# Patient Record
Sex: FEMALE | Race: OTHER | NOT HISPANIC OR LATINO | URBAN - METROPOLITAN AREA
[De-identification: names, ages, dates, MRNs, and addresses within clinical notes are randomized per-mention and may not be internally consistent; named-entity substitution may affect disease eponyms.]

---

## 2017-07-15 ENCOUNTER — EMERGENCY (EMERGENCY)
Facility: HOSPITAL | Age: 82
LOS: 1 days | Discharge: HOME | End: 2017-07-15
Attending: EMERGENCY MEDICINE | Admitting: EMERGENCY MEDICINE
Payer: MEDICARE

## 2017-07-15 VITALS
RESPIRATION RATE: 18 BRPM | HEART RATE: 67 BPM | DIASTOLIC BLOOD PRESSURE: 93 MMHG | SYSTOLIC BLOOD PRESSURE: 175 MMHG | OXYGEN SATURATION: 98 % | TEMPERATURE: 98 F

## 2017-07-15 DIAGNOSIS — S09.90XA UNSPECIFIED INJURY OF HEAD, INITIAL ENCOUNTER: ICD-10-CM

## 2017-07-15 DIAGNOSIS — S05.11XA CONTUSION OF EYEBALL AND ORBITAL TISSUES, RIGHT EYE, INITIAL ENCOUNTER: ICD-10-CM

## 2017-07-15 DIAGNOSIS — E78.5 HYPERLIPIDEMIA, UNSPECIFIED: ICD-10-CM

## 2017-07-15 DIAGNOSIS — Z88.0 ALLERGY STATUS TO PENICILLIN: ICD-10-CM

## 2017-07-15 DIAGNOSIS — Z98.890 OTHER SPECIFIED POSTPROCEDURAL STATES: Chronic | ICD-10-CM

## 2017-07-15 DIAGNOSIS — Z79.82 LONG TERM (CURRENT) USE OF ASPIRIN: ICD-10-CM

## 2017-07-15 DIAGNOSIS — E11.9 TYPE 2 DIABETES MELLITUS WITHOUT COMPLICATIONS: ICD-10-CM

## 2017-07-15 DIAGNOSIS — Z79.899 OTHER LONG TERM (CURRENT) DRUG THERAPY: ICD-10-CM

## 2017-07-15 DIAGNOSIS — Z88.1 ALLERGY STATUS TO OTHER ANTIBIOTIC AGENTS STATUS: ICD-10-CM

## 2017-07-15 PROCEDURE — 70450 CT HEAD/BRAIN W/O DYE: CPT | Mod: 26

## 2017-07-15 PROCEDURE — 99284 EMERGENCY DEPT VISIT MOD MDM: CPT

## 2017-07-15 NOTE — ED PROVIDER NOTE - PHYSICAL EXAMINATION
RIGHT eye with bruising around orbit but no point tenderness or deformities.  Globe intact PERRLA, EOMI, no change in vision reported.  No other evidence of significant head injury.  No csf/blood from ears/nose, no young sign, no abrasions or hematomas.

## 2017-07-15 NOTE — ED ADULT NURSE NOTE - CHPI ED SYMPTOMS NEG
no change in level of consciousness/no weakness/no vomiting/no dizziness/no numbness/no blurred vision/no confusion/no loss of consciousness/no fever/no nausea

## 2017-07-15 NOTE — ED PROVIDER NOTE - MEDICAL DECISION MAKING DETAILS
Awake, alert, oriented patient in no distress here with bruising on the right side of her face/orbit.  No globe injury.  Normal CT yesterday, on ASA, new headache.  Will rescan for delayed bleed.

## 2017-07-15 NOTE — ED PROVIDER NOTE - OBJECTIVE STATEMENT
Awake, alert, oriented and conversant 88 year old  states that while she was in [facility] yesterday she suffered a mechanical fall without LOC causing her RIGHT eye and side of face to hit the ground.  Patient was seen a hospital there and had a CT scan showing no fracture and no ICH.  Patient came home on the bus but today has had increasing headache so she went [place] [facility] and they sent her here for repeat CT scan r/o bleed.  Patient has contusions around her RIGHT eye but no change in vision, no point tenderness, normal eye movements, and no other injuries or complaints.  Patient takes 81 ASA per day but is not on any other blood thinning medications.

## 2017-07-15 NOTE — ED ADULT TRIAGE NOTE - CHIEF COMPLAINT QUOTE
pt biba from urgent care for head injury. tripped and fell last night and sustained bruise to left orbit. denies taking blood thinners. denies LOC

## 2017-07-15 NOTE — ED PROVIDER NOTE - PROGRESS NOTE DETAILS
Awake, alert, oriented x 3, conversant and in no distress with no complaints.  Follow up plan discussed.  Neuro exam normal. Importance of close followup and precautions for immediate return discussed.

## 2017-07-15 NOTE — ED ADULT NURSE NOTE - OBJECTIVE STATEMENT
pt. aaox,3 c/o bruising to head after fall 22 hours ago. pt. states she was seen at Urgent care but sent here for further evaluation. pt. c/o headache with bruising around right eye. speaking complete full sentences

## 2018-02-13 ENCOUNTER — EMERGENCY (EMERGENCY)
Facility: HOSPITAL | Age: 83
LOS: 1 days | Discharge: HOME | End: 2018-02-13
Admitting: EMERGENCY MEDICINE
Payer: MEDICARE

## 2018-02-13 VITALS
RESPIRATION RATE: 16 BRPM | DIASTOLIC BLOOD PRESSURE: 68 MMHG | OXYGEN SATURATION: 98 % | SYSTOLIC BLOOD PRESSURE: 158 MMHG | TEMPERATURE: 98 F | HEART RATE: 88 BPM

## 2018-02-13 VITALS
OXYGEN SATURATION: 99 % | SYSTOLIC BLOOD PRESSURE: 140 MMHG | RESPIRATION RATE: 14 BRPM | DIASTOLIC BLOOD PRESSURE: 90 MMHG | HEART RATE: 74 BPM
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
## 2018-02-13 DIAGNOSIS — Z88.1 ALLERGY STATUS TO OTHER ANTIBIOTIC AGENTS STATUS: ICD-10-CM
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
## 2018-02-13 DIAGNOSIS — Z88.0 ALLERGY STATUS TO PENICILLIN: ICD-10-CM

## 2018-02-13 DIAGNOSIS — S09.90XA UNSPECIFIED INJURY OF HEAD, INITIAL ENCOUNTER: ICD-10-CM

## 2018-02-13 DIAGNOSIS — E78.5 HYPERLIPIDEMIA, UNSPECIFIED: ICD-10-CM
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
## 2018-02-13 DIAGNOSIS — Z98.890 OTHER SPECIFIED POSTPROCEDURAL STATES: Chronic | ICD-10-CM

## 2018-02-13 DIAGNOSIS — S00.11XA CONTUSION OF RIGHT EYELID AND PERIOCULAR AREA, INITIAL ENCOUNTER: ICD-10-CM

## 2018-02-13 DIAGNOSIS — Z23 ENCOUNTER FOR IMMUNIZATION: ICD-10-CM

## 2018-02-13 DIAGNOSIS — Z79.899 OTHER LONG TERM (CURRENT) DRUG THERAPY: ICD-10-CM

## 2018-02-13 DIAGNOSIS — Z79.82 LONG TERM (CURRENT) USE OF ASPIRIN: ICD-10-CM

## 2018-02-13 PROCEDURE — 70450 CT HEAD/BRAIN W/O DYE: CPT | Mod: 26

## 2018-02-13 PROCEDURE — 70486 CT MAXILLOFACIAL W/O DYE: CPT | Mod: 26

## 2018-02-13 PROCEDURE — 99284 EMERGENCY DEPT VISIT MOD MDM: CPT

## 2018-02-13 RX ORDER — TETANUS TOXOID, REDUCED DIPHTHERIA TOXOID AND ACELLULAR PERTUSSIS VACCINE, ADSORBED 5; 2.5; 8; 8; 2.5 [IU]/.5ML; [IU]/.5ML; UG/.5ML; UG/.5ML; UG/.5ML
0.5 SUSPENSION INTRAMUSCULAR ONCE
Qty: 0 | Refills: 0 | Status: COMPLETED | OUTPATIENT
Start: 2018-02-13 | End: 2018-02-13

## 2018-02-13 RX ADMIN — TETANUS TOXOID, REDUCED DIPHTHERIA TOXOID AND ACELLULAR PERTUSSIS VACCINE, ADSORBED 0.5 MILLILITER(S): 5; 2.5; 8; 8; 2.5 SUSPENSION INTRAMUSCULAR at 19:16

## 2018-02-13 NOTE — ED PROVIDER NOTE - RIGHT FACE
Right-sided periorbital ecchymosis, swelling and tenderness. No deformity or palpable crepitus. No septal hematoma.

## 2018-02-13 NOTE — ED PROVIDER NOTE - MEDICAL DECISION MAKING DETAILS
Will order CT Head/Maxillofacial and reassess. CT Head/Maxillofacial negative for acute fracture or intracranial trauma. Pt A&Ox3. NAD. Ambulating around ED asking for discharge. Will D/C home with strict instructions to F/U with PMD in 2-3 days. Strict return precautions reviewed with pt in which pt verbalizes understanding and agrees to.

## 2018-02-13 NOTE — ED PROVIDER NOTE - OBJECTIVE STATEMENT
88 y/o F with PMH of HLD, Diabetes Insipidus and Macular Degeneration presents to ED c/o head & facial injuries after mechanically tripping and falling while running down the sidewalk to catch a bus this evening. She states she smacked the right side of her face and forehead against the sidewalk and now endorses localized pain to the area. She went to [facility] where she was referred here for further evaluation and CT scan. Pt takes ASA daily but does not take any other anticoagulants.    Denies LOC, confusion, generalized headache, dizziness, acute vision changes, neck or back pain, CP, SOB, palpitations, abdo pain, N/V, paresthesias, numbness or focal weakness

## 2018-02-13 NOTE — ED PROVIDER NOTE - CARE PLAN
Principal Discharge DX:	Facial contusion  Secondary Diagnosis:	Head injury Principal Discharge DX:	Facial contusion

## 2018-02-13 NOTE — ED ADULT TRIAGE NOTE - CHIEF COMPLAINT QUOTE
Patient reports tripped and fell while running to catch the bus. hit head--was seen urgent care and sent for head CT. denies LOC, dizziness, or headache. Multiple bruises and swelling noted to R forehead and R face.

## 2020-05-19 NOTE — ED PROVIDER NOTE - CROS ED EYES ALL NEG
CT THORAX NONCONTRAST:



DATE:

5/19/2020



HISTORY:

62-year-old female with respiratory failure.



COMPARISON:

none



FINDINGS:

ETT distal tip at mid thoracic trachea. Esophageal tube distal tip at least at distal stomach or kim
roduodenal junction. Bilateral pleural effusions; right effusion occupies approximately 20-25%

volume of right hemithoracic cavity, and the left occupies approximately 10- 15% volume. Adjacent con
solidations at the posterior aspects of bilateral lower lobes of moderate to large size images,

broadly abutting the pleural effusions. These probably represent passive atelectasis, although pneumo
kavya cannot be excluded.



Scattered ill-defined patchy regions of groundglass attenuation in bilateral upper lobes are nonspeci
fic, but one possibility is pulmonary interstitial edema. Cannot rule out viral pneumonia. No

high-grade narrowing of trachea or bilateral mainstem bronchi. No thoracic aortic aneurysm. Minimal p
ericardial effusion. No pneumothorax. No mediastinal lymphadenopathy.



IMPRESSION:



1. Bilateral small pleural effusions, right greater than left.

2. Adjacent broad consolidations in the bilateral lower lobes, probably passive atelectasis, although
 pneumonia cannot be excluded.

3. Nonspecific scattered bilateral upper lobe groundglass densities. One possibility is pulmonary int
erstitial edema. Cannot exclude viral pneumonia.

4. Endotracheal tube and esophagogastric tube.



Reported By: Momo Larsen 

Electronically Signed:  5/19/2020 11:45 AM negative...

## 2021-05-17 PROBLEM — E78.5 HYPERLIPIDEMIA, UNSPECIFIED: Chronic | Status: ACTIVE | Noted: 2017-07-15

## 2021-05-17 PROBLEM — E23.2 DIABETES INSIPIDUS: Chronic | Status: ACTIVE | Noted: 2017-07-15

## 2021-05-25 ENCOUNTER — APPOINTMENT (OUTPATIENT)
Dept: HEART AND VASCULAR | Facility: CLINIC | Age: 86
End: 2021-05-25

## 2021-05-31 ENCOUNTER — RESULT CHARGE (OUTPATIENT)
Age: 86
End: 2021-05-31

## 2021-06-01 ENCOUNTER — NON-APPOINTMENT (OUTPATIENT)
Age: 86
End: 2021-06-01

## 2021-06-01 ENCOUNTER — APPOINTMENT (OUTPATIENT)
Dept: HEART AND VASCULAR | Facility: CLINIC | Age: 86
End: 2021-06-01
Payer: MEDICARE

## 2021-06-01 VITALS — TEMPERATURE: 97.8 F

## 2021-06-01 VITALS
OXYGEN SATURATION: 96 % | HEIGHT: 61 IN | DIASTOLIC BLOOD PRESSURE: 74 MMHG | HEART RATE: 76 BPM | WEIGHT: 148 LBS | BODY MASS INDEX: 27.94 KG/M2 | SYSTOLIC BLOOD PRESSURE: 130 MMHG

## 2021-06-01 PROCEDURE — 93000 ELECTROCARDIOGRAM COMPLETE: CPT

## 2021-06-01 PROCEDURE — 99205 OFFICE O/P NEW HI 60 MIN: CPT | Mod: 25

## 2021-06-01 NOTE — ASSESSMENT
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 \par \par She has dyspnea with minimal exertion and can walk about 1/2 block before becoming very sob. She denies any cp. She also has PND. Her symptoms have been increasing over the past month.\par \par She had an echo 5/7/21 showing normal EF (55%), bioprosthetic valve, mild/mod AS and severe mitral regurgitation.\par \par Her BNP was 298 on 5/25/21.\par \par Her vitals were WNL. She has a 4/6 SADIE at RUSB and her lungs are CTA. She has 1-2+ LE edema to the knees.\par \par Her EKG showed NST at 76 with LVH.\par \par I walked her up and down the hallway. Her PO was 92 at rest and dropped to 83 with walking.\par \par We will R/O severe MR, CAD and a PE. Pt will have cMRI as well as a CTA for cors and R/O PE.\par \par Pt will return in 1 week\par \par I discussed the patient with Dr. Mcpherson and is in agreement with out plan.

## 2021-06-01 NOTE — HISTORY OF PRESENT ILLNESS
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 \par \par She has dyspnea with minimal exertion and can walk about 1/2 block before becoming very sob. She denies any cp. She also has PND. Her symptoms have been increasing over the past month.\par \par She had an echo 5/7/21 showing normal EF (55%), mild/mod AS and severe mitral regurgitation.\par \par Her BNP was 298 on 5/25/21.

## 2021-06-01 NOTE — PHYSICAL EXAM
[Normal S1, S2] : normal S1, S2 [No Rub] : no rub [No Gallop] : no gallop [Murmur] : murmur [Normal] : clear lung fields, good air entry, no respiratory distress [de-identified] : 4/6 SM [de-identified] : 1-2+ edema bilaterally up to knee

## 2021-06-02 LAB
ALBUMIN SERPL ELPH-MCNC: 4.3 G/DL
ALP BLD-CCNC: 65 U/L
ALT SERPL-CCNC: 19 U/L
ANION GAP SERPL CALC-SCNC: 11 MMOL/L
AST SERPL-CCNC: 26 U/L
BASOPHILS # BLD AUTO: 0.03 K/UL
BASOPHILS NFR BLD AUTO: 0.4 %
BILIRUB SERPL-MCNC: 0.6 MG/DL
BUN SERPL-MCNC: 14 MG/DL
CALCIUM SERPL-MCNC: 9.8 MG/DL
CHLORIDE SERPL-SCNC: 89 MMOL/L
CO2 SERPL-SCNC: 28 MMOL/L
CREAT SERPL-MCNC: 0.86 MG/DL
DEPRECATED D DIMER PPP IA-ACNC: 411 NG/ML DDU
EOSINOPHIL # BLD AUTO: 0.06 K/UL
EOSINOPHIL NFR BLD AUTO: 0.8 %
GLUCOSE SERPL-MCNC: 95 MG/DL
HCT VFR BLD CALC: 34.3 %
HGB BLD-MCNC: 10.9 G/DL
IMM GRANULOCYTES NFR BLD AUTO: 0.5 %
LYMPHOCYTES # BLD AUTO: 0.92 K/UL
LYMPHOCYTES NFR BLD AUTO: 12.3 %
MAN DIFF?: NORMAL
MCHC RBC-ENTMCNC: 30.9 PG
MCHC RBC-ENTMCNC: 31.8 GM/DL
MCV RBC AUTO: 97.2 FL
MONOCYTES # BLD AUTO: 0.78 K/UL
MONOCYTES NFR BLD AUTO: 10.5 %
NEUTROPHILS # BLD AUTO: 5.63 K/UL
NEUTROPHILS NFR BLD AUTO: 75.5 %
NT-PROBNP SERPL-MCNC: 7257 PG/ML
PLATELET # BLD AUTO: 312 K/UL
POTASSIUM SERPL-SCNC: 5.5 MMOL/L
PROT SERPL-MCNC: 6.2 G/DL
RBC # BLD: 3.53 M/UL
RBC # FLD: 14.3 %
SODIUM SERPL-SCNC: 127 MMOL/L
WBC # FLD AUTO: 7.46 K/UL

## 2021-06-03 ENCOUNTER — RESULT REVIEW (OUTPATIENT)
Age: 86
End: 2021-06-03

## 2021-06-03 ENCOUNTER — OUTPATIENT (OUTPATIENT)
Dept: OUTPATIENT SERVICES | Facility: HOSPITAL | Age: 86
LOS: 1 days | End: 2021-06-03

## 2021-06-03 ENCOUNTER — APPOINTMENT (OUTPATIENT)
Dept: CT IMAGING | Facility: CLINIC | Age: 86
End: 2021-06-03
Payer: MEDICARE

## 2021-06-03 DIAGNOSIS — Z98.890 OTHER SPECIFIED POSTPROCEDURAL STATES: Chronic | ICD-10-CM

## 2021-06-03 PROCEDURE — G1004: CPT

## 2021-06-03 PROCEDURE — 71275 CT ANGIOGRAPHY CHEST: CPT | Mod: 26,ME

## 2021-06-03 PROCEDURE — 75574 CT ANGIO HRT W/3D IMAGE: CPT | Mod: 26,ME

## 2021-06-11 ENCOUNTER — OUTPATIENT (OUTPATIENT)
Dept: OUTPATIENT SERVICES | Facility: HOSPITAL | Age: 86
LOS: 1 days | End: 2021-06-11
Payer: MEDICARE

## 2021-06-11 ENCOUNTER — EMERGENCY (EMERGENCY)
Facility: HOSPITAL | Age: 86
LOS: 1 days | Discharge: LEFT BEFORE DISCHARGE | End: 2021-06-11
Attending: STUDENT IN AN ORGANIZED HEALTH CARE EDUCATION/TRAINING PROGRAM | Admitting: STUDENT IN AN ORGANIZED HEALTH CARE EDUCATION/TRAINING PROGRAM
Payer: MEDICARE

## 2021-06-11 ENCOUNTER — APPOINTMENT (OUTPATIENT)
Dept: MRI IMAGING | Facility: HOSPITAL | Age: 86
End: 2021-06-11

## 2021-06-11 VITALS
RESPIRATION RATE: 18 BRPM | OXYGEN SATURATION: 97 % | SYSTOLIC BLOOD PRESSURE: 133 MMHG | HEART RATE: 76 BPM | DIASTOLIC BLOOD PRESSURE: 60 MMHG

## 2021-06-11 VITALS
SYSTOLIC BLOOD PRESSURE: 108 MMHG | HEART RATE: 64 BPM | TEMPERATURE: 97 F | DIASTOLIC BLOOD PRESSURE: 73 MMHG | HEIGHT: 56 IN | RESPIRATION RATE: 18 BRPM | OXYGEN SATURATION: 95 %

## 2021-06-11 DIAGNOSIS — R55 SYNCOPE AND COLLAPSE: ICD-10-CM

## 2021-06-11 DIAGNOSIS — Z98.890 OTHER SPECIFIED POSTPROCEDURAL STATES: Chronic | ICD-10-CM

## 2021-06-11 DIAGNOSIS — E23.2 DIABETES INSIPIDUS: ICD-10-CM

## 2021-06-11 DIAGNOSIS — Z88.1 ALLERGY STATUS TO OTHER ANTIBIOTIC AGENTS STATUS: ICD-10-CM

## 2021-06-11 DIAGNOSIS — Z88.0 ALLERGY STATUS TO PENICILLIN: ICD-10-CM

## 2021-06-11 DIAGNOSIS — E11.9 TYPE 2 DIABETES MELLITUS WITHOUT COMPLICATIONS: ICD-10-CM

## 2021-06-11 DIAGNOSIS — I25.10 ATHEROSCLEROTIC HEART DISEASE OF NATIVE CORONARY ARTERY WITHOUT ANGINA PECTORIS: ICD-10-CM

## 2021-06-11 DIAGNOSIS — R07.9 CHEST PAIN, UNSPECIFIED: ICD-10-CM

## 2021-06-11 DIAGNOSIS — Z86.79 PERSONAL HISTORY OF OTHER DISEASES OF THE CIRCULATORY SYSTEM: ICD-10-CM

## 2021-06-11 DIAGNOSIS — E78.5 HYPERLIPIDEMIA, UNSPECIFIED: ICD-10-CM

## 2021-06-11 LAB
ALBUMIN SERPL ELPH-MCNC: 3.9 G/DL — SIGNIFICANT CHANGE UP (ref 3.3–5)
ALP SERPL-CCNC: 57 U/L — SIGNIFICANT CHANGE UP (ref 40–120)
ALT FLD-CCNC: 17 U/L — SIGNIFICANT CHANGE UP (ref 10–45)
ANION GAP SERPL CALC-SCNC: 13 MMOL/L — SIGNIFICANT CHANGE UP (ref 5–17)
APTT BLD: 31 SEC — SIGNIFICANT CHANGE UP (ref 27.5–35.5)
AST SERPL-CCNC: 28 U/L — SIGNIFICANT CHANGE UP (ref 10–40)
BASOPHILS # BLD AUTO: 0.02 K/UL — SIGNIFICANT CHANGE UP (ref 0–0.2)
BASOPHILS NFR BLD AUTO: 0.3 % — SIGNIFICANT CHANGE UP (ref 0–2)
BILIRUB SERPL-MCNC: 0.6 MG/DL — SIGNIFICANT CHANGE UP (ref 0.2–1.2)
BUN SERPL-MCNC: 24 MG/DL — HIGH (ref 7–23)
CALCIUM SERPL-MCNC: 9.8 MG/DL — SIGNIFICANT CHANGE UP (ref 8.4–10.5)
CHLORIDE SERPL-SCNC: 93 MMOL/L — LOW (ref 96–108)
CO2 SERPL-SCNC: 24 MMOL/L — SIGNIFICANT CHANGE UP (ref 22–31)
CREAT SERPL-MCNC: 1 MG/DL — SIGNIFICANT CHANGE UP (ref 0.5–1.3)
EOSINOPHIL # BLD AUTO: 0.07 K/UL — SIGNIFICANT CHANGE UP (ref 0–0.5)
EOSINOPHIL NFR BLD AUTO: 1 % — SIGNIFICANT CHANGE UP (ref 0–6)
GLUCOSE SERPL-MCNC: 125 MG/DL — HIGH (ref 70–99)
HCT VFR BLD CALC: 31.8 % — LOW (ref 34.5–45)
HGB BLD-MCNC: 10.4 G/DL — LOW (ref 11.5–15.5)
IMM GRANULOCYTES NFR BLD AUTO: 0.4 % — SIGNIFICANT CHANGE UP (ref 0–1.5)
INR BLD: 1.01 — SIGNIFICANT CHANGE UP (ref 0.88–1.16)
LYMPHOCYTES # BLD AUTO: 1.31 K/UL — SIGNIFICANT CHANGE UP (ref 1–3.3)
LYMPHOCYTES # BLD AUTO: 18.1 % — SIGNIFICANT CHANGE UP (ref 13–44)
MCHC RBC-ENTMCNC: 30.5 PG — SIGNIFICANT CHANGE UP (ref 27–34)
MCHC RBC-ENTMCNC: 32.7 GM/DL — SIGNIFICANT CHANGE UP (ref 32–36)
MCV RBC AUTO: 93.3 FL — SIGNIFICANT CHANGE UP (ref 80–100)
MONOCYTES # BLD AUTO: 0.64 K/UL — SIGNIFICANT CHANGE UP (ref 0–0.9)
MONOCYTES NFR BLD AUTO: 8.8 % — SIGNIFICANT CHANGE UP (ref 2–14)
NEUTROPHILS # BLD AUTO: 5.17 K/UL — SIGNIFICANT CHANGE UP (ref 1.8–7.4)
NEUTROPHILS NFR BLD AUTO: 71.4 % — SIGNIFICANT CHANGE UP (ref 43–77)
NRBC # BLD: 0 /100 WBCS — SIGNIFICANT CHANGE UP (ref 0–0)
PLATELET # BLD AUTO: 231 K/UL — SIGNIFICANT CHANGE UP (ref 150–400)
POTASSIUM SERPL-MCNC: 4.6 MMOL/L — SIGNIFICANT CHANGE UP (ref 3.5–5.3)
POTASSIUM SERPL-SCNC: 4.6 MMOL/L — SIGNIFICANT CHANGE UP (ref 3.5–5.3)
PROT SERPL-MCNC: 6.5 G/DL — SIGNIFICANT CHANGE UP (ref 6–8.3)
PROTHROM AB SERPL-ACNC: 12.1 SEC — SIGNIFICANT CHANGE UP (ref 10.6–13.6)
RBC # BLD: 3.41 M/UL — LOW (ref 3.8–5.2)
RBC # FLD: 13.7 % — SIGNIFICANT CHANGE UP (ref 10.3–14.5)
SODIUM SERPL-SCNC: 130 MMOL/L — LOW (ref 135–145)
TROPONIN T SERPL-MCNC: 0.01 NG/ML — SIGNIFICANT CHANGE UP (ref 0–0.01)
WBC # BLD: 7.24 K/UL — SIGNIFICANT CHANGE UP (ref 3.8–10.5)
WBC # FLD AUTO: 7.24 K/UL — SIGNIFICANT CHANGE UP (ref 3.8–10.5)

## 2021-06-11 PROCEDURE — 93005 ELECTROCARDIOGRAM TRACING: CPT

## 2021-06-11 PROCEDURE — 99284 EMERGENCY DEPT VISIT MOD MDM: CPT

## 2021-06-11 PROCEDURE — 84484 ASSAY OF TROPONIN QUANT: CPT

## 2021-06-11 PROCEDURE — 71555 MRI ANGIO CHEST W OR W/O DYE: CPT

## 2021-06-11 PROCEDURE — A9585: CPT

## 2021-06-11 PROCEDURE — 36415 COLL VENOUS BLD VENIPUNCTURE: CPT

## 2021-06-11 PROCEDURE — 80053 COMPREHEN METABOLIC PANEL: CPT

## 2021-06-11 PROCEDURE — G1004: CPT

## 2021-06-11 PROCEDURE — 85610 PROTHROMBIN TIME: CPT

## 2021-06-11 PROCEDURE — 71555 MRI ANGIO CHEST W OR W/O DYE: CPT | Mod: 26,ME

## 2021-06-11 PROCEDURE — 85730 THROMBOPLASTIN TIME PARTIAL: CPT

## 2021-06-11 PROCEDURE — 85025 COMPLETE CBC W/AUTO DIFF WBC: CPT

## 2021-06-11 PROCEDURE — 99283 EMERGENCY DEPT VISIT LOW MDM: CPT

## 2021-06-11 NOTE — ED PROVIDER NOTE - PATIENT PORTAL LINK FT
You can access the FollowMyHealth Patient Portal offered by [facility] by registering at the following website: [email or web address]. By joining [employer]’s FollowMyHealth portal, you will also be able to view your health information using other applications (apps) compatible with our system.

## 2021-06-11 NOTE — ED ADULT NURSE NOTE - OBJECTIVE STATEMENT
pt had a syncopal episode , was sitting outside a restaurant and passed out for a brief period, prior to this had walked a few blocks using her walker accompanied by her carer and had been fasting since am for an MRI , stated that she was feeling weak while walking, having some discomfort in right lateral ribcage area, no SOB

## 2021-06-11 NOTE — ED PROVIDER NOTE - OBJECTIVE STATEMENT
89F hx HLD, DM, CAD, thoracic aortic aneurysms, presenting to the ED s/p syncopal event that was witnessed by her health aid while at a restaurant this evening. Per pt, she felt sob walking the many blocks to the restaurant, and then once she finally arrived to said restaurant, briefly 'passed out' while sitting in a chair. Pt did not fall or hit her head, per aid at bedside, and came to ~2 minutes later at which point EMS was called. Here in ED pt complains of chest pain under her R breast, mild, nonpleuritic.     Denies headache, paresthesias, abdominal pain, nausea, vomiting, fevers

## 2021-06-11 NOTE — ED PROVIDER NOTE - CLINICAL SUMMARY MEDICAL DECISION MAKING FREE TEXT BOX
pt was recommended admission. explained in detail why I felt pt should stay for cardiac workup but she refused. Pt was agreeable to signing against medical advice release and left against my medical advice stating "I'll see my doctor in the morning:".

## 2021-06-11 NOTE — ED ADULT TRIAGE NOTE - OTHER COMPLAINTS
biba from street for syncopal episode, ems reports pt was hypotensive in field 60's.  Pt denies dizziness, cp, n/v.

## 2021-06-15 ENCOUNTER — APPOINTMENT (OUTPATIENT)
Dept: HEART AND VASCULAR | Facility: CLINIC | Age: 86
End: 2021-06-15

## 2021-06-16 ENCOUNTER — INPATIENT (INPATIENT)
Facility: HOSPITAL | Age: 86
LOS: 19 days | Discharge: HOME HEALTH | DRG: 266 | End: 2021-07-06
Attending: INTERNAL MEDICINE | Admitting: INTERNAL MEDICINE
Payer: MEDICARE

## 2021-06-16 VITALS
DIASTOLIC BLOOD PRESSURE: 33 MMHG | SYSTOLIC BLOOD PRESSURE: 81 MMHG | RESPIRATION RATE: 17 BRPM | HEIGHT: 56 IN | HEART RATE: 73 BPM | OXYGEN SATURATION: 98 % | WEIGHT: 147.93 LBS | TEMPERATURE: 98 F

## 2021-06-16 DIAGNOSIS — Z98.890 OTHER SPECIFIED POSTPROCEDURAL STATES: Chronic | ICD-10-CM

## 2021-06-16 DIAGNOSIS — E23.2 DIABETES INSIPIDUS: ICD-10-CM

## 2021-06-16 DIAGNOSIS — E78.5 HYPERLIPIDEMIA, UNSPECIFIED: ICD-10-CM

## 2021-06-16 DIAGNOSIS — Z95.2 PRESENCE OF PROSTHETIC HEART VALVE: Chronic | ICD-10-CM

## 2021-06-16 DIAGNOSIS — N17.9 ACUTE KIDNEY FAILURE, UNSPECIFIED: ICD-10-CM

## 2021-06-16 DIAGNOSIS — Z86.79 PERSONAL HISTORY OF OTHER DISEASES OF THE CIRCULATORY SYSTEM: ICD-10-CM

## 2021-06-16 DIAGNOSIS — I10 ESSENTIAL (PRIMARY) HYPERTENSION: ICD-10-CM

## 2021-06-16 DIAGNOSIS — Z96.641 PRESENCE OF RIGHT ARTIFICIAL HIP JOINT: Chronic | ICD-10-CM

## 2021-06-16 DIAGNOSIS — D64.9 ANEMIA, UNSPECIFIED: ICD-10-CM

## 2021-06-16 DIAGNOSIS — Z96.649 PRESENCE OF UNSPECIFIED ARTIFICIAL HIP JOINT: Chronic | ICD-10-CM

## 2021-06-16 DIAGNOSIS — R55 SYNCOPE AND COLLAPSE: ICD-10-CM

## 2021-06-16 LAB
ALBUMIN SERPL ELPH-MCNC: 3.9 G/DL — SIGNIFICANT CHANGE UP (ref 3.3–5)
ALP SERPL-CCNC: 60 U/L — SIGNIFICANT CHANGE UP (ref 40–120)
ALT FLD-CCNC: 17 U/L — SIGNIFICANT CHANGE UP (ref 10–45)
ANION GAP SERPL CALC-SCNC: 6 MMOL/L — SIGNIFICANT CHANGE UP (ref 5–17)
APTT BLD: 28.3 SEC — SIGNIFICANT CHANGE UP (ref 27.5–35.5)
AST SERPL-CCNC: 29 U/L — SIGNIFICANT CHANGE UP (ref 10–40)
BASOPHILS # BLD AUTO: 0.02 K/UL — SIGNIFICANT CHANGE UP (ref 0–0.2)
BASOPHILS NFR BLD AUTO: 0.2 % — SIGNIFICANT CHANGE UP (ref 0–2)
BILIRUB SERPL-MCNC: 0.5 MG/DL — SIGNIFICANT CHANGE UP (ref 0.2–1.2)
BUN SERPL-MCNC: 35 MG/DL — HIGH (ref 7–23)
CALCIUM SERPL-MCNC: 9.4 MG/DL — SIGNIFICANT CHANGE UP (ref 8.4–10.5)
CHLORIDE SERPL-SCNC: 97 MMOL/L — SIGNIFICANT CHANGE UP (ref 96–108)
CK MB CFR SERPL CALC: 12.7 NG/ML — HIGH (ref 0–6.7)
CK MB CFR SERPL CALC: 13.3 NG/ML — HIGH (ref 0–6.7)
CK MB CFR SERPL CALC: 3.3 NG/ML — SIGNIFICANT CHANGE UP (ref 0–6.7)
CK SERPL-CCNC: 136 U/L — SIGNIFICANT CHANGE UP (ref 25–170)
CK SERPL-CCNC: 214 U/L — HIGH (ref 25–170)
CK SERPL-CCNC: 237 U/L — HIGH (ref 25–170)
CO2 SERPL-SCNC: 30 MMOL/L — SIGNIFICANT CHANGE UP (ref 22–31)
CREAT SERPL-MCNC: 1.34 MG/DL — HIGH (ref 0.5–1.3)
EOSINOPHIL # BLD AUTO: 0.13 K/UL — SIGNIFICANT CHANGE UP (ref 0–0.5)
EOSINOPHIL NFR BLD AUTO: 1.6 % — SIGNIFICANT CHANGE UP (ref 0–6)
GLUCOSE SERPL-MCNC: 105 MG/DL — HIGH (ref 70–99)
HCT VFR BLD CALC: 32.1 % — LOW (ref 34.5–45)
HGB BLD-MCNC: 10.3 G/DL — LOW (ref 11.5–15.5)
IMM GRANULOCYTES NFR BLD AUTO: 0.4 % — SIGNIFICANT CHANGE UP (ref 0–1.5)
INR BLD: 0.98 — SIGNIFICANT CHANGE UP (ref 0.88–1.16)
LYMPHOCYTES # BLD AUTO: 1.13 K/UL — SIGNIFICANT CHANGE UP (ref 1–3.3)
LYMPHOCYTES # BLD AUTO: 14 % — SIGNIFICANT CHANGE UP (ref 13–44)
MCHC RBC-ENTMCNC: 30 PG — SIGNIFICANT CHANGE UP (ref 27–34)
MCHC RBC-ENTMCNC: 32.1 GM/DL — SIGNIFICANT CHANGE UP (ref 32–36)
MCV RBC AUTO: 93.6 FL — SIGNIFICANT CHANGE UP (ref 80–100)
MONOCYTES # BLD AUTO: 0.82 K/UL — SIGNIFICANT CHANGE UP (ref 0–0.9)
MONOCYTES NFR BLD AUTO: 10.2 % — SIGNIFICANT CHANGE UP (ref 2–14)
NEUTROPHILS # BLD AUTO: 5.93 K/UL — SIGNIFICANT CHANGE UP (ref 1.8–7.4)
NEUTROPHILS NFR BLD AUTO: 73.6 % — SIGNIFICANT CHANGE UP (ref 43–77)
NRBC # BLD: 0 /100 WBCS — SIGNIFICANT CHANGE UP (ref 0–0)
NT-PROBNP SERPL-SCNC: 7233 PG/ML — HIGH (ref 0–300)
PLATELET # BLD AUTO: 247 K/UL — SIGNIFICANT CHANGE UP (ref 150–400)
POTASSIUM SERPL-MCNC: 4.7 MMOL/L — SIGNIFICANT CHANGE UP (ref 3.5–5.3)
POTASSIUM SERPL-SCNC: 4.7 MMOL/L — SIGNIFICANT CHANGE UP (ref 3.5–5.3)
PROT SERPL-MCNC: 6.8 G/DL — SIGNIFICANT CHANGE UP (ref 6–8.3)
PROTHROM AB SERPL-ACNC: 11.8 SEC — SIGNIFICANT CHANGE UP (ref 10.6–13.6)
RBC # BLD: 3.43 M/UL — LOW (ref 3.8–5.2)
RBC # FLD: 14 % — SIGNIFICANT CHANGE UP (ref 10.3–14.5)
SARS-COV-2 RNA SPEC QL NAA+PROBE: NEGATIVE — SIGNIFICANT CHANGE UP
SODIUM SERPL-SCNC: 133 MMOL/L — LOW (ref 135–145)
TROPONIN T SERPL-MCNC: 0.04 NG/ML — CRITICAL HIGH (ref 0–0.01)
TROPONIN T SERPL-MCNC: 0.18 NG/ML — CRITICAL HIGH (ref 0–0.01)
TROPONIN T SERPL-MCNC: 0.21 NG/ML — CRITICAL HIGH (ref 0–0.01)
WBC # BLD: 8.06 K/UL — SIGNIFICANT CHANGE UP (ref 3.8–10.5)
WBC # FLD AUTO: 8.06 K/UL — SIGNIFICANT CHANGE UP (ref 3.8–10.5)

## 2021-06-16 PROCEDURE — 99223 1ST HOSP IP/OBS HIGH 75: CPT

## 2021-06-16 PROCEDURE — 93306 TTE W/DOPPLER COMPLETE: CPT | Mod: 26

## 2021-06-16 PROCEDURE — 93010 ELECTROCARDIOGRAM REPORT: CPT | Mod: 77

## 2021-06-16 PROCEDURE — 93010 ELECTROCARDIOGRAM REPORT: CPT

## 2021-06-16 PROCEDURE — ZZZZZ: CPT

## 2021-06-16 PROCEDURE — 71045 X-RAY EXAM CHEST 1 VIEW: CPT | Mod: 26

## 2021-06-16 PROCEDURE — 99291 CRITICAL CARE FIRST HOUR: CPT

## 2021-06-16 RX ORDER — DESMOPRESSIN ACETATE 0.1 MG/1
0 TABLET ORAL
Qty: 0 | Refills: 0 | DISCHARGE

## 2021-06-16 RX ORDER — SODIUM CHLORIDE 9 MG/ML
250 INJECTION INTRAMUSCULAR; INTRAVENOUS; SUBCUTANEOUS ONCE
Refills: 0 | Status: COMPLETED | OUTPATIENT
Start: 2021-06-16 | End: 2021-06-16

## 2021-06-16 RX ORDER — ASPIRIN/CALCIUM CARB/MAGNESIUM 324 MG
1 TABLET ORAL
Qty: 0 | Refills: 0 | DISCHARGE

## 2021-06-16 RX ORDER — HYDROCORTISONE 1 %
1 OINTMENT (GRAM) TOPICAL ONCE
Refills: 0 | Status: COMPLETED | OUTPATIENT
Start: 2021-06-16 | End: 2021-06-17

## 2021-06-16 RX ORDER — ATORVASTATIN CALCIUM 80 MG/1
0 TABLET, FILM COATED ORAL
Qty: 0 | Refills: 0 | DISCHARGE

## 2021-06-16 RX ORDER — FUROSEMIDE 40 MG
20 TABLET ORAL DAILY
Refills: 0 | Status: DISCONTINUED | OUTPATIENT
Start: 2021-06-17 | End: 2021-06-17

## 2021-06-16 RX ORDER — METOPROLOL TARTRATE 50 MG
12.5 TABLET ORAL
Refills: 0 | Status: DISCONTINUED | OUTPATIENT
Start: 2021-06-16 | End: 2021-06-29

## 2021-06-16 RX ORDER — ASPIRIN/CALCIUM CARB/MAGNESIUM 324 MG
81 TABLET ORAL DAILY
Refills: 0 | Status: DISCONTINUED | OUTPATIENT
Start: 2021-06-17 | End: 2021-06-29

## 2021-06-16 RX ORDER — ENOXAPARIN SODIUM 100 MG/ML
40 INJECTION SUBCUTANEOUS EVERY 24 HOURS
Refills: 0 | Status: DISCONTINUED | OUTPATIENT
Start: 2021-06-16 | End: 2021-06-29

## 2021-06-16 RX ORDER — LATANOPROST 0.05 MG/ML
1 SOLUTION/ DROPS OPHTHALMIC; TOPICAL AT BEDTIME
Refills: 0 | Status: DISCONTINUED | OUTPATIENT
Start: 2021-06-16 | End: 2021-06-29

## 2021-06-16 RX ORDER — GABAPENTIN 400 MG/1
300 CAPSULE ORAL AT BEDTIME
Refills: 0 | Status: DISCONTINUED | OUTPATIENT
Start: 2021-06-16 | End: 2021-06-29

## 2021-06-16 RX ORDER — FLUTICASONE PROPIONATE 50 MCG
1 SPRAY, SUSPENSION NASAL EVERY 12 HOURS
Refills: 0 | Status: DISCONTINUED | OUTPATIENT
Start: 2021-06-16 | End: 2021-06-29

## 2021-06-16 RX ORDER — ASPIRIN/CALCIUM CARB/MAGNESIUM 324 MG
325 TABLET ORAL ONCE
Refills: 0 | Status: COMPLETED | OUTPATIENT
Start: 2021-06-16 | End: 2021-06-16

## 2021-06-16 RX ORDER — DESMOPRESSIN ACETATE 0.1 MG/1
0.1 TABLET ORAL
Refills: 0 | Status: DISCONTINUED | OUTPATIENT
Start: 2021-06-16 | End: 2021-06-29

## 2021-06-16 RX ORDER — ATORVASTATIN CALCIUM 80 MG/1
40 TABLET, FILM COATED ORAL AT BEDTIME
Refills: 0 | Status: DISCONTINUED | OUTPATIENT
Start: 2021-06-16 | End: 2021-06-29

## 2021-06-16 RX ADMIN — Medication 12.5 MILLIGRAM(S): at 22:48

## 2021-06-16 RX ADMIN — SODIUM CHLORIDE 500 MILLILITER(S): 9 INJECTION INTRAMUSCULAR; INTRAVENOUS; SUBCUTANEOUS at 11:44

## 2021-06-16 RX ADMIN — DESMOPRESSIN ACETATE 0.1 MILLIGRAM(S): 0.1 TABLET ORAL at 19:04

## 2021-06-16 RX ADMIN — GABAPENTIN 300 MILLIGRAM(S): 400 CAPSULE ORAL at 22:48

## 2021-06-16 RX ADMIN — LATANOPROST 1 DROP(S): 0.05 SOLUTION/ DROPS OPHTHALMIC; TOPICAL at 22:48

## 2021-06-16 RX ADMIN — ENOXAPARIN SODIUM 40 MILLIGRAM(S): 100 INJECTION SUBCUTANEOUS at 19:04

## 2021-06-16 RX ADMIN — ATORVASTATIN CALCIUM 40 MILLIGRAM(S): 80 TABLET, FILM COATED ORAL at 22:48

## 2021-06-16 RX ADMIN — Medication 325 MILLIGRAM(S): at 13:06

## 2021-06-16 NOTE — H&P ADULT - NSICDXPASTSURGICALHX_GEN_ALL_CORE_FT
PAST SURGICAL HISTORY:  History of orthopedic surgery multiple    S/P AVR      PAST SURGICAL HISTORY:  History of orthopedic surgery multiple    S/P AVR Bioprosthetic     PAST SURGICAL HISTORY:  H/O lumbosacral spine surgery     S/P AVR Bioprosthetic    S/P hip replacement B/L

## 2021-06-16 NOTE — ED PROVIDER NOTE - PROGRESS NOTE DETAILS
Pt w elevated troponin, other labs wnl.  Pt discussed w Dr Vasquez and Dr Mcpherson - both agree w admission plan.  Dr Mcpherson spoke w Dr Rendon who recommended pt be [place] to Dr Kelly ([place] [facility] [place]  Dr Kelly did not answer call or respond to text as yet but pt discussed w on call cards hospitalist, Dr Fernandez - agrees w admit.  Dr Mcpherson requested mri since outpt mri not performed in a way to eval valves; mri order entered but per mri cannot be done until tonight.  Cardiology team did not feel pt needs mri and agreed echo was sufficient.  Echo pending.

## 2021-06-16 NOTE — ED ADULT NURSE NOTE - NSIMPLEMENTINTERV_GEN_ALL_ED
Implemented All Fall with Harm Risk Interventions:  [place] to call system. Call bell, personal items and telephone within reach. Instruct patient to call for assistance. Room bathroom lighting operational. Non-slip footwear when patient is off stretcher. Physically safe environment: no spills, clutter or unnecessary equipment. Stretcher in lowest position, wheels locked, appropriate side rails in place. Provide visual cue, wrist band, yellow gown, etc. Monitor gait and stability. Monitor for mental status changes and reorient to person, place, and time. Review medications for side effects contributing to fall risk. Reinforce activity limits and safety measures with patient and family. Provide visual clues: red socks.

## 2021-06-16 NOTE — H&P ADULT - NSHPLABSRESULTS_GEN_ALL_CORE
ECG: NSR @71bpm, ANAIS in III, V1, V2, STD in I (unchanged from prior                        10.3   8.06  )-----------( 247      ( 16 Jun 2021 11:34 )             32.1       06-16    133<L>  |  97  |  35<H>  ----------------------------<  105<H>  4.7   |  30  |  1.34<H>    Ca    9.4      16 Jun 2021 11:34    TPro  6.8  /  Alb  3.9  /  TBili  0.5  /  DBili  x   /  AST  29  /  ALT  17  /  AlkPhos  60  06-16      PT/INR - ( 16 Jun 2021 11:34 )   PT: 11.8 sec;   INR: 0.98          PTT - ( 16 Jun 2021 11:34 )  PTT:28.3 sec    CARDIAC MARKERS ( 16 Jun 2021 11:34 )  x     / 0.04 ng/mL / 136 U/L / x     / 3.3 ng/mL

## 2021-06-16 NOTE — H&P ADULT - PROBLEM SELECTOR PLAN 3
BUN/Cr 35/1.34 on admission (unknown baseline, Cr. 1.00 6/11). Likely in the setting of dehydration  - S/p IVF in ED

## 2021-06-16 NOTE — ED ADULT NURSE NOTE - CHIEF COMPLAINT QUOTE
If well just observer and recheck later today and tomorrow am. And call  He has used nasal sprays in past see if using afrin or similar or oral decongestants and if so then stop biba for syncopal episode while at her doctor's office this morning.  Patient fell from standing position onto the floor.  Denies neck / back pain, sob, chest pain, numbness.  .  Per ems patient hypotensive on site bp 94/86 hr 83; given IV fluids.  Moves all extremities with equal strength.  Ekg in progress

## 2021-06-16 NOTE — H&P ADULT - ASSESSMENT
89 yo F with 24Hr private HHA with PMHx of HTN, HLD, Diabetes insipidus, GERD, AS s/p AVR (7 years ago), TAA (4.1cm) who presented to the ED 6/16/21 BIBEMS from pt’s dermatology office where she was walking down the hallway and suddenly lost consciousness per her HHA. She sat her down so she did not have a fall or hit her head. Pt was found to be hypotensive with SBP 80’s in the field. Pt was given 250 cc bolus in the ED. Pt is now admitted to cardiology for further management of syncope.

## 2021-06-16 NOTE — H&P ADULT - PROBLEM SELECTOR PLAN 2
S/p bioprosthetic AVR @ [facility] 6-7 years ago per pt  - Pt on Lasix 20mg PO QD at home, continue

## 2021-06-16 NOTE — ED ADULT NURSE NOTE - CHPI ED NUR SYMPTOMS NEG
no back pain/no chest pain/no congestion/no diaphoresis/no nausea/no shortness of breath/no vomiting

## 2021-06-16 NOTE — ED PROVIDER NOTE - ST/T WAVE
similar changes as above st elevation v1-3 similar to 6/11/21, biphasic t v5/6 new from 6/11, 1/2 box st depression 1, L similar to 6/11

## 2021-06-16 NOTE — CONSULT NOTE ADULT - ATTENDING COMMENTS
Patient seen and examined with PA/fellow bedside and discussed during rounds.  PA/fellow note read, including vitals, physical findings, laboratory data, and radiological reports.   Revisions included below. Direct personal management at bedside and extensive interpretation of the data performed.  Plan was outlined and discussed in details with the multidisciplinary team.  Decision making of high complexity.     89 yo F with 24Hr private HHA with PMHx of HTN, HLD, Diabetes insipidus, GERD, AS s/p TAVR with a [employer] THV (6-7 years ago), TAA (4.1cm) who presented with exertional SOB followed by syncope. Reportedly pt had recent ED visit on 21 for another episode of witnessed syncope after walking many blocks to a restaurant with pt left AMA that visit. Pt recently started on lasix for CHF/volume overload with coordination with endocrinologist for her DI. Outpatient CCTA 6/3/21 revealing calcium score is severe at 768 Agatston units, non obstructive CAD, dLAD is not well visualized but is probably non obstructive, shallow myocardial bridge, bioprosthetic aortic valve noted with nml leaflet thickness and excursion (motion artifact noted), severe mitral annular/aortic calcification, and no evidence of PE. Labs revealed mild renal insiffuciency with cr 1.3, anemia hgb 10.3, mildly elevated troponin in setting of elevated BNP. TTE revealed nml EF, severely elevated mean aortic transvalvular gradients, severe MR/moderate calcific MS. Referred for evaluation. Patient with concern for structural valve deterioration of TAVR THV without clear evidence of leaflet thrombus, however, limited cardiac CTA with motion artifact. Will require ROD to better evaluation THV function. Patient is at very high risk for any surgical valvular intervention without minimally invasive options to treat mitral valve disease. Consider for TAVR Bryce of  of THV.  -obtain ROD to better evaluate TAVR THV  -pending above, would consider CTA TAVR protocol including abd CTA to evaluate for TAVR Bryce  -obtain records from [place] (operative report, baseline post TAVR TTE to understand baseline gradients)  -maintain euvolemic state  -endocrine consultation  -care per primary team, d/w referring cardiologist and PMD    I was physically present for the key portions of the evaluation and management (E/M) service provided.  I agree with the above history, physical, and plan which I have reviewed and edited where appropriate.     80 minutes spent on total encounter; more than 50% of the visit was spent counseling and/or coordinating care by the attending physician.

## 2021-06-16 NOTE — ED PROVIDER NOTE - OBJECTIVE STATEMENT
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   CTA cardiac 6/3/21: Impression:  1.  The calcium score is severe at 768 Agatston units.  2.  Non obstructive coronary artery disease.  3.  Distal LAD is not well visualized but is probably non obstructive. 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  4.  Bioprosthetic aortic valve noted. Valve leaflets demonstrate normal thickness and excursion.  5.  Severe mitral annular calcification.  6.  Aortic calcification present as well as chest findingds:  1. Cardiomegaly with mild pulmonary venous congestion.  2. Dilatation of the main pulmonary artery measuring 3.3 cm. Although limited for the evaluation of distal segmental branch emboli no central pulmonary artery emboli are noted. No evidence of right ventricular strain.  3. 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 Periodic intervals surveillance is suggested.  4. Left upper and right upper lobe solid nodule measuring 6 and 5 mm respectively. As per Fleischner society 2017 guidelines, interval surveillance thoracic CT in 3-6 months is suggested to confirm stability.  Pt w recent visit w Dr Vasquez 6/1, noted 5/7/21 showing normal EF (55%), mild/mod AS and severe mitral regurgitation. Her BNP was 298 on 5/25/21. Pt here w syncope, also seen 6/11 for syncope and signed out ama. 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 Pt w recent visit w Dr Vasquez 6/1, noted 5/7/21 showing normal EF (55%), mild/mod AS and severe mitral regurgitation. Her BNP was 298 on 5/25/21.  Pt had outpt eval:   CTA cardiac 6/3/21: Impression:  1.  The calcium score is severe at 768 Agatston units.  2.  Non obstructive coronary artery disease.  3.  Distal LAD is not well visualized but is probably non obstructive. 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  4.  Bioprosthetic aortic valve noted. Valve leaflets demonstrate normal thickness and excursion.  5.  Severe mitral annular calcification.  6.  Aortic calcification present as well as chest findingds:  1. Cardiomegaly with mild pulmonary venous congestion.  2. Dilatation of the main pulmonary artery measuring 3.3 cm. Although limited for the evaluation of distal segmental branch emboli no central pulmonary artery emboli are noted. No evidence of right ventricular strain.  3. 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 Periodic intervals surveillance is suggested.  4. Left upper and right upper lobe solid nodule measuring 6 and 5 mm respectively. As per Fleischner society 2017 guidelines, interval surveillance thoracic CT in 3-6 months is suggested to confirm stability.   Pt here w syncope, also seen 6/11 for syncope and signed out ama.  Pt returns today w syncope.  Pt w/o recall but hha reports pt became unresponsive after walking down [address] at 's office and c/o sob then sitting down.  Pt notes recent rios now unable to walk ~ 1-2 block w/o severe sob.  No cp, palpitations, le edema.  No ha, change in vision/speech/gait, numbness or weakness in ext.  Pt states she is now feeling well.

## 2021-06-16 NOTE — ED ADULT NURSE NOTE - OBJECTIVE STATEMENT
Pt is a 89y female BIBA for witnessed syncopal episode. Pt denies syncope, does not recall. Pt is AAOx3. Pt noted to be hypotensive, abnormal changes in EKG. Pt upgraded.

## 2021-06-16 NOTE — H&P ADULT - PROBLEM SELECTOR PLAN 1
Pt with 2 episodes of syncope in the past week, both after ambulating and feeling SOB. Pt found to be hypotensive in the field and BP on arrival 81/33.   - Trop 0.04, f/u next  - ECG: NSR @71bpm, ANAIS in III, V1, V2, STD in I (unchanged from prior)  - S/p 250 cc bolus in the ED  - CCTA 6/3/21 revealing calcium score is severe at 768 Agatston units, non obstructive CAD, dLAD is not well visualized but is probably non obstructive. 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  - CTA chest 6/3/21 neg for PE.   - MRA chest 6/11/21: since 6/3/2021, there has been resolution of bilateral pleural effusions, technique is not optimized to assess mitral valve function, Mildly aneurysmal ascending aorta, measuring 4.1 cm.   - ECHO done 6/16 to eval valvular disease, f/u results  - Tele monitoring Pt with 2 episodes of syncope in the past week, both after ambulating and feeling SOB. Pt found to be hypotensive in the field and BP on arrival 81/33.   - Trop 0.04, f/u next  - ECG: NSR @71bpm, ANAIS in III, V1, V2, STD in I (unchanged from prior)  - S/p 250 cc bolus in the ED  - CCTA 6/3/21 revealing calcium score is severe at 768 Agatston units, non obstructive CAD, dLAD is not well visualized but is probably non obstructive. 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  - CTA chest 6/3/21 neg for PE.   - MRA chest 6/11/21: since 6/3/2021, there has been resolution of bilateral pleural effusions, technique is not optimized to assess mitral valve function, Mildly aneurysmal ascending aorta, measuring 4.1 cm.   - ECHO 5/4/21 in [employer]: EF 55%, bioprosthetic valve with mild- mod AI, severe MR with severe leaflet calcification, mod TR  - ECHO done 6/16 to eval valvular disease, f/u results  - Tele monitoring

## 2021-06-16 NOTE — H&P ADULT - HISTORY OF PRESENT ILLNESS
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 Pt was found to be hypotensive with SBP 80’s in the field. Pt had a recent ED visit on 6/11/21 for another episode of witness syncope while sitting in a restaurant after walking many blocks to get there, pt left AMA that visit. Pt denies fever, chills, cough, CP, PND, LE edema, abdominal pain, N/V/D. Pt had an outpatient CCTA 6/3/21 revealing calcium score is severe at 768 Agatston units, non obstructive CAD, dLAD is not well visualized but is probably non obstructive. 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 CTA chest 6/3/21 neg for PE. MRA chest 6/11/21: since 6/3/2021, there has been resolution of bilateral pleural effusions, technique is not optimized to assess mitral valve function, Mildly aneurysmal ascending aorta, measuring 4.1 cm.     In the ED VS: T 97.9F, HR 73bpm, BP 81/33 improved to 113/64, RR 17, O2 sat 98% on RA. Labs significant for BUN/Cr 35/1.34, Trop 0.04, CK/CKMB normal, COVID neg. ECG: NSR @71bpm, ANAIS in III, V1, V2, STD in I (unchanged from prior), CXR wet read with pulmonary vascular congestion. Pt was given 250 cc bolus in the ED. Pt is now admitted to cardiology for further management.    INCOMPLETE    89 yo F with 24Hr private HHA with PMHx of DM, GERD, AS s/p AVR (7 years ago), TAA (4.1cm) who presented to the ED 6/16/21 BIBEMS from pt’s dermatology office where she was walking down the hallway and had a syncopal episode. Pt was found to be hypotensive with SBP 80’s in the field. Pt had a recent ED visit on 6/11/21 for another episode of witness syncope while sitting in a restaurant after walking many blocks to get there, pt left AMA that visit. Pt denies fever, chills, cough, CP, PND, LE edema, abdominal pain, N/V/D. Pt had an outpatient CCTA 6/3/21 revealing calcium score is severe at 768 Agatston units, non obstructive CAD, dLAD is not well visualized but is probably non obstructive. 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 CTA chest 6/3/21 neg for PE. MRA chest 6/11/21: since 6/3/2021, there has been resolution of bilateral pleural effusions, technique is not optimized to assess mitral valve function, Mildly aneurysmal ascending aorta, measuring 4.1 cm.     In the ED VS: T 97.9F, HR 73bpm, BP 81/33 improved to 113/64, RR 17, O2 sat 98% on RA. Labs significant for BUN/Cr 35/1.34, Trop 0.04, CK/CKMB normal, COVID neg. ECG: NSR @71bpm, ANAIS in III, V1, V2, STD in I (unchanged from prior), CXR wet read with pulmonary vascular congestion. Pt was given 250 cc bolus in the ED. Pt is now admitted to cardiology for further management.    89 yo F with 24Hr private HHA with PMHx of HTN, HLD, Diabetes insipidus, AS s/p AVR (7 years ago), TAA (4.1cm) who presented to the ED 6/16/21 BIBEMS from pt’s dermatology office where she was walking down the hallway and suddenly lost consciousness per her HHA. She sat her down so she did not have a fall or hit her head. Pt was found to be hypotensive with SBP 80’s in the field. Pt had a recent ED visit on 6/11/21 for another episode of witnessed syncope after walking many blocks to a restaurant and then lost consciousness while sitting in a restaurant, pt left AMA that visit. Pt denies fever, chills, cough, CP, PND, LE edema, abdominal pain, N/V/D. Pt had an outpatient CCTA 6/3/21 revealing calcium score is severe at 768 Agatston units, non obstructive CAD, dLAD is not well visualized but is probably non obstructive. 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 CTA chest 6/3/21 neg for PE. MRA chest 6/11/21: since 6/3/2021, there has been resolution of bilateral pleural effusions, technique is not optimized to assess mitral valve function, Mildly aneurysmal ascending aorta, measuring 4.1 cm.     In the ED VS: T 97.9F, HR 73bpm, BP 81/33 improved to 113/64, RR 17, O2 sat 98% on RA. Labs significant for BUN/Cr 35/1.34, Trop 0.04, CK/CKMB normal, COVID neg. ECG: NSR @71bpm, ANAIS in III, V1, V2, STD in I (unchanged from prior), CXR wet read with pulmonary vascular congestion. Pt was given 250 cc bolus in the ED. Pt is now admitted to cardiology for further management.    89 yo F with 24Hr private HHA with PMHx of HTN, HLD, Diabetes insipidus, GERD, AS s/p AVR (7 years ago), TAA (4.1cm) who presented to the ED 6/16/21 BIBEMS from pt’s dermatology office where she was walking down the hallway, felt "breathless" and then had loss of consciousness per her HHA. She sat her down so she did not have a fall or hit her head. Pt was found to be hypotensive with SBP 80’s in the field. Pt had a recent ED visit on 6/11/21 for another episode of witnessed syncope after walking many blocks to a restaurant and then lost consciousness while sitting in a restaurant, pt left AMA that visit. Pt reports taking Lasix at home for h/o "fluid in the lungs" and chronic LE edema L>R. Pt denies fever, chills, cough, CP, PND/orthopnea, abdominal pain, N/V/D, dizziness. Pt had an outpatient CCTA 6/3/21 revealing calcium score is severe at 768 Agatston units, non obstructive CAD, dLAD is not well visualized but is probably non obstructive. 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 CTA chest 6/3/21 neg for PE. MRA chest 6/11/21: since 6/3/2021, there has been resolution of bilateral pleural effusions, technique is not optimized to assess mitral valve function, Mildly aneurysmal ascending aorta, measuring 4.1 cm.     In the ED VS: T 97.9F, HR 73bpm, BP 81/33 improved to 113/64, RR 17, O2 sat 98% on RA. Labs significant for Hgb/HCT 10.3/32.1, BUN/Cr 35/1.34, Trop 0.04, CK/CKMB normal, COVID neg. ECG: NSR @71bpm, ANAIS in III, V1, V2, STD in I (unchanged from prior), CXR wet read with pulmonary vascular congestion. Pt was given 250 cc bolus in the ED. Pt is now admitted to cardiology for further management of syncope.   89 yo F with 24Hr private HHA with PMHx of HTN, HLD, Diabetes insipidus, GERD, AS s/p AVR (7 years ago), TAA (4.1cm) who presented to the ED 6/16/21 BIBEMS from pt’s dermatology office where she was walking down the hallway, felt "breathless" and then had loss of consciousness per her HHA. She sat her down so she did not have a fall or hit her head. Pt was found to be hypotensive with SBP 80’s in the field. Pt had a recent ED visit on 6/11/21 for another episode of witnessed syncope after walking many blocks to a restaurant and then lost consciousness while sitting in a restaurant, pt left AMA that visit. Pt reports taking Lasix at home for h/o "fluid in the lungs" and chronic LE edema L>R. Pt denies fever, chills, cough, CP, PND/orthopnea, abdominal pain, N/V/D, dizziness. Pt had an outpatient CCTA 6/3/21 revealing calcium score is severe at 768 Agatston units, non obstructive CAD, dLAD is not well visualized but is probably non obstructive. 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 CTA chest 6/3/21 neg for PE. MRA chest 6/11/21: since 6/3/2021, there has been resolution of bilateral pleural effusions, technique is not optimized to assess mitral valve function, Mildly aneurysmal ascending aorta, measuring 4.1 cm.     In the ED VS: T 97.9F, HR 73bpm, BP 81/33 improved to 113/64, RR 17, O2 sat 98% on RA. Labs significant for Hgb/HCT 10.3/32.1, BUN/Cr 35/1.34, Trop 0.04, CK/CKMB normal, COVID neg. ECG: NSR @71bpm, ANAIS in III, V1, V2, STD in I (unchanged from prior), CXR wet read with pulmonary vascular congestion. Pt was given 250 cc bolus and Aspirin 325mg PO x 1 in the ED. Pt is now admitted to cardiology for further management of syncope.

## 2021-06-16 NOTE — ED PROVIDER NOTE - PMH
Diabetes insipidus    H/O aortic valve stenosis    Hyperlipidemia, unspecified hyperlipidemia type    Hypertension

## 2021-06-16 NOTE — H&P ADULT - NSICDXPASTMEDICALHX_GEN_ALL_CORE_FT
PAST MEDICAL HISTORY:  Diabetes insipidus     H/O aortic valve stenosis     Hyperlipidemia, unspecified hyperlipidemia type      PAST MEDICAL HISTORY:  Diabetes insipidus     H/O aortic valve stenosis     Hyperlipidemia, unspecified hyperlipidemia type     Hypertension

## 2021-06-16 NOTE — ED ADULT TRIAGE NOTE - CHIEF COMPLAINT QUOTE
biba for syncopal episode while at her doctor's office this morning.  Patient fell from standing position onto the floor.  Denies neck / back pain, sob, chest pain, numbness.  .  Per ems patient hypotensive on site bp 94/86 hr 83; given IV fluids.  Moves all extremities with equal strength.  Ekg in progress

## 2021-06-16 NOTE — ED PROVIDER NOTE - CLINICAL SUMMARY MEDICAL DECISION MAKING FREE TEXT BOX
Pt biba for syncope, bp low on arrival 100's on my initial exam. Pt w syncope 6/11.  Sx of miller w minimal exertion.  Recent imaging including cta chest w no pe, + severely elevated ca score, non obstructive cad, + AS and MR on echo.  Sx v concerning for cardiac syncope, suspect 2/2 pt's valve issues.  Plan labs, monitor, cxr, discuss w pt's pmd and caridiologist for plan.  Pt signed out ama 6/11 for same.

## 2021-06-16 NOTE — H&P ADULT - PROBLEM SELECTOR PLAN 7
SBP 81/33 on arrival, improved to 113/64  - Holding home Lisinopril 5mg PO QD for now, continue Lasix 20mg daily         DVT PPX: Lovenox SQ  Dispo: Pending clinical progression  Case discussed with Dr. Fernandez

## 2021-06-17 DIAGNOSIS — I35.0 NONRHEUMATIC AORTIC (VALVE) STENOSIS: ICD-10-CM

## 2021-06-17 LAB
ANION GAP SERPL CALC-SCNC: 11 MMOL/L — SIGNIFICANT CHANGE UP (ref 5–17)
BASOPHILS # BLD AUTO: 0.02 K/UL — SIGNIFICANT CHANGE UP (ref 0–0.2)
BASOPHILS NFR BLD AUTO: 0.3 % — SIGNIFICANT CHANGE UP (ref 0–2)
BUN SERPL-MCNC: 32 MG/DL — HIGH (ref 7–23)
CALCIUM SERPL-MCNC: 9.6 MG/DL — SIGNIFICANT CHANGE UP (ref 8.4–10.5)
CHLORIDE SERPL-SCNC: 99 MMOL/L — SIGNIFICANT CHANGE UP (ref 96–108)
CK MB CFR SERPL CALC: 11.7 NG/ML — HIGH (ref 0–6.7)
CK MB CFR SERPL CALC: 8.4 NG/ML — HIGH (ref 0–6.7)
CK SERPL-CCNC: 202 U/L — HIGH (ref 25–170)
CK SERPL-CCNC: 233 U/L — HIGH (ref 25–170)
CO2 SERPL-SCNC: 27 MMOL/L — SIGNIFICANT CHANGE UP (ref 22–31)
COVID-19 SPIKE DOMAIN AB INTERP: POSITIVE
COVID-19 SPIKE DOMAIN ANTIBODY RESULT: >250 U/ML — HIGH
CREAT SERPL-MCNC: 1.02 MG/DL — SIGNIFICANT CHANGE UP (ref 0.5–1.3)
EOSINOPHIL # BLD AUTO: 0.15 K/UL — SIGNIFICANT CHANGE UP (ref 0–0.5)
EOSINOPHIL NFR BLD AUTO: 1.9 % — SIGNIFICANT CHANGE UP (ref 0–6)
FERRITIN SERPL-MCNC: 318 NG/ML — HIGH (ref 15–150)
FOLATE SERPL-MCNC: 16.1 NG/ML — SIGNIFICANT CHANGE UP
GLUCOSE SERPL-MCNC: 127 MG/DL — HIGH (ref 70–99)
HCT VFR BLD CALC: 33.7 % — LOW (ref 34.5–45)
HGB BLD-MCNC: 10.7 G/DL — LOW (ref 11.5–15.5)
IMM GRANULOCYTES NFR BLD AUTO: 0.3 % — SIGNIFICANT CHANGE UP (ref 0–1.5)
IRON SATN MFR SERPL: 19 % — SIGNIFICANT CHANGE UP (ref 14–50)
IRON SATN MFR SERPL: 62 UG/DL — SIGNIFICANT CHANGE UP (ref 30–160)
LYMPHOCYTES # BLD AUTO: 1.34 K/UL — SIGNIFICANT CHANGE UP (ref 1–3.3)
LYMPHOCYTES # BLD AUTO: 17 % — SIGNIFICANT CHANGE UP (ref 13–44)
MAGNESIUM SERPL-MCNC: 2.2 MG/DL — SIGNIFICANT CHANGE UP (ref 1.6–2.6)
MCHC RBC-ENTMCNC: 30.4 PG — SIGNIFICANT CHANGE UP (ref 27–34)
MCHC RBC-ENTMCNC: 31.8 GM/DL — LOW (ref 32–36)
MCV RBC AUTO: 95.7 FL — SIGNIFICANT CHANGE UP (ref 80–100)
MONOCYTES # BLD AUTO: 0.57 K/UL — SIGNIFICANT CHANGE UP (ref 0–0.9)
MONOCYTES NFR BLD AUTO: 7.2 % — SIGNIFICANT CHANGE UP (ref 2–14)
NEUTROPHILS # BLD AUTO: 5.77 K/UL — SIGNIFICANT CHANGE UP (ref 1.8–7.4)
NEUTROPHILS NFR BLD AUTO: 73.3 % — SIGNIFICANT CHANGE UP (ref 43–77)
NRBC # BLD: 0 /100 WBCS — SIGNIFICANT CHANGE UP (ref 0–0)
PLATELET # BLD AUTO: 271 K/UL — SIGNIFICANT CHANGE UP (ref 150–400)
POTASSIUM SERPL-MCNC: 4.8 MMOL/L — SIGNIFICANT CHANGE UP (ref 3.5–5.3)
POTASSIUM SERPL-SCNC: 4.8 MMOL/L — SIGNIFICANT CHANGE UP (ref 3.5–5.3)
RBC # BLD: 3.52 M/UL — LOW (ref 3.8–5.2)
RBC # BLD: 3.52 M/UL — LOW (ref 3.8–5.2)
RBC # FLD: 13.9 % — SIGNIFICANT CHANGE UP (ref 10.3–14.5)
RETICS #: 50.3 K/UL — SIGNIFICANT CHANGE UP (ref 25–125)
RETICS/RBC NFR: 1.4 % — SIGNIFICANT CHANGE UP (ref 0.5–2.5)
SARS-COV-2 IGG+IGM SERPL QL IA: >250 U/ML — HIGH
SARS-COV-2 IGG+IGM SERPL QL IA: POSITIVE
SODIUM SERPL-SCNC: 137 MMOL/L — SIGNIFICANT CHANGE UP (ref 135–145)
TIBC SERPL-MCNC: 325 UG/DL — SIGNIFICANT CHANGE UP (ref 220–430)
TROPONIN T SERPL-MCNC: 0.15 NG/ML — CRITICAL HIGH (ref 0–0.01)
TROPONIN T SERPL-MCNC: 0.18 NG/ML — CRITICAL HIGH (ref 0–0.01)
UIBC SERPL-MCNC: 263 UG/DL — SIGNIFICANT CHANGE UP (ref 110–370)
VIT B12 SERPL-MCNC: 887 PG/ML — SIGNIFICANT CHANGE UP (ref 232–1245)
WBC # BLD: 7.87 K/UL — SIGNIFICANT CHANGE UP (ref 3.8–10.5)
WBC # FLD AUTO: 7.87 K/UL — SIGNIFICANT CHANGE UP (ref 3.8–10.5)

## 2021-06-17 PROCEDURE — 99233 SBSQ HOSP IP/OBS HIGH 50: CPT

## 2021-06-17 RX ORDER — ACETAMINOPHEN 500 MG
650 TABLET ORAL ONCE
Refills: 0 | Status: COMPLETED | OUTPATIENT
Start: 2021-06-17 | End: 2021-06-17

## 2021-06-17 RX ADMIN — Medication 12.5 MILLIGRAM(S): at 22:17

## 2021-06-17 RX ADMIN — Medication 650 MILLIGRAM(S): at 01:20

## 2021-06-17 RX ADMIN — DESMOPRESSIN ACETATE 0.1 MILLIGRAM(S): 0.1 TABLET ORAL at 07:06

## 2021-06-17 RX ADMIN — Medication 1 DROP(S): at 22:19

## 2021-06-17 RX ADMIN — LATANOPROST 1 DROP(S): 0.05 SOLUTION/ DROPS OPHTHALMIC; TOPICAL at 22:18

## 2021-06-17 RX ADMIN — Medication 650 MILLIGRAM(S): at 00:21

## 2021-06-17 RX ADMIN — Medication 1 DROP(S): at 06:05

## 2021-06-17 RX ADMIN — ATORVASTATIN CALCIUM 40 MILLIGRAM(S): 80 TABLET, FILM COATED ORAL at 22:18

## 2021-06-17 RX ADMIN — Medication 1 APPLICATION(S): at 00:15

## 2021-06-17 RX ADMIN — Medication 20 MILLIGRAM(S): at 07:07

## 2021-06-17 RX ADMIN — ENOXAPARIN SODIUM 40 MILLIGRAM(S): 100 INJECTION SUBCUTANEOUS at 22:18

## 2021-06-17 RX ADMIN — DESMOPRESSIN ACETATE 0.1 MILLIGRAM(S): 0.1 TABLET ORAL at 22:18

## 2021-06-17 RX ADMIN — GABAPENTIN 300 MILLIGRAM(S): 400 CAPSULE ORAL at 22:18

## 2021-06-17 RX ADMIN — Medication 1 SPRAY(S): at 22:19

## 2021-06-17 RX ADMIN — Medication 81 MILLIGRAM(S): at 12:48

## 2021-06-17 NOTE — PROGRESS NOTE ADULT - PROBLEM SELECTOR PLAN 1
Pt with 2 episodes of syncope in the past week, both after ambulating and feeling SOB. Pt found to be hypotensive in the field and BP on arrival 81/33.   - Trop 0.04, f/u next  - ECG: NSR @71bpm, ANAIS in III, V1, V2, STD in I (unchanged from prior)  - S/p 250 cc bolus in the ED  - CCTA 6/3/21 revealing calcium score is severe at 768 Agatston units, non obstructive CAD, dLAD is not well visualized but is probably non obstructive. 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  - CTA chest 6/3/21 neg for PE.   - MRA chest 6/11/21: since 6/3/2021, there has been resolution of bilateral pleural effusions, technique is not optimized to assess mitral valve function, Mildly aneurysmal ascending aorta, measuring 4.1 cm.   - ECHO 5/4/21 in [employer]: EF 55%, bioprosthetic valve with mild- mod AI, severe MR with severe leaflet calcification, mod TR  - ECHO done 6/16 to eval valvular disease, f/u results  - Tele monitoring -Hx of AVR x7 years ago at [facility] (CTS will obtain records)  -Echocardiogram 6/16: Severe aortic stenosis (worsened from moderate on 5/4/21). The peak transvalvular velocity is 6.00 m/s, the mean transvalvular gradient is 89.00 mmHg, and the LVOT/AV velocity ratio is 0.13. The aortic valve area (estimated via the continuity method) is 0.45 cm². (+) severe MR.  -Dr. Bella consulted  -NPO after midnight for ROD on 6/18. May need carotid US and TAVR CT pending results

## 2021-06-17 NOTE — PROGRESS NOTE ADULT - PROBLEM SELECTOR PLAN 3
BUN/Cr 35/1.34 on admission (unknown baseline, Cr. 1.00 6/11). Likely in the setting of dehydration  - S/p IVF in ED BUN/Cr 35/1.34 on admission likely in the setting of dehydration  - RESOLVED 2/ 250cc bolus: Now 1.02 on 6/17

## 2021-06-17 NOTE — PROGRESS NOTE ADULT - ASSESSMENT
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 She sat her down so she did not have a fall or hit her head. Pt was found to be hypotensive with SBP 80’s in the field. Pt was given 250 cc bolus in the ED. Pt is now admitted to cardiology for further management of syncope.    89 yo F with 24Hr private HHA with PMHx of HTN, HLD, Diabetes insipidus, GERD, AS s/p AVR (7 years ago), TAA (4.1cm) who presented to the ED 6/16/21 BIBEMS from pt’s dermatology office where she was walking down the hallway and suddenly lost consciousness per her HHA. Pt is now admitted to cardiology for further management of syncope where echocardiogram notable for severe AS for which Dr. Bella is following. NPO after midnight for ROD on 6/18.

## 2021-06-17 NOTE — PROGRESS NOTE ADULT - PROBLEM SELECTOR PLAN 7
SBP 81/33 on arrival, improved to 113/64  - Holding home Lisinopril 5mg PO QD for now, continue Lasix 20mg daily         DVT PPX: Lovenox SQ  Dispo: Pending clinical progression  Case discussed with Dr. Fernandez SBP 81/33 on arrival. Initially improved to 113/64 however SBPs 89/62 on 6/17 AM  -D/c home Lasix 20mg QD  -Holding home Lisinopril 5mg PO QD     DVT PPX: Lovenox SQ  Dispo: Pending CTS workup  Case discussed with Dr. Fernandez

## 2021-06-17 NOTE — PROGRESS NOTE ADULT - SUBJECTIVE AND OBJECTIVE BOX
Interventional Cardiology PA Adult Progress Note    Subjective Assessment:  	  MEDICATIONS:  metoprolol tartrate 12.5 milliGRAM(s) Oral two times a day        gabapentin 300 milliGRAM(s) Oral at bedtime      atorvastatin 40 milliGRAM(s) Oral at bedtime  desmopressin 0.1 milliGRAM(s) Oral two times a day    artificial  tears Solution 1 Drop(s) Both EYES two times a day  aspirin enteric coated 81 milliGRAM(s) Oral daily  enoxaparin Injectable 40 milliGRAM(s) SubCutaneous every 24 hours  fluticasone propionate 50 MICROgram(s)/spray Nasal Spray 1 Spray(s) Both Nostrils every 12 hours  latanoprost 0.005% Ophthalmic Solution 1 Drop(s) Both EYES at bedtime      	    [PHYSICAL EXAM:  TELEMETRY:  T(C): 36 (06-17-21 @ 10:03), Max: 36.8 (06-16-21 @ 14:39)  HR: 68 (06-17-21 @ 10:07) (68 - 94)  BP: 93/47 (06-17-21 @ 10:07) (81/33 - 124/63)  RR: 16 (06-17-21 @ 10:07) (16 - 19)  SpO2: 100% (06-17-21 @ 10:07) (94% - 100%)  Wt(kg): --  I&O's Summary    16 Jun 2021 07:01  -  17 Jun 2021 07:00  --------------------------------------------------------  IN: 350 mL / OUT: 200 mL / NET: 150 mL    17 Jun 2021 07:01  -  17 Jun 2021 12:26  --------------------------------------------------------  IN: 180 mL / OUT: 0 mL / NET: 180 mL      Height (cm): 142.2 (06-16 @ 16:25)  Weight (kg): 67.1 (06-16 @ 16:25)  BMI (kg/m2): 33.2 (06-16 @ 16:25)  BSA (m2): 1.56 (06-16 @ 16:25)  Vega:  Central/PICC/Mid Line:                                         Appearance: Normal	  HEENT:   Normal oral mucosa, PERRL, EOMI	  Neck: Supple, + JVD/ - JVD; Carotid Bruit   Cardiovascular: Normal S1 S2, No JVD, No murmurs,   Respiratory: Lungs clear to auscultation/Decreased Breath Sounds/No Rales, Rhonchi, Wheezing	  Gastrointestinal:  Soft, Non-tender, + BS	  Skin: No rashes, No ecchymoses, No cyanosis  Extremities: Normal range of motion, No clubbing, cyanosis or edema  Vascular: Peripheral pulses palpable 2+ bilaterally  Neurologic: Non-focal  Psychiatry: A & O x 3, Mood & affect appropriate      	    ECG:  	  RADIOLOGY:   DIAGNOSTIC TESTING:  [ ] Echocardiogram:  [ ]  Catheterization:  [ ] Stress Test:    [ ] ROD  OTHER: 	    LABS:	 	  CARDIAC MARKERS:                                  10.7   7.87  )-----------( 271      ( 17 Jun 2021 08:24 )             33.7     06-17    137  |  99  |  32<H>  ----------------------------<  127<H>  4.8   |  27  |  1.02    Ca    9.6      17 Jun 2021 08:24  Mg     2.2     06-17    TPro  6.8  /  Alb  3.9  /  TBili  0.5  /  DBili  x   /  AST  29  /  ALT  17  /  AlkPhos  60  06-16    proBNP: Serum Pro-Brain Natriuretic Peptide: 7233 pg/mL (06-16 @ 18:28)    Lipid Profile:   HgA1c:   TSH:   PT/INR - ( 16 Jun 2021 11:34 )   PT: 11.8 sec;   INR: 0.98          PTT - ( 16 Jun 2021 11:34 )  PTT:28.3 sec    ASSESSMENT/PLAN: 	        DVT ppx:  Dispo:     Interventional Cardiology PA Adult Progress Note    Subjective Assessment: Patient seen and examined at the bedside. No complaints at this time. Aware of plan for CTS consult and possible TAVR. All other ROS negative except those listed in subjective assessment.  	  MEDICATIONS:  metoprolol tartrate 12.5 milliGRAM(s) Oral two times a day  gabapentin 300 milliGRAM(s) Oral at bedtime  atorvastatin 40 milliGRAM(s) Oral at bedtime  desmopressin 0.1 milliGRAM(s) Oral two times a day  artificial  tears Solution 1 Drop(s) Both EYES two times a day  aspirin enteric coated 81 milliGRAM(s) Oral daily  enoxaparin Injectable 40 milliGRAM(s) SubCutaneous every 24 hours  fluticasone propionate 50 MICROgram(s)/spray Nasal Spray 1 Spray(s) Both Nostrils every 12 hours  latanoprost 0.005% Ophthalmic Solution 1 Drop(s) Both EYES at bedtime    [PHYSICAL EXAM:  TELEMETRY:  T(C): 36 (06-17-21 @ 10:03), Max: 36.8 (06-16-21 @ 14:39)  HR: 68 (06-17-21 @ 10:07) (68 - 94)  BP: 93/47 (06-17-21 @ 10:07) (81/33 - 124/63)  RR: 16 (06-17-21 @ 10:07) (16 - 19)  SpO2: 100% (06-17-21 @ 10:07) (94% - 100%)  I&O's Summary    16 Jun 2021 07:01 - 17 Jun 2021 07:00  --------------------------------------------------------  IN: 350 mL / OUT: 200 mL / NET: 150 mL    17 Jun 2021 07:01  -  17 Jun 2021 12:26  --------------------------------------------------------  IN: 180 mL / OUT: 0 mL / NET: 180 mL      Height (cm): 142.2 (06-16 @ 16:25)  Weight (kg): 67.1 (06-16 @ 16:25)  BMI (kg/m2): 33.2 (06-16 @ 16:25)  BSA (m2): 1.56 (06-16 @ 16:25)                                      Appearance: Normal		  Neck: Supple, - JVD; no Carotid Bruit   Cardiovascular: Normal S1 S2, IV/VI crescendo-descrecendo systolic ejection murmur @ RUSB w/ radiation to R carotid   Respiratory: Lungs clear to auscultation/No Rales, Rhonchi, Wheezing	  Gastrointestinal:  Soft, Non-tender, ND, + BS x4	  Extremities: Normal range of motion, No clubbing, cyanosis or edema  Vascular: Peripheral pulses palpable 2+ bilaterally carotid, radial, 1+ DP/PT  Neurologic:  A & O x 3, Mood & affect appropriate      ECG:  	  RADIOLOGY:   DIAGNOSTIC TESTING:  [ x] Echocardiogram: < from: TTE Echo Complete w/o Contrast w/ Doppler (06.16.21 @ 16:01) >  CONCLUSIONS:     1. Normal left ventricular size and systolic function.   2. Normal right ventricular size and systolic function.   3. Severely dilated left atrium.   4. Severe aortic stenosis. The peak transvalvular velocity is 6.00 m/s, the mean transvalvular gradient is 89.00 mmHg, and the LVOT/AV velocity ratio is 0.13. The aortic valve area (estimated via the continuity method) is 0.45 cm².   5. The calculated left ventricular stroke volume index is 42.00 ml/m² (normal >35 ml/m2). There is trace aortic regurgitation.   6. Moderate mitral stenosis. There is severe mitral annular calcification. The mean transvalvular gradient is 8.00 mmHg at a heart rate of 75 bpm.   7. Severe mitral regurgitation.   8. Mild tricuspid regurgitation.   9. Pulmonary hypertension present, pulmonary artery systolic pressure is 48 mmHg.  10. Pericardial fat pad is seen anterior to the right ventricle, cannot rule out a trivial pericardial effusion.  11. Borderline dilated aortic root.  12. Mildly dilated ascending aorta, measuring 4.5 cm.  13. No prior echo is available for comparison.      < end of copied text >    [ ]  Catheterization:  [ ] Stress Test:    [ ] ROD  OTHER: 	    LABS:	 	  CARDIAC MARKERS:                        10.7   7.87  )-----------( 271      ( 17 Jun 2021 08:24 )             33.7     06-17    137  |  99  |  32<H>  ----------------------------<  127<H>  4.8   |  27  |  1.02    Ca    9.6      17 Jun 2021 08:24  Mg     2.2     06-17    TPro  6.8  /  Alb  3.9  /  TBili  0.5  /  DBili  x   /  AST  29  /  ALT  17  /  AlkPhos  60  06-16    proBNP: Serum Pro-Brain Natriuretic Peptide: 7233 pg/mL (06-16 @ 18:28)    PT/INR - ( 16 Jun 2021 11:34 )   PT: 11.8 sec;   INR: 0.98          PTT - ( 16 Jun 2021 11:34 )  PTT:28.3 sec

## 2021-06-17 NOTE — PROGRESS NOTE ADULT - SUBJECTIVE AND OBJECTIVE BOX
Patient seen at bedside with Dr. Bella    Operation / Date: preop TAVR    SUBJECTIVE ASSESSMENT:  89y Female seen and examined bedside with Dr. Bella. Lengthy discussion held regarding her history, activity level, functional status, as well as intervention options. Patient reports that 1 year ago she was able to walk to [place] ([address]), without having to stop 2/2 SOB, however within the past two weeks has had to stop multiple times on her walk. She reports two episodes of syncope in the past 2 weeks. She currently denies chest pain, SOB at rest, palpitations, dizziness at this time, N/V/D, fevers or chills.         Vital Signs Last 24 Hrs  T(C): 36 (17 Jun 2021 10:03), Max: 36.8 (16 Jun 2021 14:39)  T(F): 96.8 (17 Jun 2021 10:03), Max: 98.3 (16 Jun 2021 14:39)  HR: 68 (17 Jun 2021 10:07) (68 - 94)  BP: 93/47 (17 Jun 2021 10:07) (81/33 - 124/63)  BP(mean): 84 (17 Jun 2021 05:28) (49 - 86)  RR: 16 (17 Jun 2021 10:07) (16 - 19)  SpO2: 100% (17 Jun 2021 10:07) (94% - 100%)  I&O's Detail    16 Jun 2021 07:01  -  17 Jun 2021 07:00  --------------------------------------------------------  IN:    Oral Fluid: 350 mL  Total IN: 350 mL    OUT:    Voided (mL): 200 mL  Total OUT: 200 mL    Total NET: 150 mL      17 Jun 2021 07:01  -  17 Jun 2021 11:45  --------------------------------------------------------  IN:    Oral Fluid: 180 mL  Total IN: 180 mL    OUT:  Total OUT: 0 mL    Total NET: 180 mL      PHYSICAL EXAM:    General: Patient sitting comfortably in bed with O2NC, no acute distress, HHA at bedside.     Neurological: Alert and orientedx3. No focal neurological deficits     Cardiovascular: S1S2, RRR, +Murmur heard best at RUSB     Respiratory: Clear to ausculation bilaterally, no wheezes, rales or rhonchi    Gastrointestinal: Abdomen soft, non tender, non distended     Extremities: Warm and well perfused. +1 peripheral edema b/l, no calf tenderness bilaterally    Vascular: Peripheral pulses palpable bilaterally    LABS:                        10.7   7.87  )-----------( 271      ( 17 Jun 2021 08:24 )             33.7       COUMADIN: No. PT/INR - ( 16 Jun 2021 11:34 )   PT: 11.8 sec;   INR: 0.98          PTT - ( 16 Jun 2021 11:34 )  PTT:28.3 sec    06-17    137  |  99  |  32<H>  ----------------------------<  127<H>  4.8   |  27  |  1.02    Ca    9.6      17 Jun 2021 08:24  Mg     2.2     06-17    TPro  6.8  /  Alb  3.9  /  TBili  0.5  /  DBili  x   /  AST  29  /  ALT  17  /  AlkPhos  60  06-16          MEDICATIONS  (STANDING):  artificial  tears Solution 1 Drop(s) Both EYES two times a day  aspirin enteric coated 81 milliGRAM(s) Oral daily  atorvastatin 40 milliGRAM(s) Oral at bedtime  desmopressin 0.1 milliGRAM(s) Oral two times a day  enoxaparin Injectable 40 milliGRAM(s) SubCutaneous every 24 hours  fluticasone propionate 50 MICROgram(s)/spray Nasal Spray 1 Spray(s) Both Nostrils every 12 hours  gabapentin 300 milliGRAM(s) Oral at bedtime  latanoprost 0.005% Ophthalmic Solution 1 Drop(s) Both EYES at bedtime  metoprolol tartrate 12.5 milliGRAM(s) Oral two times a day    MEDICATIONS  (PRN):        RADIOLOGY & ADDITIONAL TESTS:    < from: Xray Chest 1 View- PORTABLE-Urgent (Xray Chest 1 View- PORTABLE-Urgent .) (06.16.21 @ 12:04) >  IMPRESSION:  No acute cardiopulmonary pathology.        < end of copied text >    < from: TTE Echo Complete w/o Contrast w/ Doppler (06.16.21 @ 16:01) >  CONCLUSIONS:     1. Normal left ventricular size and systolic function.   2. Normal right ventricular size and systolic function.   3. Severely dilated left atrium.   4. Severe aortic stenosis. The peak transvalvular velocity is 6.00 m/s, the mean transvalvular gradient is 89.00 mmHg, and the LVOT/AV velocity ratio is 0.13. The aortic valve area (estimated via the continuity method) is 0.45 cm².   5. The calculated left ventricular stroke volume index is 42.00 ml/m² (normal >35 ml/m2). There is trace aortic regurgitation.   6. Moderate mitral stenosis. There is severe mitral annular calcification. The mean transvalvular gradient is 8.00 mmHg at a heart rate of 75 bpm.   7. Severe mitral regurgitation.   8. Mild tricuspid regurgitation.   9. Pulmonary hypertension present, pulmonary artery systolic pressure is 48 mmHg.  10. Pericardial fat pad is seen anterior to the right ventricle, cannot rule out a trivial pericardial effusion.  11. Borderline dilated aortic root.  12. Mildly dilated ascending aorta, measuring 4.5 cm.  13. No prior echo is available for comparison.    < end of copied text >    CCTA 6/3/21 revealing calcium score is severe at 768 Agatston units, non obstructive CAD, dLAD is not well visualized but is probably non obstructive. 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 CTA chest 6/3/21 neg for PE. MRA chest 6/11/21: since 6/3/2021, there has been resolution of bilateral pleural effusions, technique is not optimized to assess mitral valve function, Mildly aneurysmal ascending aorta, measuring 4.1 cm.

## 2021-06-17 NOTE — PROGRESS NOTE ADULT - PROBLEM SELECTOR PLAN 2
S/p bioprosthetic AVR @ [facility] 6-7 years ago per pt  - Pt on Lasix 20mg PO QD at home, continue Pt with 2 episodes of syncope in the past week, both after ambulating and feeling SOB. Likely 2/2 severe AS and dehydration s/p 250cc bolus in ER  - Trop peak 0.21 likely 2/2 demand from severe AS and LVH, ECG: NSR @71bpm, ANAIS in III, V1, V2, STD in I (unchanged from prior)  - CCTA 6/3/21: calcium score is severe at 768 Agatston units, non obstructive CAD, dLAD is not well visualized but is probably non obstructive. (+) shallow myocardial bridge, Bioprosthetic aortic valve noted, Severe mitral annular calcification, Aortic calcification present.  - CTA chest 6/3/21 neg for PE  - MRA chest 6/11/21: since 6/3/2021, there has been resolution of bilateral pleural effusions, technique is not optimized to assess mitral valve function, Mildly aneurysmal ascending aorta, measuring 4.1 cm.   -Management of AS as above

## 2021-06-17 NOTE — PROGRESS NOTE ADULT - ASSESSMENT
89 y/o Female with 24Hr private HHA with PMHx of HTN, HLD, Diabetes insipidus, GERD, AS s/p TAVR (2014 at [facility]), TAA (4.1cm) who presented to the ED 6/16/21 after being BIBEMS from pt’s dermatology office where she was walking down the hallway, felt "breathless" and then had loss of consciousness per her HHA. She sat her down so she did not have a fall or hit her head. This is the second syncopal event in 1 week. Pt had an outpatient CCTA 6/3/21 revealing calcium score is severe at 768 Agatston units, non obstructive CAD, dLAD is not well visualized but is probably non obstructive. 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 CTA chest 6/3/21 neg for PE. MRA chest 6/11/21: since 6/3/2021, there has been resolution of bilateral pleural effusions, technique is not optimized to assess mitral valve function, Mildly aneurysmal ascending aorta, measuring 4.1 cm. Echocardiogram in ED significant for Severe Bioprosthetic Aortic Stenosis, Severe MAC with severe MR. Structural heart, Dr. Bella, consulted for evaluation.     Plan:  Problem 1: Severe bioprosthetic AS  - Patient seen at bedside with Dr. Bella  - Images/labs reviewed with Dr. Bella  - Op-report from [place] to be obtained for further collateral (our OP office will obtain).   - Recommend: ROD to better assess valvulopathy     - will likely need CT TAVR scans and carotid dopplers pending ROD results.   - Above discussed with primary team.   - Please keep NPO p MN for ROD tomorrow.   - rest of care per primary team.     Problem 2: MR  - ROD tomorrow   - monitor I/Os closely    I have reviewed clinical labs tests and reports, radiology tests and reports, as well as old patient medical records, and discussed with the refering physician.

## 2021-06-17 NOTE — PROGRESS NOTE ADULT - PROBLEM SELECTOR PLAN 4
Hgb/HCT 10.3/32.1 (unknown baseline)  - F/u iron studies Hgb/HCT 10.3/32.1 (unknown baseline)  -Iron studies negative (elevated ferritin 318)  -Maintain active T & S

## 2021-06-18 DIAGNOSIS — Z29.9 ENCOUNTER FOR PROPHYLACTIC MEASURES, UNSPECIFIED: ICD-10-CM

## 2021-06-18 LAB
A1C WITH ESTIMATED AVERAGE GLUCOSE RESULT: 5.7 % — HIGH (ref 4–5.6)
ANION GAP SERPL CALC-SCNC: 8 MMOL/L — SIGNIFICANT CHANGE UP (ref 5–17)
BLD GP AB SCN SERPL QL: POSITIVE — SIGNIFICANT CHANGE UP
BLD GP AB SCN SERPL QL: POSITIVE — SIGNIFICANT CHANGE UP
BUN SERPL-MCNC: 25 MG/DL — HIGH (ref 7–23)
CALCIUM SERPL-MCNC: 9.2 MG/DL — SIGNIFICANT CHANGE UP (ref 8.4–10.5)
CHLORIDE SERPL-SCNC: 98 MMOL/L — SIGNIFICANT CHANGE UP (ref 96–108)
CHOLEST SERPL-MCNC: 152 MG/DL — SIGNIFICANT CHANGE UP
CO2 SERPL-SCNC: 31 MMOL/L — SIGNIFICANT CHANGE UP (ref 22–31)
CREAT SERPL-MCNC: 0.97 MG/DL — SIGNIFICANT CHANGE UP (ref 0.5–1.3)
ESTIMATED AVERAGE GLUCOSE: 117 MG/DL — HIGH (ref 68–114)
GLUCOSE SERPL-MCNC: 91 MG/DL — SIGNIFICANT CHANGE UP (ref 70–99)
HCT VFR BLD CALC: 32 % — LOW (ref 34.5–45)
HDLC SERPL-MCNC: 66 MG/DL — SIGNIFICANT CHANGE UP
HGB BLD-MCNC: 9.9 G/DL — LOW (ref 11.5–15.5)
LIPID PNL WITH DIRECT LDL SERPL: 69 MG/DL — SIGNIFICANT CHANGE UP
MAGNESIUM SERPL-MCNC: 2.2 MG/DL — SIGNIFICANT CHANGE UP (ref 1.6–2.6)
MCHC RBC-ENTMCNC: 29.8 PG — SIGNIFICANT CHANGE UP (ref 27–34)
MCHC RBC-ENTMCNC: 30.9 GM/DL — LOW (ref 32–36)
MCV RBC AUTO: 96.4 FL — SIGNIFICANT CHANGE UP (ref 80–100)
NON HDL CHOLESTEROL: 86 MG/DL — SIGNIFICANT CHANGE UP
NRBC # BLD: 0 /100 WBCS — SIGNIFICANT CHANGE UP (ref 0–0)
PLATELET # BLD AUTO: 242 K/UL — SIGNIFICANT CHANGE UP (ref 150–400)
POTASSIUM SERPL-MCNC: 5.3 MMOL/L — SIGNIFICANT CHANGE UP (ref 3.5–5.3)
POTASSIUM SERPL-SCNC: 5.3 MMOL/L — SIGNIFICANT CHANGE UP (ref 3.5–5.3)
RBC # BLD: 3.32 M/UL — LOW (ref 3.8–5.2)
RBC # FLD: 14.1 % — SIGNIFICANT CHANGE UP (ref 10.3–14.5)
RH IG SCN BLD-IMP: POSITIVE — SIGNIFICANT CHANGE UP
RH IG SCN BLD-IMP: POSITIVE — SIGNIFICANT CHANGE UP
SODIUM SERPL-SCNC: 137 MMOL/L — SIGNIFICANT CHANGE UP (ref 135–145)
TRIGL SERPL-MCNC: 83 MG/DL — SIGNIFICANT CHANGE UP
WBC # BLD: 7.2 K/UL — SIGNIFICANT CHANGE UP (ref 3.8–10.5)
WBC # FLD AUTO: 7.2 K/UL — SIGNIFICANT CHANGE UP (ref 3.8–10.5)

## 2021-06-18 PROCEDURE — 93312 ECHO TRANSESOPHAGEAL: CPT | Mod: 26

## 2021-06-18 PROCEDURE — 76377 3D RENDER W/INTRP POSTPROCES: CPT | Mod: 26

## 2021-06-18 PROCEDURE — 86077 PHYS BLOOD BANK SERV XMATCH: CPT

## 2021-06-18 PROCEDURE — 99233 SBSQ HOSP IP/OBS HIGH 50: CPT

## 2021-06-18 PROCEDURE — 93880 EXTRACRANIAL BILAT STUDY: CPT | Mod: 26

## 2021-06-18 RX ORDER — BRIMONIDINE TARTRATE 2 MG/MG
1 SOLUTION/ DROPS OPHTHALMIC AT BEDTIME
Refills: 0 | Status: DISCONTINUED | OUTPATIENT
Start: 2021-06-18 | End: 2021-06-29

## 2021-06-18 RX ORDER — ACETAMINOPHEN 500 MG
650 TABLET ORAL ONCE
Refills: 0 | Status: COMPLETED | OUTPATIENT
Start: 2021-06-18 | End: 2021-06-18

## 2021-06-18 RX ORDER — ACETAMINOPHEN 500 MG
325 TABLET ORAL EVERY 4 HOURS
Refills: 0 | Status: DISCONTINUED | OUTPATIENT
Start: 2021-06-18 | End: 2021-06-23

## 2021-06-18 RX ADMIN — Medication 1 SPRAY(S): at 06:09

## 2021-06-18 RX ADMIN — DESMOPRESSIN ACETATE 0.1 MILLIGRAM(S): 0.1 TABLET ORAL at 22:05

## 2021-06-18 RX ADMIN — GABAPENTIN 300 MILLIGRAM(S): 400 CAPSULE ORAL at 22:04

## 2021-06-18 RX ADMIN — Medication 650 MILLIGRAM(S): at 06:53

## 2021-06-18 RX ADMIN — DESMOPRESSIN ACETATE 0.1 MILLIGRAM(S): 0.1 TABLET ORAL at 06:08

## 2021-06-18 RX ADMIN — Medication 650 MILLIGRAM(S): at 07:30

## 2021-06-18 RX ADMIN — Medication 1 SPRAY(S): at 18:47

## 2021-06-18 RX ADMIN — Medication 1 DROP(S): at 18:47

## 2021-06-18 RX ADMIN — Medication 12.5 MILLIGRAM(S): at 22:04

## 2021-06-18 RX ADMIN — LATANOPROST 1 DROP(S): 0.05 SOLUTION/ DROPS OPHTHALMIC; TOPICAL at 22:05

## 2021-06-18 RX ADMIN — Medication 1 DROP(S): at 06:12

## 2021-06-18 RX ADMIN — ENOXAPARIN SODIUM 40 MILLIGRAM(S): 100 INJECTION SUBCUTANEOUS at 18:48

## 2021-06-18 RX ADMIN — ATORVASTATIN CALCIUM 40 MILLIGRAM(S): 80 TABLET, FILM COATED ORAL at 22:04

## 2021-06-18 RX ADMIN — Medication 81 MILLIGRAM(S): at 11:47

## 2021-06-18 NOTE — PROGRESS NOTE ADULT - SUBJECTIVE AND OBJECTIVE BOX
Interventional Cardiology PA Adult Progress Note    Subjective Assessment:    Pt seen and assessed at bedside. Pt appears comfortable, is in no acute distress, and has no complaints at this time. Pt made aware and agrees w/ plan for TAVR w/u today including pt remaining NPO for ROD.  12 point ROS negative other than what specified.  	  MEDICATIONS:  metoprolol tartrate 12.5 milliGRAM(s) Oral two times a day  gabapentin 300 milliGRAM(s) Oral at bedtime  atorvastatin 40 milliGRAM(s) Oral at bedtime  desmopressin 0.1 milliGRAM(s) Oral two times a day  artificial  tears Solution 1 Drop(s) Both EYES two times a day  aspirin enteric coated 81 milliGRAM(s) Oral daily  enoxaparin Injectable 40 milliGRAM(s) SubCutaneous every 24 hours  fluticasone propionate 50 MICROgram(s)/spray Nasal Spray 1 Spray(s) Both Nostrils every 12 hours  latanoprost 0.005% Ophthalmic Solution 1 Drop(s) Both EYES at bedtime      [PHYSICAL EXAM:  TELEMETRY:  T(C): 36.2 (06-18-21 @ 09:57), Max: 36.9 (06-18-21 @ 05:32)  HR: 64 (06-18-21 @ 09:30) (60 - 73)  BP: 92/50 (06-18-21 @ 09:33) (87/51 - 115/65)  RR: 16 (06-18-21 @ 09:30) (14 - 16)  SpO2: 98% (06-18-21 @ 09:30) (98% - 100%)  Wt(kg): --  I&O's Summary    17 Jun 2021 07:01  -  18 Jun 2021 07:00  --------------------------------------------------------  IN: 840 mL / OUT: 1550 mL / NET: -710 mL    18 Jun 2021 07:01  -  18 Jun 2021 13:27  --------------------------------------------------------  IN: 0 mL / OUT: 200 mL / NET: -200 mL      Vega:  Central/PICC/Mid Line:                                         Appearance: Normal	  HEENT:   Normal oral mucosa, PERRL, EOMI	  Neck: Supple, - JVD; no carotid Bruit   Cardiovascular: Normal S1 S2, No JVD, No murmurs,   Respiratory: Lungs clear to auscultation, No Rales, Rhonchi, or Wheezing	  Gastrointestinal:  Soft, Non-tender, + BS	  Skin: No rashes, No ecchymoses, No cyanosis  Extremities: Normal range of motion, No clubbing, cyanosis. 2+ pitting bipedal edema  Vascular: Peripheral pulses palpable 2+ bilaterally  Neurologic: Non-focal  Psychiatry: A & O x 3, Mood & affect appropriate    	    ECG:  	  RADIOLOGY:   DIAGNOSTIC TESTING:  [ ] Echocardiogram:  [ ]  Catheterization:  [ ] Stress Test:    [ ] ROD  OTHER: 	    LABS:	 	  CARDIAC MARKERS:                          9.9    7.20  )-----------( 242      ( 18 Jun 2021 07:25 )             32.0     06-18    137  |  98  |  25<H>  ----------------------------<  91  5.3   |  31  |  0.97    Ca    9.2      18 Jun 2021 07:25  Mg     2.2     06-18      proBNP:   Lipid Profile:   HgA1c:   TSH:

## 2021-06-18 NOTE — PROGRESS NOTE ADULT - PROBLEM SELECTOR PLAN 3
BUN/Cr 35/1.34 on admission likely in the setting of dehydration  - RESOLVED 2/ 250cc bolus: Now 1.02 on 6/17 SBP range this admission   -hypotensive to 80s on arrival. Initially improved to 113/64 however SBPs 89/62 on 6/17 AM  -holding home Lasix 20mg QD (pt w/ severe MR and will eventually need this reinstated, but holding 2/2 preload dependent severe AS)  -Holding home Lisinopril 5mg PO QD

## 2021-06-18 NOTE — PROGRESS NOTE ADULT - PROBLEM SELECTOR PLAN 2
2 syncopal episodes in the past week, both after ambulating w/ associated SOB. Likely 2/2 severe AS and dehydration. s/p 250cc bolus in ER    ----------------------------------------------------incomplete    - Trop peak 0.21 likely 2/2 demand from severe AS and LVH, ECG: NSR @71bpm, ANAIS in III, V1, V2, STD in I (unchanged from prior)  - CCTA 6/3/21: calcium score is severe at 768 Agatston units, non obstructive CAD, dLAD is not well visualized but is probably non obstructive. (+) shallow myocardial bridge, Bioprosthetic aortic valve noted, Severe mitral annular calcification, Aortic calcification present.  - CTA chest 6/3/21 neg for PE  - MRA chest 6/11/21: since 6/3/2021, there has been resolution of bilateral pleural effusions, technique is not optimized to assess mitral valve function, Mildly aneurysmal ascending aorta, measuring 4.1 cm.   -Management of AS as above 2 syncopal episodes in the past week, both after ambulating w/ associated SOB. Likely 2/2 severe AS and dehydration. s/p 250cc bolus in ER  - Trop peak 0.21 likely 2/2 demand from severe AS and LVH  - ECG: NSR @71bpm, ANAIS in III, V1, V2, STD in I (unchanged from prior)  - CCTA 6/3/21: calcium score is severe at 768 Agatston units, non obstructive CAD, dLAD is not well visualized but is probably non obstructive. (+) shallow myocardial bridge, Bioprosthetic aortic valve noted, Severe mitral annular calcification, Aortic calcification present.  - CTA chest 6/3/21 neg for PE  - MRA chest 6/11/21: since 6/3/2021, there has been resolution of bilateral pleural effusions, technique is not optimized to assess mitral valve function, Mildly aneurysmal ascending aorta, measuring 4.1 cm.   -Management of AS as above

## 2021-06-18 NOTE — PROGRESS NOTE ADULT - PROBLEM SELECTOR PLAN 4
Hgb/HCT 10.3/32.1 (unknown baseline)  -Iron studies negative (elevated ferritin 318)  -Maintain active T & S LDL 69  - Continue home Lipitor 40mg PO QHS    #Pre-DM (A1c 5.7)  -Encourage diet/lifestyle changes

## 2021-06-18 NOTE — PROGRESS NOTE ADULT - ASSESSMENT
89 yo F with 24Hr private HHA with PMHx of HTN, HLD, Diabetes insipidus, GERD, AS s/p AVR (7 years ago), TAA (4.1cm) who presented to the ED 6/16/21 BIBEMS from pt’s dermatology office where she was walking down the hallway and suddenly lost consciousness per her HHA. Pt is now admitted to cardiology for further management of syncope where echocardiogram notable for severe AS for which Dr. Bella is following. Pt currently NPO for ROD today.

## 2021-06-18 NOTE — PROGRESS NOTE ADULT - PROBLEM SELECTOR PLAN 1
-Hx of AVR x7 years ago at [place] ([facility] will obtain records)  -Echocardiogram 6/16: Severe aortic stenosis (worsened from moderate on 5/4/21). The peak transvalvular velocity is 6.00 m/s, the mean transvalvular gradient is 89.00 mmHg, and the LVOT/AV velocity ratio is 0.13. The aortic valve area (estimated via the continuity method) is 0.45 cm². (+) severe MR.  -Dr. Bella consulted  -Currently NPO for ROD today, f/u results  -May need carotid US and TAVR CT pending results, f/u recs

## 2021-06-18 NOTE — PROGRESS NOTE ADULT - ASSESSMENT
89 y/o Female with 24Hr private HHA with PMHx of HTN, HLD, Diabetes insipidus, GERD, AS s/p TAVR (2014 at [facility]), TAA (4.1cm) who presented to the ED 6/16/21 after being BIBEMS from pt’s dermatology office where she was walking down the hallway, felt "breathless" and then had loss of consciousness per her HHA. She sat her down so she did not have a fall or hit her head. This is the second syncopal event in 1 week. Pt had an outpatient CCTA 6/3/21 revealing calcium score is severe at 768 Agatston units, non obstructive CAD, dLAD is not well visualized but is probably non obstructive. 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 CTA chest 6/3/21 neg for PE. MRA chest 6/11/21: since 6/3/2021, there has been resolution of bilateral pleural effusions, technique is not optimized to assess mitral valve function, Mildly aneurysmal ascending aorta, measuring 4.1 cm. Echocardiogram in ED significant for Severe Bioprosthetic Aortic Stenosis, Severe MAC with severe MR. Structural heart, Dr. Bella, consulted for evaluation.     Plan:  Problem 1: Severe bioprosthetic AS  - Case discussed with Dr. Bella  - Patient is planned for ROD today, will follow up results.      - will likely need CT TAVR scans and carotid dopplers pending ROD results.   - Above discussed with primary team.   - rest of care per primary team.     Problem 2: MR  - ROD today  - monitor I/Os closely    I have reviewed clinical labs tests and reports, radiology tests and reports, as well as old patient medical records, and discussed with the refering physician.

## 2021-06-18 NOTE — PROGRESS NOTE ADULT - PROBLEM SELECTOR PLAN 7
SBP 81/33 on arrival. Initially improved to 113/64 however SBPs 89/62 on 6/17 AM  -D/c home Lasix 20mg QD  -Holding home Lisinopril 5mg PO QD     DVT PPX: Lovenox SQ  Dispo: Pending CTS workup  Case discussed with Dr. Fernandez **Resolved**  BUN/Cr 35/1.34 on admission likely in the setting of dehydration  - RESOLVED s/p 250cc bolus: Now 1.02 on 6/17, 0.97 today

## 2021-06-18 NOTE — PROGRESS NOTE ADULT - PROBLEM SELECTOR PLAN 6
F/u Lipid panel and A1c  - Continue home Lipitor 40mg PO QD -Continue home Desmopressin 0.1mg PO BID

## 2021-06-18 NOTE — PROGRESS NOTE ADULT - SUBJECTIVE AND OBJECTIVE BOX
Patient discussed on morning rounds with Dr. Bella    Operation / Date:  preop TAVR    SUBJECTIVE ASSESSMENT:  89y Female seen and examined.  Patient is awaiting ROD and is NPO. Offering no acute complaints. Denies chest pain, SOB at rest, palpitations, dizziness, fevers or chills.     Vital Signs Last 24 Hrs  T(C): 36.6 (18 Jun 2021 18:20), Max: 36.9 (18 Jun 2021 05:32)  T(F): 97.9 (18 Jun 2021 18:20), Max: 98.4 (18 Jun 2021 05:32)  HR: 87 (18 Jun 2021 18:44) (60 - 87)  BP: 121/58 (18 Jun 2021 18:44) (87/51 - 121/58)  BP(mean): --  RR: 18 (18 Jun 2021 18:44) (14 - 18)  SpO2: 96% (18 Jun 2021 18:44) (96% - 100%)  I&O's Detail    17 Jun 2021 07:01  -  18 Jun 2021 07:00  --------------------------------------------------------  IN:    Oral Fluid: 840 mL  Total IN: 840 mL    OUT:    Voided (mL): 1550 mL  Total OUT: 1550 mL    Total NET: -710 mL      18 Jun 2021 07:01  -  18 Jun 2021 19:38  --------------------------------------------------------  IN:    Oral Fluid: 180 mL  Total IN: 180 mL    OUT:    Voided (mL): 200 mL  Total OUT: 200 mL    Total NET: -20 mL    PHYSICAL EXAM:    General: Patient sitting comfortably in bed, no acute distress.     Neurological: Alert and oriented x3. No focal neurological deficits     Cardiovascular: S1S2, RRR, +SADIE heard best at RUSB     Respiratory: Clear to ausculation bilaterally, no wheezes, rales or rhonchi    Gastrointestinal: Abdomen soft, non tender, non distended     Extremities: Warm and well perfused. +2 peripheral edema b/l, no calf tenderness bilaterally    Vascular: Peripheral pulses palpable bilaterally      LABS:                        9.9    7.20  )-----------( 242      ( 18 Jun 2021 07:25 )             32.0       COUMADIN:  No.     06-18    137  |  98  |  25<H>  ----------------------------<  91  5.3   |  31  |  0.97    Ca    9.2      18 Jun 2021 07:25  Mg     2.2     06-18      MEDICATIONS  (STANDING):  artificial  tears Solution 1 Drop(s) Both EYES two times a day  aspirin enteric coated 81 milliGRAM(s) Oral daily  atorvastatin 40 milliGRAM(s) Oral at bedtime  desmopressin 0.1 milliGRAM(s) Oral two times a day  enoxaparin Injectable 40 milliGRAM(s) SubCutaneous every 24 hours  fluticasone propionate 50 MICROgram(s)/spray Nasal Spray 1 Spray(s) Both Nostrils every 12 hours  gabapentin 300 milliGRAM(s) Oral at bedtime  latanoprost 0.005% Ophthalmic Solution 1 Drop(s) Both EYES at bedtime  metoprolol tartrate 12.5 milliGRAM(s) Oral two times a day    MEDICATIONS  (PRN):        RADIOLOGY & ADDITIONAL TESTS:    < from: Xray Chest 1 View- PORTABLE-Urgent (Xray Chest 1 View- PORTABLE-Urgent .) (06.16.21 @ 12:04) >  IMPRESSION:  No acute cardiopulmonary pathology.        < end of copied text >    < from: TTE Echo Complete w/o Contrast w/ Doppler (06.16.21 @ 16:01) >  CONCLUSIONS:     1. Normal left ventricular size and systolic function.   2. Normal right ventricular size and systolic function.   3. Severely dilated left atrium.   4. Severe aortic stenosis. The peak transvalvular velocity is 6.00 m/s, the mean transvalvular gradient is 89.00 mmHg, and the LVOT/AV velocity ratio is 0.13. The aortic valve area (estimated via the continuity method) is 0.45 cm².   5. The calculated left ventricular stroke volume index is 42.00 ml/m² (normal >35 ml/m2). There is trace aortic regurgitation.   6. Moderate mitral stenosis. There is severe mitral annular calcification. The mean transvalvular gradient is 8.00 mmHg at a heart rate of 75 bpm.   7. Severe mitral regurgitation.   8. Mild tricuspid regurgitation.   9. Pulmonary hypertension present, pulmonary artery systolic pressure is 48 mmHg.  10. Pericardial fat pad is seen anterior to the right ventricle, cannot rule out a trivial pericardial effusion.  11. Borderline dilated aortic root.  12. Mildly dilated ascending aorta, measuring 4.5 cm.  13. No prior echo is available for comparison.    < end of copied text >    CCTA 6/3/21 revealing calcium score is severe at 768 Agatston units, non obstructive CAD, dLAD is not well visualized but is probably non obstructive. 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 CTA chest 6/3/21 neg for PE. MRA chest 6/11/21: since 6/3/2021, there has been resolution of bilateral pleural effusions, technique is not optimized to assess mitral valve function, Mildly aneurysmal ascending aorta, measuring 4.1 cm.

## 2021-06-19 LAB
ANION GAP SERPL CALC-SCNC: 11 MMOL/L — SIGNIFICANT CHANGE UP (ref 5–17)
BASOPHILS # BLD AUTO: 0.04 K/UL — SIGNIFICANT CHANGE UP (ref 0–0.2)
BASOPHILS NFR BLD AUTO: 0.5 % — SIGNIFICANT CHANGE UP (ref 0–2)
BUN SERPL-MCNC: 24 MG/DL — HIGH (ref 7–23)
CALCIUM SERPL-MCNC: 9.4 MG/DL — SIGNIFICANT CHANGE UP (ref 8.4–10.5)
CHLORIDE SERPL-SCNC: 97 MMOL/L — SIGNIFICANT CHANGE UP (ref 96–108)
CO2 SERPL-SCNC: 26 MMOL/L — SIGNIFICANT CHANGE UP (ref 22–31)
CREAT SERPL-MCNC: 1 MG/DL — SIGNIFICANT CHANGE UP (ref 0.5–1.3)
EOSINOPHIL # BLD AUTO: 0.15 K/UL — SIGNIFICANT CHANGE UP (ref 0–0.5)
EOSINOPHIL NFR BLD AUTO: 1.8 % — SIGNIFICANT CHANGE UP (ref 0–6)
GLUCOSE SERPL-MCNC: 95 MG/DL — SIGNIFICANT CHANGE UP (ref 70–99)
HCT VFR BLD CALC: 33.8 % — LOW (ref 34.5–45)
HGB BLD-MCNC: 10.5 G/DL — LOW (ref 11.5–15.5)
IMM GRANULOCYTES NFR BLD AUTO: 0.4 % — SIGNIFICANT CHANGE UP (ref 0–1.5)
LYMPHOCYTES # BLD AUTO: 1.63 K/UL — SIGNIFICANT CHANGE UP (ref 1–3.3)
LYMPHOCYTES # BLD AUTO: 19.8 % — SIGNIFICANT CHANGE UP (ref 13–44)
MAGNESIUM SERPL-MCNC: 2.2 MG/DL — SIGNIFICANT CHANGE UP (ref 1.6–2.6)
MCHC RBC-ENTMCNC: 29.6 PG — SIGNIFICANT CHANGE UP (ref 27–34)
MCHC RBC-ENTMCNC: 31.1 GM/DL — LOW (ref 32–36)
MCV RBC AUTO: 95.2 FL — SIGNIFICANT CHANGE UP (ref 80–100)
MONOCYTES # BLD AUTO: 0.83 K/UL — SIGNIFICANT CHANGE UP (ref 0–0.9)
MONOCYTES NFR BLD AUTO: 10.1 % — SIGNIFICANT CHANGE UP (ref 2–14)
NEUTROPHILS # BLD AUTO: 5.56 K/UL — SIGNIFICANT CHANGE UP (ref 1.8–7.4)
NEUTROPHILS NFR BLD AUTO: 67.4 % — SIGNIFICANT CHANGE UP (ref 43–77)
NRBC # BLD: 0 /100 WBCS — SIGNIFICANT CHANGE UP (ref 0–0)
PLATELET # BLD AUTO: 295 K/UL — SIGNIFICANT CHANGE UP (ref 150–400)
POTASSIUM SERPL-MCNC: 4.7 MMOL/L — SIGNIFICANT CHANGE UP (ref 3.5–5.3)
POTASSIUM SERPL-SCNC: 4.7 MMOL/L — SIGNIFICANT CHANGE UP (ref 3.5–5.3)
RBC # BLD: 3.55 M/UL — LOW (ref 3.8–5.2)
RBC # FLD: 14 % — SIGNIFICANT CHANGE UP (ref 10.3–14.5)
SODIUM SERPL-SCNC: 134 MMOL/L — LOW (ref 135–145)
WBC # BLD: 8.24 K/UL — SIGNIFICANT CHANGE UP (ref 3.8–10.5)
WBC # FLD AUTO: 8.24 K/UL — SIGNIFICANT CHANGE UP (ref 3.8–10.5)

## 2021-06-19 PROCEDURE — 99232 SBSQ HOSP IP/OBS MODERATE 35: CPT

## 2021-06-19 RX ORDER — BENZOCAINE AND MENTHOL 5; 1 G/100ML; G/100ML
1 LIQUID ORAL EVERY 4 HOURS
Refills: 0 | Status: DISCONTINUED | OUTPATIENT
Start: 2021-06-19 | End: 2021-06-19

## 2021-06-19 RX ORDER — BENZOCAINE AND MENTHOL 5; 1 G/100ML; G/100ML
1 LIQUID ORAL EVERY 4 HOURS
Refills: 0 | Status: COMPLETED | OUTPATIENT
Start: 2021-06-19 | End: 2021-06-22

## 2021-06-19 RX ORDER — BENZOCAINE AND MENTHOL 5; 1 G/100ML; G/100ML
1 LIQUID ORAL ONCE
Refills: 0 | Status: DISCONTINUED | OUTPATIENT
Start: 2021-06-19 | End: 2021-06-19

## 2021-06-19 RX ORDER — BENZOCAINE AND MENTHOL 5; 1 G/100ML; G/100ML
1 LIQUID ORAL ONCE
Refills: 0 | Status: COMPLETED | OUTPATIENT
Start: 2021-06-19 | End: 2021-06-19

## 2021-06-19 RX ADMIN — Medication 325 MILLIGRAM(S): at 07:15

## 2021-06-19 RX ADMIN — Medication 1 SPRAY(S): at 18:59

## 2021-06-19 RX ADMIN — BENZOCAINE AND MENTHOL 1 LOZENGE: 5; 1 LIQUID ORAL at 14:23

## 2021-06-19 RX ADMIN — Medication 325 MILLIGRAM(S): at 06:43

## 2021-06-19 RX ADMIN — LATANOPROST 1 DROP(S): 0.05 SOLUTION/ DROPS OPHTHALMIC; TOPICAL at 21:48

## 2021-06-19 RX ADMIN — Medication 12.5 MILLIGRAM(S): at 14:23

## 2021-06-19 RX ADMIN — Medication 1 DROP(S): at 18:58

## 2021-06-19 RX ADMIN — DESMOPRESSIN ACETATE 0.1 MILLIGRAM(S): 0.1 TABLET ORAL at 06:40

## 2021-06-19 RX ADMIN — DESMOPRESSIN ACETATE 0.1 MILLIGRAM(S): 0.1 TABLET ORAL at 18:59

## 2021-06-19 RX ADMIN — Medication 81 MILLIGRAM(S): at 14:23

## 2021-06-19 RX ADMIN — Medication 1 SPRAY(S): at 06:39

## 2021-06-19 RX ADMIN — Medication 1 DROP(S): at 06:39

## 2021-06-19 RX ADMIN — ATORVASTATIN CALCIUM 40 MILLIGRAM(S): 80 TABLET, FILM COATED ORAL at 21:49

## 2021-06-19 RX ADMIN — ENOXAPARIN SODIUM 40 MILLIGRAM(S): 100 INJECTION SUBCUTANEOUS at 18:59

## 2021-06-19 RX ADMIN — BRIMONIDINE TARTRATE 1 DROP(S): 2 SOLUTION/ DROPS OPHTHALMIC at 21:51

## 2021-06-19 RX ADMIN — GABAPENTIN 300 MILLIGRAM(S): 400 CAPSULE ORAL at 21:49

## 2021-06-19 RX ADMIN — Medication 12.5 MILLIGRAM(S): at 21:49

## 2021-06-19 NOTE — PROGRESS NOTE ADULT - ASSESSMENT
89 yo F with 24Hr private HHA with PMHx of HTN, HLD, Diabetes insipidus, GERD, AS s/p AVR (7 years ago), TAA (4.1cm) who presented to the ED 6/16/21 BIBEMS from pt’s dermatology office where she was walking down the hallway and suddenly lost consciousness per her HHA. Pt is now admitted to cardiology for further management of syncope where echocardiogram notable for severe AS for which Dr. Bella is following. ROD and carotid ultrasounds performed; pt is currently pending CT head, heart, chest, abdomen and pelvis.

## 2021-06-19 NOTE — PROGRESS NOTE ADULT - SUBJECTIVE AND OBJECTIVE BOX
Interventional Cardiology PA Adult Progress Note    Subjective Assessment:    Pt seen and assessed at bedside. Pt appears comfortable, is in no acute distress, and has no complaints at this time. Pt initially made aware and agreed to plan for CT heart, abdomen, pelvis and head today, but cardiology team was later informed and updated pt that CT scans could not be performed today due to the CT technician trained for CT heart not being in today so plan amended for CT scan Monday and NPO status removed at 1pm. Extensive conversation regarding updated status and plan was had w/ pt and pt's daughter.  12 point ROS negative other than what specified.  	  MEDICATIONS:  metoprolol tartrate 12.5 milliGRAM(s) Oral two times a day  acetaminophen   Tablet .. 325 milliGRAM(s) Oral every 4 hours PRN  gabapentin 300 milliGRAM(s) Oral at bedtime  atorvastatin 40 milliGRAM(s) Oral at bedtime  desmopressin 0.1 milliGRAM(s) Oral two times a day  artificial  tears Solution 1 Drop(s) Both EYES two times a day  aspirin enteric coated 81 milliGRAM(s) Oral daily  brimonidine 0.2% Ophthalmic Solution 1 Drop(s) Both EYES at bedtime  enoxaparin Injectable 40 milliGRAM(s) SubCutaneous every 24 hours  fluticasone propionate 50 MICROgram(s)/spray Nasal Spray 1 Spray(s) Both Nostrils every 12 hours  latanoprost 0.005% Ophthalmic Solution 1 Drop(s) Both EYES at bedtime    [PHYSICAL EXAM:  TELEMETRY:  T(C): 36.6 (06-19-21 @ 14:28), Max: 36.6 (06-18-21 @ 18:20)  HR: 71 (06-19-21 @ 10:10) (71 - 87)  BP: 114/57 (06-19-21 @ 10:10) (107/55 - 137/69)  RR: 18 (06-19-21 @ 10:10) (16 - 18)  SpO2: 95% (06-19-21 @ 10:10) (93% - 96%)  Wt(kg): --  I&O's Summary    18 Jun 2021 07:01  -  19 Jun 2021 07:00  --------------------------------------------------------  IN: 180 mL / OUT: 200 mL / NET: -20 mL    19 Jun 2021 07:01  -  19 Jun 2021 14:37  --------------------------------------------------------  IN: 180 mL / OUT: 0 mL / NET: 180 mL      Vega:  Central/PICC/Mid Line:                                         Appearance: Normal	  HEENT:   Normal oral mucosa, PERRL, EOMI	  Neck: Supple, - JVD; no carotid Bruit   Cardiovascular: Normal S1 S2, No JVD, No murmurs,   Respiratory: Lungs clear to auscultation, No Rales, Rhonchi, or Wheezing	  Gastrointestinal:  Soft, Non-tender, + BS	  Skin: No rashes, No ecchymoses, No cyanosis  Extremities: Normal range of motion, No clubbing, cyanosis. 1-2+ bipedal edema  Vascular: Peripheral pulses palpable 2+ bilaterally  Neurologic: Non-focal  Psychiatry: A & O x 3, Mood & affect appropriate    	    ECG:  	  RADIOLOGY:   DIAGNOSTIC TESTING:  [ ] Echocardiogram:  [ ]  Catheterization:  [ ] Stress Test:    [ ] ROD  OTHER: 	    LABS:	 	  CARDIAC MARKERS:                          10.5   8.24  )-----------( 295      ( 19 Jun 2021 06:55 )             33.8     06-19    134<L>  |  97  |  24<H>  ----------------------------<  95  4.7   |  26  |  1.00    Ca    9.4      19 Jun 2021 06:55  Mg     2.2     06-19      proBNP:   Lipid Profile:   HgA1c:   TSH: Interventional Cardiology PA Adult Progress Note    Subjective Assessment:    Pt seen and assessed at bedside. Pt appears comfortable, is in no acute distress, and stated that her only complaint at this time was a sore throat since her ROD yesterday. Pt initially made aware and agreed to plan for CT heart, abdomen, pelvis and head today, but cardiology team was later informed and updated pt that CT scans could not be performed today due to the CT technician trained for CT heart not being in today so plan amended for CT scan Monday and NPO status removed at 1pm. Extensive conversation regarding updated status and plan was had w/ pt and pt's daughter.  12 point ROS negative other than what specified.  	  MEDICATIONS:  metoprolol tartrate 12.5 milliGRAM(s) Oral two times a day  acetaminophen   Tablet .. 325 milliGRAM(s) Oral every 4 hours PRN  gabapentin 300 milliGRAM(s) Oral at bedtime  atorvastatin 40 milliGRAM(s) Oral at bedtime  desmopressin 0.1 milliGRAM(s) Oral two times a day  artificial  tears Solution 1 Drop(s) Both EYES two times a day  aspirin enteric coated 81 milliGRAM(s) Oral daily  brimonidine 0.2% Ophthalmic Solution 1 Drop(s) Both EYES at bedtime  enoxaparin Injectable 40 milliGRAM(s) SubCutaneous every 24 hours  fluticasone propionate 50 MICROgram(s)/spray Nasal Spray 1 Spray(s) Both Nostrils every 12 hours  latanoprost 0.005% Ophthalmic Solution 1 Drop(s) Both EYES at bedtime    [PHYSICAL EXAM:  TELEMETRY:  T(C): 36.6 (06-19-21 @ 14:28), Max: 36.6 (06-18-21 @ 18:20)  HR: 71 (06-19-21 @ 10:10) (71 - 87)  BP: 114/57 (06-19-21 @ 10:10) (107/55 - 137/69)  RR: 18 (06-19-21 @ 10:10) (16 - 18)  SpO2: 95% (06-19-21 @ 10:10) (93% - 96%)  Wt(kg): --  I&O's Summary    18 Jun 2021 07:01  -  19 Jun 2021 07:00  --------------------------------------------------------  IN: 180 mL / OUT: 200 mL / NET: -20 mL    19 Jun 2021 07:01  -  19 Jun 2021 14:37  --------------------------------------------------------  IN: 180 mL / OUT: 0 mL / NET: 180 mL      Vega:  Central/PICC/Mid Line:                                         Appearance: Normal	  HEENT:   Normal oral mucosa, PERRL, EOMI	  Neck: Supple, - JVD; no carotid Bruit   Cardiovascular: Normal S1 S2, No JVD, No murmurs,   Respiratory: Lungs clear to auscultation, No Rales, Rhonchi, or Wheezing	  Gastrointestinal:  Soft, Non-tender, + BS	  Skin: No rashes, No ecchymoses, No cyanosis  Extremities: Normal range of motion, No clubbing, cyanosis. 1-2+ bipedal edema  Vascular: Peripheral pulses palpable 2+ bilaterally  Neurologic: Non-focal  Psychiatry: A & O x 3, Mood & affect appropriate    	    ECG:  	  RADIOLOGY:   DIAGNOSTIC TESTING:  [ ] Echocardiogram:  [ ]  Catheterization:  [ ] Stress Test:    [ ] ROD  OTHER: 	    LABS:	 	  CARDIAC MARKERS:                          10.5   8.24  )-----------( 295      ( 19 Jun 2021 06:55 )             33.8     06-19    134<L>  |  97  |  24<H>  ----------------------------<  95  4.7   |  26  |  1.00    Ca    9.4      19 Jun 2021 06:55  Mg     2.2     06-19      proBNP:   Lipid Profile:   HgA1c:   TSH:

## 2021-06-19 NOTE — PROGRESS NOTE ADULT - PROBLEM SELECTOR PLAN 8
DVT PPX: Lovenox SQ  Dispo: Pending TAVR CTs on Monday    Case d/w Dr. Fernandez DVT PPX: Lovenox SQ  Dispo: Pending TAVR CTs on Monday    Case d/w Dr. Kelly

## 2021-06-19 NOTE — PROGRESS NOTE ADULT - PROBLEM SELECTOR PLAN 7
**Resolved**  BUN/Cr 35/1.34 on admission likely in the setting of dehydration  - RESOLVED s/p 250cc bolus: Now 1.02 on 6/17, 0.97 6/19, 1.00 today

## 2021-06-19 NOTE — PROGRESS NOTE ADULT - PROBLEM SELECTOR PLAN 3
SBP range this admission   -hypotensive to 80s on arrival. Initially improved to 113/64 however SBPs 89/62 on 6/17 AM  -holding home Lasix 20mg QD (per d/w Dr. Fernandez, lasix should not be reinstated before AV replacement)  -Holding home Lisinopril 5mg PO QD

## 2021-06-19 NOTE — PROGRESS NOTE ADULT - PROBLEM SELECTOR PLAN 1
-Hx of AVR x7 years ago at [place] ([facility] will obtain records)  -Echo 6/16: Severe aortic stenosis (worsened from moderate on 5/4/21). The peak transvalvular velocity is 6.00 m/s, the mean transvalvular gradient is 89.00 mmHg, and the LVOT/AV velocity ratio is 0.13. The aortic valve area (estimated via the continuity method) is 0.45 cm². (+) severe MR.  -Dr. Bella following, recs appreciated  -ROD 6/18/21: Severe prosthetic AS, mild valvular AR, moderate MR, moderate MS, mild TR, doppler evidence of PFO, normal RV systolic function, LV function appears grossly normal, no pericardial effusion.  -NPO after midnight Sunday night for CT head/abdomen/pelvis and cardiac CT Monday  -Carotid ultrasound performed 6/18/21, f/u read  -Pt is pre-load dependent

## 2021-06-19 NOTE — PROGRESS NOTE ADULT - PROBLEM SELECTOR PLAN 2
2 syncopal episodes in the past week, both after ambulating w/ associated SOB. Likely 2/2 severe AS and dehydration. s/p 250cc bolus in ER  - Trop peak 0.21 likely 2/2 demand from severe AS and LVH  - ECG: NSR @71bpm, ANAIS in III, V1, V2, STD in I (unchanged from prior)  - CCTA 6/3/21: calcium score is severe at 768 Agatston units, non obstructive CAD, dLAD is not well visualized but is probably non obstructive. (+) shallow myocardial bridge, Bioprosthetic aortic valve noted, Severe mitral annular calcification, Aortic calcification present.  - CTA chest 6/3/21 neg for PE  - MRI Angio chest 6/11/21: since 6/3/2021, there has been resolution of bilateral pleural effusions, technique is not optimized to assess mitral valve function, Mildly aneurysmal ascending aorta, measuring 4.1 cm.   -Management of AS as above

## 2021-06-19 NOTE — PROGRESS NOTE ADULT - PROBLEM SELECTOR PLAN 5
H&H 10.3/32.1 on arrival (unknown baseline)  -H&H 10.5/33.8 today  -Iron studies negative (elevated ferritin 318)  -Maintain active T & S (next due 6/21)

## 2021-06-20 LAB
ANION GAP SERPL CALC-SCNC: 10 MMOL/L — SIGNIFICANT CHANGE UP (ref 5–17)
BASOPHILS # BLD AUTO: 0.03 K/UL — SIGNIFICANT CHANGE UP (ref 0–0.2)
BASOPHILS NFR BLD AUTO: 0.4 % — SIGNIFICANT CHANGE UP (ref 0–2)
BUN SERPL-MCNC: 20 MG/DL — SIGNIFICANT CHANGE UP (ref 7–23)
CALCIUM SERPL-MCNC: 9.1 MG/DL — SIGNIFICANT CHANGE UP (ref 8.4–10.5)
CHLORIDE SERPL-SCNC: 96 MMOL/L — SIGNIFICANT CHANGE UP (ref 96–108)
CO2 SERPL-SCNC: 29 MMOL/L — SIGNIFICANT CHANGE UP (ref 22–31)
CREAT SERPL-MCNC: 0.88 MG/DL — SIGNIFICANT CHANGE UP (ref 0.5–1.3)
EOSINOPHIL # BLD AUTO: 0.07 K/UL — SIGNIFICANT CHANGE UP (ref 0–0.5)
EOSINOPHIL NFR BLD AUTO: 0.9 % — SIGNIFICANT CHANGE UP (ref 0–6)
GLUCOSE SERPL-MCNC: 119 MG/DL — HIGH (ref 70–99)
HCT VFR BLD CALC: 32.9 % — LOW (ref 34.5–45)
HGB BLD-MCNC: 10.3 G/DL — LOW (ref 11.5–15.5)
IMM GRANULOCYTES NFR BLD AUTO: 0.3 % — SIGNIFICANT CHANGE UP (ref 0–1.5)
LYMPHOCYTES # BLD AUTO: 0.69 K/UL — LOW (ref 1–3.3)
LYMPHOCYTES # BLD AUTO: 9.2 % — LOW (ref 13–44)
MAGNESIUM SERPL-MCNC: 2.1 MG/DL — SIGNIFICANT CHANGE UP (ref 1.6–2.6)
MCHC RBC-ENTMCNC: 30.3 PG — SIGNIFICANT CHANGE UP (ref 27–34)
MCHC RBC-ENTMCNC: 31.3 GM/DL — LOW (ref 32–36)
MCV RBC AUTO: 96.8 FL — SIGNIFICANT CHANGE UP (ref 80–100)
MONOCYTES # BLD AUTO: 0.57 K/UL — SIGNIFICANT CHANGE UP (ref 0–0.9)
MONOCYTES NFR BLD AUTO: 7.6 % — SIGNIFICANT CHANGE UP (ref 2–14)
NEUTROPHILS # BLD AUTO: 6.14 K/UL — SIGNIFICANT CHANGE UP (ref 1.8–7.4)
NEUTROPHILS NFR BLD AUTO: 81.6 % — HIGH (ref 43–77)
NRBC # BLD: 0 /100 WBCS — SIGNIFICANT CHANGE UP (ref 0–0)
PLATELET # BLD AUTO: 265 K/UL — SIGNIFICANT CHANGE UP (ref 150–400)
POTASSIUM SERPL-MCNC: 4.6 MMOL/L — SIGNIFICANT CHANGE UP (ref 3.5–5.3)
POTASSIUM SERPL-SCNC: 4.6 MMOL/L — SIGNIFICANT CHANGE UP (ref 3.5–5.3)
RBC # BLD: 3.4 M/UL — LOW (ref 3.8–5.2)
RBC # FLD: 13.7 % — SIGNIFICANT CHANGE UP (ref 10.3–14.5)
SODIUM SERPL-SCNC: 135 MMOL/L — SIGNIFICANT CHANGE UP (ref 135–145)
WBC # BLD: 7.52 K/UL — SIGNIFICANT CHANGE UP (ref 3.8–10.5)
WBC # FLD AUTO: 7.52 K/UL — SIGNIFICANT CHANGE UP (ref 3.8–10.5)

## 2021-06-20 PROCEDURE — 76937 US GUIDE VASCULAR ACCESS: CPT | Mod: 26

## 2021-06-20 PROCEDURE — 99232 SBSQ HOSP IP/OBS MODERATE 35: CPT

## 2021-06-20 PROCEDURE — 36000 PLACE NEEDLE IN VEIN: CPT

## 2021-06-20 RX ORDER — NYSTATIN CREAM 100000 [USP'U]/G
1 CREAM TOPICAL
Refills: 0 | Status: DISCONTINUED | OUTPATIENT
Start: 2021-06-20 | End: 2021-06-29

## 2021-06-20 RX ADMIN — ENOXAPARIN SODIUM 40 MILLIGRAM(S): 100 INJECTION SUBCUTANEOUS at 18:35

## 2021-06-20 RX ADMIN — GABAPENTIN 300 MILLIGRAM(S): 400 CAPSULE ORAL at 21:58

## 2021-06-20 RX ADMIN — Medication 1 SPRAY(S): at 06:47

## 2021-06-20 RX ADMIN — ATORVASTATIN CALCIUM 40 MILLIGRAM(S): 80 TABLET, FILM COATED ORAL at 21:58

## 2021-06-20 RX ADMIN — BRIMONIDINE TARTRATE 1 DROP(S): 2 SOLUTION/ DROPS OPHTHALMIC at 21:59

## 2021-06-20 RX ADMIN — Medication 12.5 MILLIGRAM(S): at 21:58

## 2021-06-20 RX ADMIN — DESMOPRESSIN ACETATE 0.1 MILLIGRAM(S): 0.1 TABLET ORAL at 21:59

## 2021-06-20 RX ADMIN — Medication 12.5 MILLIGRAM(S): at 12:25

## 2021-06-20 RX ADMIN — Medication 81 MILLIGRAM(S): at 12:25

## 2021-06-20 RX ADMIN — DESMOPRESSIN ACETATE 0.1 MILLIGRAM(S): 0.1 TABLET ORAL at 06:47

## 2021-06-20 RX ADMIN — BENZOCAINE AND MENTHOL 1 LOZENGE: 5; 1 LIQUID ORAL at 07:17

## 2021-06-20 RX ADMIN — LATANOPROST 1 DROP(S): 0.05 SOLUTION/ DROPS OPHTHALMIC; TOPICAL at 22:00

## 2021-06-20 NOTE — PROGRESS NOTE ADULT - SUBJECTIVE AND OBJECTIVE BOX
Interventional Cardiology PA Adult Progress Note    Subjective Assessment: pt seen and examined at bedside this am. Pt c/o frequent vital signs checks, lab draws, bedrest order, and throat soreness from ROD. Explained at length the importance of vital monitoring, labs and bedrest given critical AS. Pt denies CP, SOB, N/V/D, dizziness, syncope.     ROS negative except for subjective and HPI  	  MEDICATIONS:  metoprolol tartrate 12.5 milliGRAM(s) Oral two times a day        acetaminophen   Tablet .. 325 milliGRAM(s) Oral every 4 hours PRN  gabapentin 300 milliGRAM(s) Oral at bedtime      atorvastatin 40 milliGRAM(s) Oral at bedtime  desmopressin 0.1 milliGRAM(s) Oral two times a day    artificial  tears Solution 1 Drop(s) Both EYES two times a day  aspirin enteric coated 81 milliGRAM(s) Oral daily  benzocaine 15 mG/menthol 3.6 mG Lozenge 1 Lozenge Oral every 4 hours PRN  brimonidine 0.2% Ophthalmic Solution 1 Drop(s) Both EYES at bedtime  enoxaparin Injectable 40 milliGRAM(s) SubCutaneous every 24 hours  fluticasone propionate 50 MICROgram(s)/spray Nasal Spray 1 Spray(s) Both Nostrils every 12 hours  latanoprost 0.005% Ophthalmic Solution 1 Drop(s) Both EYES at bedtime      	    [PHYSICAL EXAM:  TELEMETRY:  T(C): 36.7 (06-20-21 @ 06:22), Max: 36.9 (06-19-21 @ 18:26)  HR: 76 (06-20-21 @ 05:25) (63 - 78)  BP: 125/66 (06-20-21 @ 05:25) (107/51 - 133/71)  RR: 18 (06-20-21 @ 05:25) (18 - 18)  SpO2: 94% (06-20-21 @ 05:25) (93% - 99%)  Wt(kg): --  I&O's Summary    19 Jun 2021 07:01  -  20 Jun 2021 07:00  --------------------------------------------------------  IN: 650 mL / OUT: 250 mL / NET: 400 mL        Vega:  Central/PICC/Mid Line:                                         Appearance: Normal	  HEENT:   Normal oral mucosa, PERRL, EOMI	  Neck: Supple,  - JVD; Carotid Bruit   Cardiovascular: Normal S1 S2, No JVD, + systolic IV/VI murmur   Respiratory: Decreased Breath Sounds b/l, No Rales, Rhonchi, Wheezing	  Gastrointestinal:  Soft, Non-tender, + BS	  Skin: No rashes, No ecchymoses, No cyanosis  Extremities: Normal range of motion, No clubbing, cyanosis. trace/+1 edema B/L   Vascular: Peripheral pulses palpable 2+ bilaterally  Neurologic: Non-focal  Psychiatry: A & O x 3, Mood & affect appropriate      	    ECG:  	  RADIOLOGY:   DIAGNOSTIC TESTING:  [ ] Echocardiogram:  [ ]  Catheterization:  [ ] Stress Test:    [ ] ROD  OTHER: 	    LABS:	 	  CARDIAC MARKERS:                                  10.5   8.24  )-----------( 295      ( 19 Jun 2021 06:55 )             33.8     06-19    134<L>  |  97  |  24<H>  ----------------------------<  95  4.7   |  26  |  1.00    Ca    9.4      19 Jun 2021 06:55  Mg     2.2     06-19      proBNP:   Lipid Profile:   HgA1c:   TSH:       ASSESSMENT/PLAN: 	        DVT ppx:  Dispo:     Interventional Cardiology PA Adult Progress Note    Subjective Assessment: pt seen and examined at bedside this am. Pt c/o frequent vital signs checks, lab draws, bedrest order, and throat soreness from ROD. Explained at length the importance of vital monitoring, labs and bedrest given critical AS. Pt denies CP, SOB, N/V/D, dizziness, syncope.     ROS negative except for subjective and HPI  	  MEDICATIONS:  metoprolol tartrate 12.5 milliGRAM(s) Oral two times a day        acetaminophen   Tablet .. 325 milliGRAM(s) Oral every 4 hours PRN  gabapentin 300 milliGRAM(s) Oral at bedtime      atorvastatin 40 milliGRAM(s) Oral at bedtime  desmopressin 0.1 milliGRAM(s) Oral two times a day    artificial  tears Solution 1 Drop(s) Both EYES two times a day  aspirin enteric coated 81 milliGRAM(s) Oral daily  benzocaine 15 mG/menthol 3.6 mG Lozenge 1 Lozenge Oral every 4 hours PRN  brimonidine 0.2% Ophthalmic Solution 1 Drop(s) Both EYES at bedtime  enoxaparin Injectable 40 milliGRAM(s) SubCutaneous every 24 hours  fluticasone propionate 50 MICROgram(s)/spray Nasal Spray 1 Spray(s) Both Nostrils every 12 hours  latanoprost 0.005% Ophthalmic Solution 1 Drop(s) Both EYES at bedtime      	    [PHYSICAL EXAM:  TELEMETRY:  T(C): 36.7 (06-20-21 @ 06:22), Max: 36.9 (06-19-21 @ 18:26)  HR: 76 (06-20-21 @ 05:25) (63 - 78)  BP: 125/66 (06-20-21 @ 05:25) (107/51 - 133/71)  RR: 18 (06-20-21 @ 05:25) (18 - 18)  SpO2: 94% (06-20-21 @ 05:25) (93% - 99%)  Wt(kg): --  I&O's Summary    19 Jun 2021 07:01  -  20 Jun 2021 07:00  --------------------------------------------------------  IN: 650 mL / OUT: 250 mL / NET: 400 mL        Vega:  Central/PICC/Mid Line:                                         Appearance: Normal	  HEENT:   Normal oral mucosa, PERRL, EOMI	  Neck: Supple,  - JVD  Cardiovascular: Normal S1 S2, No JVD, + systolic IV/VI murmur   Respiratory: Decreased Breath Sounds b/l, No Rales, Rhonchi, Wheezing	  Gastrointestinal:  Soft, Non-tender, + BS	  Skin: No rashes, No ecchymoses, No cyanosis  Extremities: Normal range of motion, No clubbing, cyanosis. trace/+1 edema B/L   Vascular: Peripheral pulses palpable 2+ bilaterally  Neurologic: Non-focal  Psychiatry: A & O x 3, Mood & affect appropriate      	    ECG:  	  RADIOLOGY:   DIAGNOSTIC TESTING:  [ ] Echocardiogram:  [ ]  Catheterization:  [ ] Stress Test:    [ ] ROD  OTHER: 	    LABS:	 	  CARDIAC MARKERS:                                  10.5   8.24  )-----------( 295      ( 19 Jun 2021 06:55 )             33.8     06-19    134<L>  |  97  |  24<H>  ----------------------------<  95  4.7   |  26  |  1.00    Ca    9.4      19 Jun 2021 06:55  Mg     2.2     06-19      proBNP:   Lipid Profile:   HgA1c:   TSH:       ASSESSMENT/PLAN: 	        DVT ppx:  Dispo:

## 2021-06-20 NOTE — PROGRESS NOTE ADULT - PROBLEM SELECTOR PLAN 5
H&H 10.3/32.1 on arrival (unknown baseline)  -Iron studies negative (elevated ferritin 318)  -Maintain active T & S (next due 6/21)

## 2021-06-20 NOTE — GOALS OF CARE CONVERSATION - ADVANCED CARE PLANNING - CONVERSATION DETAILS
Discussed at length diagnosis of critical AS (BUSTER 0.4 cm2 by ROD), advanced age and other comorbidities. Pt is currently undergoing evaluation for TAVR with Dr Bella and awaiting TAVR CT's. Pt is A& O X4 and wishes to remain full code.   If she were to become incapacitated her daughter, Massiel Duval would serve as her HCP phone# [phone].

## 2021-06-20 NOTE — PROGRESS NOTE ADULT - PROBLEM SELECTOR PLAN 8
Full code (GOC documented)   DVT PPX: Lovenox SQ  Dispo: Pending TAVR CTs on Monday, NPO after MN Full code (GOC documented)   DVT PPX: Lovenox SQ  Activity: bed to chair with assistance  Dispo: Pending TAVR CTs on Monday, NPO after MN

## 2021-06-20 NOTE — PROGRESS NOTE ADULT - ASSESSMENT
89 yo F with 24Hr private HHA with PMHx of HTN, HLD, Diabetes insipidus, GERD, AS s/p AVR (7 years ago), TAA (4.1cm) who presented to the ED 6/16/21 BIBEMS from pt’s dermatology office where she was walking down the hallway and suddenly lost consciousness per her HHA. Pt is now admitted to cardiology for further management of syncope where echocardiogram notable for critical AS (BUSTER 0.4 cm2) for which Dr. Bella is following. ROD and carotid ultrasounds performed; pt is currently pending CT head, heart, chest, abdomen and pelvis.

## 2021-06-20 NOTE — PROGRESS NOTE ADULT - PROBLEM SELECTOR PLAN 1
-Hx of AVR x7 years ago at [place] ([facility] will obtain records)  -Echo 6/16: Severe aortic stenosis (worsened from moderate on 5/4/21). The peak transvalvular velocity is 6.00 m/s, the mean transvalvular gradient is 89.00 mmHg, and the LVOT/AV velocity ratio is 0.13. The aortic valve area (estimated via the continuity method) is 0.45 cm². (+) severe MR.  -Dr. Bella following, recs appreciated  -ROD 6/18/21: Severe prosthetic AS, mild valvular AR, moderate MR, moderate MS, mild TR, doppler evidence of PFO, normal RV systolic function, LV function appears grossly normal, no pericardial effusion.  -NPO after midnight Sunday night for CT head/abdomen/pelvis and cardiac CT Monday  -Carotid ultrasound performed 6/18/21, f/u read  -Pt is pre-load dependent -Hx of AVR x7 years ago at [place] (CTS will obtain records)  -Echo 6/16: Critical aortic stenosis (worsened from moderate on 5/4/21). The peak transvalvular velocity is 6.00 m/s, the mean transvalvular gradient is 89.00 mmHg, and the LVOT/AV velocity ratio is 0.13. The aortic valve area (estimated via the continuity method) is 0.45 cm². (+) severe MR.  -Dr. Bella following, recs appreciated  -ROD 6/18/21: Severe prosthetic AS, mild valvular AR, moderate MR, moderate MS, mild TR, doppler evidence of PFO, normal RV systolic function, LV function appears grossly normal, no pericardial effusion.  -NPO after midnight Sunday night for CT head/abdomen/pelvis and cardiac CT Monday  -Carotid ultrasound performed 6/18/21, f/u read  -Pt is pre-load dependent

## 2021-06-20 NOTE — PROGRESS NOTE ADULT - TIME BILLING
See PA note written above, for details. I reviewed the PA documentation.  I have personally seen and examined this patient today. I reviewed vitals, labs, medications, cardiac studies and additional imaging.  I agree with the PA's findings and plans as written above with the following additions/amendments:  patient c/o sore throat s/p ROD  ADminister lozenges  Awaiting CT scan for structural work up  Awaiting carotid u/s   Case discussed with patient, private aide at bedside and cardiac care team  Linette De Oliveira M.D.  Cardiology Attending
See PA note written above, for details. I reviewed the PA documentation.  I have personally seen and examined this patient today. I reviewed vitals, labs, medications, cardiac studies and additional imaging.  I agree with the PA's findings and plans as written above with the following additions/amendments:  89 with severe aortic stenosis and syncope undergoing work up for TAVR.  This morning patient euvolemic on exam, complaining about being kept bedbound, desires to sit in chair and "feel like a human"  Plan for:  OOB to chair with 1:1 assistance  -->patient much happier in chair  Cont ASA 81, Atorva 40qHS  Lopressor 12.5 BID   Hold Lasix as patient euvolemic on exam with symptomatic AS  LANG hose to legs, patient wears compression stockings at home and requesting to wear here  NPO pMN tonight for structural CT scans  Case discussed with patient, private aide at bedside and cardiac care team. Updates given to PCP Dr Vida De Oliveira M.D.  Cardiology Attending

## 2021-06-20 NOTE — PROCEDURE NOTE - NSICDXPROCEDURE_GEN_ALL_CORE_FT
PROCEDURES:  Insertion of intravenous catheter with ultrasound guidance 20-Jun-2021 23:46:40  Franny Nunes

## 2021-06-20 NOTE — PROGRESS NOTE ADULT - PROBLEM SELECTOR PLAN 7
**Resolved**  BUN/Cr 35/1.34 on admission likely in the setting of dehydration  - RESOLVED s/p 250cc bolus **Resolved**  BUN/Cr 35/1.34 on admission likely in the setting of dehydration  -BUN 20, Cr 0.88 6/20/21  - RESOLVED s/p 250cc bolus

## 2021-06-21 ENCOUNTER — TRANSCRIPTION ENCOUNTER (OUTPATIENT)
Age: 86
End: 2021-06-21

## 2021-06-21 LAB
ANION GAP SERPL CALC-SCNC: 8 MMOL/L — SIGNIFICANT CHANGE UP (ref 5–17)
BLD GP AB SCN SERPL QL: POSITIVE — SIGNIFICANT CHANGE UP
BUN SERPL-MCNC: 20 MG/DL — SIGNIFICANT CHANGE UP (ref 7–23)
CALCIUM SERPL-MCNC: 9.3 MG/DL — SIGNIFICANT CHANGE UP (ref 8.4–10.5)
CHLORIDE SERPL-SCNC: 98 MMOL/L — SIGNIFICANT CHANGE UP (ref 96–108)
CO2 SERPL-SCNC: 30 MMOL/L — SIGNIFICANT CHANGE UP (ref 22–31)
CREAT SERPL-MCNC: 0.79 MG/DL — SIGNIFICANT CHANGE UP (ref 0.5–1.3)
GLUCOSE SERPL-MCNC: 97 MG/DL — SIGNIFICANT CHANGE UP (ref 70–99)
HCT VFR BLD CALC: 32.4 % — LOW (ref 34.5–45)
HGB BLD-MCNC: 10.1 G/DL — LOW (ref 11.5–15.5)
MAGNESIUM SERPL-MCNC: 2.2 MG/DL — SIGNIFICANT CHANGE UP (ref 1.6–2.6)
MCHC RBC-ENTMCNC: 29.8 PG — SIGNIFICANT CHANGE UP (ref 27–34)
MCHC RBC-ENTMCNC: 31.2 GM/DL — LOW (ref 32–36)
MCV RBC AUTO: 95.6 FL — SIGNIFICANT CHANGE UP (ref 80–100)
NRBC # BLD: 0 /100 WBCS — SIGNIFICANT CHANGE UP (ref 0–0)
PLATELET # BLD AUTO: 267 K/UL — SIGNIFICANT CHANGE UP (ref 150–400)
POTASSIUM SERPL-MCNC: 5.8 MMOL/L — HIGH (ref 3.5–5.3)
POTASSIUM SERPL-SCNC: 5.8 MMOL/L — HIGH (ref 3.5–5.3)
RBC # BLD: 3.39 M/UL — LOW (ref 3.8–5.2)
RBC # FLD: 13.7 % — SIGNIFICANT CHANGE UP (ref 10.3–14.5)
RH IG SCN BLD-IMP: POSITIVE — SIGNIFICANT CHANGE UP
SODIUM SERPL-SCNC: 136 MMOL/L — SIGNIFICANT CHANGE UP (ref 135–145)
WBC # BLD: 6.71 K/UL — SIGNIFICANT CHANGE UP (ref 3.8–10.5)
WBC # FLD AUTO: 6.71 K/UL — SIGNIFICANT CHANGE UP (ref 3.8–10.5)

## 2021-06-21 PROCEDURE — 74174 CTA ABD&PLVS W/CONTRAST: CPT | Mod: 26

## 2021-06-21 PROCEDURE — 70450 CT HEAD/BRAIN W/O DYE: CPT | Mod: 26

## 2021-06-21 PROCEDURE — 99238 HOSP IP/OBS DSCHRG MGMT 30/<: CPT

## 2021-06-21 PROCEDURE — 75573 CT HRT C+ STRUX CGEN HRT DS: CPT | Mod: 26

## 2021-06-21 PROCEDURE — 99233 SBSQ HOSP IP/OBS HIGH 50: CPT

## 2021-06-21 PROCEDURE — 71045 X-RAY EXAM CHEST 1 VIEW: CPT | Mod: 26

## 2021-06-21 RX ORDER — FUROSEMIDE 40 MG
20 TABLET ORAL ONCE
Refills: 0 | Status: COMPLETED | OUTPATIENT
Start: 2021-06-21 | End: 2021-06-21

## 2021-06-21 RX ORDER — SODIUM ZIRCONIUM CYCLOSILICATE 10 G/10G
5 POWDER, FOR SUSPENSION ORAL ONCE
Refills: 0 | Status: COMPLETED | OUTPATIENT
Start: 2021-06-21 | End: 2021-06-21

## 2021-06-21 RX ADMIN — DESMOPRESSIN ACETATE 0.1 MILLIGRAM(S): 0.1 TABLET ORAL at 11:23

## 2021-06-21 RX ADMIN — LATANOPROST 1 DROP(S): 0.05 SOLUTION/ DROPS OPHTHALMIC; TOPICAL at 22:07

## 2021-06-21 RX ADMIN — ENOXAPARIN SODIUM 40 MILLIGRAM(S): 100 INJECTION SUBCUTANEOUS at 18:00

## 2021-06-21 RX ADMIN — Medication 1 SPRAY(S): at 18:00

## 2021-06-21 RX ADMIN — ATORVASTATIN CALCIUM 40 MILLIGRAM(S): 80 TABLET, FILM COATED ORAL at 22:08

## 2021-06-21 RX ADMIN — GABAPENTIN 300 MILLIGRAM(S): 400 CAPSULE ORAL at 22:08

## 2021-06-21 RX ADMIN — Medication 81 MILLIGRAM(S): at 11:23

## 2021-06-21 RX ADMIN — BRIMONIDINE TARTRATE 1 DROP(S): 2 SOLUTION/ DROPS OPHTHALMIC at 22:09

## 2021-06-21 RX ADMIN — Medication 12.5 MILLIGRAM(S): at 11:23

## 2021-06-21 RX ADMIN — Medication 1 SPRAY(S): at 06:09

## 2021-06-21 RX ADMIN — DESMOPRESSIN ACETATE 0.1 MILLIGRAM(S): 0.1 TABLET ORAL at 22:07

## 2021-06-21 RX ADMIN — Medication 20 MILLIGRAM(S): at 18:23

## 2021-06-21 RX ADMIN — SODIUM ZIRCONIUM CYCLOSILICATE 5 GRAM(S): 10 POWDER, FOR SUSPENSION ORAL at 13:55

## 2021-06-21 NOTE — PROGRESS NOTE ADULT - PROBLEM SELECTOR PLAN 8
Full code (GOC documented)   DVT PPX: Lovenox SQ  Activity: bed to chair with assistance  Dispo: Pending TAVR CTs on Monday, NPO after MN

## 2021-06-21 NOTE — DISCHARGE NOTE PROVIDER - NSDCFUADDINST_GEN_ALL_CORE_FT
-Walk daily as tolerated and use your incentive spirometer every hour.    -No driving or strenuous activity/exercise for 6 weeks, or until cleared by your surgeon.    -Gently clean your incisions with anti-bacterial soap and water, pat dry.  You may leave them open to air.    -Call your doctor if you have shortness of breath, chest pain not relieved by pain medication, dizziness, fever >101.5, or increased redness or drainage from incisions.

## 2021-06-21 NOTE — DISCHARGE NOTE PROVIDER - CARE PROVIDERS DIRECT ADDRESSES
,[email or web address]_Unknown,[email or web address]

## 2021-06-21 NOTE — DISCHARGE NOTE PROVIDER - NSDCCPCAREPLAN_GEN_ALL_CORE_FT
PRINCIPAL DISCHARGE DIAGNOSIS  Diagnosis: Critical aortic valve stenosis  Assessment and Plan of Treatment:        PRINCIPAL DISCHARGE DIAGNOSIS  Diagnosis: Critical aortic valve stenosis  Assessment and Plan of Treatment:       SECONDARY DISCHARGE DIAGNOSES  Diagnosis: HTN (hypertension)  Assessment and Plan of Treatment:     Diagnosis: Hyperlipidemia  Assessment and Plan of Treatment: Please continue your Lipitor 40 mg once a day at bedtime.    Diagnosis: Prediabetes  Assessment and Plan of Treatment: Your hemoglobin A1c is 5.7 which makes you prediabetic or borderline diabetic. A normal hemoglobin A1c is less than 5.7, prediabetes is 5.7-6.4, diabetes is 6.5 and up. Your hemoglobin A1c measures your average blood sugar over 3 months. It is very important to maintain a low carbohydrate diet (less bread, rice, pasta, starches, soda).    Diagnosis: Diabetes insipidus  Assessment and Plan of Treatment: Continue desmopressin 0.1 mg twice a day as prescribed.     PRINCIPAL DISCHARGE DIAGNOSIS  Diagnosis: Critical aortic valve stenosis  Assessment and Plan of Treatment: Your aortic heart valve is very tight and barely opening and closing. Please return to the nearest hospital of dizziness or near syncope (passing out). Please avoid hot environments and crowds. Please continue to to wear your compresison stocks, eat a low salt diet (2 grams of salt per day) and make sure to stay hydrated. You were evaluted by the structural heart surgeon Dr Nirav Bella for a TAVR (valve repair). Please follow up with Dr Bella on 6/30/21 @ 2PM.      SECONDARY DISCHARGE DIAGNOSES  Diagnosis: HTN (hypertension)  Assessment and Plan of Treatment: Please STOP Lisinopril.  Please continue metoprolol tartrate 12.5 mg twice daily.    Diagnosis: Hyperlipidemia  Assessment and Plan of Treatment: Please continue your Lipitor 40 mg once a day at bedtime.    Diagnosis: Prediabetes  Assessment and Plan of Treatment: Your hemoglobin A1c is 5.7 which makes you prediabetic or borderline diabetic. A normal hemoglobin A1c is less than 5.7, prediabetes is 5.7-6.4, diabetes is 6.5 and up. Your hemoglobin A1c measures your average blood sugar over 3 months. It is very important to maintain a low carbohydrate diet (less bread, rice, pasta, starches, soda).    Diagnosis: Diabetes insipidus  Assessment and Plan of Treatment: Continue desmopressin 0.1 mg twice a day as prescribed.     PRINCIPAL DISCHARGE DIAGNOSIS  Diagnosis: Critical aortic valve stenosis  Assessment and Plan of Treatment: Your aortic heart valve is very tight and barely opening and closing. Please return to the nearest hospital for dizziness or near syncope (passing out). Please avoid hot environments and crowds. Please continue to to wear your compresison stocks, eat a low salt diet (2 grams of salt per day) and make sure you stay hydrated. You were evaluted by the structural heart surgeon Dr. Nirav Bella for a TAVR (transcather aortic valve repair). Please follow up with Dr Bella on 6/30/21 @ 2PM.      SECONDARY DISCHARGE DIAGNOSES  Diagnosis: HTN (hypertension)  Assessment and Plan of Treatment: Please STOP Lisinopril.  Please continue metoprolol tartrate 12.5 mg twice daily.    Diagnosis: Hyperlipidemia  Assessment and Plan of Treatment: Please continue your Lipitor 40 mg once a day at bedtime.    Diagnosis: Prediabetes  Assessment and Plan of Treatment: Your hemoglobin A1c is 5.7 which makes you prediabetic or borderline diabetic. A normal hemoglobin A1c is less than 5.7, prediabetes is 5.7-6.4, diabetes is 6.5 and up. Your hemoglobin A1c measures your average blood sugar over 3 months. It is very important to maintain a low carbohydrate diet (less bread, rice, pasta, starches, soda).    Diagnosis: Diabetes insipidus  Assessment and Plan of Treatment: Continue desmopressin 0.1 mg twice a day as prescribed.     PRINCIPAL DISCHARGE DIAGNOSIS  Diagnosis: Critical aortic valve stenosis  Assessment and Plan of Treatment: Your aortic heart valve is very tight and barely opening and closing. Please return to the nearest hospital for dizziness or near syncope (passing out). Please avoid hot environments and crowds. Please continue to to wear your compresison stocks, eat a low salt diet (2 grams of salt per day) and make sure you stay hydrated. You were evaluted by the structural heart surgeon Dr. Nirav Bella for a valve-in valve repair. Please follow up with Dr Bella on 6/30/21 @ 2PM.      SECONDARY DISCHARGE DIAGNOSES  Diagnosis: HTN (hypertension)  Assessment and Plan of Treatment: Please STOP Lisinopril.  Please continue metoprolol tartrate 12.5 mg twice daily.    Diagnosis: Hyperlipidemia  Assessment and Plan of Treatment: Please continue your Lipitor 40 mg once a day at bedtime.    Diagnosis: Prediabetes  Assessment and Plan of Treatment: Your hemoglobin A1c is 5.7 which makes you prediabetic or borderline diabetic. A normal hemoglobin A1c is less than 5.7, prediabetes is 5.7-6.4, diabetes is 6.5 and up. Your hemoglobin A1c measures your average blood sugar over 3 months. It is very important to maintain a low carbohydrate diet (less bread, rice, pasta, starches, soda).    Diagnosis: Diabetes insipidus  Assessment and Plan of Treatment: Continue desmopressin 0.1 mg twice a day as prescribed.

## 2021-06-21 NOTE — PROGRESS NOTE ADULT - PROBLEM SELECTOR PLAN 7
**Resolved**  BUN/Cr 35/1.34 on admission likely in the setting of dehydration  -BUN 20, Cr 0.88 6/20/21  - RESOLVED s/p 250cc bolus Full code (GOC documented)   DVT PPX: Lovenox SQ  Activity: bed to chair with assistance  Dispo: Pending CTS Dr. Bella recs

## 2021-06-21 NOTE — DISCHARGE NOTE PROVIDER - NSDCFUSCHEDAPPT_GEN_ALL_CORE_FT
[facility]OZ[facility] ; 06/30/2021 ; [facility]PP [facility] Surg [address] [facility] ; 07/20/2021 ; [facility] [address] [facility]OZ[facility] ; 07/12/2021 ; NPP CT Surg [address]  [facility]OZ[facility] ; 07/20/2021 ; [facility] [address] [facility]OZ[facility] ; 07/12/2021 ; [facility] [address] [facility] [address] [facility] ; [facility] [address]

## 2021-06-21 NOTE — DISCHARGE NOTE PROVIDER - NSDCFUADDAPPT_GEN_ALL_CORE_FT
-Please follow up with Dr. Bella on 6/30/21 at 2:00pm. The office is located at [facility], [facility], 4th floor. Call us with any questions #[phone].    -Please follow up with your PCP, Dr. Mcpherson in 2 weeks. Call #[phone] to schedule an appointment.    -Please follow up with your cardiologist, Dr. Vasquez in 2 weeks. Call #[phone] to schedule an appointment.   -Please follow up with Dr. Bella on 7/12/21. The office is located at [facility], [facility], 4th floor. Call us with any questions #[phone].    -Please follow up with Dr. Grace (vascular surgery) in 1 week. Call #[phone] to make an appointment.    -Please follow up with your PCP, Dr. Mcpherson in 2 weeks. Call #[phone] to schedule an appointment.    -Please follow up with your cardiologist, Dr. Vasquez in 2 weeks. Call #[phone] to schedule an appointment.

## 2021-06-21 NOTE — DISCHARGE NOTE PROVIDER - HOSPITAL COURSE
INCOMPLETE     89 yo F with 24Hr private HHA with PMHx of HTN, HLD, Diabetes insipidus, GERD, AS s/p AVR (7 years ago), TAA (4.1cm) who presented to the ED 6/16/21 BIBEMS from pt’s dermatology office where she was walking down the hallway, felt "breathless" and then had loss of consciousness per her HHA. She sat her down so she did not have a fall or hit her head. Pt was found to be hypotensive with SBP 80’s in the field. Pt had a recent ED visit on 6/11/21 for another episode of witnessed syncope after walking many blocks to a restaurant and then lost consciousness while sitting in a restaurant, pt left AMA that visit. Pt reports taking Lasix at home for h/o "fluid in the lungs" and chronic LE edema L>R. Pt denies fever, chills, cough, CP, PND/orthopnea, abdominal pain, N/V/D, dizziness. Pt had an outpatient CCTA 6/3/21 revealing calcium score is severe at 768 Agatston units, non obstructive CAD, dLAD is not well visualized but is probably non obstructive. 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 CTA chest 6/3/21 neg for PE. MRA chest 6/11/21: since 6/3/2021, there has been resolution of bilateral pleural effusions, technique is not optimized to assess mitral valve function, Mildly aneurysmal ascending aorta, measuring 4.1 cm.  In the ED VS: T 97.9F, HR 73bpm, BP 81/33 improved to 113/64, RR 17, O2 sat 98% on RA. Labs significant for Hgb/HCT 10.3/32.1, BUN/Cr 35/1.34, Trop 0.04, CK/CKMB normal, COVID neg. ECG: NSR @71bpm, ANAIS in III, V1, V2, STD in I (unchanged from prior), CXR wet read with pulmonary vascular congestion. Pt was given 250 cc bolus and Aspirin 325mg PO x 1 in the ED. Pt is now admitted to cardiology for further management of syncope.   INCOMPLETE     89 yo F with 24Hr private HHA with PMHx of HTN, HLD, Diabetes insipidus, GERD, AS s/p AVR (7 years ago), TAA (4.1cm) who presented to the ED 6/16/21 BIBEMS from pt’s dermatology office where she was walking down the hallway, felt "breathless" and then had loss of consciousness per her HHA. She sat her down so she did not have a fall or hit her head. Pt was found to be hypotensive with SBP 80’s in the field. Pt had a recent ED visit on 6/11/21 for another episode of witnessed syncope after walking many blocks to a restaurant and then lost consciousness while sitting in a restaurant, pt left AMA that visit. Pt reports taking Lasix at home for h/o "fluid in the lungs" and chronic LE edema L>R. Pt denies fever, chills, cough, CP, PND/orthopnea, abdominal pain, N/V/D, dizziness. Pt had an outpatient CCTA 6/3/21 revealing calcium score is severe at 768 Agatston units, non obstructive CAD, dLAD is not well visualized but is probably non obstructive. 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 CTA chest 6/3/21 neg for PE. MRA chest 6/11/21: since 6/3/2021, there has been resolution of bilateral pleural effusions, technique is not optimized to assess mitral valve function, Mildly aneurysmal ascending aorta, measuring 4.1 cm.  In the ED VS: T 97.9F, HR 73bpm, BP 81/33 improved to 113/64, RR 17, O2 sat 98% on RA. Labs significant for Hgb/HCT 10.3/32.1, BUN/Cr 35/1.34, Trop 0.04, CK/CKMB normal, COVID neg. ECG: NSR @71bpm, ANAIS in III, V1, V2, STD in I (unchanged from prior), CXR revealed no acute cardiopulmonary pathology. Pt was given 250 cc bolus and Aspirin 325mg PO x 1 in the ED. Pt admitted to cardiology for further management of syncope.    Echo 6/16: Critical aortic stenosis (worsened from moderate on 5/4/21). The peak transvalvular velocity is 6.00 m/s, the mean transvalvular gradient is 89.00 mmHg, and the LVOT/AV velocity ratio is 0.13. The aortic valve area (estimated via the continuity method) is 0.45 cm². (+) severe MR.  ROD 6/18: critical prosthetic AS BUSTER 0.3 cm2, mild valvular AR, moderate MR, moderate MS, mild TR, doppler evidence of PFO, normal RV systolic function, LV function appears grossly normal, no pericardial effusion.  Carotid ultrasound 6/18: f/u read    Dr. Muniz team consulted and recommended pre-TAVR workup on 6/21/21. CT head revealed no acute pathology. CT heart congenital revealed nonobstructive CAD, calcified bioprosthetic aortic valve, severe mitral annular calcification; the bilateral pleural effusions, septal thickening, and groundglass opacities can occur in the clinical setting of pulmonary edema.     Pt deemed stable for discharge per Dr. Fernandez, patient will follow-up with .... INCOMPLETE     89 yo F with 24Hr private HHA with PMHx of HTN, HLD, Diabetes insipidus, GERD, AS s/p AVR (7 years ago), TAA (4.1cm) who presented to the ED 6/16/21 BIBEMS from pt’s dermatology office where she was walking down the hallway, felt "breathless" and then had loss of consciousness per her HHA. She sat her down so she did not have a fall or hit her head. Pt was found to be hypotensive with SBP 80’s in the field. Pt had a recent ED visit on 6/11/21 for another episode of witnessed syncope after walking many blocks to a restaurant and then lost consciousness while sitting in a restaurant, pt left AMA that visit. Pt reports taking Lasix at home for h/o "fluid in the lungs" and chronic LE edema L>R. Pt denies fever, chills, cough, CP, PND/orthopnea, abdominal pain, N/V/D, dizziness. Pt had an outpatient CCTA 6/3/21 revealing calcium score is severe at 768 Agatston units, non obstructive CAD, dLAD is not well visualized but is probably non obstructive. 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 CTA chest 6/3/21 neg for PE. MRA chest 6/11/21: since 6/3/2021, there has been resolution of bilateral pleural effusions, technique is not optimized to assess mitral valve function, Mildly aneurysmal ascending aorta, measuring 4.1 cm.  In the ED VS: T 97.9F, HR 73bpm, BP 81/33 improved to 113/64, RR 17, O2 sat 98% on RA. Labs significant for Hgb/HCT 10.3/32.1, BUN/Cr 35/1.34, Trop 0.04, CK/CKMB normal, COVID neg. ECG: NSR @71bpm, ANAIS in III, V1, V2, STD in I (unchanged from prior), CXR revealed no acute cardiopulmonary pathology. Pt was given 250 cc bolus and Aspirin 325mg PO x 1 in the ED. Pt admitted to cardiology for further management of syncope.    During hospitalization, Echo was performed revealing critical aortic stenosis (worsened from moderate on 5/4/21), peak transvalvular velocity is 6.00 m/s, the mean transvalvular gradient is 89.00 mmHg, and the LVOT/AV velocity ratio is 0.13, aortic valve area (estimated via the continuity method) is 0.45 cm², (+) severe MR. ROD was performed revealing critical prosthetic AS BUSTER 0.3 cm2, mild valvular AR, moderate MR, moderate MS, mild TR, doppler evidence of PFO, normal RV systolic function, LV function appears grossly normal, no pericardial effusion.   Carotid ultrasound 6/18: f/u read    Dr. Muniz team consulted and recommended pre-TAVR workup on 6/21/21. CT head revealed no acute pathology. CT heart congenital revealed nonobstructive CAD, calcified bioprosthetic aortic valve, severe mitral annular calcification; the bilateral pleural effusions, septal thickening, and groundglass opacities can occur in the clinical setting of pulmonary edema.       Pt deemed stable for discharge per Dr. Fernandez, patient will follow-up with .... INCOMPLETE     89 yo F with 24Hr private HHA with PMHx of HTN, HLD, Diabetes insipidus, GERD, AS s/p AVR (7 years ago), TAA (4.1cm) who presented to the ED 6/16/21 BIBEMS from pt’s dermatology office where she was walking down the hallway, felt "breathless" and then had loss of consciousness per her HHA. She sat her down so she did not have a fall or hit her head. Pt was found to be hypotensive with SBP 80’s in the field. Pt had a recent ED visit on 6/11/21 for another episode of witnessed syncope after walking many blocks to a restaurant and then lost consciousness while sitting in a restaurant, pt left AMA that visit. Pt reports taking Lasix at home for h/o "fluid in the lungs" and chronic LE edema L>R. Pt denies fever, chills, cough, CP, PND/orthopnea, abdominal pain, N/V/D, dizziness. Pt had an outpatient CCTA 6/3/21 revealing calcium score is severe at 768 Agatston units, non obstructive CAD, dLAD is not well visualized but is probably non obstructive. 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 CTA chest 6/3/21 neg for PE. MRA chest 6/11/21: since 6/3/2021, there has been resolution of bilateral pleural effusions, technique is not optimized to assess mitral valve function, Mildly aneurysmal ascending aorta, measuring 4.1 cm.  In the ED VS: T 97.9F, HR 73bpm, BP 81/33 improved to 113/64, RR 17, O2 sat 98% on RA. Labs significant for Hgb/HCT 10.3/32.1, BUN/Cr 35/1.34, Trop 0.04, CK/CKMB normal, COVID neg. ECG: NSR @71bpm, ANAIS in III, V1, V2, STD in I (unchanged from prior), CXR revealed no acute cardiopulmonary pathology. Pt was given 250 cc bolus and Aspirin 325mg PO x 1 in the ED. Pt admitted to cardiology for further management of syncope.    During hospitalization, Echo was performed revealing critical aortic stenosis (worsened from moderate on 5/4/21), peak transvalvular velocity is 6.00 m/s, the mean transvalvular gradient is 89.00 mmHg, and the LVOT/AV velocity ratio is 0.13, aortic valve area (estimated via the continuity method) is 0.45 cm², (+) severe MR. ROD was performed revealing critical prosthetic AS BUSTER 0.3 cm2, mild valvular AR, moderate MR, moderate MS, mild TR, doppler evidence of PFO, normal RV systolic function, LV function appears grossly normal, no pericardial effusion.   Carotid ultrasound 6/18: f/u read    Dr. Muniz team consulted and recommended pre-TAVR workup on 6/21/21. CT head revealed no acute pathology. CT heart congenital revealed nonobstructive CAD, calcified bioprosthetic aortic valve, severe mitral annular calcification; the bilateral pleural effusions, septal thickening, and groundglass opacities can occur in the clinical setting of pulmonary edema.     Pt. seen and examined at bedside this morning, without complaints. Vital signs stable, labs and telemetry reviewed. Home medication regimen reviewed with Dr. Fernandez and patient is to continue taking ….. Pt. stable for discharge as per Dr. Fernandez and to f/u with  … . Patient has been given appropriate discharge instructions including medication regimen, management and follow up. Prescriptions have been e-prescribed to patient's preferred pharmacy.   INCOMPLETE     89 yo F with 24Hr private HHA with PMHx of HTN, HLD, Diabetes insipidus, GERD, AS s/p AVR (7 years ago), TAA (4.1cm) who presented to the ED 6/16/21 BIBEMS from pt’s dermatology office where she was walking down the hallway, felt "breathless" and then had loss of consciousness per her HHA. She sat her down so she did not have a fall or hit her head. Pt was found to be hypotensive with SBP 80’s in the field. Pt had a recent ED visit on 6/11/21 for another episode of witnessed syncope after walking many blocks to a restaurant and then lost consciousness while sitting in a restaurant, pt left AMA that visit. Pt reports taking Lasix at home for h/o "fluid in the lungs" and chronic LE edema L>R. Pt denies fever, chills, cough, CP, PND/orthopnea, abdominal pain, N/V/D, dizziness. Pt had an outpatient CCTA 6/3/21 revealing calcium score is severe at 768 Agatston units, non obstructive CAD, dLAD is not well visualized but is probably non obstructive. 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 CTA chest 6/3/21 neg for PE. MRA chest 6/11/21: since 6/3/2021, there has been resolution of bilateral pleural effusions, technique is not optimized to assess mitral valve function, Mildly aneurysmal ascending aorta, measuring 4.1 cm.  In the ED VS: T 97.9F, HR 73bpm, BP 81/33 improved to 113/64, RR 17, O2 sat 98% on RA. Labs significant for Hgb/HCT 10.3/32.1, BUN/Cr 35/1.34, Trop 0.04, CK/CKMB normal, COVID neg. ECG: NSR @71bpm, ANAIS in III, V1, V2, STD in I (unchanged from prior), CXR revealed no acute cardiopulmonary pathology. Pt was given 250 cc bolus and Aspirin 325mg PO x 1 in the ED. Pt admitted to cardiology for further management of syncope.    During hospitalization, Echo was performed 6/16 revealing critical aortic stenosis (worsened from moderate on 5/4/21), peak transvalvular velocity is 6.00 m/s, the mean transvalvular gradient is 89.00 mmHg, and the LVOT/AV velocity ratio is 0.13, aortic valve area (estimated via the continuity method) is 0.45 cm², (+) severe MR. ROD was performed 6/18 revealing critical prosthetic AS BUSTER 0.3 cm2, mild valvular AR, moderate MR, moderate MS, mild TR, doppler evidence of PFO, normal RV systolic function, LV function appears grossly normal, no pericardial effusion.   Carotid ultrasound 6/18: f/u read    Dr. Muniz team consulted and recommended pre-TAVR workup. CT head 6/21 revealed no acute pathology. CT heart congenital 6/21 revealed nonobstructive CAD, calcified bioprosthetic aortic valve, severe mitral annular calcification; the bilateral pleural effusions, septal thickening, and groundglass opacities can occur in the clinical setting of pulmonary edema.     Pt. seen and examined at bedside this morning, without complaints. Vital signs stable, labs and telemetry reviewed. Home medication regimen reviewed with Dr. Fernandez and patient is to continue taking ….. Pt. stable for discharge as per Dr. Fernandez and to f/u with  … . Patient has been given appropriate discharge instructions including medication regimen, management and follow up. Prescriptions have been e-prescribed to patient's preferred pharmacy.   INCOMPLETE     89 yo F with 24Hr private HHA with PMHx of HTN, HLD, Diabetes insipidus, GERD, AS s/p AVR (7 years ago), TAA (4.1cm) who presented to the ED 6/16/21 BIBEMS from pt’s dermatology office where she was walking down the hallway, felt "breathless" and then had loss of consciousness per her HHA. She sat her down so she did not have a fall or hit her head. Pt was found to be hypotensive with SBP 80’s in the field. Pt had a recent ED visit on 6/11/21 for another episode of witnessed syncope after walking many blocks to a restaurant and then lost consciousness while sitting in a restaurant, pt left AMA that visit. Pt reports taking Lasix at home for h/o "fluid in the lungs" and chronic LE edema L>R. Pt denies fever, chills, cough, CP, PND/orthopnea, abdominal pain, N/V/D, dizziness. Pt had an outpatient CCTA 6/3/21 revealing calcium score is severe at 768 Agatston units, non obstructive CAD, dLAD is not well visualized but is probably non obstructive. 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 CTA chest 6/3/21 neg for PE. MRA chest 6/11/21: since 6/3/2021, there has been resolution of bilateral pleural effusions, technique is not optimized to assess mitral valve function, Mildly aneurysmal ascending aorta, measuring 4.1 cm.  In the ED VS: T 97.9F, HR 73bpm, BP 81/33 improved to 113/64, RR 17, O2 sat 98% on RA. Labs significant for Hgb/HCT 10.3/32.1, BUN/Cr 35/1.34, Trop 0.04, CK/CKMB normal, COVID neg. ECG: NSR @71bpm, ANAIS in III, V1, V2, STD in I (unchanged from prior), CXR revealed no acute cardiopulmonary pathology. Pt was given 250 cc bolus and Aspirin 325mg PO x 1 in the ED. Pt admitted to cardiology for further management of syncope.    During hospitalization, Echo was performed 6/16 revealing critical aortic stenosis (worsened from moderate on 5/4/21), peak transvalvular velocity is 6.00 m/s, the mean transvalvular gradient is 89.00 mmHg, and the LVOT/AV velocity ratio is 0.13, aortic valve area (estimated via the continuity method) is 0.45 cm², (+) severe MR. ROD was performed 6/18 revealing critical prosthetic AS BUSTER 0.3 cm2, mild valvular AR, moderate MR, moderate MS, mild TR, doppler evidence of PFO, normal RV systolic function, LV function appears grossly normal, no pericardial effusion.   Carotid ultrasound 6/18: f/u read _________    Dr. Muniz team consulted and recommended pre-TAVR workup. CT head 6/21 revealed no acute pathology. CT heart congenital 6/21 revealed nonobstructive CAD, calcified bioprosthetic aortic valve, severe mitral annular calcification; the bilateral pleural effusions, septal thickening, and groundglass opacities can occur in the clinical setting of pulmonary edema.   Pt's home lasix and lisinopril discontinued as pt preload dependent. Pt started on lopressor 12.5 mg BID for which she has tolerated well.     Pt. seen and examined at bedside this morning, without complaints. Vital signs stable, labs and telemetry reviewed. Home medication regimen reviewed with Dr. Fernandez and patient is to continue taking lopressor 12.5 mg BID.  Patient has been given appropriate discharge instructions including medication regimen, management and follow up. Prescriptions have been e-prescribed to patient's preferred pharmacy. Pt. stable for discharge as per Dr. Fernandez and to f/u with Dr. Bella on 6/30/21 @ 2pm.   INCOMPLETE     89 yo F with 24Hr private HHA with PMHx of HTN, HLD, Diabetes insipidus, GERD, AS s/p AVR (7 years ago), TAA (4.1cm) who presented to the ED 6/16/21 BIBEMS from pt’s dermatology office where she was walking down the hallway, felt "breathless" and then had loss of consciousness per her HHA. She sat her down so she did not have a fall or hit her head. Pt was found to be hypotensive with SBP 80’s in the field. Pt had a recent ED visit on 6/11/21 for another episode of witnessed syncope after walking many blocks to a restaurant and then lost consciousness while sitting in a restaurant, pt left AMA that visit. Pt reports taking Lasix at home for h/o "fluid in the lungs" and chronic LE edema L>R. Pt denies fever, chills, cough, CP, PND/orthopnea, abdominal pain, N/V/D, dizziness. Pt had an outpatient CCTA 6/3/21 revealing calcium score is severe at 768 Agatston units, non obstructive CAD, dLAD is not well visualized but is probably non obstructive. 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 CTA chest 6/3/21 neg for PE. MRA chest 6/11/21: since 6/3/2021, there has been resolution of bilateral pleural effusions, technique is not optimized to assess mitral valve function, Mildly aneurysmal ascending aorta, measuring 4.1 cm.  In the ED VS: T 97.9F, HR 73bpm, BP 81/33 improved to 113/64, RR 17, O2 sat 98% on RA. Labs significant for Hgb/HCT 10.3/32.1, BUN/Cr 35/1.34, Trop 0.04, CK/CKMB normal, COVID neg. ECG: NSR @71bpm, ANAIS in III, V1, V2, STD in I (unchanged from prior), CXR revealed no acute cardiopulmonary pathology. Pt was given 250 cc bolus and Aspirin 325mg PO x 1 in the ED. Pt admitted to cardiology for further management of syncope.    During hospitalization, Echo 6/16 revealing critical aortic stenosis (worsened from moderate on 5/4/21), peak transvalvular velocity is 6.00 m/s, the mean transvalvular gradient is 89.00 mmHg, and the LVOT/AV velocity ratio is 0.13, aortic valve area (estimated via the continuity method) is 0.45 cm², (+) severe MR. ROD 6/18 revealing critical prosthetic AS BUSTER 0.3 cm2, mild valvular AR, moderate MR, moderate MS, mild TR, doppler evidence of PFO, normal RV systolic function, LV function appears grossly normal, no pericardial effusion.     Structural Heart, Dr. Bella consulted and recommended pre-TAVR workup. CT head 6/21 revealed no acute pathology. CT heart congenital 6/21 revealed nonobstructive CAD, calcified bioprosthetic aortic valve, severe mitral annular calcification; the bilateral pleural effusions, septal thickening, and groundglass opacities can occur in the clinical setting of pulmonary edema.   Pt's home lasix and lisinopril discontinued as pt preload dependent. Pt started on lopressor 12.5 mg BID for which she has tolerated well.     Pt. seen and examined at bedside this morning, without complaints. Vital signs stable, labs and telemetry reviewed. Home medication regimen reviewed with Dr. Fernandez and patient is to continue taking lopressor 12.5 mg BID.  Patient has been given appropriate discharge instructions including medication regimen, management and follow up. Prescriptions have been e-prescribed to patient's preferred pharmacy. Pt. stable for discharge as per Dr. Fernandez and to f/u with Dr. Bella on 6/30/21 @ 2pm.   89 yo F with 24Hr private HHA with PMHx of HTN, HLD, Diabetes insipidus, GERD, AS s/p AVR (7 years ago), TAA (4.1cm) who presented to the ED 6/16/21 BIBEMS from pt’s dermatology office where she was walking down the hallway, felt "breathless" and then had loss of consciousness per her HHA. She sat her down so she did not have a fall or hit her head. Pt was found to be hypotensive with SBP 80’s in the field. Pt had a recent ED visit on 6/11/21 for another episode of witnessed syncope after walking many blocks to a restaurant and then lost consciousness while sitting in a restaurant, pt left AMA that visit. Pt reports taking Lasix at home for h/o "fluid in the lungs" and chronic LE edema L>R. Pt denies fever, chills, cough, CP, PND/orthopnea, abdominal pain, N/V/D, dizziness. Pt had an outpatient CCTA 6/3/21 revealing calcium score is severe at 768 Agatston units, non obstructive CAD, dLAD is not well visualized but is probably non obstructive. 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 CTA chest 6/3/21 neg for PE. MRA chest 6/11/21: since 6/3/2021, there has been resolution of bilateral pleural effusions, technique is not optimized to assess mitral valve function, Mildly aneurysmal ascending aorta, measuring 4.1 cm.  In the ED VS: T 97.9F, HR 73bpm, BP 81/33 improved to 113/64, RR 17, O2 sat 98% on RA. Labs significant for Hgb/HCT 10.3/32.1, BUN/Cr 35/1.34, Trop 0.04, CK/CKMB normal, COVID neg. ECG: NSR @71bpm, ANAIS in III, V1, V2, STD in I (unchanged from prior), CXR revealed no acute cardiopulmonary pathology. Pt was given 250 cc bolus and Aspirin 325mg PO x 1 in the ED. Pt admitted to cardiology for further management of syncope.    During hospitalization, Echo 6/16 revealing critical aortic stenosis (worsened from moderate on 5/4/21), peak transvalvular velocity is 6.00 m/s, the mean transvalvular gradient is 89.00 mmHg, and the LVOT/AV velocity ratio is 0.13, aortic valve area (estimated via the continuity method) is 0.45 cm², (+) severe MR. ROD 6/18 revealing critical prosthetic AS BUSTER 0.3 cm2, mild valvular AR, moderate MR, moderate MS, mild TR, doppler evidence of PFO, normal RV systolic function, LV function appears grossly normal, no pericardial effusion.     Structural Heart, Dr. Bella consulted and recommended pre-TAVR workup. CT head 6/21 revealed no acute pathology. CT heart congenital 6/21 revealed nonobstructive CAD, calcified bioprosthetic aortic valve, severe mitral annular calcification; the bilateral pleural effusions, septal thickening, and groundglass opacities can occur in the clinical setting of pulmonary edema.   Pt's home lasix and lisinopril discontinued as pt preload dependent. Pt started on lopressor 12.5 mg BID for which she has tolerated well.     On 6/29/21 she underwent valve in valve TAVR with Sheri valve with Dr.'s Bella/Young/Sher.  A CoreValve was initially attempted, however there was resistance with passing the sheath through the descending, aortic arch, and ascending aorta. The CoreValve was deemed too rigid and decision was made to go with Sheri valve, which was deployed across bioprosthetic AV with good result.  Echocardiogram at the end of the procedure demonstrated no AI, no significant perivalvular leak, and no wall motion abnormalities. Of note, there was a planned ostial RCA stent for protection during ROSLYN deployment, however RCA stent embolized to descending thoracic aorta then left external iliac artery.  Multiple attempts at retrieval was unsuccessful and stent was left in place.         noted to have a left radial pseudoaneurysm for which vascular was consulted. On 7/1 she underwent a left radial pseudoanerysm repair. She tolerated procedure well. She continue to ambulate independently. She was discharged home on 7/6/21.    89 yo F with 24Hr private HHA with PMHx of HTN, HLD, Diabetes insipidus, GERD, AS s/p AVR (7 years ago), TAA (4.1cm) who presented to the ED 6/16/21 BIBEMS from pt’s dermatology office where she was walking down the hallway, felt "breathless" and then had loss of consciousness per her HHA. She sat her down so she did not have a fall or hit her head. Pt was found to be hypotensive with SBP 80’s in the field. Pt had a recent ED visit on 6/11/21 for another episode of witnessed syncope after walking many blocks to a restaurant and then lost consciousness while sitting in a restaurant, pt left AMA that visit. Pt reports taking Lasix at home for h/o "fluid in the lungs" and chronic LE edema L>R. Pt denies fever, chills, cough, CP, PND/orthopnea, abdominal pain, N/V/D, dizziness. Pt had an outpatient CCTA 6/3/21 revealing calcium score is severe at 768 Agatston units, non obstructive CAD, dLAD is not well visualized but is probably non obstructive. 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 CTA chest 6/3/21 neg for PE. MRA chest 6/11/21: since 6/3/2021, there has been resolution of bilateral pleural effusions, technique is not optimized to assess mitral valve function, Mildly aneurysmal ascending aorta, measuring 4.1 cm.  In the ED VS: T 97.9F, HR 73bpm, BP 81/33 improved to 113/64, RR 17, O2 sat 98% on RA. Labs significant for Hgb/HCT 10.3/32.1, BUN/Cr 35/1.34, Trop 0.04, CK/CKMB normal, COVID neg. ECG: NSR @71bpm, ANAIS in III, V1, V2, STD in I (unchanged from prior), CXR revealed no acute cardiopulmonary pathology. Pt was given 250 cc bolus and Aspirin 325mg PO x 1 in the ED. Pt admitted to cardiology for further management of syncope.    During hospitalization, Echo 6/16 revealing critical aortic stenosis (worsened from moderate on 5/4/21), peak transvalvular velocity is 6.00 m/s, the mean transvalvular gradient is 89.00 mmHg, and the LVOT/AV velocity ratio is 0.13, aortic valve area (estimated via the continuity method) is 0.45 cm², (+) severe MR. ROD 6/18 revealing critical prosthetic AS BUSTER 0.3 cm2, mild valvular AR, moderate MR, moderate MS, mild TR, doppler evidence of PFO, normal RV systolic function, LV function appears grossly normal, no pericardial effusion.     Structural Heart, Dr. Bella consulted and recommended pre-TAVR workup. CT head 6/21 revealed no acute pathology. CT heart congenital 6/21 revealed nonobstructive CAD, calcified bioprosthetic aortic valve, severe mitral annular calcification; the bilateral pleural effusions, septal thickening, and groundglass opacities can occur in the clinical setting of pulmonary edema.   Pt's home lasix and lisinopril discontinued as pt preload dependent. Pt started on lopressor 12.5 mg BID for which she has tolerated well.     On 6/29/21 she underwent valve in valve TAVR with Sheri valve with Dr.'s Bella/Young/Sher.  A CoreValve was initially attempted, however there was resistance with passing the sheath through the descending, aortic arch, and ascending aorta. The CoreValve was deemed too rigid and decision was made to go with Sheri valve, which was deployed across bioprosthetic AV with good result.  Echocardiogram at the end of the procedure demonstrated no AI, no significant perivalvular leak, and no wall motion abnormalities. Of note, there was a planned ostial RCA stent for protection during ROSLYN deployment, however RCA stent embolized to descending thoracic aorta then left external iliac artery.  Multiple attempts at retrieval was unsuccessful and stent was left in place. She arrived to CTICU HD stable. Shw was weaned and extbated to high flow nasal canula overnight. She required 2 U PRBCs for Hg 6.7. An ultrasound of left radial artery was noted to have a left radial pseudoaneurysm for which vascular was consulted. On 7/1 she underwent a left radial pseudoanerysm repair under conscious sedation with Dr. Grace. She tolerated procedure well.  Palmar pulses were monitored daily postop and remained stable.  She otherwise remained hemodynamically stable, and made steady progress. She continue to ambulate independently. She was discharged home on 7/6/21.    89 yo F with 24Hr private HHA with PMHx of HTN, HLD, Diabetes insipidus, GERD, AS s/p AVR (7 years ago), TAA (4.1cm) who presented to the ED 6/16/21 BIBEMS from pt’s dermatology office where she was walking down the hallway, felt "breathless" and then had loss of consciousness per her HHA. She sat her down so she did not have a fall or hit her head. Pt was found to be hypotensive with SBP 80’s in the field. Pt had a recent ED visit on 6/11/21 for another episode of witnessed syncope after walking many blocks to a restaurant and then lost consciousness while sitting in a restaurant, pt left AMA that visit. Pt reports taking Lasix at home for h/o "fluid in the lungs" and chronic LE edema L>R. Pt denies fever, chills, cough, CP, PND/orthopnea, abdominal pain, N/V/D, dizziness. Pt had an outpatient CCTA 6/3/21 revealing calcium score is severe at 768 Agatston units, non obstructive CAD, dLAD is not well visualized but is probably non obstructive. 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 CTA chest 6/3/21 neg for PE. MRA chest 6/11/21: since 6/3/2021, there has been resolution of bilateral pleural effusions, technique is not optimized to assess mitral valve function, Mildly aneurysmal ascending aorta, measuring 4.1 cm.      During hospitalization, Echo 6/16 revealing critical aortic stenosis (worsened from moderate on 5/4/21), peak transvalvular velocity is 6.00 m/s, the mean transvalvular gradient is 89.00 mmHg, and the LVOT/AV velocity ratio is 0.13, aortic valve area (estimated via the continuity method) is 0.45 cm², (+) severe MR. ROD 6/18 revealing critical prosthetic AS BUSTER 0.3 cm2, mild valvular AR, moderate MR, moderate MS, mild TR, doppler evidence of PFO, normal RV systolic function, LV function appears grossly normal, no pericardial effusion.     Structural Heart, Dr. Bella consulted and recommended pre-TAVR workup. CT head 6/21 revealed no acute pathology. CT heart congenital 6/21 revealed nonobstructive CAD, calcified bioprosthetic aortic valve, severe mitral annular calcification; the bilateral pleural effusions, septal thickening, and groundglass opacities can occur in the clinical setting of pulmonary edema.   Pt's home lasix and lisinopril discontinued as pt preload dependent. Pt started on lopressor 12.5 mg BID for which she has tolerated well.     On 6/29/21 she underwent valve in valve TAVR with Sheri valve with Dr.'s Bella/Young/Sher.  A CoreValve was initially attempted, however there was resistance with passing the sheath through the descending, aortic arch, and ascending aorta. The CoreValve was deemed too rigid and decision was made to go with Sheri valve, which was deployed across bioprosthetic AV with good result.  Echocardiogram at the end of the procedure demonstrated no AI, no significant perivalvular leak, and no wall motion abnormalities. Of note, there was a planned ostial RCA stent for protection during ROSLYN deployment, however RCA stent embolized to descending thoracic aorta then left external iliac artery.  Multiple attempts at retrieval was unsuccessful and stent was left in place. She arrived to CTICU HD stable. Shw was weaned and extbated to high flow nasal canula overnight. She required 2 U PRBCs for Hg 6.7. An ultrasound of left radial artery was noted to have a left radial pseudoaneurysm for which vascular was consulted. On 7/1 she underwent a left radial pseudoanerysm repair under conscious sedation with Dr. Grace. She tolerated procedure well.  Palmar pulses were monitored daily postop and remained stable.  She otherwise remained hemodynamically stable, and made steady progress. She continue to ambulate independently. She was discharged home on 7/6/21.    89 yo F with 24Hr private HHA with PMHx of HTN, HLD, Diabetes insipidus, GERD, AS s/p AVR (7 years ago), TAA (4.1cm) who presented to the ED 6/16/21 BIBEMS from pt’s dermatology office where she was walking down the hallway, felt "breathless" and then had loss of consciousness per her HHA. Pt was found to be hypotensive with SBP 80’s in the field. Pt had a recent ED visit on 6/11/21 for another episode of witnessed syncope after walking many blocks to a restaurant and then lost consciousness while sitting in a restaurant, pt left AMA that visit. Pt reports taking Lasix at home for h/o "fluid in the lungs" and chronic LE edema L>R. Pt denies fever, chills, cough, CP, PND/orthopnea, abdominal pain, N/V/D, dizziness. Pt had an outpatient CCTA 6/3/21 revealing calcium score is severe at 768 Agatston units, non obstructive CAD, dLAD is not well visualized but is probably non obstructive. 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 CTA chest 6/3/21 neg for PE. MRA chest 6/11/21: since 6/3/2021, there has been resolution of bilateral pleural effusions, technique is not optimized to assess mitral valve function, Mildly aneurysmal ascending aorta, measuring 4.1 cm.  During hospitalization, an echo 6/16 revealing critical aortic stenosis (worsened from moderate on 5/4/21), peak transvalvular velocity is 6.00 m/s, the mean transvalvular gradient is 89.00 mmHg, and the LVOT/AV velocity ratio is 0.13, aortic valve area (estimated via the continuity method) is 0.45 cm², (+) severe MR. A subsequent ROD 6/18 revealed critical prosthetic AS BUTSER 0.3 cm2, mild valvular AR, moderate MR, moderate MS, mild TR, doppler evidence of PFO, normal RV systolic function, LV function appears grossly normal, no pericardial effusion. Structural heart disease team (     Structural Heart, Dr. Bella consulted and recommended pre-TAVR workup. CT head 6/21 revealed no acute pathology. CT heart congenital 6/21 revealed nonobstructive CAD, calcified bioprosthetic aortic valve, severe mitral annular calcification; the bilateral pleural effusions, septal thickening, and groundglass opacities can occur in the clinical setting of pulmonary edema.   Pt's home lasix and lisinopril discontinued as pt preload dependent. Pt started on lopressor 12.5 mg BID for which she has tolerated well.     On 6/29/21 she underwent valve in valve TAVR with Sheri valve with Dr.'s Bella/Young/Sher.  A CoreValve was initially attempted, however there was resistance with passing the sheath through the descending, aortic arch, and ascending aorta. The CoreValve was deemed too rigid and decision was made to go with Sheri valve, which was deployed across bioprosthetic AV with good result.  Echocardiogram at the end of the procedure demonstrated no AI, no significant perivalvular leak, and no wall motion abnormalities. Of note, there was a planned ostial RCA stent for protection during ROSLYN deployment, however RCA stent embolized to descending thoracic aorta then left external iliac artery.  Multiple attempts at retrieval was unsuccessful and stent was left in place. She arrived to CTICU HD stable. Shw was weaned and extbated to high flow nasal canula overnight. She required 2 U PRBCs for Hg 6.7. An ultrasound of left radial artery was noted to have a left radial pseudoaneurysm for which vascular was consulted. On 7/1 she underwent a left radial pseudoanerysm repair under conscious sedation with Dr. Grace. She tolerated procedure well.  Palmar pulses were monitored daily postop and remained stable.  She otherwise remained hemodynamically stable, and made steady progress. She continue to ambulate independently. She was discharged home on 7/6/21.    89 yo F with 24Hr private HHA with PMHx of HTN, HLD, Diabetes insipidus, GERD, AS s/p AVR (7 years ago), TAA (4.1cm) who presented to the ED 6/16/21 BIBEMS from pt’s dermatology office where she was walking down the hallway, felt "breathless" and then had loss of consciousness per her HHA. Pt was found to be hypotensive with SBP 80’s in the field. Pt had a recent ED visit on 6/11/21 for another episode of witnessed syncope after walking many blocks to a restaurant and then lost consciousness while sitting in a restaurant, pt left AMA that visit. Pt reports taking Lasix at home for h/o "fluid in the lungs" and chronic LE edema L>R. Pt denies fever, chills, cough, CP, PND/orthopnea, abdominal pain, N/V/D, dizziness. Pt had an outpatient CCTA 6/3/21 revealing calcium score is severe at 768 Agatston units, non obstructive CAD, dLAD is not well visualized but is probably non obstructive. 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 CTA chest 6/3/21 neg for PE. MRA chest 6/11/21: since 6/3/2021, there has been resolution of bilateral pleural effusions, technique is not optimized to assess mitral valve function, Mildly aneurysmal ascending aorta, measuring 4.1 cm.  During hospitalization, an echo 6/16 revealing critical aortic stenosis (worsened from moderate on 5/4/21), peak transvalvular velocity is 6.00 m/s, the mean transvalvular gradient is 89.00 mmHg, and the LVOT/AV velocity ratio is 0.13, aortic valve area (estimated via the continuity method) is 0.45 cm², (+) severe MR. A subsequent ROD 6/18 revealed critical prosthetic AS BUSTER 0.3 cm2, mild valvular AR, moderate MR, moderate MS, mild TR, doppler evidence of PFO, normal RV systolic function, LV function appears grossly normal, no pericardial effusion. Structural heart disease team (Dr. Bella, consulted) for TAVR workup. CT head 6/21 revealed no acute processes. CT heart congential 6/21 revealed nonobstructive CAD, calcified bioprosthetic aortic valve, severe mitral annular calcification; bilateral pleural effusions, septal thickening, and groundglass opacities. On 6/29/21 she underwent valve in valve TAVR with Sheri valve with Barber Bella/Young/Sher.       On 6/29/21 she underwent valve in valve TAVR with Sheri valve with Dr.'s Bella/Young/Sher.  A CoreValve was initially attempted, however there was resistance with passing the sheath through the descending, aortic arch, and ascending aorta. The CoreValve was deemed too rigid and decision was made to go with Sheri valve, which was deployed across bioprosthetic AV with good result.  Echocardiogram at the end of the procedure demonstrated no AI, no significant perivalvular leak, and no wall motion abnormalities. Of note, there was a planned ostial RCA stent for protection during ROSLYN deployment, however RCA stent embolized to descending thoracic aorta then left external iliac artery.  Multiple attempts at retrieval was unsuccessful and stent was left in place. She arrived to CTICU HD stable. Shw was weaned and extbated to high flow nasal canula overnight. She required 2 U PRBCs for Hg 6.7. An ultrasound of left radial artery was noted to have a left radial pseudoaneurysm for which vascular was consulted. On 7/1 she underwent a left radial pseudoanerysm repair under conscious sedation with Dr. Grace. She tolerated procedure well.  Palmar pulses were monitored daily postop and remained stable.  She otherwise remained hemodynamically stable, and made steady progress. She continue to ambulate independently. She was discharged home on 7/6/21.    89 yo F with 24Hr private HHA with PMHx of HTN, HLD, Diabetes insipidus, GERD, AS s/p AVR (7 years ago), TAA (4.1cm) who presented to the ED 6/16/21 BIBEMS from pt’s dermatology office where she was walking down the hallway, felt "breathless" and then had loss of consciousness per her HHA. Pt was found to be hypotensive with SBP 80’s in the field. Pt had a recent ED visit on 6/11/21 for another episode of witnessed syncope after walking many blocks to a restaurant and then lost consciousness while sitting in a restaurant, pt left AMA that visit. Pt reports taking Lasix at home for h/o "fluid in the lungs" and chronic LE edema L>R. Pt denies fever, chills, cough, CP, PND/orthopnea, abdominal pain, N/V/D, dizziness. Pt had an outpatient CCTA 6/3/21 revealing calcium score is severe at 768 Agatston units, non obstructive CAD, dLAD is not well visualized but is probably non obstructive. 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 CTA chest 6/3/21 neg for PE. MRA chest 6/11/21: since 6/3/2021, there has been resolution of bilateral pleural effusions, technique is not optimized to assess mitral valve function, Mildly aneurysmal ascending aorta, measuring 4.1 cm.  During hospitalization, an echo 6/16 revealing critical aortic stenosis (worsened from moderate on 5/4/21), peak transvalvular velocity is 6.00 m/s, the mean transvalvular gradient is 89.00 mmHg, and the LVOT/AV velocity ratio is 0.13, aortic valve area (estimated via the continuity method) is 0.45 cm², (+) severe MR. A subsequent ROD 6/18 revealed critical prosthetic AS BUSTER 0.3 cm2, mild valvular AR, moderate MR, moderate MS, mild TR, doppler evidence of PFO, normal RV systolic function, LV function appears grossly normal, no pericardial effusion. Structural heart disease team (Dr. Bella, consulted) for TAVR workup. CT head 6/21 revealed no acute processes. CT heart congential 6/21 revealed nonobstructive CAD, calcified bioprosthetic aortic valve, severe mitral annular calcification; bilateral pleural effusions, septal thickening, and groundglass opacities. On 6/29/21 she underwent valve in valve TAVR with Sheri valve with Barber Bella/Young/Sher. A CoreValve was initially attempted, however there was resistance with passing the sheath through the descending, aortic arch, and ascending aorta. The CoreValve was deemed too rigid and decision was made to go with Sheri valve, which was deployed across bioprosthetic AV with good result.  Echocardiogram at the end of the procedure demonstrated no AI, no significant perivalvular leak, and no wall motion abnormalities. Of note, there was a planned ostial RCA stent for protection during ROSLYN deployment, however RCA stent embolized to descending thoracic aorta then left external iliac artery.  Multiple attempts at retrieval was unsuccessful and stent was left in place. She arrived to CTICU HD stable. She was weaned and extubated to high flow nasal canula overnight. She required 2 U PRBCs for Hg 6.7. An ultrasound of left radial artery was noted to have a left radial pseudoaneurysm for which vascular was consulted. On 7/1 she underwent a left radial pseudoanerysm repair under conscious sedation with Dr. Grace. She tolerated procedure well.  Palmar pulses were monitored daily postop and remained stable.  She otherwise remained hemodynamically stable, and made steady progress. She continue to ambulate independently. She was  deemed medically stable for discharge by structural heart disease and vascular surgery. She is tolerating po diet, moving her bowels and ambulating without supplemental oxygen. She was discharged home on 7/6/21.     Over 35 minutes was spent with the patient reviewing the discharge material including medications, follow up appointments, recovery, concerning symptoms, and how to contact their health care providers if they have questions

## 2021-06-21 NOTE — DIETITIAN INITIAL EVALUATION ADULT. - OTHER CALCULATIONS
IBW used to calculate energy needs due to pt's current body weight exceeding 120% of IBW; adjusted for age/BMI

## 2021-06-21 NOTE — PROGRESS NOTE ADULT - PROBLEM SELECTOR PLAN 2
2 syncopal episodes in the past week, both after ambulating w/ associated SOB. Likely 2/2 severe AS and dehydration. s/p 250cc bolus in ER  - Trop peak 0.21 likely 2/2 demand from severe AS and LVH  - ECG: NSR @71bpm, ANAIS in III, V1, V2, STD in I (unchanged from prior)  - CCTA 6/3/21: calcium score is severe at 768 Agatston units, non obstructive CAD, dLAD is not well visualized but is probably non obstructive. (+) shallow myocardial bridge, Bioprosthetic aortic valve noted, Severe mitral annular calcification, Aortic calcification present.  - CTA chest 6/3/21 neg for PE  - MRI Angio chest 6/11/21: since 6/3/2021, there has been resolution of bilateral pleural effusions, technique is not optimized to assess mitral valve function, Mildly aneurysmal ascending aorta, measuring 4.1 cm.   -Management of AS as above 2 syncopal episodes in the past week, both after ambulating w/ associated SOB. Likely 2/2 critical AS and dehydration. s/p 250cc bolus in ER  - Trop peak 0.21 likely 2/2 demand from critical AS and LVH  - ECG: NSR @71bpm, ANAIS in III, V1, V2, STD in I (unchanged from prior)  - CCTA 6/3/21: calcium score is severe at 768 Agatston units, non obstructive CAD, dLAD is not well visualized but is probably non obstructive. (+) shallow myocardial bridge, Bioprosthetic aortic valve noted, Severe mitral annular calcification, Aortic calcification present.  - CTA chest 6/3/21 neg for PE  - MRI Angio chest 6/11/21: since 6/3/2021, there has been resolution of bilateral pleural effusions, technique is not optimized to assess mitral valve function, Mildly aneurysmal ascending aorta, measuring 4.1 cm.   -Management of AS as above

## 2021-06-21 NOTE — DISCHARGE NOTE PROVIDER - CARE PROVIDER_API CALL
Nirav Bella)  Cardiology; Interventional Cardiology  [address] [place] [address]  Phone: [phone]  Fax: [phone]  Scheduled Appointment: 06/30/2021 02:00 PM   Nirav Bella)  Cardiology; Interventional Cardiology  [address] [place] [address]  Phone: [phone]  Fax: [phone]  Scheduled Appointment: 06/30/2021 02:00 PM    Camila Mcpherson)  Internal Medicine  [address] [place] [address]  Phone: [phone]  Fax: [phone]  Follow Up Time: 1 week    Sydney Vasquez)  Cardiovascular Disease  [address], [address] [place] [address]  Phone: [phone]  Fax: [phone]  Follow Up Time: 2 weeks   Nirav Bella)  Cardiology; Interventional Cardiology  [address] [place] [address]  Phone: [phone]  Fax: [phone]  Scheduled Appointment: 07/12/2021 02:00 PM    Camila Mcpherson)  Internal Medicine  [address] [place] [address]  Phone: [phone]  Fax: [phone]  Follow Up Time: 1 week    Sydney Vasquez)  Cardiovascular Disease  [address], [address] [place] [address]  Phone: [phone]  Fax: [phone]  Follow Up Time: 2 weeks    Darek Grace)  Surgery; Vascular Surgery  [address], [address] [address]  Phone: [phone]  Fax: [phone]  Follow Up Time: 1 week

## 2021-06-21 NOTE — PROGRESS NOTE ADULT - PROBLEM SELECTOR PLAN 1
-Hx of AVR x7 years ago at [place] (CTS will obtain records)  -Echo 6/16: Critical aortic stenosis (worsened from moderate on 5/4/21). The peak transvalvular velocity is 6.00 m/s, the mean transvalvular gradient is 89.00 mmHg, and the LVOT/AV velocity ratio is 0.13. The aortic valve area (estimated via the continuity method) is 0.45 cm². (+) severe MR.  -Dr. Bella following, recs appreciated  -ROD 6/18/21: Severe prosthetic AS, mild valvular AR, moderate MR, moderate MS, mild TR, doppler evidence of PFO, normal RV systolic function, LV function appears grossly normal, no pericardial effusion.  -NPO after midnight Sunday night for CT head/abdomen/pelvis and cardiac CT Monday  -Carotid ultrasound performed 6/18/21, f/u read  -Pt is pre-load dependent -Hx of AVR x7 years ago at [place] (CTS will obtain records)  -Echo 6/16: Critical aortic stenosis (worsened from moderate on 5/4/21). The peak transvalvular velocity is 6.00 m/s, the mean transvalvular gradient is 89.00 mmHg, and the LVOT/AV velocity ratio is 0.13. The aortic valve area (estimated via the continuity method) is 0.45 cm². (+) severe MR.  -Dr. Bella following, recs appreciated  -ROD 6/18/21: Severe prosthetic AS, mild valvular AR, moderate MR, moderate MS, mild TR, doppler evidence of PFO, normal RV systolic function, LV function appears grossly normal, no pericardial effusion.  -Carotid ultrasound performed 6/18/21, f/u read  -CT head/abdomen/pelvis and cardiac CT performed 6/21/21, f/u read   -Pt is pre-load dependent -Hx of AVR x7 years ago at [place] (CTS will obtain records)  -Echo 6/16: Critical aortic stenosis (worsened from moderate on 5/4/21). The peak transvalvular velocity is 6.00 m/s, the mean transvalvular gradient is 89.00 mmHg, and the LVOT/AV velocity ratio is 0.13. The aortic valve area (estimated via the continuity method) is 0.45 cm². (+) severe MR.  -Dr. Bella following, recs appreciated  -ROD 6/18/21: critical prosthetic AS BUSTER 0.3 cm2, mild valvular AR, moderate MR, moderate MS, mild TR, doppler evidence of PFO, normal RV systolic function, LV function appears grossly normal, no pericardial effusion.  -Carotid ultrasound performed 6/18/21, f/u read  -CT head/abdomen/pelvis and cardiac CT performed 6/21/21, f/u read   -Pt is pre-load dependent -Hx of AVR x7 years ago at [place] (CTS will obtain records)  -Echo 6/16: Critical aortic stenosis (worsened from moderate on 5/4/21). The peak transvalvular velocity is 6.00 m/s, the mean transvalvular gradient is 89.00 mmHg, and the LVOT/AV velocity ratio is 0.13. The aortic valve area (estimated via the continuity method) is 0.45 cm². (+) severe MR.  -Dr. Bella following, recs appreciated  -ROD 6/18/21: critical prosthetic AS BUSTER 0.3 cm2, mild valvular AR, moderate MR, moderate MS, mild TR, doppler evidence of PFO, normal RV systolic function, LV function appears grossly normal, no pericardial effusion.  -Carotid ultrasound performed 6/18/21, f/u read  -CT head: no acute pathology  -CT heart congenital:  Non obstructive CAD. Calcified bioprosthetic aortic valve. Severe mitral annular calcification.  -CT abdomen/pelvis performed 6/21/21, f/u read   -Pt is pre-load dependent

## 2021-06-21 NOTE — DIETITIAN INITIAL EVALUATION ADULT. - PROBLEM SELECTOR PLAN 2
S/p bioprosthetic AVR @ [facility] 6-7 years ago per pt  - Pt on Lasix 20mg PO QD at home, continue

## 2021-06-21 NOTE — DIETITIAN INITIAL EVALUATION ADULT. - DIET TYPE
Diet to be advanced in 24-48hrs as medically feasible; prior DASH order noted, consider to liberalize based adv age

## 2021-06-21 NOTE — DISCHARGE NOTE PROVIDER - NSDCACTIVITY_GEN_ALL_CORE
No heavy lifting/straining Do not drive or operate machinery/Do not make important decisions/Stairs allowed/Walking - Indoors allowed/No heavy lifting/straining/Walking - Outdoors allowed

## 2021-06-21 NOTE — PROGRESS NOTE ADULT - ASSESSMENT
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 Pt is now admitted to cardiology for further management of syncope where echocardiogram notable for critical AS (BUSTER 0.4 cm2) for which Dr. Bella is following. ROD and carotid ultrasounds performed; pt is currently pending CT head, heart, chest, abdomen and pelvis. 89 yo F with 24Hr private HHA with PMHx of HTN, HLD, Diabetes insipidus, GERD, AS s/p AVR (7 years ago), TAA (4.1cm) who presented to the ED 6/16/21 BIBEMS from pt’s dermatology office where she was walking down the hallway and suddenly lost consciousness per her HHA. Pt is now admitted to cardiology for further management of syncope where echocardiogram notable for critical AS (BUSTER 0.4 cm2) for which Dr. Bella is following. ROD, carotid ultrasounds, and TAVR CTs performed. 89 yo F with 24Hr private HHA with PMHx of HTN, HLD, Diabetes insipidus, GERD, AS s/p AVR (7 years ago), TAA (4.1cm) who presented to the ED 6/16/21 BIBEMS from pt’s dermatology office where she was walking down the hallway and suddenly lost consciousness per her HHA. Pt is now admitted to cardiology for further management of syncope where echocardiogram notable for critical AS (BUSTER 0.3 cm2 by ROD) for which Dr. Bella is following. Carotid ultrasound and TAVR CTs performed and awaiting results.

## 2021-06-21 NOTE — DIETITIAN INITIAL EVALUATION ADULT. - PROBLEM SELECTOR PLAN 1
Pt with 2 episodes of syncope in the past week, both after ambulating and feeling SOB. Pt found to be hypotensive in the field and BP on arrival 81/33.   - Trop 0.04, f/u next  - ECG: NSR @71bpm, ANAIS in III, V1, V2, STD in I (unchanged from prior)  - S/p 250 cc bolus in the ED  - CCTA 6/3/21 revealing calcium score is severe at 768 Agatston units, non obstructive CAD, dLAD is not well visualized but is probably non obstructive. 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  - CTA chest 6/3/21 neg for PE.   - MRA chest 6/11/21: since 6/3/2021, there has been resolution of bilateral pleural effusions, technique is not optimized to assess mitral valve function, Mildly aneurysmal ascending aorta, measuring 4.1 cm.   - ECHO 5/4/21 in [employer]: EF 55%, bioprosthetic valve with mild- mod AI, severe MR with severe leaflet calcification, mod TR  - ECHO done 6/16 to eval valvular disease, f/u results  - Tele monitoring

## 2021-06-21 NOTE — DIETITIAN INITIAL EVALUATION ADULT. - OTHER INFO
89 yo F with PMHx of HTN, HLD, Diabetes insipidus, GERD, AS s/p AVR (7 years ago), TAA (4.1cm) who presented to the ED 6/16/21 BIBEMS from pt’s dermatology office where she was walking down the hallway and suddenly lost consciousness per her HHA. Pt was found to be hypotensive with SBP 80’s in the field. Pt s/p 250cc bolus in ER and admitted to cardiology for further management of syncope where echocardiogram notable for critical AS. Syncope Likely 2/2 critical AS and dehydration. Carotid ultrasound and TAVR CTs performed and awaiting results. Pending CT head/abdomen/pelvis and cardiac CT today of which pt NPO for. Prior to NPO ordered for DASHTLC diet, varying PO d/t not liking diet. PTA good PO intake of meals made by HHA. Likes salmon, salads, Fruits/vegetables. Reviewed menu based on reported preferences. NKFA, no issues chewing/swallowing - does report sore throat, Cepacol ordered. PTA takes D3/MVI. No pain. Jeff 17. +BM6/20x1, reports hx of acid reflux, tips for managing provided. 1+ R leg/ankle and 2+ L leg/foot edema.   Please see below for nutritions recommendations. Paged team.

## 2021-06-21 NOTE — PROGRESS NOTE ADULT - PROBLEM SELECTOR PLAN 5
H&H 10.3/32.1 on arrival (unknown baseline)  -Iron studies negative (elevated ferritin 318)  -Maintain active T & S (next due 6/21) H&H 10.3/32.1 on arrival (unknown baseline), H&H 10.1/32.4 on 6/21/21  -Iron studies negative (elevated ferritin 318)  -Maintain active T & S (next due 6/21) H&H 10.3/32.1 on arrival (unknown baseline), H&H 10.1/32.4 on 6/21/21  -Iron studies negative (elevated ferritin 318)  -Maintain active T & S (last 6/21)

## 2021-06-21 NOTE — DISCHARGE NOTE PROVIDER - PROVIDER TOKENS
PROVIDER:[[id number]],SCHEDULEDAPPT:[06/30/2021],SCHEDULEDAPPTTIME:[02:00 PM]] PROVIDER:[[id number]],SCHEDULEDAPPT:[06/30/2021],SCHEDULEDAPPTTIME:[02:00 PM]],PROVIDER:[TOKEN:[[id number]],FOLLOWUP:[1 week]],PROVIDER:[TOKEN:[[id number]],FOLLOWUP:[2 weeks]] PROVIDER:[TOKEN:[[id number]],SCHEDULEDAPPT:[07/12/2021],SCHEDULEDAPPTTIME:[02:00 PM]],PROVIDER:[TOKEN:[[id number]],FOLLOWUP:[1 week]],PROVIDER:[[id number]],FOLLOWUP:[2 weeks]],PROVIDER:[TOKEN:[[id number]],FOLLOWUP:[1 week]]

## 2021-06-21 NOTE — PROGRESS NOTE ADULT - SUBJECTIVE AND OBJECTIVE BOX
Interventional Cardiology PA Adult Progress Note    Subjective Assessment: Pt seen and examined at bedside this am. Complaining about wait time for CT scan. Denies CP, SOB, N/V/D, dizziness, syncope.       ROS negative except for subjective and HPI   	  MEDICATIONS:  metoprolol tartrate 12.5 milliGRAM(s) Oral two times a day        acetaminophen   Tablet .. 325 milliGRAM(s) Oral every 4 hours PRN  gabapentin 300 milliGRAM(s) Oral at bedtime      atorvastatin 40 milliGRAM(s) Oral at bedtime  desmopressin 0.1 milliGRAM(s) Oral two times a day    artificial  tears Solution 1 Drop(s) Both EYES two times a day  aspirin enteric coated 81 milliGRAM(s) Oral daily  benzocaine 15 mG/menthol 3.6 mG Lozenge 1 Lozenge Oral every 4 hours PRN  brimonidine 0.2% Ophthalmic Solution 1 Drop(s) Both EYES at bedtime  enoxaparin Injectable 40 milliGRAM(s) SubCutaneous every 24 hours  fluticasone propionate 50 MICROgram(s)/spray Nasal Spray 1 Spray(s) Both Nostrils every 12 hours  latanoprost 0.005% Ophthalmic Solution 1 Drop(s) Both EYES at bedtime  nystatin Powder 1 Application(s) Topical two times a day      	    [PHYSICAL EXAM:  TELEMETRY:  T(C): 35.8 (06-21-21 @ 06:14), Max: 36.9 (06-20-21 @ 22:16)  HR: 70 (06-21-21 @ 08:15) (61 - 77)  BP: 126/59 (06-21-21 @ 08:15) (120/91 - 129/58)  RR: 18 (06-21-21 @ 08:15) (16 - 18)  SpO2: 99% (06-21-21 @ 08:15) (98% - 99%)  Wt(kg): --  I&O's Summary    20 Jun 2021 07:01  -  21 Jun 2021 07:00  --------------------------------------------------------  IN: 570 mL / OUT: 500 mL / NET: 70 mL        Vega:  Central/PICC/Mid Line:                                         Appearance: Normal	  HEENT:   Normal oral mucosa, PERRL, EOMI	  Neck: Supple, - JVD  Cardiovascular: Normal S1 S2, No JVD, + systolic IV/VI murmurs  Respiratory: Decreased Breath Sounds b/l, No Rales, Rhonchi, Wheezing	  Gastrointestinal:  Soft, Non-tender, + BS	  Skin: No rashes, No ecchymoses, No cyanosis  Extremities: Normal range of motion, No clubbing, cyanosis or edema  Vascular: Peripheral pulses palpable 2+ bilaterally  Neurologic: Non-focal  Psychiatry: A & O x 3, Mood & affect appropriate      	    ECG:  	  RADIOLOGY:   DIAGNOSTIC TESTING:  [ ] Echocardiogram:  [ ]  Catheterization:  [ ] Stress Test:    [ ] ROD  OTHER: 	    LABS:	 	  CARDIAC MARKERS:                                  10.1   6.71  )-----------( 267      ( 21 Jun 2021 08:14 )             32.4     06-21    136  |  98  |  20  ----------------------------<  97  5.8<H>   |  30  |  0.79    Ca    9.3      21 Jun 2021 08:14  Mg     2.2     06-21      proBNP:   Lipid Profile:   HgA1c:   TSH:       ASSESSMENT/PLAN: 	        DVT ppx:  Dispo:

## 2021-06-21 NOTE — DISCHARGE NOTE PROVIDER - NPI NUMBER (FOR SYSADMIN USE ONLY) :
[[id number]] [[id number]],[[id number]],[[id number]] [[id number]]

## 2021-06-22 PROBLEM — Z86.79 PERSONAL HISTORY OF OTHER DISEASES OF THE CIRCULATORY SYSTEM: Chronic | Status: ACTIVE | Noted: 2021-06-16

## 2021-06-22 PROBLEM — I10 ESSENTIAL (PRIMARY) HYPERTENSION: Chronic | Status: ACTIVE | Noted: 2021-06-16

## 2021-06-22 LAB
ANION GAP SERPL CALC-SCNC: 10 MMOL/L — SIGNIFICANT CHANGE UP (ref 5–17)
BUN SERPL-MCNC: 24 MG/DL — HIGH (ref 7–23)
CALCIUM SERPL-MCNC: 9.4 MG/DL — SIGNIFICANT CHANGE UP (ref 8.4–10.5)
CHLORIDE SERPL-SCNC: 99 MMOL/L — SIGNIFICANT CHANGE UP (ref 96–108)
CO2 SERPL-SCNC: 31 MMOL/L — SIGNIFICANT CHANGE UP (ref 22–31)
CREAT SERPL-MCNC: 0.93 MG/DL — SIGNIFICANT CHANGE UP (ref 0.5–1.3)
GLUCOSE SERPL-MCNC: 90 MG/DL — SIGNIFICANT CHANGE UP (ref 70–99)
HCT VFR BLD CALC: 32.7 % — LOW (ref 34.5–45)
HGB BLD-MCNC: 10.2 G/DL — LOW (ref 11.5–15.5)
MAGNESIUM SERPL-MCNC: 2.2 MG/DL — SIGNIFICANT CHANGE UP (ref 1.6–2.6)
MCHC RBC-ENTMCNC: 29.7 PG — SIGNIFICANT CHANGE UP (ref 27–34)
MCHC RBC-ENTMCNC: 31.2 GM/DL — LOW (ref 32–36)
MCV RBC AUTO: 95.3 FL — SIGNIFICANT CHANGE UP (ref 80–100)
NRBC # BLD: 0 /100 WBCS — SIGNIFICANT CHANGE UP (ref 0–0)
PLATELET # BLD AUTO: 264 K/UL — SIGNIFICANT CHANGE UP (ref 150–400)
POTASSIUM SERPL-MCNC: 5.4 MMOL/L — HIGH (ref 3.5–5.3)
POTASSIUM SERPL-SCNC: 5.4 MMOL/L — HIGH (ref 3.5–5.3)
RBC # BLD: 3.43 M/UL — LOW (ref 3.8–5.2)
RBC # FLD: 13.9 % — SIGNIFICANT CHANGE UP (ref 10.3–14.5)
SODIUM SERPL-SCNC: 140 MMOL/L — SIGNIFICANT CHANGE UP (ref 135–145)
WBC # BLD: 7.12 K/UL — SIGNIFICANT CHANGE UP (ref 3.8–10.5)
WBC # FLD AUTO: 7.12 K/UL — SIGNIFICANT CHANGE UP (ref 3.8–10.5)

## 2021-06-22 PROCEDURE — 99233 SBSQ HOSP IP/OBS HIGH 50: CPT

## 2021-06-22 PROCEDURE — 93880 EXTRACRANIAL BILAT STUDY: CPT | Mod: 26

## 2021-06-22 RX ORDER — FUROSEMIDE 40 MG
20 TABLET ORAL ONCE
Refills: 0 | Status: COMPLETED | OUTPATIENT
Start: 2021-06-22 | End: 2021-06-22

## 2021-06-22 RX ADMIN — BENZOCAINE AND MENTHOL 1 LOZENGE: 5; 1 LIQUID ORAL at 13:09

## 2021-06-22 RX ADMIN — ATORVASTATIN CALCIUM 40 MILLIGRAM(S): 80 TABLET, FILM COATED ORAL at 21:32

## 2021-06-22 RX ADMIN — BENZOCAINE AND MENTHOL 1 LOZENGE: 5; 1 LIQUID ORAL at 01:23

## 2021-06-22 RX ADMIN — GABAPENTIN 300 MILLIGRAM(S): 400 CAPSULE ORAL at 21:32

## 2021-06-22 RX ADMIN — BRIMONIDINE TARTRATE 1 DROP(S): 2 SOLUTION/ DROPS OPHTHALMIC at 21:34

## 2021-06-22 RX ADMIN — LATANOPROST 1 DROP(S): 0.05 SOLUTION/ DROPS OPHTHALMIC; TOPICAL at 21:33

## 2021-06-22 RX ADMIN — Medication 325 MILLIGRAM(S): at 07:32

## 2021-06-22 RX ADMIN — ENOXAPARIN SODIUM 40 MILLIGRAM(S): 100 INJECTION SUBCUTANEOUS at 18:24

## 2021-06-22 RX ADMIN — Medication 81 MILLIGRAM(S): at 13:09

## 2021-06-22 RX ADMIN — Medication 325 MILLIGRAM(S): at 22:33

## 2021-06-22 RX ADMIN — Medication 325 MILLIGRAM(S): at 21:33

## 2021-06-22 RX ADMIN — DESMOPRESSIN ACETATE 0.1 MILLIGRAM(S): 0.1 TABLET ORAL at 21:35

## 2021-06-22 RX ADMIN — Medication 325 MILLIGRAM(S): at 08:40

## 2021-06-22 RX ADMIN — Medication 20 MILLIGRAM(S): at 10:22

## 2021-06-22 RX ADMIN — Medication 1 SPRAY(S): at 21:35

## 2021-06-22 RX ADMIN — Medication 12.5 MILLIGRAM(S): at 10:24

## 2021-06-22 RX ADMIN — DESMOPRESSIN ACETATE 0.1 MILLIGRAM(S): 0.1 TABLET ORAL at 10:22

## 2021-06-22 NOTE — PHYSICAL THERAPY INITIAL EVALUATION ADULT - GENERAL OBSERVATIONS, REHAB EVAL
Pt received OOB standing in the bathroom with ZOILA Cordova, +hep-lock, +EKG, NAD. HHA present. Pt left OOB sitting in the chair, +chair alarm, call bell in reach, HHA present, ZOILA parra.

## 2021-06-22 NOTE — PHYSICAL THERAPY INITIAL EVALUATION ADULT - PERTINENT HX OF CURRENT PROBLEM, REHAB EVAL
Pt is a 89 yo female who presented to the ED 6/16/21 BIBEMS from pt’s dermatology office where she was walking down the hallway and suddenly lost consciousness per her HHA. She sat her down so she did not have a fall or hit her head. Pt was found to be hypotensive with SBP 80’s in the field., Pt is admitted to cardiology for further management of syncope.

## 2021-06-22 NOTE — PROGRESS NOTE ADULT - PROBLEM SELECTOR PLAN 7
Full code (GOC documented)    DVT PPX: Lovenox SQ   Activity: bed to chair with assistance   Dispo: Pending CTS Dr. Bella recs.

## 2021-06-22 NOTE — PROGRESS NOTE ADULT - ASSESSMENT
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 Pt is now admitted to cardiology for further management of syncope where echocardiogram notable for critical AS (BUSTER 0.3 cm2 by ROD) for which Dr. Bella is following. Carotid ultrasound and TAVR CTs performed and awaiting results.  89 yo F with 24Hr private HHA with PMHx of HTN, HLD, Diabetes insipidus, GERD, AS s/p AVR (7 years ago), TAA (4.1cm) who presented to the ED 6/16/21 BIBEMS from pt’s dermatology office where she was walking down the hallway and suddenly lost consciousness per her HHA. Pt is now admitted to cardiology for further management of syncope where echocardiogram notable for critical AS (BUSTER 0.3 cm2 by ROD) for which Dr. Bella is following. Carotid ultrasound and TAVR CTs performed and awaiting results.    Pt to work w/ PT and ambulate and out of bed to chair. Laix 20mg IV x1 today 6/22/21, will re-evaluate later in the day. Pt likely to be discharged later this week w/ follow up with Dr. Bella on 6/30/21.

## 2021-06-22 NOTE — PROGRESS NOTE ADULT - PROBLEM SELECTOR PLAN 3
--SBP 120s.    --Holding Lasix 20mg PO QD and Lisinopril 5mg PO QD.    --Continue w/ Lopressor 12.5mg PO BID.

## 2021-06-22 NOTE — PROGRESS NOTE ADULT - PROBLEM SELECTOR PLAN 6
--CONT: home Desmopressin 0.1mg PO BID.        Full code (GOC documented)    DVT PPX: Lovenox SQ   Activity: bed to chair with assistance   Dispo: Pending CTS Dr. Bella recs. --CONT: home Desmopressin 0.1mg PO BID.

## 2021-06-22 NOTE — PROGRESS NOTE ADULT - PROBLEM SELECTOR PLAN 2
--2 syncopal episodes in the past week, both after ambulating w/ associated SOB. Likely 2/2 critical AS and dehydration. s/p 250cc bolus in ER   --Trop peak 0.21 likely 2/2 demand from critical AS and LVH; ECG: NSR @71bpm, ANAIS in III, V1, V2, STD in I (unchanged from prior)   --CCTA 6/3/21: calcium score is severe at 768 Agatston units, non obstructive CAD, dLAD is not well visualized but is probably non obstructive. (+) shallow myocardial bridge, Bioprosthetic aortic valve noted, Severe mitral annular calcification, Aortic calcification present.   --CTA chest 6/3/21 neg for PE   --MRI Angio chest 6/11/21: since 6/3/2021, there has been resolution of bilateral pleural effusions, technique is not optimized to assess mitral valve function, Mildly aneurysmal ascending aorta, measuring 4.1 cm.    --Management of AS as above.

## 2021-06-22 NOTE — PHYSICAL THERAPY INITIAL EVALUATION ADULT - GAIT DEVIATIONS NOTED, PT EVAL
fairly steady gait, no lose of balance, decreased heel strike and hip flexion bilaterally/decreased simona/increased time in double stance/decreased step length

## 2021-06-22 NOTE — PROGRESS NOTE ADULT - SUBJECTIVE AND OBJECTIVE BOX
*** IN PROGRESS / INCOMPLETE NOTE ***    Cardiology PA Adult Progress Note    Subjective Assessment:  	  MEDICATIONS:  furosemide   Injectable 20 milliGRAM(s) IV Push once  metoprolol tartrate 12.5 milliGRAM(s) Oral two times a day  acetaminophen   Tablet .. 325 milliGRAM(s) Oral every 4 hours PRN  gabapentin 300 milliGRAM(s) Oral at bedtime  atorvastatin 40 milliGRAM(s) Oral at bedtime  desmopressin 0.1 milliGRAM(s) Oral two times a day  artificial  tears Solution 1 Drop(s) Both EYES two times a day  aspirin enteric coated 81 milliGRAM(s) Oral daily  benzocaine 15 mG/menthol 3.6 mG Lozenge 1 Lozenge Oral every 4 hours PRN  brimonidine 0.2% Ophthalmic Solution 1 Drop(s) Both EYES at bedtime  enoxaparin Injectable 40 milliGRAM(s) SubCutaneous every 24 hours  fluticasone propionate 50 MICROgram(s)/spray Nasal Spray 1 Spray(s) Both Nostrils every 12 hours  latanoprost 0.005% Ophthalmic Solution 1 Drop(s) Both EYES at bedtime  nystatin Powder 1 Application(s) Topical two times a day      	    [PHYSICAL EXAM:  TELEMETRY:  T(C): 35.9 (06-22-21 @ 09:29), Max: 36.4 (06-21-21 @ 22:01)  HR: 63 (06-22-21 @ 09:05) (63 - 74)  BP: 117/56 (06-22-21 @ 09:05) (117/56 - 131/63)  RR: 18 (06-22-21 @ 09:05) (18 - 18)  SpO2: 100% (06-22-21 @ 09:05) (97% - 100%)  Wt(kg): --  I&O's Summary    21 Jun 2021 07:01  -  22 Jun 2021 07:00  --------------------------------------------------------  IN: 300 mL / OUT: 880 mL / NET: -580 mL    22 Jun 2021 07:01  -  22 Jun 2021 09:46  --------------------------------------------------------  IN: 180 mL / OUT: 0 mL / NET: 180 mL        Vega:  Central/PICC/Mid Line:                                         Appearance: Normal	  HEENT:   Normal oral mucosa, PERRL, EOMI	  Neck: Supple, + JVD/ - JVD; Carotid Bruit   Cardiovascular: Normal S1 S2, No JVD, No murmurs,   Respiratory: Lungs clear to auscultation/Decreased Breath Sounds/No Rales, Rhonchi, Wheezing	  Gastrointestinal:  Soft, Non-tender, + BS	  Skin: No rashes, No ecchymoses, No cyanosis  Extremities: Normal range of motion, No clubbing, cyanosis or edema  Vascular: Peripheral pulses palpable 2+ bilaterally  Neurologic: Non-focal  Psychiatry: A & O x 3, Mood & affect appropriate      	    ECG:  	  RADIOLOGY:   DIAGNOSTIC TESTING:  [ ] Echocardiogram:  [ ]  Catheterization:  [ ] Stress Test:    [ ] ROD  OTHER: 	    LABS:	 	  CARDIAC MARKERS:                  10.2   7.12  )-----------( 264      ( 22 Jun 2021 08:08 )             32.7     140  |  99  |  24<H>  ----------------------------<  90  5.4<H>   |  31  |  0.93    Ca    9.4      22 Jun 2021 08:08  Mg     2.2     06-22     Cardiology PA Adult Progress Note    Subjective Assessment: Pt seen and evaluated at bedside. Feels well and no complaints at this time. Off oxygen and feeling well.   	  MEDICATIONS:  furosemide   Injectable 20 milliGRAM(s) IV Push once  metoprolol tartrate 12.5 milliGRAM(s) Oral two times a day  acetaminophen   Tablet .. 325 milliGRAM(s) Oral every 4 hours PRN  gabapentin 300 milliGRAM(s) Oral at bedtime  atorvastatin 40 milliGRAM(s) Oral at bedtime  desmopressin 0.1 milliGRAM(s) Oral two times a day  artificial  tears Solution 1 Drop(s) Both EYES two times a day  aspirin enteric coated 81 milliGRAM(s) Oral daily  benzocaine 15 mG/menthol 3.6 mG Lozenge 1 Lozenge Oral every 4 hours PRN  brimonidine 0.2% Ophthalmic Solution 1 Drop(s) Both EYES at bedtime  enoxaparin Injectable 40 milliGRAM(s) SubCutaneous every 24 hours  fluticasone propionate 50 MICROgram(s)/spray Nasal Spray 1 Spray(s) Both Nostrils every 12 hours  latanoprost 0.005% Ophthalmic Solution 1 Drop(s) Both EYES at bedtime  nystatin Powder 1 Application(s) Topical two times a day      PHYSICAL EXAM:  TELEMETRY: no events on telemetry  T(C): 35.9 (06-22-21 @ 09:29), Max: 36.4 (06-21-21 @ 22:01)  HR: 63 (06-22-21 @ 09:05) (63 - 74)  BP: 117/56 (06-22-21 @ 09:05) (117/56 - 131/63)  RR: 18 (06-22-21 @ 09:05) (18 - 18)  SpO2: 100% (06-22-21 @ 09:05) (97% - 100%)  Wt(kg): --  I&O's Summary    21 Jun 2021 07:01  -  22 Jun 2021 07:00  --------------------------------------------------------  IN: 300 mL / OUT: 880 mL / NET: -580 mL    22 Jun 2021 07:01  -  22 Jun 2021 09:46  --------------------------------------------------------  IN: 180 mL / OUT: 0 mL / NET: 180 mL      Appearance: Normal	  HEENT:   Normal oral mucosa, PERRL, EOMI	  Neck: Supple, - JVD  Cardiovascular: Normal S1 S2, No JVD, + systolic IV/VI murmurs  Respiratory: Decreased Breath Sounds b/l, No Rales, Rhonchi, Wheezing	  Gastrointestinal:  Soft, Non-tender, + BS	  Skin: No rashes, No ecchymoses, No cyanosis  Extremities: Normal range of motion, No clubbing, cyanosis or edema  Vascular: Peripheral pulses palpable 2+ bilaterally  Neurologic: Non-focal  Psychiatry: A & O x 3, Mood & affect appropriate    LABS:	 	  CARDIAC MARKERS:                  10.2   7.12  )-----------( 264      ( 22 Jun 2021 08:08 )             32.7     140  |  99  |  24<H>  ----------------------------<  90  5.4<H>   |  31  |  0.93    Ca    9.4      22 Jun 2021 08:08  Mg     2.2     06-22

## 2021-06-22 NOTE — PROGRESS NOTE ADULT - PROBLEM SELECTOR PLAN 1
--Hx of AVR x7yrs ago at [place] (experimental valve).    --TTE (6/16/21): critical AS (worsening from 5/4/21); peak velocity 6m/s, mean gradient 89mmHg, LVOT/AV velocity ratio 0.13, BUSTER 0.45cm^2; severe MR.    --ROD 6/18/21: critical prosthetic AS BUSTER 0.3cm^2, mild AR, mod MR, mod MS, mild TR, evidence of PFO, normal RV fxn, LV fxn grossly normal, no pericardial effusion.    --CT head no pathology; CT heart congenital non-obs CAD, calcified bioprosthetic aortic valve, severe mitral calcification.    --Carotid US – first study not diagnostic bc pt refused; reordered 6/22/21.    --Pt pre-load dependent.    --Dr. Bella to have patient follow up outpatient on 6/30/21.

## 2021-06-22 NOTE — PHYSICAL THERAPY INITIAL EVALUATION ADULT - ADDITIONAL COMMENTS
Pt lives in an apt with elevator, Pt has 24 hours HHA. Prior to admission, pt ambulated with rollator.

## 2021-06-22 NOTE — PROGRESS NOTE ADULT - PROBLEM SELECTOR PLAN 5
--H&H 10.3/32.1 on arrival (unknown baseline), H&H 10.1/32.4 on 6/21/21   --Iron studies negative (elevated ferritin 318)   --Maintain active T & S (last 6/21).

## 2021-06-22 NOTE — PHYSICAL THERAPY INITIAL EVALUATION ADULT - IMPAIRMENTS CONTRIBUTING TO GAIT DEVIATIONS, PT EVAL
with 1 sitting break secondary pt complains feeling tired./impaired balance/impaired postural control/decreased strength

## 2021-06-23 LAB
ANION GAP SERPL CALC-SCNC: 8 MMOL/L — SIGNIFICANT CHANGE UP (ref 5–17)
BUN SERPL-MCNC: 32 MG/DL — HIGH (ref 7–23)
CALCIUM SERPL-MCNC: 9.5 MG/DL — SIGNIFICANT CHANGE UP (ref 8.4–10.5)
CHLORIDE SERPL-SCNC: 99 MMOL/L — SIGNIFICANT CHANGE UP (ref 96–108)
CO2 SERPL-SCNC: 33 MMOL/L — HIGH (ref 22–31)
CREAT SERPL-MCNC: 1.04 MG/DL — SIGNIFICANT CHANGE UP (ref 0.5–1.3)
GLUCOSE SERPL-MCNC: 95 MG/DL — SIGNIFICANT CHANGE UP (ref 70–99)
HCT VFR BLD CALC: 32.9 % — LOW (ref 34.5–45)
HGB BLD-MCNC: 10.4 G/DL — LOW (ref 11.5–15.5)
MAGNESIUM SERPL-MCNC: 2.1 MG/DL — SIGNIFICANT CHANGE UP (ref 1.6–2.6)
MCHC RBC-ENTMCNC: 30.2 PG — SIGNIFICANT CHANGE UP (ref 27–34)
MCHC RBC-ENTMCNC: 31.6 GM/DL — LOW (ref 32–36)
MCV RBC AUTO: 95.6 FL — SIGNIFICANT CHANGE UP (ref 80–100)
NRBC # BLD: 0 /100 WBCS — SIGNIFICANT CHANGE UP (ref 0–0)
PLATELET # BLD AUTO: 286 K/UL — SIGNIFICANT CHANGE UP (ref 150–400)
POTASSIUM SERPL-MCNC: 5.5 MMOL/L — HIGH (ref 3.5–5.3)
POTASSIUM SERPL-SCNC: 5.5 MMOL/L — HIGH (ref 3.5–5.3)
RBC # BLD: 3.44 M/UL — LOW (ref 3.8–5.2)
RBC # FLD: 13.7 % — SIGNIFICANT CHANGE UP (ref 10.3–14.5)
SODIUM SERPL-SCNC: 140 MMOL/L — SIGNIFICANT CHANGE UP (ref 135–145)
WBC # BLD: 6.39 K/UL — SIGNIFICANT CHANGE UP (ref 3.8–10.5)
WBC # FLD AUTO: 6.39 K/UL — SIGNIFICANT CHANGE UP (ref 3.8–10.5)

## 2021-06-23 PROCEDURE — 99233 SBSQ HOSP IP/OBS HIGH 50: CPT

## 2021-06-23 RX ORDER — SODIUM ZIRCONIUM CYCLOSILICATE 10 G/10G
10 POWDER, FOR SUSPENSION ORAL ONCE
Refills: 0 | Status: COMPLETED | OUTPATIENT
Start: 2021-06-23 | End: 2021-06-23

## 2021-06-23 RX ORDER — ACETAMINOPHEN 500 MG
650 TABLET ORAL EVERY 6 HOURS
Refills: 0 | Status: DISCONTINUED | OUTPATIENT
Start: 2021-06-23 | End: 2021-06-29

## 2021-06-23 RX ADMIN — GABAPENTIN 300 MILLIGRAM(S): 400 CAPSULE ORAL at 21:25

## 2021-06-23 RX ADMIN — DESMOPRESSIN ACETATE 0.1 MILLIGRAM(S): 0.1 TABLET ORAL at 11:08

## 2021-06-23 RX ADMIN — Medication 81 MILLIGRAM(S): at 11:08

## 2021-06-23 RX ADMIN — Medication 12.5 MILLIGRAM(S): at 21:25

## 2021-06-23 RX ADMIN — ATORVASTATIN CALCIUM 40 MILLIGRAM(S): 80 TABLET, FILM COATED ORAL at 21:25

## 2021-06-23 RX ADMIN — Medication 325 MILLIGRAM(S): at 04:27

## 2021-06-23 RX ADMIN — SODIUM ZIRCONIUM CYCLOSILICATE 10 GRAM(S): 10 POWDER, FOR SUSPENSION ORAL at 13:43

## 2021-06-23 RX ADMIN — Medication 1 DROP(S): at 05:58

## 2021-06-23 RX ADMIN — LATANOPROST 1 DROP(S): 0.05 SOLUTION/ DROPS OPHTHALMIC; TOPICAL at 21:26

## 2021-06-23 RX ADMIN — Medication 1 SPRAY(S): at 21:24

## 2021-06-23 RX ADMIN — Medication 12.5 MILLIGRAM(S): at 10:30

## 2021-06-23 RX ADMIN — Medication 325 MILLIGRAM(S): at 05:27

## 2021-06-23 RX ADMIN — DESMOPRESSIN ACETATE 0.1 MILLIGRAM(S): 0.1 TABLET ORAL at 21:25

## 2021-06-23 RX ADMIN — ENOXAPARIN SODIUM 40 MILLIGRAM(S): 100 INJECTION SUBCUTANEOUS at 18:02

## 2021-06-23 RX ADMIN — BRIMONIDINE TARTRATE 1 DROP(S): 2 SOLUTION/ DROPS OPHTHALMIC at 21:25

## 2021-06-23 RX ADMIN — Medication 650 MILLIGRAM(S): at 07:52

## 2021-06-23 NOTE — PROGRESS NOTE ADULT - PROBLEM SELECTOR PLAN 1
--Hx of AVR x7yrs ago at [place] (experimental valve).    --TTE (6/16/21): critical AS (worsening from 5/4/21); peak velocity 6m/s, mean gradient 89mmHg, LVOT/AV velocity ratio 0.13, BUSTER 0.45cm^2; severe MR.    --ROD 6/18/21: critical prosthetic AS BUSTER 0.3cm^2, mild AR, mod MR, mod MS, mild TR, evidence of PFO, normal RV fxn, LV fxn grossly normal, no pericardial effusion.    --CT head no pathology; CT heart congenital non-obs CAD, calcified bioprosthetic aortic valve, severe mitral calcification.    --Carotid US – first study not diagnostic bc pt refused; reordered 6/22/21.    --Pt pre-load dependent.    --Dr. Bella to have patient follow up outpatient on 6/30/21. --Hx of AVR x7yrs ago at [place] (experimental valve).    --TTE (6/16/21): critical AS (worsening from 5/4/21); peak velocity 6m/s, mean gradient 89mmHg, LVOT/AV velocity ratio 0.13, BUSTER 0.45cm^2; severe MR.    --ROD 6/18/21: critical prosthetic AS BUSTER 0.3cm^2, mild AR, mod MR, mod MS, mild TR, evidence of PFO, normal RV fxn, LV fxn grossly normal, no pericardial effusion.    --CT head no pathology; CT Heart congenital non-obstructive CAD, calcified bioprosthetic aortic valve, severe mitral calcification.    --Carotid US: No hemodynamically significant stenosis in the bilateral carotid arteries.  --Pt is pre-load dependent. Will hold further diuresis today  --Plan for TAVR valve-in this admission

## 2021-06-23 NOTE — PROGRESS NOTE ADULT - PROBLEM SELECTOR PLAN 3
--SBP 120s.    --Holding Lasix 20mg PO QD and Lisinopril 5mg PO QD.    --Continue w/ Lopressor 12.5mg PO BID. --SBP 120s.    --HOLD Lasix 20mg PO QD and Lisinopril 5mg PO QD as pt is preload dependent in setting of critical AS  --Continue w/ Lopressor 12.5mg PO BID. --SBP 120s.    --HOLD Lasix 20mg PO QD and Lisinopril 5mg PO QD as pt is preload dependent in setting of critical AS  --Continue w/ Lopressor 12.5mg PO BID.    #Hyperkalemia  -K 5.8, remains elevated 5.5 this AM  -Given Lokelma 10mg x1  -Repeat K in AM

## 2021-06-23 NOTE — PROGRESS NOTE ADULT - SUBJECTIVE AND OBJECTIVE BOX
CARDIOLOGY NP PROGRESS NOTE    Subjective: Pt seen and examined at bedside. Reports feeling well. Had some SOB while ambulating in hallway yesterday. Denies chest pain, sob at rest, lightheadedness, dizziness, palpitations, fever, chills.  Remainder ROS otherwise negative.    Overnight Events: none    TELEMETRY: SR 70s        VITAL SIGNS:  T(C): 36.8 (06-23-21 @ 13:33), Max: 37 (06-22-21 @ 22:24)  HR: 62 (06-23-21 @ 13:00) (59 - 71)  BP: 108/58 (06-23-21 @ 13:00) (103/57 - 129/63)  RR: 17 (06-23-21 @ 13:00) (17 - 18)  SpO2: 96% (06-23-21 @ 13:00) (93% - 98%)  Wt(kg): --    I&O's Summary    22 Jun 2021 07:01  -  23 Jun 2021 07:00  --------------------------------------------------------  IN: 540 mL / OUT: 380 mL / NET: 160 mL    23 Jun 2021 07:01  -  23 Jun 2021 14:43  --------------------------------------------------------  IN: 180 mL / OUT: 200 mL / NET: -20 mL          PHYSICAL EXAM:    General: A/ox 3, No acute Distress  Neck: Supple, NO JVD  Cardiac: S1 S2, No M/R/G  Pulmonary: CTAB, Breathing unlabored, No Rhonchi/Rales/Wheezing  Abdomen: Soft, Non -tender, +BS x 4 quads  Extremities: No Rashes, No edema  Neuro: A/o x 3, No focal deficits          LABS:                          10.4   6.39  )-----------( 286      ( 23 Jun 2021 08:56 )             32.9                              06-23    140  |  99  |  32<H>  ----------------------------<  95  5.5<H>   |  33<H>  |  1.04    Ca    9.5      23 Jun 2021 08:56  Mg     2.1     06-23                                CAPILLARY BLOOD GLUCOSE            Allergies:  erythromycin (Unknown)  penicillin (Unknown)    MEDICATIONS  (STANDING):  artificial  tears Solution 1 Drop(s) Both EYES two times a day  aspirin enteric coated 81 milliGRAM(s) Oral daily  atorvastatin 40 milliGRAM(s) Oral at bedtime  brimonidine 0.2% Ophthalmic Solution 1 Drop(s) Both EYES at bedtime  desmopressin 0.1 milliGRAM(s) Oral two times a day  enoxaparin Injectable 40 milliGRAM(s) SubCutaneous every 24 hours  fluticasone propionate 50 MICROgram(s)/spray Nasal Spray 1 Spray(s) Both Nostrils every 12 hours  gabapentin 300 milliGRAM(s) Oral at bedtime  latanoprost 0.005% Ophthalmic Solution 1 Drop(s) Both EYES at bedtime  metoprolol tartrate 12.5 milliGRAM(s) Oral two times a day  nystatin Powder 1 Application(s) Topical two times a day    MEDICATIONS  (PRN):  acetaminophen   Tablet .. 650 milliGRAM(s) Oral every 6 hours PRN Moderate Pain (4 - 6), Severe Pain (7 - 10)        DIAGNOSTIC TESTS:        CARDIOLOGY NP PROGRESS NOTE    Subjective: Pt seen and examined at bedside. Reports feeling well. Had some SOB while ambulating in hallway yesterday. Denies chest pain, sob at rest, lightheadedness, dizziness, palpitations, fever, chills.  Remainder ROS otherwise negative.    Overnight Events: none    TELEMETRY: SR 70s        VITAL SIGNS:  T(C): 36.8 (06-23-21 @ 13:33), Max: 37 (06-22-21 @ 22:24)  HR: 62 (06-23-21 @ 13:00) (59 - 71)  BP: 108/58 (06-23-21 @ 13:00) (103/57 - 129/63)  RR: 17 (06-23-21 @ 13:00) (17 - 18)  SpO2: 96% (06-23-21 @ 13:00) (93% - 98%)  Wt(kg): --    I&O's Summary    22 Jun 2021 07:01  -  23 Jun 2021 07:00  --------------------------------------------------------  IN: 540 mL / OUT: 380 mL / NET: 160 mL    23 Jun 2021 07:01  -  23 Jun 2021 14:43  --------------------------------------------------------  IN: 180 mL / OUT: 200 mL / NET: -20 mL          PHYSICAL EXAM:    General: A/ox 3, No acute Distress  Neck: Supple, NO JVD  Cardiac: S1 S2, grade IV/VI systolic murmur  Pulmonary: Lungs w/ bibasilar crackles, Breathing unlabored on RA, No Rhonchi/Wheezing  Abdomen: Soft, Non -tender, +BS x 4 quads  Extremities: No Rashes, no edema. chronic L foot trace edema, cool to touch w/o pain -no palpable or dopplerable pulses. R foot WWP 1+ DP/PT pulses  Neuro: A/o x 3, No focal deficits          LABS:                          10.4   6.39  )-----------( 286      ( 23 Jun 2021 08:56 )             32.9                              06-23    140  |  99  |  32<H>  ----------------------------<  95  5.5<H>   |  33<H>  |  1.04    Ca    9.5      23 Jun 2021 08:56  Mg     2.1     06-23                                CAPILLARY BLOOD GLUCOSE            Allergies:  erythromycin (Unknown)  penicillin (Unknown)    MEDICATIONS  (STANDING):  artificial  tears Solution 1 Drop(s) Both EYES two times a day  aspirin enteric coated 81 milliGRAM(s) Oral daily  atorvastatin 40 milliGRAM(s) Oral at bedtime  brimonidine 0.2% Ophthalmic Solution 1 Drop(s) Both EYES at bedtime  desmopressin 0.1 milliGRAM(s) Oral two times a day  enoxaparin Injectable 40 milliGRAM(s) SubCutaneous every 24 hours  fluticasone propionate 50 MICROgram(s)/spray Nasal Spray 1 Spray(s) Both Nostrils every 12 hours  gabapentin 300 milliGRAM(s) Oral at bedtime  latanoprost 0.005% Ophthalmic Solution 1 Drop(s) Both EYES at bedtime  metoprolol tartrate 12.5 milliGRAM(s) Oral two times a day  nystatin Powder 1 Application(s) Topical two times a day    MEDICATIONS  (PRN):  acetaminophen   Tablet .. 650 milliGRAM(s) Oral every 6 hours PRN Moderate Pain (4 - 6), Severe Pain (7 - 10)        DIAGNOSTIC TESTS:

## 2021-06-23 NOTE — PROGRESS NOTE ADULT - ASSESSMENT
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 Pt is now admitted to cardiology for further management of syncope where echocardiogram notable for critical AS (BUSTER 0.3 cm2 by ROD) for which Dr. Bella is following. Carotid ultrasound and TAVR CTs performed and awaiting results.    Pt to work w/ PT and ambulate and out of bed to chair. Laix 20mg IV x1 today 6/22/21, will re-evaluate later in the day. Pt likely to be discharged later this week w/ follow up with Dr. Bella on 6/30/21.   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 Admitted to cardiac tele for further management of syncope, found to have critical AS (BUSTER 0.3 cm2 by ROD) for which [facility] is following. S/p TAVR w/u: Carotid ultrasound and TAVR CTs performed. Plan for TAVR this admission per discussions w/ PCP.

## 2021-06-23 NOTE — PROGRESS NOTE ADULT - PROBLEM SELECTOR PLAN 4
--CONT: Lipitor 40mg PO QHS.      #Pre-DM (A1c 5.7)   -Encourage diet/lifestyle changes. --CONT: Lipitor 40mg PO QHS.    #Pre-DM (A1c 5.7)   -Encourage diet/lifestyle changes.

## 2021-06-23 NOTE — PROGRESS NOTE ADULT - PROBLEM SELECTOR PLAN 5
--H&H 10.3/32.1 on arrival (unknown baseline), H&H 10.1/32.4 on 6/21/21   --Iron studies negative (elevated ferritin 318)   --Maintain active T & S (last 6/21). Normocytic anemia.   --H/H 10/32 - remains stable  --Likely 2/2 anemiaof chronic disease.   - Iron studies WNL, elevated ferritin 318  - F/u LDH, haptoglobin  --Maintain active T & S (last 6/21) Normocytic anemia.   --H/H 10/32 - remains stable  --Likely 2/2 anemia of chronic disease.   - Iron studies WNL, elevated ferritin 318  - F/u LDH, haptoglobin  --Maintain active T & S (last 6/21)

## 2021-06-23 NOTE — PROGRESS NOTE ADULT - PROBLEM SELECTOR PLAN 7
Full code (GOC documented)    DVT PPX: Lovenox SQ   Activity: bed to chair with assistance   Dispo: Pending CTS Dr. Bella recs. Full code (GOC documented)    DVT PPX: Lovenox SQ   Activity: Ambulate w/ assistance  Dispo: Pending SHD Dr. Bella recs, TAVR this admission

## 2021-06-24 LAB
ANION GAP SERPL CALC-SCNC: 8 MMOL/L — SIGNIFICANT CHANGE UP (ref 5–17)
BLD GP AB SCN SERPL QL: POSITIVE — SIGNIFICANT CHANGE UP
BUN SERPL-MCNC: 28 MG/DL — HIGH (ref 7–23)
CALCIUM SERPL-MCNC: 9.2 MG/DL — SIGNIFICANT CHANGE UP (ref 8.4–10.5)
CHLORIDE SERPL-SCNC: 100 MMOL/L — SIGNIFICANT CHANGE UP (ref 96–108)
CO2 SERPL-SCNC: 29 MMOL/L — SIGNIFICANT CHANGE UP (ref 22–31)
CREAT SERPL-MCNC: 0.93 MG/DL — SIGNIFICANT CHANGE UP (ref 0.5–1.3)
GLUCOSE SERPL-MCNC: 88 MG/DL — SIGNIFICANT CHANGE UP (ref 70–99)
HAPTOGLOB SERPL-MCNC: 42 MG/DL — SIGNIFICANT CHANGE UP (ref 34–200)
HCT VFR BLD CALC: 31.2 % — LOW (ref 34.5–45)
HGB BLD-MCNC: 9.8 G/DL — LOW (ref 11.5–15.5)
LDH SERPL L TO P-CCNC: 293 U/L — HIGH (ref 50–242)
MAGNESIUM SERPL-MCNC: 2 MG/DL — SIGNIFICANT CHANGE UP (ref 1.6–2.6)
MCHC RBC-ENTMCNC: 29.7 PG — SIGNIFICANT CHANGE UP (ref 27–34)
MCHC RBC-ENTMCNC: 31.4 GM/DL — LOW (ref 32–36)
MCV RBC AUTO: 94.5 FL — SIGNIFICANT CHANGE UP (ref 80–100)
NRBC # BLD: 0 /100 WBCS — SIGNIFICANT CHANGE UP (ref 0–0)
PLATELET # BLD AUTO: 308 K/UL — SIGNIFICANT CHANGE UP (ref 150–400)
POTASSIUM SERPL-MCNC: 4.2 MMOL/L — SIGNIFICANT CHANGE UP (ref 3.5–5.3)
POTASSIUM SERPL-SCNC: 4.2 MMOL/L — SIGNIFICANT CHANGE UP (ref 3.5–5.3)
RBC # BLD: 3.3 M/UL — LOW (ref 3.8–5.2)
RBC # FLD: 13.9 % — SIGNIFICANT CHANGE UP (ref 10.3–14.5)
RH IG SCN BLD-IMP: POSITIVE — SIGNIFICANT CHANGE UP
SODIUM SERPL-SCNC: 137 MMOL/L — SIGNIFICANT CHANGE UP (ref 135–145)
WBC # BLD: 6.33 K/UL — SIGNIFICANT CHANGE UP (ref 3.8–10.5)
WBC # FLD AUTO: 6.33 K/UL — SIGNIFICANT CHANGE UP (ref 3.8–10.5)

## 2021-06-24 PROCEDURE — 94010 BREATHING CAPACITY TEST: CPT | Mod: 26

## 2021-06-24 PROCEDURE — 86077 PHYS BLOOD BANK SERV XMATCH: CPT

## 2021-06-24 PROCEDURE — 93925 LOWER EXTREMITY STUDY: CPT | Mod: 26

## 2021-06-24 PROCEDURE — 99233 SBSQ HOSP IP/OBS HIGH 50: CPT

## 2021-06-24 RX ORDER — HYDROCORTISONE 1 %
1 OINTMENT (GRAM) TOPICAL DAILY
Refills: 0 | Status: DISCONTINUED | OUTPATIENT
Start: 2021-06-24 | End: 2021-06-29

## 2021-06-24 RX ADMIN — DESMOPRESSIN ACETATE 0.1 MILLIGRAM(S): 0.1 TABLET ORAL at 21:57

## 2021-06-24 RX ADMIN — LATANOPROST 1 DROP(S): 0.05 SOLUTION/ DROPS OPHTHALMIC; TOPICAL at 20:48

## 2021-06-24 RX ADMIN — Medication 650 MILLIGRAM(S): at 07:27

## 2021-06-24 RX ADMIN — DESMOPRESSIN ACETATE 0.1 MILLIGRAM(S): 0.1 TABLET ORAL at 11:00

## 2021-06-24 RX ADMIN — BRIMONIDINE TARTRATE 1 DROP(S): 2 SOLUTION/ DROPS OPHTHALMIC at 20:48

## 2021-06-24 RX ADMIN — ENOXAPARIN SODIUM 40 MILLIGRAM(S): 100 INJECTION SUBCUTANEOUS at 18:01

## 2021-06-24 RX ADMIN — ATORVASTATIN CALCIUM 40 MILLIGRAM(S): 80 TABLET, FILM COATED ORAL at 21:56

## 2021-06-24 RX ADMIN — GABAPENTIN 300 MILLIGRAM(S): 400 CAPSULE ORAL at 21:57

## 2021-06-24 RX ADMIN — Medication 12.5 MILLIGRAM(S): at 11:00

## 2021-06-24 RX ADMIN — Medication 1 DROP(S): at 06:24

## 2021-06-24 RX ADMIN — Medication 1 SPRAY(S): at 21:59

## 2021-06-24 RX ADMIN — Medication 81 MILLIGRAM(S): at 11:00

## 2021-06-24 NOTE — PROGRESS NOTE ADULT - PROBLEM SELECTOR PLAN 7
Full code (GOC documented)    DVT PPX: Lovenox SQ   Activity: Ambulate w/ assistance  Dispo: Pending TAVR next week

## 2021-06-24 NOTE — PROGRESS NOTE ADULT - PROBLEM SELECTOR PLAN 5
Normocytic anemia.   --Hgb 9-10 - remains stable  --Likely 2/2 anemia of chronic disease.   - Iron studies WNL, elevated ferritin 318  - F/u LDH, haptoglobin  --Maintain active T & S (last 6/21)

## 2021-06-24 NOTE — PROGRESS NOTE ADULT - ASSESSMENT
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 Admitted to cardiac tele for further management of syncope, found to have critical AS (BUSTER 0.3 cm2 by ROD) for which [facility] is following. S/p TAVR w/u: Carotid ultrasound and TAVR CTs performed. Plan for TAVR this admission per discussions w/ PCP.

## 2021-06-24 NOTE — PROGRESS NOTE ADULT - PROBLEM SELECTOR PLAN 1
--Hx of AVR x7yrs ago at [place] (experimental valve).    --TTE (6/16/21): critical AS (worsening from 5/4/21); peak velocity 6m/s, mean gradient 89mmHg, LVOT/AV velocity ratio 0.13, BUSTER 0.45cm^2; severe MR.    --ROD 6/18/21: critical prosthetic AS BUSTER 0.3cm^2, mild AR, mod MR, mod MS, mild TR, evidence of PFO, normal RV fxn, LV fxn grossly normal, no pericardial effusion.    --CT head no pathology; CT Heart congenital non-obstructive CAD, calcified bioprosthetic aortic valve, severe mitral calcification.    --Carotid US: No hemodynamically significant stenosis in the bilateral carotid arteries.  - S/p TAVR imaging. Pending bedside PFTs  -Pt is pre-load dependent. Will hold further diuresis for now  --Plan for TAVR valve-in w/ Dr Bella this admission, 6/29/21

## 2021-06-24 NOTE — PROGRESS NOTE ADULT - ATTENDING COMMENTS
Patient seen and examined with PA/fellow bedside and discussed during rounds.  PA/fellow note read, including vitals, physical findings, laboratory data, and radiological reports.   Revisions included below. Direct personal management at bedside and extensive interpretation of the data performed.  Plan was outlined and discussed in details with the multidisciplinary team.  Decision making of high complexity.     89 yo F with 24Hr private HHA with PMHx of HTN, HLD, Diabetes insipidus, GERD, AS s/p TAVR with a [employer] THV (6-7 years ago), TAA (4.1cm) who presented with exertional SOB followed by syncope. Reportedly pt had recent ED visit on 21 for another episode of witnessed syncope after walking many blocks to a restaurant with pt left AMA that visit. Pt recently started on lasix for CHF/volume overload with coordination with endocrinologist for her DI. Outpatient CCTA 6/3/21 revealing calcium score is severe at 768 Agatston units, non obstructive CAD, dLAD is not well visualized but is probably non obstructive, shallow myocardial bridge, bioprosthetic aortic valve noted with nml leaflet thickness and excursion (motion artifact noted), severe mitral annular/aortic calcification, and no evidence of PE. Labs revealed mild renal insiffuciency with cr 1.3, anemia hgb 10.3, mildly elevated troponin in setting of elevated BNP. TTE revealed nml EF, severely elevated mean aortic transvalvular gradients, severe MR/moderate calcific MS. Referred for evaluation. Patient with concern for structural valve deterioration of TAVR THV without clear evidence of leaflet thrombus, however, limited cardiac CTA with motion artifact. Will require ROD to better evaluation THV function. Patient is at very high risk for any surgical valvular intervention without minimally invasive options to treat mitral valve disease. Consider for TAVR Bryce of  of THV.  -obtain ROD to better evaluate TAVR THV  -pending above, would consider CTA TAVR protocol including abd CTA to evaluate for TAVR Bryce  -obtain records from [facility] (operative report, baseline post TAVR TTE to understand baseline gradients)  -maintain euvolemic state  -endocrine consultation  -care per primary team, d/w referring cardiologist and PMD    I was physically present for the key portions of the evaluation and management (E/M) service provided.  I agree with the above history, physical, and plan which I have reviewed and edited where appropriate.     15 minutes spent on total encounter; more than 50% of the visit was spent counseling and/or coordinating care by the attending physician.
89F w/ pmh of HTN, HLD, DI, AS s/p [employer]Pros AVR(~6yrs ago) p/w syncope in the setting of lsevere AS, admitted to cardiac Tele for further w/u    -AS - s/p BioPros AVR ~7yrs ago, now p/w syncope and found to have likely BioPros failure w/ severe AS. s/p TTE: Normal LVEF, Severe AS - mGrad: 89mmHg, BUSTER 0.45cm2, also w/ severe MR/MAC w/ moderate MS, dilated LA, PASP ~48mmHg, Normal RV function; Structural Heart Team consulted and following - s/p ROD and CT-TAVR protocol - Plan for AV intervention next week; Pt. euvolemic on exam and HD stable; c/w Lopressor 12.5 BID; Lasix PRN but euvolemic at this time Will work with PT daily  -DI - c/w Desmopressin 0.1mg BID; Sodium WNL and stable  -DASH diet  -DVT PPx  -OOB to chair / Ambulate w/ assistance daily  -Full Code  -Dispo: Cardiac Tele    Al Fernandez MD  Cardiology Attending
89F w/ pmh of HTN, HLD, DI, AS s/p BioPros AVR(~6yrs ago) p/w syncope in the setting of lsevere AS, admitted to cardiac Tele for further w/u    -AS - s/p BioPros AVR ~7yrs ago, now p/w syncope and found to have likely BioPros failure w/ severe AS. s/p TTE: Normal LVEF, Severe AS - mGrad: 89mmHg, BUSTER 0.45cm2, also w/ severe MR/MAC w/ moderate MS, dilated LA, PASP ~48mmHg, Normal RV function; Structural Heart Team consulted and following - s/p ROD and CT-TAVR protocol; Pt. near euvolemic on exam and HD stable, though w/ subj RIOS w/ minimal exertion; c/w Lopressor 12.5 BID; Will give Lasix 20 IV 1-2x daily; Will work with PT daily  -DI - c/w Desmopressin 0.1mg BID; Sodium WNL and stable  -DASH diet  -DVT PPx  -OOB to chair / PT Eval  -Full Code  -Dispo: Cardiac Tele    Al Fernandez MD  Cardiology Attending
See PA note written above, for details. I reviewed the PA documentation.  I have personally seen and examined this patient today. I reviewed vitals, labs, medications, cardiac studies and additional imaging.  I agree with the PA's findings and plans as written above with the following additions/amendments:  89 with severe aortic stenosis and syncope undergoing work up for TAVR.  This morning patient euvolemic on exam, complaining about being kept bedbound, desires to sit in chair and "feel like a human"  Plan for:  OOB to chair with 1:1 assistance  -->patient much happier in chair  Cont ASA 81, Atorva 40qHS  Lopressor 12.5 BID   Hold Lasix as patient euvolemic on exam with symptomatic AS  LANG hose to legs, patient wears compression stockings at home and requesting to wear here  NPO pMN tonight for structural CT scans  Case discussed with patient, private aide at bedside and cardiac care team. Updates given to PCP Dr Vida De Oliveira M.D.  Cardiology Attending
See PA note written above, for details. I reviewed the PA documentation.  I have personally seen and examined this patient today. I reviewed vitals, labs, medications, cardiac studies and additional imaging.  I agree with the PA's findings and plans as written above with the following additions/amendments:  patient c/o sore throat s/p ROD  ADminister lozenges  Awaiting CT scan for structural work up  Awaiting carotid u/s   Case discussed with patient, private aide at bedside and cardiac care team  Linette De Oliveira M.D.  Cardiology Attending
89F w/ pmh of HTN, HLD, DI, AS s/p BioPros AVR(~7yrs ago) p/w syncope in the setting of likely severe AS/MR, admitted to cardiac Tele for further w/u    -AS - s/p BioPros AVR ~7yrs ago, now p/w syncope and found to have likely BioPros failure w/ severe AS. s/p TTE: Normal LVEF, Severe AS - mGrad: 89mmHg, BUSTER 0.45cm2, also w/ severe MR/MAC w/ moderate MS, dilated LA, PASP ~48mmHg, Normal RV function; Structural Heart Team consulted - Plan for ROD tmrw am to further assess aortic valve; Will f/u recs; Pt. euvolemic on exam and HD stable; Hold any further diuresis; c/w Lopressor 12.5 BID  -MR - Severe MR - Pt. currently euvolemic on exam, saturating well on RA; Structural Heart Team following for AS, but unlikely to intervene d/t severe MAC. Pt. doing well off diuretic, continue to monitor  -DI - c/w Desmopressin 0.1mg BID  -DASH diet; NPO after MN  -DVT PPx  -OOB to chair  -Full Code  -Dispo: Cardiac Tele    Al Fernandez MD  Cardiology Attending
89F w/ pmh of HTN, HLD, DI, AS s/p BioPros AVR(~7yrs ago) p/w syncope in the setting of likely severe AS/MR, admitted to cardiac Tele for further w/u    -AS - s/p BioPros AVR ~7yrs ago, now p/w syncope and found to have likely BioPros failure w/ severe AS. s/p TTE: Normal LVEF, Severe AS - mGrad: 89mmHg, BUSTER 0.45cm2, also w/ severe MR/MAC w/ moderate MS, dilated LA, PASP ~48mmHg, Normal RV function; Structural Heart Team consulted and following - Plan for ROD today; Pt. euvolemic on exam and HD stable; c/w Lopressor 12.5 BID; No diuretics at this time  -MR - Severe MR - Pt. currently euvolemic on exam, saturating well on RA; Will continue to monitor off diuretic  -DI - c/w Desmopressin 0.1mg BID; Sodium WNL and stable  -DASH diet; NPO for ROD  -DVT PPx  -OOB to chair  -Full Code  -Dispo: Cardiac Tele    Al Fernandez MD  Cardiology Attending
89F w/ pmh of HTN, HLD, DI, AS s/p BioPros AVR(~7yrs ago) p/w syncope in the setting of likely severe AS/MR, admitted to cardiac Tele for further w/u    -AS - s/p BioPros AVR ~7yrs ago, now p/w syncope and found to have likely BioPros failure w/ severe AS. s/p TTE: Normal LVEF, Severe AS - mGrad: 89mmHg, BUSTER 0.45cm2, also w/ severe MR/MAC w/ moderate MS, dilated LA, PASP ~48mmHg, Normal RV function; Structural Heart Team consulted and following - s/p ROD and CT-TAVR protocol; Pt. near euvolemic on exam and HD stable, though w/ subj RIOS w/ minimal exertion; c/w Lopressor 12.5 BID; Will give Lasix 20 IV 1-2x daily  -DI - c/w Desmopressin 0.1mg BID; Sodium WNL and stable  -DASH diet  -DVT PPx  -OOB to chair / PT Eval  -Full Code  -Dispo: Cardiac Tele    Al Fernandez MD  Cardiology Attending

## 2021-06-24 NOTE — PROGRESS NOTE ADULT - SUBJECTIVE AND OBJECTIVE BOX
CARDIOLOGY NP PROGRESS NOTE    Subjective: Pt seen and examined at bedside. Reports feeling okay. Ambulated in hallway yesterday w/ HHA and PT. Denies chest pain, sob at rest, lightheadedness, dizziness, palpitations, fever, chills.  Remainder ROS otherwise negative.    Overnight Events: None    TELEMETRY: SR 60s, occasional PVCs           VITAL SIGNS:  T(C): 36.7 (06-24-21 @ 08:35), Max: 36.8 (06-23-21 @ 13:33)  HR: 68 (06-24-21 @ 08:11) (62 - 77)  BP: 114/53 (06-24-21 @ 08:11) (98/52 - 128/59)  RR: 18 (06-24-21 @ 08:11) (16 - 18)  SpO2: 98% (06-24-21 @ 08:11) (94% - 98%)  Wt(kg): --    I&O's Summary    23 Jun 2021 07:01  -  24 Jun 2021 07:00  --------------------------------------------------------  IN: 360 mL / OUT: 200 mL / NET: 160 mL          PHYSICAL EXAM:    General: A/ox 3, No acute Distress  Neck: Supple, NO JVD  Cardiac: S1 S2, grade IV/VI systolic murmur  Pulmonary: Lungs w/ bibasilar crackles, Breathing unlabored on RA, No Rhonchi/Wheezing  Abdomen: Soft, Non -tender, +BS x 4 quads  Extremities: No Rashes, no edema. Chronic L foot trace edema, cool to touch w/o pain -no palpable or dopplerable pulses. R foot WWP 1+ DP/PT pulses  Neuro: A/o x 3, No focal deficits          LABS:                          9.8    6.33  )-----------( 308      ( 24 Jun 2021 07:34 )             31.2                              06-24    137  |  100  |  28<H>  ----------------------------<  88  4.2   |  29  |  0.93    Ca    9.2      24 Jun 2021 07:34  Mg     2.0     06-24                                CAPILLARY BLOOD GLUCOSE                Allergies:  erythromycin (Unknown)  penicillin (Unknown)    MEDICATIONS  (STANDING):  artificial  tears Solution 1 Drop(s) Both EYES two times a day  aspirin enteric coated 81 milliGRAM(s) Oral daily  atorvastatin 40 milliGRAM(s) Oral at bedtime  brimonidine 0.2% Ophthalmic Solution 1 Drop(s) Both EYES at bedtime  desmopressin 0.1 milliGRAM(s) Oral two times a day  enoxaparin Injectable 40 milliGRAM(s) SubCutaneous every 24 hours  fluticasone propionate 50 MICROgram(s)/spray Nasal Spray 1 Spray(s) Both Nostrils every 12 hours  gabapentin 300 milliGRAM(s) Oral at bedtime  latanoprost 0.005% Ophthalmic Solution 1 Drop(s) Both EYES at bedtime  metoprolol tartrate 12.5 milliGRAM(s) Oral two times a day  nystatin Powder 1 Application(s) Topical two times a day    MEDICATIONS  (PRN):  acetaminophen   Tablet .. 650 milliGRAM(s) Oral every 6 hours PRN Moderate Pain (4 - 6), Severe Pain (7 - 10)        DIAGNOSTIC TESTS:

## 2021-06-24 NOTE — PROGRESS NOTE ADULT - PROBLEM SELECTOR PLAN 4
--CONT: Lipitor 40mg PO QHS.    #Pre-DM (A1c 5.7)   -Encourage diet/lifestyle changes. --CONT: Lipitor 40mg PO QHS.    #Pre-DM (A1c 5.7)   -Encourage diet/lifestyle changes.    #L foot cool/mild swelling  -Reportedly ongoing x 7 months. Denies pain. L leg WWP  -Pulses non-palpable and non-dopplerable  -Awaiting ismael LE arterial doppler

## 2021-06-25 LAB
ANION GAP SERPL CALC-SCNC: 9 MMOL/L — SIGNIFICANT CHANGE UP (ref 5–17)
BUN SERPL-MCNC: 21 MG/DL — SIGNIFICANT CHANGE UP (ref 7–23)
CALCIUM SERPL-MCNC: 9.4 MG/DL — SIGNIFICANT CHANGE UP (ref 8.4–10.5)
CHLORIDE SERPL-SCNC: 99 MMOL/L — SIGNIFICANT CHANGE UP (ref 96–108)
CO2 SERPL-SCNC: 29 MMOL/L — SIGNIFICANT CHANGE UP (ref 22–31)
CREAT SERPL-MCNC: 0.86 MG/DL — SIGNIFICANT CHANGE UP (ref 0.5–1.3)
GLUCOSE SERPL-MCNC: 129 MG/DL — HIGH (ref 70–99)
HCT VFR BLD CALC: 31.2 % — LOW (ref 34.5–45)
HGB BLD-MCNC: 9.9 G/DL — LOW (ref 11.5–15.5)
MAGNESIUM SERPL-MCNC: 2.2 MG/DL — SIGNIFICANT CHANGE UP (ref 1.6–2.6)
MCHC RBC-ENTMCNC: 29.8 PG — SIGNIFICANT CHANGE UP (ref 27–34)
MCHC RBC-ENTMCNC: 31.7 GM/DL — LOW (ref 32–36)
MCV RBC AUTO: 94 FL — SIGNIFICANT CHANGE UP (ref 80–100)
NRBC # BLD: 0 /100 WBCS — SIGNIFICANT CHANGE UP (ref 0–0)
PLATELET # BLD AUTO: 310 K/UL — SIGNIFICANT CHANGE UP (ref 150–400)
POTASSIUM SERPL-MCNC: 5.1 MMOL/L — SIGNIFICANT CHANGE UP (ref 3.5–5.3)
POTASSIUM SERPL-SCNC: 5.1 MMOL/L — SIGNIFICANT CHANGE UP (ref 3.5–5.3)
RBC # BLD: 3.32 M/UL — LOW (ref 3.8–5.2)
RBC # FLD: 13.9 % — SIGNIFICANT CHANGE UP (ref 10.3–14.5)
SODIUM SERPL-SCNC: 137 MMOL/L — SIGNIFICANT CHANGE UP (ref 135–145)
WBC # BLD: 6.78 K/UL — SIGNIFICANT CHANGE UP (ref 3.8–10.5)
WBC # FLD AUTO: 6.78 K/UL — SIGNIFICANT CHANGE UP (ref 3.8–10.5)

## 2021-06-25 PROCEDURE — 99233 SBSQ HOSP IP/OBS HIGH 50: CPT

## 2021-06-25 RX ADMIN — Medication 1 APPLICATION(S): at 11:17

## 2021-06-25 RX ADMIN — ATORVASTATIN CALCIUM 40 MILLIGRAM(S): 80 TABLET, FILM COATED ORAL at 22:20

## 2021-06-25 RX ADMIN — GABAPENTIN 300 MILLIGRAM(S): 400 CAPSULE ORAL at 22:20

## 2021-06-25 RX ADMIN — DESMOPRESSIN ACETATE 0.1 MILLIGRAM(S): 0.1 TABLET ORAL at 22:21

## 2021-06-25 RX ADMIN — BRIMONIDINE TARTRATE 1 DROP(S): 2 SOLUTION/ DROPS OPHTHALMIC at 22:34

## 2021-06-25 RX ADMIN — NYSTATIN CREAM 1 APPLICATION(S): 100000 CREAM TOPICAL at 18:49

## 2021-06-25 RX ADMIN — Medication 81 MILLIGRAM(S): at 11:16

## 2021-06-25 RX ADMIN — ENOXAPARIN SODIUM 40 MILLIGRAM(S): 100 INJECTION SUBCUTANEOUS at 18:44

## 2021-06-25 RX ADMIN — Medication 1 SPRAY(S): at 22:21

## 2021-06-25 RX ADMIN — LATANOPROST 1 DROP(S): 0.05 SOLUTION/ DROPS OPHTHALMIC; TOPICAL at 22:34

## 2021-06-25 RX ADMIN — Medication 1 SPRAY(S): at 11:09

## 2021-06-25 RX ADMIN — Medication 1 DROP(S): at 18:49

## 2021-06-25 RX ADMIN — DESMOPRESSIN ACETATE 0.1 MILLIGRAM(S): 0.1 TABLET ORAL at 11:07

## 2021-06-25 NOTE — PROGRESS NOTE ADULT - PROBLEM SELECTOR PLAN 5
Normocytic anemia. Likely 2/2 anemia of chronic disease.   - Hgb 9-10 - remains stable  - Iron studies WNL, elevated ferritin 318  - Maintain active T & S (last 6/21)

## 2021-06-25 NOTE — PROGRESS NOTE ADULT - ASSESSMENT
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 Structural Heart is following.

## 2021-06-25 NOTE — PROGRESS NOTE ADULT - SUBJECTIVE AND OBJECTIVE BOX
CARDIOLOGY NP PROGRESS NOTE    Subjective: Received pt awake in bed in NAD. States feeling well; c/o RIOS when ambulating to bathroom. Denies CP, dizziness/diaphoresis, n/v, palpitations.  Remainder ROS otherwise negative.    Overnight Events:  No acute events overnight     TELEMETRY: SR (HR 70s); 4 beats NSVT at 730AM; asx.            VITAL SIGNS:  T(C): 36.4 (06-25-21 @ 08:57), Max: 37.1 (06-24-21 @ 18:21)  HR: 76 (06-25-21 @ 09:00) (69 - 80)  BP: 109/56 (06-25-21 @ 09:00) (102/50 - 137/62)  RR: 18 (06-25-21 @ 09:00) (17 - 18)  SpO2: 96% (06-25-21 @ 09:00) (92% - 96%)  Wt(kg): --    I&O's Summary    24 Jun 2021 07:01  -  25 Jun 2021 07:00  --------------------------------------------------------  IN: 300 mL / OUT: 250 mL / NET: 50 mL          PHYSICAL EXAM:    General: A/ox 3, No acute Distress  Neck: Supple, NO JVD  Cardiac: S1 S2, (+) systolic murmur  Pulmonary: Bibasilar Rales.  Breathing unlabored on RA.   Abdomen: Soft, Non -tender, +BS x 4 quads  Extremities: No rashes. Trace edema LLE- cool to touch pulses w/doppler   Neuro: A/o x 3, No focal deficits              LABS:                          9.8    6.33  )-----------( 308      ( 24 Jun 2021 07:34 )             31.2                              06-24    137  |  100  |  28<H>  ----------------------------<  88  4.2   |  29  |  0.93    Ca    9.2      24 Jun 2021 07:34  Mg     2.0     06-24                                CAPILLARY BLOOD GLUCOSE                Allergies:  erythromycin (Unknown)  penicillin (Unknown)    MEDICATIONS  (STANDING):  artificial  tears Solution 1 Drop(s) Both EYES two times a day  aspirin enteric coated 81 milliGRAM(s) Oral daily  atorvastatin 40 milliGRAM(s) Oral at bedtime  brimonidine 0.2% Ophthalmic Solution 1 Drop(s) Both EYES at bedtime  desmopressin 0.1 milliGRAM(s) Oral two times a day  enoxaparin Injectable 40 milliGRAM(s) SubCutaneous every 24 hours  fluticasone propionate 50 MICROgram(s)/spray Nasal Spray 1 Spray(s) Both Nostrils every 12 hours  gabapentin 300 milliGRAM(s) Oral at bedtime  hydrocortisone 1% Ointment 1 Application(s) Topical daily  latanoprost 0.005% Ophthalmic Solution 1 Drop(s) Both EYES at bedtime  metoprolol tartrate 12.5 milliGRAM(s) Oral two times a day  nystatin Powder 1 Application(s) Topical two times a day    MEDICATIONS  (PRN):  acetaminophen   Tablet .. 650 milliGRAM(s) Oral every 6 hours PRN Moderate Pain (4 - 6), Severe Pain (7 - 10)        DIAGNOSTIC TESTS:

## 2021-06-25 NOTE — PROGRESS NOTE ADULT - PROBLEM SELECTOR PLAN 4
- CONT: Lipitor 40mg PO QHS.    #Pre-DM (A1c 5.7)   - Encourage diet/lifestyle changes.    #L foot cool/mild swelling  - Reportedly ongoing x 7 months. Denies pain. L leg WWP  - Pulses non-palpable and non-dopplerable  - BL LE arterial doppler: : moderate to severe proximal arterial stenosis/severe aortic valvular stenosis along with diffuse peripheral artery disease. Possible >50 % stenosis in the left posterior tibial artery.

## 2021-06-25 NOTE — PROGRESS NOTE ADULT - PROBLEM SELECTOR PLAN 3
- SBP 110s-120s  - continue to HOLD  Lasix 20mg PO QD and Lisinopril 5mg PO QD as pt is preload dependent  - c/w Lopressor 12.5mg PO BID.

## 2021-06-25 NOTE — PROGRESS NOTE ADULT - PROBLEM SELECTOR PLAN 2
Presented s/p syncopal episode. Had 2 syncopal episodes prior week while ambulating a/w SOB. Likely 2/2 critical AS and dehydration. s/p 250cc bolus in ER. Trop peak 0.21 likely 2/2 demand ischemia in setting of critical AS. ECG: NSR @71bpm, ANAIS in III, V1, V2, STD in I (unchanged from prior)   - CCTA 6/3/21: calcium score is severe at 768 Agatston units, non obstructive CAD, dLAD is not well visualized but is probably non obstructive. (+) shallow myocardial bridge, Bioprosthetic aortic valve noted, Severe mitral annular calcification, Aortic calcification present.   - CTA chest 6/3/21 neg for PE   - Management of AS as above.

## 2021-06-25 NOTE — PROGRESS NOTE ADULT - PROBLEM SELECTOR PLAN 1
HX AVR x7yrs ago at [place] (experimental valve).    - TTE (6/16/21): critical AS (worsening from 5/4/21); peak velocity 6m/s, mean gradient 89mmHg, LVOT/AV velocity ratio 0.13, BUSTER 0.45cm^2; severe MR.    - ROD 6/18/21: critical prosthetic AS BUSTER 0.3cm^2, mild AR, mod MR, mod MS, mild TR, evidence of PFO, normal RV fxn, LV fxn grossly normal, no pericardial effusion.    - CT head no pathology; CT Heart congenital non-obstructive CAD, calcified bioprosthetic aortic valve, severe mitral calcification.    - Carotid US: No hemodynamically significant stenosis in the bilateral carotid arteries.  - Pt is pre-load dependent. Will hold further diuresis for now  - Plan for TAVR valve-in- valve w/ Dr Bella 6/29/21

## 2021-06-26 LAB
ANION GAP SERPL CALC-SCNC: 10 MMOL/L — SIGNIFICANT CHANGE UP (ref 5–17)
BUN SERPL-MCNC: 23 MG/DL — SIGNIFICANT CHANGE UP (ref 7–23)
CALCIUM SERPL-MCNC: 8.8 MG/DL — SIGNIFICANT CHANGE UP (ref 8.4–10.5)
CHLORIDE SERPL-SCNC: 101 MMOL/L — SIGNIFICANT CHANGE UP (ref 96–108)
CO2 SERPL-SCNC: 26 MMOL/L — SIGNIFICANT CHANGE UP (ref 22–31)
CREAT SERPL-MCNC: 0.91 MG/DL — SIGNIFICANT CHANGE UP (ref 0.5–1.3)
GLUCOSE SERPL-MCNC: 90 MG/DL — SIGNIFICANT CHANGE UP (ref 70–99)
HCT VFR BLD CALC: 29.8 % — LOW (ref 34.5–45)
HGB BLD-MCNC: 9.3 G/DL — LOW (ref 11.5–15.5)
MAGNESIUM SERPL-MCNC: 2 MG/DL — SIGNIFICANT CHANGE UP (ref 1.6–2.6)
MCHC RBC-ENTMCNC: 29.7 PG — SIGNIFICANT CHANGE UP (ref 27–34)
MCHC RBC-ENTMCNC: 31.2 GM/DL — LOW (ref 32–36)
MCV RBC AUTO: 95.2 FL — SIGNIFICANT CHANGE UP (ref 80–100)
NRBC # BLD: 0 /100 WBCS — SIGNIFICANT CHANGE UP (ref 0–0)
PLATELET # BLD AUTO: 284 K/UL — SIGNIFICANT CHANGE UP (ref 150–400)
POTASSIUM SERPL-MCNC: 4.7 MMOL/L — SIGNIFICANT CHANGE UP (ref 3.5–5.3)
POTASSIUM SERPL-SCNC: 4.7 MMOL/L — SIGNIFICANT CHANGE UP (ref 3.5–5.3)
RBC # BLD: 3.13 M/UL — LOW (ref 3.8–5.2)
RBC # FLD: 14.1 % — SIGNIFICANT CHANGE UP (ref 10.3–14.5)
RH IG SCN BLD-IMP: POSITIVE — SIGNIFICANT CHANGE UP
SODIUM SERPL-SCNC: 137 MMOL/L — SIGNIFICANT CHANGE UP (ref 135–145)
WBC # BLD: 6.73 K/UL — SIGNIFICANT CHANGE UP (ref 3.8–10.5)
WBC # FLD AUTO: 6.73 K/UL — SIGNIFICANT CHANGE UP (ref 3.8–10.5)

## 2021-06-26 PROCEDURE — 99233 SBSQ HOSP IP/OBS HIGH 50: CPT

## 2021-06-26 RX ORDER — LANOLIN ALCOHOL/MO/W.PET/CERES
3 CREAM (GRAM) TOPICAL AT BEDTIME
Refills: 0 | Status: DISCONTINUED | OUTPATIENT
Start: 2021-06-26 | End: 2021-06-29

## 2021-06-26 RX ADMIN — Medication 650 MILLIGRAM(S): at 10:44

## 2021-06-26 RX ADMIN — GABAPENTIN 300 MILLIGRAM(S): 400 CAPSULE ORAL at 21:52

## 2021-06-26 RX ADMIN — Medication 1 APPLICATION(S): at 11:09

## 2021-06-26 RX ADMIN — Medication 1 SPRAY(S): at 21:54

## 2021-06-26 RX ADMIN — Medication 1 DROP(S): at 11:08

## 2021-06-26 RX ADMIN — Medication 12.5 MILLIGRAM(S): at 11:09

## 2021-06-26 RX ADMIN — Medication 3 MILLIGRAM(S): at 23:58

## 2021-06-26 RX ADMIN — Medication 1 SPRAY(S): at 11:08

## 2021-06-26 RX ADMIN — DESMOPRESSIN ACETATE 0.1 MILLIGRAM(S): 0.1 TABLET ORAL at 21:52

## 2021-06-26 RX ADMIN — ENOXAPARIN SODIUM 40 MILLIGRAM(S): 100 INJECTION SUBCUTANEOUS at 18:33

## 2021-06-26 RX ADMIN — BRIMONIDINE TARTRATE 1 DROP(S): 2 SOLUTION/ DROPS OPHTHALMIC at 21:58

## 2021-06-26 RX ADMIN — DESMOPRESSIN ACETATE 0.1 MILLIGRAM(S): 0.1 TABLET ORAL at 11:08

## 2021-06-26 RX ADMIN — ATORVASTATIN CALCIUM 40 MILLIGRAM(S): 80 TABLET, FILM COATED ORAL at 21:52

## 2021-06-26 RX ADMIN — Medication 650 MILLIGRAM(S): at 08:05

## 2021-06-26 RX ADMIN — Medication 12.5 MILLIGRAM(S): at 21:53

## 2021-06-26 RX ADMIN — LATANOPROST 1 DROP(S): 0.05 SOLUTION/ DROPS OPHTHALMIC; TOPICAL at 21:53

## 2021-06-26 RX ADMIN — NYSTATIN CREAM 1 APPLICATION(S): 100000 CREAM TOPICAL at 11:09

## 2021-06-26 RX ADMIN — Medication 81 MILLIGRAM(S): at 11:07

## 2021-06-26 NOTE — PROGRESS NOTE ADULT - PROBLEM SELECTOR PLAN 1
HX AVR x7yrs ago at [place] (experimental valve).    - TTE (6/16/21): critical AS (worsening from 5/4/21); peak velocity 6m/s, mean gradient 89mmHg, LVOT/AV velocity ratio 0.13, BUSTER 0.45cm^2; severe MR.    - ROD 6/18/21: critical prosthetic AS BUSTER 0.3cm^2, mild AR, mod MR, mod MS, mild TR, evidence of PFO, normal RV fxn, LV fxn grossly normal, no pericardial effusion.    - CT head no pathology; CT Heart congenital non-obstructive CAD, calcified bioprosthetic aortic valve, severe mitral calcification.    - Carotid US: No hemodynamically significant stenosis in the bilateral carotid arteries.  - Pt is pre-load dependent. Will hold further diuresis for now  - Plan for TAVR valve-in- valve w/ Dr Bella 6/29/21

## 2021-06-26 NOTE — PROGRESS NOTE ADULT - PROBLEM SELECTOR PLAN 3
- SBP 110s-130s  - continue to HOLD  Lasix 20mg PO QD and Lisinopril 5mg PO QD as pt is preload dependent  - c/w Lopressor 12.5mg PO BID.

## 2021-06-26 NOTE — PROGRESS NOTE ADULT - SUBJECTIVE AND OBJECTIVE BOX
CARDIOLOGY NP PROGRESS NOTE    Subjective: Received pt awake in chair in NAD w/ HHA at bedside. States feeling well. Denies CP/SOB, dizziness/diaphoresis, n/v, palpitations.  Remainder ROS otherwise negative.    Overnight Events: No acute events overnight     TELEMETRY: SR (HR 60s-80s)        VITAL SIGNS:  T(C): 36.4 (06-26-21 @ 09:40), Max: 37.2 (06-25-21 @ 22:18)  HR: 74 (06-26-21 @ 11:07) (67 - 84)  BP: 120/56 (06-26-21 @ 11:07) (106/57 - 140/62)  RR: 18 (06-26-21 @ 11:07) (17 - 18)  SpO2: 98% (06-26-21 @ 11:07) (93% - 98%)  Wt(kg): --    I&O's Summary    25 Jun 2021 07:01  -  26 Jun 2021 07:00  --------------------------------------------------------  IN: 490 mL / OUT: 600 mL / NET: -110 mL    26 Jun 2021 07:01  -  26 Jun 2021 11:59  --------------------------------------------------------  IN: 180 mL / OUT: 0 mL / NET: 180 mL          PHYSICAL EXAM:      General: A/ox 3, No acute Distress  Neck: Supple, NO JVD  Cardiac: S1 S2, (+) systolic murmur  Pulmonary: Bibasilar Rales.  Breathing unlabored on RA.   Abdomen: Soft, Non -tender, +BS x 4 quads  Extremities: No rashes. Trace edema LLE- cool to touch pulses w/doppler   Neuro: A/o x 3, No focal deficits          LABS:                          9.3    6.73  )-----------( 284      ( 26 Jun 2021 07:11 )             29.8                              06-26    137  |  101  |  23  ----------------------------<  90  4.7   |  26  |  0.91    Ca    8.8      26 Jun 2021 07:11  Mg     2.0     06-26                                CAPILLARY BLOOD GLUCOSE                Allergies:  erythromycin (Unknown)  penicillin (Unknown)    MEDICATIONS  (STANDING):  artificial  tears Solution 1 Drop(s) Both EYES two times a day  aspirin enteric coated 81 milliGRAM(s) Oral daily  atorvastatin 40 milliGRAM(s) Oral at bedtime  brimonidine 0.2% Ophthalmic Solution 1 Drop(s) Both EYES at bedtime  desmopressin 0.1 milliGRAM(s) Oral two times a day  enoxaparin Injectable 40 milliGRAM(s) SubCutaneous every 24 hours  fluticasone propionate 50 MICROgram(s)/spray Nasal Spray 1 Spray(s) Both Nostrils every 12 hours  gabapentin 300 milliGRAM(s) Oral at bedtime  hydrocortisone 1% Ointment 1 Application(s) Topical daily  latanoprost 0.005% Ophthalmic Solution 1 Drop(s) Both EYES at bedtime  metoprolol tartrate 12.5 milliGRAM(s) Oral two times a day  nystatin Powder 1 Application(s) Topical two times a day    MEDICATIONS  (PRN):  acetaminophen   Tablet .. 650 milliGRAM(s) Oral every 6 hours PRN Moderate Pain (4 - 6), Severe Pain (7 - 10)        DIAGNOSTIC TESTS:

## 2021-06-26 NOTE — PROGRESS NOTE ADULT - PROBLEM SELECTOR PLAN 5
Normocytic anemia. Likely 2/2 anemia of chronic disease.   - Hgb 9-10 - remains stable  - Iron studies WNL, elevated ferritin 318  - Maintain active T & S (last 6/26)

## 2021-06-27 PROCEDURE — 99233 SBSQ HOSP IP/OBS HIGH 50: CPT

## 2021-06-27 RX ORDER — DIPHENHYDRAMINE HCL 50 MG
12.5 CAPSULE ORAL ONCE
Refills: 0 | Status: COMPLETED | OUTPATIENT
Start: 2021-06-27 | End: 2021-06-27

## 2021-06-27 RX ORDER — DIPHENHYDRAMINE HCL 50 MG
25 CAPSULE ORAL EVERY 4 HOURS
Refills: 0 | Status: DISCONTINUED | OUTPATIENT
Start: 2021-06-27 | End: 2021-06-28

## 2021-06-27 RX ADMIN — Medication 3 MILLIGRAM(S): at 22:11

## 2021-06-27 RX ADMIN — Medication 25 MILLIGRAM(S): at 04:00

## 2021-06-27 RX ADMIN — NYSTATIN CREAM 1 APPLICATION(S): 100000 CREAM TOPICAL at 18:39

## 2021-06-27 RX ADMIN — ENOXAPARIN SODIUM 40 MILLIGRAM(S): 100 INJECTION SUBCUTANEOUS at 18:38

## 2021-06-27 RX ADMIN — Medication 1 SPRAY(S): at 13:54

## 2021-06-27 RX ADMIN — GABAPENTIN 300 MILLIGRAM(S): 400 CAPSULE ORAL at 22:11

## 2021-06-27 RX ADMIN — BRIMONIDINE TARTRATE 1 DROP(S): 2 SOLUTION/ DROPS OPHTHALMIC at 22:14

## 2021-06-27 RX ADMIN — Medication 25 MILLIGRAM(S): at 23:26

## 2021-06-27 RX ADMIN — ATORVASTATIN CALCIUM 40 MILLIGRAM(S): 80 TABLET, FILM COATED ORAL at 22:11

## 2021-06-27 RX ADMIN — LATANOPROST 1 DROP(S): 0.05 SOLUTION/ DROPS OPHTHALMIC; TOPICAL at 22:12

## 2021-06-27 RX ADMIN — DESMOPRESSIN ACETATE 0.1 MILLIGRAM(S): 0.1 TABLET ORAL at 13:53

## 2021-06-27 RX ADMIN — Medication 81 MILLIGRAM(S): at 13:53

## 2021-06-27 RX ADMIN — Medication 1 SPRAY(S): at 22:14

## 2021-06-27 RX ADMIN — Medication 1 DROP(S): at 18:39

## 2021-06-27 RX ADMIN — DESMOPRESSIN ACETATE 0.1 MILLIGRAM(S): 0.1 TABLET ORAL at 22:11

## 2021-06-27 NOTE — PROGRESS NOTE ADULT - SUBJECTIVE AND OBJECTIVE BOX
Interventional Cardiology PA Adult Progress Note    Subjective Assessment:  	  MEDICATIONS:  metoprolol tartrate 12.5 milliGRAM(s) Oral two times a day      diphenhydrAMINE   Injectable 12.5 milliGRAM(s) IV Push once    acetaminophen   Tablet .. 650 milliGRAM(s) Oral every 6 hours PRN  diphenhydrAMINE 25 milliGRAM(s) Oral every 4 hours PRN  gabapentin 300 milliGRAM(s) Oral at bedtime  melatonin 3 milliGRAM(s) Oral at bedtime      atorvastatin 40 milliGRAM(s) Oral at bedtime  desmopressin 0.1 milliGRAM(s) Oral two times a day    artificial  tears Solution 1 Drop(s) Both EYES two times a day  aspirin enteric coated 81 milliGRAM(s) Oral daily  brimonidine 0.2% Ophthalmic Solution 1 Drop(s) Both EYES at bedtime  enoxaparin Injectable 40 milliGRAM(s) SubCutaneous every 24 hours  fluticasone propionate 50 MICROgram(s)/spray Nasal Spray 1 Spray(s) Both Nostrils every 12 hours  hydrocortisone 1% Ointment 1 Application(s) Topical daily  latanoprost 0.005% Ophthalmic Solution 1 Drop(s) Both EYES at bedtime  nystatin Powder 1 Application(s) Topical two times a day      	    [PHYSICAL EXAM:  TELEMETRY:  T(C): 36.8 (06-26-21 @ 22:09), Max: 36.9 (06-26-21 @ 18:31)  HR: 62 (06-27-21 @ 04:25) (62 - 83)  BP: 133/77 (06-27-21 @ 04:25) (120/56 - 140/62)  RR: 18 (06-27-21 @ 04:25) (18 - 18)  SpO2: 94% (06-27-21 @ 04:25) (93% - 98%)  Wt(kg): --  I&O's Summary    26 Jun 2021 07:01  -  27 Jun 2021 07:00  --------------------------------------------------------  IN: 660 mL / OUT: 300 mL / NET: 360 mL        Vega:  Central/PICC/Mid Line:                                         Appearance: Normal	  HEENT:   Normal oral mucosa, PERRL, EOMI	  Neck: Supple, + JVD/ - JVD; Carotid Bruit   Cardiovascular: Normal S1 S2, No JVD, No murmurs,   Respiratory: Lungs clear to auscultation/Decreased Breath Sounds/No Rales, Rhonchi, Wheezing	  Gastrointestinal:  Soft, Non-tender, + BS	  Skin: No rashes, No ecchymoses, No cyanosis  Extremities: Normal range of motion, No clubbing, cyanosis or edema  Vascular: Peripheral pulses palpable 2+ bilaterally  Neurologic: Non-focal  Psychiatry: A & O x 3, Mood & affect appropriate      	    ECG:  	  RADIOLOGY:   DIAGNOSTIC TESTING:  [ ] Echocardiogram:  [ ]  Catheterization:  [ ] Stress Test:    [ ] ROD  OTHER: 	    LABS:	 	  CARDIAC MARKERS:                                  9.3    6.73  )-----------( 284      ( 26 Jun 2021 07:11 )             29.8     06-26    137  |  101  |  23  ----------------------------<  90  4.7   |  26  |  0.91    Ca    8.8      26 Jun 2021 07:11  Mg     2.0     06-26      proBNP:   Lipid Profile:   HgA1c:   TSH:       ASSESSMENT/PLAN: 	        DVT ppx:  Dispo:     Interventional Cardiology PA Adult Progress Note    Subjective Assessment: Patient seen and examined at the bedside. No complaints at this time. Itchiness has resolved. Denies CP/SOB/palpitations. Denies all other ROS negative except those listed in subjective assessment.   	  MEDICATIONS:  metoprolol tartrate 12.5 milliGRAM(s) Oral two times a day  diphenhydrAMINE   Injectable 12.5 milliGRAM(s) IV Push once  acetaminophen   Tablet .. 650 milliGRAM(s) Oral every 6 hours PRN  diphenhydrAMINE 25 milliGRAM(s) Oral every 4 hours PRN  gabapentin 300 milliGRAM(s) Oral at bedtime  melatonin 3 milliGRAM(s) Oral at bedtime  atorvastatin 40 milliGRAM(s) Oral at bedtime  desmopressin 0.1 milliGRAM(s) Oral two times a day  artificial  tears Solution 1 Drop(s) Both EYES two times a day  aspirin enteric coated 81 milliGRAM(s) Oral daily  brimonidine 0.2% Ophthalmic Solution 1 Drop(s) Both EYES at bedtime  enoxaparin Injectable 40 milliGRAM(s) SubCutaneous every 24 hours  fluticasone propionate 50 MICROgram(s)/spray Nasal Spray 1 Spray(s) Both Nostrils every 12 hours  hydrocortisone 1% Ointment 1 Application(s) Topical daily  latanoprost 0.005% Ophthalmic Solution 1 Drop(s) Both EYES at bedtime  nystatin Powder 1 Application(s) Topical two times a day	    [PHYSICAL EXAM:  TELEMETRY:  T(C): 36.8 (06-26-21 @ 22:09), Max: 36.9 (06-26-21 @ 18:31)  HR: 62 (06-27-21 @ 04:25) (62 - 83)  BP: 133/77 (06-27-21 @ 04:25) (120/56 - 140/62)  RR: 18 (06-27-21 @ 04:25) (18 - 18)  SpO2: 94% (06-27-21 @ 04:25) (93% - 98%)  Wt(kg): --  I&O's Summary    26 Jun 2021 07:01  -  27 Jun 2021 07:00  --------------------------------------------------------  IN: 660 mL / OUT: 300 mL / NET: 360 mL                              Appearance: Normal		  Neck: Supple, - JVD; no Carotid Bruit   Cardiovascular: Normal S1 S2, IV/VI crescendo-descrecendo systolic ejection murmur @ RUSB w/ radiation to R carotid   Respiratory: Lungs clear to auscultation/No Rales, Rhonchi, Wheezing	  Gastrointestinal:  Soft, Non-tender, ND, + BS x4	  Extremities: Normal range of motion, No clubbing, cyanosis. Trace b/l LE edema  Vascular: Peripheral pulses palpable 2+ bilaterally carotid, radial, 1+ DP/PT  Neurologic:  A & O x 3, Mood & affect appropriate    	    ECG:  	  RADIOLOGY:   DIAGNOSTIC TESTING:  [x ] Echocardiogram: < from: ROD w/Doppler (06.18.21 @ 15:42) >  CONCLUSIONS:     1. Severe prosthetic aortic stenosis.   2. Mild valvular aortic regurgitation.   3. Moderate mitral regurgitation.   4. Moderate mitral stenosis.   5. Mild tricuspid regurgitation.   6. Color flow Doppler reveals evidence of a patent foramen ovale.   7. Right ventricular systolic function is normal.   8. Left ventricular endocardium is not well visualized. Grossly, left ventricular function appears normal.   9. No pericardial effusion.    < end of copied text >    [ ]  Catheterization:  [ ] Stress Test:    [ ] ROD   OTHER: 	    LABS:	 	  CARDIAC MARKERS:                        9.3    6.73  )-----------( 284      ( 26 Jun 2021 07:11 )             29.8     06-26    137  |  101  |  23  ----------------------------<  90  4.7   |  26  |  0.91    Ca    8.8      26 Jun 2021 07:11  Mg     2.0     06-26

## 2021-06-27 NOTE — PROGRESS NOTE ADULT - PROBLEM SELECTOR PLAN 1
HX AVR x7yrs ago at [place] (experimental valve).    - TTE (6/16/21): critical AS (worsening from 5/4/21); peak velocity 6m/s, mean gradient 89mmHg, LVOT/AV velocity ratio 0.13, BUSTER 0.45cm^2; severe MR.    - ROD 6/18/21: critical prosthetic AS BUSTER 0.3cm^2, mild AR, mod MR, mod MS, mild TR, evidence of PFO, normal RV fxn, LV fxn grossly normal, no pericardial effusion.    - CT head no pathology; CT Heart congenital non-obstructive CAD, calcified bioprosthetic aortic valve, severe mitral calcification.    - Carotid US: No hemodynamically significant stenosis in the bilateral carotid arteries.  - Pt is pre-load dependent. Will hold further diuresis for now  - Plan for TAVR valve-in- valve w/ Dr Bella 6/29/21

## 2021-06-28 ENCOUNTER — TRANSCRIPTION ENCOUNTER (OUTPATIENT)
Age: 86
End: 2021-06-28

## 2021-06-28 LAB
ANION GAP SERPL CALC-SCNC: 9 MMOL/L — SIGNIFICANT CHANGE UP (ref 5–17)
BUN SERPL-MCNC: 20 MG/DL — SIGNIFICANT CHANGE UP (ref 7–23)
CALCIUM SERPL-MCNC: 9.4 MG/DL — SIGNIFICANT CHANGE UP (ref 8.4–10.5)
CHLORIDE SERPL-SCNC: 98 MMOL/L — SIGNIFICANT CHANGE UP (ref 96–108)
CO2 SERPL-SCNC: 28 MMOL/L — SIGNIFICANT CHANGE UP (ref 22–31)
CREAT SERPL-MCNC: 0.87 MG/DL — SIGNIFICANT CHANGE UP (ref 0.5–1.3)
GLUCOSE SERPL-MCNC: 85 MG/DL — SIGNIFICANT CHANGE UP (ref 70–99)
HCT VFR BLD CALC: 32.9 % — LOW (ref 34.5–45)
HGB BLD-MCNC: 10.1 G/DL — LOW (ref 11.5–15.5)
MAGNESIUM SERPL-MCNC: 2.2 MG/DL — SIGNIFICANT CHANGE UP (ref 1.6–2.6)
MCHC RBC-ENTMCNC: 29.6 PG — SIGNIFICANT CHANGE UP (ref 27–34)
MCHC RBC-ENTMCNC: 30.7 GM/DL — LOW (ref 32–36)
MCV RBC AUTO: 96.5 FL — SIGNIFICANT CHANGE UP (ref 80–100)
NRBC # BLD: 0 /100 WBCS — SIGNIFICANT CHANGE UP (ref 0–0)
PLATELET # BLD AUTO: 296 K/UL — SIGNIFICANT CHANGE UP (ref 150–400)
POTASSIUM SERPL-MCNC: 5.4 MMOL/L — HIGH (ref 3.5–5.3)
POTASSIUM SERPL-SCNC: 5.4 MMOL/L — HIGH (ref 3.5–5.3)
RBC # BLD: 3.41 M/UL — LOW (ref 3.8–5.2)
RBC # FLD: 14.1 % — SIGNIFICANT CHANGE UP (ref 10.3–14.5)
SODIUM SERPL-SCNC: 135 MMOL/L — SIGNIFICANT CHANGE UP (ref 135–145)
WBC # BLD: 6.96 K/UL — SIGNIFICANT CHANGE UP (ref 3.8–10.5)
WBC # FLD AUTO: 6.96 K/UL — SIGNIFICANT CHANGE UP (ref 3.8–10.5)

## 2021-06-28 PROCEDURE — 99231 SBSQ HOSP IP/OBS SF/LOW 25: CPT

## 2021-06-28 PROCEDURE — 99233 SBSQ HOSP IP/OBS HIGH 50: CPT

## 2021-06-28 RX ORDER — SODIUM ZIRCONIUM CYCLOSILICATE 10 G/10G
5 POWDER, FOR SUSPENSION ORAL ONCE
Refills: 0 | Status: COMPLETED | OUTPATIENT
Start: 2021-06-28 | End: 2021-06-28

## 2021-06-28 RX ORDER — FUROSEMIDE 40 MG
10 TABLET ORAL ONCE
Refills: 0 | Status: COMPLETED | OUTPATIENT
Start: 2021-06-28 | End: 2021-06-28

## 2021-06-28 RX ORDER — CHLORHEXIDINE GLUCONATE 213 G/1000ML
30 SOLUTION TOPICAL ONCE
Refills: 0 | Status: COMPLETED | OUTPATIENT
Start: 2021-06-28 | End: 2021-06-29

## 2021-06-28 RX ADMIN — NYSTATIN CREAM 1 APPLICATION(S): 100000 CREAM TOPICAL at 06:50

## 2021-06-28 RX ADMIN — Medication 10 MILLIGRAM(S): at 15:15

## 2021-06-28 RX ADMIN — Medication 12.5 MILLIGRAM(S): at 10:39

## 2021-06-28 RX ADMIN — BRIMONIDINE TARTRATE 1 DROP(S): 2 SOLUTION/ DROPS OPHTHALMIC at 22:12

## 2021-06-28 RX ADMIN — Medication 650 MILLIGRAM(S): at 06:48

## 2021-06-28 RX ADMIN — Medication 12.5 MILLIGRAM(S): at 22:11

## 2021-06-28 RX ADMIN — Medication 1 DROP(S): at 18:12

## 2021-06-28 RX ADMIN — Medication 650 MILLIGRAM(S): at 07:48

## 2021-06-28 RX ADMIN — DESMOPRESSIN ACETATE 0.1 MILLIGRAM(S): 0.1 TABLET ORAL at 10:39

## 2021-06-28 RX ADMIN — ENOXAPARIN SODIUM 40 MILLIGRAM(S): 100 INJECTION SUBCUTANEOUS at 18:12

## 2021-06-28 RX ADMIN — LATANOPROST 1 DROP(S): 0.05 SOLUTION/ DROPS OPHTHALMIC; TOPICAL at 22:11

## 2021-06-28 RX ADMIN — Medication 1 DROP(S): at 06:50

## 2021-06-28 RX ADMIN — ATORVASTATIN CALCIUM 40 MILLIGRAM(S): 80 TABLET, FILM COATED ORAL at 22:11

## 2021-06-28 RX ADMIN — Medication 1 APPLICATION(S): at 13:03

## 2021-06-28 RX ADMIN — Medication 3 MILLIGRAM(S): at 22:11

## 2021-06-28 RX ADMIN — Medication 1 SPRAY(S): at 22:13

## 2021-06-28 RX ADMIN — GABAPENTIN 300 MILLIGRAM(S): 400 CAPSULE ORAL at 22:10

## 2021-06-28 RX ADMIN — SODIUM ZIRCONIUM CYCLOSILICATE 5 GRAM(S): 10 POWDER, FOR SUSPENSION ORAL at 13:03

## 2021-06-28 RX ADMIN — DESMOPRESSIN ACETATE 0.1 MILLIGRAM(S): 0.1 TABLET ORAL at 22:11

## 2021-06-28 RX ADMIN — Medication 81 MILLIGRAM(S): at 10:39

## 2021-06-28 NOTE — PROGRESS NOTE ADULT - PROBLEM SELECTOR PLAN 1
HX AVR x7yrs ago at [place] (experimental valve).    - TTE (6/16/21): critical AS (worsening from 5/4/21); peak velocity 6m/s, mean gradient 89mmHg, LVOT/AV velocity ratio 0.13, BUSTER 0.45cm^2; severe MR.    - ROD 6/18/21: critical prosthetic AS BUSTER 0.3cm^2, mild AR, mod MR, mod MS, mild TR, evidence of PFO, normal RV fxn, LV fxn grossly normal, no pericardial effusion.    - CT head no pathology; CT Heart congenital non-obstructive CAD, calcified bioprosthetic aortic valve, severe mitral calcification.    - Carotid US: No hemodynamically significant stenosis in the bilateral carotid arteries.  - Pt is pre-load dependent. Will hold further diuresis for now  - Plan for TAVR valve-in- valve w/ Dr Bella 6/29/21. NPO after midnight HX AVR x7yrs ago at [place] (experimental valve).    - TTE (6/16/21): critical AS (worsening from 5/4/21); peak velocity 6m/s, mean gradient 89mmHg, LVOT/AV velocity ratio 0.13, BUSTER 0.45cm^2; severe MR.    - ROD 6/18/21: critical prosthetic AS BUSTER 0.3cm^2, mild AR, mod MR, mod MS, mild TR, evidence of PFO, normal RV fxn, LV fxn grossly normal, no pericardial effusion.    - CT head no pathology; CT Heart congenital non-obstructive CAD, calcified bioprosthetic aortic valve, severe mitral calcification.    - Carotid US: No hemodynamically significant stenosis in the bilateral carotid arteries.  - Pt is pre-load dependent. Diuresis PRN (received 10mg IV Lasix x1 on 6/28)  - Plan for TAVR valve-in- valve w/ Dr Bella 6/29/21. NPO after midnight

## 2021-06-28 NOTE — PROGRESS NOTE ADULT - SUBJECTIVE AND OBJECTIVE BOX
Planned Date of Surgery: 6/28/21                                                                                                                Surgeon:  MD Jena    Procedure: Valve in Valve TAVR    HPI:  89y Female NAD      PAST MEDICAL & SURGICAL HISTORY:  Hyperlipidemia, unspecified hyperlipidemia type    Diabetes insipidus    H/O aortic valve stenosis    Hypertension    S/P AVR  Bioprosthetic    H/O lumbosacral spine surgery    S/P hip replacement  B/L        erythromycin (Unknown)  penicillin (Unknown)      MEDICATIONS  (STANDING):  aspirin enteric coated 81 milliGRAM(s) Oral daily  atorvastatin 40 milliGRAM(s) Oral at bedtime  brimonidine 0.2% Ophthalmic Solution 1 Drop(s) Both EYES at bedtime  desmopressin 0.1 milliGRAM(s) Oral two times a day  enoxaparin Injectable 40 milliGRAM(s) SubCutaneous every 24 hours  fluticasone propionate 50 MICROgram(s)/spray Nasal Spray 1 Spray(s) Both Nostrils every 12 hours  gabapentin 300 milliGRAM(s) Oral at bedtime  hydrocortisone 1% Ointment 1 Application(s) Topical daily  latanoprost 0.005% Ophthalmic Solution 1 Drop(s) Both EYES at bedtime  melatonin 3 milliGRAM(s) Oral at bedtime  metoprolol tartrate 12.5 milliGRAM(s) Oral two times a day  nystatin Powder 1 Application(s) Topical two times a day    MEDICATIONS  (PRN):  acetaminophen   Tablet .. 650 milliGRAM(s) Oral every 6 hours PRN Moderate Pain (4 - 6), Severe Pain (7 - 10)      Labs:                        10.1   6.96  )-----------( 296      ( 28 Jun 2021 07:33 )             32.9     06-28    135  |  98  |  20  ----------------------------<  85  5.4<H>   |  28  |  0.87    Ca    9.4      28 Jun 2021 07:33  Mg     2.2     06-28        ABO Interpretation: O (06-26-21 @ 07:33)    Hgb A1C: 5.7    EKG: `< from: 12 Lead ECG (06.16.21 @ 23:51) >  Diagnosis Line Sinus rhythm with 1st degree AV block  Left axis deviation  Left ventricular hypertrophy with QRS widening and repolarization abnormality    < end of copied text >      CXR: `< from: Xray Chest 1 View- PORTABLE-Routine (Xray Chest 1 View- PORTABLE-Routine .) (06.21.21 @ 18:16) >  Findings/  impression: Stable heart size, thoracic aortic and mitral annulus calcification. Bilateral pleural effusions, new...Stable bony structures, dextroscoliosis. Left rib old fractures.    < end of copied text >        < from: CT Angio Heart and Coronaries w/ IV Cont (06.03.21 @ 19:53) >  IMPRESSION:    1. Cardiomegaly with mild pulmonary venous congestion.  2. Dilatation of the main pulmonary artery measuring 3.3 cm. Although limited for the evaluation of distal segmental branch emboli no central pulmonary artery emboli are noted. No evidence of right ventricular strain.  3. 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 Periodic intervals surveillance is suggested.  4. Left upper and right upper lobe solid nodule measuring 6 and 5 mm respectively. As per Fleischner society 2017 guidelines, interval surveillance thoracic CT in 3-6 months is suggested to confirm stability.    < end of copied text >  < from: CT Angio Heart and Coronaries w/ IV Cont (06.03.21 @ 19:53) >  Impression:  1.  The calcium score is severe at 768 Agatston units.  2.  Non obstructive coronary artery disease.  3.  Distal LAD is not well visualized but is probably non obstructive. 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  4.  Bioprosthetic aortic valve noted. Valve leaflets demonstrate normal thickness and excursion.  5.  Severe mitral annular calcification.  6.  Aortic calcification present.    Please see separate radiology report for non-coronary findings.    < end of copied text >      Echo: `< from: ROD w/Doppler (06.18.21 @ 15:42) >  CONCLUSIONS:     1. Severe prosthetic aortic stenosis.   2. Mild valvular aortic regurgitation.   3. Moderate mitral regurgitation.   4. Moderate mitral stenosis.   5. Mild tricuspid regurgitation.   6. Color flow Doppler reveals evidence of a patent foramen ovale.   7. Right ventricular systolic function is normal.   8. Left ventricular endocardium is not well visualized. Grossly, left ventricular function appears normal.   9. No pericardial effusion.    < end of copied text >      Carotid Duplex: < from: US Duplex Carotid Arteries Complete, Bilateral (06.22.21 @ 22:14) >  VERTEBRAL ARTERIES:  Antegrade flow was seen within both vertebral arteries.      IMPRESSION:    1.  Pulses parvus tardus waveforms are seen in the bilateral carotid arteries consistent with proximal stenosis.  2.  No hemodynamically significant stenosis in the bilateral carotid arteries.  3.  Small calcified atherosclerotic plaque at the bilateral carotid bifurcations and proximal internal carotid arteries.    < end of copied text >      Consult in Chart?  YES   Consent in Chart? YES  Pre-op Orders Placed? YES  Blood Products Ordered? YES  NPO ordered? YES

## 2021-06-28 NOTE — PROGRESS NOTE ADULT - ASSESSMENT
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 Structural Heart is following.      89 y/oF w/24Hr private HHA , PMHx HTN, HLD, Diabetes insipidus, GERD, AS s/p AVR (7 years ago), TAA (4.1cm) presented to ED 6/16/21 s/p LOC at outpatient dermatology office, admitted to tele for further mgmt syncope for which she was found to have critical AS (BUSTER 0.3 cm2 by ROD). NPO after midnight for TAVR 6/29 w/ Dr. Bella @ 1pm.

## 2021-06-28 NOTE — PROGRESS NOTE ADULT - PROBLEM SELECTOR PLAN 7
Full code (GOC documented)    DVT PPX: Lovenox SQ   Activity: Ambulate w/ assistance  Dispo: Pending TAVR 6/29. NPO after midnight

## 2021-06-28 NOTE — PROGRESS NOTE ADULT - SUBJECTIVE AND OBJECTIVE BOX
Interventional Cardiology PA Adult Progress Note    Subjective Assessment: Pt seen and examined at beside. No complaints at this time. Denies CP, SOB, syncope, palpitations, dizziness. Denies all other ROS negative except those listed in subjective assessment.   	  MEDICATIONS:  metoprolol tartrate 12.5 milliGRAM(s) Oral two times a day  acetaminophen   Tablet .. 650 milliGRAM(s) Oral every 6 hours PRN  diphenhydrAMINE 25 milliGRAM(s) Oral every 4 hours PRN  gabapentin 300 milliGRAM(s) Oral at bedtime  melatonin 3 milliGRAM(s) Oral at bedtime  atorvastatin 40 milliGRAM(s) Oral at bedtime  desmopressin 0.1 milliGRAM(s) Oral two times a day  artificial  tears Solution 1 Drop(s) Both EYES two times a day  aspirin enteric coated 81 milliGRAM(s) Oral daily  brimonidine 0.2% Ophthalmic Solution 1 Drop(s) Both EYES at bedtime  enoxaparin Injectable 40 milliGRAM(s) SubCutaneous every 24 hours  fluticasone propionate 50 MICROgram(s)/spray Nasal Spray 1 Spray(s) Both Nostrils every 12 hours  hydrocortisone 1% Ointment 1 Application(s) Topical daily  latanoprost 0.005% Ophthalmic Solution 1 Drop(s) Both EYES at bedtime  nystatin Powder 1 Application(s) Topical two times a day      [PHYSICAL EXAM:  TELEMETRY:  T(C): 36.1 (06-28-21 @ 09:34), Max: 36.6 (06-27-21 @ 14:08)  HR: 74 (06-28-21 @ 08:53) (66 - 81)  BP: 123/62 (06-28-21 @ 08:53) (94/49 - 131/59)  RR: 17 (06-28-21 @ 08:53) (16 - 17)  SpO2: 98% (06-28-21 @ 08:53) (93% - 98%)  Wt(kg): --  I&O's Summary    27 Jun 2021 07:01  -  28 Jun 2021 07:00  --------------------------------------------------------  IN: 660 mL / OUT: 500 mL / NET: 160 mL    28 Jun 2021 07:01  -  28 Jun 2021 11:08  --------------------------------------------------------  IN: 180 mL / OUT: 0 mL / NET: 180 mL                                Appearance: Normal		  Neck: Supple, - JVD; no Carotid Bruit   Cardiovascular: Normal S1 S2, IV/VI crescendo-descrecendo systolic ejection murmur @ RUSB w/ radiation to R carotid   Respiratory: Lungs clear to auscultation/No Rales, Rhonchi, Wheezing	  Gastrointestinal:  Soft, Non-tender, ND, + BS x4	  Extremities: Normal range of motion, No clubbing, cyanosis. Trace b/l LE edema  Vascular: Peripheral pulses palpable 2+ bilaterally carotid, radial, 1+ DP/PT  Neurologic:  A & O x 3, Mood & affect appropriate    	    ECG:  	  RADIOLOGY:   DIAGNOSTIC TESTING:  [ ] Echocardiogram:  [ ]  Catheterization:  [ ] Stress Test:    [ ] ROD  OTHER: 	    LABS:	 	  CARDIAC MARKERS:                          10.1   6.96  )-----------( 296      ( 28 Jun 2021 07:33 )             32.9     06-28    135  |  98  |  20  ----------------------------<  85  5.4<H>   |  28  |  0.87    Ca    9.4      28 Jun 2021 07:33  Mg     2.2     06-28      proBNP:   Lipid Profile:   HgA1c:   TSH:

## 2021-06-28 NOTE — PROGRESS NOTE ADULT - PROBLEM SELECTOR PLAN 4
- CONT: Lipitor 40mg PO QHS.    #Pre-DM (A1c 5.7)   - Encourage diet/lifestyle changes.    #L foot cool/mild swelling  - Reportedly ongoing x 7 months. Denies pain. L leg WWP  - BL LE arterial doppler: moderate to severe proximal arterial stenosis/severe aortic valvular stenosis along with diffuse peripheral artery disease. Possible >50 % stenosis in the left posterior tibial artery.

## 2021-06-28 NOTE — PROGRESS NOTE ADULT - ASSESSMENT
89 y/o Female with 24Hr private HHA with PMHx of HTN, HLD, Diabetes insipidus, GERD, AS s/p TAVR (2014 at [facility]), TAA (4.1cm) who presented to the ED 6/16/21 after being BIBEMS from pt’s dermatology office where she was walking down the hallway, felt "breathless" and then had loss of consciousness per her HHA. She sat her down so she did not have a fall or hit her head. This was the second syncopal event in 1 week. Pt had an outpatient CCTA 6/3/21 revealing calcium score is severe at 768 Agatston units, non obstructive CAD, dLAD is not well visualized but is probably non obstructive. 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 CTA chest 6/3/21 neg for PE. MRA chest 6/11/21: since 6/3/2021, there has been resolution of bilateral pleural effusions, technique is not optimized to assess mitral valve function, Mildly aneurysmal ascending aorta, measuring 4.1 cm. Echocardiogram in ED significant for Severe Bioprosthetic Aortic Stenosis, Severe MAC with severe MR. Structural heart, Dr. Bella, consulted for evaluation.     Plan:  Problem 1: Severe bioprosthetic AS  - Case discussed with Dr. Bella  - Patient is planned for Valve in valve TAVR in am.   - NPO at midnight  - Preop orders in

## 2021-06-29 ENCOUNTER — NON-APPOINTMENT (OUTPATIENT)
Age: 86
End: 2021-06-29

## 2021-06-29 ENCOUNTER — APPOINTMENT (OUTPATIENT)
Dept: CARDIOTHORACIC SURGERY | Facility: HOSPITAL | Age: 86
End: 2021-06-29

## 2021-06-29 LAB
ALBUMIN SERPL ELPH-MCNC: 3.3 G/DL — SIGNIFICANT CHANGE UP (ref 3.3–5)
ALBUMIN SERPL ELPH-MCNC: 3.6 G/DL — SIGNIFICANT CHANGE UP (ref 3.3–5)
ALP SERPL-CCNC: 34 U/L — LOW (ref 40–120)
ALP SERPL-CCNC: 44 U/L — SIGNIFICANT CHANGE UP (ref 40–120)
ALT FLD-CCNC: 10 U/L — SIGNIFICANT CHANGE UP (ref 10–45)
ALT FLD-CCNC: 13 U/L — SIGNIFICANT CHANGE UP (ref 10–45)
ANION GAP SERPL CALC-SCNC: 10 MMOL/L — SIGNIFICANT CHANGE UP (ref 5–17)
ANION GAP SERPL CALC-SCNC: 10 MMOL/L — SIGNIFICANT CHANGE UP (ref 5–17)
ANION GAP SERPL CALC-SCNC: 9 MMOL/L — SIGNIFICANT CHANGE UP (ref 5–17)
APPEARANCE UR: CLEAR — SIGNIFICANT CHANGE UP
APTT BLD: 30 SEC — SIGNIFICANT CHANGE UP (ref 27.5–35.5)
APTT BLD: 32 SEC — SIGNIFICANT CHANGE UP (ref 27.5–35.5)
APTT BLD: >200 SEC — CRITICAL HIGH (ref 27.5–35.5)
AST SERPL-CCNC: 20 U/L — SIGNIFICANT CHANGE UP (ref 10–40)
AST SERPL-CCNC: 23 U/L — SIGNIFICANT CHANGE UP (ref 10–40)
BACTERIA # UR AUTO: ABNORMAL /HPF
BASOPHILS # BLD AUTO: 0.02 K/UL — SIGNIFICANT CHANGE UP (ref 0–0.2)
BASOPHILS NFR BLD AUTO: 0.2 % — SIGNIFICANT CHANGE UP (ref 0–2)
BILIRUB SERPL-MCNC: 0.5 MG/DL — SIGNIFICANT CHANGE UP (ref 0.2–1.2)
BILIRUB SERPL-MCNC: 0.6 MG/DL — SIGNIFICANT CHANGE UP (ref 0.2–1.2)
BILIRUB UR-MCNC: NEGATIVE — SIGNIFICANT CHANGE UP
BLD GP AB SCN SERPL QL: POSITIVE — SIGNIFICANT CHANGE UP
BUN SERPL-MCNC: 13 MG/DL — SIGNIFICANT CHANGE UP (ref 7–23)
BUN SERPL-MCNC: 16 MG/DL — SIGNIFICANT CHANGE UP (ref 7–23)
BUN SERPL-MCNC: 18 MG/DL — SIGNIFICANT CHANGE UP (ref 7–23)
CALCIUM SERPL-MCNC: 8.4 MG/DL — SIGNIFICANT CHANGE UP (ref 8.4–10.5)
CALCIUM SERPL-MCNC: 8.9 MG/DL — SIGNIFICANT CHANGE UP (ref 8.4–10.5)
CALCIUM SERPL-MCNC: 9.3 MG/DL — SIGNIFICANT CHANGE UP (ref 8.4–10.5)
CHLORIDE SERPL-SCNC: 95 MMOL/L — LOW (ref 96–108)
CHLORIDE SERPL-SCNC: 98 MMOL/L — SIGNIFICANT CHANGE UP (ref 96–108)
CHLORIDE SERPL-SCNC: 99 MMOL/L — SIGNIFICANT CHANGE UP (ref 96–108)
CO2 SERPL-SCNC: 24 MMOL/L — SIGNIFICANT CHANGE UP (ref 22–31)
CO2 SERPL-SCNC: 26 MMOL/L — SIGNIFICANT CHANGE UP (ref 22–31)
CO2 SERPL-SCNC: 27 MMOL/L — SIGNIFICANT CHANGE UP (ref 22–31)
COLOR SPEC: YELLOW — SIGNIFICANT CHANGE UP
CREAT SERPL-MCNC: 0.76 MG/DL — SIGNIFICANT CHANGE UP (ref 0.5–1.3)
CREAT SERPL-MCNC: 0.77 MG/DL — SIGNIFICANT CHANGE UP (ref 0.5–1.3)
CREAT SERPL-MCNC: 0.82 MG/DL — SIGNIFICANT CHANGE UP (ref 0.5–1.3)
DIFF PNL FLD: ABNORMAL
EOSINOPHIL # BLD AUTO: 0.14 K/UL — SIGNIFICANT CHANGE UP (ref 0–0.5)
EOSINOPHIL NFR BLD AUTO: 1.7 % — SIGNIFICANT CHANGE UP (ref 0–6)
EPI CELLS # UR: SIGNIFICANT CHANGE UP /HPF (ref 0–5)
GAS PNL BLDA: SIGNIFICANT CHANGE UP
GAS PNL BLDA: SIGNIFICANT CHANGE UP
GLUCOSE SERPL-MCNC: 106 MG/DL — HIGH (ref 70–99)
GLUCOSE SERPL-MCNC: 117 MG/DL — HIGH (ref 70–99)
GLUCOSE SERPL-MCNC: 88 MG/DL — SIGNIFICANT CHANGE UP (ref 70–99)
GLUCOSE UR QL: NEGATIVE — SIGNIFICANT CHANGE UP
HCT VFR BLD CALC: 20.8 % — CRITICAL LOW (ref 34.5–45)
HCT VFR BLD CALC: 25.4 % — LOW (ref 34.5–45)
HCT VFR BLD CALC: 32.1 % — LOW (ref 34.5–45)
HGB BLD-MCNC: 10.1 G/DL — LOW (ref 11.5–15.5)
HGB BLD-MCNC: 6.7 G/DL — CRITICAL LOW (ref 11.5–15.5)
HGB BLD-MCNC: 8.2 G/DL — LOW (ref 11.5–15.5)
IMM GRANULOCYTES NFR BLD AUTO: 0.8 % — SIGNIFICANT CHANGE UP (ref 0–1.5)
INR BLD: 1.04 — SIGNIFICANT CHANGE UP (ref 0.88–1.16)
INR BLD: 1.26 — HIGH (ref 0.88–1.16)
INR BLD: 1.38 — HIGH (ref 0.88–1.16)
KETONES UR-MCNC: NEGATIVE — SIGNIFICANT CHANGE UP
LEUKOCYTE ESTERASE UR-ACNC: ABNORMAL
LYMPHOCYTES # BLD AUTO: 1.5 K/UL — SIGNIFICANT CHANGE UP (ref 1–3.3)
LYMPHOCYTES # BLD AUTO: 17.9 % — SIGNIFICANT CHANGE UP (ref 13–44)
MAGNESIUM SERPL-MCNC: 1.7 MG/DL — SIGNIFICANT CHANGE UP (ref 1.6–2.6)
MAGNESIUM SERPL-MCNC: 1.7 MG/DL — SIGNIFICANT CHANGE UP (ref 1.6–2.6)
MAGNESIUM SERPL-MCNC: 2 MG/DL — SIGNIFICANT CHANGE UP (ref 1.6–2.6)
MCHC RBC-ENTMCNC: 30.2 PG — SIGNIFICANT CHANGE UP (ref 27–34)
MCHC RBC-ENTMCNC: 30.2 PG — SIGNIFICANT CHANGE UP (ref 27–34)
MCHC RBC-ENTMCNC: 30.5 PG — SIGNIFICANT CHANGE UP (ref 27–34)
MCHC RBC-ENTMCNC: 31.5 GM/DL — LOW (ref 32–36)
MCHC RBC-ENTMCNC: 32.2 GM/DL — SIGNIFICANT CHANGE UP (ref 32–36)
MCHC RBC-ENTMCNC: 32.3 GM/DL — SIGNIFICANT CHANGE UP (ref 32–36)
MCV RBC AUTO: 93.7 FL — SIGNIFICANT CHANGE UP (ref 80–100)
MCV RBC AUTO: 94.4 FL — SIGNIFICANT CHANGE UP (ref 80–100)
MCV RBC AUTO: 96.1 FL — SIGNIFICANT CHANGE UP (ref 80–100)
MONOCYTES # BLD AUTO: 0.58 K/UL — SIGNIFICANT CHANGE UP (ref 0–0.9)
MONOCYTES NFR BLD AUTO: 6.9 % — SIGNIFICANT CHANGE UP (ref 2–14)
NEUTROPHILS # BLD AUTO: 6.09 K/UL — SIGNIFICANT CHANGE UP (ref 1.8–7.4)
NEUTROPHILS NFR BLD AUTO: 72.5 % — SIGNIFICANT CHANGE UP (ref 43–77)
NITRITE UR-MCNC: NEGATIVE — SIGNIFICANT CHANGE UP
NRBC # BLD: 0 /100 WBCS — SIGNIFICANT CHANGE UP (ref 0–0)
PH UR: 6 — SIGNIFICANT CHANGE UP (ref 5–8)
PHOSPHATE SERPL-MCNC: 3.6 MG/DL — SIGNIFICANT CHANGE UP (ref 2.5–4.5)
PHOSPHATE SERPL-MCNC: 3.6 MG/DL — SIGNIFICANT CHANGE UP (ref 2.5–4.5)
PLATELET # BLD AUTO: 206 K/UL — SIGNIFICANT CHANGE UP (ref 150–400)
PLATELET # BLD AUTO: 250 K/UL — SIGNIFICANT CHANGE UP (ref 150–400)
PLATELET # BLD AUTO: 302 K/UL — SIGNIFICANT CHANGE UP (ref 150–400)
POTASSIUM SERPL-MCNC: 3.8 MMOL/L — SIGNIFICANT CHANGE UP (ref 3.5–5.3)
POTASSIUM SERPL-MCNC: 4.3 MMOL/L — SIGNIFICANT CHANGE UP (ref 3.5–5.3)
POTASSIUM SERPL-MCNC: 4.7 MMOL/L — SIGNIFICANT CHANGE UP (ref 3.5–5.3)
POTASSIUM SERPL-SCNC: 3.8 MMOL/L — SIGNIFICANT CHANGE UP (ref 3.5–5.3)
POTASSIUM SERPL-SCNC: 4.3 MMOL/L — SIGNIFICANT CHANGE UP (ref 3.5–5.3)
POTASSIUM SERPL-SCNC: 4.7 MMOL/L — SIGNIFICANT CHANGE UP (ref 3.5–5.3)
PROT SERPL-MCNC: 5.2 G/DL — LOW (ref 6–8.3)
PROT SERPL-MCNC: 5.3 G/DL — LOW (ref 6–8.3)
PROT UR-MCNC: 30 MG/DL
PROTHROM AB SERPL-ACNC: 12.5 SEC — SIGNIFICANT CHANGE UP (ref 10.6–13.6)
PROTHROM AB SERPL-ACNC: 14.9 SEC — HIGH (ref 10.6–13.6)
PROTHROM AB SERPL-ACNC: 16.3 SEC — HIGH (ref 10.6–13.6)
RBC # BLD: 2.22 M/UL — LOW (ref 3.8–5.2)
RBC # BLD: 2.69 M/UL — LOW (ref 3.8–5.2)
RBC # BLD: 3.34 M/UL — LOW (ref 3.8–5.2)
RBC # FLD: 14 % — SIGNIFICANT CHANGE UP (ref 10.3–14.5)
RBC # FLD: 14 % — SIGNIFICANT CHANGE UP (ref 10.3–14.5)
RBC # FLD: 14.2 % — SIGNIFICANT CHANGE UP (ref 10.3–14.5)
RBC CASTS # UR COMP ASSIST: < 5 /HPF — SIGNIFICANT CHANGE UP
RH IG SCN BLD-IMP: POSITIVE — SIGNIFICANT CHANGE UP
SARS-COV-2 RNA SPEC QL NAA+PROBE: SIGNIFICANT CHANGE UP
SODIUM SERPL-SCNC: 131 MMOL/L — LOW (ref 135–145)
SODIUM SERPL-SCNC: 133 MMOL/L — LOW (ref 135–145)
SODIUM SERPL-SCNC: 134 MMOL/L — LOW (ref 135–145)
SP GR SPEC: 1.02 — SIGNIFICANT CHANGE UP (ref 1–1.03)
UROBILINOGEN FLD QL: 0.2 E.U./DL — SIGNIFICANT CHANGE UP
WBC # BLD: 12.52 K/UL — HIGH (ref 3.8–10.5)
WBC # BLD: 7.08 K/UL — SIGNIFICANT CHANGE UP (ref 3.8–10.5)
WBC # BLD: 8.4 K/UL — SIGNIFICANT CHANGE UP (ref 3.8–10.5)
WBC # FLD AUTO: 12.52 K/UL — HIGH (ref 3.8–10.5)
WBC # FLD AUTO: 7.08 K/UL — SIGNIFICANT CHANGE UP (ref 3.8–10.5)
WBC # FLD AUTO: 8.4 K/UL — SIGNIFICANT CHANGE UP (ref 3.8–10.5)
WBC UR QL: ABNORMAL /HPF

## 2021-06-29 PROCEDURE — 33361 REPLACE AORTIC VALVE PERQ: CPT | Mod: 62,Q0

## 2021-06-29 PROCEDURE — 93010 ELECTROCARDIOGRAM REPORT: CPT | Mod: 77

## 2021-06-29 PROCEDURE — 71045 X-RAY EXAM CHEST 1 VIEW: CPT | Mod: 26

## 2021-06-29 PROCEDURE — 93010 ELECTROCARDIOGRAM REPORT: CPT

## 2021-06-29 PROCEDURE — 99291 CRITICAL CARE FIRST HOUR: CPT

## 2021-06-29 RX ORDER — DEXTROSE 50 % IN WATER 50 %
50 SYRINGE (ML) INTRAVENOUS
Refills: 0 | Status: DISCONTINUED | OUTPATIENT
Start: 2021-06-29 | End: 2021-06-30

## 2021-06-29 RX ORDER — CHLORHEXIDINE GLUCONATE 213 G/1000ML
1 SOLUTION TOPICAL ONCE
Refills: 0 | Status: COMPLETED | OUTPATIENT
Start: 2021-06-29 | End: 2021-06-29

## 2021-06-29 RX ORDER — PHENYLEPHRINE HYDROCHLORIDE 10 MG/ML
0.1 INJECTION INTRAVENOUS
Qty: 40 | Refills: 0 | Status: DISCONTINUED | OUTPATIENT
Start: 2021-06-29 | End: 2021-06-30

## 2021-06-29 RX ORDER — PROTAMINE SULFATE 10 MG/ML
25 AMPUL (ML) INTRAVENOUS ONCE
Refills: 0 | Status: COMPLETED | OUTPATIENT
Start: 2021-06-29 | End: 2021-06-29

## 2021-06-29 RX ORDER — CEFAZOLIN SODIUM 1 G
2000 VIAL (EA) INJECTION EVERY 8 HOURS
Refills: 0 | Status: COMPLETED | OUTPATIENT
Start: 2021-06-29 | End: 2021-06-30

## 2021-06-29 RX ORDER — ALBUMIN HUMAN 25 %
250 VIAL (ML) INTRAVENOUS
Refills: 0 | Status: COMPLETED | OUTPATIENT
Start: 2021-06-29 | End: 2021-06-30

## 2021-06-29 RX ORDER — MAGNESIUM SULFATE 500 MG/ML
2 VIAL (ML) INJECTION ONCE
Refills: 0 | Status: COMPLETED | OUTPATIENT
Start: 2021-06-29 | End: 2021-06-29

## 2021-06-29 RX ORDER — CHLORHEXIDINE GLUCONATE 213 G/1000ML
1 SOLUTION TOPICAL DAILY
Refills: 0 | Status: DISCONTINUED | OUTPATIENT
Start: 2021-06-29 | End: 2021-07-06

## 2021-06-29 RX ORDER — POTASSIUM CHLORIDE 20 MEQ
20 PACKET (EA) ORAL ONCE
Refills: 0 | Status: COMPLETED | OUTPATIENT
Start: 2021-06-29 | End: 2021-06-29

## 2021-06-29 RX ORDER — HEPARIN SODIUM 5000 [USP'U]/ML
5000 INJECTION INTRAVENOUS; SUBCUTANEOUS EVERY 8 HOURS
Refills: 0 | Status: DISCONTINUED | OUTPATIENT
Start: 2021-06-29 | End: 2021-06-29

## 2021-06-29 RX ORDER — DEXTROSE 50 % IN WATER 50 %
25 SYRINGE (ML) INTRAVENOUS
Refills: 0 | Status: DISCONTINUED | OUTPATIENT
Start: 2021-06-29 | End: 2021-06-30

## 2021-06-29 RX ORDER — CHLORHEXIDINE GLUCONATE 213 G/1000ML
5 SOLUTION TOPICAL
Refills: 0 | Status: DISCONTINUED | OUTPATIENT
Start: 2021-06-29 | End: 2021-06-29

## 2021-06-29 RX ORDER — PROPOFOL 10 MG/ML
10 INJECTION, EMULSION INTRAVENOUS
Qty: 1000 | Refills: 0 | Status: DISCONTINUED | OUTPATIENT
Start: 2021-06-29 | End: 2021-06-30

## 2021-06-29 RX ORDER — ASPIRIN/CALCIUM CARB/MAGNESIUM 324 MG
81 TABLET ORAL DAILY
Refills: 0 | Status: DISCONTINUED | OUTPATIENT
Start: 2021-06-29 | End: 2021-07-01

## 2021-06-29 RX ORDER — SODIUM CHLORIDE 9 MG/ML
1000 INJECTION INTRAMUSCULAR; INTRAVENOUS; SUBCUTANEOUS
Refills: 0 | Status: DISCONTINUED | OUTPATIENT
Start: 2021-06-29 | End: 2021-07-06

## 2021-06-29 RX ORDER — FENTANYL CITRATE 50 UG/ML
25 INJECTION INTRAVENOUS
Refills: 0 | Status: DISCONTINUED | OUTPATIENT
Start: 2021-06-29 | End: 2021-06-30

## 2021-06-29 RX ORDER — CHLORHEXIDINE GLUCONATE 213 G/1000ML
15 SOLUTION TOPICAL EVERY 12 HOURS
Refills: 0 | Status: DISCONTINUED | OUTPATIENT
Start: 2021-06-29 | End: 2021-06-30

## 2021-06-29 RX ORDER — INSULIN HUMAN 100 [IU]/ML
1 INJECTION, SOLUTION SUBCUTANEOUS
Qty: 50 | Refills: 0 | Status: DISCONTINUED | OUTPATIENT
Start: 2021-06-29 | End: 2021-06-30

## 2021-06-29 RX ORDER — CEFAZOLIN SODIUM 1 G
2000 VIAL (EA) INJECTION EVERY 8 HOURS
Refills: 0 | Status: DISCONTINUED | OUTPATIENT
Start: 2021-06-29 | End: 2021-06-29

## 2021-06-29 RX ORDER — FUROSEMIDE 40 MG
10 TABLET ORAL ONCE
Refills: 0 | Status: COMPLETED | OUTPATIENT
Start: 2021-06-29 | End: 2021-06-29

## 2021-06-29 RX ORDER — PROTAMINE SULFATE 10 MG/ML
25 AMPUL (ML) INTRAVENOUS ONCE
Refills: 0 | Status: DISCONTINUED | OUTPATIENT
Start: 2021-06-29 | End: 2021-06-30

## 2021-06-29 RX ORDER — CALCIUM GLUCONATE 100 MG/ML
2 VIAL (ML) INTRAVENOUS ONCE
Refills: 0 | Status: COMPLETED | OUTPATIENT
Start: 2021-06-29 | End: 2021-06-29

## 2021-06-29 RX ORDER — HEPARIN SODIUM 5000 [USP'U]/ML
5000 INJECTION INTRAVENOUS; SUBCUTANEOUS EVERY 8 HOURS
Refills: 0 | Status: DISCONTINUED | OUTPATIENT
Start: 2021-06-30 | End: 2021-07-01

## 2021-06-29 RX ORDER — PANTOPRAZOLE SODIUM 20 MG/1
40 TABLET, DELAYED RELEASE ORAL DAILY
Refills: 0 | Status: DISCONTINUED | OUTPATIENT
Start: 2021-06-29 | End: 2021-06-30

## 2021-06-29 RX ADMIN — Medication 100 MILLIEQUIVALENT(S): at 22:10

## 2021-06-29 RX ADMIN — CHLORHEXIDINE GLUCONATE 15 MILLILITER(S): 213 SOLUTION TOPICAL at 22:22

## 2021-06-29 RX ADMIN — Medication 650 MILLIGRAM(S): at 07:25

## 2021-06-29 RX ADMIN — Medication 81 MILLIGRAM(S): at 10:44

## 2021-06-29 RX ADMIN — Medication 10 MILLIGRAM(S): at 09:00

## 2021-06-29 RX ADMIN — Medication 250 MILLILITER(S): at 20:50

## 2021-06-29 RX ADMIN — Medication 2000 MILLIGRAM(S): at 22:10

## 2021-06-29 RX ADMIN — Medication 315 MILLIGRAM(S): at 19:30

## 2021-06-29 RX ADMIN — NYSTATIN CREAM 1 APPLICATION(S): 100000 CREAM TOPICAL at 06:20

## 2021-06-29 RX ADMIN — Medication 50 GRAM(S): at 20:32

## 2021-06-29 RX ADMIN — Medication 250 MILLILITER(S): at 20:06

## 2021-06-29 RX ADMIN — PANTOPRAZOLE SODIUM 40 MILLIGRAM(S): 20 TABLET, DELAYED RELEASE ORAL at 22:10

## 2021-06-29 RX ADMIN — Medication 12.5 MILLIGRAM(S): at 10:44

## 2021-06-29 RX ADMIN — DESMOPRESSIN ACETATE 0.1 MILLIGRAM(S): 0.1 TABLET ORAL at 10:44

## 2021-06-29 RX ADMIN — CHLORHEXIDINE GLUCONATE 30 MILLILITER(S): 213 SOLUTION TOPICAL at 06:25

## 2021-06-29 RX ADMIN — Medication 250 MILLILITER(S): at 20:33

## 2021-06-29 RX ADMIN — Medication 650 MILLIGRAM(S): at 06:25

## 2021-06-29 RX ADMIN — CHLORHEXIDINE GLUCONATE 1 APPLICATION(S): 213 SOLUTION TOPICAL at 06:21

## 2021-06-29 RX ADMIN — Medication 200 GRAM(S): at 20:32

## 2021-06-29 RX ADMIN — PROPOFOL 4.03 MICROGRAM(S)/KG/MIN: 10 INJECTION, EMULSION INTRAVENOUS at 22:10

## 2021-06-29 NOTE — PROGRESS NOTE ADULT - ASSESSMENT
89 y/oF w/24Hr private HHA , PMHx HTN, HLD, Diabetes insipidus, GERD, AS s/p AVR (7 years ago), TAA (4.1cm) presented to ED 6/16/21 s/p LOC at outpatient dermatology office, admitted to tele for further mgmt syncope for which she was found to have critical AS (BUSTER 0.3 cm2 by ROD). NPO for TAVR today (6/29) w/ Dr. Bella @ 1pm.

## 2021-06-29 NOTE — PROGRESS NOTE ADULT - SUBJECTIVE AND OBJECTIVE BOX
INTERVAL HPI/OVERNIGHT EVENTS:    89y FemaleHPI:  89 yo F with 24Hr private HHA with PMHx of HTN, HLD, Diabetes insipidus, GERD, AS s/p AVR (7 years ago), TAA (4.1cm) who presented to the ED 21 BIBEMS from pt’s dermatology office where she was walking down the hallway, felt "breathless" and then had loss of consciousness per her HHA. She sat her down so she did not have a fall or hit her head. Pt was found to be hypotensive with SBP 80’s in the field. Pt had a recent ED visit on 21 for another episode of witnessed syncope after walking many blocks to a restaurant and then lost consciousness while sitting in a restaurant, pt left AMA that visit. Pt reports taking Lasix at home for h/o "fluid in the lungs" and chronic LE edema L>R. Pt denies fever, chills, cough, CP, PND/orthopnea, abdominal pain, N/V/D, dizziness. Pt had an outpatient CCTA 6/3/21 revealing calcium score is severe at 768 Agatston units, non obstructive CAD, dLAD is not well visualized but is probably non obstructive. 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 CTA chest 6/3/21 neg for PE. MRA chest 21: since 6/3/2021, there has been resolution of bilateral pleural effusions, technique is not optimized to assess mitral valve function, Mildly aneurysmal ascending aorta, measuring 4.1 cm.     In the ED VS: T 97.9F, HR 73bpm, BP 81/33 improved to 113/64, RR 17, O2 sat 98% on RA. Labs significant for Hgb/HCT 10.3/32.1, BUN/Cr 35/1.34, Trop 0.04, CK/CKMB normal, COVID neg. ECG: NSR @71bpm, ANAIS in III, V1, V2, STD in I (unchanged from prior), CXR wet read with pulmonary vascular congestion. Pt was given 250 cc bolus and Aspirin 325mg PO x 1 in the ED. Pt is now admitted to cardiology for further management of syncope.   (2021 15:12)      PAST MEDICAL & SURGICAL HISTORY:  Hyperlipidemia, unspecified hyperlipidemia type    Diabetes insipidus    H/O aortic valve stenosis    Hypertension    S/P AVR  Bioprosthetic    H/O lumbosacral spine surgery    S/P hip replacement  B/L        ICU Vital Signs Last 24 Hrs  T(C): 35.7 (2021 22:00), Max: 36.3 (2021 06:06)  T(F): 96.3 (2021 22:00), Max: 97.4 (2021 06:06)  HR: 51 (:) (51 - 69)  BP: 116/56 (2021 08:55) (108/59 - 116/56)  BP(mean): --  ABP: 138/57 (2021 22:00) (106/45 - 151/53)  ABP(mean): 84 (2021 22:00) (65 - 90)  RR: 14 (:00) (12 - 18)  SpO2: 100% (2021 22:) (93% - 100%)    Qtts:     I&O's Summary    2021 07:  -  2021 07:00  --------------------------------------------------------  IN: 480 mL / OUT: 1150 mL / NET: -670 mL    2021 07:01  -  2021 23:05  --------------------------------------------------------  IN: 756.4 mL / OUT: 935 mL / NET: -178.6 mL        Mode: AC/ CMV (Assist Control/ Continuous Mandatory Ventilation)  RR (machine): 12  TV (machine): 400  FiO2: 40  PEEP: 5  ITime: 1  MAP: 8  PIP: 20      Physical Exam    Neuro sedated, intubated, moving all 4 extremities  Heart RRR, stormy in 40s  Lungs clear mechanical breath sounds bilaterally  Abd soft, nontender  MSK - pulses in all extremities, oozing from L radial access site, 2x balloon dressings in place, intact palmar arch pulses  Skin - several chronic skin patches, dressing in groin with modest soiling, will monitor, groins soft and no visible hematomas    LABS:                        6.7    12.52 )-----------( 206      ( 2021 21:03 )             20.8         133<L>  |  99  |  13  ----------------------------<  106<H>  3.8   |  24  |  0.76    Ca    9.3      2021 21:03  Phos  3.6       Mg     1.7         TPro  5.2<L>  /  Alb  3.6  /  TBili  0.6  /  DBili  x   /  AST  20  /  ALT  10  /  AlkPhos  34<L>      PT/INR - ( 2021 21:03 )   PT: 14.9 sec;   INR: 1.26          PTT - ( 2021 21:03 )  PTT:30.0 sec  Urinalysis Basic - ( 2021 06:51 )    Color: Yellow / Appearance: Clear / S.025 / pH: x  Gluc: x / Ketone: NEGATIVE  / Bili: Negative / Urobili: 0.2 E.U./dL   Blood: x / Protein: 30 mg/dL / Nitrite: NEGATIVE   Leuk Esterase: Moderate / RBC: < 5 /HPF / WBC Many /HPF   Sq Epi: x / Non Sq Epi: 0-5 /HPF / Bacteria: Many /HPF      ABG - ( 2021 21:00 )  pH, Arterial: 7.48  pH, Blood: x     /  pCO2: 32    /  pO2: 182   / HCO3: 24    / Base Excess: 0.9   /  SaO2: 100.0               RADIOLOGY & ADDITIONAL STUDIES:    ASSESSMENT & PLAN:    89y FemaleHPI:  89 yo F with 24Hr private HHA with PMHx of HTN, HLD, Diabetes insipidus, GERD, AS s/p AVR (7 years ago), TAA (4.1cm) who presented to the ED 21 BIBEMS from pt’s dermatology office where she was walking down the hallway, felt "breathless" and then had loss of consciousness per her HHA. She sat her down so she did not have a fall or hit her head. Pt was found to be hypotensive with SBP 80’s in the field. Pt had a recent ED visit on 21 for another episode of witnessed syncope after walking many blocks to a restaurant and then lost consciousness while sitting in a restaurant, pt left AMA that visit. Pt reports taking Lasix at home for h/o "fluid in the lungs" and chronic LE edema L>R. Pt denies fever, chills, cough, CP, PND/orthopnea, abdominal pain, N/V/D, dizziness. Pt had an outpatient CCTA 6/3/21 revealing calcium score is severe at 768 Agatston units, non obstructive CAD, dLAD is not well visualized but is probably non obstructive. 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 CTA chest 6/3/21 neg for PE. MRA chest 21: since 6/3/2021, there has been resolution of bilateral pleural effusions, technique is not optimized to assess mitral valve function, Mildly aneurysmal ascending aorta, measuring 4.1 cm.     In the ED VS: T 97.9F, HR 73bpm, BP 81/33 improved to 113/64, RR 17, O2 sat 98% on RA. Labs significant for Hgb/HCT 10.3/32.1, BUN/Cr 35/1.34, Trop 0.04, CK/CKMB normal, COVID neg. ECG: NSR @71bpm, ANAIS in III, V1, V2, STD in I (unchanged from prior), CXR wet read with pulmonary vascular congestion. Pt was given 250 cc bolus and Aspirin 325mg PO x 1 in the ED. Pt is now admitted to cardiology for further management of syncope.   (2021 15:12)  now s/p valve in valve TAVR with Dr. Bella on .    NEURO:  Pain control with PRN fentanyl/tylenol, sedation with propofol    CV:  MAP Goals 60-80/ SBP goal <140, 120  In native rhythm in 50s to high 40s, TVP at backup rate of 30  BP initially labile postop, requiring intermittent pushes and gtt of phenylephrine and calcium  Normal EF postop    PULM:  Respiratory support with vent on SIMV Rate 14, TV goal 400, Peep 5, PS 5, FiO2 50%  Pulmonary hygiene with IS and activity once extubated, likely plan to extubate to high flow    GI:  Diet - cardiac pending bedside swallow evaluation    /RENAL/LYTES:  Calcium given post-op for acute hypotension, required 750cc 5% albumin as well, volume avid    ID:  Cefazolinfor periop antibiotics  Noted to have UTI on UA from this AM, will dose Bactrim for planned 3d course    HEME:  Required 50mg protamine for bleeding post-op, PTT noted to be >200 initially, improved to 30  Hgb frop to 6.7 from 10 preop, attributed to small losses and dilution, will transfuse and recheck  DVT ppx with HSQ starting tomorrow afternoon    ENDO:  Sliding scale insulin    PPX:  SCDs  PPI while intubated, d/c once extubated    I have spent/provided stated minutes of critical care time to this patient: 45

## 2021-06-29 NOTE — BRIEF OPERATIVE NOTE - NSICDXBRIEFPOSTOP_GEN_ALL_CORE_FT
POST-OP DIAGNOSIS:  Status post transcatheter bioprosthetic or tissue aortic valve replacement 29-Jun-2021 18:11:47  Nathaniel Trevino  Aortic stenosis 29-Jun-2021 18:11:55  Nathaniel Trevino

## 2021-06-29 NOTE — BRIEF OPERATIVE NOTE - NSICDXBRIEFPREOP_GEN_ALL_CORE_FT
PRE-OP DIAGNOSIS:  Status post transcatheter bioprosthetic or tissue aortic valve replacement 29-Jun-2021 18:11:04  Nathaniel Trevino  Aortic stenosis 29-Jun-2021 18:11:16  Nathaniel Trevino

## 2021-06-29 NOTE — BRIEF OPERATIVE NOTE - COMMENTS
Dr. Vidal was the first assistant for this case including but not limited to femoral access, valve deployment, hemostasis, closure    I was present for this procedure and participated as first assistant as described by the PA above, unless otherwise noted below.

## 2021-06-29 NOTE — PROGRESS NOTE ADULT - PROBLEM SELECTOR PLAN 3
- SBP 100s-110s  - continue to HOLD Lasix 20mg PO QD and Lisinopril 5mg PO QD as pt is preload dependent  - c/w Lopressor 12.5mg PO BID.

## 2021-06-29 NOTE — PRE-OP CHECKLIST - SELECT TESTS ORDERED
BMP/CBC/Type and Cross/Type and Screen/EKG/COVID-19 BMP/CBC/PT/PTT/INR/Type and Cross/Type and Screen/Urinalysis/EKG/COVID-19 BMP/CBC/PT/PTT/INR/Type and Cross/Type and Screen/Urinalysis/EKG/Results in MD note/COVID-19

## 2021-06-29 NOTE — PROGRESS NOTE ADULT - PROBLEM SELECTOR PLAN 1
HX AVR x7yrs ago at [place] (experimental valve).    - TTE (6/16/21): critical AS (worsening from 5/4/21); peak velocity 6m/s, mean gradient 89mmHg, LVOT/AV velocity ratio 0.13, BUSTER 0.45cm^2; severe MR.    - ROD 6/18/21: critical prosthetic AS BUSTER 0.3cm^2, mild AR, mod MR, mod MS, mild TR, evidence of PFO, normal RV fxn, LV fxn grossly normal, no pericardial effusion.    - CT head no pathology; CT Heart congenital non-obstructive CAD, calcified bioprosthetic aortic valve, severe mitral calcification.    - Carotid US: No hemodynamically significant stenosis in the bilateral carotid arteries.  - Pt is pre-load dependent. Diuresis PRN (received 10mg IV Lasix x1 on 6/28)  - Plan for TAVR valve-in-valve w/ Dr Bella today (6/29/21). NPO, pre-op ECG and T&S ordered, f/u results. HX AVR x7yrs ago at [place] (experimental valve).    - TTE (6/16/21): critical AS (worsening from 5/4/21); peak velocity 6m/s, mean gradient 89mmHg, LVOT/AV velocity ratio 0.13, BUSTER 0.45cm^2; severe MR.    - ROD 6/18/21: critical prosthetic AS BUSTER 0.3cm^2, mild AR, mod MR, mod MS, mild TR, evidence of PFO, normal RV fxn, LV fxn grossly normal, no pericardial effusion.    - CT head no pathology; CT Heart congenital non-obstructive CAD, calcified bioprosthetic aortic valve, severe mitral calcification.    - Carotid US: No hemodynamically significant stenosis in the bilateral carotid arteries.  - Pt is pre-load dependent. Diuresis PRN (received 10mg IV Lasix x1 on 6/29)  - Plan for TAVR valve-in-valve w/ Dr Bella today (6/29/21). NPO, pre-op ECG and T&S ordered, f/u results.

## 2021-06-29 NOTE — PROGRESS NOTE ADULT - PROBLEM SELECTOR PLAN 7
Full code (GOC documented)    DVT PPX: hold Lovenox SQ for TAVR 6/29  Activity: Ambulate w/ assistance  Dispo: Pending TAVR today (6/29). NPO

## 2021-06-29 NOTE — PROGRESS NOTE ADULT - PROBLEM SELECTOR PLAN 5
Normocytic anemia. Likely 2/2 anemia of chronic disease.   - Hgb 9-10 - remains stable  - Iron studies WNL, elevated ferritin 318  - Maintain active T & S (last 6/29)

## 2021-06-29 NOTE — PROGRESS NOTE ADULT - SUBJECTIVE AND OBJECTIVE BOX
Interventional Cardiology PA Adult Progress Note    Subjective Assessment: Pt seen and examined at bedside this am. Pt c/o SOB, worse while lying flat, relieved with sitting up. Denies CP, syncope, palpitations, dizziness. Denies all other ROS negative except those listed in subjective assessment.     	  MEDICATIONS:  furosemide   Injectable 10 milliGRAM(s) IV Push once  metoprolol tartrate 12.5 milliGRAM(s) Oral two times a day  acetaminophen   Tablet .. 650 milliGRAM(s) Oral every 6 hours PRN  gabapentin 300 milliGRAM(s) Oral at bedtime  melatonin 3 milliGRAM(s) Oral at bedtime  atorvastatin 40 milliGRAM(s) Oral at bedtime  desmopressin 0.1 milliGRAM(s) Oral two times a day  artificial  tears Solution 1 Drop(s) Both EYES two times a day  aspirin enteric coated 81 milliGRAM(s) Oral daily  brimonidine 0.2% Ophthalmic Solution 1 Drop(s) Both EYES at bedtime  enoxaparin Injectable 40 milliGRAM(s) SubCutaneous every 24 hours  fluticasone propionate 50 MICROgram(s)/spray Nasal Spray 1 Spray(s) Both Nostrils every 12 hours  hydrocortisone 1% Ointment 1 Application(s) Topical daily  latanoprost 0.005% Ophthalmic Solution 1 Drop(s) Both EYES at bedtime  nystatin Powder 1 Application(s) Topical two times a day      [PHYSICAL EXAM:  TELEMETRY:  T(C): 36.3 (06-29-21 @ 06:06), Max: 36.4 (06-28-21 @ 13:47)  HR: 64 (06-29-21 @ 06:13) (63 - 74)  BP: 108/59 (06-29-21 @ 06:13) (108/59 - 135/60)  RR: 18 (06-29-21 @ 06:13) (16 - 18)  SpO2: 99% (06-29-21 @ 06:13) (94% - 99%)  Wt(kg): --  I&O's Summary    28 Jun 2021 07:01  -  29 Jun 2021 07:00  --------------------------------------------------------  IN: 480 mL / OUT: 1150 mL / NET: -670 mL      Height (cm): 142.2 (06-29 @ 04:01)  Weight (kg): 67.1 (06-29 @ 04:01)  BMI (kg/m2): 33.2 (06-29 @ 04:01)  BSA (m2): 1.56 (06-29 @ 04:01)                                Appearance: Normal, in NAD		  Neck: Supple, - JVD; no Carotid Bruit   Cardiovascular: Normal S1 S2, IV/VI crescendo-descrecendo systolic ejection murmur @ RUSB w/ radiation to R carotid   Respiratory: Crackles auscultated at b/l lung bases, No Rhonchi, Wheezing	  Gastrointestinal:  Soft, Non-tender, ND, + BS x4	  Extremities: Normal range of motion, No clubbing, cyanosis. Trace R LE edema, 2+ pitting edema of L ankle  Vascular: Peripheral pulses palpable 2+ bilaterally carotid, radial, 1+ DP/PT  Neurologic:  A & O x 3, Mood & affect appropriate    ECG:  	  RADIOLOGY:   DIAGNOSTIC TESTING:  [ ] Echocardiogram:  [ ]  Catheterization:  [ ] Stress Test:    [ ] ROD  OTHER: 	    LABS:	 	  CARDIAC MARKERS:                          10.1   7.08  )-----------( 302      ( 29 Jun 2021 06:40 )             32.1     06-29    131<L>  |  95<L>  |  18  ----------------------------<  88  4.7   |  27  |  0.82    Ca    8.9      29 Jun 2021 06:40  Mg     2.0     06-29    proBNP:   Lipid Profile:   HgA1c:   TSH:   PT/INR - ( 29 Jun 2021 06:40 )   PT: 12.5 sec;   INR: 1.04     PTT - ( 29 Jun 2021 06:40 )  PTT:32.0 sec

## 2021-06-30 ENCOUNTER — TRANSCRIPTION ENCOUNTER (OUTPATIENT)
Age: 86
End: 2021-06-30

## 2021-06-30 ENCOUNTER — APPOINTMENT (OUTPATIENT)
Dept: CARDIOTHORACIC SURGERY | Facility: CLINIC | Age: 86
End: 2021-06-30

## 2021-06-30 LAB
ALBUMIN SERPL ELPH-MCNC: 3.5 G/DL — SIGNIFICANT CHANGE UP (ref 3.3–5)
ALBUMIN SERPL ELPH-MCNC: 3.8 G/DL — SIGNIFICANT CHANGE UP (ref 3.3–5)
ALP SERPL-CCNC: 34 U/L — LOW (ref 40–120)
ALP SERPL-CCNC: 42 U/L — SIGNIFICANT CHANGE UP (ref 40–120)
ALT FLD-CCNC: 11 U/L — SIGNIFICANT CHANGE UP (ref 10–45)
ALT FLD-CCNC: 12 U/L — SIGNIFICANT CHANGE UP (ref 10–45)
ANION GAP SERPL CALC-SCNC: 10 MMOL/L — SIGNIFICANT CHANGE UP (ref 5–17)
ANION GAP SERPL CALC-SCNC: 10 MMOL/L — SIGNIFICANT CHANGE UP (ref 5–17)
ANION GAP SERPL CALC-SCNC: 12 MMOL/L — SIGNIFICANT CHANGE UP (ref 5–17)
APTT BLD: 32.1 SEC — SIGNIFICANT CHANGE UP (ref 27.5–35.5)
AST SERPL-CCNC: 29 U/L — SIGNIFICANT CHANGE UP (ref 10–40)
AST SERPL-CCNC: 49 U/L — HIGH (ref 10–40)
BILIRUB SERPL-MCNC: 0.7 MG/DL — SIGNIFICANT CHANGE UP (ref 0.2–1.2)
BILIRUB SERPL-MCNC: 0.8 MG/DL — SIGNIFICANT CHANGE UP (ref 0.2–1.2)
BUN SERPL-MCNC: 14 MG/DL — SIGNIFICANT CHANGE UP (ref 7–23)
BUN SERPL-MCNC: 15 MG/DL — SIGNIFICANT CHANGE UP (ref 7–23)
BUN SERPL-MCNC: 15 MG/DL — SIGNIFICANT CHANGE UP (ref 7–23)
CALCIUM SERPL-MCNC: 9 MG/DL — SIGNIFICANT CHANGE UP (ref 8.4–10.5)
CALCIUM SERPL-MCNC: 9 MG/DL — SIGNIFICANT CHANGE UP (ref 8.4–10.5)
CALCIUM SERPL-MCNC: 9.6 MG/DL — SIGNIFICANT CHANGE UP (ref 8.4–10.5)
CHLORIDE SERPL-SCNC: 96 MMOL/L — SIGNIFICANT CHANGE UP (ref 96–108)
CHLORIDE SERPL-SCNC: 97 MMOL/L — SIGNIFICANT CHANGE UP (ref 96–108)
CHLORIDE SERPL-SCNC: 98 MMOL/L — SIGNIFICANT CHANGE UP (ref 96–108)
CO2 SERPL-SCNC: 22 MMOL/L — SIGNIFICANT CHANGE UP (ref 22–31)
CO2 SERPL-SCNC: 25 MMOL/L — SIGNIFICANT CHANGE UP (ref 22–31)
CO2 SERPL-SCNC: 25 MMOL/L — SIGNIFICANT CHANGE UP (ref 22–31)
CREAT SERPL-MCNC: 0.73 MG/DL — SIGNIFICANT CHANGE UP (ref 0.5–1.3)
CREAT SERPL-MCNC: 0.93 MG/DL — SIGNIFICANT CHANGE UP (ref 0.5–1.3)
CREAT SERPL-MCNC: 1 MG/DL — SIGNIFICANT CHANGE UP (ref 0.5–1.3)
FERRITIN SERPL-MCNC: 273 NG/ML — HIGH (ref 15–150)
GAS PNL BLDA: SIGNIFICANT CHANGE UP
GAS PNL BLDA: SIGNIFICANT CHANGE UP
GLUCOSE SERPL-MCNC: 112 MG/DL — HIGH (ref 70–99)
GLUCOSE SERPL-MCNC: 124 MG/DL — HIGH (ref 70–99)
GLUCOSE SERPL-MCNC: 135 MG/DL — HIGH (ref 70–99)
HCT VFR BLD CALC: 27.2 % — LOW (ref 34.5–45)
HCT VFR BLD CALC: 27.6 % — LOW (ref 34.5–45)
HCT VFR BLD CALC: 28.1 % — LOW (ref 34.5–45)
HGB BLD-MCNC: 9 G/DL — LOW (ref 11.5–15.5)
HGB BLD-MCNC: 9.1 G/DL — LOW (ref 11.5–15.5)
HGB BLD-MCNC: 9.3 G/DL — LOW (ref 11.5–15.5)
INR BLD: 1.18 — HIGH (ref 0.88–1.16)
IRON SATN MFR SERPL: 21 % — SIGNIFICANT CHANGE UP (ref 14–50)
IRON SATN MFR SERPL: 45 UG/DL — SIGNIFICANT CHANGE UP (ref 30–160)
MAGNESIUM SERPL-MCNC: 2.1 MG/DL — SIGNIFICANT CHANGE UP (ref 1.6–2.6)
MAGNESIUM SERPL-MCNC: 2.2 MG/DL — SIGNIFICANT CHANGE UP (ref 1.6–2.6)
MAGNESIUM SERPL-MCNC: 2.3 MG/DL — SIGNIFICANT CHANGE UP (ref 1.6–2.6)
MCHC RBC-ENTMCNC: 29.4 PG — SIGNIFICANT CHANGE UP (ref 27–34)
MCHC RBC-ENTMCNC: 29.4 PG — SIGNIFICANT CHANGE UP (ref 27–34)
MCHC RBC-ENTMCNC: 29.9 PG — SIGNIFICANT CHANGE UP (ref 27–34)
MCHC RBC-ENTMCNC: 33 GM/DL — SIGNIFICANT CHANGE UP (ref 32–36)
MCHC RBC-ENTMCNC: 33.1 GM/DL — SIGNIFICANT CHANGE UP (ref 32–36)
MCHC RBC-ENTMCNC: 33.1 GM/DL — SIGNIFICANT CHANGE UP (ref 32–36)
MCV RBC AUTO: 88.9 FL — SIGNIFICANT CHANGE UP (ref 80–100)
MCV RBC AUTO: 88.9 FL — SIGNIFICANT CHANGE UP (ref 80–100)
MCV RBC AUTO: 90.8 FL — SIGNIFICANT CHANGE UP (ref 80–100)
NRBC # BLD: 0 /100 WBCS — SIGNIFICANT CHANGE UP (ref 0–0)
PHOSPHATE SERPL-MCNC: 4 MG/DL — SIGNIFICANT CHANGE UP (ref 2.5–4.5)
PHOSPHATE SERPL-MCNC: 5.1 MG/DL — HIGH (ref 2.5–4.5)
PLATELET # BLD AUTO: 160 K/UL — SIGNIFICANT CHANGE UP (ref 150–400)
PLATELET # BLD AUTO: 166 K/UL — SIGNIFICANT CHANGE UP (ref 150–400)
PLATELET # BLD AUTO: 175 K/UL — SIGNIFICANT CHANGE UP (ref 150–400)
POTASSIUM SERPL-MCNC: 4.4 MMOL/L — SIGNIFICANT CHANGE UP (ref 3.5–5.3)
POTASSIUM SERPL-MCNC: 4.5 MMOL/L — SIGNIFICANT CHANGE UP (ref 3.5–5.3)
POTASSIUM SERPL-MCNC: 4.5 MMOL/L — SIGNIFICANT CHANGE UP (ref 3.5–5.3)
POTASSIUM SERPL-SCNC: 4.4 MMOL/L — SIGNIFICANT CHANGE UP (ref 3.5–5.3)
POTASSIUM SERPL-SCNC: 4.5 MMOL/L — SIGNIFICANT CHANGE UP (ref 3.5–5.3)
POTASSIUM SERPL-SCNC: 4.5 MMOL/L — SIGNIFICANT CHANGE UP (ref 3.5–5.3)
PROT SERPL-MCNC: 5.2 G/DL — LOW (ref 6–8.3)
PROT SERPL-MCNC: 5.8 G/DL — LOW (ref 6–8.3)
PROTHROM AB SERPL-ACNC: 14.1 SEC — HIGH (ref 10.6–13.6)
RBC # BLD: 3.04 M/UL — LOW (ref 3.8–5.2)
RBC # BLD: 3.06 M/UL — LOW (ref 3.8–5.2)
RBC # BLD: 3.16 M/UL — LOW (ref 3.8–5.2)
RBC # BLD: 3.3 M/UL — LOW (ref 3.8–5.2)
RBC # FLD: 14.1 % — SIGNIFICANT CHANGE UP (ref 10.3–14.5)
RBC # FLD: 14.4 % — SIGNIFICANT CHANGE UP (ref 10.3–14.5)
RBC # FLD: 14.4 % — SIGNIFICANT CHANGE UP (ref 10.3–14.5)
RETICS #: 64.7 K/UL — SIGNIFICANT CHANGE UP (ref 25–125)
RETICS/RBC NFR: 2 % — SIGNIFICANT CHANGE UP (ref 0.5–2.5)
SODIUM SERPL-SCNC: 131 MMOL/L — LOW (ref 135–145)
SODIUM SERPL-SCNC: 132 MMOL/L — LOW (ref 135–145)
SODIUM SERPL-SCNC: 132 MMOL/L — LOW (ref 135–145)
TIBC SERPL-MCNC: 217 UG/DL — LOW (ref 220–430)
UIBC SERPL-MCNC: 172 UG/DL — SIGNIFICANT CHANGE UP (ref 110–370)
WBC # BLD: 8.63 K/UL — SIGNIFICANT CHANGE UP (ref 3.8–10.5)
WBC # BLD: 8.9 K/UL — SIGNIFICANT CHANGE UP (ref 3.8–10.5)
WBC # BLD: 9.85 K/UL — SIGNIFICANT CHANGE UP (ref 3.8–10.5)
WBC # FLD AUTO: 8.63 K/UL — SIGNIFICANT CHANGE UP (ref 3.8–10.5)
WBC # FLD AUTO: 8.9 K/UL — SIGNIFICANT CHANGE UP (ref 3.8–10.5)
WBC # FLD AUTO: 9.85 K/UL — SIGNIFICANT CHANGE UP (ref 3.8–10.5)

## 2021-06-30 PROCEDURE — 93931 UPPER EXTREMITY STUDY: CPT | Mod: 26,LT

## 2021-06-30 PROCEDURE — 71045 X-RAY EXAM CHEST 1 VIEW: CPT | Mod: 26

## 2021-06-30 PROCEDURE — 93306 TTE W/DOPPLER COMPLETE: CPT | Mod: 26

## 2021-06-30 PROCEDURE — 93010 ELECTROCARDIOGRAM REPORT: CPT

## 2021-06-30 PROCEDURE — 99291 CRITICAL CARE FIRST HOUR: CPT

## 2021-06-30 PROCEDURE — 99292 CRITICAL CARE ADDL 30 MIN: CPT

## 2021-06-30 RX ORDER — PANTOPRAZOLE SODIUM 20 MG/1
40 TABLET, DELAYED RELEASE ORAL
Refills: 0 | Status: DISCONTINUED | OUTPATIENT
Start: 2021-06-30 | End: 2021-07-06

## 2021-06-30 RX ORDER — HYDROCORTISONE 1 %
1 OINTMENT (GRAM) TOPICAL AT BEDTIME
Refills: 0 | Status: DISCONTINUED | OUTPATIENT
Start: 2021-06-30 | End: 2021-07-06

## 2021-06-30 RX ORDER — BENZOCAINE 10 %
1 GEL (GRAM) MUCOUS MEMBRANE THREE TIMES A DAY
Refills: 0 | Status: DISCONTINUED | OUTPATIENT
Start: 2021-06-30 | End: 2021-07-06

## 2021-06-30 RX ORDER — LATANOPROST 0.05 MG/ML
1 SOLUTION/ DROPS OPHTHALMIC; TOPICAL AT BEDTIME
Refills: 0 | Status: DISCONTINUED | OUTPATIENT
Start: 2021-06-30 | End: 2021-07-06

## 2021-06-30 RX ORDER — BRIMONIDINE TARTRATE 2 MG/MG
1 SOLUTION/ DROPS OPHTHALMIC AT BEDTIME
Refills: 0 | Status: DISCONTINUED | OUTPATIENT
Start: 2021-06-30 | End: 2021-07-06

## 2021-06-30 RX ORDER — LIDOCAINE 4 G/100G
1 CREAM TOPICAL DAILY
Refills: 0 | Status: DISCONTINUED | OUTPATIENT
Start: 2021-06-30 | End: 2021-07-06

## 2021-06-30 RX ORDER — ACETAMINOPHEN 500 MG
1000 TABLET ORAL ONCE
Refills: 0 | Status: COMPLETED | OUTPATIENT
Start: 2021-06-30 | End: 2021-06-30

## 2021-06-30 RX ORDER — GABAPENTIN 400 MG/1
300 CAPSULE ORAL DAILY
Refills: 0 | Status: DISCONTINUED | OUTPATIENT
Start: 2021-06-30 | End: 2021-07-06

## 2021-06-30 RX ORDER — DESMOPRESSIN ACETATE 0.1 MG/1
0.1 TABLET ORAL
Refills: 0 | Status: DISCONTINUED | OUTPATIENT
Start: 2021-06-30 | End: 2021-07-06

## 2021-06-30 RX ORDER — TRAMADOL HYDROCHLORIDE 50 MG/1
25 TABLET ORAL EVERY 6 HOURS
Refills: 0 | Status: DISCONTINUED | OUTPATIENT
Start: 2021-06-30 | End: 2021-07-01

## 2021-06-30 RX ORDER — FLUTICASONE PROPIONATE 50 MCG
1 SPRAY, SUSPENSION NASAL EVERY 12 HOURS
Refills: 0 | Status: DISCONTINUED | OUTPATIENT
Start: 2021-06-30 | End: 2021-07-06

## 2021-06-30 RX ORDER — BENZOCAINE 10 %
1 GEL (GRAM) MUCOUS MEMBRANE THREE TIMES A DAY
Refills: 0 | Status: DISCONTINUED | OUTPATIENT
Start: 2021-06-30 | End: 2021-06-30

## 2021-06-30 RX ORDER — ALBUMIN HUMAN 25 %
250 VIAL (ML) INTRAVENOUS
Refills: 0 | Status: COMPLETED | OUTPATIENT
Start: 2021-06-30 | End: 2021-06-30

## 2021-06-30 RX ORDER — ACETAMINOPHEN 500 MG
650 TABLET ORAL EVERY 6 HOURS
Refills: 0 | Status: DISCONTINUED | OUTPATIENT
Start: 2021-06-30 | End: 2021-07-06

## 2021-06-30 RX ORDER — BENZOCAINE AND MENTHOL 5; 1 G/100ML; G/100ML
1 LIQUID ORAL THREE TIMES A DAY
Refills: 0 | Status: DISCONTINUED | OUTPATIENT
Start: 2021-06-30 | End: 2021-07-06

## 2021-06-30 RX ORDER — ATORVASTATIN CALCIUM 80 MG/1
40 TABLET, FILM COATED ORAL AT BEDTIME
Refills: 0 | Status: DISCONTINUED | OUTPATIENT
Start: 2021-06-30 | End: 2021-07-06

## 2021-06-30 RX ORDER — BENZOCAINE AND MENTHOL 5; 1 G/100ML; G/100ML
1 LIQUID ORAL THREE TIMES A DAY
Refills: 0 | Status: DISCONTINUED | OUTPATIENT
Start: 2021-06-30 | End: 2021-06-30

## 2021-06-30 RX ORDER — HYDRALAZINE HCL 50 MG
5 TABLET ORAL ONCE
Refills: 0 | Status: COMPLETED | OUTPATIENT
Start: 2021-06-30 | End: 2021-06-30

## 2021-06-30 RX ADMIN — ATORVASTATIN CALCIUM 40 MILLIGRAM(S): 80 TABLET, FILM COATED ORAL at 21:24

## 2021-06-30 RX ADMIN — BENZOCAINE AND MENTHOL 1 LOZENGE: 5; 1 LIQUID ORAL at 14:54

## 2021-06-30 RX ADMIN — GABAPENTIN 300 MILLIGRAM(S): 400 CAPSULE ORAL at 11:17

## 2021-06-30 RX ADMIN — PANTOPRAZOLE SODIUM 40 MILLIGRAM(S): 20 TABLET, DELAYED RELEASE ORAL at 07:15

## 2021-06-30 RX ADMIN — HEPARIN SODIUM 5000 UNIT(S): 5000 INJECTION INTRAVENOUS; SUBCUTANEOUS at 21:24

## 2021-06-30 RX ADMIN — Medication 2000 MILLIGRAM(S): at 05:37

## 2021-06-30 RX ADMIN — Medication 1 APPLICATION(S): at 21:46

## 2021-06-30 RX ADMIN — CHLORHEXIDINE GLUCONATE 1 APPLICATION(S): 213 SOLUTION TOPICAL at 11:24

## 2021-06-30 RX ADMIN — DESMOPRESSIN ACETATE 0.1 MILLIGRAM(S): 0.1 TABLET ORAL at 09:35

## 2021-06-30 RX ADMIN — LIDOCAINE 1 PATCH: 4 CREAM TOPICAL at 23:13

## 2021-06-30 RX ADMIN — Medication 1 TABLET(S): at 07:15

## 2021-06-30 RX ADMIN — Medication 250 MILLILITER(S): at 20:06

## 2021-06-30 RX ADMIN — Medication 1 TABLET(S): at 17:47

## 2021-06-30 RX ADMIN — FENTANYL CITRATE 25 MICROGRAM(S): 50 INJECTION INTRAVENOUS at 01:20

## 2021-06-30 RX ADMIN — Medication 1 SPRAY(S): at 17:45

## 2021-06-30 RX ADMIN — LIDOCAINE 1 PATCH: 4 CREAM TOPICAL at 11:22

## 2021-06-30 RX ADMIN — HEPARIN SODIUM 5000 UNIT(S): 5000 INJECTION INTRAVENOUS; SUBCUTANEOUS at 14:40

## 2021-06-30 RX ADMIN — BRIMONIDINE TARTRATE 1 DROP(S): 2 SOLUTION/ DROPS OPHTHALMIC at 21:24

## 2021-06-30 RX ADMIN — Medication 81 MILLIGRAM(S): at 11:17

## 2021-06-30 RX ADMIN — Medication 1 SPRAY(S): at 09:37

## 2021-06-30 RX ADMIN — Medication 1 DROP(S): at 17:45

## 2021-06-30 RX ADMIN — FENTANYL CITRATE 25 MICROGRAM(S): 50 INJECTION INTRAVENOUS at 01:05

## 2021-06-30 RX ADMIN — DESMOPRESSIN ACETATE 0.1 MILLIGRAM(S): 0.1 TABLET ORAL at 20:08

## 2021-06-30 RX ADMIN — BENZOCAINE AND MENTHOL 1 LOZENGE: 5; 1 LIQUID ORAL at 21:25

## 2021-06-30 RX ADMIN — Medication 5 MILLIGRAM(S): at 03:30

## 2021-06-30 RX ADMIN — LIDOCAINE 1 PATCH: 4 CREAM TOPICAL at 20:00

## 2021-06-30 RX ADMIN — Medication 1 SPRAY(S): at 05:37

## 2021-06-30 RX ADMIN — Medication 2000 MILLIGRAM(S): at 14:40

## 2021-06-30 RX ADMIN — Medication 1000 MILLIGRAM(S): at 03:05

## 2021-06-30 RX ADMIN — Medication 250 MILLILITER(S): at 00:02

## 2021-06-30 RX ADMIN — Medication 400 MILLIGRAM(S): at 08:56

## 2021-06-30 RX ADMIN — Medication 1000 MILLIGRAM(S): at 09:13

## 2021-06-30 RX ADMIN — LATANOPROST 1 DROP(S): 0.05 SOLUTION/ DROPS OPHTHALMIC; TOPICAL at 21:24

## 2021-06-30 RX ADMIN — Medication 250 MILLILITER(S): at 21:23

## 2021-06-30 RX ADMIN — Medication 400 MILLIGRAM(S): at 02:28

## 2021-06-30 RX ADMIN — BENZOCAINE AND MENTHOL 1 LOZENGE: 5; 1 LIQUID ORAL at 10:27

## 2021-06-30 NOTE — PROGRESS NOTE ADULT - SUBJECTIVE AND OBJECTIVE BOX
CTICU  CRITICAL  CARE  attending     Hand off received 					   Pertinent clinical, laboratory, radiographic, hemodynamic, echocardiographic, respiratory data, microbiologic data and chart were reviewed and analyzed frequently throughout the course of the day and night  Patient seen and examined with CTS/ SH attending at bedside  Pt is a 89y , Female, HEALTH ISSUES - PROBLEM Dx:  Syncope  Syncope    H/O aortic valve stenosis  H/O aortic valve stenosis    KAYLYN (acute kidney injury)  KAYLYN (acute kidney injury)    Anemia  Anemia    Diabetes insipidus  Diabetes insipidus    Hyperlipidemia, unspecified hyperlipidemia type  Hyperlipidemia, unspecified hyperlipidemia type    Hypertension  Hypertension    Aortic stenosis  Aortic stenosis    Prophylactic measure  Prophylactic measure        , FAMILY HISTORY:  FH: lung cancer (Father)    PAST MEDICAL & SURGICAL HISTORY:  Hyperlipidemia, unspecified hyperlipidemia type    Diabetes insipidus    H/O aortic valve stenosis    Hypertension    S/P AVR  Bioprosthetic    H/O lumbosacral spine surgery    S/P hip replacement  B/L      Patient is a 89y old  Female who presents with a chief complaint of Syncope (29 Jun 2021 23:05)      14 system review limited by mentation and multiorgan morbidity     Vital signs, hemodynamic and respiratory parameters were reviewed from the bedside nursing flowsheet.  ICU Vital Signs Last 24 Hrs  T(C): 36.2 (30 Jun 2021 15:02), Max: 36.2 (30 Jun 2021 09:53)  T(F): 97.2 (30 Jun 2021 15:02), Max: 97.2 (30 Jun 2021 09:53)  HR: 75 (30 Jun 2021 16:52) (50 - 75)  BP: --  BP(mean): --  ABP: 112/48 (30 Jun 2021 16:00) (96/58 - 176/74)  ABP(mean): 69 (30 Jun 2021 16:00) (65 - 111)  RR: 16 (30 Jun 2021 16:52) (12 - 19)  SpO2: 99% (30 Jun 2021 16:52) (93% - 100%)    Adult Advanced Hemodynamics Last 24 Hrs  CVP(mm Hg): --  CVP(cm H2O): --  CO: --  CI: --  PA: --  PA(mean): --  PCWP: --  SVR: --  SVRI: --  PVR: --  PVRI: --, ABG - ( 30 Jun 2021 02:03 )  pH, Arterial: 7.41  pH, Blood: x     /  pCO2: 36    /  pO2: 164   / HCO3: 23    / Base Excess: -1.4  /  SaO2: 99.6              Mode: standby    Intake and output was reviewed and the fluid balance was calculated  Daily     Daily   I&O's Summary    29 Jun 2021 07:01  -  30 Jun 2021 07:00  --------------------------------------------------------  IN: 1392.6 mL / OUT: 2085 mL / NET: -692.4 mL    30 Jun 2021 07:01  -  30 Jun 2021 17:22  --------------------------------------------------------  IN: 220 mL / OUT: 490 mL / NET: -270 mL        All lines and drain sites were assessed  Glycemic trend was reviewedCAPILLARY BLOOD GLUCOSE        No acute change in focality  Auscultation of the chest reveals equal bs  Abdomen is soft  Extremities are warm and well perfused  Wounds appear clean and unremarkable  Antibiotics are periop    labs  CBC Full  -  ( 30 Jun 2021 15:12 )  WBC Count : 8.63 K/uL  RBC Count : 3.16 M/uL  Hemoglobin : 9.3 g/dL  Hematocrit : 28.1 %  Platelet Count - Automated : 166 K/uL  Mean Cell Volume : 88.9 fl  Mean Cell Hemoglobin : 29.4 pg  Mean Cell Hemoglobin Concentration : 33.1 gm/dL  Auto Neutrophil # : x  Auto Lymphocyte # : x  Auto Monocyte # : x  Auto Eosinophil # : x  Auto Basophil # : x  Auto Neutrophil % : x  Auto Lymphocyte % : x  Auto Monocyte % : x  Auto Eosinophil % : x  Auto Basophil % : x    06-30    131<L>  |  96  |  15  ----------------------------<  124<H>  4.4   |  25  |  1.00    Ca    9.0      30 Jun 2021 15:12  Phos  5.1     06-30  Mg     2.2     06-30    TPro  5.8<L>  /  Alb  3.8  /  TBili  0.8  /  DBili  x   /  AST  49<H>  /  ALT  12  /  AlkPhos  42  06-30    PT/INR - ( 30 Jun 2021 01:25 )   PT: 14.1 sec;   INR: 1.18          PTT - ( 30 Jun 2021 01:25 )  PTT:32.1 sec  The current medications were reviewed   MEDICATIONS  (STANDING):  artificial  tears Solution 1 Drop(s) Both EYES two times a day  aspirin enteric coated 81 milliGRAM(s) Oral daily  atorvastatin 40 milliGRAM(s) Oral at bedtime  benzocaine 15 mG/menthol 3.6 mG Lozenge 1 Lozenge Oral three times a day  brimonidine 0.2% Ophthalmic Solution 1 Drop(s) Both EYES at bedtime  chlorhexidine 2% Cloths 1 Application(s) Topical daily  desmopressin 0.1 milliGRAM(s) Oral two times a day  fluticasone propionate 50 MICROgram(s)/spray Nasal Spray 1 Spray(s) Both Nostrils every 12 hours  gabapentin 300 milliGRAM(s) Oral daily  heparin   Injectable 5000 Unit(s) SubCutaneous every 8 hours  latanoprost 0.005% Ophthalmic Solution 1 Drop(s) Both EYES at bedtime  lidocaine   Patch 1 Patch Transdermal daily  pantoprazole    Tablet 40 milliGRAM(s) Oral before breakfast  sodium chloride 0.9%. 1000 milliLiter(s) (10 mL/Hr) IV Continuous <Continuous>  trimethoprim  160 mG/sulfamethoxazole 800 mG 1 Tablet(s) Oral every 12 hours    MEDICATIONS  (PRN):  acetaminophen   Tablet .. 650 milliGRAM(s) Oral every 6 hours PRN Mild Pain (1 - 3)  benzocaine 20% Spray 1 Spray(s) Topical three times a day PRN sore throat  traMADol 25 milliGRAM(s) Oral every 6 hours PRN Severe Pain (7 - 10)       PROBLEM LIST/ ASSESSMENT:  HEALTH ISSUES - PROBLEM Dx:  Syncope  Syncope    H/O aortic valve stenosis  H/O aortic valve stenosis    KAYLYN (acute kidney injury)  KAYLYN (acute kidney injury)    Anemia  Anemia    Diabetes insipidus  Diabetes insipidus    Hyperlipidemia, unspecified hyperlipidemia type  Hyperlipidemia, unspecified hyperlipidemia type    Hypertension  Hypertension    Aortic stenosis  Aortic stenosis    Prophylactic measure  Prophylactic measure        ,   Patient is a 89y old  Female who presents with a chief complaint of Syncope (29 Jun 2021 23:05)     s/p tavr                My plan includes :  close hemodynamic, ventilatory and drain monitoring and management per post op routine    Monitor for arrhythmias and monitor parameters for organ perfusion  beta blockade not administered due to hemodynamic instability and bradycardia  monitor neurologic status  Head of the bed should remain elevated to 45 deg .   chest PT and IS will be encouraged  monitor adequacy of oxygenation and ventilation and attempt to wean oxygen  antibiotic regimen will be tailored to the clinical, laboratory and microbiologic data  Nutritional goals will be met using po eventually , ensure adequate caloric intake and montior the same  Stress ulcer and VTE prophylaxis will be achieved    Glycemic control is satisfactory  Electrolytes have been repleted as necessary and wound care has been carried out. Pain control has been achieved.   agressive physical therapy and early mobility and ambulation goals will be met   The family was updated about the course and plan  CRITICAL CARE TIME personally provided by me  in evaluation and management, reassessments, review and interpretation of labs and x-rays, ventilator and hemodynamic management, formulating a plan and coordinating care: ___90____ MIN.  Time does not include procedural time. Time spent was non routine post-operarive caRE and included multiple and repeated evaluations at the bedside  CTICU ATTENDING     					    Cb Suazo MD

## 2021-06-30 NOTE — PROGRESS NOTE ADULT - SUBJECTIVE AND OBJECTIVE BOX
While in the ICU this evening following evening sign-off with the daytime intensivist, I provided an additional 30 minutes of critical care time as documented below:  Pain well controlled, ambulated today without issue. TVP remains in place, will keep in place overnight. Remains off gtts today, responsive to albumin today. continue to monitor for signs of bleeding or widening of QRS. Vega removed today, following up void trial.  This included chart review, documentation, obtaining additional history when needed, review of test results, and discussions with the multidisciplinary ICU team. This did not include time spent performing separately documented procedures or time treating multiple patients simultaneously. This time includes only time spent directly engaged in work dedicated to this individual patient whether at the bedside or elsewhere in the ICU while the patient was critically ill.  Todd Moreno MD  Cardiothoracic Critical Care

## 2021-07-01 LAB
ALBUMIN SERPL ELPH-MCNC: 4.4 G/DL — SIGNIFICANT CHANGE UP (ref 3.3–5)
ALP SERPL-CCNC: 40 U/L — SIGNIFICANT CHANGE UP (ref 40–120)
ALT FLD-CCNC: 10 U/L — SIGNIFICANT CHANGE UP (ref 10–45)
ANION GAP SERPL CALC-SCNC: 11 MMOL/L — SIGNIFICANT CHANGE UP (ref 5–17)
ANION GAP SERPL CALC-SCNC: 12 MMOL/L — SIGNIFICANT CHANGE UP (ref 5–17)
APTT BLD: 39.7 SEC — HIGH (ref 27.5–35.5)
APTT BLD: 48.7 SEC — HIGH (ref 27.5–35.5)
AST SERPL-CCNC: 41 U/L — HIGH (ref 10–40)
BILIRUB SERPL-MCNC: 0.9 MG/DL — SIGNIFICANT CHANGE UP (ref 0.2–1.2)
BUN SERPL-MCNC: 15 MG/DL — SIGNIFICANT CHANGE UP (ref 7–23)
BUN SERPL-MCNC: 17 MG/DL — SIGNIFICANT CHANGE UP (ref 7–23)
CALCIUM SERPL-MCNC: 8.8 MG/DL — SIGNIFICANT CHANGE UP (ref 8.4–10.5)
CALCIUM SERPL-MCNC: 9.1 MG/DL — SIGNIFICANT CHANGE UP (ref 8.4–10.5)
CHLORIDE SERPL-SCNC: 96 MMOL/L — SIGNIFICANT CHANGE UP (ref 96–108)
CHLORIDE SERPL-SCNC: 99 MMOL/L — SIGNIFICANT CHANGE UP (ref 96–108)
CO2 SERPL-SCNC: 23 MMOL/L — SIGNIFICANT CHANGE UP (ref 22–31)
CO2 SERPL-SCNC: 24 MMOL/L — SIGNIFICANT CHANGE UP (ref 22–31)
CREAT SERPL-MCNC: 1.01 MG/DL — SIGNIFICANT CHANGE UP (ref 0.5–1.3)
CREAT SERPL-MCNC: 1.04 MG/DL — SIGNIFICANT CHANGE UP (ref 0.5–1.3)
FIBRINOGEN PPP-MCNC: 362 MG/DL — SIGNIFICANT CHANGE UP (ref 258–438)
GAS PNL BLDA: SIGNIFICANT CHANGE UP
GLUCOSE SERPL-MCNC: 100 MG/DL — HIGH (ref 70–99)
GLUCOSE SERPL-MCNC: 96 MG/DL — SIGNIFICANT CHANGE UP (ref 70–99)
HCT VFR BLD CALC: 25.1 % — LOW (ref 34.5–45)
HCT VFR BLD CALC: 25.1 % — LOW (ref 34.5–45)
HCT VFR BLD CALC: 26.1 % — LOW (ref 34.5–45)
HGB BLD-MCNC: 8.1 G/DL — LOW (ref 11.5–15.5)
HGB BLD-MCNC: 8.2 G/DL — LOW (ref 11.5–15.5)
HGB BLD-MCNC: 8.7 G/DL — LOW (ref 11.5–15.5)
INR BLD: 1.1 — SIGNIFICANT CHANGE UP (ref 0.88–1.16)
INR BLD: 1.11 — SIGNIFICANT CHANGE UP (ref 0.88–1.16)
INR BLD: 1.16 — SIGNIFICANT CHANGE UP (ref 0.88–1.16)
MAGNESIUM SERPL-MCNC: 2 MG/DL — SIGNIFICANT CHANGE UP (ref 1.6–2.6)
MAGNESIUM SERPL-MCNC: 2 MG/DL — SIGNIFICANT CHANGE UP (ref 1.6–2.6)
MCHC RBC-ENTMCNC: 29.3 PG — SIGNIFICANT CHANGE UP (ref 27–34)
MCHC RBC-ENTMCNC: 29.9 PG — SIGNIFICANT CHANGE UP (ref 27–34)
MCHC RBC-ENTMCNC: 30.2 PG — SIGNIFICANT CHANGE UP (ref 27–34)
MCHC RBC-ENTMCNC: 32.3 GM/DL — SIGNIFICANT CHANGE UP (ref 32–36)
MCHC RBC-ENTMCNC: 32.7 GM/DL — SIGNIFICANT CHANGE UP (ref 32–36)
MCHC RBC-ENTMCNC: 33.3 GM/DL — SIGNIFICANT CHANGE UP (ref 32–36)
MCV RBC AUTO: 90.6 FL — SIGNIFICANT CHANGE UP (ref 80–100)
MCV RBC AUTO: 90.9 FL — SIGNIFICANT CHANGE UP (ref 80–100)
MCV RBC AUTO: 91.6 FL — SIGNIFICANT CHANGE UP (ref 80–100)
NRBC # BLD: 0 /100 WBCS — SIGNIFICANT CHANGE UP (ref 0–0)
PHOSPHATE SERPL-MCNC: 4.4 MG/DL — SIGNIFICANT CHANGE UP (ref 2.5–4.5)
PLATELET # BLD AUTO: 129 K/UL — LOW (ref 150–400)
PLATELET # BLD AUTO: 141 K/UL — LOW (ref 150–400)
PLATELET # BLD AUTO: 149 K/UL — LOW (ref 150–400)
POTASSIUM SERPL-MCNC: 4.3 MMOL/L — SIGNIFICANT CHANGE UP (ref 3.5–5.3)
POTASSIUM SERPL-MCNC: 4.4 MMOL/L — SIGNIFICANT CHANGE UP (ref 3.5–5.3)
POTASSIUM SERPL-SCNC: 4.3 MMOL/L — SIGNIFICANT CHANGE UP (ref 3.5–5.3)
POTASSIUM SERPL-SCNC: 4.4 MMOL/L — SIGNIFICANT CHANGE UP (ref 3.5–5.3)
PROT SERPL-MCNC: 6.1 G/DL — SIGNIFICANT CHANGE UP (ref 6–8.3)
PROTHROM AB SERPL-ACNC: 13.1 SEC — SIGNIFICANT CHANGE UP (ref 10.6–13.6)
PROTHROM AB SERPL-ACNC: 13.2 SEC — SIGNIFICANT CHANGE UP (ref 10.6–13.6)
PROTHROM AB SERPL-ACNC: 13.8 SEC — HIGH (ref 10.6–13.6)
RBC # BLD: 2.74 M/UL — LOW (ref 3.8–5.2)
RBC # BLD: 2.76 M/UL — LOW (ref 3.8–5.2)
RBC # BLD: 2.88 M/UL — LOW (ref 3.8–5.2)
RBC # FLD: 14.4 % — SIGNIFICANT CHANGE UP (ref 10.3–14.5)
RBC # FLD: 14.5 % — SIGNIFICANT CHANGE UP (ref 10.3–14.5)
RBC # FLD: 14.5 % — SIGNIFICANT CHANGE UP (ref 10.3–14.5)
SARS-COV-2 RNA SPEC QL NAA+PROBE: SIGNIFICANT CHANGE UP
SODIUM SERPL-SCNC: 131 MMOL/L — LOW (ref 135–145)
SODIUM SERPL-SCNC: 134 MMOL/L — LOW (ref 135–145)
WBC # BLD: 7.74 K/UL — SIGNIFICANT CHANGE UP (ref 3.8–10.5)
WBC # BLD: 9.09 K/UL — SIGNIFICANT CHANGE UP (ref 3.8–10.5)
WBC # BLD: 9.12 K/UL — SIGNIFICANT CHANGE UP (ref 3.8–10.5)
WBC # FLD AUTO: 7.74 K/UL — SIGNIFICANT CHANGE UP (ref 3.8–10.5)
WBC # FLD AUTO: 9.09 K/UL — SIGNIFICANT CHANGE UP (ref 3.8–10.5)
WBC # FLD AUTO: 9.12 K/UL — SIGNIFICANT CHANGE UP (ref 3.8–10.5)

## 2021-07-01 PROCEDURE — 99291 CRITICAL CARE FIRST HOUR: CPT

## 2021-07-01 PROCEDURE — 99292 CRITICAL CARE ADDL 30 MIN: CPT

## 2021-07-01 PROCEDURE — 93355 ECHO TRANSESOPHAGEAL (TEE): CPT

## 2021-07-01 PROCEDURE — 35206 REPAIR BLOOD VESSEL DIR UXTR: CPT | Mod: GC

## 2021-07-01 PROCEDURE — 93010 ELECTROCARDIOGRAM REPORT: CPT

## 2021-07-01 RX ORDER — DIPHENHYDRAMINE HCL 50 MG
25 CAPSULE ORAL ONCE
Refills: 0 | Status: COMPLETED | OUTPATIENT
Start: 2021-07-01 | End: 2021-07-01

## 2021-07-01 RX ADMIN — Medication 1 TABLET(S): at 06:18

## 2021-07-01 RX ADMIN — Medication 1 APPLICATION(S): at 22:59

## 2021-07-01 RX ADMIN — HEPARIN SODIUM 5000 UNIT(S): 5000 INJECTION INTRAVENOUS; SUBCUTANEOUS at 06:15

## 2021-07-01 RX ADMIN — Medication 25 MILLIGRAM(S): at 22:57

## 2021-07-01 RX ADMIN — GABAPENTIN 300 MILLIGRAM(S): 400 CAPSULE ORAL at 13:13

## 2021-07-01 RX ADMIN — BRIMONIDINE TARTRATE 1 DROP(S): 2 SOLUTION/ DROPS OPHTHALMIC at 22:58

## 2021-07-01 RX ADMIN — DESMOPRESSIN ACETATE 0.1 MILLIGRAM(S): 0.1 TABLET ORAL at 21:32

## 2021-07-01 RX ADMIN — PANTOPRAZOLE SODIUM 40 MILLIGRAM(S): 20 TABLET, DELAYED RELEASE ORAL at 06:15

## 2021-07-01 RX ADMIN — Medication 1 TABLET(S): at 17:46

## 2021-07-01 RX ADMIN — LATANOPROST 1 DROP(S): 0.05 SOLUTION/ DROPS OPHTHALMIC; TOPICAL at 22:59

## 2021-07-01 RX ADMIN — LIDOCAINE 1 PATCH: 4 CREAM TOPICAL at 15:21

## 2021-07-01 RX ADMIN — BENZOCAINE AND MENTHOL 1 LOZENGE: 5; 1 LIQUID ORAL at 22:57

## 2021-07-01 RX ADMIN — Medication 1 SPRAY(S): at 17:43

## 2021-07-01 RX ADMIN — BENZOCAINE AND MENTHOL 1 LOZENGE: 5; 1 LIQUID ORAL at 06:16

## 2021-07-01 RX ADMIN — BENZOCAINE AND MENTHOL 1 LOZENGE: 5; 1 LIQUID ORAL at 13:14

## 2021-07-01 RX ADMIN — Medication 1 DROP(S): at 06:15

## 2021-07-01 RX ADMIN — ATORVASTATIN CALCIUM 40 MILLIGRAM(S): 80 TABLET, FILM COATED ORAL at 21:31

## 2021-07-01 RX ADMIN — LIDOCAINE 1 PATCH: 4 CREAM TOPICAL at 17:36

## 2021-07-01 RX ADMIN — Medication 1 DROP(S): at 17:43

## 2021-07-01 RX ADMIN — Medication 1 SPRAY(S): at 06:15

## 2021-07-01 RX ADMIN — CHLORHEXIDINE GLUCONATE 1 APPLICATION(S): 213 SOLUTION TOPICAL at 13:13

## 2021-07-01 RX ADMIN — HEPARIN SODIUM 5000 UNIT(S): 5000 INJECTION INTRAVENOUS; SUBCUTANEOUS at 13:14

## 2021-07-01 RX ADMIN — Medication 81 MILLIGRAM(S): at 13:13

## 2021-07-01 NOTE — CONSULT NOTE ADULT - SUBJECTIVE AND OBJECTIVE BOX
Surgeon: Dr. Bella    Requesting Physician: Dr. Fernandez    HISTORY OF PRESENT ILLNESS (Need 4):  89 yo F with 24Hr private HHA with PMHx of HTN, HLD, Diabetes insipidus, GERD, AS s/p AVR (7 years ago), TAA (4.1cm) who presented to the ED 6/16/21 BIBEMS from pt’s dermatology office where she was walking down the hallway, felt "breathless" and then had loss of consciousness per her HHA. She sat her down so she did not have a fall or hit her head. Pt was found to be hypotensive with SBP 80’s in the field. Pt had a recent ED visit on 6/11/21 for another episode of witnessed syncope after walking many blocks to a restaurant and then lost consciousness while sitting in a restaurant, pt left AMA that visit. Pt reports taking Lasix at home for h/o "fluid in the lungs" and chronic LE edema L>R. Pt denies fever, chills, cough, CP, PND/orthopnea, abdominal pain, N/V/D, dizziness. Pt had an outpatient CCTA 6/3/21 revealing calcium score is severe at 768 Agatston units, non obstructive CAD, dLAD is not well visualized but is probably non obstructive. 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 CTA chest 6/3/21 neg for PE. MRA chest 6/11/21: since 6/3/2021, there has been resolution of bilateral pleural effusions, technique is not optimized to assess mitral valve function, Mildly aneurysmal ascending aorta, measuring 4.1 cm.     In the ED VS: T 97.9F, HR 73bpm, BP 81/33 improved to 113/64, RR 17, O2 sat 98% on RA. Labs significant for Hgb/HCT 10.3/32.1, BUN/Cr 35/1.34, Trop 0.04, CK/CKMB normal, COVID neg. ECG: NSR @71bpm, ANAIS in III, V1, V2, STD in I (unchanged from prior), CXR wet read with pulmonary vascular congestion. Pt was given 250 cc bolus and Aspirin 325mg PO x 1 in the ED. Pt is now admitted to cardiology for further management of syncope.    PAST MEDICAL & SURGICAL HISTORY:  Hyperlipidemia, unspecified hyperlipidemia type    Diabetes insipidus    H/O aortic valve stenosis    Hypertension    S/P AVR  Bioprosthetic    H/O lumbosacral spine surgery    S/P hip replacement  B/L        MEDICATIONS  (STANDING):  atorvastatin 40 milliGRAM(s) Oral at bedtime  desmopressin 0.1 milliGRAM(s) Oral two times a day  enoxaparin Injectable 40 milliGRAM(s) SubCutaneous every 24 hours  fluticasone propionate 50 MICROgram(s)/spray Nasal Spray 1 Spray(s) Both Nostrils every 12 hours  gabapentin 300 milliGRAM(s) Oral at bedtime  latanoprost 0.005% Ophthalmic Solution 1 Drop(s) Both EYES at bedtime    MEDICATIONS  (PRN):      Allergies    erythromycin (Unknown)  penicillin (Unknown)    Intolerances      FAMILY HISTORY:  FH: lung cancer (Father)        Review of Systems (Need 10):  ROS negative unless otherwise noted in HPI                                                            Vital Signs Last 24 Hrs  T(C): 36.5 (16 Jun 2021 18:59), Max: 36.8 (16 Jun 2021 14:39)  T(F): 97.7 (16 Jun 2021 18:59), Max: 98.3 (16 Jun 2021 14:39)  HR: 81 (16 Jun 2021 19:37) (67 - 83)  BP: 124/63 (16 Jun 2021 19:37) (81/33 - 124/63)  BP(mean): 49 (16 Jun 2021 15:12) (49 - 49)  RR: 16 (16 Jun 2021 19:37) (16 - 18)  SpO2: 95% (16 Jun 2021 19:37) (95% - 99%)    Physical Exam (Need 8)  Neuro: A+O x 3, non-focal, speech clear and intact  HEENT: PERRL, EOMI, oral mucosa pink and moist  Neck: supple, no JVD  CV: regular rate, regular rhythm, +S1S2, systolic murmur noted LUSB  Pulm/chest: lung sounds CTA and equal bilaterally, no accessory muscle use noted  Abd: soft, NT, ND, +BS  Ext: PISANO x 4, no C/C/E  Skin: warm, well perfused, no rashes                                                           LABS:                        10.3   8.06  )-----------( 247      ( 16 Jun 2021 11:34 )             32.1     06-16    133<L>  |  97  |  35<H>  ----------------------------<  105<H>  4.7   |  30  |  1.34<H>    Ca    9.4      16 Jun 2021 11:34    TPro  6.8  /  Alb  3.9  /  TBili  0.5  /  DBili  x   /  AST  29  /  ALT  17  /  AlkPhos  60  06-16    PT/INR - ( 16 Jun 2021 11:34 )   PT: 11.8 sec;   INR: 0.98          PTT - ( 16 Jun 2021 11:34 )  PTT:28.3 sec    CARDIAC MARKERS ( 16 Jun 2021 19:55 )  x     / 0.21 ng/mL / 237 U/L / x     / 13.3 ng/mL  CARDIAC MARKERS ( 16 Jun 2021 18:28 )  x     / 0.18 ng/mL / 214 U/L / x     / 12.7 ng/mL  CARDIAC MARKERS ( 16 Jun 2021 11:34 )  x     / 0.04 ng/mL / 136 U/L / x     / 3.3 ng/mL          RADIOLOGY & ADDITIONAL STUDIES:  < from: TTE Echo Complete w/o Contrast w/ Doppler (06.16.21 @ 16:01) >  CONCLUSIONS:     1. Normal left ventricular size and systolic function.   2. Normal right ventricular size and systolic function.   3. Severely dilated left atrium.   4. Severe aortic stenosis. The peak transvalvular velocity is 6.00 m/s, the mean transvalvular gradient is 89.00 mmHg, and the LVOT/AV velocity ratio is 0.13. The aortic valve area (estimated via the continuity method) is 0.45 cm².   5. The calculated left ventricular stroke volume index is 42.00 ml/m² (normal >35 ml/m2). There is trace aortic regurgitation.   6. Moderate mitral stenosis. There is severe mitral annular calcification. The mean transvalvular gradient is 8.00 mmHg at a heart rate of 75 bpm.   7. Severe mitral regurgitation.   8. Mild tricuspid regurgitation.   9. Pulmonary hypertension present, pulmonary artery systolic pressure is 48 mmHg.  10. Pericardial fat pad is seen anterior to the right ventricle, cannot rule out a trivial pericardial effusion.  11. Borderline dilated aortic root.  12. Mildly dilated ascending aorta, measuring 4.5 cm.  13. No prior echo is available for comparison.    < end of copied text >  
Vascular Attending:  Dr. Lesly Cortez Complaint: Left Radial Hematoma       HPI:  89 yo F with 24Hr private HHA with PMHx of HTN, HLD, Diabetes insipidus, GERD, AS s/p AVR (7 years ago), TAA (4.1cm) who presented to the ED 6/16/21 BIBEMS from pt’s dermatology office where she was walking down the hallway, felt "breathless" and then had loss of consciousness per her HHA. She sat her down so she did not have a fall or hit her head. Pt was found to be hypotensive with SBP 80’s in the field. During workup patient was noted to have AS and was taken for TAVR on 6/30. Post-op patient hemodynamically stable but noted to have ecchymosis of left wrist. Patient reports tenderness and swelling at left wrist that extends to upper arm. Denies left hand weakness, numbness, decreased strength or pain in left hand. able to flex left wrist but admits to pain with wrist flexion. admits to purple discoloration of left forearm. denies left hand feeling cold. admits to normal sensation in left hand.  Deneis CP, SOB, N,V, Ultrasound done 6/30 notes Left radial pseudo and hematoma         PAST MEDICAL & SURGICAL HISTORY:  Hyperlipidemia, unspecified hyperlipidemia type    Diabetes insipidus    H/O aortic valve stenosis    Hypertension    S/P AVR  Bioprosthetic    H/O lumbosacral spine surgery    S/P hip replacement  B/L        REVIEW OF SYSTEMS  as per above     Allergic/Immunologic:	    MEDICATIONS  (STANDING):  artificial  tears Solution 1 Drop(s) Both EYES two times a day  aspirin enteric coated 81 milliGRAM(s) Oral daily  atorvastatin 40 milliGRAM(s) Oral at bedtime  benzocaine 15 mG/menthol 3.6 mG Lozenge 1 Lozenge Oral three times a day  brimonidine 0.2% Ophthalmic Solution 1 Drop(s) Both EYES at bedtime  chlorhexidine 2% Cloths 1 Application(s) Topical daily  desmopressin 0.1 milliGRAM(s) Oral two times a day  fluticasone propionate 50 MICROgram(s)/spray Nasal Spray 1 Spray(s) Both Nostrils every 12 hours  gabapentin 300 milliGRAM(s) Oral daily  heparin   Injectable 5000 Unit(s) SubCutaneous every 8 hours  hydrocortisone 1% Cream 1 Application(s) Topical at bedtime  latanoprost 0.005% Ophthalmic Solution 1 Drop(s) Both EYES at bedtime  lidocaine   Patch 1 Patch Transdermal daily  pantoprazole    Tablet 40 milliGRAM(s) Oral before breakfast  sodium chloride 0.9%. 1000 milliLiter(s) (10 mL/Hr) IV Continuous <Continuous>  triamcinolone 0.1% Cream 1 Application(s) Topical <User Schedule>  trimethoprim  160 mG/sulfamethoxazole 800 mG 1 Tablet(s) Oral every 12 hours    MEDICATIONS  (PRN):  acetaminophen   Tablet .. 650 milliGRAM(s) Oral every 6 hours PRN Mild Pain (1 - 3)  benzocaine 20% Spray 1 Spray(s) Topical three times a day PRN sore throat  traMADol 25 milliGRAM(s) Oral every 6 hours PRN Severe Pain (7 - 10)      Allergies    erythromycin (Unknown)  penicillin (Unknown)    Intolerances        SOCIAL HISTORY:    FAMILY HISTORY:  FH: lung cancer (Father)        Vital Signs Last 24 Hrs  T(C): 36.1 (01 Jul 2021 09:26), Max: 36.9 (30 Jun 2021 21:01)  T(F): 97 (01 Jul 2021 09:26), Max: 98.5 (30 Jun 2021 21:01)  HR: 78 (01 Jul 2021 10:00) (63 - 87)  BP: 150/62 (01 Jul 2021 09:26) (98/54 - 162/70)  BP(mean): 101 (01 Jul 2021 09:00) (68 - 101)  RR: 17 (01 Jul 2021 10:00) (16 - 18)  SpO2: 97% (01 Jul 2021 10:00) (93% - 100%)    PHYSICAL EXAM:      Constitutional:  Gen: NAD   Rt IJ in place   LUE: left brachial and radial pulse palpable   left forearm compartments soft   left hand warm to touch   Motor 5/5, sensation intact to light touch in left hand,  5/5   Cardiovascular: S1S2  Skin: ecchymosis of left forearm and hematoma noted. no pulsatile mass noted         LABS:                        8.7    9.09  )-----------( 149      ( 01 Jul 2021 03:35 )             26.1     07-01    131<L>  |  96  |  17  ----------------------------<  96  4.3   |  23  |  1.04    Ca    9.1      01 Jul 2021 03:34  Phos  4.4     07-01  Mg     2.0     07-01    TPro  6.1  /  Alb  4.4  /  TBili  0.9  /  DBili  x   /  AST  41<H>  /  ALT  10  /  AlkPhos  40  07-01    PT/INR - ( 01 Jul 2021 03:34 )   PT: 13.8 sec;   INR: 1.16          PTT - ( 01 Jul 2021 03:34 )  PTT:39.7 sec      RADIOLOGY & ADDITIONAL STUDIES

## 2021-07-01 NOTE — PROGRESS NOTE ADULT - SUBJECTIVE AND OBJECTIVE BOX
SUBJECTIVE: Patient seen and examined bedside by vascular team this AM. Reports that she is doing well since her procedure. Denies any worsening pain of her left hand, changes in sensation of her left hand, weakness of her left hand.     aspirin enteric coated 81 milliGRAM(s) Oral daily  heparin   Injectable 5000 Unit(s) SubCutaneous every 8 hours  trimethoprim  160 mG/sulfamethoxazole 800 mG 1 Tablet(s) Oral every 12 hours      Vital Signs Last 24 Hrs  T(C): 36.2 (01 Jul 2021 13:40), Max: 36.9 (30 Jun 2021 21:01)  T(F): 97.2 (01 Jul 2021 13:40), Max: 98.5 (30 Jun 2021 21:01)  HR: 74 (01 Jul 2021 14:00) (68 - 87)  BP: 139/66 (01 Jul 2021 12:45) (98/54 - 162/70)  BP(mean): 95 (01 Jul 2021 12:45) (68 - 101)  RR: 12 (01 Jul 2021 14:00) (12 - 18)  SpO2: 96% (01 Jul 2021 14:00) (93% - 100%)  I&O's Detail    30 Jun 2021 07:01  -  01 Jul 2021 07:00  --------------------------------------------------------  IN:    Albumin 5%  - 250 mL: 500 mL    IV PiggyBack: 100 mL    Oral Fluid: 480 mL  Total IN: 1080 mL    OUT:    sodium chloride 0.9%: 0 mL    Voided (mL): 970 mL  Total OUT: 970 mL    Total NET: 110 mL      01 Jul 2021 07:01  -  01 Jul 2021 14:53  --------------------------------------------------------  IN:    sodium chloride 0.9%: 40 mL  Total IN: 40 mL    OUT:    Voided (mL): 600 mL  Total OUT: 600 mL    Total NET: -560 mL          Physical Exam:  General: No acute distress, resting comfortably in bed  C/V: normal sinus rhythm  Pulm: Nonlabored breathing, no respiratory distress  Abd: soft, non-tender, non-distended  Extrem: warm and well perfused. Left arm palpable radial pulse, 5/5  strength, normal sensory function. Area around left wrist remains ecchymotic and edematous.     LABS:                        8.1    7.74  )-----------( 129      ( 01 Jul 2021 13:10 )             25.1     07-01    134<L>  |  99  |  15  ----------------------------<  100<H>  4.4   |  24  |  1.01    Ca    8.8      01 Jul 2021 13:10  Phos  4.4     07-01  Mg     2.0     07-01    TPro  6.1  /  Alb  4.4  /  TBili  0.9  /  DBili  x   /  AST  41<H>  /  ALT  10  /  AlkPhos  40  07-01    PT/INR - ( 01 Jul 2021 13:10 )   PT: 13.1 sec;   INR: 1.10          PTT - ( 01 Jul 2021 03:34 )  PTT:39.7 sec      Assessment  89 yo F with 24Hr private HHA with PMHx of HTN, HLD, Diabetes insipidus, GERD, AS s/p AVR (7 years ago) s/p TAVR on 6/30 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    Plan  1. Patient continues to have palpable radial pulse and normal sensorimotor function of left hand.   2. Recommend frequent neurovascular checks of LUE.  3. Vascular surgery will continue to follow.    Plan discussed with vascular surgical attending and vascular surgery chief resident.

## 2021-07-01 NOTE — BRIEF OPERATIVE NOTE - OPERATION/FINDINGS
Attempted CoreValve V-I-V.  Resistance while passing sheath through descending, aortic arch, and ascending aorta   Aborted CoreValve ROSLYN TAVR due to significant resistance while passing sheath around arch and distal ascending aorta.  CoreValve too rigid.  Converted to Sheri and deployed across bioprosthetic AV with good result.  No AI, No significant perivalvular leak, no wall motion abnormalities  Planned ostial RCA stent for protection during ROSLYN deployment.  RCA stent embolized to descending thoracic aorta then left external iliac artery.  Multiple attempts at retrieval, unsuccessful.  Stent abandoned.
Patient prepped and draped in sterile fashion with L arm exposed. Preop signals include a triphasic radial and ulnar pulse of LUE. An incision was made on patients wrist and dissected down to the radial artery where approximately 10cc of hematoma was exposed. The pre-existing arteriotomy was sutured using 6-0 prolene sutures and repaired primarily. Loud triphasic signal of L radial appreciated while in OR. Postoperatively in PACU patient with palpable radial pulses.  Full hemostasis was achieved at the end of the case.

## 2021-07-01 NOTE — PROGRESS NOTE ADULT - SUBJECTIVE AND OBJECTIVE BOX
CTICU  CRITICAL  CARE  attending     Hand off received 					   Pertinent clinical, laboratory, radiographic, hemodynamic, echocardiographic, respiratory data, microbiologic data and chart were reviewed and analyzed frequently throughout the course of the day and night  Patient seen and examined with CTS/ SH attending at bedside  Pt is a 89y , Female, HEALTH ISSUES - PROBLEM Dx:  Syncope  Syncope    H/O aortic valve stenosis  H/O aortic valve stenosis    KAYLYN (acute kidney injury)  KAYLYN (acute kidney injury)    Anemia  Anemia    Diabetes insipidus  Diabetes insipidus    Hyperlipidemia, unspecified hyperlipidemia type  Hyperlipidemia, unspecified hyperlipidemia type    Hypertension  Hypertension    Aortic stenosis  Aortic stenosis    Prophylactic measure  Prophylactic measure        , FAMILY HISTORY:  FH: lung cancer (Father)    PAST MEDICAL & SURGICAL HISTORY:  Hyperlipidemia, unspecified hyperlipidemia type    Diabetes insipidus    H/O aortic valve stenosis    Hypertension    S/P AVR  Bioprosthetic    H/O lumbosacral spine surgery    S/P hip replacement  B/L      Patient is a 89y old  Female who presents with a chief complaint of Syncope (2021 21:26)      14 system review limited by mentation and multiorgan morbidity     Vital signs, hemodynamic and respiratory parameters were reviewed from the bedside nursing flowsheet.  ICU Vital Signs Last 24 Hrs  T(C): 37.2 (2021 21:01), Max: 37.2 (2021 17:18)  T(F): 98.9 (2021 21:01), Max: 99 (2021 17:18)  HR: 71 (2021 21:00) (71 - 87)  BP: 107/69 (2021 20:00) (103/82 - 162/70)  BP(mean): 80 (2021 20:00) (80 - 101)  ABP: 140/60 (2021 17:00) (119/49 - 158/75)  ABP(mean): 90 (2021 17:00) (72 - 105)  RR: 18 (2021 21:00) (12 - 18)  SpO2: 98% (2021 21:00) (78% - 100%)    Adult Advanced Hemodynamics Last 24 Hrs  CVP(mm Hg): --  CVP(cm H2O): --  CO: --  CI: --  PA: --  PA(mean): --  PCWP: --  SVR: --  SVRI: --  PVR: --  PVRI: --, ABG - ( 2021 03:29 )  pH, Arterial: 7.41  pH, Blood: x     /  pCO2: 37    /  pO2: 91    / HCO3: 24    / Base Excess: -0.8  /  SaO2: 98.5                Intake and output was reviewed and the fluid balance was calculated  Daily     Daily Weight in k.2 (2021 06:00)  I&O's Summary    2021 07:01  -  2021 07:00  --------------------------------------------------------  IN: 1080 mL / OUT: 970 mL / NET: 110 mL    2021 07:01  -  2021 22:02  --------------------------------------------------------  IN: 100 mL / OUT: 1000 mL / NET: -900 mL        All lines and drain sites were assessed  Glycemic trend was reviewedCAPILLARY BLOOD GLUCOSE        No acute change in focality  Auscultation of the chest reveals equal bs  Abdomen is soft  Extremities are warm and well perfused  Wounds appear clean and unremarkable  Antibiotics are periop    labs  CBC Full  -  ( 2021 20:59 )  WBC Count : 9.12 K/uL  RBC Count : 2.74 M/uL  Hemoglobin : 8.2 g/dL  Hematocrit : 25.1 %  Platelet Count - Automated : 141 K/uL  Mean Cell Volume : 91.6 fl  Mean Cell Hemoglobin : 29.9 pg  Mean Cell Hemoglobin Concentration : 32.7 gm/dL  Auto Neutrophil # : x  Auto Lymphocyte # : x  Auto Monocyte # : x  Auto Eosinophil # : x  Auto Basophil # : x  Auto Neutrophil % : x  Auto Lymphocyte % : x  Auto Monocyte % : x  Auto Eosinophil % : x  Auto Basophil % : x        134<L>  |  99  |  15  ----------------------------<  100<H>  4.4   |  24  |  1.01    Ca    8.8      2021 13:10  Phos  4.4     07-  Mg     2.0     -    TPro  6.1  /  Alb  4.4  /  TBili  0.9  /  DBili  x   /  AST  41<H>  /  ALT  10  /  AlkPhos  40      PT/INR - ( 2021 20:59 )   PT: 13.2 sec;   INR: 1.11          PTT - ( 2021 20:59 )  PTT:48.7 sec  The current medications were reviewed   MEDICATIONS  (STANDING):  artificial  tears Solution 1 Drop(s) Both EYES two times a day  atorvastatin 40 milliGRAM(s) Oral at bedtime  benzocaine 15 mG/menthol 3.6 mG Lozenge 1 Lozenge Oral three times a day  brimonidine 0.2% Ophthalmic Solution 1 Drop(s) Both EYES at bedtime  chlorhexidine 2% Cloths 1 Application(s) Topical daily  desmopressin 0.1 milliGRAM(s) Oral two times a day  fluticasone propionate 50 MICROgram(s)/spray Nasal Spray 1 Spray(s) Both Nostrils every 12 hours  gabapentin 300 milliGRAM(s) Oral daily  hydrocortisone 1% Cream 1 Application(s) Topical at bedtime  latanoprost 0.005% Ophthalmic Solution 1 Drop(s) Both EYES at bedtime  lidocaine   Patch 1 Patch Transdermal daily  pantoprazole    Tablet 40 milliGRAM(s) Oral before breakfast  sodium chloride 0.9%. 1000 milliLiter(s) (10 mL/Hr) IV Continuous <Continuous>  triamcinolone 0.1% Cream 1 Application(s) Topical <User Schedule>  trimethoprim  160 mG/sulfamethoxazole 800 mG 1 Tablet(s) Oral every 12 hours    MEDICATIONS  (PRN):  acetaminophen   Tablet .. 650 milliGRAM(s) Oral every 6 hours PRN Mild Pain (1 - 3)  benzocaine 20% Spray 1 Spray(s) Topical three times a day PRN sore throat       PROBLEM LIST/ ASSESSMENT:  HEALTH ISSUES - PROBLEM Dx:  Syncope  Syncope    H/O aortic valve stenosis  H/O aortic valve stenosis    KAYLYN (acute kidney injury)  KAYLYN (acute kidney injury)    Anemia  Anemia    Diabetes insipidus  Diabetes insipidus    Hyperlipidemia, unspecified hyperlipidemia type  Hyperlipidemia, unspecified hyperlipidemia type    Hypertension  Hypertension    Aortic stenosis  Aortic stenosis    Prophylactic measure  Prophylactic measure        ,   Patient is a 89y old  Female who presents with a chief complaint of Syncope (2021 21:26)                   My plan includes :  close hemodynamic, ventilatory and drain monitoring and management per post op routine    Monitor for arrhythmias and monitor parameters for organ perfusion  beta blockade not administered due to hemodynamic instability and bradycardia  monitor neurologic status  Head of the bed should remain elevated to 45 deg .   chest PT and IS will be encouraged  monitor adequacy of oxygenation and ventilation and attempt to wean oxygen  antibiotic regimen will be tailored to the clinical, laboratory and microbiologic data  Nutritional goals will be met using po eventually , ensure adequate caloric intake and montior the same  Stress ulcer and VTE prophylaxis will be achieved    Glycemic control is satisfactory  Electrolytes have been repleted as necessary and wound care has been carried out. Pain control has been achieved.   agressive physical therapy and early mobility and ambulation goals will be met   The family was updated about the course and plan  CRITICAL CARE TIME personally provided by me  in evaluation and management, reassessments, review and interpretation of labs and x-rays, ventilator and hemodynamic management, formulating a plan and coordinating care: ___90____ MIN.  Time does not include procedural time. Time spent was non routine post-operarive caRE and included multiple and repeated evaluations at the bedside  CTICU ATTENDING     					    Cb Suazo MD

## 2021-07-01 NOTE — CONSULT NOTE ADULT - ASSESSMENT
89 yo F with 24Hr private HHA with PMHx of HTN, HLD, Diabetes insipidus, GERD, AS s/p AVR (7 years ago) now s/p TAVR 6/30 with left radial access     1. LUE arterial duplex reviewed and PSA noted   2. Will pre-op for primary repair of PSA in OR.   3. added on and consented   4. Covid swab sent   5. Continue left hand neurovascular monitoring     Discussed with Dr. Grace     
89 yo F with 24Hr private HHA with PMHx of HTN, HLD, Diabetes insipidus, GERD, AS s/p AVR (7 years ago), TAA (4.1cm) who presented to the ED 6/16/21 BIBEMS from pt’s dermatology office where she was walking down the hallway, felt "breathless" and then had loss of consciousness per her HHA. She sat her down so she did not have a fall or hit her head. Pt was found to be hypotensive with SBP 80’s in the field. Pt had a recent ED visit on 6/11/21 for another episode of witnessed syncope after walking many blocks to a restaurant and then lost consciousness while sitting in a restaurant, pt left AMA that visit. Pt reports taking Lasix at home for h/o "fluid in the lungs" and chronic LE edema L>R. Pt denies fever, chills, cough, CP, PND/orthopnea, abdominal pain, N/V/D, dizziness. Pt had an outpatient CCTA 6/3/21 revealing calcium score is severe at 768 Agatston units, non obstructive CAD, dLAD is not well visualized but is probably non obstructive. 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 CTA chest 6/3/21 neg for PE. MRA chest 6/11/21: since 6/3/2021, there has been resolution of bilateral pleural effusions, technique is not optimized to assess mitral valve function, Mildly aneurysmal ascending aorta, measuring 4.1 cm. Echocardiogram in ED significant for Severe Bioprosthetic Aortic Stenosis, Severe MAC with severe MR. Structural heart consulted for evaluation.       #Severe AS / MR  - Previous Bioprosthetic valve 6-7 years ago, now with severe BPV stenosis   - Structural heart will continue to follow, plan to review echocardiogram in AM  - agree with diuresis in setting of Severe MR, monitor I/O closely    Discussed with Dr. Bella and primary team

## 2021-07-02 LAB
ALBUMIN SERPL ELPH-MCNC: 3.9 G/DL — SIGNIFICANT CHANGE UP (ref 3.3–5)
ALP SERPL-CCNC: 38 U/L — LOW (ref 40–120)
ALT FLD-CCNC: 7 U/L — LOW (ref 10–45)
ANION GAP SERPL CALC-SCNC: 10 MMOL/L — SIGNIFICANT CHANGE UP (ref 5–17)
APTT BLD: 36.8 SEC — HIGH (ref 27.5–35.5)
AST SERPL-CCNC: 35 U/L — SIGNIFICANT CHANGE UP (ref 10–40)
BILIRUB SERPL-MCNC: 0.9 MG/DL — SIGNIFICANT CHANGE UP (ref 0.2–1.2)
BUN SERPL-MCNC: 14 MG/DL — SIGNIFICANT CHANGE UP (ref 7–23)
CALCIUM SERPL-MCNC: 9.1 MG/DL — SIGNIFICANT CHANGE UP (ref 8.4–10.5)
CHLORIDE SERPL-SCNC: 100 MMOL/L — SIGNIFICANT CHANGE UP (ref 96–108)
CO2 SERPL-SCNC: 24 MMOL/L — SIGNIFICANT CHANGE UP (ref 22–31)
CREAT SERPL-MCNC: 1.09 MG/DL — SIGNIFICANT CHANGE UP (ref 0.5–1.3)
GLUCOSE SERPL-MCNC: 93 MG/DL — SIGNIFICANT CHANGE UP (ref 70–99)
HCT VFR BLD CALC: 24 % — LOW (ref 34.5–45)
HGB BLD-MCNC: 7.8 G/DL — LOW (ref 11.5–15.5)
INR BLD: 1.05 — SIGNIFICANT CHANGE UP (ref 0.88–1.16)
MAGNESIUM SERPL-MCNC: 1.9 MG/DL — SIGNIFICANT CHANGE UP (ref 1.6–2.6)
MCHC RBC-ENTMCNC: 30 PG — SIGNIFICANT CHANGE UP (ref 27–34)
MCHC RBC-ENTMCNC: 32.5 GM/DL — SIGNIFICANT CHANGE UP (ref 32–36)
MCV RBC AUTO: 92.3 FL — SIGNIFICANT CHANGE UP (ref 80–100)
NRBC # BLD: 0 /100 WBCS — SIGNIFICANT CHANGE UP (ref 0–0)
PHOSPHATE SERPL-MCNC: 2.9 MG/DL — SIGNIFICANT CHANGE UP (ref 2.5–4.5)
PLATELET # BLD AUTO: 129 K/UL — LOW (ref 150–400)
POTASSIUM SERPL-MCNC: 4.7 MMOL/L — SIGNIFICANT CHANGE UP (ref 3.5–5.3)
POTASSIUM SERPL-SCNC: 4.7 MMOL/L — SIGNIFICANT CHANGE UP (ref 3.5–5.3)
PROT SERPL-MCNC: 5.9 G/DL — LOW (ref 6–8.3)
PROTHROM AB SERPL-ACNC: 12.6 SEC — SIGNIFICANT CHANGE UP (ref 10.6–13.6)
RBC # BLD: 2.6 M/UL — LOW (ref 3.8–5.2)
RBC # FLD: 14.5 % — SIGNIFICANT CHANGE UP (ref 10.3–14.5)
SODIUM SERPL-SCNC: 134 MMOL/L — LOW (ref 135–145)
WBC # BLD: 7.35 K/UL — SIGNIFICANT CHANGE UP (ref 3.8–10.5)
WBC # FLD AUTO: 7.35 K/UL — SIGNIFICANT CHANGE UP (ref 3.8–10.5)

## 2021-07-02 PROCEDURE — 93010 ELECTROCARDIOGRAM REPORT: CPT

## 2021-07-02 PROCEDURE — 71045 X-RAY EXAM CHEST 1 VIEW: CPT | Mod: 26

## 2021-07-02 PROCEDURE — 99233 SBSQ HOSP IP/OBS HIGH 50: CPT

## 2021-07-02 RX ORDER — ASPIRIN/CALCIUM CARB/MAGNESIUM 324 MG
81 TABLET ORAL DAILY
Refills: 0 | Status: DISCONTINUED | OUTPATIENT
Start: 2021-07-02 | End: 2021-07-06

## 2021-07-02 RX ORDER — MORPHINE SULFATE 50 MG/1
1 CAPSULE, EXTENDED RELEASE ORAL ONCE
Refills: 0 | Status: DISCONTINUED | OUTPATIENT
Start: 2021-07-02 | End: 2021-07-02

## 2021-07-02 RX ADMIN — ATORVASTATIN CALCIUM 40 MILLIGRAM(S): 80 TABLET, FILM COATED ORAL at 22:32

## 2021-07-02 RX ADMIN — BENZOCAINE AND MENTHOL 1 LOZENGE: 5; 1 LIQUID ORAL at 06:17

## 2021-07-02 RX ADMIN — Medication 81 MILLIGRAM(S): at 11:29

## 2021-07-02 RX ADMIN — LATANOPROST 1 DROP(S): 0.05 SOLUTION/ DROPS OPHTHALMIC; TOPICAL at 22:34

## 2021-07-02 RX ADMIN — LIDOCAINE 1 PATCH: 4 CREAM TOPICAL at 05:27

## 2021-07-02 RX ADMIN — Medication 1 SPRAY(S): at 06:18

## 2021-07-02 RX ADMIN — Medication 1 APPLICATION(S): at 22:33

## 2021-07-02 RX ADMIN — GABAPENTIN 300 MILLIGRAM(S): 400 CAPSULE ORAL at 11:29

## 2021-07-02 RX ADMIN — Medication 650 MILLIGRAM(S): at 10:59

## 2021-07-02 RX ADMIN — Medication 650 MILLIGRAM(S): at 11:30

## 2021-07-02 RX ADMIN — Medication 1 SPRAY(S): at 17:28

## 2021-07-02 RX ADMIN — CHLORHEXIDINE GLUCONATE 1 APPLICATION(S): 213 SOLUTION TOPICAL at 11:29

## 2021-07-02 RX ADMIN — Medication 1 DROP(S): at 06:25

## 2021-07-02 RX ADMIN — Medication 1 TABLET(S): at 17:28

## 2021-07-02 RX ADMIN — PANTOPRAZOLE SODIUM 40 MILLIGRAM(S): 20 TABLET, DELAYED RELEASE ORAL at 06:13

## 2021-07-02 RX ADMIN — Medication 1 TABLET(S): at 06:24

## 2021-07-02 RX ADMIN — DESMOPRESSIN ACETATE 0.1 MILLIGRAM(S): 0.1 TABLET ORAL at 22:33

## 2021-07-02 RX ADMIN — BRIMONIDINE TARTRATE 1 DROP(S): 2 SOLUTION/ DROPS OPHTHALMIC at 22:33

## 2021-07-02 RX ADMIN — Medication 1 DROP(S): at 17:28

## 2021-07-02 RX ADMIN — DESMOPRESSIN ACETATE 0.1 MILLIGRAM(S): 0.1 TABLET ORAL at 09:27

## 2021-07-02 RX ADMIN — Medication 650 MILLIGRAM(S): at 06:18

## 2021-07-02 RX ADMIN — MORPHINE SULFATE 1 MILLIGRAM(S): 50 CAPSULE, EXTENDED RELEASE ORAL at 13:40

## 2021-07-02 RX ADMIN — BENZOCAINE AND MENTHOL 1 LOZENGE: 5; 1 LIQUID ORAL at 21:45

## 2021-07-02 RX ADMIN — MORPHINE SULFATE 1 MILLIGRAM(S): 50 CAPSULE, EXTENDED RELEASE ORAL at 13:11

## 2021-07-02 NOTE — PROGRESS NOTE ADULT - SUBJECTIVE AND OBJECTIVE BOX
SUBJECTIVE: Patient seen and examined bedside by vascular team this AM. Reports that she is feeling very well since her surgery. Reports that her left arm is certainly more swollen but has no sensation changes, motor changes, or pain of her left hand.     aspirin  chewable 81 milliGRAM(s) Oral daily  trimethoprim  160 mG/sulfamethoxazole 800 mG 1 Tablet(s) Oral every 12 hours      Vital Signs Last 24 Hrs  T(C): 36.5 (02 Jul 2021 05:25), Max: 37.2 (01 Jul 2021 17:18)  T(F): 97.7 (02 Jul 2021 05:25), Max: 99 (01 Jul 2021 17:18)  HR: 81 (02 Jul 2021 13:00) (69 - 86)  BP: 154/68 (02 Jul 2021 12:00) (103/82 - 158/66)  BP(mean): 92 (02 Jul 2021 12:00) (77 - 96)  RR: 18 (02 Jul 2021 13:00) (12 - 18)  SpO2: 95% (02 Jul 2021 13:00) (78% - 100%)  I&O's Detail    01 Jul 2021 07:01  -  02 Jul 2021 07:00  --------------------------------------------------------  IN:    Oral Fluid: 60 mL    sodium chloride 0.9%: 120 mL  Total IN: 180 mL    OUT:    Voided (mL): 1325 mL  Total OUT: 1325 mL    Total NET: -1145 mL      02 Jul 2021 07:01  -  02 Jul 2021 13:54  --------------------------------------------------------  IN:    Oral Fluid: 540 mL    sodium chloride 0.9%: 30 mL  Total IN: 570 mL    OUT:    Voided (mL): 275 mL  Total OUT: 275 mL    Total NET: 295 mL          Physical Exam:  General: No acute distress, resting comfortably in bed  C/V: normal sinus rhythm  Pulm: Nonlabored breathing, no respiratory distress  Abd: soft, non-tender, non-distended, incisions clean/dry/intact.  Extrem: warm and well perfused. LUE with moderate swelling at the level of the forearm and marked swelling of the hand. The wrist where the radial artery incision has diffuse ecchmyosis. There is a strongly palpable radial pulse. Sensation is normal throughout the entire extremity and  strength is 5/5.    LABS:                        7.8    7.35  )-----------( 129      ( 02 Jul 2021 06:04 )             24.0     07-02    134<L>  |  100  |  14  ----------------------------<  93  4.7   |  24  |  1.09    Ca    9.1      02 Jul 2021 06:04  Phos  2.9     07-02  Mg     1.9     07-02    TPro  5.9<L>  /  Alb  3.9  /  TBili  0.9  /  DBili  x   /  AST  35  /  ALT  7<L>  /  AlkPhos  38<L>  07-02    PT/INR - ( 02 Jul 2021 06:04 )   PT: 12.6 sec;   INR: 1.05          PTT - ( 02 Jul 2021 06:04 )  PTT:36.8 sec      Assessment  89 yo F with 24Hr private HHA with PMHx of HTN, HLD, Diabetes insipidus, GERD, AS s/p AVR (7 years ago) s/p TAVR on 6/30 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    Plan  1. Patient continues to have palpable radial pulse and normal sensorimotor function of left hand.   2. Recommend frequent neurovascular checks of LUE.  3. Ok to restart ASA from vascular perspective.  4. Vascular surgery will continue to follow.    Plan discussed with vascular surgical attending and vascular surgery chief resident.

## 2021-07-02 NOTE — PROGRESS NOTE ADULT - SUBJECTIVE AND OBJECTIVE BOX
While in the ICU this evening following evening sign-off with the daytime intensivist, I provided an additional 30 minutes of critical care time as documented below:  Patient progressing well today, delined, L wrist oozing improved. Pulses remain intact.  This included chart review, documentation, obtaining additional history when needed, review of test results, and discussions with the multidisciplinary ICU team. This did not include time spent performing separately documented procedures or time treating multiple patients simultaneously. This time includes only time spent directly engaged in work dedicated to this individual patient whether at the bedside or elsewhere in the ICU while the patient was critically ill.  Todd Moreno MD  Cardiothoracic Critical Care

## 2021-07-03 LAB
ALBUMIN SERPL ELPH-MCNC: 3.7 G/DL — SIGNIFICANT CHANGE UP (ref 3.3–5)
ALP SERPL-CCNC: 44 U/L — SIGNIFICANT CHANGE UP (ref 40–120)
ALT FLD-CCNC: 7 U/L — LOW (ref 10–45)
ANION GAP SERPL CALC-SCNC: 11 MMOL/L — SIGNIFICANT CHANGE UP (ref 5–17)
APTT BLD: 26 SEC — LOW (ref 27.5–35.5)
AST SERPL-CCNC: 32 U/L — SIGNIFICANT CHANGE UP (ref 10–40)
BILIRUB SERPL-MCNC: 0.7 MG/DL — SIGNIFICANT CHANGE UP (ref 0.2–1.2)
BUN SERPL-MCNC: 20 MG/DL — SIGNIFICANT CHANGE UP (ref 7–23)
CALCIUM SERPL-MCNC: 9 MG/DL — SIGNIFICANT CHANGE UP (ref 8.4–10.5)
CHLORIDE SERPL-SCNC: 101 MMOL/L — SIGNIFICANT CHANGE UP (ref 96–108)
CO2 SERPL-SCNC: 25 MMOL/L — SIGNIFICANT CHANGE UP (ref 22–31)
CREAT SERPL-MCNC: 1.18 MG/DL — SIGNIFICANT CHANGE UP (ref 0.5–1.3)
GLUCOSE SERPL-MCNC: 88 MG/DL — SIGNIFICANT CHANGE UP (ref 70–99)
HCT VFR BLD CALC: 24.5 % — LOW (ref 34.5–45)
HGB BLD-MCNC: 7.9 G/DL — LOW (ref 11.5–15.5)
INR BLD: 1.02 — SIGNIFICANT CHANGE UP (ref 0.88–1.16)
MAGNESIUM SERPL-MCNC: 1.9 MG/DL — SIGNIFICANT CHANGE UP (ref 1.6–2.6)
MCHC RBC-ENTMCNC: 30 PG — SIGNIFICANT CHANGE UP (ref 27–34)
MCHC RBC-ENTMCNC: 32.2 GM/DL — SIGNIFICANT CHANGE UP (ref 32–36)
MCV RBC AUTO: 93.2 FL — SIGNIFICANT CHANGE UP (ref 80–100)
NRBC # BLD: 0 /100 WBCS — SIGNIFICANT CHANGE UP (ref 0–0)
PHOSPHATE SERPL-MCNC: 2.9 MG/DL — SIGNIFICANT CHANGE UP (ref 2.5–4.5)
PLATELET # BLD AUTO: 141 K/UL — LOW (ref 150–400)
POTASSIUM SERPL-MCNC: 5.4 MMOL/L — HIGH (ref 3.5–5.3)
POTASSIUM SERPL-SCNC: 5.4 MMOL/L — HIGH (ref 3.5–5.3)
PROT SERPL-MCNC: 6.1 G/DL — SIGNIFICANT CHANGE UP (ref 6–8.3)
PROTHROM AB SERPL-ACNC: 12.2 SEC — SIGNIFICANT CHANGE UP (ref 10.6–13.6)
RBC # BLD: 2.63 M/UL — LOW (ref 3.8–5.2)
RBC # FLD: 14.6 % — HIGH (ref 10.3–14.5)
SODIUM SERPL-SCNC: 137 MMOL/L — SIGNIFICANT CHANGE UP (ref 135–145)
WBC # BLD: 7.71 K/UL — SIGNIFICANT CHANGE UP (ref 3.8–10.5)
WBC # FLD AUTO: 7.71 K/UL — SIGNIFICANT CHANGE UP (ref 3.8–10.5)

## 2021-07-03 PROCEDURE — 99233 SBSQ HOSP IP/OBS HIGH 50: CPT

## 2021-07-03 PROCEDURE — 99292 CRITICAL CARE ADDL 30 MIN: CPT

## 2021-07-03 PROCEDURE — 71045 X-RAY EXAM CHEST 1 VIEW: CPT | Mod: 26

## 2021-07-03 RX ORDER — LANOLIN ALCOHOL/MO/W.PET/CERES
5 CREAM (GRAM) TOPICAL AT BEDTIME
Refills: 0 | Status: DISCONTINUED | OUTPATIENT
Start: 2021-07-03 | End: 2021-07-06

## 2021-07-03 RX ORDER — MULTIVIT WITH MIN/MFOLATE/K2 340-15/3 G
1 POWDER (GRAM) ORAL ONCE
Refills: 0 | Status: COMPLETED | OUTPATIENT
Start: 2021-07-03 | End: 2021-07-03

## 2021-07-03 RX ORDER — MAGNESIUM OXIDE 400 MG ORAL TABLET 241.3 MG
400 TABLET ORAL ONCE
Refills: 0 | Status: COMPLETED | OUTPATIENT
Start: 2021-07-03 | End: 2021-07-03

## 2021-07-03 RX ADMIN — Medication 1 APPLICATION(S): at 21:50

## 2021-07-03 RX ADMIN — GABAPENTIN 300 MILLIGRAM(S): 400 CAPSULE ORAL at 11:28

## 2021-07-03 RX ADMIN — PANTOPRAZOLE SODIUM 40 MILLIGRAM(S): 20 TABLET, DELAYED RELEASE ORAL at 06:54

## 2021-07-03 RX ADMIN — BENZOCAINE AND MENTHOL 1 LOZENGE: 5; 1 LIQUID ORAL at 10:40

## 2021-07-03 RX ADMIN — DESMOPRESSIN ACETATE 0.1 MILLIGRAM(S): 0.1 TABLET ORAL at 10:12

## 2021-07-03 RX ADMIN — Medication 1 DROP(S): at 06:54

## 2021-07-03 RX ADMIN — Medication 1 DROP(S): at 18:31

## 2021-07-03 RX ADMIN — ATORVASTATIN CALCIUM 40 MILLIGRAM(S): 80 TABLET, FILM COATED ORAL at 21:40

## 2021-07-03 RX ADMIN — LATANOPROST 1 DROP(S): 0.05 SOLUTION/ DROPS OPHTHALMIC; TOPICAL at 21:47

## 2021-07-03 RX ADMIN — LIDOCAINE 1 PATCH: 4 CREAM TOPICAL at 18:32

## 2021-07-03 RX ADMIN — DESMOPRESSIN ACETATE 0.1 MILLIGRAM(S): 0.1 TABLET ORAL at 21:48

## 2021-07-03 RX ADMIN — Medication 650 MILLIGRAM(S): at 07:08

## 2021-07-03 RX ADMIN — CHLORHEXIDINE GLUCONATE 1 APPLICATION(S): 213 SOLUTION TOPICAL at 11:25

## 2021-07-03 RX ADMIN — LIDOCAINE 1 PATCH: 4 CREAM TOPICAL at 22:54

## 2021-07-03 RX ADMIN — Medication 5 MILLIGRAM(S): at 21:45

## 2021-07-03 RX ADMIN — Medication 81 MILLIGRAM(S): at 11:28

## 2021-07-03 RX ADMIN — BRIMONIDINE TARTRATE 1 DROP(S): 2 SOLUTION/ DROPS OPHTHALMIC at 21:45

## 2021-07-03 RX ADMIN — MAGNESIUM OXIDE 400 MG ORAL TABLET 400 MILLIGRAM(S): 241.3 TABLET ORAL at 06:54

## 2021-07-03 RX ADMIN — Medication 1 SPRAY(S): at 06:53

## 2021-07-03 RX ADMIN — Medication 1 SPRAY(S): at 18:32

## 2021-07-03 RX ADMIN — LIDOCAINE 1 PATCH: 4 CREAM TOPICAL at 11:28

## 2021-07-03 RX ADMIN — Medication 650 MILLIGRAM(S): at 09:05

## 2021-07-03 RX ADMIN — Medication 1 BOTTLE: at 14:42

## 2021-07-03 NOTE — PROGRESS NOTE ADULT - SUBJECTIVE AND OBJECTIVE BOX
While in the ICU this evening following evening sign-off with the daytime intensivist, I provided additional care as documented below:  Continues to do well, L arm remains sore, but hematoma appears to be settling. Continue quiet time as able to encourage good sleep/wake cycles in active delirium prophylaxis.  This included chart review, documentation, obtaining additional history when needed, review of test results, and discussions with the multidisciplinary ICU team. This did not include time spent performing separately documented procedures or time treating multiple patients simultaneously. This time includes only time spent directly engaged in work dedicated to this individual patient whether at the bedside or elsewhere in the ICU while the patient was critically ill.  Todd Moreno MD  Cardiothoracic Critical Care

## 2021-07-03 NOTE — PROGRESS NOTE ADULT - SUBJECTIVE AND OBJECTIVE BOX
CTICU  CRITICAL  CARE  attending     Hand off received 					   Pertinent clinical, laboratory, radiographic, hemodynamic, echocardiographic, respiratory data, microbiologic data and chart were reviewed and analyzed frequently throughout the course of the day and night  Patient seen and examined with CTS/ SH attending at bedside  Pt is a 89y , Female, HEALTH ISSUES - PROBLEM Dx:  Syncope  Syncope    H/O aortic valve stenosis  H/O aortic valve stenosis    KAYLYN (acute kidney injury)  KAYLYN (acute kidney injury)    Anemia  Anemia    Diabetes insipidus  Diabetes insipidus    Hyperlipidemia, unspecified hyperlipidemia type  Hyperlipidemia, unspecified hyperlipidemia type    Hypertension  Hypertension    Aortic stenosis  Aortic stenosis    Prophylactic measure  Prophylactic measure        , FAMILY HISTORY:  FH: lung cancer (Father)    PAST MEDICAL & SURGICAL HISTORY:  Hyperlipidemia, unspecified hyperlipidemia type    Diabetes insipidus    H/O aortic valve stenosis    Hypertension    S/P AVR  Bioprosthetic    H/O lumbosacral spine surgery    S/P hip replacement  B/L      Patient is a 89y old  Female who presents with a chief complaint of Syncope (03 Jul 2021 13:04)      14 system review limited by mentation and multiorgan morbidity     Vital signs, hemodynamic and respiratory parameters were reviewed from the bedside nursing flowsheet.  ICU Vital Signs Last 24 Hrs  T(C): 36.2 (03 Jul 2021 17:06), Max: 36.3 (03 Jul 2021 05:22)  T(F): 97.1 (03 Jul 2021 17:06), Max: 97.4 (03 Jul 2021 05:22)  HR: 76 (03 Jul 2021 18:05) (65 - 77)  BP: 148/63 (03 Jul 2021 18:05) (107/54 - 155/65)  BP(mean): 91 (03 Jul 2021 18:05) (71 - 104)  ABP: --  ABP(mean): --  RR: 18 (03 Jul 2021 18:05) (15 - 20)  SpO2: 91% (03 Jul 2021 18:05) (91% - 99%)    Adult Advanced Hemodynamics Last 24 Hrs  CVP(mm Hg): --  CVP(cm H2O): --  CO: --  CI: --  PA: --  PA(mean): --  PCWP: --  SVR: --  SVRI: --  PVR: --  PVRI: --,     Intake and output was reviewed and the fluid balance was calculated  Daily     Daily   I&O's Summary    02 Jul 2021 07:01  -  03 Jul 2021 07:00  --------------------------------------------------------  IN: 1040 mL / OUT: 1325 mL / NET: -285 mL    03 Jul 2021 07:01  -  03 Jul 2021 20:02  --------------------------------------------------------  IN: 840 mL / OUT: 620 mL / NET: 220 mL        All lines and drain sites were assessed  Glycemic trend was reviewedCAPILLARY BLOOD GLUCOSE        No acute change in focality  Auscultation of the chest reveals equal bs  Abdomen is soft  Extremities are warm and well perfused  Wounds appear clean and unremarkable  Antibiotics are periop    labs  CBC Full  -  ( 03 Jul 2021 04:01 )  WBC Count : 7.71 K/uL  RBC Count : 2.63 M/uL  Hemoglobin : 7.9 g/dL  Hematocrit : 24.5 %  Platelet Count - Automated : 141 K/uL  Mean Cell Volume : 93.2 fl  Mean Cell Hemoglobin : 30.0 pg  Mean Cell Hemoglobin Concentration : 32.2 gm/dL  Auto Neutrophil # : x  Auto Lymphocyte # : x  Auto Monocyte # : x  Auto Eosinophil # : x  Auto Basophil # : x  Auto Neutrophil % : x  Auto Lymphocyte % : x  Auto Monocyte % : x  Auto Eosinophil % : x  Auto Basophil % : x    07-03    137  |  101  |  20  ----------------------------<  88  5.4<H>   |  25  |  1.18    Ca    9.0      03 Jul 2021 04:01  Phos  2.9     07-03  Mg     1.9     07-03    TPro  6.1  /  Alb  3.7  /  TBili  0.7  /  DBili  x   /  AST  32  /  ALT  7<L>  /  AlkPhos  44  07-03    PT/INR - ( 03 Jul 2021 04:01 )   PT: 12.2 sec;   INR: 1.02          PTT - ( 03 Jul 2021 04:01 )  PTT:26.0 sec  The current medications were reviewed   MEDICATIONS  (STANDING):  artificial  tears Solution 1 Drop(s) Both EYES two times a day  aspirin  chewable 81 milliGRAM(s) Oral daily  atorvastatin 40 milliGRAM(s) Oral at bedtime  benzocaine 15 mG/menthol 3.6 mG Lozenge 1 Lozenge Oral three times a day  brimonidine 0.2% Ophthalmic Solution 1 Drop(s) Both EYES at bedtime  chlorhexidine 2% Cloths 1 Application(s) Topical daily  desmopressin 0.1 milliGRAM(s) Oral two times a day  fluticasone propionate 50 MICROgram(s)/spray Nasal Spray 1 Spray(s) Both Nostrils every 12 hours  gabapentin 300 milliGRAM(s) Oral daily  hydrocortisone 1% Cream 1 Application(s) Topical at bedtime  latanoprost 0.005% Ophthalmic Solution 1 Drop(s) Both EYES at bedtime  lidocaine   Patch 1 Patch Transdermal daily  pantoprazole    Tablet 40 milliGRAM(s) Oral before breakfast  sodium chloride 0.9%. 1000 milliLiter(s) (10 mL/Hr) IV Continuous <Continuous>  triamcinolone 0.1% Cream 1 Application(s) Topical <User Schedule>    MEDICATIONS  (PRN):  acetaminophen   Tablet .. 650 milliGRAM(s) Oral every 6 hours PRN Mild Pain (1 - 3)  benzocaine 20% Spray 1 Spray(s) Topical three times a day PRN sore throat       PROBLEM LIST/ ASSESSMENT:  HEALTH ISSUES - PROBLEM Dx:  Syncope  Syncope    H/O aortic valve stenosis  H/O aortic valve stenosis    KAYLYN (acute kidney injury)  KAYLYN (acute kidney injury)    Anemia  Anemia    Diabetes insipidus  Diabetes insipidus    Hyperlipidemia, unspecified hyperlipidemia type  Hyperlipidemia, unspecified hyperlipidemia type    Hypertension  Hypertension    Aortic stenosis  Aortic stenosis    Prophylactic measure  Prophylactic measure        ,   Patient is a 89y old  Female who presents with a chief complaint of Syncope (03 Jul 2021 13:04)     s/p amanda tavr                 My plan includes :  close hemodynamic, ventilatory and drain monitoring and management per post op routine    Monitor for arrhythmias and monitor parameters for organ perfusion  beta blockade not administered due to hemodynamic instability and bradycardia  monitor neurologic status  Head of the bed should remain elevated to 45 deg .   chest PT and IS will be encouraged  monitor adequacy of oxygenation and ventilation and attempt to wean oxygen  antibiotic regimen will be tailored to the clinical, laboratory and microbiologic data  Nutritional goals will be met using po eventually , ensure adequate caloric intake and montior the same  Stress ulcer and VTE prophylaxis will be achieved    Glycemic control is satisfactory  Electrolytes have been repleted as necessary and wound care has been carried out. Pain control has been achieved.   agressive physical therapy and early mobility and ambulation goals will be met   The family was updated about the course and plan  CRITICAL CARE TIME personally provided by me  in evaluation and management, reassessments, review and interpretation of labs and x-rays, ventilator and hemodynamic management, formulating a plan and coordinating care: ___90____ MIN.  Time does not include procedural time. Time spent was non routine post-operarive caRE and included multiple and repeated evaluations at the bedside  CTICU ATTENDING     					    Cb Suazo MD

## 2021-07-03 NOTE — PROGRESS NOTE ADULT - SUBJECTIVE AND OBJECTIVE BOX
SUBJECTIVE: Patient seen and examined bedside by vascular team this AM. Patient in good spirits. Denies any worsening pain, sensation loss, motor loss of left hand/left forearm.     aspirin  chewable 81 milliGRAM(s) Oral daily      Vital Signs Last 24 Hrs  T(C): 36.2 (03 Jul 2021 13:01), Max: 36.4 (02 Jul 2021 14:10)  T(F): 97.2 (03 Jul 2021 13:01), Max: 97.6 (02 Jul 2021 14:10)  HR: 77 (03 Jul 2021 11:00) (65 - 90)  BP: 139/87 (03 Jul 2021 11:00) (107/54 - 155/65)  BP(mean): 104 (03 Jul 2021 11:00) (71 - 104)  RR: 17 (03 Jul 2021 11:00) (15 - 19)  SpO2: 93% (03 Jul 2021 11:00) (91% - 99%)  I&O's Detail    02 Jul 2021 07:01  -  03 Jul 2021 07:00  --------------------------------------------------------  IN:    Oral Fluid: 990 mL    sodium chloride 0.9%: 50 mL  Total IN: 1040 mL    OUT:    Voided (mL): 1325 mL  Total OUT: 1325 mL    Total NET: -285 mL      03 Jul 2021 07:01  -  03 Jul 2021 13:05  --------------------------------------------------------  IN:    Oral Fluid: 120 mL  Total IN: 120 mL    OUT:    Voided (mL): 150 mL  Total OUT: 150 mL    Total NET: -30 mL          Physical Exam:  General: No acute distress, resting comfortably in bed  C/V: normal sinus rhythm  Pulm: Nonlabored breathing, no respiratory distress  Abd: soft, non-tender, non-distended.   Extrem: warm and well perfused. LUE continues to be diffusely edematous with edema of the digits. L wrist incision is C/D/I without any drainage. No hematoma appreciated. Palpable radial pulse. Normal sensory and motor function of LUE.     LABS:                        7.9    7.71  )-----------( 141      ( 03 Jul 2021 04:01 )             24.5     07-03    137  |  101  |  20  ----------------------------<  88  5.4<H>   |  25  |  1.18    Ca    9.0      03 Jul 2021 04:01  Phos  2.9     07-03  Mg     1.9     07-03    TPro  6.1  /  Alb  3.7  /  TBili  0.7  /  DBili  x   /  AST  32  /  ALT  7<L>  /  AlkPhos  44  07-03    PT/INR - ( 03 Jul 2021 04:01 )   PT: 12.2 sec;   INR: 1.02          PTT - ( 03 Jul 2021 04:01 )  PTT:26.0 sec      Assessment  89 yo F with 24Hr private HHA with PMHx of HTN, HLD, Diabetes insipidus, GERD, AS s/p AVR (7 years ago) s/p TAVR on 6/30 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    Plan  1. Patient continues to have palpable radial pulse and normal sensorimotor function of left hand.   2. Recommend frequent neurovascular checks of LUE.  3. Vascular surgery will continue to follow.    Plan discussed with vascular surgical attending and vascular surgery chief resident.

## 2021-07-04 LAB
ALBUMIN SERPL ELPH-MCNC: 4 G/DL — SIGNIFICANT CHANGE UP (ref 3.3–5)
ALP SERPL-CCNC: 53 U/L — SIGNIFICANT CHANGE UP (ref 40–120)
ALT FLD-CCNC: 10 U/L — SIGNIFICANT CHANGE UP (ref 10–45)
ANION GAP SERPL CALC-SCNC: 11 MMOL/L — SIGNIFICANT CHANGE UP (ref 5–17)
ANION GAP SERPL CALC-SCNC: 9 MMOL/L — SIGNIFICANT CHANGE UP (ref 5–17)
ANION GAP SERPL CALC-SCNC: 9 MMOL/L — SIGNIFICANT CHANGE UP (ref 5–17)
APTT BLD: 18.5 SEC — LOW (ref 27.5–35.5)
AST SERPL-CCNC: 34 U/L — SIGNIFICANT CHANGE UP (ref 10–40)
BILIRUB SERPL-MCNC: 1 MG/DL — SIGNIFICANT CHANGE UP (ref 0.2–1.2)
BUN SERPL-MCNC: 20 MG/DL — SIGNIFICANT CHANGE UP (ref 7–23)
BUN SERPL-MCNC: 23 MG/DL — SIGNIFICANT CHANGE UP (ref 7–23)
BUN SERPL-MCNC: 24 MG/DL — HIGH (ref 7–23)
CALCIUM SERPL-MCNC: 9.3 MG/DL — SIGNIFICANT CHANGE UP (ref 8.4–10.5)
CALCIUM SERPL-MCNC: 9.4 MG/DL — SIGNIFICANT CHANGE UP (ref 8.4–10.5)
CALCIUM SERPL-MCNC: 9.8 MG/DL — SIGNIFICANT CHANGE UP (ref 8.4–10.5)
CHLORIDE SERPL-SCNC: 96 MMOL/L — SIGNIFICANT CHANGE UP (ref 96–108)
CHLORIDE SERPL-SCNC: 96 MMOL/L — SIGNIFICANT CHANGE UP (ref 96–108)
CHLORIDE SERPL-SCNC: 97 MMOL/L — SIGNIFICANT CHANGE UP (ref 96–108)
CO2 SERPL-SCNC: 24 MMOL/L — SIGNIFICANT CHANGE UP (ref 22–31)
CO2 SERPL-SCNC: 24 MMOL/L — SIGNIFICANT CHANGE UP (ref 22–31)
CO2 SERPL-SCNC: 25 MMOL/L — SIGNIFICANT CHANGE UP (ref 22–31)
CREAT SERPL-MCNC: 0.99 MG/DL — SIGNIFICANT CHANGE UP (ref 0.5–1.3)
CREAT SERPL-MCNC: 1.06 MG/DL — SIGNIFICANT CHANGE UP (ref 0.5–1.3)
CREAT SERPL-MCNC: 1.12 MG/DL — SIGNIFICANT CHANGE UP (ref 0.5–1.3)
GLUCOSE BLDC GLUCOMTR-MCNC: 162 MG/DL — HIGH (ref 70–99)
GLUCOSE SERPL-MCNC: 111 MG/DL — HIGH (ref 70–99)
GLUCOSE SERPL-MCNC: 128 MG/DL — HIGH (ref 70–99)
GLUCOSE SERPL-MCNC: 137 MG/DL — HIGH (ref 70–99)
INR BLD: 0.94 — SIGNIFICANT CHANGE UP (ref 0.88–1.16)
MAGNESIUM SERPL-MCNC: 2 MG/DL — SIGNIFICANT CHANGE UP (ref 1.6–2.6)
PHOSPHATE SERPL-MCNC: 2.9 MG/DL — SIGNIFICANT CHANGE UP (ref 2.5–4.5)
POTASSIUM SERPL-MCNC: 4.9 MMOL/L — SIGNIFICANT CHANGE UP (ref 3.5–5.3)
POTASSIUM SERPL-MCNC: 5.6 MMOL/L — HIGH (ref 3.5–5.3)
POTASSIUM SERPL-MCNC: 5.9 MMOL/L — HIGH (ref 3.5–5.3)
POTASSIUM SERPL-SCNC: 4.9 MMOL/L — SIGNIFICANT CHANGE UP (ref 3.5–5.3)
POTASSIUM SERPL-SCNC: 5.6 MMOL/L — HIGH (ref 3.5–5.3)
POTASSIUM SERPL-SCNC: 5.9 MMOL/L — HIGH (ref 3.5–5.3)
PROT SERPL-MCNC: 6.8 G/DL — SIGNIFICANT CHANGE UP (ref 6–8.3)
PROTHROM AB SERPL-ACNC: 11.3 SEC — SIGNIFICANT CHANGE UP (ref 10.6–13.6)
SODIUM SERPL-SCNC: 129 MMOL/L — LOW (ref 135–145)
SODIUM SERPL-SCNC: 130 MMOL/L — LOW (ref 135–145)
SODIUM SERPL-SCNC: 132 MMOL/L — LOW (ref 135–145)

## 2021-07-04 PROCEDURE — 93010 ELECTROCARDIOGRAM REPORT: CPT | Mod: 76

## 2021-07-04 PROCEDURE — 71045 X-RAY EXAM CHEST 1 VIEW: CPT | Mod: 26

## 2021-07-04 RX ORDER — SODIUM ZIRCONIUM CYCLOSILICATE 10 G/10G
5 POWDER, FOR SUSPENSION ORAL ONCE
Refills: 0 | Status: COMPLETED | OUTPATIENT
Start: 2021-07-04 | End: 2021-07-04

## 2021-07-04 RX ORDER — SODIUM ZIRCONIUM CYCLOSILICATE 10 G/10G
10 POWDER, FOR SUSPENSION ORAL ONCE
Refills: 0 | Status: DISCONTINUED | OUTPATIENT
Start: 2021-07-04 | End: 2021-07-04

## 2021-07-04 RX ORDER — DEXTROSE 50 % IN WATER 50 %
50 SYRINGE (ML) INTRAVENOUS ONCE
Refills: 0 | Status: COMPLETED | OUTPATIENT
Start: 2021-07-04 | End: 2021-07-04

## 2021-07-04 RX ORDER — CALCIUM GLUCONATE 100 MG/ML
2 VIAL (ML) INTRAVENOUS ONCE
Refills: 0 | Status: COMPLETED | OUTPATIENT
Start: 2021-07-04 | End: 2021-07-04

## 2021-07-04 RX ORDER — INSULIN HUMAN 100 [IU]/ML
10 INJECTION, SOLUTION SUBCUTANEOUS ONCE
Refills: 0 | Status: COMPLETED | OUTPATIENT
Start: 2021-07-04 | End: 2021-07-04

## 2021-07-04 RX ORDER — ACETAMINOPHEN 500 MG
325 TABLET ORAL ONCE
Refills: 0 | Status: COMPLETED | OUTPATIENT
Start: 2021-07-04 | End: 2021-07-04

## 2021-07-04 RX ADMIN — DESMOPRESSIN ACETATE 0.1 MILLIGRAM(S): 0.1 TABLET ORAL at 21:42

## 2021-07-04 RX ADMIN — BENZOCAINE AND MENTHOL 1 LOZENGE: 5; 1 LIQUID ORAL at 07:20

## 2021-07-04 RX ADMIN — LATANOPROST 1 DROP(S): 0.05 SOLUTION/ DROPS OPHTHALMIC; TOPICAL at 19:35

## 2021-07-04 RX ADMIN — BENZOCAINE AND MENTHOL 1 LOZENGE: 5; 1 LIQUID ORAL at 21:42

## 2021-07-04 RX ADMIN — Medication 50 MILLILITER(S): at 17:16

## 2021-07-04 RX ADMIN — ATORVASTATIN CALCIUM 40 MILLIGRAM(S): 80 TABLET, FILM COATED ORAL at 21:42

## 2021-07-04 RX ADMIN — Medication 200 GRAM(S): at 18:36

## 2021-07-04 RX ADMIN — CHLORHEXIDINE GLUCONATE 1 APPLICATION(S): 213 SOLUTION TOPICAL at 12:50

## 2021-07-04 RX ADMIN — INSULIN HUMAN 10 UNIT(S): 100 INJECTION, SOLUTION SUBCUTANEOUS at 17:23

## 2021-07-04 RX ADMIN — Medication 1 SPRAY(S): at 07:21

## 2021-07-04 RX ADMIN — Medication 650 MILLIGRAM(S): at 02:50

## 2021-07-04 RX ADMIN — Medication 650 MILLIGRAM(S): at 01:49

## 2021-07-04 RX ADMIN — SODIUM ZIRCONIUM CYCLOSILICATE 5 GRAM(S): 10 POWDER, FOR SUSPENSION ORAL at 18:10

## 2021-07-04 RX ADMIN — SODIUM ZIRCONIUM CYCLOSILICATE 5 GRAM(S): 10 POWDER, FOR SUSPENSION ORAL at 17:24

## 2021-07-04 RX ADMIN — BRIMONIDINE TARTRATE 1 DROP(S): 2 SOLUTION/ DROPS OPHTHALMIC at 19:36

## 2021-07-04 RX ADMIN — Medication 81 MILLIGRAM(S): at 12:47

## 2021-07-04 RX ADMIN — Medication 1 SPRAY(S): at 17:17

## 2021-07-04 RX ADMIN — Medication 1 APPLICATION(S): at 21:43

## 2021-07-04 RX ADMIN — DESMOPRESSIN ACETATE 0.1 MILLIGRAM(S): 0.1 TABLET ORAL at 09:52

## 2021-07-04 RX ADMIN — Medication 1 DROP(S): at 18:10

## 2021-07-04 RX ADMIN — GABAPENTIN 300 MILLIGRAM(S): 400 CAPSULE ORAL at 12:47

## 2021-07-04 RX ADMIN — Medication 325 MILLIGRAM(S): at 05:15

## 2021-07-04 NOTE — PROGRESS NOTE ADULT - SUBJECTIVE AND OBJECTIVE BOX
Patient discussed on morning rounds with Dr. Bella    Operation / Date: 6/29/21 - Jena/Young/Sher DIORR (Sheri) EF 55%  7/1 left radial pseudoanerysm repair    SUBJECTIVE ASSESSMENT:  89y Female seen and examined at bedside.  Patient complaining of itchiness of left arm.  Denies chest pain, shortness of breath, nausea, vomiting.  Ambulating the halls with rollator.          Vital Signs Last 24 Hrs  T(C): 36.7 (04 Jul 2021 09:00), Max: 36.7 (04 Jul 2021 09:00)  T(F): 98.1 (04 Jul 2021 09:00), Max: 98.1 (04 Jul 2021 09:00)  HR: 75 (04 Jul 2021 12:22) (64 - 76)  BP: 128/62 (04 Jul 2021 12:22) (112/72 - 148/63)  BP(mean): 89 (04 Jul 2021 12:22) (77 - 91)  RR: 15 (04 Jul 2021 12:22) (15 - 20)  SpO2: 95% (04 Jul 2021 12:22) (91% - 98%)  I&O's Detail    03 Jul 2021 07:01  -  04 Jul 2021 07:00  --------------------------------------------------------  IN:    Oral Fluid: 840 mL  Total IN: 840 mL    OUT:    Voided (mL): 650 mL  Total OUT: 650 mL    Total NET: 190 mL      04 Jul 2021 07:01  -  04 Jul 2021 13:59  --------------------------------------------------------  IN:    Oral Fluid: 250 mL  Total IN: 250 mL    OUT:  Total OUT: 0 mL    Total NET: 250 mL      PHYSICAL EXAM:    GEN: NAD, looks comfortable  Psych: Mood appropriate  Neuro: A&Ox3.  No focal deficits.  Moving all extremities.   HEENT: No obvious abnormalities  CV: S1S2, regular, no murmurs appreciated.  No carotid bruits.  No JVD  Lungs: Clear B/L.  No wheezing, rales or rhonchi, right chest with extensive ecchymosis  ABD: Soft, non-tender, non-distended.  +Bowel sounds  EXT: Warm and well perfused.  No peripheral edema noted, left arm wrapped with ACE, extensive ecchymosis  Musculoskeletal: Moving all extremities with normal ROM, no joint swelling,  PV: Pedal pulses palpable    LABS:                        7.9    7.71  )-----------( 141      ( 03 Jul 2021 04:01 )             24.5       COUMADIN:  No. REASON: .    PT/INR - ( 04 Jul 2021 10:30 )   PT: 11.3 sec;   INR: 0.94          PTT - ( 04 Jul 2021 10:30 )  PTT:18.5 sec    07-04    132<L>  |  97  |  20  ----------------------------<  128<H>  5.6<H>   |  24  |  0.99    Ca    9.3      04 Jul 2021 10:30  Phos  2.9     07-04  Mg     2.0     07-04    TPro  6.8  /  Alb  4.0  /  TBili  1.0  /  DBili  x   /  AST  34  /  ALT  10  /  AlkPhos  53  07-04          MEDICATIONS  (STANDING):  artificial  tears Solution 1 Drop(s) Both EYES two times a day  aspirin  chewable 81 milliGRAM(s) Oral daily  atorvastatin 40 milliGRAM(s) Oral at bedtime  benzocaine 15 mG/menthol 3.6 mG Lozenge 1 Lozenge Oral three times a day  brimonidine 0.2% Ophthalmic Solution 1 Drop(s) Both EYES at bedtime  chlorhexidine 2% Cloths 1 Application(s) Topical daily  desmopressin 0.1 milliGRAM(s) Oral two times a day  fluticasone propionate 50 MICROgram(s)/spray Nasal Spray 1 Spray(s) Both Nostrils every 12 hours  gabapentin 300 milliGRAM(s) Oral daily  hydrocortisone 1% Cream 1 Application(s) Topical at bedtime  latanoprost 0.005% Ophthalmic Solution 1 Drop(s) Both EYES at bedtime  lidocaine   Patch 1 Patch Transdermal daily  pantoprazole    Tablet 40 milliGRAM(s) Oral before breakfast  sodium chloride 0.9%. 1000 milliLiter(s) (10 mL/Hr) IV Continuous <Continuous>  triamcinolone 0.1% Cream 1 Application(s) Topical <User Schedule>    MEDICATIONS  (PRN):  acetaminophen   Tablet .. 650 milliGRAM(s) Oral every 6 hours PRN Mild Pain (1 - 3)  benzocaine 20% Spray 1 Spray(s) Topical three times a day PRN sore throat  melatonin 5 milliGRAM(s) Oral at bedtime PRN Sleep        RADIOLOGY & ADDITIONAL TESTS:

## 2021-07-04 NOTE — PROGRESS NOTE ADULT - ASSESSMENT
89 yo F with 24Hr private HHA with PMHx of HTN, HLD, Diabetes insipidus, GERD, AS s/p AVR (7 years ago), TAA (4.1cm) who presented to the ED 6/16/21 BIBEMS from pt’s dermatology office where she was walking down the hallway, felt "breathless" and then had loss of consciousness per her HHA. She sat her down so she did not have a fall or hit her head. Pt was found to be hypotensive with SBP 80’s in the field. Pt had a recent ED visit on 6/11/21 for another episode of witnessed syncope after walking many blocks to a restaurant and then lost consciousness while sitting in a restaurant, pt left AMA that visit. Pt reports taking Lasix at home for h/o "fluid in the lungs" and chronic LE edema L>R.  Patient underwent cardiac workup which showed severe bioprosthetic aortic valve stenosis.  Patient underwent ROSLYN TAVR on 6/29/21.  Arrived from OR post procedure, weaned to extubation and HFNC overnight, required 2U RBCs for Hgb 6.7, corrected well, poss pseudoaneurysm on L radial access site.  On 6/30 found to have +pseudoaneurysm in L radial on US. On 7/1 s/p repair of left arm pseudoaneurysm repair under la and conscious sedition with Dr. Spears. Swelling in left forearm, ASA/ SQH stopped, well maintained cap refill and palmar arch.  On7/02 ASA restarted per vascular; Left forearm ace wrapped per Dr. Grace; Cordis d/cd. transferred to floor on 7/3/21.  On 7/4/21 patient is stable ambulating the hallways with rolator.    ==== Neurovascular ====  -No delirium, pain well managed on current regimen  -C/w PRNs for Pain control  -Monitor neuro status    ==== Respiratory ====  -Saturates well on RA     -AM CXR stable, repeat in AM  -Encourage IS 10x/hour while awake, Cough and deep breathing exercises  -Monitor respiratory status via SpO2    ==== Cardiovascular ====  Monitor on Tele  Continue aspirin 81mg QD  Continue Statin    ==== GI ====   -Tolerating PO  -Prophylaxis: Protonix  -C/w bowel regimen    ==== /Renal ====  -BUN/Cr: 20/0.99  -Trend Cr on AM labs  -Replete electrolytes as needed  -Repeat BMP at 4pm for K of 5.6    ==== ID ====   Afebrile, asymptomatic  -WBC: stable  -Continue to monitor for SIRS/Sepsis syndrome while inpatient    ==== Endocrine ====   -A1C: 5.7, no h/o DM    ==== Hematologic ====   -H/H: stable  -CBC, chem in AM  -DVT ppx: SCDs    ==== Disposition Planning ====  Telemetry, home tomorrow

## 2021-07-04 NOTE — PROGRESS NOTE ADULT - SUBJECTIVE AND OBJECTIVE BOX
SUBJECTIVE: Patient seen and examined bedside by vascular team this AM. Patient well appearing. Denies any worsening pain, sensation loss, motor loss of left hand/left forearm.     aspirin  chewable 81 milliGRAM(s) Oral daily      Vital Signs Last 24 Hrs  T(C): 36.7 (04 Jul 2021 09:00), Max: 36.7 (04 Jul 2021 09:00)  T(F): 98.1 (04 Jul 2021 09:00), Max: 98.1 (04 Jul 2021 09:00)  HR: 75 (04 Jul 2021 12:22) (64 - 76)  BP: 128/62 (04 Jul 2021 12:22) (112/72 - 148/63)  BP(mean): 89 (04 Jul 2021 12:22) (77 - 91)  RR: 15 (04 Jul 2021 12:22) (15 - 20)  SpO2: 95% (04 Jul 2021 12:22) (91% - 98%)  I&O's Detail    03 Jul 2021 07:01  -  04 Jul 2021 07:00  --------------------------------------------------------  IN:    Oral Fluid: 840 mL  Total IN: 840 mL    OUT:    Voided (mL): 650 mL  Total OUT: 650 mL    Total NET: 190 mL      04 Jul 2021 07:01  -  04 Jul 2021 13:59  --------------------------------------------------------  IN:    Oral Fluid: 250 mL  Total IN: 250 mL    OUT:  Total OUT: 0 mL    Total NET: 250 mL      PHYSICAL EXAM:    GEN: NAD, looks comfortable  EXT: Warm and well perfused.  No peripheral edema noted, left arm wrapped with ACE, extensive ecchymosis  Musculoskeletal: Moving all extremities with normal ROM, no joint swelling,      LABS:                        7.9    7.71  )-----------( 141      ( 03 Jul 2021 04:01 )             24.5       COUMADIN:  No. REASON: .    PT/INR - ( 04 Jul 2021 10:30 )   PT: 11.3 sec;   INR: 0.94          PTT - ( 04 Jul 2021 10:30 )  PTT:18.5 sec    07-04    132<L>  |  97  |  20  ----------------------------<  128<H>  5.6<H>   |  24  |  0.99    Ca    9.3      04 Jul 2021 10:30  Phos  2.9     07-04  Mg     2.0     07-04    TPro  6.8  /  Alb  4.0  /  TBili  1.0  /  DBili  x   /  AST  34  /  ALT  10  /  AlkPhos  53  07-04        Assessment  89 yo F with 24Hr private HHA with PMHx of HTN, HLD, Diabetes insipidus, GERD, AS s/p AVR (7 years ago) s/p TAVR on 6/30 found to have a LUE radial pseudonaneurysm, now POD3 primary repair.    Plan  1. Patient continues to have palpable radial pulse and normal sensorimotor function of left hand.   2. Recommend frequent neurovascular checks of LUE.  3. Vascular surgery will continue to follow.    Plan discussed with vascular surgical attending and vascular surgery chief resident.

## 2021-07-05 LAB
ANION GAP SERPL CALC-SCNC: 10 MMOL/L — SIGNIFICANT CHANGE UP (ref 5–17)
BUN SERPL-MCNC: 20 MG/DL — SIGNIFICANT CHANGE UP (ref 7–23)
CALCIUM SERPL-MCNC: 9.4 MG/DL — SIGNIFICANT CHANGE UP (ref 8.4–10.5)
CHLORIDE SERPL-SCNC: 98 MMOL/L — SIGNIFICANT CHANGE UP (ref 96–108)
CO2 SERPL-SCNC: 23 MMOL/L — SIGNIFICANT CHANGE UP (ref 22–31)
CREAT SERPL-MCNC: 0.98 MG/DL — SIGNIFICANT CHANGE UP (ref 0.5–1.3)
GLUCOSE SERPL-MCNC: 89 MG/DL — SIGNIFICANT CHANGE UP (ref 70–99)
HCT VFR BLD CALC: 25.6 % — LOW (ref 34.5–45)
HGB BLD-MCNC: 7.8 G/DL — LOW (ref 11.5–15.5)
MAGNESIUM SERPL-MCNC: 1.9 MG/DL — SIGNIFICANT CHANGE UP (ref 1.6–2.6)
MCHC RBC-ENTMCNC: 30.1 PG — SIGNIFICANT CHANGE UP (ref 27–34)
MCHC RBC-ENTMCNC: 30.5 GM/DL — LOW (ref 32–36)
MCV RBC AUTO: 98.8 FL — SIGNIFICANT CHANGE UP (ref 80–100)
NRBC # BLD: 0 /100 WBCS — SIGNIFICANT CHANGE UP (ref 0–0)
PLATELET # BLD AUTO: 141 K/UL — LOW (ref 150–400)
POTASSIUM SERPL-MCNC: 4.5 MMOL/L — SIGNIFICANT CHANGE UP (ref 3.5–5.3)
POTASSIUM SERPL-SCNC: 4.5 MMOL/L — SIGNIFICANT CHANGE UP (ref 3.5–5.3)
RBC # BLD: 2.59 M/UL — LOW (ref 3.8–5.2)
RBC # FLD: 14.7 % — HIGH (ref 10.3–14.5)
SODIUM SERPL-SCNC: 131 MMOL/L — LOW (ref 135–145)
WBC # BLD: 7.18 K/UL — SIGNIFICANT CHANGE UP (ref 3.8–10.5)
WBC # FLD AUTO: 7.18 K/UL — SIGNIFICANT CHANGE UP (ref 3.8–10.5)

## 2021-07-05 PROCEDURE — 99232 SBSQ HOSP IP/OBS MODERATE 35: CPT

## 2021-07-05 RX ORDER — FUROSEMIDE 40 MG
20 TABLET ORAL ONCE
Refills: 0 | Status: COMPLETED | OUTPATIENT
Start: 2021-07-05 | End: 2021-07-05

## 2021-07-05 RX ORDER — MAGNESIUM OXIDE 400 MG ORAL TABLET 241.3 MG
400 TABLET ORAL ONCE
Refills: 0 | Status: COMPLETED | OUTPATIENT
Start: 2021-07-05 | End: 2021-07-05

## 2021-07-05 RX ORDER — SENNA PLUS 8.6 MG/1
2 TABLET ORAL AT BEDTIME
Refills: 0 | Status: DISCONTINUED | OUTPATIENT
Start: 2021-07-05 | End: 2021-07-06

## 2021-07-05 RX ORDER — FERROUS SULFATE 325(65) MG
325 TABLET ORAL DAILY
Refills: 0 | Status: DISCONTINUED | OUTPATIENT
Start: 2021-07-05 | End: 2021-07-06

## 2021-07-05 RX ADMIN — BENZOCAINE AND MENTHOL 1 LOZENGE: 5; 1 LIQUID ORAL at 21:28

## 2021-07-05 RX ADMIN — DESMOPRESSIN ACETATE 0.1 MILLIGRAM(S): 0.1 TABLET ORAL at 21:29

## 2021-07-05 RX ADMIN — Medication 1 DROP(S): at 17:40

## 2021-07-05 RX ADMIN — Medication 1 APPLICATION(S): at 21:29

## 2021-07-05 RX ADMIN — MAGNESIUM OXIDE 400 MG ORAL TABLET 400 MILLIGRAM(S): 241.3 TABLET ORAL at 08:53

## 2021-07-05 RX ADMIN — DESMOPRESSIN ACETATE 0.1 MILLIGRAM(S): 0.1 TABLET ORAL at 10:27

## 2021-07-05 RX ADMIN — BENZOCAINE AND MENTHOL 1 LOZENGE: 5; 1 LIQUID ORAL at 06:15

## 2021-07-05 RX ADMIN — Medication 650 MILLIGRAM(S): at 23:37

## 2021-07-05 RX ADMIN — LATANOPROST 1 DROP(S): 0.05 SOLUTION/ DROPS OPHTHALMIC; TOPICAL at 22:46

## 2021-07-05 RX ADMIN — Medication 20 MILLIGRAM(S): at 06:16

## 2021-07-05 RX ADMIN — CHLORHEXIDINE GLUCONATE 1 APPLICATION(S): 213 SOLUTION TOPICAL at 11:31

## 2021-07-05 RX ADMIN — GABAPENTIN 300 MILLIGRAM(S): 400 CAPSULE ORAL at 11:30

## 2021-07-05 RX ADMIN — BRIMONIDINE TARTRATE 1 DROP(S): 2 SOLUTION/ DROPS OPHTHALMIC at 21:30

## 2021-07-05 RX ADMIN — Medication 5 MILLIGRAM(S): at 22:45

## 2021-07-05 RX ADMIN — Medication 1 APPLICATION(S): at 22:46

## 2021-07-05 RX ADMIN — Medication 1 DROP(S): at 06:15

## 2021-07-05 RX ADMIN — ATORVASTATIN CALCIUM 40 MILLIGRAM(S): 80 TABLET, FILM COATED ORAL at 21:30

## 2021-07-05 RX ADMIN — Medication 325 MILLIGRAM(S): at 19:31

## 2021-07-05 RX ADMIN — Medication 1 SPRAY(S): at 06:16

## 2021-07-05 RX ADMIN — Medication 81 MILLIGRAM(S): at 11:31

## 2021-07-05 RX ADMIN — Medication 1 SPRAY(S): at 17:40

## 2021-07-05 RX ADMIN — Medication 20 MILLIGRAM(S): at 19:31

## 2021-07-05 RX ADMIN — PANTOPRAZOLE SODIUM 40 MILLIGRAM(S): 20 TABLET, DELAYED RELEASE ORAL at 06:16

## 2021-07-05 NOTE — PROGRESS NOTE ADULT - SUBJECTIVE AND OBJECTIVE BOX
SUBJECTIVE: Patient seen and examined bedside by vascular team this AM. Patient well appearing. Denies any worsening pain, sensation loss, motor loss of left hand/left forearm.     Vital Signs Last 24 Hrs  T(C): 37.2 (05 Jul 2021 13:40), Max: 37.3 (04 Jul 2021 18:07)  T(F): 98.9 (05 Jul 2021 13:40), Max: 99.2 (04 Jul 2021 18:07)  HR: 73 (05 Jul 2021 12:20) (68 - 77)  BP: 91/51 (05 Jul 2021 12:20) (91/51 - 138/77)  BP(mean): 68 (05 Jul 2021 12:20) (68 - 102)  RR: 18 (05 Jul 2021 12:20) (14 - 18)  SpO2: 98% (05 Jul 2021 12:20) (91% - 99%)  I&O's Summary    04 Jul 2021 07:01  -  05 Jul 2021 07:00  --------------------------------------------------------  IN: 1050 mL / OUT: 850 mL / NET: 200 mL    PHYSICAL EXAM:    GEN: NAD, looks comfortable  EXT: Warm and well perfused.  No peripheral edema noted, left arm wrapped with ACE, extensive ecchymosis  Musculoskeletal: Moving all extremities with normal ROM, no joint swelling,      LABS:                        7.8    7.18  )-----------( 141      ( 05 Jul 2021 06:34 )             25.6     07-05    131<L>  |  98  |  20  ----------------------------<  89  4.5   |  23  |  0.98    Ca    9.4      05 Jul 2021 06:34  Phos  2.9     07-04  Mg     1.9     07-05    TPro  6.8  /  Alb  4.0  /  TBili  1.0  /  DBili  x   /  AST  34  /  ALT  10  /  AlkPhos  53  07-04    PT/INR - ( 04 Jul 2021 10:30 )   PT: 11.3 sec;   INR: 0.94      Assessment  89 yo F with 24Hr private HHA with PMHx of HTN, HLD, Diabetes insipidus, GERD, AS s/p AVR (7 years ago) s/p TAVR on 6/30 found to have a LUE radial pseudonaneurysm, now POD3 primary repair.    Plan  1. Patient continues to have palpable radial pulse and normal sensorimotor function of left hand.   2. Recommend frequent neurovascular checks of LUE.  3. No other interventions at this time.               SUBJECTIVE: Patient seen and examined bedside by vascular team this AM. Patient well appearing. Denies any worsening pain, sensation loss, motor loss of left hand/left forearm.     Vital Signs Last 24 Hrs  T(C): 37.2 (05 Jul 2021 13:40), Max: 37.3 (04 Jul 2021 18:07)  T(F): 98.9 (05 Jul 2021 13:40), Max: 99.2 (04 Jul 2021 18:07)  HR: 73 (05 Jul 2021 12:20) (68 - 77)  BP: 91/51 (05 Jul 2021 12:20) (91/51 - 138/77)  BP(mean): 68 (05 Jul 2021 12:20) (68 - 102)  RR: 18 (05 Jul 2021 12:20) (14 - 18)  SpO2: 98% (05 Jul 2021 12:20) (91% - 99%)  I&O's Summary    04 Jul 2021 07:01  -  05 Jul 2021 07:00  --------------------------------------------------------  IN: 1050 mL / OUT: 850 mL / NET: 200 mL    PHYSICAL EXAM:    GEN: NAD, looks comfortable  EXT: Warm and well perfused.  No peripheral edema noted, left arm wrapped with ACE, extensive ecchymosis  Musculoskeletal: Moving all extremities with normal ROM, no joint swelling,      LABS:                        7.8    7.18  )-----------( 141      ( 05 Jul 2021 06:34 )             25.6     07-05    131<L>  |  98  |  20  ----------------------------<  89  4.5   |  23  |  0.98    Ca    9.4      05 Jul 2021 06:34  Phos  2.9     07-04  Mg     1.9     07-05    TPro  6.8  /  Alb  4.0  /  TBili  1.0  /  DBili  x   /  AST  34  /  ALT  10  /  AlkPhos  53  07-04    PT/INR - ( 04 Jul 2021 10:30 )   PT: 11.3 sec;   INR: 0.94      Assessment  89 yo F with 24Hr private HHA with PMHx of HTN, HLD, Diabetes insipidus, GERD, AS s/p AVR (7 years ago) s/p TAVR on 6/30 found to have a LUE radial pseudonaneurysm, now POD3 primary repair.    Plan  1. Patient continues to have palpable radial pulse and normal sensorimotor function of left hand.   2. Recommend frequent neurovascular checks of LUE.  3. No other interventions at this time. Vascular Surgery signing off

## 2021-07-05 NOTE — PROGRESS NOTE ADULT - SUBJECTIVE AND OBJECTIVE BOX
Patient discussed on morning rounds with Dr. Bella     Operation / Date:   6/29/21 Jena/Young/Sher - ROSLYN TAVR (Sheri) EF55%  7/1/21 left radial pseudoaneurysm repair    SUBJECTIVE ASSESSMENT:  89y Female. Patient with persistent hyponatremia (improving) and improving hyperkalemia. Vascular surgery cleared her to go home. Patient     Vital Signs Last 24 Hrs  T(C): 36.2 (05 Jul 2021 17:46), Max: 37.2 (05 Jul 2021 13:40)  T(F): 97.1 (05 Jul 2021 17:46), Max: 98.9 (05 Jul 2021 13:40)  HR: 73 (05 Jul 2021 16:51) (68 - 77)  BP: 141/65 (05 Jul 2021 16:51) (91/51 - 141/65)  BP(mean): 93 (05 Jul 2021 16:51) (68 - 102)  RR: 18 (05 Jul 2021 16:51) (14 - 18)  SpO2: 98% (05 Jul 2021 16:51) (91% - 99%)  I&O's Detail    04 Jul 2021 07:01  -  05 Jul 2021 07:00  --------------------------------------------------------  IN:    IV PiggyBack: 150 mL    Oral Fluid: 900 mL  Total IN: 1050 mL    OUT:    Voided (mL): 850 mL  Total OUT: 850 mL    Total NET: 200 mL      05 Jul 2021 07:01  -  05 Jul 2021 20:19  --------------------------------------------------------  IN:    Oral Fluid: 750 mL  Total IN: 750 mL    OUT:    Voided (mL): 700 mL  Total OUT: 700 mL    Total NET: 50 mL    CHEST TUBE:   No.   MEME DRAIN:   No.  EPICARDIAL WIRES: No.  TIE DOWNS:  No.  VANN: No.    PHYSICAL EXAM:    General: NAD, comfortable, sitting up in chair    Neurological: AOx3, no focal neuro deficits    Cardiovascular: RRR< S1S2, no murmurs, rubs, gallops    Respiratory: CTABL, no wheezing, rales rhonchi    Gastrointestinal: soft, NTND, +BS    Extremities: WWP, no LE edema, no calf tenderness    Vascular: ditsal puleses intact b/l; extensive ecchymosis of left arm    Incision Sites: c/d/i    LABS:                        7.8    7.18  )-----------( 141      ( 05 Jul 2021 06:34 )             25.6       COUMADIN:  No.     PT/INR - ( 04 Jul 2021 10:30 )   PT: 11.3 sec;   INR: 0.94          PTT - ( 04 Jul 2021 10:30 )  PTT:18.5 sec    07-05    131<L>  |  98  |  20  ----------------------------<  89  4.5   |  23  |  0.98    Ca    9.4      05 Jul 2021 06:34  Phos  2.9     07-04  Mg     1.9     07-05    TPro  6.8  /  Alb  4.0  /  TBili  1.0  /  DBili  x   /  AST  34  /  ALT  10  /  AlkPhos  53  07-04    MEDICATIONS  (STANDING):  artificial  tears Solution 1 Drop(s) Both EYES two times a day  aspirin  chewable 81 milliGRAM(s) Oral daily  atorvastatin 40 milliGRAM(s) Oral at bedtime  benzocaine 15 mG/menthol 3.6 mG Lozenge 1 Lozenge Oral three times a day  brimonidine 0.2% Ophthalmic Solution 1 Drop(s) Both EYES at bedtime  chlorhexidine 2% Cloths 1 Application(s) Topical daily  desmopressin 0.1 milliGRAM(s) Oral two times a day  ferrous    sulfate 325 milliGRAM(s) Oral daily  fluticasone propionate 50 MICROgram(s)/spray Nasal Spray 1 Spray(s) Both Nostrils every 12 hours  gabapentin 300 milliGRAM(s) Oral daily  hydrocortisone 1% Cream 1 Application(s) Topical at bedtime  latanoprost 0.005% Ophthalmic Solution 1 Drop(s) Both EYES at bedtime  lidocaine   Patch 1 Patch Transdermal daily  pantoprazole    Tablet 40 milliGRAM(s) Oral before breakfast  senna 2 Tablet(s) Oral at bedtime  sodium chloride 0.9%. 1000 milliLiter(s) (10 mL/Hr) IV Continuous <Continuous>  triamcinolone 0.1% Cream 1 Application(s) Topical <User Schedule>    MEDICATIONS  (PRN):  acetaminophen   Tablet .. 650 milliGRAM(s) Oral every 6 hours PRN Mild Pain (1 - 3)  benzocaine 20% Spray 1 Spray(s) Topical three times a day PRN sore throat  melatonin 5 milliGRAM(s) Oral at bedtime PRN Sleep        RADIOLOGY & ADDITIONAL TESTS:    < from: Xray Chest 1 View- PORTABLE-Routine (Xray Chest 1 View- PORTABLE-Routine in AM.) (07.04.21 @ 04:21) >  hest.    COMPARISON: July 3, 2021.    Findings/  impression: Stable heart size, thoracic aortic and mitral annulus calcification, TAVR.. Right upper lobe nodule/granuloma. Left pleural effusion, unchanged. Left calcified granuloma Stable bony structures, dextroscoliosis. Left rib old fractures.      < end of copied text >

## 2021-07-05 NOTE — PROGRESS NOTE ADULT - ASSESSMENT
89 yo F with 24Hr private HHA with PMHx of HTN, HLD, Diabetes insipidus, GERD, AS s/p AVR (7 years ago), TAA (4.1cm) who presented to the ED 6/16/21 BIBEMS from pt’s dermatology office where she was walking down the hallway, felt "breathless" and then had loss of consciousness per her HHA. She sat her down so she did not have a fall or hit her head. Pt was found to be hypotensive with SBP 80’s in the field. Pt had a recent ED visit on 6/11/21 for another episode of witnessed syncope after walking many blocks to a restaurant and then lost consciousness while sitting in a restaurant, pt left AMA that visit. Pt reports taking Lasix at home for h/o "fluid in the lungs" and chronic LE edema L>R.  Patient underwent cardiac workup which showed severe bioprosthetic aortic valve stenosis.  Patient underwent ROSLYN TAVR on 6/29/21.  Arrived from OR post procedure, weaned to extubation and HFNC overnight, required 2U RBCs for Hgb 6.7, corrected well, poss pseudoaneurysm on L radial access site.  On 6/30 found to have +pseudoaneurysm in L radial on US. On 7/1 s/p repair of left arm pseudoaneurysm repair under la and conscious sedition with Dr. Spears. Swelling in left forearm, ASA/ SQH stopped, well maintained cap refill and palmar arch.  On7/02 ASA restarted per vascular; Left forearm ace wrapped per Dr. Grace; Cordis d/cd. transferred to floor on 7/3/21.  On 7/4/21 patient is stable ambulating the hallways with rolator. Patient with continued hyponatremia, consulting casper leung in. Patient with improving hyperkalemia. Patient planned for discharge home tomorrow.    ==== Neurovascular ====  -No delirium, pain well managed on current regimen  -C/w PRNs for Pain control  -Monitor neuro status    ==== Respiratory ====  -Saturates well on RA     -AM CXR stable, repeat in AM  -Encourage IS 10x/hour while awake, Cough and deep breathing exercises  -Monitor respiratory status via SpO2    ==== Cardiovascular ====  Monitor on Tele  Continue aspirin 81mg QD  Continue Statin    ==== GI ====   -Tolerating PO  -Prophylaxis: Protonix  -C/w bowel regimen    ==== /Renal ====  -BUN/Cr: 20/0.99  -Trend Cr on AM labs  -Replete electrolytes as needed  -Repeat BMP at 4pm for K of 5.6    ==== ID ====   Afebrile, asymptomatic  -WBC: stable  -Continue to monitor for SIRS/Sepsis syndrome while inpatient    ==== Endocrine ====   -A1C: 5.7, no h/o DM    ==== Hematologic ====   -H/H: stable  -CBC, chem in AM  -DVT ppx: SCDs    ==== Disposition Planning ====  Telemetry, home tomorrow     89 yo F with 24Hr private HHA with PMHx of HTN, HLD, Diabetes insipidus, GERD, AS s/p AVR (7 years ago), TAA (4.1cm) who presented to the ED 6/16/21 BIBEMS from pt’s dermatology office where she was walking down the hallway, felt "breathless" and then had loss of consciousness per her HHA. She sat her down so she did not have a fall or hit her head. Pt was found to be hypotensive with SBP 80’s in the field. Pt had a recent ED visit on 6/11/21 for another episode of witnessed syncope after walking many blocks to a restaurant and then lost consciousness while sitting in a restaurant, pt left AMA that visit. Pt reports taking Lasix at home for h/o "fluid in the lungs" and chronic LE edema L>R.  Patient underwent cardiac workup which showed severe bioprosthetic aortic valve stenosis.  Patient underwent ROSLYN TAVR on 6/29/21.  Arrived from OR post procedure, weaned to extubation and HFNC overnight, required 2U RBCs for Hgb 6.7, corrected well, poss pseudoaneurysm on L radial access site.  On 6/30 found to have +pseudoaneurysm in L radial on US. On 7/1 s/p repair of left arm pseudoaneurysm repair under la and conscious sedition with Dr. Spears. Swelling in left forearm, ASA/ SQH stopped, well maintained cap refill and palmar arch.  On7/02 ASA restarted per vascular; Left forearm ace wrapped per Dr. Grace; Cordis d/cd. transferred to floor on 7/3/21.  On 7/4/21 patient is stable ambulating the hallways with rolator. Patient with continued hyponatremia, consulting casper leung in. Patient with improving hyperkalemia. Patient planned for discharge home tomorrow.    ==== Neurovascular ====  -No delirium, pain well managed on current regimen  -C/w PRNs for Pain control  -Monitor neuro status    ==== Respiratory ====  -Saturates well on RA     -AM CXR stable, repeat in AM  -Encourage IS 10x/hour while awake, Cough and deep breathing exercises  -Monitor respiratory status via SpO2    ==== Cardiovascular ====  Monitor on Tele  Continue aspirin 81mg QD  Continue Statin  -Vascular clearance for discharge    ==== GI ====   -Tolerating PO  -Prophylaxis: Protonix  -C/w bowel regimen    ==== /Renal ====  -BUN/Cr: 20/0.99  -Trend Cr on AM labs  -Replete electrolytes as needed  -Repeat BMP at 4pm for K of 5.6    ==== ID ====   Afebrile, asymptomatic  -WBC: stable  -Continue to monitor for SIRS/Sepsis syndrome while inpatient    ==== Endocrine ====   -A1C: 5.7, no h/o DM  -endo consult for hyponatremia    ==== Hematologic ====   -H/H: stable  -CBC, chem in AM  -DVT ppx: SCDs  -added iron supplements    ==== Disposition Planning ====  Telemetry, home tomorrow

## 2021-07-06 ENCOUNTER — TRANSCRIPTION ENCOUNTER (OUTPATIENT)
Age: 86
End: 2021-07-06

## 2021-07-06 VITALS — TEMPERATURE: 98 F

## 2021-07-06 LAB
ALBUMIN SERPL ELPH-MCNC: 3.8 G/DL — SIGNIFICANT CHANGE UP (ref 3.3–5)
ALP SERPL-CCNC: 50 U/L — SIGNIFICANT CHANGE UP (ref 40–120)
ALT FLD-CCNC: 11 U/L — SIGNIFICANT CHANGE UP (ref 10–45)
ANION GAP SERPL CALC-SCNC: 9 MMOL/L — SIGNIFICANT CHANGE UP (ref 5–17)
AST SERPL-CCNC: 27 U/L — SIGNIFICANT CHANGE UP (ref 10–40)
BILIRUB SERPL-MCNC: 1.2 MG/DL — SIGNIFICANT CHANGE UP (ref 0.2–1.2)
BUN SERPL-MCNC: 21 MG/DL — SIGNIFICANT CHANGE UP (ref 7–23)
CALCIUM SERPL-MCNC: 9.3 MG/DL — SIGNIFICANT CHANGE UP (ref 8.4–10.5)
CHLORIDE SERPL-SCNC: 97 MMOL/L — SIGNIFICANT CHANGE UP (ref 96–108)
CO2 SERPL-SCNC: 25 MMOL/L — SIGNIFICANT CHANGE UP (ref 22–31)
CREAT SERPL-MCNC: 1.01 MG/DL — SIGNIFICANT CHANGE UP (ref 0.5–1.3)
GLUCOSE SERPL-MCNC: 93 MG/DL — SIGNIFICANT CHANGE UP (ref 70–99)
HCT VFR BLD CALC: 25.8 % — LOW (ref 34.5–45)
HGB BLD-MCNC: 8.1 G/DL — LOW (ref 11.5–15.5)
MAGNESIUM SERPL-MCNC: 1.9 MG/DL — SIGNIFICANT CHANGE UP (ref 1.6–2.6)
MCHC RBC-ENTMCNC: 30.2 PG — SIGNIFICANT CHANGE UP (ref 27–34)
MCHC RBC-ENTMCNC: 31.4 GM/DL — LOW (ref 32–36)
MCV RBC AUTO: 96.3 FL — SIGNIFICANT CHANGE UP (ref 80–100)
NRBC # BLD: 0 /100 WBCS — SIGNIFICANT CHANGE UP (ref 0–0)
PLATELET # BLD AUTO: 204 K/UL — SIGNIFICANT CHANGE UP (ref 150–400)
POTASSIUM SERPL-MCNC: 4.2 MMOL/L — SIGNIFICANT CHANGE UP (ref 3.5–5.3)
POTASSIUM SERPL-SCNC: 4.2 MMOL/L — SIGNIFICANT CHANGE UP (ref 3.5–5.3)
PROT SERPL-MCNC: 6.4 G/DL — SIGNIFICANT CHANGE UP (ref 6–8.3)
RBC # BLD: 2.68 M/UL — LOW (ref 3.8–5.2)
RBC # FLD: 14.6 % — HIGH (ref 10.3–14.5)
SODIUM SERPL-SCNC: 131 MMOL/L — LOW (ref 135–145)
WBC # BLD: 6.57 K/UL — SIGNIFICANT CHANGE UP (ref 3.8–10.5)
WBC # FLD AUTO: 6.57 K/UL — SIGNIFICANT CHANGE UP (ref 3.8–10.5)

## 2021-07-06 PROCEDURE — 71045 X-RAY EXAM CHEST 1 VIEW: CPT

## 2021-07-06 PROCEDURE — 84132 ASSAY OF SERUM POTASSIUM: CPT

## 2021-07-06 PROCEDURE — 81001 URINALYSIS AUTO W/SCOPE: CPT

## 2021-07-06 PROCEDURE — 84484 ASSAY OF TROPONIN QUANT: CPT

## 2021-07-06 PROCEDURE — 82728 ASSAY OF FERRITIN: CPT

## 2021-07-06 PROCEDURE — 70450 CT HEAD/BRAIN W/O DYE: CPT

## 2021-07-06 PROCEDURE — 82803 BLOOD GASES ANY COMBINATION: CPT

## 2021-07-06 PROCEDURE — 97164 PT RE-EVAL EST PLAN CARE: CPT

## 2021-07-06 PROCEDURE — C1887: CPT

## 2021-07-06 PROCEDURE — 80053 COMPREHEN METABOLIC PANEL: CPT

## 2021-07-06 PROCEDURE — C1769: CPT

## 2021-07-06 PROCEDURE — 83540 ASSAY OF IRON: CPT

## 2021-07-06 PROCEDURE — 36430 TRANSFUSION BLD/BLD COMPNT: CPT

## 2021-07-06 PROCEDURE — 74174 CTA ABD&PLVS W/CONTRAST: CPT

## 2021-07-06 PROCEDURE — C1773: CPT

## 2021-07-06 PROCEDURE — 93306 TTE W/DOPPLER COMPLETE: CPT

## 2021-07-06 PROCEDURE — 99285 EMERGENCY DEPT VISIT HI MDM: CPT

## 2021-07-06 PROCEDURE — 86891 AUTOLOGOUS BLOOD OP SALVAGE: CPT

## 2021-07-06 PROCEDURE — 82746 ASSAY OF FOLIC ACID SERUM: CPT

## 2021-07-06 PROCEDURE — 84295 ASSAY OF SERUM SODIUM: CPT

## 2021-07-06 PROCEDURE — 83010 ASSAY OF HAPTOGLOBIN QUANT: CPT

## 2021-07-06 PROCEDURE — 99238 HOSP IP/OBS DSCHRG MGMT 30/<: CPT

## 2021-07-06 PROCEDURE — 86870 RBC ANTIBODY IDENTIFICATION: CPT

## 2021-07-06 PROCEDURE — 97162 PT EVAL MOD COMPLEX 30 MIN: CPT

## 2021-07-06 PROCEDURE — U0003: CPT

## 2021-07-06 PROCEDURE — 97116 GAIT TRAINING THERAPY: CPT

## 2021-07-06 PROCEDURE — 94640 AIRWAY INHALATION TREATMENT: CPT

## 2021-07-06 PROCEDURE — 82330 ASSAY OF CALCIUM: CPT

## 2021-07-06 PROCEDURE — 93880 EXTRACRANIAL BILAT STUDY: CPT

## 2021-07-06 PROCEDURE — C1894: CPT

## 2021-07-06 PROCEDURE — 84100 ASSAY OF PHOSPHORUS: CPT

## 2021-07-06 PROCEDURE — 83880 ASSAY OF NATRIURETIC PEPTIDE: CPT

## 2021-07-06 PROCEDURE — 82550 ASSAY OF CK (CPK): CPT

## 2021-07-06 PROCEDURE — 86902 BLOOD TYPE ANTIGEN DONOR EA: CPT

## 2021-07-06 PROCEDURE — 85610 PROTHROMBIN TIME: CPT

## 2021-07-06 PROCEDURE — 86900 BLOOD TYPING SEROLOGIC ABO: CPT

## 2021-07-06 PROCEDURE — 83615 LACTATE (LD) (LDH) ENZYME: CPT

## 2021-07-06 PROCEDURE — C1889: CPT

## 2021-07-06 PROCEDURE — 85045 AUTOMATED RETICULOCYTE COUNT: CPT

## 2021-07-06 PROCEDURE — 85384 FIBRINOGEN ACTIVITY: CPT

## 2021-07-06 PROCEDURE — 93005 ELECTROCARDIOGRAM TRACING: CPT

## 2021-07-06 PROCEDURE — P9016: CPT

## 2021-07-06 PROCEDURE — 97530 THERAPEUTIC ACTIVITIES: CPT

## 2021-07-06 PROCEDURE — 83036 HEMOGLOBIN GLYCOSYLATED A1C: CPT

## 2021-07-06 PROCEDURE — C1874: CPT

## 2021-07-06 PROCEDURE — 80061 LIPID PANEL: CPT

## 2021-07-06 PROCEDURE — 71045 X-RAY EXAM CHEST 1 VIEW: CPT | Mod: 26

## 2021-07-06 PROCEDURE — C1760: CPT

## 2021-07-06 PROCEDURE — 85025 COMPLETE CBC W/AUTO DIFF WBC: CPT

## 2021-07-06 PROCEDURE — 86905 BLOOD TYPING RBC ANTIGENS: CPT

## 2021-07-06 PROCEDURE — C1725: CPT

## 2021-07-06 PROCEDURE — 36415 COLL VENOUS BLD VENIPUNCTURE: CPT

## 2021-07-06 PROCEDURE — 87635 SARS-COV-2 COVID-19 AMP PRB: CPT

## 2021-07-06 PROCEDURE — 82553 CREATINE MB FRACTION: CPT

## 2021-07-06 PROCEDURE — 85027 COMPLETE CBC AUTOMATED: CPT

## 2021-07-06 PROCEDURE — 83550 IRON BINDING TEST: CPT

## 2021-07-06 PROCEDURE — 82962 GLUCOSE BLOOD TEST: CPT

## 2021-07-06 PROCEDURE — 86880 COOMBS TEST DIRECT: CPT

## 2021-07-06 PROCEDURE — 85730 THROMBOPLASTIN TIME PARTIAL: CPT

## 2021-07-06 PROCEDURE — 75573 CT HRT C+ STRUX CGEN HRT DS: CPT

## 2021-07-06 PROCEDURE — P9045: CPT

## 2021-07-06 PROCEDURE — 86850 RBC ANTIBODY SCREEN: CPT

## 2021-07-06 PROCEDURE — 93312 ECHO TRANSESOPHAGEAL: CPT

## 2021-07-06 PROCEDURE — 93931 UPPER EXTREMITY STUDY: CPT

## 2021-07-06 PROCEDURE — 93925 LOWER EXTREMITY STUDY: CPT

## 2021-07-06 PROCEDURE — 86922 COMPATIBILITY TEST ANTIGLOB: CPT

## 2021-07-06 PROCEDURE — 86901 BLOOD TYPING SEROLOGIC RH(D): CPT

## 2021-07-06 PROCEDURE — 82607 VITAMIN B-12: CPT

## 2021-07-06 PROCEDURE — 94150 VITAL CAPACITY TEST: CPT

## 2021-07-06 PROCEDURE — 86769 SARS-COV-2 COVID-19 ANTIBODY: CPT

## 2021-07-06 PROCEDURE — U0005: CPT

## 2021-07-06 PROCEDURE — 80048 BASIC METABOLIC PNL TOTAL CA: CPT

## 2021-07-06 PROCEDURE — 83735 ASSAY OF MAGNESIUM: CPT

## 2021-07-06 PROCEDURE — 94002 VENT MGMT INPAT INIT DAY: CPT

## 2021-07-06 RX ORDER — SENNA PLUS 8.6 MG/1
2 TABLET ORAL
Qty: 60 | Refills: 0
Start: 2021-07-06 | End: 2021-08-04

## 2021-07-06 RX ORDER — FUROSEMIDE 40 MG
1 TABLET ORAL
Qty: 0 | Refills: 0 | DISCHARGE

## 2021-07-06 RX ORDER — LATANOPROST 0.05 MG/ML
1 SOLUTION/ DROPS OPHTHALMIC; TOPICAL
Qty: 0 | Refills: 0 | DISCHARGE

## 2021-07-06 RX ORDER — DESMOPRESSIN ACETATE 0.1 MG/1
1.5 TABLET ORAL
Qty: 0 | Refills: 0 | DISCHARGE
Start: 2021-07-06 | End: 2021-08-04

## 2021-07-06 RX ORDER — ATORVASTATIN CALCIUM 80 MG/1
1 TABLET, FILM COATED ORAL
Qty: 30 | Refills: 0
Start: 2021-07-06 | End: 2021-08-04

## 2021-07-06 RX ORDER — ACETAMINOPHEN 500 MG
2 TABLET ORAL
Qty: 0 | Refills: 0 | DISCHARGE
Start: 2021-07-06 | End: 2021-08-04

## 2021-07-06 RX ORDER — POTASSIUM CHLORIDE 20 MEQ
1 PACKET (EA) ORAL
Qty: 15 | Refills: 0
Start: 2021-07-06 | End: 2021-08-04

## 2021-07-06 RX ORDER — FLUTICASONE PROPIONATE 50 MCG
2 SPRAY, SUSPENSION NASAL
Qty: 0 | Refills: 0 | DISCHARGE
Start: 2021-07-06 | End: 2021-08-04

## 2021-07-06 RX ORDER — FLUTICASONE PROPIONATE 50 MCG
1 SPRAY, SUSPENSION NASAL
Qty: 1 | Refills: 0
Start: 2021-07-06 | End: 2021-08-04

## 2021-07-06 RX ORDER — FLUTICASONE PROPIONATE 50 MCG
1 SPRAY, SUSPENSION NASAL
Qty: 0 | Refills: 0 | DISCHARGE

## 2021-07-06 RX ORDER — DESMOPRESSIN ACETATE 0.1 MG/1
1 TABLET ORAL
Qty: 0 | Refills: 0 | DISCHARGE

## 2021-07-06 RX ORDER — ACETAMINOPHEN 500 MG
2 TABLET ORAL
Qty: 240 | Refills: 0
Start: 2021-07-06 | End: 2021-08-04

## 2021-07-06 RX ORDER — ATORVASTATIN CALCIUM 80 MG/1
1 TABLET, FILM COATED ORAL
Qty: 0 | Refills: 0 | DISCHARGE

## 2021-07-06 RX ORDER — FERROUS SULFATE 325(65) MG
1 TABLET ORAL
Qty: 30 | Refills: 0
Start: 2021-07-06 | End: 2021-08-04

## 2021-07-06 RX ORDER — GABAPENTIN 400 MG/1
1 CAPSULE ORAL
Qty: 30 | Refills: 0
Start: 2021-07-06 | End: 2021-08-04

## 2021-07-06 RX ORDER — GABAPENTIN 400 MG/1
1 CAPSULE ORAL
Qty: 0 | Refills: 0 | DISCHARGE

## 2021-07-06 RX ORDER — DESMOPRESSIN ACETATE 0.1 MG/1
1 TABLET ORAL
Qty: 60 | Refills: 0
Start: 2021-07-06 | End: 2021-08-04

## 2021-07-06 RX ORDER — GABAPENTIN 400 MG/1
1 CAPSULE ORAL
Qty: 0 | Refills: 0 | DISCHARGE
Start: 2021-07-06 | End: 2021-08-04

## 2021-07-06 RX ORDER — ASPIRIN/CALCIUM CARB/MAGNESIUM 324 MG
1 TABLET ORAL
Qty: 30 | Refills: 0
Start: 2021-07-06 | End: 2021-08-04

## 2021-07-06 RX ORDER — BRINZOLAMIDE/BRIMONIDINE TARTRATE 10; 2 MG/ML; MG/ML
1 SUSPENSION/ DROPS OPHTHALMIC
Qty: 0 | Refills: 0 | DISCHARGE

## 2021-07-06 RX ORDER — LATANOPROST 0.05 MG/ML
1 SOLUTION/ DROPS OPHTHALMIC; TOPICAL
Qty: 0 | Refills: 0 | DISCHARGE
Start: 2021-07-06

## 2021-07-06 RX ORDER — PANTOPRAZOLE SODIUM 20 MG/1
1 TABLET, DELAYED RELEASE ORAL
Qty: 30 | Refills: 0
Start: 2021-07-06 | End: 2021-08-04

## 2021-07-06 RX ORDER — LISINOPRIL 2.5 MG/1
1 TABLET ORAL
Qty: 0 | Refills: 0 | DISCHARGE

## 2021-07-06 RX ORDER — FUROSEMIDE 40 MG
1 TABLET ORAL
Qty: 15 | Refills: 0
Start: 2021-07-06 | End: 2021-08-04

## 2021-07-06 RX ADMIN — Medication 1 SPRAY(S): at 05:14

## 2021-07-06 RX ADMIN — DESMOPRESSIN ACETATE 0.1 MILLIGRAM(S): 0.1 TABLET ORAL at 11:00

## 2021-07-06 RX ADMIN — Medication 1 DROP(S): at 05:53

## 2021-07-06 RX ADMIN — Medication 325 MILLIGRAM(S): at 11:00

## 2021-07-06 RX ADMIN — GABAPENTIN 300 MILLIGRAM(S): 400 CAPSULE ORAL at 11:00

## 2021-07-06 RX ADMIN — PANTOPRAZOLE SODIUM 40 MILLIGRAM(S): 20 TABLET, DELAYED RELEASE ORAL at 06:44

## 2021-07-06 RX ADMIN — CHLORHEXIDINE GLUCONATE 1 APPLICATION(S): 213 SOLUTION TOPICAL at 11:00

## 2021-07-06 RX ADMIN — Medication 81 MILLIGRAM(S): at 11:00

## 2021-07-06 RX ADMIN — Medication 650 MILLIGRAM(S): at 00:14

## 2021-07-06 NOTE — PROGRESS NOTE ADULT - PROVIDER SPECIALTY LIST ADULT
Critical Care
Critical Care
Vascular Surgery
Vascular Surgery
Critical Care
Critical Care
Vascular Surgery
CT Surgery
Cardiology
Critical Care
Critical Care
Structural Heart
Vascular Surgery
Vascular Surgery
Critical Care
Critical Care
Intervent Cardiology
Structural Heart
Structural Heart
Cardiology
Intervent Cardiology
Cardiology
Intervent Cardiology
Intervent Cardiology
Cardiology
Intervent Cardiology
Cardiology
Intervent Cardiology

## 2021-07-06 NOTE — DISCHARGE NOTE NURSING/CASE MANAGEMENT/SOCIAL WORK - NSDCVIVACCINE_GEN_ALL_CORE_FT
Tdap; 13-Feb-2018 19:16; Lit Romero); [employer]; [id number]; IntraMuscular; Deltoid Right.; 0.5 milliLiter(s); VIS (VIS Published: 09-May-2013, VIS Presented: 13-Feb-2018);

## 2021-07-06 NOTE — PROGRESS NOTE ADULT - SUBJECTIVE AND OBJECTIVE BOX
Patient discussed on morning rounds with Dr. Bella     Operation / Date:   6/29/21 Jena/Young/Sher MCGOWAN TAVR (Sheri) EF55%  7/1/21 left radial pseudoaneurysm repair    Surgeon: Dr. Bella    Referring Physician: Dr. Mcpherson    SUBJECTIVE ASSESSMENT AND HOSPITAL COURSE:  89 yo F with 24Hr private HHA with PMHx of HTN, HLD, Diabetes insipidus, GERD, AS s/p AVR (7 years ago), TAA (4.1cm) who presented to the ED 6/16/21 BIBEMS from pt’s dermatology office where she was walking down the hallway, felt "breathless" and then had loss of consciousness per her HHA. Pt was found to be hypotensive with SBP 80’s in the field. Pt had a recent ED visit on 6/11/21 for another episode of witnessed syncope after walking many blocks to a restaurant and then lost consciousness while sitting in a restaurant, pt left AMA that visit. Pt reports taking Lasix at home for h/o "fluid in the lungs" and chronic LE edema L>R. Pt denies fever, chills, cough, CP, PND/orthopnea, abdominal pain, N/V/D, dizziness. Pt had an outpatient CCTA 6/3/21 revealing calcium score is severe at 768 Agatston units, non obstructive CAD, dLAD is not well visualized but is probably non obstructive. 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 CTA chest 6/3/21 neg for PE. MRA chest 6/11/21: since 6/3/2021, there has been resolution of bilateral pleural effusions, technique is not optimized to assess mitral valve function, Mildly aneurysmal ascending aorta, measuring 4.1 cm.  During hospitalization, an echo 6/16 revealing critical aortic stenosis (worsened from moderate on 5/4/21), peak transvalvular velocity is 6.00 m/s, the mean transvalvular gradient is 89.00 mmHg, and the LVOT/AV velocity ratio is 0.13, aortic valve area (estimated via the continuity method) is 0.45 cm², (+) severe MR. A subsequent ROD 6/18 revealed critical prosthetic AS BUSTER 0.3 cm2, mild valvular AR, moderate MR, moderate MS, mild TR, doppler evidence of PFO, normal RV systolic function, LV function appears grossly normal, no pericardial effusion. Structural heart disease team (Dr. Bella, consulted) for TAVR workup. CT head 6/21 revealed no acute processes. CT heart congential 6/21 revealed nonobstructive CAD, calcified bioprosthetic aortic valve, severe mitral annular calcification; bilateral pleural effusions, septal thickening, and groundglass opacities. On 6/29/21 she underwent valve in valve TAVR with Sheri valve with Barber Bella/Young/Sher. A CoreValve was initially attempted, however there was resistance with passing the sheath through the descending, aortic arch, and ascending aorta. The CoreValve was deemed too rigid and decision was made to go with Sheri valve, which was deployed across bioprosthetic AV with good result.  Echocardiogram at the end of the procedure demonstrated no AI, no significant perivalvular leak, and no wall motion abnormalities. Of note, there was a planned ostial RCA stent for protection during ROSLYN deployment, however RCA stent embolized to descending thoracic aorta then left external iliac artery.  Multiple attempts at retrieval was unsuccessful and stent was left in place. She arrived to CTICU HD stable. She was weaned and extubated to high flow nasal canula overnight. She required 2 U PRBCs for Hg 6.7. An ultrasound of left radial artery was noted to have a left radial pseudoaneurysm for which vascular was consulted. On 7/1 she underwent a left radial pseudoanerysm repair under conscious sedation with Dr. Grace. She tolerated procedure well.  Palmar pulses were monitored daily postop and remained stable.  She otherwise remained hemodynamically stable, and made steady progress. She continue to ambulate independently. She was  deemed medically stable for discharge by structural heart disease and vascular surgery. She is tolerating po diet, moving her bowels and ambulating without supplemental oxygen. She was discharged home on 7/6/21.     Over 35 minutes was spent with the patient reviewing the discharge material including medications, follow up appointments, recovery, concerning symptoms, and how to contact their health care providers if they have questions    Vital Signs Last 24 Hrs  T(C): 36.4 (06 Jul 2021 09:12), Max: 37.2 (05 Jul 2021 13:40)  T(F): 97.5 (06 Jul 2021 09:12), Max: 98.9 (05 Jul 2021 13:40)  HR: 82 (06 Jul 2021 09:00) (63 - 82)  BP: 111/67 (06 Jul 2021 09:00) (91/51 - 141/65)  BP(mean): 80 (06 Jul 2021 09:00) (68 - 93)  RR: 17 (06 Jul 2021 09:00) (17 - 20)  SpO2: 96% (06 Jul 2021 09:00) (96% - 98%)    EPICARDIAL WIRES REMOVED: Yes.  TIE DOWNS REMOVED: Yes.    PHYSICAL EXAM:    General: NAD, comfortable, sitting up in chair    Neurological: AOx3, no focal neuro deficits    Cardiovascular: RRR, S1S2, +SADIE murmurs, rubs, gallops    Respiratory: CTABL, no wheezing, rales rhonchi    Gastrointestinal: soft, NTND, +BS    Extremities: WWP, no LE edema, no calf tenderness    Vascular: ditsal pulses intact b/l; extensive ecchymosis of left arm and right neck area    Incision Sites: c/d/i; no groin hematomas b/l    LABS:                        8.1    6.57  )-----------( 204      ( 06 Jul 2021 08:11 )             25.8       COUMADIN:   No.                    07-06    131<L>  |  97  |  21  ----------------------------<  93  4.2   |  25  |  1.01    Ca    9.3      06 Jul 2021 08:11  Mg     1.9     07-06    TPro  6.4  /  Alb  3.8  /  TBili  1.2  /  DBili  x   /  AST  27  /  ALT  11  /  AlkPhos  50  07-06          Discharge CXR:  < from: Xray Chest 1 View- PORTABLE-Routine (Xray Chest 1 View- PORTABLE-Routine in AM.) (07.04.21 @ 04:21) >    INTERPRETATION:  Clinical history/reason for exam: Follow-up.    Frontal chest.    COMPARISON: July 3, 2021.    Findings/  impression: Stable heart size, thoracic aortic and mitral annulus calcification, TAVR.. Right upper lobe nodule/granuloma. Left pleural effusion, unchanged. Left calcified granuloma Stable bony structures, dextroscoliosis. Left rib old fractures.    < end of copied text >      Discharge ECHO:  < from: TTE Echo Complete w/o Contrast w/ Doppler (06.30.21 @ 12:29) >  --------------------------------------------------------------------------------  CONCLUSIONS:     1. Normal left ventricular size and systolic function.   2. Normal right ventricular size and systolic function.   3. Moderate-to-severe mitral regurgitation.   4. Mild-to-moderate mitral stenosis. The mean transvalvular gradient is 7.00 mmHg at a heart rate of 66 bpm. Elevated transmitral gradient, which is partially due to the degree of mitral regurgitation.   5. Mild tricuspid regurgitation.   6. 20 mm Sheri 3 Ultra valve is noted in the aortic position (valve in valve) without any aortic regurgitation. The peak transvalvular velocity is 4.11 m/s, the mean transvalvular gradient is 35 mmHg, and the LVOT/AV velocity ratio is 0.33.   7. Pulmonary hypertension present, pulmonary artery systolic pressure is 37 mmHg.   8. No pericardial effusion.   9. Left pleural effusion.  10. Compared to the previous TTE performed on 6/16/2021, the patient is now s/p valve in valve procedure and the aortic gradients are lower.    < end of copied text >

## 2021-07-06 NOTE — PROGRESS NOTE ADULT - REASON FOR ADMISSION
Syncope
Syncope, severe AS
Syncope
Syncope, critical AS
Syncope

## 2021-07-06 NOTE — CHART NOTE - NSCHARTNOTESELECT_GEN_ALL_CORE
Event Note
Vascular Surgery/Event Note
Follow up/Nutrition Services
Nutrition Follow-up/Nutrition Services
Off Service Note
Vascular Surgery/Event Note

## 2021-07-06 NOTE — CHART NOTE - NSCHARTNOTEFT_GEN_A_CORE
89 yo F with 24Hr private HHA with PMHx of HTN, HLD, Diabetes insipidus, GERD, AS s/p AVR (7 years ago), TAA (4.1cm) who presented to the ED 6/16/21 BIBEMS from pt’s dermatology office where she was walking down the hallway and suddenly lost consciousness per her HHA. Pt is now admitted to cardiology for further management of syncope where echocardiogram notable for critical AS (BUSTER 0.3 cm2 by ROD) for which Dr. Bella is following. TAVR CTs performed today. Patient discussed by Dr. Fernandez and Dr. Vidal along with PCP and decision made to refer patient to [facility] for further workup and possible intervention due to type of valve used during prior AVR. Structural heart to sign off, please reconsult as medically necessary. Plan discussed with Dr. Vidal.
Admitting Diagnosis:   Patient is a 89y old  Female who presents with a chief complaint of Syncope (2021 11:21)      PAST MEDICAL & SURGICAL HISTORY:  Hyperlipidemia, unspecified hyperlipidemia type    Diabetes insipidus    H/O aortic valve stenosis    Hypertension    S/P AVR  Bioprosthetic    H/O lumbosacral spine surgery    S/P hip replacement  B/L        Current Nutrition Order: DASH/TLC + Cst Cho diet       PO Intake: Good (%) [   ]  Fair (50-75%) [   ] Poor (<25%) [ x  ]- diet just advanced from NPO post-op     GI Issues: no BM post-op, no n/v    Pain: being managed     Skin Integrity:  Jeff 18  Incision to wrist  B/L groin incision   No edema     Labs:       131<L>  |  96  |  17  ----------------------------<  96  4.3   |  23  |  1.04    Ca    9.1      2021 03:34  Phos  4.4     07-  Mg     2.0     -    TPro  6.1  /  Alb  4.4  /  TBili  0.9  /  DBili  x   /  AST  41<H>  /  ALT  10  /  AlkPhos  40  07-    CAPILLARY BLOOD GLUCOSE          Medications:  MEDICATIONS  (STANDING):  artificial  tears Solution 1 Drop(s) Both EYES two times a day  aspirin enteric coated 81 milliGRAM(s) Oral daily  atorvastatin 40 milliGRAM(s) Oral at bedtime  benzocaine 15 mG/menthol 3.6 mG Lozenge 1 Lozenge Oral three times a day  brimonidine 0.2% Ophthalmic Solution 1 Drop(s) Both EYES at bedtime  chlorhexidine 2% Cloths 1 Application(s) Topical daily  desmopressin 0.1 milliGRAM(s) Oral two times a day  fluticasone propionate 50 MICROgram(s)/spray Nasal Spray 1 Spray(s) Both Nostrils every 12 hours  gabapentin 300 milliGRAM(s) Oral daily  heparin   Injectable 5000 Unit(s) SubCutaneous every 8 hours  hydrocortisone 1% Cream 1 Application(s) Topical at bedtime  latanoprost 0.005% Ophthalmic Solution 1 Drop(s) Both EYES at bedtime  lidocaine   Patch 1 Patch Transdermal daily  pantoprazole    Tablet 40 milliGRAM(s) Oral before breakfast  sodium chloride 0.9%. 1000 milliLiter(s) (10 mL/Hr) IV Continuous <Continuous>  triamcinolone 0.1% Cream 1 Application(s) Topical <User Schedule>  trimethoprim  160 mG/sulfamethoxazole 800 mG 1 Tablet(s) Oral every 12 hours    MEDICATIONS  (PRN):  acetaminophen   Tablet .. 650 milliGRAM(s) Oral every 6 hours PRN Mild Pain (1 - 3)  benzocaine 20% Spray 1 Spray(s) Topical three times a day PRN sore throat  traMADol 25 milliGRAM(s) Oral every 6 hours PRN Severe Pain (7 - 10)      Weight:  67.1kg  Daily Weight in k.2 (2021 06:00)    Weight Change: -1kg, please trend, suspect d/t fluid     Nutrition Focused Physical Exam: Completed [   ]  Not Pertinent [  x ]    Estimated energy needs:   IBW used to calculate energy needs due to pt's current body weight exceeding 120% of IBW; adjusted for age/BMI   3436-4044 kcals (25-30 kcal/kg, IBW)  41.7-50.04 g protein (1-1.2 g/kg, IBW)  7553-0302 mls (30-35 ml/kg, IBW)    Subjective:   89 yo F with PMHx of HTN, HLD, Diabetes insipidus, GERD, AS s/p AVR (7 years ago), TAA (4.1cm) who presented to the ED 21 BIBEMS from pt’s dermatology office where she was walking down the hallway and suddenly lost consciousness per her HHA. Pt was found to be hypotensive with SBP 80’s in the field. Pt s/p 250cc bolus in ER and admitted to cardiology for further management of syncope where echocardiogram notable for critical AS. Syncope Likely 2/2 critical AS and dehydration. Carotid ultrasound shows no hemodynaimcally signficant stenosis in carotid arteries. CT head showed no pathology and cardiac CT non-obstructibe CAD, calcified bioprosthetic aortic valve, severe mitral calcification. Underwent TAVR , extubated , u/s showing hematoma.     Per prior assessment pt reported that portions here are too big for her but otherwise generally enjoys the meals. Likes chicken and fish and denies eating red meats, and also likes eating fruits and vegetables. Reports following a low sodium diet. Diet advanced from NPO to DASH and Cst Cho this morning, would liberalize to low Na to best meet needs d/t advanced age. Will continue to follow per RD protocol. Please see RD recs below.    Previous Nutrition Diagnosis: Inadequate energy intake r/t intake < EER AEB NPO     Active [   ]  Resolved [ x  ]    If resolved, new PES: Increased nutrient needs r/t hypermetabolic state AEB post-op demand     Goal: meet >75% EER     Recommendations:  1. Continue on DASH/TLC diet, would liberalize from cst cho to optimize options   2. Monitor %PO intake and need for ONS   3. Monitor labs: BMP, BG q6hrs, lytes  4. Trend wts     Education: encouraged intake through day to meet needs     Risk Level: High [  x ] Moderate [   ] Low [   ]
Admitting Diagnosis:   Patient is a 89y old  Female who presents with a chief complaint of Syncope (27 Jun 2021 07:33)      PAST MEDICAL & SURGICAL HISTORY:  Hyperlipidemia, unspecified hyperlipidemia type    Diabetes insipidus    H/O aortic valve stenosis    Hypertension    S/P AVR  Bioprosthetic    H/O lumbosacral spine surgery    S/P hip replacement  B/L        Current Nutrition Order:   DASH/TLC Diet    PO Intake: Good (%) [   ]  Fair (50-75%) [ x ] Poor (<25%) [   ]    GI Issues: WDL as noted in flowsheet, last BM 6/26    Pain: Denies pain/discomfort per flowsheet    Skin Integrity: Jeff 18, pressure wise skin intact    Labs:   06-28    135  |  98  |  20  ----------------------------<  85  5.4<H>   |  28  |  0.87    Ca    9.4      28 Jun 2021 07:33  Mg     2.2     06-28      CAPILLARY BLOOD GLUCOSE          Medications:  MEDICATIONS  (STANDING):  artificial  tears Solution 1 Drop(s) Both EYES two times a day  aspirin enteric coated 81 milliGRAM(s) Oral daily  atorvastatin 40 milliGRAM(s) Oral at bedtime  brimonidine 0.2% Ophthalmic Solution 1 Drop(s) Both EYES at bedtime  desmopressin 0.1 milliGRAM(s) Oral two times a day  enoxaparin Injectable 40 milliGRAM(s) SubCutaneous every 24 hours  fluticasone propionate 50 MICROgram(s)/spray Nasal Spray 1 Spray(s) Both Nostrils every 12 hours  gabapentin 300 milliGRAM(s) Oral at bedtime  hydrocortisone 1% Ointment 1 Application(s) Topical daily  latanoprost 0.005% Ophthalmic Solution 1 Drop(s) Both EYES at bedtime  melatonin 3 milliGRAM(s) Oral at bedtime  metoprolol tartrate 12.5 milliGRAM(s) Oral two times a day  nystatin Powder 1 Application(s) Topical two times a day    MEDICATIONS  (PRN):  acetaminophen   Tablet .. 650 milliGRAM(s) Oral every 6 hours PRN Moderate Pain (4 - 6), Severe Pain (7 - 10)  diphenhydrAMINE 25 milliGRAM(s) Oral every 4 hours PRN Rash and/or Itching    Adm Anthropometrics:  Height 4 Ft 8 in  Weight 148 lbs  BMI 32.9   IBW 92 lbs  %%    Weight Change: No new wts in EMR. Please obtain wts biweekly to assess for wt changes    Nutrition Focused Physical Exam: Completed [   ]  Not Pertinent [ x ]    Estimated energy needs:   8870-9847 kcals (25-30 kcals/kg, IBW)  41.7-50.04 g protein (1-1.2 g/kg, IBW)  5795-3827 mls (30-35 mls/kg, IBW)    IBW used to calculate energy needs due to pt's current body weight exceeding 120% of IBW; adjusted for age/BMI    Subjective:   89 yo F with PMHx of HTN, HLD, Diabetes insipidus, GERD, AS s/p AVR (7 years ago), TAA (4.1cm) who presented to the ED 6/16/21 BIBEMS from pt’s dermatology office where she was walking down the hallway and suddenly lost consciousness per her HHA. Pt was found to be hypotensive with SBP 80’s in the field. Pt s/p 250cc bolus in ER and admitted to cardiology for further management of syncope where echocardiogram notable for critical AS. Syncope Likely 2/2 critical AS and dehydration. Carotid ultrasound shows no hemodynaimcally signficant stenosis in carotid arteries. CT head showed no pathology and cardiac CT non-obstructibe CAD, calcified bioprosthetic aortic valve, severe mitral calcification. Plan for TAVR 6/29    On follow up assessment, pt seen sitting up in bed. Reported good appetite and good PO intake. Pt reported that portions here are too big for her but otherwise generally enjoys the meals. Likes chicken and fish and denies eating red meats, and also likes eating fruits and vegetables. Reports following a low sodium diet. Reported to have eaten oatmeal, 1/2 bagel, decaf coffee and raisins for breakfast this AM. Encouraged pt to have adequate PO intake of lean proteins, adequate CHO and continue with eating vegetables and fruit in her diet. Currently on DASH/TLC diet. Will continue to follow per RD protocol. Please see RD recs below.    Previous Nutrition Diagnosis:  Nutrition Diagnostic Terminology #1 Other...     Other Inadequate energy intake.     Etiology RT intake<EER.     Signs/Symptoms AEB NPO.     Goal/Expected Outcome Diet to be advanced in 24-48hrs as medically feasible.    Active [   ]  Resolved [ x ]    If resolved, new PES:   Increased nutrient requirements RT increase demand for nutrients AEB plan for TAVR 6/29    Goal: >/=75% EER met via PO intake with good tolerance    Recommendations: 1. Continue on DASH/TLC diet 2. Monitor %PO intake and need for ONS 3. Monitor labs: BMP, BG q6hrs, lytes    Education: Encourage PO intake with emphasis on lean proteins    Risk Level: High [   ] Moderate [ x ] Low [   ]
Patient seen and examined this AM. No complaints regarding L arm/wrist, though states she has generalized itching in both arms. Denies CP/SOB. On exam patient with unchanged hematoma by wrist incision, bruising up L medial arm up to elbow, asymptomatic. Palpable radial pulse, motor/sensation grossly intact. Incision c/d/i. OK to shower, clean with soap and water, do not scrub, pat dry. Please have patient follow-up with Dr. Grace in 1 week. Call [phone] for appointments.
Evaluated patient at bedside for concerns of bleeding from incision site. Patient currently denies pain in the left arm. States that the swelling and bruising has not gotten worse. No numbness or tingling.    Left hand to elbow with areas of ecchymosis and slight swelling. All arm compartments are soft. Dressing saturated with serosanguinous fluid. Dressing removed at bedside in sterile fashion. No areas of large amounts of bleeding noted. Small area in middle of incision with very slow oozing of blood. No hematoma could be palpated or expressed. slight pressure held over area of oozing for 5 minutes. No further oozing noted. strong palpable radial pulse distal to the incision. Incision cleaned and re-dressed.    Continue to monitor hand for worsening swelling, bleeding, pulse exam, and patients pain level. Discussed with Dr. Grace. Will continue to monitor closely.
Troponin T uptrending 0.18 to 0.21, CKMB 12.7 to 13.3, . Patient asymptomatic, resting comfortably. VSS. Repeat EKG SR@80BPM with persistent ST depression lateral leads, ST segment elevations V1-V3, resolution of biphasic TW in V5-V6. Discussed labs/EKG findings with Dr. Fernandez, likely demand in setting of severe AS. Will continue to trend CE and medically management with ASA/Statin and initiate Metoprolol Tartrate 12.5mg PO BID.

## 2021-07-06 NOTE — PROGRESS NOTE ADULT - NSICDXPILOT_GEN_ALL_CORE
[place]

## 2021-07-06 NOTE — PROGRESS NOTE ADULT - ASSESSMENT
Care Plan/Procedures:  Discharge Diagnoses, Assessment and Plan of Treatment	PRINCIPAL DISCHARGE DIAGNOSIS  Diagnosis: Critical aortic valve stenosis  Assessment and Plan of Treatment: Your aortic heart valve is very tight and barely opening and closing. Please return to the nearest hospital for dizziness or near syncope (passing out). Please avoid hot environments and crowds. Please continue to to wear your compresison stocks, eat a low salt diet (2 grams of salt per day) and make sure you stay hydrated. You were evaluted by the structural heart surgeon Dr. Nirav Bella for a valve-in valve repair. Please follow up with Dr Bella on 6/30/21 @ 2PM.      SECONDARY DISCHARGE DIAGNOSES  Diagnosis: HTN (hypertension)  Assessment and Plan of Treatment: Please STOP Lisinopril.  Please continue metoprolol tartrate 12.5 mg twice daily.    Diagnosis: Hyperlipidemia  Assessment and Plan of Treatment: Please continue your Lipitor 40 mg once a day at bedtime.    Diagnosis: Prediabetes  Assessment and Plan of Treatment: Your hemoglobin A1c is 5.7 which makes you prediabetic or borderline diabetic. A normal hemoglobin A1c is less than 5.7, prediabetes is 5.7-6.4, diabetes is 6.5 and up. Your hemoglobin A1c measures your average blood sugar over 3 months. It is very important to maintain a low carbohydrate diet (less bread, rice, pasta, starches, soda).    Diagnosis: Diabetes insipidus  Assessment and Plan of Treatment: Continue desmopressin 0.1 mg twice a day as prescribed.  Discharge Procedures, Findings and Treatment	PRINCIPAL PROCEDURE  Procedure: Repair or revision, TAVR  Findings and Treatment:  Goal(s)	To get better and follow your care plan as instructed.     Follow Up:  Activity	Do not drive or operate machinery, Do not make important decisions, No heavy lifting/straining, Stairs allowed, Walking - Indoors allowed, Walking - Outdoors allowed  Additional Instructions	-Walk daily as tolerated and use your incentive spirometer every hour.    -No driving or strenuous activity/exercise for 6 weeks, or until cleared by your surgeon.    -Gently clean your incisions with anti-bacterial soap and water, pat dry.  You may leave them open to air.    -Call your doctor if you have shortness of breath, chest pain not relieved by pain medication, dizziness, fever >101.5, or increased redness or drainage from incisions.  Care Providers for Follow up (PCP/Outpatient Provider)	Nirav Bella)  Cardiology; Interventional Cardiology  [address] [place] [address]  Phone: [phone]  Fax: [phone]  Scheduled Appointment: 07/12/2021 02:00 PM    Camila Mcpherson)  Internal Medicine  [address] [place] [address]  Phone: [phone]  Fax: [phone]  Follow Up Time: 1 week    Sydney Vasquez)  Cardiovascular Disease  [address], [address] [place] [address]  Phone: [phone]  Fax: [phone]  Follow Up Time: 2 weeks    Darek Grace)  Surgery; Vascular Surgery  [address], [address], [place] [address]  Phone: [phone]  Fax: [phone]  Follow Up Time: 1 week  	  Patient's Scheduled Appointments	COLLETTE OROZCO ; 07/12/2021 ; NPP CT Surg [address]  [place], COLLETTE ; 07/20/2021 ; NPP Surg Vasc [address]  [place], COLLETTE ; 07/20/2021 ; NPP Cardio Vasc [address]  	  Additional Scheduled Appointments	-Please follow up with Dr. Bella on 7/12/21. The office is located at [facility], [address] [facility]. Call us with any questions #[phone].    -Please follow up with Dr. Grace (vascular surgery) in 1 week. Call #[phone] to make an appointment.    -Please follow up with your PCP, Dr. Mcpherson in 2 weeks. Call #[phone] to schedule an appointment.    -Please follow up with your cardiologist, Dr. Vasquez in 2 weeks. Call #[phone] to schedule an appointment.

## 2021-07-07 RX ORDER — LATANOPROST/PF 0.005 %
0.01 DROPS OPHTHALMIC (EYE) DAILY
Refills: 0 | Status: ACTIVE | COMMUNITY
Start: 2021-07-07

## 2021-07-09 ENCOUNTER — APPOINTMENT (OUTPATIENT)
Dept: CARE COORDINATION | Facility: HOME HEALTH | Age: 86
End: 2021-07-09

## 2021-07-09 VITALS — WEIGHT: 145 LBS | BODY MASS INDEX: 27.4 KG/M2

## 2021-07-09 NOTE — HISTORY OF PRESENT ILLNESS
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 Pt was found to be hypotensive with SBP 80’s in the field. Pt had a recent ED visit on 6/11/21 for another episode of witnessed syncope after walking many blocks to a restaurant and then lost consciousness while sitting in a restaurant, pt left AMA that visit. Pt reports taking Lasix at home for h/o "fluid in the lungs" and chronic LE edema L>R. Pt denies fever, chills, cough, CP, PND/orthopnea, abdominal pain, N/V/D, dizziness. Pt had an outpatient CCTA 6/3/21 revealing calcium score is severe at 768 Agatston units, non obstructive CAD, dLAD is not well visualized but is probably non obstructive. 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 CTA chest 6/3/21 neg for PE. MRA chest 6/11/21: since 6/3/2021, there has been resolution of bilateral pleural effusions, technique is not optimized to assess mitral valve function, Mildly aneurysmal ascending aorta, measuring 4.1 cm. During hospitalization, an echo 6/16 revealing critical aortic stenosis (worsened from moderate on 5/4/21), peak transvalvular velocity is 6.00 m/s, the mean transvalvular gradient is 89.00 mmHg, and the LVOT/AV velocity ratio is 0.13, aortic valve area (estimated via the continuity method) is 0.45 cm², (+) severe MR. A subsequent ROD 6/18 revealed critical prosthetic AS BUSTER 0.3 cm2, mild valvular AR, moderate MR, moderate MS, mild TR, Doppler evidence of PFO, normal RV systolic function, LV function appears grossly normal, no pericardial effusion. Structural heart disease team (Dr. Bella, consulted) for TAVR workup. CT head 6/21 revealed no acute processes. CT heart congenital 6/21 revealed nonobstructive CAD, calcified bioprosthetic aortic valve, severe mitral annular calcification; bilateral pleural effusions, septal thickening, and ground-glass opacities. On 6/29/21 she underwent valve in valve TAVR with Sheri valve with Barber Bella/Young/Sher. A CoreValve was initially attempted, however there was resistance with passing the sheath through the descending, aortic arch, and ascending aorta. The CoreValve was deemed too rigid and decision was made to go with Sheri valve, which was deployed across bioprosthetic AV with good result. Echocardiogram at the end of the procedure demonstrated no AI, no significant perivalvular leak, and no wall motion abnormalities. Of note, there was a planned ostial RCA stent for protection during ROSLYN deployment, however RCA stent embolized to descending thoracic aorta then left external iliac artery. Multiple attempts at retrieval was unsuccessful and stent was left in place. She arrived to CTICU HD stable. She was weaned and extubated to high flow nasal canula overnight. She required 2 U PRBCs for Hg 6.7. An ultrasound of left radial artery was noted to have a left radial pseudoaneurysm for which vascular was consulted. On 7/1 she underwent a left radial pseudoaneurysm repair under conscious sedation with Dr. Grace. She tolerated procedure well. Palmar pulses were monitored daily postop and remained stable. She otherwise remained hemodynamically stable, and made steady progress. She continue to ambulate independently. She was deemed medically stable for discharge by structural heart disease and vascular surgery. She is tolerating po diet, moving her bowels and ambulating without supplemental oxygen. She was discharged home on 7/6/21. \par \par Labs & studies reviewed. \par \par Pt. is seen via telemedicine with assistance of her HHA, Mary. Mary reports that BLE as visualized on exam is baseline for pt. She stated that her pre-op weight was 148 lbs & today is 145 lbs. Pt. is ambulating with rollator. She was seen by PT today. She is eating, drinking, voiding & moving her bowels without difficulty. Mary was able to palpate left radial pulse during visit. Pt. stated that ecchymosis & swelling to LUE has decreased since d/c.  All medications reviewed & pt. is taking as prescribed. Pt. offers no complaints at this time. She has f/u appt on 7/12/21.

## 2021-07-09 NOTE — REASON FOR VISIT
[Home] : at home, [unfilled] , at the time of the visit. [Other Location: e.g. Home (Enter Location, City,State)___] : at [unfilled] [Formal Caregiver] : formal caregiver [Verbal consent obtained from patient] : the patient, [unfilled] [Post Op: _________] : a [unfilled] post op visit

## 2021-07-09 NOTE — PHYSICAL EXAM
[Sclera] : the sclera and conjunctiva were normal [Extraocular Movements] : extraocular movements were intact [] : no respiratory distress [Respiration, Rhythm And Depth] : normal respiratory rhythm and effort [Exaggerated Use Of Accessory Muscles For Inspiration] : no accessory muscle use [Examination Of The Chest] : the chest was normal in appearance [Chest Visual Inspection Thoracic Asymmetry] : no chest asymmetry [Nail Clubbing] : no clubbing  or cyanosis of the fingernails [Motor Tone] : muscle strength and tone were normal [FreeTextEntry1] : Moderate ecchymosis to neck as above, left arm & hand, bilateral groins & lower abd. Left forearm incision healing well. No erythema or drainage.  [No Focal Deficits] : no focal deficits [Oriented To Time, Place, And Person] : oriented to person, place, and time [Impaired Insight] : insight and judgment were intact [Affect] : the affect was normal [Mood] : the mood was normal

## 2021-07-09 NOTE — REVIEW OF SYSTEMS
[Lower Ext Edema] : lower extremity edema [As Noted in HPI] : as noted in HPI [Limb Swelling] : limb swelling [Sleep Disturbances] : no sleep disturbances [Anxiety] : no anxiety [Depression] : no depression [Negative] : Neurological [FreeTextEntry7] : Last BM today.  [FreeTextEntry8] : Frequency throughout day & evening on days she takes Lasix which is bothersome to her.

## 2021-07-12 ENCOUNTER — APPOINTMENT (OUTPATIENT)
Dept: VASCULAR SURGERY | Facility: CLINIC | Age: 86
End: 2021-07-12

## 2021-07-12 ENCOUNTER — APPOINTMENT (OUTPATIENT)
Dept: CARDIOTHORACIC SURGERY | Facility: CLINIC | Age: 86
End: 2021-07-12
Payer: MEDICARE

## 2021-07-12 VITALS
HEART RATE: 68 BPM | WEIGHT: 143 LBS | BODY MASS INDEX: 27 KG/M2 | RESPIRATION RATE: 17 BRPM | DIASTOLIC BLOOD PRESSURE: 68 MMHG | TEMPERATURE: 97.3 F | HEIGHT: 61 IN | OXYGEN SATURATION: 97 % | SYSTOLIC BLOOD PRESSURE: 147 MMHG

## 2021-07-12 VITALS — HEART RATE: 63 BPM | SYSTOLIC BLOOD PRESSURE: 130 MMHG | DIASTOLIC BLOOD PRESSURE: 76 MMHG

## 2021-07-12 PROCEDURE — 99072 ADDL SUPL MATRL&STAF TM PHE: CPT

## 2021-07-12 PROCEDURE — 99213 OFFICE O/P EST LOW 20 MIN: CPT

## 2021-07-12 NOTE — REASON FOR VISIT
[de-identified] : Left radial artery pseudoaneurysm repair [de-identified] : 7/1/2021 [de-identified] : 89 year old female with a history of hypertension,Diabetes Inspidus, GERD, Aortic stenosis s/p AVR seven years ago, chronic diastolic heart failure with severe bioprosthetic aortic stenosis s/p TF Bryce on 06/29/2021 using Sheri Ultra (20 mm, commercial) c/b embolization of the RCA stent into the left external iliac artery, with left radial pseudoaneurysm repair on 07/1/2021 who presents for a post up visit. Patient states that her left arm incision is well healed, but she noticed that her left arm and left leg are more swollen than usual. She was seen by Dr. Bella today who recommended for her to be evaluated for left sided edema. She denies pain at her incision side, states that her incision healed well, no fever, chills. \par Dr. Bella would like her to have B/L UE venous doppler.\par \par The patient tolerated the procedure well. Intraop, there was embolization of the RCA stent into the left external iliac and attempts to retrieve it were unsuccessful. Post op, the patient required blood transfusions. Ultrasound of left radial showed pseudoaneurysm requiring repair with Dr. Nieves and the patient was discharged home. \par  [Formal Caregiver] : formal caregiver

## 2021-07-12 NOTE — PHYSICAL EXAM
[JVD] : no jugular venous distention  [Normal Breath Sounds] : Normal breath sounds [Normal Heart Sounds] : normal heart sounds [Normal Rate and Rhythm] : normal rate and rhythm [2+] : left 2+ [Ankle Swelling (On Exam)] : present [Ankle Swelling On The Left] : of the left ankle [Ankle Swelling On The Right] : mild [Varicose Veins Of Lower Extremities] : not present [] : not present [Abdomen Tenderness] : ~T ~M No abdominal tenderness [Alert] : alert [Calm] : calm [de-identified] : Pleasant, on a wheelchair [de-identified] : NC/AT [FreeTextEntry1] : LUE: + edema from elbow to wrist, + well healed incision over radial artery, + mild hematoma, NT, radial pulse  intact, no neurologic deficits [de-identified] : +FROM [de-identified] : grossly intact

## 2021-07-12 NOTE — ADDENDUM
[FreeTextEntry1] : \par This note was written by Tamara MUHAMMAD, acting as a scribe for Dr. Darek Grace.  I, Dr. Darek Grace, have read and attest that all the information, medical decision-making, and discharge instructions within are true and accurate.\par \par I, Dr. Darek Grace, personally performed the evaluation and management (E/M) services for this established patient who presents today with (an) existing condition(s).  That E/M includes conducting the examination, assessing all conditions, and (re)establishing/reinforcing a plan of care.  Today, my ACP, Tamara MUHAMMAD, was here to observe my evaluation and management services for this condition to be followed going forward.\par \par \par

## 2021-07-12 NOTE — DISCUSSION/SUMMARY
[FreeTextEntry1] : 89 year old female, s/p L radial artery pseudoaneurysm repair on 7/1/21 after cardiac procedure and returns today for a post-op visit.\par Left UE with mild edema from the elbow down, incision is well healed, mild surrounding hematoma, + palpable radial pulse,no signs of infection.\par B/L UE and LLE venous doppler was done in the office and it is negative for DVT.\par Patient was reassured that she doesn't have a "blood clot" and her left arm incision is well healed.\par Bacitracin was applied to the left arm incision and covered with kerlix.\par Patient will f/u here as necessary.

## 2021-07-16 DIAGNOSIS — Q21.1 ATRIAL SEPTAL DEFECT: ICD-10-CM

## 2021-07-16 DIAGNOSIS — I27.20 PULMONARY HYPERTENSION, UNSPECIFIED: ICD-10-CM

## 2021-07-16 DIAGNOSIS — E87.5 HYPERKALEMIA: ICD-10-CM

## 2021-07-16 DIAGNOSIS — I24.8 OTHER FORMS OF ACUTE ISCHEMIC HEART DISEASE: ICD-10-CM

## 2021-07-16 DIAGNOSIS — T81.718A COMPLICATION OF OTHER ARTERY FOLLOWING A PROCEDURE, NOT ELSEWHERE CLASSIFIED, INITIAL ENCOUNTER: ICD-10-CM

## 2021-07-16 DIAGNOSIS — I97.89 OTHER POSTPROCEDURAL COMPLICATIONS AND DISORDERS OF THE CIRCULATORY SYSTEM, NOT ELSEWHERE CLASSIFIED: ICD-10-CM

## 2021-07-16 DIAGNOSIS — I95.9 HYPOTENSION, UNSPECIFIED: ICD-10-CM
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
## 2021-07-16 DIAGNOSIS — Z00.6 ENCOUNTER FOR EXAMINATION FOR NORMAL COMPARISON AND CONTROL IN CLINICAL RESEARCH PROGRAM: ICD-10-CM

## 2021-07-16 DIAGNOSIS — J90 PLEURAL EFFUSION, NOT ELSEWHERE CLASSIFIED: ICD-10-CM

## 2021-07-16 DIAGNOSIS — K21.9 GASTRO-ESOPHAGEAL REFLUX DISEASE WITHOUT ESOPHAGITIS: ICD-10-CM

## 2021-07-16 DIAGNOSIS — I97.88 OTHER INTRAOPERATIVE COMPLICATIONS OF THE CIRCULATORY SYSTEM, NOT ELSEWHERE CLASSIFIED: ICD-10-CM

## 2021-07-16 DIAGNOSIS — I34.0 NONRHEUMATIC MITRAL (VALVE) INSUFFICIENCY: ICD-10-CM

## 2021-07-16 DIAGNOSIS — Z88.0 ALLERGY STATUS TO PENICILLIN: ICD-10-CM

## 2021-07-16 DIAGNOSIS — T82.867A THROMBOSIS DUE TO CARDIAC PROSTHETIC DEVICES, IMPLANTS AND GRAFTS, INITIAL ENCOUNTER: ICD-10-CM

## 2021-07-16 DIAGNOSIS — Z96.643 PRESENCE OF ARTIFICIAL HIP JOINT, BILATERAL: ICD-10-CM

## 2021-07-16 DIAGNOSIS — I70.203 UNSPECIFIED ATHEROSCLEROSIS OF NATIVE ARTERIES OF EXTREMITIES, BILATERAL LEGS: ICD-10-CM

## 2021-07-16 DIAGNOSIS — Z88.1 ALLERGY STATUS TO OTHER ANTIBIOTIC AGENTS STATUS: ICD-10-CM

## 2021-07-16 DIAGNOSIS — R55 SYNCOPE AND COLLAPSE: ICD-10-CM

## 2021-07-16 DIAGNOSIS — I71.2 THORACIC AORTIC ANEURYSM, WITHOUT RUPTURE: ICD-10-CM

## 2021-07-16 DIAGNOSIS — E87.1 HYPO-OSMOLALITY AND HYPONATREMIA: ICD-10-CM

## 2021-07-16 DIAGNOSIS — N17.9 ACUTE KIDNEY FAILURE, UNSPECIFIED: ICD-10-CM

## 2021-07-16 DIAGNOSIS — I11.0 HYPERTENSIVE HEART DISEASE WITH HEART FAILURE: ICD-10-CM
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
## 2021-07-16 DIAGNOSIS — I50.33 ACUTE ON CHRONIC DIASTOLIC (CONGESTIVE) HEART FAILURE: ICD-10-CM
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
## 2021-07-16 DIAGNOSIS — I72.1 ANEURYSM OF ARTERY OF UPPER EXTREMITY: ICD-10-CM

## 2021-07-16 DIAGNOSIS — D63.8 ANEMIA IN OTHER CHRONIC DISEASES CLASSIFIED ELSEWHERE: ICD-10-CM

## 2021-07-16 DIAGNOSIS — E78.5 HYPERLIPIDEMIA, UNSPECIFIED: ICD-10-CM

## 2021-07-16 DIAGNOSIS — E13.51 OTHER SPECIFIED DIABETES MELLITUS WITH DIABETIC PERIPHERAL ANGIOPATHY WITHOUT GANGRENE: ICD-10-CM

## 2021-07-16 DIAGNOSIS — T82.857A STENOSIS OF OTHER CARDIAC PROSTHETIC DEVICES, IMPLANTS AND GRAFTS, INITIAL ENCOUNTER: ICD-10-CM

## 2021-07-16 DIAGNOSIS — E23.2 DIABETES INSIPIDUS: ICD-10-CM

## 2021-07-16 DIAGNOSIS — Z95.3 PRESENCE OF XENOGENIC HEART VALVE: ICD-10-CM

## 2021-07-16 DIAGNOSIS — I35.0 NONRHEUMATIC AORTIC (VALVE) STENOSIS: ICD-10-CM

## 2021-07-16 DIAGNOSIS — I34.2 NONRHEUMATIC MITRAL (VALVE) STENOSIS: ICD-10-CM

## 2021-07-16 NOTE — PHYSICAL EXAM
[Well Developed] : well developed [Well Nourished] : well nourished [No Acute Distress] : no acute distress [Normal Venous Pressure] : normal venous pressure [Normal S1, S2] : normal S1, S2 [No Murmur] : no murmur [No Rub] : no rub [No Gallop] : no gallop [Clear Lung Fields] : clear lung fields [Good Air Entry] : good air entry [No Respiratory Distress] : no respiratory distress  [Soft] : abdomen soft [Non Tender] : non-tender [Normal Bowel Sounds] : normal bowel sounds [Normal Gait] : normal gait [No Cyanosis] : no cyanosis [No Clubbing] : no clubbing [Diminished Pedal Pulses ___] : diminished pedal pulses [unfilled] [Edema ___] : edema [unfilled] [No Rash] : no rash [No Focal Deficits] : no focal deficits [Normal Speech] : normal speech [Alert and Oriented] : alert and oriented [Normal memory] : normal memory [de-identified] : s/p LUE hematoma evacuation: ecchymosis with mild edema, healing incision C/D/I

## 2021-07-16 NOTE — REVIEW OF SYSTEMS
[Lower Ext Edema] : lower extremity edema [Anxiety] : anxiety [Under Stress] : under stress [Fever] : no fever [Headache] : no headache [Chills] : no chills [Feeling Fatigued] : not feeling fatigued [SOB] : no shortness of breath [Dyspnea on exertion] : not dyspnea during exertion [Chest Discomfort] : no chest discomfort [Leg Claudication] : no intermittent leg claudication [Palpitations] : no palpitations [Orthopnea] : no orthopnea [Syncope] : no syncope [Cough] : no cough [Wheezing] : no wheezing [Abdominal Pain] : no abdominal pain [Nausea] : no nausea [Vomiting] : no vomiting [Diarrhea] : diarrhea [Constipation] : no constipation [Joint Pain] : no joint pain [Joint Swelling] : no joint swelling [Myalgia] : no myalgia [Rash] : no rash [Dizziness] : no dizziness [Weakness] : no weakness [Confusion] : no confusion was observed [Depression] : no depression [FreeTextEntry5] : Left lower extremity edema more than right

## 2021-07-16 NOTE — ASSESSMENT
[FreeTextEntry1] : 89 year old female with history of HTN, Diabetes Insipidus, GERD, AS s/p AVR, chronic diastolic CHF, severe bioprosthetic AS s/p TF Valve in Valve TAVR ([place] Sheri S3 ultra 20mm) on 6/29/21 c/b embolization of RCA stent into the left external iliac artery with unsuccessful retrieval and left radial pseudoaneurysm repair on 7/1/21 who was seen in office today for post-discharge follow-up.  \par \par 1. Severe AS s/p TAVR \par -Continue ASA 81mg, Lipitor 40mg\par -Significant LLE edema concerning for DVT.  Obtain LE venous duplex to r/o DVT. \par -Continue Lasix 20mg QOD with K+\par -Recommend continued follow-up with Dr. Garcia (Vascular)\par -Will follow-up in clinic or via Telehealth in 1 week\par \par I personally performed the services described in the documentation and reviewed the documented recorded by the scribe in my presence; it accurately and completely records my words and actions.\par \par

## 2021-07-16 NOTE — HISTORY OF PRESENT ILLNESS
[FreeTextEntry1] : 89 year old female with a history of hypertension, hypertension, Diabetes Inspidus, GERD, Aortic stenosis s/p AVR seven years ago, chronic diastolic heart failure with severe bioprosthetic aortic stenosis s/p TF Bryce on 06/29/2021 using Sheri Ultra (20 mm, commercial) c/b embolization of the RCA stent into the left external iliac artery, with left radial pseudoaneurysm repair on 07/1/2021 who presents for follow up after discharge. \par \par The patient tolerated the procedure well. Intraop, there was embolization of the RCA stent into the left external iliac and attempts to retrieve it were unsuccessful. Post op, the patient required blood transfusions. Ultrasound of left radial showed pseudoaneurysm requiring repair with Dr. Nieves and the patient was discharged home. \par \par Since her discharge, the patient states that she has been having some emotional difficulties dealing with her post-procedure expectations.  She would like to be more active than she has been able to be after her procedure which is causing her some anxiety.  She also reports continued LUE and LLE edema and mild incisional pain.  Denies HA, AMS, CP, palpitations, SOB, cough, wheeze, n/v/d, fever, chills.

## 2021-07-19 ENCOUNTER — APPOINTMENT (OUTPATIENT)
Dept: CARDIOTHORACIC SURGERY | Facility: CLINIC | Age: 86
End: 2021-07-19
Payer: MEDICARE

## 2021-07-19 ENCOUNTER — APPOINTMENT (OUTPATIENT)
Dept: VASCULAR SURGERY | Facility: CLINIC | Age: 86
End: 2021-07-19
Payer: MEDICARE

## 2021-07-19 VITALS
HEIGHT: 61 IN | DIASTOLIC BLOOD PRESSURE: 64 MMHG | SYSTOLIC BLOOD PRESSURE: 138 MMHG | OXYGEN SATURATION: 98 % | RESPIRATION RATE: 17 BRPM | WEIGHT: 143 LBS | TEMPERATURE: 96.8 F | BODY MASS INDEX: 27 KG/M2 | HEART RATE: 71 BPM

## 2021-07-19 PROCEDURE — 99212 OFFICE O/P EST SF 10 MIN: CPT

## 2021-07-19 PROCEDURE — 99213 OFFICE O/P EST LOW 20 MIN: CPT

## 2021-07-20 ENCOUNTER — APPOINTMENT (OUTPATIENT)
Dept: HEART AND VASCULAR | Facility: CLINIC | Age: 86
End: 2021-07-20

## 2021-07-20 NOTE — ASSESSMENT
[FreeTextEntry1] : 89yoF s/p L radial artery pseudoaneurysm repair on 07/01/2021 after cardiac procedure, also seen for chronic LLE edema at previous visit, returns for reevaluation of both issues.  Currently, pt reports no symptoms in the L hand/fingers, and complains only of a focal area of the surgical incision which is slightly red but not tender or draining fluid.  Pt states that her LLE edema present prior to her surgery w/Dr. Bella is persistent, but has significantly improved since her post-op visit w/Dr. Grace.  Duplex performed at that time was negative for DVT.  Ms. Duval states she has compression stockings, but is unable to don them as they are 20-30mmHg.  \par She endorses full use and strength of her L hand and LUE, and reports no limitation in use in the LLE.  She is compliant w/all medications.\par \par Edema significantly improved in LLE and LUE fully functional w/o limitation.  Palpable radial pulse noted in the LUE and extremity well-perfused.  Encouraged pt to continue use of LUE and start wearing compression stockings (Rx for 12-20mmHg compression provided to pt).  Pt will return to office PRN.

## 2021-07-20 NOTE — HISTORY OF PRESENT ILLNESS
[FreeTextEntry1] : 89yoF s/p L radial artery pseudoaneurysm repair on 07/01/2021 after cardiac procedure, also seen for chronic LLE edema at previous visit, returns for reevaluation of both issues.  Currently, pt reports no symptoms in the L hand/fingers, and complains only of a focal area of the surgical incision which is slightly red but not tender or draining fluid.  Pt states that her LLE edema present prior to her surgery w/Dr. Bella is persistent, but has significantly improved since her post-op visit w/Dr. Grace.  Duplex performed at that time was negative for DVT.  Ms. Duval states she has compression stockings, but is unable to don them as they are 20-30mmHg.\par \par She endorses full use and strength of her L hand and LUE, and reports no limitation in use in the LLE.  She is compliant w/all medications.

## 2021-07-20 NOTE — PHYSICAL EXAM
[JVD] : no jugular venous distention  [Normal Thyroid] : the thyroid was normal [Normal Breath Sounds] : Normal breath sounds [Normal Heart Sounds] : normal heart sounds [Normal Rate and Rhythm] : normal rate and rhythm [2+] : left 2+ [Ankle Swelling (On Exam)] : present [Ankle Swelling On The Right] : mild [Ankle Swelling On The Left] : of the left ankle [Varicose Veins Of Lower Extremities] : not present [] : not present [Abdomen Tenderness] : ~T ~M No abdominal tenderness [Purpura] : no purpura  [Petechiae] : no petechiae [Skin Ulcer] : no ulcer [Skin Induration] : no induration [Alert] : alert [Calm] : calm [de-identified] : Pleasant, ambulating w/walker support, NAD [de-identified] : NC/AT, anicteric [FreeTextEntry1] : LUE w/o swelling, fingertips well-perfused w/brisk cap refill; LLE edema slightly improved, not tense [de-identified] : FROM throughout, strength 5/5x4,  strength 5/5 in L hand [de-identified] : Well-approximated L wrist incision, no dehiscence noted, surrounding skin slightly macerated [de-identified] : Neurosensory grossly intact in hands b/l

## 2021-07-20 NOTE — REVIEW OF SYSTEMS
[Fever] : no fever [Chills] : no chills [Leg Claudication] : no intermittent leg claudication [Lower Ext Edema] : lower extremity edema [As Noted in HPI] : as noted in HPI [Limb Pain] : no limb pain [Limb Swelling] : limb swelling [Negative] : Heme/Lymph

## 2021-07-23 NOTE — PHYSICAL EXAM
[Sclera] : the sclera and conjunctiva were normal [PERRL With Normal Accommodation] : pupils were equal in size, round, and reactive to light [Neck Appearance] : the appearance of the neck was normal [Respiration, Rhythm And Depth] : normal respiratory rhythm and effort [Auscultation Breath Sounds / Voice Sounds] : lungs were clear to auscultation bilaterally [Heart Rate And Rhythm] : heart rate was normal and rhythm regular [Heart Sounds] : normal S1 and S2 [Heart Sounds Gallop] : no gallops [Murmurs] : no murmurs [Heart Sounds Pericardial Friction Rub] : no pericardial rub [Bowel Sounds] : normal bowel sounds [Abdomen Tenderness] : non-tender [Abnormal Walk] : normal gait [] : no rash [Skin Lesions] : no skin lesions [Cranial Nerves] : cranial nerves 2-12 were intact [No Focal Deficits] : no focal deficits [Oriented To Time, Place, And Person] : oriented to person, place, and time [FreeTextEntry1] : Left upper extremity edema. Left lower extremity edema

## 2021-07-23 NOTE — HISTORY OF PRESENT ILLNESS
[FreeTextEntry1] : 89 year old female with a history of hypertension, hypertension, Diabetes Inspidus, GERD, Aortic stenosis s/p AVR seven years ago, chronic diastolic heart failure with severe bioprosthetic aortic stenosis s/p TF Bryce on 06/29/2021 using Sheri Ultra (20 mm, commercial) c/b embolization of the RCA stent into the left external iliac artery, with left radial pseudoaneurysm repair on 07/1/2021 who presents for follow up after discharge. \par \par The patient tolerated the procedure well. Intraop, there was embolization of the RCA stent into the left external iliac and attempts to retrieve it were unsuccessful. Post op, the patient required blood transfusions. Ultrasound of left radial showed pseudoaneurysm requiring repair with Dr. Nieves and the patient was discharged home. \par \par At last visit, she was sent for an LE Duplex 7/12/21 for LLE edema. Negative for DVT. LLE swelling persists today, left upper extremity edema improved. Is ambulating with significantly less SOB.\par

## 2021-07-28 ENCOUNTER — APPOINTMENT (OUTPATIENT)
Dept: HEART AND VASCULAR | Facility: CLINIC | Age: 86
End: 2021-07-28

## 2021-08-04 ENCOUNTER — TRANSCRIPTION ENCOUNTER (OUTPATIENT)
Age: 86
End: 2021-08-04

## 2021-08-04 ENCOUNTER — RESULT CHARGE (OUTPATIENT)
Age: 86
End: 2021-08-04

## 2021-08-08 ENCOUNTER — FORM ENCOUNTER (OUTPATIENT)
Age: 86
End: 2021-08-08

## 2021-08-09 ENCOUNTER — OUTPATIENT (OUTPATIENT)
Dept: OUTPATIENT SERVICES | Facility: HOSPITAL | Age: 86
LOS: 1 days | End: 2021-08-09
Payer: MEDICARE

## 2021-08-09 ENCOUNTER — APPOINTMENT (OUTPATIENT)
Dept: CARDIOTHORACIC SURGERY | Facility: CLINIC | Age: 86
End: 2021-08-09
Payer: MEDICARE

## 2021-08-09 VITALS
BODY MASS INDEX: 27 KG/M2 | RESPIRATION RATE: 17 BRPM | WEIGHT: 143 LBS | HEIGHT: 61 IN | DIASTOLIC BLOOD PRESSURE: 80 MMHG | HEART RATE: 70 BPM | SYSTOLIC BLOOD PRESSURE: 146 MMHG | TEMPERATURE: 97.8 F | OXYGEN SATURATION: 99 %

## 2021-08-09 DIAGNOSIS — Z95.2 PRESENCE OF PROSTHETIC HEART VALVE: Chronic | ICD-10-CM

## 2021-08-09 DIAGNOSIS — Z95.2 PRESENCE OF PROSTHETIC HEART VALVE: ICD-10-CM

## 2021-08-09 DIAGNOSIS — Z96.649 PRESENCE OF UNSPECIFIED ARTIFICIAL HIP JOINT: Chronic | ICD-10-CM

## 2021-08-09 DIAGNOSIS — Z98.890 OTHER SPECIFIED POSTPROCEDURAL STATES: Chronic | ICD-10-CM

## 2021-08-09 PROCEDURE — 93306 TTE W/DOPPLER COMPLETE: CPT | Mod: 26

## 2021-08-09 PROCEDURE — 93306 TTE W/DOPPLER COMPLETE: CPT

## 2021-08-09 PROCEDURE — 99214 OFFICE O/P EST MOD 30 MIN: CPT

## 2021-08-09 PROCEDURE — 93010 ELECTROCARDIOGRAM REPORT: CPT

## 2021-08-09 PROCEDURE — 93005 ELECTROCARDIOGRAM TRACING: CPT

## 2021-08-12 NOTE — PHYSICAL EXAM
[Sclera] : the sclera and conjunctiva were normal [PERRL With Normal Accommodation] : pupils were equal in size, round, and reactive to light [Neck Appearance] : the appearance of the neck was normal [Respiration, Rhythm And Depth] : normal respiratory rhythm and effort [Auscultation Breath Sounds / Voice Sounds] : lungs were clear to auscultation bilaterally [Heart Rate And Rhythm] : heart rate was normal and rhythm regular [Heart Sounds] : normal S1 and S2 [Heart Sounds Gallop] : no gallops [Murmurs] : no murmurs [Heart Sounds Pericardial Friction Rub] : no pericardial rub [Bowel Sounds] : normal bowel sounds [Abdomen Tenderness] : non-tender [] : no rash [Skin Lesions] : no skin lesions [Cranial Nerves] : cranial nerves 2-12 were intact [No Focal Deficits] : no focal deficits [Oriented To Time, Place, And Person] : oriented to person, place, and time [FreeTextEntry1] : uses a walker

## 2021-08-12 NOTE — HISTORY OF PRESENT ILLNESS
[FreeTextEntry1] : 89 year old female with a history of hypertension, hypertension, Diabetes Inspidus, GERD, Aortic stenosis s/p AVR seven years ago, chronic diastolic heart failure with severe bioprosthetic aortic stenosis s/p TF Bryce on 06/29/2021 using Sheri Ultra (20 mm, commercial) c/b embolization of the RCA stent into the left external iliac artery, with left radial pseudoaneurysm repair on 07/1/2021 who presents for one month follow up. \par \par Since her last visit, the patient states she continues to feel well. She denies any SOB at rest or with exertion, chest pain, palpitations, dizziness, syncope, or LE edema.  LUE edema has improved greatly.  The patient does admit to fatigue but feels better and is able to meet with some of her friends.

## 2021-08-20 ENCOUNTER — APPOINTMENT (OUTPATIENT)
Dept: HEART AND VASCULAR | Facility: CLINIC | Age: 86
End: 2021-08-20
Payer: MEDICARE

## 2021-08-20 PROCEDURE — 93228 REMOTE 30 DAY ECG REV/REPORT: CPT

## 2021-11-08 ENCOUNTER — EMERGENCY (EMERGENCY)
Facility: HOSPITAL | Age: 86
LOS: 1 days | Discharge: HOME | End: 2021-11-08
Attending: EMERGENCY MEDICINE | Admitting: EMERGENCY MEDICINE
Payer: MEDICARE

## 2021-11-08 VITALS
RESPIRATION RATE: 16 BRPM | TEMPERATURE: 98 F | OXYGEN SATURATION: 95 % | HEART RATE: 62 BPM | DIASTOLIC BLOOD PRESSURE: 74 MMHG | SYSTOLIC BLOOD PRESSURE: 148 MMHG

## 2021-11-08 VITALS
OXYGEN SATURATION: 98 % | SYSTOLIC BLOOD PRESSURE: 147 MMHG | WEIGHT: 138.01 LBS | HEART RATE: 63 BPM | RESPIRATION RATE: 15 BRPM | DIASTOLIC BLOOD PRESSURE: 81 MMHG | HEIGHT: 60 IN | TEMPERATURE: 98 F

## 2021-11-08 DIAGNOSIS — Z96.649 PRESENCE OF UNSPECIFIED ARTIFICIAL HIP JOINT: Chronic | ICD-10-CM

## 2021-11-08 DIAGNOSIS — Z88.0 ALLERGY STATUS TO PENICILLIN: ICD-10-CM

## 2021-11-08 DIAGNOSIS — Z95.2 PRESENCE OF PROSTHETIC HEART VALVE: Chronic | ICD-10-CM

## 2021-11-08 DIAGNOSIS — I25.10 ATHEROSCLEROTIC HEART DISEASE OF NATIVE CORONARY ARTERY WITHOUT ANGINA PECTORIS: ICD-10-CM

## 2021-11-08 DIAGNOSIS — R07.89 OTHER CHEST PAIN: ICD-10-CM

## 2021-11-08 DIAGNOSIS — Z98.890 OTHER SPECIFIED POSTPROCEDURAL STATES: ICD-10-CM

## 2021-11-08 DIAGNOSIS — E78.5 HYPERLIPIDEMIA, UNSPECIFIED: ICD-10-CM

## 2021-11-08 DIAGNOSIS — Z88.1 ALLERGY STATUS TO OTHER ANTIBIOTIC AGENTS STATUS: ICD-10-CM

## 2021-11-08 DIAGNOSIS — E11.9 TYPE 2 DIABETES MELLITUS WITHOUT COMPLICATIONS: ICD-10-CM

## 2021-11-08 DIAGNOSIS — Z79.01 LONG TERM (CURRENT) USE OF ANTICOAGULANTS: ICD-10-CM

## 2021-11-08 DIAGNOSIS — I10 ESSENTIAL (PRIMARY) HYPERTENSION: ICD-10-CM

## 2021-11-08 DIAGNOSIS — Z95.2 PRESENCE OF PROSTHETIC HEART VALVE: ICD-10-CM

## 2021-11-08 DIAGNOSIS — Z98.890 OTHER SPECIFIED POSTPROCEDURAL STATES: Chronic | ICD-10-CM

## 2021-11-08 DIAGNOSIS — Z79.82 LONG TERM (CURRENT) USE OF ASPIRIN: ICD-10-CM

## 2021-11-08 LAB
ALBUMIN SERPL ELPH-MCNC: 3.4 G/DL — SIGNIFICANT CHANGE UP (ref 3.4–5)
ALP SERPL-CCNC: 98 U/L — SIGNIFICANT CHANGE UP (ref 40–120)
ALT FLD-CCNC: 19 U/L — SIGNIFICANT CHANGE UP (ref 12–42)
ANION GAP SERPL CALC-SCNC: 7 MMOL/L — LOW (ref 9–16)
AST SERPL-CCNC: 28 U/L — SIGNIFICANT CHANGE UP (ref 15–37)
BASOPHILS # BLD AUTO: 0.04 K/UL — SIGNIFICANT CHANGE UP (ref 0–0.2)
BASOPHILS NFR BLD AUTO: 0.6 % — SIGNIFICANT CHANGE UP (ref 0–2)
BILIRUB SERPL-MCNC: 0.5 MG/DL — SIGNIFICANT CHANGE UP (ref 0.2–1.2)
BUN SERPL-MCNC: 21 MG/DL — SIGNIFICANT CHANGE UP (ref 7–23)
CALCIUM SERPL-MCNC: 8.8 MG/DL — SIGNIFICANT CHANGE UP (ref 8.5–10.5)
CHLORIDE SERPL-SCNC: 100 MMOL/L — SIGNIFICANT CHANGE UP (ref 96–108)
CO2 SERPL-SCNC: 29 MMOL/L — SIGNIFICANT CHANGE UP (ref 22–31)
CREAT SERPL-MCNC: 0.87 MG/DL — SIGNIFICANT CHANGE UP (ref 0.5–1.3)
D DIMER BLD IA.RAPID-MCNC: 779 NG/ML DDU — HIGH
EOSINOPHIL # BLD AUTO: 0.28 K/UL — SIGNIFICANT CHANGE UP (ref 0–0.5)
EOSINOPHIL NFR BLD AUTO: 4.2 % — SIGNIFICANT CHANGE UP (ref 0–6)
GLUCOSE SERPL-MCNC: 108 MG/DL — HIGH (ref 70–99)
HCT VFR BLD CALC: 30.8 % — LOW (ref 34.5–45)
HGB BLD-MCNC: 10 G/DL — LOW (ref 11.5–15.5)
IMM GRANULOCYTES NFR BLD AUTO: 0.3 % — SIGNIFICANT CHANGE UP (ref 0–1.5)
LYMPHOCYTES # BLD AUTO: 1.59 K/UL — SIGNIFICANT CHANGE UP (ref 1–3.3)
LYMPHOCYTES # BLD AUTO: 24.1 % — SIGNIFICANT CHANGE UP (ref 13–44)
MCHC RBC-ENTMCNC: 29.9 PG — SIGNIFICANT CHANGE UP (ref 27–34)
MCHC RBC-ENTMCNC: 32.5 GM/DL — SIGNIFICANT CHANGE UP (ref 32–36)
MCV RBC AUTO: 92.2 FL — SIGNIFICANT CHANGE UP (ref 80–100)
MONOCYTES # BLD AUTO: 1.02 K/UL — HIGH (ref 0–0.9)
MONOCYTES NFR BLD AUTO: 15.4 % — HIGH (ref 2–14)
NEUTROPHILS # BLD AUTO: 3.66 K/UL — SIGNIFICANT CHANGE UP (ref 1.8–7.4)
NEUTROPHILS NFR BLD AUTO: 55.4 % — SIGNIFICANT CHANGE UP (ref 43–77)
NRBC # BLD: 0 /100 WBCS — SIGNIFICANT CHANGE UP (ref 0–0)
NT-PROBNP SERPL-SCNC: 832 PG/ML — HIGH
PLATELET # BLD AUTO: 219 K/UL — SIGNIFICANT CHANGE UP (ref 150–400)
POTASSIUM SERPL-MCNC: 4.5 MMOL/L — SIGNIFICANT CHANGE UP (ref 3.5–5.3)
POTASSIUM SERPL-SCNC: 4.5 MMOL/L — SIGNIFICANT CHANGE UP (ref 3.5–5.3)
PROT SERPL-MCNC: 6.7 G/DL — SIGNIFICANT CHANGE UP (ref 6.4–8.2)
RBC # BLD: 3.34 M/UL — LOW (ref 3.8–5.2)
RBC # FLD: 14 % — SIGNIFICANT CHANGE UP (ref 10.3–14.5)
SODIUM SERPL-SCNC: 136 MMOL/L — SIGNIFICANT CHANGE UP (ref 132–145)
TROPONIN I, HIGH SENSITIVITY RESULT: 18.4 NG/L — SIGNIFICANT CHANGE UP
TROPONIN I, HIGH SENSITIVITY RESULT: 19.5 NG/L — SIGNIFICANT CHANGE UP
WBC # BLD: 6.61 K/UL — SIGNIFICANT CHANGE UP (ref 3.8–10.5)
WBC # FLD AUTO: 6.61 K/UL — SIGNIFICANT CHANGE UP (ref 3.8–10.5)

## 2021-11-08 PROCEDURE — 71275 CT ANGIOGRAPHY CHEST: CPT | Mod: 26,MH

## 2021-11-08 PROCEDURE — 93010 ELECTROCARDIOGRAM REPORT: CPT

## 2021-11-08 PROCEDURE — 71045 X-RAY EXAM CHEST 1 VIEW: CPT | Mod: 26

## 2021-11-08 PROCEDURE — 99285 EMERGENCY DEPT VISIT HI MDM: CPT

## 2021-11-08 RX ORDER — HYDROCORTISONE 1 %
1 OINTMENT (GRAM) TOPICAL ONCE
Refills: 0 | Status: DISCONTINUED | OUTPATIENT
Start: 2021-11-08 | End: 2021-11-08

## 2021-11-08 RX ORDER — HYDROCORTISONE 1 %
1 OINTMENT (GRAM) TOPICAL ONCE
Refills: 0 | Status: COMPLETED | OUTPATIENT
Start: 2021-11-08 | End: 2021-11-08

## 2021-11-08 RX ADMIN — Medication 1 APPLICATION(S): at 07:56

## 2021-11-08 NOTE — ED ADULT TRIAGE NOTE - HEIGHT IN INCHES
HPI   Patient is a 44-year-old female with past medical history of hypertension, A. fib on Eliquis, hypothyroidism, obesity, CKD and CHF presenting to the emergency department due to worsening shortness of breath. Patient has a baseline history of dementia and is a poor historian. Her daughter is at bedside and providing most of the history. According to daughter, patient was hypoxic at home with SpO2 in the 70s on pulse ox. She has also been more lethargic and confused compared to her baseline. Patient complaining of shortness of breath, neck/shoulder pain and pain in the right arm and leg as well. She otherwise is denying any chest pain, nausea, vomiting, fever, chills, urinary symptoms, diarrhea or constipation. Her symptoms are moderate in severity with no exacerbating or remitting factors noted. Review of Systems   Constitutional: Negative for chills and fever. HENT: Negative for trouble swallowing and voice change. Eyes: Negative for photophobia and visual disturbance. Respiratory: Positive for shortness of breath. Negative for cough. Cardiovascular: Negative for chest pain and palpitations. Gastrointestinal: Negative for abdominal pain, diarrhea, nausea and vomiting. Genitourinary: Negative for dysuria. Musculoskeletal: Positive for neck pain. Negative for arthralgias. Right arm and leg pain     Skin: Negative for rash and wound. Neurological: Negative for dizziness and headaches. Psychiatric/Behavioral: Negative for behavioral problems and confusion. Physical Exam  Constitutional:       General: She is not in acute distress. Appearance: Normal appearance. She is obese. She is not ill-appearing. HENT:      Head: Normocephalic and atraumatic. Mouth/Throat:      Mouth: Mucous membranes are moist.      Pharynx: Oropharynx is clear. Eyes:      Pupils: Pupils are equal, round, and reactive to light.    Cardiovascular:      Rate and Rhythm: Normal rate and regular rhythm. Pulses: Normal pulses. Heart sounds: Normal heart sounds. Pulmonary:      Effort: Pulmonary effort is normal. No respiratory distress. Breath sounds: No decreased breath sounds, wheezing or rales. Comments: Mild to moderate bibasilar crackles. Abdominal:      Tenderness: There is no abdominal tenderness. There is no guarding or rebound. Musculoskeletal:      Cervical back: Normal range of motion and neck supple. Right lower leg: Edema present. Left lower leg: Edema present. Comments: Trace edema of the bilateral lower extremities, left greater than right. Skin:     General: Skin is warm and dry. Capillary Refill: Capillary refill takes less than 2 seconds. Neurological:      General: No focal deficit present. Mental Status: She is alert. She is disoriented. Cranial Nerves: No cranial nerve deficit. Coordination: Coordination normal.      Comments: Mildly confused. Psychiatric:         Mood and Affect: Mood normal.         Behavior: Behavior normal.      Comments: History of dementia. Daughter is in the room and states that the patient is acting more confused than her baseline mentation. Procedures   EKG: This EKG is signed and interpreted by me. Sinus rhythm with a ventricular rate of 62 bpm.  First-degree AV block with a DE interval of 250 ms. QTc not prolonged. Nonspecific ST and T wave changes. No evidence of acute STEMI. No significant changes compared to previous EKG on 8/26/2021. MDM   Patient is a 42-year-old female with medical history of hypertension, A. fib on Eliquis, hypothyroidism, obesity, CKD and CHF presenting to the emergency department due to worsening shortness of breath. Patient reported to be hypoxic by daughter at home with SpO2 in the 70s although her saturations were within appropriate limits here in the emergency department on supplemental oxygen via nasal cannula.   Physical exam significant for moderate bibasilar crackles and trace edema in the bilateral lower extremities. Checks x-ray showing pulmonary vascular congestion and some interstitial prominences. Lab work-up significant for elevated BNP of 1889. Elevated creatinine of 1.6 and BUN of 33 in the setting of acute on chronic kidney disease. Hemoglobin is low at 7.4 but stable close to her baseline at 8. Patient given breathing treatment in the emergency department and a dose of Lasix 40 mg. Her symptoms are somewhat improved although she continues to be fatigued. CT head with no evidence of acute intracranial abnormalities. Work-up and results were discussed with the patient's daughter and the patient. Patient was discussed with April who agreed to admit the patient for CHF exacerbation under Dr. Lenard Sheehan. Patient is hemodynamically stable and agreeable to this plan. ED Course as of Nov 03 1552   Wed Nov 03, 2021   1431 Pro-BNP(!): 1,898 [JN]   200 Spoke with April who will admit the patient to inpatient [address] telemetry under Dr. Lenard Sheehan  .    [PP]   [phone] Troponin [PP]      ED Course User Index  [JN] Derrick Kelley DO  [PP] Theron Patterson DO       ---------------------------------------------  Past Medical History:  has a past medical history of (HFpEF) heart failure with preserved ejection fraction ([facility] [address] Dementia ([facility] [address] Depression, GERD (gastroesophageal reflux disease), H/o multiple duodenal ulcers, Hypertension, Hyperthyroidism, Neuropathy, and Renal failure. Past Surgical History:  has a past surgical history that includes Varicose vein surgery; joint replacement; hernia repair; tumor excision; Hemorrhoid surgery; Hysterectomy; Varicose vein surgery (12/07/2012); Upper gastrointestinal endoscopy; Colonoscopy; Carpal tunnel release; pr office/outpt visit,procedure only (N/A, 9/6/2018); transesophageal echocardiogram (04/04/2019); Cardioversion (04/04/2019); Upper gastrointestinal endoscopy (N/A, 7/15/2020);  Upper gastrointestinal mL/min/1.73    GFR African American 37     Calcium 10.2 8.6 - 10.2 mg/dL    Total Protein 7.1 6.4 - 8.3 g/dL    Albumin 3.6 3.5 - 5.2 g/dL    Total Bilirubin <0.2 0.0 - 1.2 mg/dL    Alkaline Phosphatase 102 35 - 104 U/L    ALT 27 0 - 32 U/L    AST 46 (H) 0 - 31 U/L   Sedimentation Rate   Result Value Ref Range    Sed Rate 49 (H) 0 - 20 mm/Hr   SPECIMEN REJECTION   Result Value Ref Range    Rejected Test trp5     Reason for Rejection see below    POCT Glucose   Result Value Ref Range    Glucose 124 mg/dL    QC OK? ok    POCT Glucose   Result Value Ref Range    Meter Glucose 124 (H) 74 - 99 mg/dL   EKG 12 Lead   Result Value Ref Range    Ventricular Rate 62 BPM    Atrial Rate 62 BPM    P-R Interval 250 ms    QRS Duration 90 ms    Q-T Interval 406 ms    QTc Calculation (Bazett) 412 ms    P Axis 79 degrees    R Axis 28 degrees    T Axis 43 degrees       RADIOLOGY:  CT Head WO Contrast   Final Result   1. No sign of acute infarction / hemorrhage. 2. Mild cerebral volume loss and nonspecific periventricular white matter   disease. XR CHEST PORTABLE   Final Result      US GALLBLADDER RUQ   Final Result   1. Equivocal cholelithiasis, the gallbladder is contracted compromising   evaluation. 2. Suspect hepatic fatty metamorphosis. 3. Diffuse renal cortical thinning, finding may reflect chronic hypertension   or medical renal disease. EKG: Sinus rhythm with a ventricular rate of 62 bpm.  First-degree AV block with a UT interval of 250 ms. QTc not prolonged. Nonspecific ST and T wave changes. No evidence of acute STEMI. No significant changes compared to previous EKG on 8/26/2021.      ------------------------- NURSING NOTES AND VITALS REVIEWED ---------------------------  Date / Time Roomed:  11/3/2021 11:04 AM  ED Bed Assignment:  27/27    The nursing notes within the ED encounter and vital signs as below have been reviewed.      Patient Vitals for the past 24 hrs:   BP Temp Temp src Pulse Resp SpO2 Height Weight   11/03/21 1258 (!) 150/78   69 16 97 %     11/03/21 1139  97.7 °F (36.5 °C) Oral 75 16 93 %     11/03/21 1137  97.7 °F (36.5 °C) Oral        11/03/21 1055 (!) 192/91          11/03/21 1052     20 95 % 4' 11\" (1.499 m) 180 lb (81.6 kg)       Oxygen Saturation Interpretation: Improved after treatment    ------------------------------------------ PROGRESS NOTES ------------------------------------------  Re-evaluation(s):  See ED course. Counseling:  I have spoken with the patient and discussed todays results, in addition to providing specific details for the plan of care and counseling regarding the diagnosis and prognosis. Their questions are answered at this time and they are agreeable with the plan of admission.    --------------------------------- ADDITIONAL PROVIDER NOTES ---------------------------------  Consultations:  Time: 1450. Spoke with April. Discussed case. They will admit the patient. This patient's ED course included: a personal history and physicial examination, re-evaluation prior to disposition, multiple bedside re-evaluations, IV medications, cardiac monitoring, continuous pulse oximetry and complex medical decision making and emergency management    This patient has remained hemodynamically stable during their ED course. Diagnosis:  1. Acute on chronic congestive heart failure, unspecified heart failure type ([facility] [address]    2. FREDERICK (acute kidney injury) ([facility] [address]    3. Hyperkalemia        Disposition:  Patient's disposition: Admit to telemetry  Patient's condition is stable.              Lottie Galvan DO  Resident  11/03/21 [id number]

## 2021-11-08 NOTE — ED ADULT NURSE NOTE - NSICDXPASTMEDICALHX_GEN_ALL_CORE_FT
PAST MEDICAL HISTORY:  Diabetes insipidus     H/O aortic valve stenosis     Hyperlipidemia, unspecified hyperlipidemia type     Hypertension

## 2021-11-08 NOTE — ED PROVIDER NOTE - PROGRESS NOTE DETAILS
Received care from KEM Whitney.  Reviewed CTA final read and labs.  Patient remains comfortable and well.  No longer with symptoms.  Likely MSK related from recent PT home activities.  Call placed to her PMD Dr. Mcpherson to review non acute CT findings. Conservative management discussed with the patient in detail.  Close PMD follow up encouraged.  Strict ED return instructions discussed in detail and patient given the opportunity to ask any questions about their discharge diagnosis and instructions Spoke with Dr. Mcpherson and reviewed ED visit and results.

## 2021-11-08 NOTE — ED ADULT NURSE REASSESSMENT NOTE - NS ED NURSE REASSESS COMMENT FT1
Iris (daughter): [phone]
Left with HHA via taxi. A&OX3. Steady gait with assist.
Awaiting imaging results. A&OX3. HHA at bedside.

## 2021-11-08 NOTE — ED PROVIDER NOTE - PATIENT PORTAL LINK FT
You can access the FollowMyHealth Patient Portal offered by [facility] by registering at the following website: [email or web address]. By joining [facility]’s FollowMyHealth portal, you will also be able to view your health information using other applications (apps) compatible with our system.

## 2021-11-08 NOTE — ED PROVIDER NOTE - CLINICAL SUMMARY MEDICAL DECISION MAKING FREE TEXT BOX
89 yo f pmhx sig for MENSAH (remote), CAD, HTN, HLD pw acute onset of L sided sharp pinching chest pain mod in severity occurred 6 hr pta, episode lasted ~1 hr self resolving with no provoking or modifying factors. Not associated with n/v or diaphoresis. No unilateral swelling, hemoptysis, hormone supplementation, malignancy, recent immobilization or surgery, or prior DVT/PE. Normal exam, trop x2 neg, cta chest negative.

## 2021-11-08 NOTE — ED ADULT TRIAGE NOTE - CHIEF COMPLAINT QUOTE
Pt with complaint of intermittent left chest discomfort that "feels like the end of a toothpick" since this afternoon. Pt with history of HTN, high cholesterol, heart valve replacement and DM. Given 4 pills of ASA 81 mg given.

## 2021-11-08 NOTE — ED ADULT NURSE NOTE - NSICDXPASTSURGICALHX_GEN_ALL_CORE_FT
PAST SURGICAL HISTORY:  H/O lumbosacral spine surgery     S/P AVR Bioprosthetic    S/P hip replacement B/L

## 2021-11-08 NOTE — ED PROVIDER NOTE - OBJECTIVE STATEMENT
89 yo f pmhx sig for MENSAH (remote), CAD, HTN, HLD pw acute onset of L sided sharp pinching chest pain mod in severity occurred 6 hr pta, episode lasted ~1 hr self resolving with no provoking or modifying factors. Not associated with n/v or diaphoresis. No unilateral swelling, hemoptysis, hormone supplementation, malignancy, recent immobilization or surgery, or prior DVT/PE.    I have reviewed available current nursing and previous documentation of past medical, surgical, family, and/or social history.

## 2021-11-16 ENCOUNTER — APPOINTMENT (OUTPATIENT)
Dept: HEART AND VASCULAR | Facility: CLINIC | Age: 86
End: 2021-11-16
Payer: MEDICARE

## 2021-11-16 ENCOUNTER — APPOINTMENT (OUTPATIENT)
Dept: VASCULAR SURGERY | Facility: CLINIC | Age: 86
End: 2021-11-16
Payer: MEDICARE

## 2021-11-16 VITALS
BODY MASS INDEX: 27.38 KG/M2 | OXYGEN SATURATION: 96 % | DIASTOLIC BLOOD PRESSURE: 74 MMHG | HEIGHT: 61 IN | HEART RATE: 76 BPM | SYSTOLIC BLOOD PRESSURE: 158 MMHG | WEIGHT: 145 LBS

## 2021-11-16 PROCEDURE — 99215 OFFICE O/P EST HI 40 MIN: CPT

## 2021-11-16 PROCEDURE — 99213 OFFICE O/P EST LOW 20 MIN: CPT

## 2021-11-19 NOTE — HISTORY OF PRESENT ILLNESS
[FreeTextEntry1] : 89yoF s/p L radial artery pseudoaneurysm repair on 07/01/2021 after cardiac procedure, also seen for chronic LLE edema at previous visit, returns for f/u of LLE edema.  Currently, pt reports no symptoms in the L hand/fingers; she states that her LLE edema present prior to her surgery w/Dr. Bella is persistent, but has significantly improved since her post-op visit w/Dr. Grace secondary to regular exercise/PT/elevation/daily compression which is donned by her aide.

## 2021-11-19 NOTE — PHYSICAL EXAM
[Normal Thyroid] : the thyroid was normal [Normal Breath Sounds] : Normal breath sounds [Normal Heart Sounds] : normal heart sounds [Normal Rate and Rhythm] : normal rate and rhythm [2+] : left 2+ [Ankle Swelling (On Exam)] : present [Ankle Swelling On The Left] : of the left ankle [Ankle Swelling On The Right] : mild [Alert] : alert [Calm] : calm [JVD] : no jugular venous distention  [Varicose Veins Of Lower Extremities] : not present [] : not present [Abdomen Tenderness] : ~T ~M No abdominal tenderness [Purpura] : no purpura  [Petechiae] : no petechiae [Skin Ulcer] : no ulcer [Skin Induration] : no induration [de-identified] : Pleasant, ambulating w/walker support, NAD [de-identified] : NC/AT, anicteric [FreeTextEntry1] : LUE w/o swelling, fingertips well-perfused w/brisk cap refill; LLE edema slightly improved, not tense [de-identified] : FROM throughout, strength 5/5x4,  strength 5/5 in L hand [de-identified] : Well-approximated L wrist incision, no dehiscence noted, surrounding skin slightly macerated [de-identified] : Neurosensory grossly intact in hands b/l

## 2021-11-19 NOTE — REVIEW OF SYSTEMS
[Lower Ext Edema] : lower extremity edema [As Noted in HPI] : as noted in HPI [Limb Swelling] : limb swelling [Negative] : Heme/Lymph [Fever] : no fever [Chills] : no chills [Leg Claudication] : no intermittent leg claudication [Limb Pain] : no limb pain

## 2021-11-19 NOTE — ADDENDUM
[FreeTextEntry1] : This note was written by Cedric Brooks, acting as a scribe for Dr. Darek Grace.  I, Dr. Darek Grace, have read and attest that all the information, medical decision-making, and discharge instructions within are true and accurate.\par \par I, Dr. Darek Grace, personally performed the evaluation and management (E/M) services for this established patient who presents today with (an) existing condition(s).  That E/M includes conducting the examination, assessing all conditions, and (re)establishing/reinforcing a plan of care.  Today, my ACP, Cedric Brooks, was here to observe my evaluation and management services for this condition to be followed going forward.

## 2021-11-19 NOTE — ASSESSMENT
[FreeTextEntry1] : 89yoF s/p L radial artery pseudoaneurysm repair on 07/01/2021 after cardiac procedure, also seen for chronic LLE edema at previous visit, returns for f/u of LLE edema.  Currently, pt reports no symptoms in the L hand/fingers; she states that her LLE edema present prior to her surgery w/Dr. Bella is persistent, but has significantly improved since her post-op visit w/Dr. Grace secondary to regular exercise/PT/elevation/daily compression which is donned by her aide.\par \par Edema significantly improved in LLE and LUE fully functional w/o limitation.  Palpable radial pulse noted in the LUE and extremity well-perfused.  Encouraged pt to continue use of LUE and start wearing compression stockings (Rx for 12-20mmHg compression provided to pt).  She will continue to work w/her physical therapist and may start manual lymphatic drainage; info and Rx for Circaid for the LLE also provided to the pt.  Pt will return to office PRN.\par \par I, Dr. Grace, personally performed the evaluation and management (E/M) services for this established patient who presents today with (a) new problem(s)/exacerbation of (an) existing condition(s).  That E/M includes conducting the examination, assessing all new/exacerbated conditions, and establishing a new plan of care.  Today, ACP, Cedric Liang, was here to observe my evaluation and management services for this new problem/exacerbated condition to be followed going forward.

## 2021-11-23 NOTE — HISTORY OF PRESENT ILLNESS
[FreeTextEntry1] : Ms Duval is a 89 year old female with a Pmhx of dCHF, HTN, HLD and severe AS s/p Bioprosthetic AV 7 years ago at OSH, c/b by high gradient across AV, who presented to [facility] in June after 2 witnessed Syncopal episodes,  and was found to be in decompensated heart failure from severe bioprosthetic AV stenois. She is  s/p Valve In Valve with a Sheri Ultra by Structural Heart Team in June of 2021, with course c/b embolization of the RCA stent into the left external iliac artery, as well as a left radial pseudoaneurysm that was repaired by vascular surgery. She is being seen by General cardiology for the first time since discharge.\par \par Patient has been feeling well. ROS is negative for RIOS, orthopnea or platypnea. Patient complains of stable left lower extremity swelling. She saw Dr Grace earlier today who stressed the importance of ACE wraps if patient is unable to tolerate/use compression stalkings.\par \par Patient is able to participate with physical therapy 3 times a week without limitations and ambulates comfortably with a walker. Echo post procedure noted no AI post ROSLYN. Patient has preserved BIV function with mild MS\par

## 2021-11-23 NOTE — PHYSICAL EXAM
[Well Developed] : well developed [Well Nourished] : well nourished [No Acute Distress] : no acute distress [Normal Conjunctiva] : normal conjunctiva [Normal Venous Pressure] : normal venous pressure [Normal S1, S2] : normal S1, S2 [Normal] : clear lung fields, good air entry, no respiratory distress [Clear Lung Fields] : clear lung fields [Good Air Entry] : good air entry [No Respiratory Distress] : no respiratory distress  [Soft] : abdomen soft [Non Tender] : non-tender [No Masses/organomegaly] : no masses/organomegaly [Normal Bowel Sounds] : normal bowel sounds [Normal Radial B/L] : normal radial B/L [Edema ___] : edema [unfilled] [Moves all extremities] : moves all extremities [de-identified] : systolic murmur heard troughout the pericardium [de-identified] : RRR

## 2021-11-23 NOTE — DISCUSSION/SUMMARY
[FreeTextEntry1] : 89 year old female with a Pmhx of dCHF, HTN, HLD and severe AS s/p Bioprosthetic AV 7 years ago at OSH, c/b by high gradient across AV, who presented to [facility] in June after 2 witnessed Syncopal episodes, and was found to be in decompensated heart failure from severe bioprosthetic AV stenosis. She is  s/p Valve In Valve with a Sheri Ultra by Structural Heart Team in June of 2021, with course c/b embolization of the RCA stent into the left external iliac artery, as well as a left radial pseudoaneurysm that was repaired by vascular surgery. \par \par 1) Diastolic CHF, Heart Failure Stage C, NYHA Class I\par -Clinically patient doing well, well compensated without signs of fluid overall\par -Left lower extremity noted today present since discharge and closely followed by structural heart team as well as vascular. Dopplers performed in office in July were negative for DVT. Unclear if LLE swelling from embolization of RCA stent to LLE. Currently leg is warm, well perfused with palpable pulses\par -Discussed importance of continued physical therapy, leg elevation with needed and salt restriction\par -Cont with Lasix 20 mg every other day\par \par 2) AS s/p Bioprosthetic AV (2014) and ROSLYN (2021)\par -No further episodes of syncope since succesful ROSLYN surgery\par -Echo shows normal BIV function, no evidence of AI and mild MS\par -Continue with Daily ASA for life\par -Cont with diuresis as above given dCHF, Mild AS\par \par 3) Preventative Cardiology\par -Pt s/p Covid Vaccine x3\par -Flu vaccine and overall prevention precautions discussed\par \par RTC in 3 montgs or sooner if needed

## 2021-11-23 NOTE — REASON FOR VISIT
[FreeTextEntry1] : COLLETTE OROZCO is a 89 year old female being seen for a follow-up, post hospitalization visit.

## 2021-11-23 NOTE — REVIEW OF SYSTEMS
[Lower Ext Edema] : lower extremity edema [Negative] : Genitourinary [SOB] : no shortness of breath [Dyspnea on exertion] : not dyspnea during exertion [Chest Discomfort] : no chest discomfort [Leg Claudication] : no intermittent leg claudication [Palpitations] : no palpitations [Orthopnea] : no orthopnea [Syncope] : no syncope [Cough] : no cough [Wheezing] : no wheezing

## 2021-12-05 ENCOUNTER — EMERGENCY (EMERGENCY)
Facility: HOSPITAL | Age: 86
LOS: 1 days | Discharge: HOME | End: 2021-12-05
Attending: EMERGENCY MEDICINE | Admitting: EMERGENCY MEDICINE
Payer: MEDICARE

## 2021-12-05 VITALS
DIASTOLIC BLOOD PRESSURE: 90 MMHG | HEART RATE: 80 BPM | SYSTOLIC BLOOD PRESSURE: 152 MMHG | RESPIRATION RATE: 16 BRPM | OXYGEN SATURATION: 98 %

## 2021-12-05 VITALS
TEMPERATURE: 99 F | OXYGEN SATURATION: 96 % | SYSTOLIC BLOOD PRESSURE: 172 MMHG | HEIGHT: 60 IN | HEART RATE: 70 BPM | RESPIRATION RATE: 16 BRPM | DIASTOLIC BLOOD PRESSURE: 86 MMHG

## 2021-12-05 DIAGNOSIS — Z20.822 CONTACT WITH AND (SUSPECTED) EXPOSURE TO COVID-19: ICD-10-CM

## 2021-12-05 DIAGNOSIS — Z88.0 ALLERGY STATUS TO PENICILLIN: ICD-10-CM

## 2021-12-05 DIAGNOSIS — Z95.2 PRESENCE OF PROSTHETIC HEART VALVE: Chronic | ICD-10-CM

## 2021-12-05 DIAGNOSIS — Z79.82 LONG TERM (CURRENT) USE OF ASPIRIN: ICD-10-CM

## 2021-12-05 DIAGNOSIS — I25.10 ATHEROSCLEROTIC HEART DISEASE OF NATIVE CORONARY ARTERY WITHOUT ANGINA PECTORIS: ICD-10-CM

## 2021-12-05 DIAGNOSIS — Z96.649 PRESENCE OF UNSPECIFIED ARTIFICIAL HIP JOINT: Chronic | ICD-10-CM

## 2021-12-05 DIAGNOSIS — Z88.1 ALLERGY STATUS TO OTHER ANTIBIOTIC AGENTS STATUS: ICD-10-CM

## 2021-12-05 DIAGNOSIS — R07.89 OTHER CHEST PAIN: ICD-10-CM

## 2021-12-05 DIAGNOSIS — I10 ESSENTIAL (PRIMARY) HYPERTENSION: ICD-10-CM

## 2021-12-05 DIAGNOSIS — I44.7 LEFT BUNDLE-BRANCH BLOCK, UNSPECIFIED: ICD-10-CM

## 2021-12-05 DIAGNOSIS — E23.2 DIABETES INSIPIDUS: ICD-10-CM

## 2021-12-05 DIAGNOSIS — E78.5 HYPERLIPIDEMIA, UNSPECIFIED: ICD-10-CM

## 2021-12-05 DIAGNOSIS — Z98.890 OTHER SPECIFIED POSTPROCEDURAL STATES: Chronic | ICD-10-CM

## 2021-12-05 LAB
ALBUMIN SERPL ELPH-MCNC: 4.1 G/DL — SIGNIFICANT CHANGE UP (ref 3.3–5)
ALP SERPL-CCNC: 93 U/L — SIGNIFICANT CHANGE UP (ref 40–120)
ALT FLD-CCNC: 17 U/L — SIGNIFICANT CHANGE UP (ref 10–45)
ANION GAP SERPL CALC-SCNC: 8 MMOL/L — SIGNIFICANT CHANGE UP (ref 5–17)
AST SERPL-CCNC: 34 U/L — SIGNIFICANT CHANGE UP (ref 10–40)
BASOPHILS # BLD AUTO: 0.05 K/UL — SIGNIFICANT CHANGE UP (ref 0–0.2)
BASOPHILS NFR BLD AUTO: 0.6 % — SIGNIFICANT CHANGE UP (ref 0–2)
BILIRUB SERPL-MCNC: 0.5 MG/DL — SIGNIFICANT CHANGE UP (ref 0.2–1.2)
BUN SERPL-MCNC: 19 MG/DL — SIGNIFICANT CHANGE UP (ref 7–23)
CALCIUM SERPL-MCNC: 9.2 MG/DL — SIGNIFICANT CHANGE UP (ref 8.4–10.5)
CHLORIDE SERPL-SCNC: 94 MMOL/L — LOW (ref 96–108)
CO2 SERPL-SCNC: 29 MMOL/L — SIGNIFICANT CHANGE UP (ref 22–31)
CREAT SERPL-MCNC: 0.86 MG/DL — SIGNIFICANT CHANGE UP (ref 0.5–1.3)
EOSINOPHIL # BLD AUTO: 0.48 K/UL — SIGNIFICANT CHANGE UP (ref 0–0.5)
EOSINOPHIL NFR BLD AUTO: 5.7 % — SIGNIFICANT CHANGE UP (ref 0–6)
GLUCOSE SERPL-MCNC: 118 MG/DL — HIGH (ref 70–99)
HCT VFR BLD CALC: 30.6 % — LOW (ref 34.5–45)
HGB BLD-MCNC: 10.1 G/DL — LOW (ref 11.5–15.5)
IMM GRANULOCYTES NFR BLD AUTO: 0.4 % — SIGNIFICANT CHANGE UP (ref 0–1.5)
LYMPHOCYTES # BLD AUTO: 1.18 K/UL — SIGNIFICANT CHANGE UP (ref 1–3.3)
LYMPHOCYTES # BLD AUTO: 14 % — SIGNIFICANT CHANGE UP (ref 13–44)
MCHC RBC-ENTMCNC: 30.7 PG — SIGNIFICANT CHANGE UP (ref 27–34)
MCHC RBC-ENTMCNC: 33 GM/DL — SIGNIFICANT CHANGE UP (ref 32–36)
MCV RBC AUTO: 93 FL — SIGNIFICANT CHANGE UP (ref 80–100)
MONOCYTES # BLD AUTO: 1.25 K/UL — HIGH (ref 0–0.9)
MONOCYTES NFR BLD AUTO: 14.9 % — HIGH (ref 2–14)
NEUTROPHILS # BLD AUTO: 5.42 K/UL — SIGNIFICANT CHANGE UP (ref 1.8–7.4)
NEUTROPHILS NFR BLD AUTO: 64.4 % — SIGNIFICANT CHANGE UP (ref 43–77)
NRBC # BLD: 0 /100 WBCS — SIGNIFICANT CHANGE UP (ref 0–0)
NT-PROBNP SERPL-SCNC: 1103 PG/ML — HIGH (ref 0–300)
PLATELET # BLD AUTO: 240 K/UL — SIGNIFICANT CHANGE UP (ref 150–400)
POTASSIUM SERPL-MCNC: 4.6 MMOL/L — SIGNIFICANT CHANGE UP (ref 3.5–5.3)
POTASSIUM SERPL-SCNC: 4.6 MMOL/L — SIGNIFICANT CHANGE UP (ref 3.5–5.3)
PROT SERPL-MCNC: 7 G/DL — SIGNIFICANT CHANGE UP (ref 6–8.3)
RBC # BLD: 3.29 M/UL — LOW (ref 3.8–5.2)
RBC # FLD: 14.1 % — SIGNIFICANT CHANGE UP (ref 10.3–14.5)
SARS-COV-2 RNA SPEC QL NAA+PROBE: NEGATIVE — SIGNIFICANT CHANGE UP
SODIUM SERPL-SCNC: 131 MMOL/L — LOW (ref 135–145)
TROPONIN T SERPL-MCNC: 0.01 NG/ML — SIGNIFICANT CHANGE UP (ref 0–0.01)
WBC # BLD: 8.41 K/UL — SIGNIFICANT CHANGE UP (ref 3.8–10.5)
WBC # FLD AUTO: 8.41 K/UL — SIGNIFICANT CHANGE UP (ref 3.8–10.5)

## 2021-12-05 PROCEDURE — 83880 ASSAY OF NATRIURETIC PEPTIDE: CPT

## 2021-12-05 PROCEDURE — 36415 COLL VENOUS BLD VENIPUNCTURE: CPT

## 2021-12-05 PROCEDURE — 84484 ASSAY OF TROPONIN QUANT: CPT

## 2021-12-05 PROCEDURE — 99284 EMERGENCY DEPT VISIT MOD MDM: CPT | Mod: CS

## 2021-12-05 PROCEDURE — 87635 SARS-COV-2 COVID-19 AMP PRB: CPT

## 2021-12-05 PROCEDURE — 93005 ELECTROCARDIOGRAM TRACING: CPT

## 2021-12-05 PROCEDURE — 85025 COMPLETE CBC W/AUTO DIFF WBC: CPT

## 2021-12-05 PROCEDURE — 71045 X-RAY EXAM CHEST 1 VIEW: CPT

## 2021-12-05 PROCEDURE — 80053 COMPREHEN METABOLIC PANEL: CPT

## 2021-12-05 PROCEDURE — 71045 X-RAY EXAM CHEST 1 VIEW: CPT | Mod: 26

## 2021-12-05 PROCEDURE — 93010 ELECTROCARDIOGRAM REPORT: CPT

## 2021-12-05 PROCEDURE — 99283 EMERGENCY DEPT VISIT LOW MDM: CPT | Mod: 25

## 2021-12-05 NOTE — ED ADULT TRIAGE NOTE - PAIN: PRESENCE, MLM
85 y/o F w PMH of breast cancer s/p mastectomy in 2018, radiation in 2019 who complains of progressive weakness and fatigue on exertion of 2-3 weeks with reduced PO intake and weight loss and found to have PE and positive UA. Admitted for further workup and management. denies pain/discomfort 83 y/o F w PMH of breast cancer s/p mastectomy in 2018, radiation in 2019 who complains of progressive weakness and fatigue on exertion of 2-3 weeks with reduced PO intake and weight loss and found to have PE and positive UA. Admitted for further workup and management.

## 2021-12-05 NOTE — ED ADULT NURSE NOTE - OBJECTIVE STATEMENT
pt c/o Left sided chest pain that lasted approx one hour after exertion/ambulating. denies chest pain at this time. pt has hx of aortic valve repair 2 months ago, and is taking ASA, lasix and gabapentin. legs swollen b/l +1 edema. pain does not radiate,

## 2021-12-05 NOTE — ED PROVIDER NOTE - CLINICAL SUMMARY MEDICAL DECISION MAKING FREE TEXT BOX
89 y/o F presents to ED with 2 episodes of atypical chest pain. Pt has history of similar episode a few weeks ago and was sent by her cardiologist for further evaluation. Pt is asymptomatic at rest. EKG shows left bundle branch block that is unchanged from previous. Plan for basic labs. CXR is normal with no pleural effusion, no congestion, and no infiltrates. 89 y/o F presents to ED with 2 episodes of atypical chest pain. Pt has history of similar episode a few weeks ago and was sent by her cardiologist for further evaluation. Pt is asymptomatic at rest. EKG shows left bundle branch block that is unchanged from previous. Plan for basic labs. CXR is normal with no pleural effusion, no congestion, and no infiltrates.    Labs otherwise wnl.  Dr. Mcpherson attempted to be contacted with no response.    Pt requesting discharge.  Feels well.

## 2021-12-05 NOTE — ED PROVIDER NOTE - PATIENT PORTAL LINK FT
You can access the FollowMyHealth Patient Portal offered by [facility] by registering at the following website: [email or web address]. By joining [facility]’s FollowMyHealth portal, you will also be able to view your health information using other applications (apps) compatible with our system.

## 2021-12-05 NOTE — ED PROVIDER NOTE - OBJECTIVE STATEMENT
89 y/o F with PMHx CAD (TAVR in July 2021), HTN, and HLD. PCP is Dr. Monson and he referred pt to ED for further evaluation. Pt states she has had 2 episodes of chest tingling. The first one occurred last night and lasted a minute, the second one occurred this morning and lasted a few minutes. These episodes occurred while at rest. Pt states she had the same symptoms with similar presentation on Nov 8th and went to [facility]. Pt feels fine in ED and denies any SOB, fevers, or other infectious symptoms.

## 2021-12-05 NOTE — ED ADULT TRIAGE NOTE - OTHER COMPLAINTS
chest discomfort "pinging" in chest this evening. Dr Mcpherson is cardiologist and was requested to come to ED for eval

## 2021-12-05 NOTE — ED PROVIDER NOTE - MUSCULOSKELETAL, MLM
Spine appears normal, range of motion is not limited, no muscle or joint tenderness. +1 edema LE at baseline.

## 2021-12-05 NOTE — ED PROVIDER NOTE - NSFOLLOWUPINSTRUCTIONS_ED_ALL_ED_FT
Follow up with Dr. Mcpherson and your cardiologist.    Take your regularly prescribed medications.    Return to ED with worsening symptoms or other concerns.          Chest Pain    WHAT YOU NEED TO KNOW:    What do I need to know about chest pain? Chest pain can be caused by a range of conditions, from not serious to life-threatening.    What may cause or increase my risk for chest pain?   •A digestion problem, such as acid reflux or a stomach ulcer      •An anxiety attack or a strong emotion, such as anger      •Infection, inflammation, or a fracture in the bones or cartilage in your chest      •Poor blood flow to your heart (angina)      •A life-threatening condition, such as a heart attack or blood clot in your lungs      What other symptoms might I have with chest pain?   •A burning feeling behind your breastbone      •A racing or slow heartbeat      •Fever or sweating      •Nausea or vomiting      •Shortness of breath      •Discomfort or pressure that spreads from your chest to your back, jaw, or arm      •Feeling weak, tired, or faint      How is the cause of chest pain diagnosed? Your healthcare provider will examine you. Describe your chest pain in as much detail as possible. Tell him or her where your pain is and when it began. Tell the provider if you notice anything that makes the pain worse or better. Tell him or her if it is constant or comes and goes. Your healthcare provider will ask about any medicines you use and medical conditions you have. He or she will also examine you. You may also need any of the following tests:  •An EKG is a test that records your heart's electrical activity.      •Blood tests check for heart damage and signs of a heart attack.      •An echocardiogram uses sound waves to see if blood is flowing normally through your heart.      •An ultrasound, x-ray, CT, or MRI scan may show the cause of your chest pain. You may be given contrast liquid to help your heart show up better in the pictures. Tell the healthcare provider if you have ever had an allergic reaction to contrast liquid. Do not enter the MRI room with anything metal. Metal can cause serious injury. Tell the healthcare provider if you have any metal in or on your body.      •An endoscopy may be done to check for ulcers or problem with your esophagus.  Upper Endoscopy           How is chest pain treated?   •Medicines may be given to treat the cause of your chest pain. Examples include pain medicine, anxiety medicine, or medicines to increase blood flow to your heart. Do not take certain medicines without asking your healthcare provider first. These include NSAIDs, herbal or vitamin supplements, or hormones (estrogen or progestin).      •A stent may be placed if your chest pain is caused by blockage in your heart. A stent is a wire mesh tube that helps hold your artery open. You may need more than 1 stent.      What are some healthy living tips? The following are general healthy guidelines. If the cause of your chest pain is known, your healthcare provider will give you specific guidelines to follow.  •Do not smoke. Nicotine and other chemicals in cigarettes and cigars can cause lung and heart damage. Ask your healthcare provider for information if you currently smoke and need help to quit. E-cigarettes or smokeless tobacco still contain nicotine. Talk to your healthcare provider before you use these products.      •Choose a variety of healthy foods as often as possible. Include fresh, frozen, or canned fruits and vegetables. Also include low-fat dairy products, fish, chicken (without skin), and lean meats. Your healthcare provider or a dietitian can help you create meal plans. You may need to avoid certain foods or drinks if your pain is caused by a digestion problem.  Healthy Foods           •Lower your sodium (salt) intake. Limit foods that are high in sodium, such as canned foods, salty snacks, and cold cuts. If you add salt when you cook food, do not add more at the table. Choose low-sodium canned foods as much as possible.             •Drink plenty of water every day. Water helps your body to control your temperature and blood pressure. Ask your healthcare provider how much water you should drink every day.      •Ask about activity. Your healthcare provider will tell you which activities to limit or avoid. Ask when you can drive, return to work, and have sex. Ask about the best exercise plan for you.      •Maintain a healthy weight. Ask your healthcare provider what a healthy weight is for you. Ask him or her to help you create a weight loss plan if you are overweight.      •Ask about vaccines you may need. Get the influenza (flu) vaccine every year as soon as recommended, usually in September or October. You may also need a pneumococcal vaccine to prevent pneumonia. The vaccine is usually given every 5 years, starting at age 65. Your healthcare provider can tell you if should get other vaccines, and when to get them.      Call your local emergency number (911 in the [place]) or have someone call if:   •You have any of the following signs of a heart attack: ?Squeezing, pressure, or pain in your chest      ?You may also have any of the following: ?Discomfort or pain in your back, neck, jaw, stomach, or arm      ?Shortness of breath      ?Nausea or vomiting      ?Lightheadedness or a sudden cold sweat            When should I seek immediate care?   •You have chest discomfort that gets worse, even with medicine.      •You cough or vomit blood.      •Your bowel movements are black or bloody.       •You cannot stop vomiting, or it hurts to swallow.      When should I call my doctor?   •You have questions or concerns about your condition or care.          CARE AGREEMENT:    You have the right to help plan your care. Learn about your health condition and how it may be treated. Discuss treatment options with your healthcare providers to decide what care you want to receive. You always have the right to refuse treatment.        © Copyright [employer] 2021           back to top                          © Copyright [employer] 2021

## 2022-01-01 NOTE — ED ADULT TRIAGE NOTE - NS ED NURSE AMBULANCES
VOICEMAIL  1. Caller Name: Palak Lopes                           Call Back Number: [phone] (home)     2. Message: Pt called to ask for a Referral [facility].  Informed the Pt she had not been seen by her PCP since 2019, and this would likely require an appt.  Informed the Pt PCP is out of office this week, and would see if she would send in the referral without an appt, but informed the Pt she would still need to be seen or she would need to est with a new PCP.  It has been almost three years since her last appt.  Pt verbalized understanding, and we scheduled her for an appt with PCP on 6/15/21           [employer] present

## 2022-01-18 ENCOUNTER — APPOINTMENT (OUTPATIENT)
Dept: HEART AND VASCULAR | Facility: CLINIC | Age: 87
End: 2022-01-18
Payer: MEDICARE

## 2022-01-18 VITALS
BODY MASS INDEX: 28.32 KG/M2 | HEART RATE: 64 BPM | SYSTOLIC BLOOD PRESSURE: 145 MMHG | DIASTOLIC BLOOD PRESSURE: 67 MMHG | WEIGHT: 150 LBS | OXYGEN SATURATION: 98 % | HEIGHT: 61 IN

## 2022-01-18 PROCEDURE — 99215 OFFICE O/P EST HI 40 MIN: CPT

## 2022-01-24 NOTE — REASON FOR VISIT
[Structural Heart and Valve Disease] : structural heart and valve disease [Formal Caregiver] : formal caregiver

## 2022-01-24 NOTE — HISTORY OF PRESENT ILLNESS
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 She presents today for routine f/u \par \par Patient continues to do well. She only note the persistent LLE edema. She denies any CP, SOB, RIOS, orthopnea or PND. She also denies any lightheadedness or syncope\par \par She is able to participate in PT three times a week without limitations and ambulates comfortably with a walker.

## 2022-01-24 NOTE — DISCUSSION/SUMMARY
[FreeTextEntry1] : 1) Diastolic CHF, Heart Failure Stage C, NYHA Class I\par -Clinically patient doing well, well compensated and euvolemic on exam\par -Left lower extremity remains unchanged since last vist. Dopplers performed in office in July were negative for DVT. Currently leg is warm, well perfused with palpable pulses. No erythema or calf tenderness noted\par -Discussed physical therapy, leg elevation, compression stockings and salt restriction\par -Continue with Lasix 20 mg PO every other day\par \par 2) AS s/p Bioprosthetic AV (2014) and ROSLYN (2021)\par -s/p  successful ROSLYN procedure - euvolemic on exam\par -Echo shows normal BI-Ventricular function, no evidence of AI and mild MS\par -c/w ASA 81mg daily\par -cont with diuresis as above given dCHF, Mild AS\par \par RTC in 4 months\par

## 2022-01-24 NOTE — PHYSICAL EXAM
[Well Developed] : well developed [Normal Venous Pressure] : normal venous pressure [Normal S1, S2] : normal S1, S2 [Clear Lung Fields] : clear lung fields [Soft] : abdomen soft [Non Tender] : non-tender [No Masses/organomegaly] : no masses/organomegaly [de-identified] : Left lower extremity edema, unilateral 1-2+, no calf tenderness or eythema; Unchanged from pror exam

## 2022-01-24 NOTE — REVIEW OF SYSTEMS
[Fever] : no fever [Headache] : no headache [Feeling Fatigued] : not feeling fatigued [SOB] : no shortness of breath [Dyspnea on exertion] : not dyspnea during exertion [Chest Discomfort] : no chest discomfort [Lower Ext Edema] : lower extremity edema [Palpitations] : no palpitations [Orthopnea] : no orthopnea [PND] : no PND [Syncope] : no syncope [Cough] : no cough [Wheezing] : no wheezing [Abdominal Pain] : no abdominal pain [Nausea] : no nausea [Vomiting] : no vomiting [Change in Appetite] : no change in appetite [Dysuria] : no dysuria [Dizziness] : no dizziness [Confusion] : no confusion was observed

## 2022-01-27 ENCOUNTER — APPOINTMENT (OUTPATIENT)
Dept: NEUROLOGY | Facility: CLINIC | Age: 87
End: 2022-01-27

## 2022-02-03 ENCOUNTER — APPOINTMENT (OUTPATIENT)
Dept: VASCULAR SURGERY | Facility: CLINIC | Age: 87
End: 2022-02-03
Payer: MEDICARE

## 2022-02-03 DIAGNOSIS — M79.604 PAIN IN RIGHT LEG: ICD-10-CM

## 2022-02-03 DIAGNOSIS — M79.605 PAIN IN RIGHT LEG: ICD-10-CM

## 2022-02-03 PROCEDURE — 93923 UPR/LXTR ART STDY 3+ LVLS: CPT

## 2022-02-03 PROCEDURE — 93971 EXTREMITY STUDY: CPT

## 2022-02-03 PROCEDURE — 99213 OFFICE O/P EST LOW 20 MIN: CPT

## 2022-02-07 NOTE — PROCEDURE
[FreeTextEntry1] : Venous duplex to evaluate LLE venous flow reveals widely patent and compressible deep and superficial veins in the LLE w/o evidence of thrombosis.

## 2022-02-07 NOTE — ASSESSMENT
[FreeTextEntry1] : 89yoF w/chronic LLE edema which has been noted at previous visits, referred back to office by Dr. Mars (Ortho) for evaluation of edema w/duplex, and evaluation of lower extremity arterial circulation.  Pt states she has had chronic b/l knee pain and swelling which ortho has been treating w/intra-articular and intra-bursal steroid injections w/o improvement.  Pt has been somewhat compliant w/compression w/daily Circaid use, but has not been using it since her leg started swelling 2wks prior.  She is now afraid to ambulate and weight bear on the leg.\par \par Edema persistent in LLE but not tense or tender, and Venous duplex to evaluate LLE venous flow reveals widely patent and compressible deep and superficial veins in the LLE w/o evidence of thrombosis.  She will continue to work w/her physical therapist and may restart manual lymphatic drainage and daily Circaid use for the LLE.  Pt will return to office PRN.

## 2022-02-07 NOTE — HISTORY OF PRESENT ILLNESS
[FreeTextEntry1] : 89yoF w/chronic LLE edema which has been noted at previous visits, referred back to office by Dr. Mars (Ortho) for evaluation of edema w/duplex, and evaluation of lower extremity arterial circulation.  Pt states she has had chronic b/l knee pain and swelling which ortho has been treating w/intra-articular and intra-bursal steroid injections w/o improvement.\par \par Pt has been somewhat compliant w/compression w/daily Circaid use, but has not been using it since her leg started swelling 2wks prior.  She is now afraid to ambulate and weight bear on the leg.

## 2022-02-07 NOTE — PHYSICAL EXAM
[Normal Thyroid] : the thyroid was normal [Normal Breath Sounds] : Normal breath sounds [Normal Heart Sounds] : normal heart sounds [Normal Rate and Rhythm] : normal rate and rhythm [2+] : left 2+ [Ankle Swelling (On Exam)] : present [Ankle Swelling On The Left] : of the left ankle [Ankle Swelling On The Right] : mild [Alert] : alert [Calm] : calm [JVD] : no jugular venous distention  [Varicose Veins Of Lower Extremities] : not present [] : not present [Abdomen Tenderness] : ~T ~M No abdominal tenderness [Purpura] : no purpura  [Petechiae] : no petechiae [Skin Ulcer] : no ulcer [Skin Induration] : no induration [de-identified] : Pleasant, ambulating w/walker support, NAD [de-identified] : NC/AT, anicteric [FreeTextEntry1] : LUE w/o swelling, fingertips well-perfused w/brisk cap refill; LLE edema slightly improved, not tense [de-identified] : FROM throughout, strength 5/5x4,  strength 5/5 in L hand [de-identified] : Well-approximated L wrist incision, no dehiscence noted, surrounding skin slightly macerated [de-identified] : Neurosensory grossly intact in hands b/l

## 2022-02-24 ENCOUNTER — NON-APPOINTMENT (OUTPATIENT)
Age: 87
End: 2022-02-24

## 2022-02-24 ENCOUNTER — APPOINTMENT (OUTPATIENT)
Dept: OPHTHALMOLOGY | Facility: CLINIC | Age: 87
End: 2022-02-24
Payer: MEDICARE

## 2022-02-24 PROCEDURE — 92083 EXTENDED VISUAL FIELD XM: CPT

## 2022-02-24 PROCEDURE — 76514 ECHO EXAM OF EYE THICKNESS: CPT

## 2022-02-24 PROCEDURE — 92250 FUNDUS PHOTOGRAPHY W/I&R: CPT

## 2022-02-24 PROCEDURE — 92020 GONIOSCOPY: CPT

## 2022-02-24 PROCEDURE — 92004 COMPRE OPH EXAM NEW PT 1/>: CPT

## 2022-03-07 ENCOUNTER — OUTPATIENT (OUTPATIENT)
Dept: OUTPATIENT SERVICES | Facility: HOSPITAL | Age: 87
LOS: 1 days | End: 2022-03-07
Payer: MEDICARE

## 2022-03-07 ENCOUNTER — APPOINTMENT (OUTPATIENT)
Dept: OPHTHALMOLOGY | Facility: CLINIC | Age: 87
End: 2022-03-07

## 2022-03-07 ENCOUNTER — NON-APPOINTMENT (OUTPATIENT)
Age: 87
End: 2022-03-07

## 2022-03-07 DIAGNOSIS — Z95.2 PRESENCE OF PROSTHETIC HEART VALVE: Chronic | ICD-10-CM

## 2022-03-07 DIAGNOSIS — Z96.649 PRESENCE OF UNSPECIFIED ARTIFICIAL HIP JOINT: Chronic | ICD-10-CM

## 2022-03-07 DIAGNOSIS — Z98.890 OTHER SPECIFIED POSTPROCEDURAL STATES: Chronic | ICD-10-CM

## 2022-03-07 PROCEDURE — 65855 TRABECULOPLASTY LASER SURG: CPT | Mod: RT

## 2022-03-08 DIAGNOSIS — H40.1432 CAPSULAR GLAUCOMA WITH PSEUDOEXFOLIATION OF LENS, BILATERAL, MODERATE STAGE: ICD-10-CM

## 2022-03-18 ENCOUNTER — EMERGENCY (EMERGENCY)
Facility: HOSPITAL | Age: 87
LOS: 1 days | Discharge: HOME | End: 2022-03-18
Attending: EMERGENCY MEDICINE | Admitting: EMERGENCY MEDICINE
Payer: MEDICARE

## 2022-03-18 VITALS
TEMPERATURE: 98 F | OXYGEN SATURATION: 95 % | DIASTOLIC BLOOD PRESSURE: 77 MMHG | RESPIRATION RATE: 17 BRPM | SYSTOLIC BLOOD PRESSURE: 160 MMHG | HEART RATE: 70 BPM

## 2022-03-18 VITALS
SYSTOLIC BLOOD PRESSURE: 168 MMHG | HEART RATE: 85 BPM | DIASTOLIC BLOOD PRESSURE: 86 MMHG | TEMPERATURE: 99 F | HEIGHT: 60 IN | WEIGHT: 147.93 LBS | OXYGEN SATURATION: 95 % | RESPIRATION RATE: 18 BRPM

## 2022-03-18 DIAGNOSIS — I10 ESSENTIAL (PRIMARY) HYPERTENSION: ICD-10-CM

## 2022-03-18 DIAGNOSIS — Z96.649 PRESENCE OF UNSPECIFIED ARTIFICIAL HIP JOINT: Chronic | ICD-10-CM

## 2022-03-18 DIAGNOSIS — Z98.890 OTHER SPECIFIED POSTPROCEDURAL STATES: Chronic | ICD-10-CM

## 2022-03-18 DIAGNOSIS — E87.5 HYPERKALEMIA: ICD-10-CM

## 2022-03-18 DIAGNOSIS — Z88.1 ALLERGY STATUS TO OTHER ANTIBIOTIC AGENTS STATUS: ICD-10-CM

## 2022-03-18 DIAGNOSIS — R06.02 SHORTNESS OF BREATH: ICD-10-CM

## 2022-03-18 DIAGNOSIS — Z20.822 CONTACT WITH AND (SUSPECTED) EXPOSURE TO COVID-19: ICD-10-CM

## 2022-03-18 DIAGNOSIS — I44.0 ATRIOVENTRICULAR BLOCK, FIRST DEGREE: ICD-10-CM

## 2022-03-18 DIAGNOSIS — Z88.0 ALLERGY STATUS TO PENICILLIN: ICD-10-CM

## 2022-03-18 DIAGNOSIS — Z79.82 LONG TERM (CURRENT) USE OF ASPIRIN: ICD-10-CM

## 2022-03-18 DIAGNOSIS — E78.5 HYPERLIPIDEMIA, UNSPECIFIED: ICD-10-CM

## 2022-03-18 DIAGNOSIS — E87.1 HYPO-OSMOLALITY AND HYPONATREMIA: ICD-10-CM

## 2022-03-18 DIAGNOSIS — Z95.2 PRESENCE OF PROSTHETIC HEART VALVE: Chronic | ICD-10-CM

## 2022-03-18 DIAGNOSIS — I49.8 OTHER SPECIFIED CARDIAC ARRHYTHMIAS: ICD-10-CM

## 2022-03-18 LAB
ALBUMIN SERPL ELPH-MCNC: 4.3 G/DL — SIGNIFICANT CHANGE UP (ref 3.3–5)
ALP SERPL-CCNC: 81 U/L — SIGNIFICANT CHANGE UP (ref 40–120)
ALT FLD-CCNC: 21 U/L — SIGNIFICANT CHANGE UP (ref 10–45)
ANION GAP SERPL CALC-SCNC: 9 MMOL/L — SIGNIFICANT CHANGE UP (ref 5–17)
ANION GAP SERPL CALC-SCNC: 9 MMOL/L — SIGNIFICANT CHANGE UP (ref 5–17)
APTT BLD: 28.3 SEC — SIGNIFICANT CHANGE UP (ref 27.5–35.5)
AST SERPL-CCNC: 40 U/L — SIGNIFICANT CHANGE UP (ref 10–40)
BASOPHILS # BLD AUTO: 0.04 K/UL — SIGNIFICANT CHANGE UP (ref 0–0.2)
BASOPHILS NFR BLD AUTO: 0.5 % — SIGNIFICANT CHANGE UP (ref 0–2)
BILIRUB SERPL-MCNC: 0.4 MG/DL — SIGNIFICANT CHANGE UP (ref 0.2–1.2)
BUN SERPL-MCNC: 20 MG/DL — SIGNIFICANT CHANGE UP (ref 7–23)
BUN SERPL-MCNC: 21 MG/DL — SIGNIFICANT CHANGE UP (ref 7–23)
CALCIUM SERPL-MCNC: 9.1 MG/DL — SIGNIFICANT CHANGE UP (ref 8.4–10.5)
CALCIUM SERPL-MCNC: 9.4 MG/DL — SIGNIFICANT CHANGE UP (ref 8.4–10.5)
CHLORIDE SERPL-SCNC: 93 MMOL/L — LOW (ref 96–108)
CHLORIDE SERPL-SCNC: 96 MMOL/L — SIGNIFICANT CHANGE UP (ref 96–108)
CK MB CFR SERPL CALC: 4.6 NG/ML — SIGNIFICANT CHANGE UP (ref 0–6.7)
CK SERPL-CCNC: 249 U/L — HIGH (ref 25–170)
CO2 SERPL-SCNC: 28 MMOL/L — SIGNIFICANT CHANGE UP (ref 22–31)
CO2 SERPL-SCNC: 29 MMOL/L — SIGNIFICANT CHANGE UP (ref 22–31)
CREAT SERPL-MCNC: 0.95 MG/DL — SIGNIFICANT CHANGE UP (ref 0.5–1.3)
CREAT SERPL-MCNC: 0.95 MG/DL — SIGNIFICANT CHANGE UP (ref 0.5–1.3)
EGFR: 56 ML/MIN/1.73M2 — LOW
EGFR: 56 ML/MIN/1.73M2 — LOW
EOSINOPHIL # BLD AUTO: 0.18 K/UL — SIGNIFICANT CHANGE UP (ref 0–0.5)
EOSINOPHIL NFR BLD AUTO: 2.2 % — SIGNIFICANT CHANGE UP (ref 0–6)
GLUCOSE SERPL-MCNC: 119 MG/DL — HIGH (ref 70–99)
GLUCOSE SERPL-MCNC: 92 MG/DL — SIGNIFICANT CHANGE UP (ref 70–99)
HCT VFR BLD CALC: 33.8 % — LOW (ref 34.5–45)
HGB BLD-MCNC: 10.8 G/DL — LOW (ref 11.5–15.5)
IMM GRANULOCYTES NFR BLD AUTO: 0.7 % — SIGNIFICANT CHANGE UP (ref 0–1.5)
INR BLD: 0.98 — SIGNIFICANT CHANGE UP (ref 0.88–1.16)
LYMPHOCYTES # BLD AUTO: 1.55 K/UL — SIGNIFICANT CHANGE UP (ref 1–3.3)
LYMPHOCYTES # BLD AUTO: 18.7 % — SIGNIFICANT CHANGE UP (ref 13–44)
MCHC RBC-ENTMCNC: 29.3 PG — SIGNIFICANT CHANGE UP (ref 27–34)
MCHC RBC-ENTMCNC: 32 GM/DL — SIGNIFICANT CHANGE UP (ref 32–36)
MCV RBC AUTO: 91.8 FL — SIGNIFICANT CHANGE UP (ref 80–100)
MONOCYTES # BLD AUTO: 0.96 K/UL — HIGH (ref 0–0.9)
MONOCYTES NFR BLD AUTO: 11.6 % — SIGNIFICANT CHANGE UP (ref 2–14)
NEUTROPHILS # BLD AUTO: 5.5 K/UL — SIGNIFICANT CHANGE UP (ref 1.8–7.4)
NEUTROPHILS NFR BLD AUTO: 66.3 % — SIGNIFICANT CHANGE UP (ref 43–77)
NRBC # BLD: 0 /100 WBCS — SIGNIFICANT CHANGE UP (ref 0–0)
NT-PROBNP SERPL-SCNC: 641 PG/ML — HIGH (ref 0–300)
PLATELET # BLD AUTO: 283 K/UL — SIGNIFICANT CHANGE UP (ref 150–400)
POTASSIUM SERPL-MCNC: 4.6 MMOL/L — SIGNIFICANT CHANGE UP (ref 3.5–5.3)
POTASSIUM SERPL-MCNC: 5.4 MMOL/L — HIGH (ref 3.5–5.3)
POTASSIUM SERPL-SCNC: 4.6 MMOL/L — SIGNIFICANT CHANGE UP (ref 3.5–5.3)
POTASSIUM SERPL-SCNC: 5.4 MMOL/L — HIGH (ref 3.5–5.3)
PROT SERPL-MCNC: 7 G/DL — SIGNIFICANT CHANGE UP (ref 6–8.3)
PROTHROM AB SERPL-ACNC: 11.7 SEC — SIGNIFICANT CHANGE UP (ref 10.5–13.4)
RBC # BLD: 3.68 M/UL — LOW (ref 3.8–5.2)
RBC # FLD: 14.9 % — HIGH (ref 10.3–14.5)
SARS-COV-2 RNA SPEC QL NAA+PROBE: SIGNIFICANT CHANGE UP
SODIUM SERPL-SCNC: 130 MMOL/L — LOW (ref 135–145)
SODIUM SERPL-SCNC: 134 MMOL/L — LOW (ref 135–145)
TROPONIN T SERPL-MCNC: <0.01 NG/ML — SIGNIFICANT CHANGE UP (ref 0–0.01)
WBC # BLD: 8.29 K/UL — SIGNIFICANT CHANGE UP (ref 3.8–10.5)
WBC # FLD AUTO: 8.29 K/UL — SIGNIFICANT CHANGE UP (ref 3.8–10.5)

## 2022-03-18 PROCEDURE — 80048 BASIC METABOLIC PNL TOTAL CA: CPT

## 2022-03-18 PROCEDURE — U0005: CPT

## 2022-03-18 PROCEDURE — 99285 EMERGENCY DEPT VISIT HI MDM: CPT | Mod: 25

## 2022-03-18 PROCEDURE — 85730 THROMBOPLASTIN TIME PARTIAL: CPT

## 2022-03-18 PROCEDURE — 85025 COMPLETE CBC W/AUTO DIFF WBC: CPT

## 2022-03-18 PROCEDURE — 93010 ELECTROCARDIOGRAM REPORT: CPT

## 2022-03-18 PROCEDURE — 36415 COLL VENOUS BLD VENIPUNCTURE: CPT

## 2022-03-18 PROCEDURE — 96360 HYDRATION IV INFUSION INIT: CPT

## 2022-03-18 PROCEDURE — 71045 X-RAY EXAM CHEST 1 VIEW: CPT | Mod: 26

## 2022-03-18 PROCEDURE — 83880 ASSAY OF NATRIURETIC PEPTIDE: CPT

## 2022-03-18 PROCEDURE — 82553 CREATINE MB FRACTION: CPT

## 2022-03-18 PROCEDURE — 84484 ASSAY OF TROPONIN QUANT: CPT

## 2022-03-18 PROCEDURE — 93005 ELECTROCARDIOGRAM TRACING: CPT

## 2022-03-18 PROCEDURE — U0003: CPT

## 2022-03-18 PROCEDURE — 71045 X-RAY EXAM CHEST 1 VIEW: CPT

## 2022-03-18 PROCEDURE — 80053 COMPREHEN METABOLIC PANEL: CPT

## 2022-03-18 PROCEDURE — 82550 ASSAY OF CK (CPK): CPT

## 2022-03-18 PROCEDURE — 85610 PROTHROMBIN TIME: CPT

## 2022-03-18 RX ORDER — SODIUM CHLORIDE 9 MG/ML
500 INJECTION INTRAMUSCULAR; INTRAVENOUS; SUBCUTANEOUS ONCE
Refills: 0 | Status: COMPLETED | OUTPATIENT
Start: 2022-03-18 | End: 2022-03-18

## 2022-03-18 RX ADMIN — SODIUM CHLORIDE 500 MILLILITER(S): 9 INJECTION INTRAMUSCULAR; INTRAVENOUS; SUBCUTANEOUS at 19:30

## 2022-03-18 RX ADMIN — SODIUM CHLORIDE 500 MILLILITER(S): 9 INJECTION INTRAMUSCULAR; INTRAVENOUS; SUBCUTANEOUS at 18:54

## 2022-03-18 NOTE — ED PROVIDER NOTE - PATIENT PORTAL LINK FT
You can access the FollowMyHealth Patient Portal offered by [facility] by registering at the following website: [email or web address]. By joining [employer]’s FollowMyHealth portal, you will also be able to view your health information using other applications (apps) compatible with our system.

## 2022-03-18 NOTE — ED ADULT NURSE NOTE - NSIMPLEMENTINTERV_GEN_ALL_ED
Implemented All Fall with Harm Risk Interventions:  [place] to call system. Call bell, personal items and telephone within reach. Instruct patient to call for assistance. Room bathroom lighting operational. Non-slip footwear when patient is off stretcher. Physically safe environment: no spills, clutter or unnecessary equipment. Stretcher in lowest position, wheels locked, appropriate side rails in place. Provide visual cue, wrist band, yellow gown, etc. Monitor gait and stability. Monitor for mental status changes and reorient to person, place, and time. Review medications for side effects contributing to fall risk. Reinforce activity limits and safety measures with patient and family. Provide visual clues: red socks.

## 2022-03-18 NOTE — ED ADULT NURSE REASSESSMENT NOTE - NS ED NURSE REASSESS COMMENT FT1
Patient evaluated by Dr. Almeida, given sandwich/cracker/ginger ale, tolerating well.  CXR at the bedside.  IV removed per patient request.  Dr. Almeida informed.
plan for repeat lab when patient returns

## 2022-03-18 NOTE — ED PROVIDER NOTE - NSFOLLOWUPINSTRUCTIONS_ED_ALL_ED_FT
Please follow up with your primary care physician, Dr. Mcpherson.  Return to the Emergency Department if you have any new or worsening symptoms, or for any other concerns. Please read below for further information.      Diabetes Insipidus    WHAT YOU NEED TO KNOW:    Diabetes insipidus (DI) is a disease that causes frequent urination. The amount of urine you make is controlled by antidiuretic hormone (ADH). ADH is made in a part of the brain called the hypothalamus. ADH is stored and released by the pituitary gland. The 2 most common types of diabetes insipidus are central DI (CDI) and nephrogenic DI (NDI).    DISCHARGE INSTRUCTIONS:    Medicines:   •Medicines may be given to decrease the amount you urinate.      •Take your medicine as directed. Contact your healthcare provider if you think your medicine is not helping or if you have side effects. Tell him if you are allergic to any medicine. Keep a list of the medicines, vitamins, and herbs you take. Include the amounts, and when and why you take them. Bring the list or the pill bottles to follow-up visits. Carry your medicine list with you in case of an emergency.      Follow up with your healthcare provider as directed: You may need to return for more blood and urine tests to check if your treatments are working. Write down your questions so you remember to ask them during your visits.     Weigh yourself each day: Weigh yourself daily at the same time, on the same scale. Rapid weight loss can be a sign of fluid loss in your body.     Drink liquids as directed: Ask your healthcare provider how much liquid to drink each day and which liquids are best for you.     Nutrition: You may need to decrease the amount of sodium (salt) you eat if you have NDI. This may help decrease the amount of fluids you lose. Ask for more information about the meal plan you should follow.     Contact your healthcare provider if:   •You have a dry mouth or cracked lips.      •You are more tired than usual.      •You have new headaches or vision changes.      •You have questions or concerns about your condition or care.      Return to the emergency department if:   •You feel very thirsty all the time, and your thirst is waking you from sleep.      •You are urinating large amounts of light yellow, or clear urine.      •You are losing weight daily without trying.      •You feel weak and dizzy, or have fainted.      •You feel confused.      •You have a seizure.

## 2022-03-18 NOTE — ED ADULT NURSE NOTE - OBJECTIVE STATEMENT
Patient presents to ED c/o "I feel like I have a cold," reports runny nose and "I feel like a post nasal drip is settling in my neck."  Speaking in full sentences, no s:s of respiratory distress.  Seen in urgent care and was recommended to ED for further evaluation.  Denies fever/cough/chills/n/v/d.

## 2022-03-18 NOTE — ED PROVIDER NOTE - CLINICAL SUMMARY MEDICAL DECISION MAKING FREE TEXT BOX
89F with a h/o Diabetes Insipidus, aortic valve repair (bioprosthetic), HLD, HTN who p/w a week of runny nose, feeling like she has a cold, associated with feeling like her respirations were "more shallow." She went to  and was sent to ER for cardiac eval. SHe now denies any sx, stating she feels "fine." She has been compliant with her meds, denies CP, no n/v, no dizziness or syncope, no new leg swelling. Follows with Dr. Mcpherson however she is away at a conference and unavailable.    Pt is well-appearing on exam, VSS, no focal neuro deficits, EKG SA, 1st deg AVB, no ischemia, CXR no acute infection or effusion. Plan for labs, covid, dispo pending workup.

## 2022-03-18 NOTE — ED PROVIDER NOTE - CARE PROVIDER_API CALL
Camila Mcpherson)  Internal Medicine  [address] [place] [address], [place] [facility]  Phone: [phone]  Fax: [phone]  Follow Up Time:

## 2022-03-18 NOTE — ED PROVIDER NOTE - WR INTERPRETATION 1
Heart size within normal limits, thoracic aortic and mitral annulus calcification, TAVR. Bilateral calcified granulomata. No acute focal opacity.

## 2022-03-18 NOTE — ED ADULT TRIAGE NOTE - CHIEF COMPLAINT QUOTE
pt arriving with HHA for c/c x 2 days of feeling respirations to be more "shallow" than usual. pt states she thinks she had a cold but it has not improved. Denies n/v/d, fever, chills ,cough, or chest pain. seen at  today, referred to ED for further workup

## 2022-03-18 NOTE — ED PROVIDER NOTE - PHYSICAL EXAMINATION
GEN: Elderly, Well appearing, well developed, awake, alert, oriented to person, place, time/situation and in no apparent distress. NTAF  ENT: Airway patent, Nasal mucosa clear. Mouth with normal mucosa.  EYES: Clear bilaterally. PERRL, EOMI  RESPIRATORY: Breathing comfortably with normal RR. No W/C/R, no hypoxia or resp distress.  CARDIAC: Regular rate and rhythm, no M/R/G  ABDOMEN: Soft, nontender, +bowel sounds, no rebound, rigidity, or guarding.  MSK: Range of motion is not limited, no deformities noted. B/l LE edema.  NEURO: Alert and oriented, no focal deficits.  SKIN: Skin normal color for race, warm, dry and intact. No evidence of rash.  PSYCH: Alert and oriented to person, place, time/situation. normal mood and affect. no apparent risk to self or others.

## 2022-03-18 NOTE — ED PROVIDER NOTE - OBJECTIVE STATEMENT
89F with a h/o Diabetes Insipidus, aortic valve repair (bioprosthetic), HLD, HTN who p/w a week of runny nose, feeling like she has a cold, associated with feeling like her respirations were "more shallow." She went to  and was sent to ER for cardiac eval. SHe now denies any sx, stating she feels "fine." She has been compliant with her meds, denies CP, no n/v, no dizziness or syncope, no new leg swelling. Follows with Dr. Mcpherson however she is away at a conference and unavailable.

## 2022-03-18 NOTE — ED PROVIDER NOTE - PROGRESS NOTE DETAILS
Labs show Na 130, K 5.4, , proBNP 641, trop neg. Suspect abnormalities due to DI and desmopressin use. Will hydrate with 500cc NS and recheck BMP, if improving, will DC. Pt is very eager to go home.   Do not suspect ACS, PE, dissection or other acute life-threatening pathology at this time. Pt will f/u with Dr. Mcpherson as outpt.

## 2022-03-21 ENCOUNTER — APPOINTMENT (OUTPATIENT)
Dept: OPHTHALMOLOGY | Facility: CLINIC | Age: 87
End: 2022-03-21
Payer: MEDICARE

## 2022-03-21 ENCOUNTER — NON-APPOINTMENT (OUTPATIENT)
Age: 87
End: 2022-03-21

## 2022-03-21 PROCEDURE — 92133 CPTRZD OPH DX IMG PST SGM ON: CPT

## 2022-03-21 PROCEDURE — 92012 INTRM OPH EXAM EST PATIENT: CPT

## 2022-03-30 ENCOUNTER — APPOINTMENT (OUTPATIENT)
Dept: OPHTHALMOLOGY | Facility: CLINIC | Age: 87
End: 2022-03-30
Payer: MEDICARE

## 2022-03-30 ENCOUNTER — APPOINTMENT (OUTPATIENT)
Dept: OPHTHALMOLOGY | Facility: CLINIC | Age: 87
End: 2022-03-30

## 2022-03-30 ENCOUNTER — NON-APPOINTMENT (OUTPATIENT)
Age: 87
End: 2022-03-30

## 2022-03-30 PROCEDURE — 92012 INTRM OPH EXAM EST PATIENT: CPT

## 2022-04-04 ENCOUNTER — APPOINTMENT (OUTPATIENT)
Dept: OPHTHALMOLOGY | Facility: CLINIC | Age: 87
End: 2022-04-04

## 2022-04-04 ENCOUNTER — NON-APPOINTMENT (OUTPATIENT)
Age: 87
End: 2022-04-04

## 2022-04-04 ENCOUNTER — OUTPATIENT (OUTPATIENT)
Dept: OUTPATIENT SERVICES | Facility: HOSPITAL | Age: 87
LOS: 1 days | End: 2022-04-04
Payer: MEDICARE

## 2022-04-04 DIAGNOSIS — Z96.649 PRESENCE OF UNSPECIFIED ARTIFICIAL HIP JOINT: Chronic | ICD-10-CM

## 2022-04-04 DIAGNOSIS — Z98.890 OTHER SPECIFIED POSTPROCEDURAL STATES: Chronic | ICD-10-CM

## 2022-04-04 DIAGNOSIS — Z95.2 PRESENCE OF PROSTHETIC HEART VALVE: Chronic | ICD-10-CM

## 2022-04-04 PROCEDURE — 65855 TRABECULOPLASTY LASER SURG: CPT | Mod: LT

## 2022-04-05 DIAGNOSIS — H40.1432 CAPSULAR GLAUCOMA WITH PSEUDOEXFOLIATION OF LENS, BILATERAL, MODERATE STAGE: ICD-10-CM

## 2022-04-22 NOTE — PROGRESS NOTE ADULT - PROBLEM SELECTOR PLAN 5
Continue home Desmopressin 0.1mg PO BID H&H 10.3/32.1 on arrival (unknown baseline)  -H&H 9.9/32.0 today  -Iron studies negative (elevated ferritin 318)  -Maintain active T & S (next due 6/21) no

## 2022-04-26 ENCOUNTER — APPOINTMENT (OUTPATIENT)
Dept: HEART AND VASCULAR | Facility: CLINIC | Age: 87
End: 2022-04-26
Payer: MEDICARE

## 2022-04-26 ENCOUNTER — NON-APPOINTMENT (OUTPATIENT)
Age: 87
End: 2022-04-26

## 2022-04-26 VITALS
DIASTOLIC BLOOD PRESSURE: 90 MMHG | WEIGHT: 156 LBS | HEART RATE: 79 BPM | SYSTOLIC BLOOD PRESSURE: 143 MMHG | OXYGEN SATURATION: 98 % | TEMPERATURE: 98 F | BODY MASS INDEX: 29.45 KG/M2 | HEIGHT: 61 IN

## 2022-04-26 PROCEDURE — 99215 OFFICE O/P EST HI 40 MIN: CPT | Mod: 25

## 2022-04-26 PROCEDURE — 93000 ELECTROCARDIOGRAM COMPLETE: CPT

## 2022-04-27 NOTE — PROGRESS NOTE ADULT - SUBJECTIVE AND OBJECTIVE BOX
Non Face-to-Face While in the ICU this evening following evening sign-off with the daytime intensivist, I provided an additional 30 minutes of critical care time as documented below:  S/p repair of L radial pseudoaneurysm today, gauze dressing with modest soiling, intact palmar arch signal/strength and sensory function. TVP removed, remains in sinus rhythm. Will followup coagulation parameters and Vascular surgery recommendations.  This included chart review, documentation, obtaining additional history when needed, review of test results, and discussions with the multidisciplinary ICU team. This did not include time spent performing separately documented procedures or time treating multiple patients simultaneously. This time includes only time spent directly engaged in work dedicated to this individual patient whether at the bedside or elsewhere in the ICU while the patient was critically ill.  Todd Moreno MD  Cardiothoracic Critical Care

## 2022-04-29 ENCOUNTER — NON-APPOINTMENT (OUTPATIENT)
Age: 87
End: 2022-04-29

## 2022-04-29 ENCOUNTER — APPOINTMENT (OUTPATIENT)
Dept: OPHTHALMOLOGY | Facility: CLINIC | Age: 87
End: 2022-04-29
Payer: MEDICARE

## 2022-04-29 PROCEDURE — 92014 COMPRE OPH EXAM EST PT 1/>: CPT

## 2022-05-03 ENCOUNTER — NON-APPOINTMENT (OUTPATIENT)
Age: 87
End: 2022-05-03

## 2022-05-03 NOTE — REVIEW OF SYSTEMS
[Fever] : no fever [Headache] : no headache [Feeling Fatigued] : not feeling fatigued [SOB] : no shortness of breath [Dyspnea on exertion] : not dyspnea during exertion [Chest Discomfort] : no chest discomfort [Lower Ext Edema] : lower extremity edema [Palpitations] : no palpitations [Orthopnea] : no orthopnea [PND] : no PND [Syncope] : no syncope [Cough] : no cough [Wheezing] : no wheezing [Abdominal Pain] : no abdominal pain [Nausea] : no nausea [Vomiting] : no vomiting [Change in Appetite] : no change in appetite [Dysuria] : no dysuria [Dizziness] : no dizziness [Confusion] : no confusion was observed [FreeTextEntry5] : Persistent LLE edema, unchanged vs slightly improved

## 2022-05-03 NOTE — DISCUSSION/SUMMARY
[FreeTextEntry1] : 1) Diastolic CHF, Heart Failure Stage C, NYHA Class I\par -Patient continues to do well compensated and euvolemic on exam\par -We discussed physical therapy, leg elevation, compression stockings and salt restriction\par -Continue with Lasix 20 mg PO every other day\par \par 2) AS s/p Bioprosthetic AV (2014) and ROSLYN (2021)\par -s/p successful ROSLYN procedure - euvolemic on exam\par -Echo shows normal BI-Ventricular function, no evidence of AI and mild MS\par -c/w ASA 81mg daily\par -cont with diuresis as above given dCHF, Mild AS

## 2022-05-03 NOTE — HISTORY OF PRESENT ILLNESS
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 She presents today for routine f/u \par \par Patient currently doing well. She denies any CP, SOB, RIOS, orthopnea or PND. She also denies any lightheadedness or syncope\par \par She continues to participate in PT three times a week without limitations and ambulates comfortably with a walker.

## 2022-05-03 NOTE — PHYSICAL EXAM
[Well Developed] : well developed [Normal Venous Pressure] : normal venous pressure [Normal S1, S2] : normal S1, S2 [Clear Lung Fields] : clear lung fields [Soft] : abdomen soft [Non Tender] : non-tender [No Masses/organomegaly] : no masses/organomegaly [de-identified] : Left lower extremity edema, unilateral 1+, no calf tenderness or erythema; Unchanged from prior exams

## 2022-05-09 ENCOUNTER — APPOINTMENT (OUTPATIENT)
Dept: OPHTHALMOLOGY | Facility: CLINIC | Age: 87
End: 2022-05-09

## 2022-05-16 ENCOUNTER — APPOINTMENT (OUTPATIENT)
Dept: OPHTHALMOLOGY | Facility: CLINIC | Age: 87
End: 2022-05-16

## 2022-05-24 ENCOUNTER — NON-APPOINTMENT (OUTPATIENT)
Age: 87
End: 2022-05-24

## 2022-05-24 ENCOUNTER — APPOINTMENT (OUTPATIENT)
Dept: OPHTHALMOLOGY | Facility: CLINIC | Age: 87
End: 2022-05-24
Payer: MEDICARE

## 2022-05-24 PROCEDURE — 92012 INTRM OPH EXAM EST PATIENT: CPT

## 2022-06-28 ENCOUNTER — APPOINTMENT (OUTPATIENT)
Dept: HEART AND VASCULAR | Facility: CLINIC | Age: 87
End: 2022-06-28

## 2022-06-28 VITALS
HEIGHT: 61 IN | BODY MASS INDEX: 28.7 KG/M2 | OXYGEN SATURATION: 95 % | HEART RATE: 67 BPM | SYSTOLIC BLOOD PRESSURE: 113 MMHG | TEMPERATURE: 97.6 F | WEIGHT: 152 LBS | DIASTOLIC BLOOD PRESSURE: 75 MMHG

## 2022-06-28 PROCEDURE — 99215 OFFICE O/P EST HI 40 MIN: CPT

## 2022-06-28 NOTE — HISTORY OF PRESENT ILLNESS
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 She presents today for routine f/u \par \par Patient currently doing well. She does note some mild persistent L Lower extremity swelling, chronic and related to stent embolization. She continues to work with PT a few times a week. She denies any CP, SOB, RIOS, orthopnea or PND. She also denies any lightheadedness or syncope

## 2022-06-28 NOTE — REVIEW OF SYSTEMS
[Lower Ext Edema] : lower extremity edema [Fever] : no fever [Headache] : no headache [Feeling Fatigued] : not feeling fatigued [SOB] : no shortness of breath [Dyspnea on exertion] : not dyspnea during exertion [Chest Discomfort] : no chest discomfort [Palpitations] : no palpitations [Orthopnea] : no orthopnea [PND] : no PND [Syncope] : no syncope [Cough] : no cough [Wheezing] : no wheezing [Abdominal Pain] : no abdominal pain [Nausea] : no nausea [Vomiting] : no vomiting [Change in Appetite] : no change in appetite [Dysuria] : no dysuria [Dizziness] : no dizziness [Confusion] : no confusion was observed [FreeTextEntry5] : Persistent LLE edema, unchanged

## 2022-06-28 NOTE — DISCUSSION/SUMMARY
[FreeTextEntry1] : 1) Diastolic CHF, Heart Failure Stage C, NYHA Class I\par -Patient remains well-compensated and euvolemic on exam\par -Continue with Lasix 20 mg PO every other day\par \par 2) AS s/p Bioprosthetic AV (2014) and ROSLYN (2021)\par -s/p successful ROSLYN procedure - euvolemic on exam\par -Echo shows normal BI-Ventricular function, no evidence of AI and mild MS\par -c/w ASA 81mg daily\par -cont with diuresis as above given dCHF, Mild AS. \par \par 3) Pre-op risk assessment: Patient is an intermediate risk patient for a low-risk procedure; CCTA in June '21 showed non-obstructive CAD; TTE in Sept '21 showed Normal LVEF, Normal RV function, Mild AI, normal functioning biopros AV and Moderate MS; No further testing needed;\par -Ok to hold Aspirin as indicated by Ophthalmologist and would restart post procedure; Will also hold PO Lasix on the day of the procedure \par \par RTC in 4 months

## 2022-06-28 NOTE — PHYSICAL EXAM
[Well Developed] : well developed [Normal Venous Pressure] : normal venous pressure [Normal S1, S2] : normal S1, S2 [Clear Lung Fields] : clear lung fields [Soft] : abdomen soft [Non Tender] : non-tender [No Masses/organomegaly] : no masses/organomegaly [de-identified] : Left lower extremity edema, unilateral 1+, no calf tenderness or erythema; Unchanged from prior exams

## 2022-07-19 NOTE — ASU PATIENT PROFILE, ADULT - NSICDXPASTMEDICALHX_GEN_ALL_CORE_FT
PAST MEDICAL HISTORY:  Chronic diastolic congestive heart failure     Diabetes insipidus     Dyslipidemia     H/O aortic valve stenosis     History of pseudoaneurysm     Hyperlipidemia, unspecified hyperlipidemia type     Hypertension     Vertigo

## 2022-07-19 NOTE — ASU PATIENT PROFILE, ADULT - NSICDXPASTSURGICALHX_GEN_ALL_CORE_FT
PAST SURGICAL HISTORY:  H/O lumbosacral spine surgery     S/P AVR Bioprosthetic    S/P hip replacement B/L    S/P right coronary artery (RCA) stent placement

## 2022-07-19 NOTE — ASU PATIENT PROFILE, ADULT - NS PREOP UNDERSTANDS INFO
No solid food, dairy, candy or gum after midnight, water is allowed before 8:00AM Wednesday. Patient instructed to come with photo ID, vaccination and insurance card; dress comfortable; no jewelries; no smoking, alcohol drinking or illicit drug use tonight; escort must show proof of vaccination and photo ID; address, parking information and call back number provided./yes No solid food, dairy, candy or gum after midnight, water is allowed before 8:00AM Wednesday. Patient instructed to come with photo ID, vaccination and insurance card; dress comfortable; no jewelries; no smoking, alcohol drinking or illicit drug use tonight; escort must show proof of vaccination and photo ID; address, parking information and call back number provided. Instruction was given to daughter Massiel Lozadaflash./yes

## 2022-07-19 NOTE — ASU PATIENT PROFILE, ADULT - TEACHING/LEARNING CULTURAL CONSIDERATIONS
October 6, 2020     Patient: Liset Monte   YOB: 1980   Date of Visit: 10/6/2020       To Whom it May Concern:    Liset Monte was seen in my clinic on 10/6/2020 at 10:20 am.    Please excuse Liset for her absence from work on the date listed above to be able to make her appointment. They are currently under evaluation for COVID 19 & awaiting test results.    If the COVID 19 test is NEGATIVE, they may return to work without restrictions as long as they are having improvement in symptoms AND afebrile for 24 hours without the need for antipyretics.    If the COVID-19 test is POSITIVE, they may return to work without restrictions after 10 days of quarantine from the beginning of their symptoms as long as they are seeing improvement in symptoms and is afebrile for 24 hours without the need for antipyretics.      Sincerely,         Minda Hernández, FRANCES    Medical information is confidential and cannot be disclosed without the written consent of the patient or her representative.      
none

## 2022-07-19 NOTE — ASU PATIENT PROFILE, ADULT - FALL HARM RISK - HARM RISK INTERVENTIONS

## 2022-07-20 ENCOUNTER — APPOINTMENT (OUTPATIENT)
Dept: OPHTHALMOLOGY | Facility: AMBULATORY SURGERY CENTER | Age: 87
End: 2022-07-20

## 2022-07-20 ENCOUNTER — NON-APPOINTMENT (OUTPATIENT)
Age: 87
End: 2022-07-20

## 2022-07-20 ENCOUNTER — OUTPATIENT (OUTPATIENT)
Dept: OUTPATIENT SERVICES | Facility: HOSPITAL | Age: 87
LOS: 1 days | Discharge: HOME | End: 2022-07-20

## 2022-07-20 ENCOUNTER — TRANSCRIPTION ENCOUNTER (OUTPATIENT)
Age: 87
End: 2022-07-20

## 2022-07-20 VITALS
DIASTOLIC BLOOD PRESSURE: 82 MMHG | WEIGHT: 151.9 LBS | SYSTOLIC BLOOD PRESSURE: 179 MMHG | TEMPERATURE: 98 F | RESPIRATION RATE: 16 BRPM | HEART RATE: 69 BPM | HEIGHT: 60 IN | OXYGEN SATURATION: 97 %

## 2022-07-20 VITALS
OXYGEN SATURATION: 99 % | TEMPERATURE: 98 F | SYSTOLIC BLOOD PRESSURE: 145 MMHG | RESPIRATION RATE: 16 BRPM | DIASTOLIC BLOOD PRESSURE: 64 MMHG | HEART RATE: 68 BPM

## 2022-07-20 DIAGNOSIS — Z98.890 OTHER SPECIFIED POSTPROCEDURAL STATES: Chronic | ICD-10-CM

## 2022-07-20 DIAGNOSIS — Z95.2 PRESENCE OF PROSTHETIC HEART VALVE: Chronic | ICD-10-CM

## 2022-07-20 DIAGNOSIS — Z95.5 PRESENCE OF CORONARY ANGIOPLASTY IMPLANT AND GRAFT: Chronic | ICD-10-CM

## 2022-07-20 DIAGNOSIS — Z96.649 PRESENCE OF UNSPECIFIED ARTIFICIAL HIP JOINT: Chronic | ICD-10-CM

## 2022-07-20 LAB — SARS-COV-2 RNA SPEC QL NAA+PROBE: NEGATIVE — SIGNIFICANT CHANGE UP

## 2022-07-20 PROCEDURE — 66710 CILIARY TRANSSLERAL THERAPY: CPT | Mod: LT

## 2022-07-20 RX ORDER — OFLOXACIN 0.3 %
1 DROPS OPHTHALMIC (EYE)
Refills: 0 | Status: COMPLETED | OUTPATIENT
Start: 2022-07-20 | End: 2022-07-20

## 2022-07-20 RX ADMIN — Medication 1 DROP(S): at 11:18

## 2022-07-20 RX ADMIN — Medication 1 DROP(S): at 11:41

## 2022-07-20 RX ADMIN — Medication 1 DROP(S): at 11:39

## 2022-07-20 NOTE — OPERATIVE REPORT - OPERATIVE RPOSRT DETAILS
Patient Name: COLLETTE OROZCO    Medical Record Number: [id number]    Date of Surgery: JULY 20, 2022    Operating Surgeon: Juancho Pandya MD    Assistant Surgeon: Serge Raines MD    Anesthesia: MAC, retrobulbar anesthesia    Preoperative Diagnosis: Glaucoma, Left eye    Postoperative Diagnosis: Glaucoma, Left eye    Operative Procedure: Transscleral Cyclophotocoagulation, Left eye    Complication: None    Specimen: None    ESTIMATED BLOOD LOSS: <1 cc    Patient condition: Stable    Procedure: The patient was unable to lie down flat on the back and had postnasal drip and cough. Dr. Pandya discussed changing the surgery from trabeculectomy to transscleral cyclophotocoagulation with the patient. All risks, benefits and alternatives of transscleral cyclophotocoagulation were discussed with the patient, including but not limited to infection, bleeding, retinal detachment, increase or decrease in intraocular pressure, corneal edema, corneal decompensation, ptosis, diplopia, loss of vision, no improvement of vision, need for second surgery, macular edema, intraocular inflammation, etc. All questions were answered and the patient wished to proceed with the surgery. Informed consent was obtained.    The patient was brought to the operating room and placed under MAC and retrobulbar anesthesia. Tetracaine was instilled and an eyelid speculum was placed in the left eye. Diode laser was set with a power of 1250 mW and duration of 4 seconds. 10 shots of laser were applied on the inferior limbal area using G probe, avoiding the 3 and 9 o’clock positions. Diode laser was set with a power of 1350 mW and duration of 4 seconds. 10 shots of laser were applied on the inferior limbal area using G probe, avoiding the 3 and 9 o’clock positions. Diode laser was set with a power of 1450 mW and duration of 4 seconds. 10 shots of laser were applied on the superotemporal limbal area using G probe, avoiding the 3 and 9 o’clock positions. No pop sound was heard during the procedure. Subconjunctival injection of dexamethasone was performed. Eyelid speculum was removed, and the eye was closed and patched. The patient left the operating room in an excellent condition.

## 2022-07-20 NOTE — ASU DISCHARGE PLAN (ADULT/PEDIATRIC) - NS MD DC FALL RISK RISK
For information on Fall & Injury Prevention, visit: [email or web address] OR  [email or web address] OR  https://www.cdc.gov/steadi/patient.html

## 2022-07-21 ENCOUNTER — NON-APPOINTMENT (OUTPATIENT)
Age: 87
End: 2022-07-21

## 2022-07-21 ENCOUNTER — APPOINTMENT (OUTPATIENT)
Dept: OPHTHALMOLOGY | Facility: CLINIC | Age: 87
End: 2022-07-21

## 2022-07-21 PROBLEM — I50.32 CHRONIC DIASTOLIC (CONGESTIVE) HEART FAILURE: Chronic | Status: ACTIVE | Noted: 2022-07-20

## 2022-07-21 PROBLEM — R42 DIZZINESS AND GIDDINESS: Chronic | Status: ACTIVE | Noted: 2022-07-20

## 2022-07-21 PROBLEM — Z86.79 PERSONAL HISTORY OF OTHER DISEASES OF THE CIRCULATORY SYSTEM: Chronic | Status: ACTIVE | Noted: 2022-07-20

## 2022-07-21 PROBLEM — E78.5 HYPERLIPIDEMIA, UNSPECIFIED: Chronic | Status: ACTIVE | Noted: 2022-07-20

## 2022-07-21 PROCEDURE — 99024 POSTOP FOLLOW-UP VISIT: CPT

## 2022-07-26 ENCOUNTER — APPOINTMENT (OUTPATIENT)
Dept: HEART AND VASCULAR | Facility: CLINIC | Age: 87
End: 2022-07-26

## 2022-07-26 VITALS
TEMPERATURE: 97.8 F | BODY MASS INDEX: 28.7 KG/M2 | SYSTOLIC BLOOD PRESSURE: 124 MMHG | HEIGHT: 61 IN | HEART RATE: 75 BPM | WEIGHT: 152 LBS | DIASTOLIC BLOOD PRESSURE: 59 MMHG

## 2022-07-26 PROCEDURE — 99215 OFFICE O/P EST HI 40 MIN: CPT

## 2022-07-26 RX ORDER — ATORVASTATIN CALCIUM 40 MG/1
40 TABLET, FILM COATED ORAL
Qty: 30 | Refills: 6 | Status: ACTIVE | COMMUNITY
Start: 2021-07-07 | End: 1900-01-01

## 2022-07-26 RX ORDER — BRIMONIDINE TARTRATE 1.5 MG/ML
0.15 SOLUTION/ DROPS OPHTHALMIC
Qty: 5 | Refills: 0 | Status: ACTIVE | COMMUNITY
Start: 2022-02-22

## 2022-07-26 NOTE — REVIEW OF SYSTEMS
[Fever] : no fever [Chills] : no chills [Feeling Fatigued] : not feeling fatigued [SOB] : no shortness of breath [Dyspnea on exertion] : not dyspnea during exertion [Chest Discomfort] : no chest discomfort [Lower Ext Edema] : no extremity edema [Palpitations] : no palpitations [Orthopnea] : no orthopnea [PND] : no PND [Syncope] : no syncope [Cough] : no cough [Abdominal Pain] : no abdominal pain [Nausea] : no nausea [Vomiting] : no vomiting [Diarrhea] : diarrhea [Dysuria] : no dysuria [Joint Pain] : no joint pain [Rash] : no rash [Dizziness] : no dizziness [Confusion] : no confusion was observed

## 2022-07-26 NOTE — REASON FOR VISIT
[Cardiac Failure] : cardiac failure [Structural Heart and Valve Disease] : structural heart and valve disease [Formal Caregiver] : formal caregiver

## 2022-07-26 NOTE — PHYSICAL EXAM
[Well Developed] : well developed [Normal Venous Pressure] : normal venous pressure [Normal S1, S2] : normal S1, S2 [Clear Lung Fields] : clear lung fields [Soft] : abdomen soft [Non Tender] : non-tender [No Masses/organomegaly] : no masses/organomegaly [No Focal Deficits] : no focal deficits [Alert and Oriented] : alert and oriented [de-identified] : Left lower extremity edema, unilateral 1+, no calf tenderness or erythema; Unchanged from prior exams

## 2022-07-26 NOTE — HISTORY OF PRESENT ILLNESS
[FreeTextEntry1] : 89 F with a Pmhx of dCHF, HTN, HLD and severe AS s/p Bioprosthetic AV 7 years ago at OSH, c/b by bioprosthetic AV stenosis, now  s/p Valve-in-Valve with a Sheri Ultra by Structural Heart Team in June of 2021, with course c/b embolization of the RCA stent into the left external iliac artery, as well as a left radial pseudoaneurysm that was repaired by vascular surgery. She presents today for routine f/u \par \par Patient currently doing well. She had her scheduled eye procedure last week for Glaucoma which went well. When she resumed her medication - she has been taking her Lasix 20mg daily instead of every other day and has noted increased thirst and a few episodes of incontinence. She otherwise denies any CP, SOB, orthopnea or PND. She also denies any lightheadedness or syncope

## 2022-07-26 NOTE — DISCUSSION/SUMMARY
[FreeTextEntry1] : 1) Diastolic CHF, Heart Failure Stage C, NYHA Class I\par -Patient remains well-compensated and euvolemic on exam\par -Discussed Lasix frequency and asked her to reduce back to previous dose of 20mg PO EOD, we decided on Tuesday, Thursday and Saturday\par \par 2) AS s/p Bioprosthetic AV (2014) and ROSLYN (2021)\par -s/p successful ROSLYN procedure - euvolemic on exam\par -Echo shows normal BI-Ventricular function, no evidence of AI and mild MS\par -c/w ASA 81mg daily\par -cont with diuresis as above given dCHF, Mild AS. \par \par \par RTC in 3 months - 10/11/22

## 2022-08-22 ENCOUNTER — NON-APPOINTMENT (OUTPATIENT)
Age: 87
End: 2022-08-22

## 2022-08-22 ENCOUNTER — APPOINTMENT (OUTPATIENT)
Dept: OPHTHALMOLOGY | Facility: CLINIC | Age: 87
End: 2022-08-22

## 2022-08-22 PROCEDURE — 99024 POSTOP FOLLOW-UP VISIT: CPT

## 2022-08-31 ENCOUNTER — INPATIENT (INPATIENT)
Facility: HOSPITAL | Age: 87
LOS: 1 days | Discharge: HOME HEALTH | DRG: 552 | End: 2022-09-02
Attending: INTERNAL MEDICINE | Admitting: STUDENT IN AN ORGANIZED HEALTH CARE EDUCATION/TRAINING PROGRAM
Payer: MEDICARE

## 2022-08-31 VITALS
OXYGEN SATURATION: 96 % | DIASTOLIC BLOOD PRESSURE: 76 MMHG | RESPIRATION RATE: 18 BRPM | HEART RATE: 68 BPM | TEMPERATURE: 99 F | HEIGHT: 60 IN | SYSTOLIC BLOOD PRESSURE: 153 MMHG | WEIGHT: 147.93 LBS

## 2022-08-31 DIAGNOSIS — Z98.890 OTHER SPECIFIED POSTPROCEDURAL STATES: Chronic | ICD-10-CM

## 2022-08-31 DIAGNOSIS — Z95.5 PRESENCE OF CORONARY ANGIOPLASTY IMPLANT AND GRAFT: Chronic | ICD-10-CM

## 2022-08-31 DIAGNOSIS — Z95.2 PRESENCE OF PROSTHETIC HEART VALVE: Chronic | ICD-10-CM

## 2022-08-31 DIAGNOSIS — Z96.649 PRESENCE OF UNSPECIFIED ARTIFICIAL HIP JOINT: Chronic | ICD-10-CM

## 2022-08-31 LAB
ALBUMIN SERPL ELPH-MCNC: 4.1 G/DL — SIGNIFICANT CHANGE UP (ref 3.4–5)
ALP SERPL-CCNC: 103 U/L — SIGNIFICANT CHANGE UP (ref 40–120)
ALT FLD-CCNC: 29 U/L — SIGNIFICANT CHANGE UP (ref 12–42)
ANION GAP SERPL CALC-SCNC: 2 MMOL/L — LOW (ref 9–16)
ANION GAP SERPL CALC-SCNC: 5 MMOL/L — LOW (ref 9–16)
APPEARANCE UR: CLEAR — SIGNIFICANT CHANGE UP
APTT BLD: 33 SEC — SIGNIFICANT CHANGE UP (ref 27.5–35.5)
AST SERPL-CCNC: 38 U/L — HIGH (ref 15–37)
BACTERIA # UR AUTO: PRESENT /HPF
BASOPHILS # BLD AUTO: 0.06 K/UL — SIGNIFICANT CHANGE UP (ref 0–0.2)
BASOPHILS NFR BLD AUTO: 0.7 % — SIGNIFICANT CHANGE UP (ref 0–2)
BILIRUB SERPL-MCNC: 0.5 MG/DL — SIGNIFICANT CHANGE UP (ref 0.2–1.2)
BILIRUB UR-MCNC: NEGATIVE — SIGNIFICANT CHANGE UP
BUN SERPL-MCNC: 19 MG/DL — SIGNIFICANT CHANGE UP (ref 7–23)
BUN SERPL-MCNC: 22 MG/DL — SIGNIFICANT CHANGE UP (ref 7–23)
CALCIUM SERPL-MCNC: 8.5 MG/DL — SIGNIFICANT CHANGE UP (ref 8.5–10.5)
CALCIUM SERPL-MCNC: 9.1 MG/DL — SIGNIFICANT CHANGE UP (ref 8.5–10.5)
CHLORIDE SERPL-SCNC: 92 MMOL/L — LOW (ref 96–108)
CHLORIDE SERPL-SCNC: 95 MMOL/L — LOW (ref 96–108)
CK SERPL-CCNC: 258 U/L — HIGH (ref 26–192)
CO2 SERPL-SCNC: 27 MMOL/L — SIGNIFICANT CHANGE UP (ref 22–31)
CO2 SERPL-SCNC: 32 MMOL/L — HIGH (ref 22–31)
COLOR SPEC: YELLOW — SIGNIFICANT CHANGE UP
CREAT SERPL-MCNC: 0.82 MG/DL — SIGNIFICANT CHANGE UP (ref 0.5–1.3)
CREAT SERPL-MCNC: 0.87 MG/DL — SIGNIFICANT CHANGE UP (ref 0.5–1.3)
DIFF PNL FLD: ABNORMAL
EGFR: 62 ML/MIN/1.73M2 — SIGNIFICANT CHANGE UP
EGFR: 67 ML/MIN/1.73M2 — SIGNIFICANT CHANGE UP
EOSINOPHIL # BLD AUTO: 0.24 K/UL — SIGNIFICANT CHANGE UP (ref 0–0.5)
EOSINOPHIL NFR BLD AUTO: 2.7 % — SIGNIFICANT CHANGE UP (ref 0–6)
EPI CELLS # UR: ABNORMAL /HPF (ref 0–5)
FLUAV AG NPH QL: SIGNIFICANT CHANGE UP
FLUBV AG NPH QL: SIGNIFICANT CHANGE UP
GLUCOSE SERPL-MCNC: 139 MG/DL — HIGH (ref 70–99)
GLUCOSE SERPL-MCNC: 89 MG/DL — SIGNIFICANT CHANGE UP (ref 70–99)
GLUCOSE UR QL: NEGATIVE — SIGNIFICANT CHANGE UP
HCT VFR BLD CALC: 37.4 % — SIGNIFICANT CHANGE UP (ref 34.5–45)
HGB BLD-MCNC: 12.3 G/DL — SIGNIFICANT CHANGE UP (ref 11.5–15.5)
HYALINE CASTS # UR AUTO: ABNORMAL /LPF (ref 0–2)
IMM GRANULOCYTES NFR BLD AUTO: 0.7 % — SIGNIFICANT CHANGE UP (ref 0–1.5)
INR BLD: 0.94 — SIGNIFICANT CHANGE UP (ref 0.88–1.16)
KETONES UR-MCNC: NEGATIVE — SIGNIFICANT CHANGE UP
LEUKOCYTE ESTERASE UR-ACNC: NEGATIVE — SIGNIFICANT CHANGE UP
LYMPHOCYTES # BLD AUTO: 1.41 K/UL — SIGNIFICANT CHANGE UP (ref 1–3.3)
LYMPHOCYTES # BLD AUTO: 15.8 % — SIGNIFICANT CHANGE UP (ref 13–44)
MAGNESIUM SERPL-MCNC: 2.2 MG/DL — SIGNIFICANT CHANGE UP (ref 1.6–2.6)
MCHC RBC-ENTMCNC: 30.8 PG — SIGNIFICANT CHANGE UP (ref 27–34)
MCHC RBC-ENTMCNC: 32.9 GM/DL — SIGNIFICANT CHANGE UP (ref 32–36)
MCV RBC AUTO: 93.5 FL — SIGNIFICANT CHANGE UP (ref 80–100)
MONOCYTES # BLD AUTO: 1.07 K/UL — HIGH (ref 0–0.9)
MONOCYTES NFR BLD AUTO: 12 % — SIGNIFICANT CHANGE UP (ref 2–14)
NEUTROPHILS # BLD AUTO: 6.06 K/UL — SIGNIFICANT CHANGE UP (ref 1.8–7.4)
NEUTROPHILS NFR BLD AUTO: 68.1 % — SIGNIFICANT CHANGE UP (ref 43–77)
NITRITE UR-MCNC: NEGATIVE — SIGNIFICANT CHANGE UP
NRBC # BLD: 0 /100 WBCS — SIGNIFICANT CHANGE UP (ref 0–0)
PH UR: 7 — SIGNIFICANT CHANGE UP (ref 5–8)
PLATELET # BLD AUTO: 290 K/UL — SIGNIFICANT CHANGE UP (ref 150–400)
POTASSIUM SERPL-MCNC: 4 MMOL/L — SIGNIFICANT CHANGE UP (ref 3.5–5.3)
POTASSIUM SERPL-MCNC: 5 MMOL/L — SIGNIFICANT CHANGE UP (ref 3.5–5.3)
POTASSIUM SERPL-SCNC: 4 MMOL/L — SIGNIFICANT CHANGE UP (ref 3.5–5.3)
POTASSIUM SERPL-SCNC: 5 MMOL/L — SIGNIFICANT CHANGE UP (ref 3.5–5.3)
PROT SERPL-MCNC: 8 G/DL — SIGNIFICANT CHANGE UP (ref 6.4–8.2)
PROT UR-MCNC: ABNORMAL MG/DL
PROTHROM AB SERPL-ACNC: 10.9 SEC — SIGNIFICANT CHANGE UP (ref 10.5–13.4)
RBC # BLD: 4 M/UL — SIGNIFICANT CHANGE UP (ref 3.8–5.2)
RBC # FLD: 13.7 % — SIGNIFICANT CHANGE UP (ref 10.3–14.5)
RBC CASTS # UR COMP ASSIST: ABNORMAL /HPF
RSV RNA NPH QL NAA+NON-PROBE: SIGNIFICANT CHANGE UP
SARS-COV-2 RNA SPEC QL NAA+PROBE: SIGNIFICANT CHANGE UP
SODIUM SERPL-SCNC: 124 MMOL/L — LOW (ref 132–145)
SODIUM SERPL-SCNC: 129 MMOL/L — LOW (ref 132–145)
SP GR SPEC: 1.01 — SIGNIFICANT CHANGE UP (ref 1–1.03)
TROPONIN I, HIGH SENSITIVITY RESULT: 14.1 NG/L — SIGNIFICANT CHANGE UP
UROBILINOGEN FLD QL: 0.2 E.U./DL — SIGNIFICANT CHANGE UP
WBC # BLD: 8.9 K/UL — SIGNIFICANT CHANGE UP (ref 3.8–10.5)
WBC # FLD AUTO: 8.9 K/UL — SIGNIFICANT CHANGE UP (ref 3.8–10.5)
WBC UR QL: < 5 /HPF — SIGNIFICANT CHANGE UP

## 2022-08-31 PROCEDURE — 99285 EMERGENCY DEPT VISIT HI MDM: CPT | Mod: FS

## 2022-08-31 PROCEDURE — 72125 CT NECK SPINE W/O DYE: CPT | Mod: 26,MH

## 2022-08-31 PROCEDURE — 93010 ELECTROCARDIOGRAM REPORT: CPT

## 2022-08-31 PROCEDURE — 70450 CT HEAD/BRAIN W/O DYE: CPT | Mod: 26,MH

## 2022-08-31 RX ORDER — LIDOCAINE 4 G/100G
1 CREAM TOPICAL ONCE
Refills: 0 | Status: COMPLETED | OUTPATIENT
Start: 2022-08-31 | End: 2022-08-31

## 2022-08-31 RX ORDER — SODIUM CHLORIDE 9 MG/ML
1000 INJECTION INTRAMUSCULAR; INTRAVENOUS; SUBCUTANEOUS ONCE
Refills: 0 | Status: COMPLETED | OUTPATIENT
Start: 2022-08-31 | End: 2022-08-31

## 2022-08-31 RX ORDER — TRAMADOL HYDROCHLORIDE 50 MG/1
50 TABLET ORAL ONCE
Refills: 0 | Status: DISCONTINUED | OUTPATIENT
Start: 2022-08-31 | End: 2022-08-31

## 2022-08-31 RX ORDER — ACETAMINOPHEN 500 MG
975 TABLET ORAL ONCE
Refills: 0 | Status: COMPLETED | OUTPATIENT
Start: 2022-08-31 | End: 2022-08-31

## 2022-08-31 RX ADMIN — Medication 975 MILLIGRAM(S): at 16:04

## 2022-08-31 RX ADMIN — TRAMADOL HYDROCHLORIDE 50 MILLIGRAM(S): 50 TABLET ORAL at 22:29

## 2022-08-31 RX ADMIN — LIDOCAINE 1 PATCH: 4 CREAM TOPICAL at 16:02

## 2022-08-31 RX ADMIN — SODIUM CHLORIDE 1000 MILLILITER(S): 9 INJECTION INTRAMUSCULAR; INTRAVENOUS; SUBCUTANEOUS at 19:29

## 2022-08-31 NOTE — ED PROVIDER NOTE - PROGRESS NOTE DETAILS
+C2 fracture, non displaced  Pt placed in C-collar  Case d/w neurosurgery - recommending hard collar w/o further intervention [D/w on-call Dr. Pritchard]  Will admit to medicine

## 2022-08-31 NOTE — ED ADULT NURSE NOTE - CHIEF COMPLAINT QUOTE
BIBA s/p fall, patient knees "buckled" and caused her to fall, as per HHA, hit her head on a chest "HHA went to grab her and they both went down". no LOC. [facility] also reports she has been c/o dizziness since this morning, recently started carbamazepine.

## 2022-08-31 NOTE — ED ADULT NURSE REASSESSMENT NOTE - NS ED NURSE REASSESS COMMENT FT1
a call made to KE Cottrell to come to room 12 to speak with patient; soft collar already in place by Ke Cottrell and patient is trying to remove c-collar and RN went in to stop patient; no pain meds ordered by provider after asked multiple times as patient states that they are in pain; patient told to keep c-collar on multiple times for their protection and safety; HAA not at bedside but outside on the phone; will wait for new orders by provider

## 2022-08-31 NOTE — ED PROVIDER NOTE - ATTENDING CONTRIBUTION TO CARE
Patient re-evaluated by me.  History reviewed as well as exam.  She had tenderness to the prox and mid cervical spine, consistent with area of fracture.  She had no complaints of numbness/tingling or weakness and on exam was fully alert, oriented, conversant and had normal sensation and strength in all extremities.  Patient and aide informed of diagnosis and plan.  CCollar placed while awaiting neurosurgical input.  KEM Cottrell discussed case with consultants and she and I discussed the case in detail and made shared decisions.  Patient remained stable with no neurological changes during her visit.

## 2022-08-31 NOTE — ED ADULT TRIAGE NOTE - ESI TRIAGE ACUITY LEVEL, MLM
Patient Name: Carlyle Luis  Surgery Date: 3/1/2023  CSN: [id number]  Medical Record: [id number]     Surgeon(s):  Prosche Navarro MD  Consent Procedure: LUMBAR 4  -5  POSTERIOR INSTRUMENTED FUSION, AND LUMBAR 1 - 2, LUMBAR 2 -3, LUMBAR 3-4, LUMBAR 4-5  LAMINECTOMY  Anesthesia Type: General    :  Surgeon order for labs stated PT/INR day before surgery. Pt can have other testing at H and P appt. He was instructed to go back to lab 2/27 for COVID. Now he will need to return to lab for PT/INR on 2/28. I questioned surgeon office and they stated it is surgeon requested regardless if pt on blood thinner. 2

## 2022-08-31 NOTE — ED PROVIDER NOTE - CLINICAL SUMMARY MEDICAL DECISION MAKING FREE TEXT BOX
89 yo F with PMHx of HTN, HLD, CHF, DM, and aortic valve stenosis presenting to the ED after a dizziness episode which resulted in a fall. Pt reports the dizziness occurring after she stood up from the toilet. Currently states that those symptoms have resolved. Further denies CP, SOB, HA, and changes in vision. Clinical suspecting possible orthostatic reaction. Will obtain orthostatic bp and CT head. Will give Tylenol and lidocaine patch for pain relief. Dispo pending medical workup.

## 2022-08-31 NOTE — ED PROVIDER NOTE - CHPI ED SYMPTOMS NEG
no changes in vision, no LOC, no nausea, no vomiting, no dizziness, no abdominal pain, no back pain, no hip pain, no inability to ambulate, no urinary symptoms.

## 2022-08-31 NOTE — ED ADULT NURSE NOTE - ED STAT RN HANDOFF DETAILS
received verbal hand off from ZOILA Murphy at 1615; patient already linned and labbed ans straight cathed for urine; already medciated for pain; patient is waiting for imaging for ct neck; patient is NOT is c-collar as per ZOILA Murphy; has ice packs and heat packs in place; patient refusing hard collar now; waiting for results at this time; no new MD orders; as per ZOILA Murphy patient is refusing orthostatics to be performed "as she is dizzy and doesn't want to get up"

## 2022-08-31 NOTE — ED ADULT NURSE NOTE - OBJECTIVE STATEMENT
Pt BIBEMS s/p fall - patient knees "buckled" and caused her to fall. Fall witnessed by HHA, hit her head on a chest "HHA went to grab her and they both went down". no LOC. HHA also reports she has been c/o dizziness since this morning, recently started carbamazepine. Pt BIBEMS s/p fall - patient knees "buckled" and caused her to fall. Fall witnessed by HHA, hit her head on a chest- no LOC. HHA also reports she has been c/o dizziness since this morning, recently started carbamazepine. Pt takes ASA. Baseline mental status as per aide.

## 2022-08-31 NOTE — ED PROVIDER NOTE - NS ED ATTENDING STATEMENT MOD
I have seen and examined this patient and fully participated in the care of this patient as the teaching attending.  The service was shared with the MECHE.  I reviewed and verified the documentation and independently performed the documented:

## 2022-08-31 NOTE — ED ADULT NURSE NOTE - NS_SISCREENINGSR_GEN_ALL_ED
A/P:  27y  PPD # 1   S/P  with 2nd laceration  Doing Well    PMHx: none  Current Issues: none    Negative

## 2022-08-31 NOTE — ED ADULT TRIAGE NOTE - CHIEF COMPLAINT QUOTE
BIBA s/p fall, patient knees "buckled" and caused her to fall, as per HHA, hit her head on a chest "HHA went to grab her and they both went down". no LOC. [facility] also reports she has been c/o dizziness since this morning, recently started carbamazepine.

## 2022-08-31 NOTE — ED PROVIDER NOTE - MUSCULOSKELETAL, MLM
No midline cervical tenderness, but has tenderness along the left upper trapezius muscle, no overly erythema, no hematoma. Full ROM of all extremities, no lumbar or spine tenderness. Full ROM of spine, hips, knees, ankles. No calf tenderness.

## 2022-08-31 NOTE — ED PROVIDER NOTE - OBJECTIVE STATEMENT
89 yo F with PMHx of HTN, HLD, CHF, DM, and aortic valve stenosis presenting to the ED after a fall today while at home. Pt reports she stood up from the toilet and tried to walk to her walker when she suddenly felt dizziness. Denies LOC but states she fell down to her knees and hit her head. At the time, pt was standing next to her home health aid who tried to catch her but pt hit the left, back side of her head in the process. Reports mild HA and pain to the left posterior head and upper neck. Denies changes in vision, blacking out, nausea, vomiting, dizziness, abdominal pain, back pain, hip pain, inability to ambulate, or urinary symptoms.

## 2022-09-01 ENCOUNTER — TRANSCRIPTION ENCOUNTER (OUTPATIENT)
Age: 87
End: 2022-09-01

## 2022-09-01 DIAGNOSIS — I10 ESSENTIAL (PRIMARY) HYPERTENSION: ICD-10-CM
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
## 2022-09-01 DIAGNOSIS — E78.5 HYPERLIPIDEMIA, UNSPECIFIED: ICD-10-CM

## 2022-09-01 DIAGNOSIS — E87.1 HYPO-OSMOLALITY AND HYPONATREMIA: ICD-10-CM

## 2022-09-01 DIAGNOSIS — E11.9 TYPE 2 DIABETES MELLITUS WITHOUT COMPLICATIONS: ICD-10-CM

## 2022-09-01 DIAGNOSIS — G62.9 POLYNEUROPATHY, UNSPECIFIED: ICD-10-CM

## 2022-09-01 DIAGNOSIS — I50.9 HEART FAILURE, UNSPECIFIED: ICD-10-CM

## 2022-09-01 DIAGNOSIS — S12.9XXA FRACTURE OF NECK, UNSPECIFIED, INITIAL ENCOUNTER: ICD-10-CM

## 2022-09-01 DIAGNOSIS — Z29.9 ENCOUNTER FOR PROPHYLACTIC MEASURES, UNSPECIFIED: ICD-10-CM

## 2022-09-01 LAB
ANION GAP SERPL CALC-SCNC: 8 MMOL/L — SIGNIFICANT CHANGE UP (ref 5–17)
ANION GAP SERPL CALC-SCNC: 9 MMOL/L — SIGNIFICANT CHANGE UP (ref 5–17)
ANION GAP SERPL CALC-SCNC: 9 MMOL/L — SIGNIFICANT CHANGE UP (ref 5–17)
BASOPHILS # BLD AUTO: 0.05 K/UL — SIGNIFICANT CHANGE UP (ref 0–0.2)
BASOPHILS NFR BLD AUTO: 0.5 % — SIGNIFICANT CHANGE UP (ref 0–2)
BUN SERPL-MCNC: 15 MG/DL — SIGNIFICANT CHANGE UP (ref 7–23)
BUN SERPL-MCNC: 16 MG/DL — SIGNIFICANT CHANGE UP (ref 7–23)
BUN SERPL-MCNC: 18 MG/DL — SIGNIFICANT CHANGE UP (ref 7–23)
CALCIUM SERPL-MCNC: 9.2 MG/DL — SIGNIFICANT CHANGE UP (ref 8.4–10.5)
CALCIUM SERPL-MCNC: 9.5 MG/DL — SIGNIFICANT CHANGE UP (ref 8.4–10.5)
CALCIUM SERPL-MCNC: 9.6 MG/DL — SIGNIFICANT CHANGE UP (ref 8.4–10.5)
CHLORIDE SERPL-SCNC: 92 MMOL/L — LOW (ref 96–108)
CHLORIDE SERPL-SCNC: 93 MMOL/L — LOW (ref 96–108)
CHLORIDE SERPL-SCNC: 96 MMOL/L — SIGNIFICANT CHANGE UP (ref 96–108)
CO2 SERPL-SCNC: 27 MMOL/L — SIGNIFICANT CHANGE UP (ref 22–31)
CO2 SERPL-SCNC: 29 MMOL/L — SIGNIFICANT CHANGE UP (ref 22–31)
CO2 SERPL-SCNC: 29 MMOL/L — SIGNIFICANT CHANGE UP (ref 22–31)
CREAT SERPL-MCNC: 0.74 MG/DL — SIGNIFICANT CHANGE UP (ref 0.5–1.3)
CREAT SERPL-MCNC: 0.78 MG/DL — SIGNIFICANT CHANGE UP (ref 0.5–1.3)
CREAT SERPL-MCNC: 0.83 MG/DL — SIGNIFICANT CHANGE UP (ref 0.5–1.3)
CULTURE RESULTS: NO GROWTH — SIGNIFICANT CHANGE UP
EGFR: 66 ML/MIN/1.73M2 — SIGNIFICANT CHANGE UP
EGFR: 71 ML/MIN/1.73M2 — SIGNIFICANT CHANGE UP
EGFR: 75 ML/MIN/1.73M2 — SIGNIFICANT CHANGE UP
EOSINOPHIL # BLD AUTO: 0.15 K/UL — SIGNIFICANT CHANGE UP (ref 0–0.5)
EOSINOPHIL NFR BLD AUTO: 1.4 % — SIGNIFICANT CHANGE UP (ref 0–6)
GLUCOSE BLDC GLUCOMTR-MCNC: 100 MG/DL — HIGH (ref 70–99)
GLUCOSE BLDC GLUCOMTR-MCNC: 113 MG/DL — HIGH (ref 70–99)
GLUCOSE BLDC GLUCOMTR-MCNC: 153 MG/DL — HIGH (ref 70–99)
GLUCOSE BLDC GLUCOMTR-MCNC: 162 MG/DL — HIGH (ref 70–99)
GLUCOSE SERPL-MCNC: 111 MG/DL — HIGH (ref 70–99)
GLUCOSE SERPL-MCNC: 182 MG/DL — HIGH (ref 70–99)
GLUCOSE SERPL-MCNC: 94 MG/DL — SIGNIFICANT CHANGE UP (ref 70–99)
HCT VFR BLD CALC: 34.2 % — LOW (ref 34.5–45)
HGB BLD-MCNC: 11.4 G/DL — LOW (ref 11.5–15.5)
IMM GRANULOCYTES NFR BLD AUTO: 0.7 % — SIGNIFICANT CHANGE UP (ref 0–1.5)
LYMPHOCYTES # BLD AUTO: 1.25 K/UL — SIGNIFICANT CHANGE UP (ref 1–3.3)
LYMPHOCYTES # BLD AUTO: 11.9 % — LOW (ref 13–44)
MAGNESIUM SERPL-MCNC: 2.2 MG/DL — SIGNIFICANT CHANGE UP (ref 1.6–2.6)
MCHC RBC-ENTMCNC: 30.6 PG — SIGNIFICANT CHANGE UP (ref 27–34)
MCHC RBC-ENTMCNC: 33.3 GM/DL — SIGNIFICANT CHANGE UP (ref 32–36)
MCV RBC AUTO: 91.9 FL — SIGNIFICANT CHANGE UP (ref 80–100)
MONOCYTES # BLD AUTO: 1.01 K/UL — HIGH (ref 0–0.9)
MONOCYTES NFR BLD AUTO: 9.6 % — SIGNIFICANT CHANGE UP (ref 2–14)
NEUTROPHILS # BLD AUTO: 7.95 K/UL — HIGH (ref 1.8–7.4)
NEUTROPHILS NFR BLD AUTO: 75.9 % — SIGNIFICANT CHANGE UP (ref 43–77)
NRBC # BLD: 0 /100 WBCS — SIGNIFICANT CHANGE UP (ref 0–0)
PLATELET # BLD AUTO: 287 K/UL — SIGNIFICANT CHANGE UP (ref 150–400)
POTASSIUM SERPL-MCNC: 4.4 MMOL/L — SIGNIFICANT CHANGE UP (ref 3.5–5.3)
POTASSIUM SERPL-MCNC: 4.8 MMOL/L — SIGNIFICANT CHANGE UP (ref 3.5–5.3)
POTASSIUM SERPL-MCNC: 5.4 MMOL/L — HIGH (ref 3.5–5.3)
POTASSIUM SERPL-SCNC: 4.4 MMOL/L — SIGNIFICANT CHANGE UP (ref 3.5–5.3)
POTASSIUM SERPL-SCNC: 4.8 MMOL/L — SIGNIFICANT CHANGE UP (ref 3.5–5.3)
POTASSIUM SERPL-SCNC: 5.4 MMOL/L — HIGH (ref 3.5–5.3)
RBC # BLD: 3.72 M/UL — LOW (ref 3.8–5.2)
RBC # FLD: 13.6 % — SIGNIFICANT CHANGE UP (ref 10.3–14.5)
SODIUM SERPL-SCNC: 130 MMOL/L — LOW (ref 135–145)
SODIUM SERPL-SCNC: 130 MMOL/L — LOW (ref 135–145)
SODIUM SERPL-SCNC: 132 MMOL/L — LOW (ref 135–145)
SPECIMEN SOURCE: SIGNIFICANT CHANGE UP
WBC # BLD: 10.48 K/UL — SIGNIFICANT CHANGE UP (ref 3.8–10.5)
WBC # FLD AUTO: 10.48 K/UL — SIGNIFICANT CHANGE UP (ref 3.8–10.5)

## 2022-09-01 PROCEDURE — 99221 1ST HOSP IP/OBS SF/LOW 40: CPT

## 2022-09-01 RX ORDER — DEXTROSE 50 % IN WATER 50 %
25 SYRINGE (ML) INTRAVENOUS ONCE
Refills: 0 | Status: DISCONTINUED | OUTPATIENT
Start: 2022-09-01 | End: 2022-09-02

## 2022-09-01 RX ORDER — GABAPENTIN 400 MG/1
300 CAPSULE ORAL EVERY 12 HOURS
Refills: 0 | Status: DISCONTINUED | OUTPATIENT
Start: 2022-09-01 | End: 2022-09-01

## 2022-09-01 RX ORDER — GLUCAGON INJECTION, SOLUTION 0.5 MG/.1ML
1 INJECTION, SOLUTION SUBCUTANEOUS ONCE
Refills: 0 | Status: DISCONTINUED | OUTPATIENT
Start: 2022-09-01 | End: 2022-09-02

## 2022-09-01 RX ORDER — ACETAMINOPHEN 500 MG
650 TABLET ORAL EVERY 6 HOURS
Refills: 0 | Status: DISCONTINUED | OUTPATIENT
Start: 2022-09-01 | End: 2022-09-02

## 2022-09-01 RX ORDER — ENOXAPARIN SODIUM 100 MG/ML
30 INJECTION SUBCUTANEOUS EVERY 24 HOURS
Refills: 0 | Status: DISCONTINUED | OUTPATIENT
Start: 2022-09-01 | End: 2022-09-02

## 2022-09-01 RX ORDER — DEXTROSE 50 % IN WATER 50 %
12.5 SYRINGE (ML) INTRAVENOUS ONCE
Refills: 0 | Status: DISCONTINUED | OUTPATIENT
Start: 2022-09-01 | End: 2022-09-02

## 2022-09-01 RX ORDER — FUROSEMIDE 40 MG
20 TABLET ORAL
Refills: 0 | Status: DISCONTINUED | OUTPATIENT
Start: 2022-09-02 | End: 2022-09-02

## 2022-09-01 RX ORDER — DEXTROSE 50 % IN WATER 50 %
15 SYRINGE (ML) INTRAVENOUS ONCE
Refills: 0 | Status: DISCONTINUED | OUTPATIENT
Start: 2022-09-01 | End: 2022-09-02

## 2022-09-01 RX ORDER — CHOLECALCIFEROL (VITAMIN D3) 125 MCG
5000 CAPSULE ORAL EVERY 24 HOURS
Refills: 0 | Status: DISCONTINUED | OUTPATIENT
Start: 2022-09-02 | End: 2022-09-02

## 2022-09-01 RX ORDER — SODIUM CHLORIDE 9 MG/ML
1000 INJECTION, SOLUTION INTRAVENOUS
Refills: 0 | Status: DISCONTINUED | OUTPATIENT
Start: 2022-09-01 | End: 2022-09-02

## 2022-09-01 RX ORDER — INFLUENZA VIRUS VACCINE 15; 15; 15; 15 UG/.5ML; UG/.5ML; UG/.5ML; UG/.5ML
0.7 SUSPENSION INTRAMUSCULAR ONCE
Refills: 0 | Status: DISCONTINUED | OUTPATIENT
Start: 2022-09-01 | End: 2022-09-02

## 2022-09-01 RX ORDER — INSULIN LISPRO 100/ML
VIAL (ML) SUBCUTANEOUS
Refills: 0 | Status: DISCONTINUED | OUTPATIENT
Start: 2022-09-01 | End: 2022-09-02

## 2022-09-01 RX ORDER — DESMOPRESSIN ACETATE 0.1 MG/1
0.15 TABLET ORAL AT BEDTIME
Refills: 0 | Status: DISCONTINUED | OUTPATIENT
Start: 2022-09-01 | End: 2022-09-02

## 2022-09-01 RX ORDER — ATORVASTATIN CALCIUM 80 MG/1
40 TABLET, FILM COATED ORAL EVERY 24 HOURS
Refills: 0 | Status: DISCONTINUED | OUTPATIENT
Start: 2022-09-01 | End: 2022-09-02

## 2022-09-01 RX ORDER — ASPIRIN/CALCIUM CARB/MAGNESIUM 324 MG
81 TABLET ORAL EVERY 24 HOURS
Refills: 0 | Status: DISCONTINUED | OUTPATIENT
Start: 2022-09-02 | End: 2022-09-02

## 2022-09-01 RX ORDER — GABAPENTIN 400 MG/1
300 CAPSULE ORAL
Refills: 0 | Status: DISCONTINUED | OUTPATIENT
Start: 2022-09-01 | End: 2022-09-02

## 2022-09-01 RX ORDER — ZINC SULFATE TAB 220 MG (50 MG ZINC EQUIVALENT) 220 (50 ZN) MG
220 TAB ORAL EVERY 24 HOURS
Refills: 0 | Status: DISCONTINUED | OUTPATIENT
Start: 2022-09-02 | End: 2022-09-02

## 2022-09-01 RX ORDER — FLUTICASONE PROPIONATE 50 MCG
1 SPRAY, SUSPENSION NASAL
Refills: 0 | Status: DISCONTINUED | OUTPATIENT
Start: 2022-09-01 | End: 2022-09-02

## 2022-09-01 RX ADMIN — GABAPENTIN 300 MILLIGRAM(S): 400 CAPSULE ORAL at 22:46

## 2022-09-01 RX ADMIN — Medication 1: at 17:55

## 2022-09-01 RX ADMIN — Medication 1: at 12:05

## 2022-09-01 RX ADMIN — Medication 650 MILLIGRAM(S): at 10:17

## 2022-09-01 RX ADMIN — Medication 1 SPRAY(S): at 22:47

## 2022-09-01 RX ADMIN — DESMOPRESSIN ACETATE 0.15 MILLIGRAM(S): 0.1 TABLET ORAL at 22:50

## 2022-09-01 RX ADMIN — Medication 650 MILLIGRAM(S): at 11:17

## 2022-09-01 RX ADMIN — ENOXAPARIN SODIUM 30 MILLIGRAM(S): 100 INJECTION SUBCUTANEOUS at 12:28

## 2022-09-01 RX ADMIN — ATORVASTATIN CALCIUM 40 MILLIGRAM(S): 80 TABLET, FILM COATED ORAL at 22:47

## 2022-09-01 NOTE — DISCHARGE NOTE PROVIDER - NSDCFUSCHEDAPPT_GEN_ALL_CORE_FT
Juancho Pandya  [facility] Physician [facility]  OPHTHALM [address]  Scheduled Appointment: 09/20/2022    Al Fernandez  [facility]  HEARTVASC [address]  Scheduled Appointment: 10/11/2022    [facility]  [facility]  HEARTVASC [address]  Scheduled Appointment: 10/18/2022     Isaías Rabago  [facility] Physician [facility]  ORTHOSURG [address] [facility]  Scheduled Appointment: 09/15/2022    Juancho Pandya  [facility] Physician [facility]  OPHTHALM [address]  Scheduled Appointment: 09/20/2022    Al Fernandez  [facility]  HEARTVASC [address]  Scheduled Appointment: 10/11/2022    Al Fernandez  [facility]  HEARTVASC [address]  Scheduled Appointment: 10/18/2022

## 2022-09-01 NOTE — CONSULT NOTE ADULT - ASSESSMENT
per Internal Medicine    89 y o F with PMHx of HTN, HLD, CHF, DM, and aortic valve stenosis presenting to the ED after a fall today while at home. Likely mechanical fall vs 2/2 orthostatic hypotension. No syncope.     Problem/Plan - 1:  ·  Problem: Fall.   ·  Plan: - Mechanical fall, c/f orthostasis given BP fluctuations and dizziness  - follow up orthostatic vitals, s/p 1 L bolus   - PT/OT.    Problem/Plan - 2:  ·  Problem: Cervical spine fracture.   ·  Plan: Ct neck: Questionable lucency at the base of the odontoid process which may   represent nondisplaced type II fracture  - neurosurgery consulted, pt in cervical collar    Plan   - pain control   - follow up neurosurgery.    Problem/Plan - 3:  ·  Problem: Hyponatremia.   ·  Plan: - Na 124 on admission  - now 129 after IVF  - likely 2/2 hypovolemic hyponatremia  - s/p 1 L bolus NS  - continue to monitor, follow up urine studies.    Problem/Plan - 4:  ·  Problem: HTN (hypertension).   ·  Plan: - official med rec in AM, start home meds.    Problem/Plan - 5:  ·  Problem: HLD (hyperlipidemia).   ·  Plan: - continue with lipitor 40 daily.    Problem/Plan - 6:  ·  Problem: Chronic CHF.   ·  Plan: unknown EF   - pt does not have medication list, official med rec and restart home meds.    Problem/Plan - 7:  ·  Problem: DM (diabetes mellitus).   ·  Plan: - continue with ISS.    Problem/Plan - 8:  ·  Problem: Prophylactic measure.   ·  Plan: F: s/p 1 L bolus   E: replete as needed  N: start diet after dysphagia screen   DVT ppx: lovenox    Dispo: [facility].  ·

## 2022-09-01 NOTE — DISCHARGE NOTE PROVIDER - CARE PROVIDER_API CALL
Isaías Rabago)  [facility]  [address] [place] [address], [address] [place] [address]  Phone: [phone]  Fax: [phone]  Follow Up Time: 2 weeks   Isaías Rabago)  [facility]  [address] [place] [address], [address] [place] [address]  Phone: [phone]  Fax: [phone]  Scheduled Appointment: 09/15/2022 11:00 AM   Isaías Rabago)  [facility]  [address] [facility], [address]th [place] [address]  Phone: [phone]  Fax: [phone]  Scheduled Appointment: 09/15/2022 11:00 AM    Camila Mcpherson)  Internal Medicine  [facility] [place] [address]  Phone: [phone]  Fax: [phone]  Follow Up Time:     Wally Puckett)  Neurosurgery  [address], [address] [place] [id number]  Phone: [phone]  Fax: [phone]  Follow Up Time:

## 2022-09-01 NOTE — H&P ADULT - PROBLEM SELECTOR PLAN 4
- official med rec in AM, start home meds Pt hypertensive in the ED, but down to 121/75 in the hospital.     Plan:  - c/w lasix every other day

## 2022-09-01 NOTE — PHYSICAL THERAPY INITIAL EVALUATION ADULT - ADDITIONAL COMMENTS
Pt states she lives in an elevator building, no steps to enter. Uses a RW and wheelchair when necessary. Has a HHA 24/7. Pt able to ambulate and completes transfers with supervision

## 2022-09-01 NOTE — H&P ADULT - HISTORY OF PRESENT ILLNESS
89 yo F with PMHx of HTN, HLD, CHF, DM, and aortic valve stenosis presenting to the ED after a fall today while at home  89 yo F with PMHx of HTN, HLD, CHF, DM, and aortic valve stenosis presenting to the ED after a fall today while at home             ED Course:   Vitals: afebrile,  /76 --> 208/95, HR 65-80, 99 on RA   Labs s/f: Na 124,    EKG: NSR with 1st degree AV block   Imaging: Ct head: no acute infarct or hemorrhage, Ct neck with possible nondisplaced type 2 fracture   Interventions: tylenol, tramadol     Patient admitted for  89 yo F with PMHx of HTN, HLD, CHF, DM, and aortic valve stenosis presenting to the ED after a fall today while at home. She denies LOC, but did hit her head. She states she was feeling lightheaded while walking to bathroom and she fell backwards and hit her head on furniture.             ED Course:   Vitals: afebrile,  /76 --> 208/95, HR 65-80, 99 on RA   Labs s/f: Na 124,    EKG: NSR with 1st degree AV block   Imaging: Ct head: no acute infarct or hemorrhage, Ct neck with possible nondisplaced type 2 fracture   Interventions: tylenol, tramadol     Patient admitted for cervical fracture and fall.  89 yo F with PMHx of HTN, HLD, CHF, DM, and aortic valve stenosis presenting to the ED after a fall today while at home. She denies LOC, but did hit her head. She states she was feeling lightheaded while walking to bathroom and she fell backwards and hit her head on furniture.         ED Course:   Vitals: afebrile,  /76 --> 208/95, HR 65-80, 99 on RA   Labs s/f: Na 124,    EKG: NSR with 1st degree AV block   Imaging: Ct head: no acute infarct or hemorrhage, Ct neck with possible nondisplaced type 2 fracture   Interventions: tylenol, tramadol     Patient admitted for cervical fracture and fall.  89 yo F with PMHx of HTN, HLD, CHF, DM, and aortic valve stenosis presenting to the ED after a fall today while at home. She denies LOC, but did hit her head. She states she was feeling lightheaded while walking to bathroom and she fell backwards and hit her head on furniture, she remembers fall. She reports pain at the back of her head. Denies HA. Denies chest pain, palpitations, or dyspnea prior to falling. She denies frequent falls or hx of dizziness while standing. She lives with 24hr HHA, lives alone. She usually walks with walker. She denies fever, chills, dysuria, abd pain, N/V/D. She reports adequate PO intake and food intake.       ED Course:   Vitals: afebrile,  /76 --> 208/95, HR 65-80, 99 on RA   Labs s/f: Na 124,    EKG: NSR with 1st degree AV block   Imaging: Ct head: no acute infarct or hemorrhage, Ct neck with possible nondisplaced type 2 fracture   Interventions: tylenol, tramadol     Patient admitted for cervical fracture and fall.  89 yo F with PMHx of HTN, HLD, CHF, and aortic valve stenosis presenting to the ED after a fall today while at home. She denies LOC, but did hit her head. She states she was feeling lightheaded while walking to bathroom and she fell backwards and hit her head on furniture, she remembers fall. She reports pain at the back of her head. Denies HA. Denies chest pain, palpitations, or dyspnea prior to falling. She denies frequent falls or hx of dizziness while standing. She lives with 24hr HHA, lives alone. She usually walks with walker. She denies fever, chills, dysuria, abd pain, N/V/D. She reports adequate PO intake and food intake.       ED Course:   Vitals: afebrile,  /76 --> 208/95, HR 65-80, 99 on RA   Labs s/f: Na 124,    EKG: NSR with 1st degree AV block   Imaging: Ct head: no acute infarct or hemorrhage, Ct neck with possible nondisplaced type 2 fracture   Interventions: tylenol, tramadol     Patient admitted for cervical fracture and fall.

## 2022-09-01 NOTE — DISCHARGE NOTE PROVIDER - CARE PROVIDERS DIRECT ADDRESSES
[email or web address]

## 2022-09-01 NOTE — DISCHARGE NOTE PROVIDER - NSDCCPCAREPLAN_GEN_ALL_CORE_FT
PRINCIPAL DISCHARGE DIAGNOSIS  Diagnosis: Fall  Assessment and Plan of Treatment: You have had a fall for which you were admitted to the hospital. Health conditions which change your blood pressure, vision, muscle strength and coordination may increase your risk for falls. Medication can also increase your risk if they make you dizzy, weak, or sleepy. It appears that the cause is likely because of dizziness brought on by either your new medication (Carbamazepine) or your low sodium. To prevent falls, you can stand or sit up slowly, use assistive devices as directed, wear shoes that fit well and have soles that , wear a personal alarm, stay active, and manage your medical conditions. Home safety tips include keeping your path clear, removing small rugs, not walking on wet surfaces, installing bright lights at home, and keeping items you use often on shelves within reach.        SECONDARY DISCHARGE DIAGNOSES  Diagnosis: Cervical spine fracture  Assessment and Plan of Treatment:     Diagnosis: Hyponatremia  Assessment and Plan of Treatment:      PRINCIPAL DISCHARGE DIAGNOSIS  Diagnosis: Fall  Assessment and Plan of Treatment: You have had a fall for which you were admitted to the hospital. Health conditions which change your blood pressure, vision, muscle strength and coordination may increase your risk for falls. Medication can also increase your risk if they make you dizzy, weak, or sleepy. It appears that the cause is likely because of dizziness brought on by either your new medication (Carbamazepine) or your low sodium. To prevent falls, you can stand or sit up slowly, use assistive devices as directed, wear shoes that fit well and have soles that , wear a personal alarm, stay active, and manage your medical conditions. Home safety tips include keeping your path clear, removing small rugs, not walking on wet surfaces, installing bright lights at home, and keeping items you use often on shelves within reach.        SECONDARY DISCHARGE DIAGNOSES  Diagnosis: Hyponatremia  Assessment and Plan of Treatment: Hyponatremia is the medical term for "too little sodium." In people with this condition, the body holds on to too much water. That dilutes the amount of sodium in their blood. Sodium is one of many substances called "electrolytes" that help carry electrical signals between cells. That's important because many cells rely on electrical signals to work right. Sodium also helps keep the right amount of fluid inside cells. Your sodium was low on arrival to the hospital, but after receiving some fluids, it increased to levels normal for you at the time of discharge.    Diagnosis: Cervical spine fracture  Assessment and Plan of Treatment: A "fracture" is another word for a broken bone. A neck fracture is when a person breaks 1 of the bones in the neck. The medical term for the bones in the neck is "cervical vertebrae". For some neck fractures, the person might need to wear a special collar or brace. These hold the neck in place while it heals. Go to the emergency department right away if you: have trouble breathing, have trouble moving your arms or legs, or have trouble controlling your bladder or bowels.

## 2022-09-01 NOTE — H&P ADULT - NSHPPHYSICALEXAM_GEN_ALL_CORE
.  VITAL SIGNS:  T(C): 36.6 (09-01-22 @ 04:29), Max: 37 (08-31-22 @ 13:55)  T(F): 97.8 (09-01-22 @ 04:29), Max: 98.6 (08-31-22 @ 13:55)  HR: 68 (09-01-22 @ 04:29) (65 - 88)  BP: 169/93 (09-01-22 @ 04:29) (153/76 - 213/96)  BP(mean): --  RR: 18 (09-01-22 @ 04:29) (18 - 20)  SpO2: 98% (09-01-22 @ 04:29) (96% - 99%)  Wt(kg): --    PHYSICAL EXAM:    Constitutional: NAD   HEENT: NC/AT, PERRL, EOMI, clear conjunctiva, uvula midline, no oropharyngeal erythema or exudates; MMM  Neck: supple; no JVD   Respiratory: CTA B/L; no W/R/R, no retractions  Cardiac: +S1/S2; RRR; no M/R/G  Gastrointestinal: soft, NT/ND; no rebound or guarding; +BSx4  Back: no vertebral point tenderness, no CVAT B/L  Extremities: WWP, no clubbing or cyanosis; no peripheral edema  Musculoskeletal: NROM x4; no joint swelling, tenderness or erythema  Vascular: peripheral pulses present   Dermatologic: skin warm, dry and intact; no rashes, wounds, or scars  Neurologic: AAOx3; CNII-XII grossly intact; no focal deficits .  VITAL SIGNS:  T(C): 36.6 (09-01-22 @ 04:29), Max: 37 (08-31-22 @ 13:55)  T(F): 97.8 (09-01-22 @ 04:29), Max: 98.6 (08-31-22 @ 13:55)  HR: 68 (09-01-22 @ 04:29) (65 - 88)  BP: 169/93 (09-01-22 @ 04:29) (153/76 - 213/96)  BP(mean): --  RR: 18 (09-01-22 @ 04:29) (18 - 20)  SpO2: 98% (09-01-22 @ 04:29) (96% - 99%)  Wt(kg): --    PHYSICAL EXAM:    Constitutional: NAD   HEENT: NC/AT, PERRL, EOMI, clear conjunctiva, uvula midline, no oropharyngeal erythema or exudates; MMM  Neck: tender to cervical spine   Respiratory: pt refused exam   Cardiac: pt refused exam   Gastrointestinal: soft, NT/ND; no rebound or guarding; +BSx4  Back: no vertebral point tenderness, no CVAT B/L  Extremities: WWP, no clubbing or cyanosis; no peripheral edema  Musculoskeletal: NROM x4; no joint swelling, tenderness or erythema  Vascular: peripheral pulses present   Dermatologic: skin warm, dry and intact; no rashes, wounds, or scars  Neurologic: AAOx3; CNII-XII grossly intact; no focal deficits .  VITAL SIGNS:  T(C): 36.6 (09-01-22 @ 04:29), Max: 37 (08-31-22 @ 13:55)  T(F): 97.8 (09-01-22 @ 04:29), Max: 98.6 (08-31-22 @ 13:55)  HR: 68 (09-01-22 @ 04:29) (65 - 88)  BP: 169/93 (09-01-22 @ 04:29) (153/76 - 213/96)  BP(mean): --  RR: 18 (09-01-22 @ 04:29) (18 - 20)  SpO2: 98% (09-01-22 @ 04:29) (96% - 99%)  Wt(kg): --    PHYSICAL EXAM:    Constitutional: NAD   HEENT: NC/AT, PERRL, EOMI, clear conjunctiva, MMM  Neck: tender to cervical spine   Respiratory: CTA b/l, no W/R/R  Cardiac: systolic murmur RUSB, RRR, +S1/S2  Gastrointestinal: soft, NT/ND; no rebound or guarding; +BSx4  Back: no vertebral point tenderness, no CVAT B/L  Extremities: WWP, no clubbing or cyanosis; no peripheral edema  Musculoskeletal: NROM x4; no joint swelling, tenderness or erythema  Vascular: peripheral pulses present   Dermatologic: skin warm, dry and intact; no rashes, wounds, or scars  Neurologic: AAOx3; CNII-XII grossly intact; no focal deficits; intact strength B/L upper & lower extremities

## 2022-09-01 NOTE — DISCHARGE NOTE PROVIDER - PROVIDER TOKENS
PROVIDER:[TOKEN:[[id number]],FOLLOWUP:[2 weeks]] PROVIDER:[TOKEN:[[id number]],SCHEDULEDAPPT:[09/15/2022],SCHEDULEDAPPTTIME:[11:00 AM]] PROVIDER:[TOKEN:[[id number]],SCHEDULEDAPPT:[09/15/2022],SCHEDULEDAPPTTIME:[11:00 AM]],PROVIDER:[TOKEN:[[id number]]],PROVIDER:[TOKEN:[[id number]]]

## 2022-09-01 NOTE — DISCHARGE NOTE PROVIDER - NSDCFUADDAPPT_GEN_ALL_CORE_FT
Please bring your Insurance card, Photo ID and Discharge paperwork to the following appointment:    (1) Please follow up with your Orthopedic Provider, Dr. Isaías Rabago at [address] [place] [address], [place] [address] on 09/15/2022 at 11:00am.    Appointment was scheduled by Ms. KAITLYNN Walter, Referral Coordinator.   Please bring your Insurance card, Photo ID and Discharge paperwork to the following appointment:    (1) Please follow up with your Orthopedic Provider, Dr. Isaías Rabago at [address] [place] [address], [place] [address] on 09/15/2022 at 11:00am.    Appointment was scheduled by Ms. KAITLYNN Walter, Referral Coordinator.    Please follow up with your PCP Dr. Mcpherson within 1-2 weeks of discharge.    Please follow up with your neurosurgeon Dr. Puckett 4 weeks after discharge for repeat imaging.

## 2022-09-01 NOTE — H&P ADULT - PROBLEM SELECTOR PLAN 6
unknown EF   - pt does not have medication list, official med rec and restart home meds Unknown EF. Pt on Lasix 20mg every other day.    Plan:  - c/w lasix 20 every other day

## 2022-09-01 NOTE — DISCHARGE NOTE PROVIDER - ATTENDING ATTESTATION STATEMENT
I have personally seen and examined the patient. I have collaborated with and supervised the
yes/HCPT

## 2022-09-01 NOTE — H&P ADULT - PROBLEM SELECTOR PLAN 3
- Na 124 on admission  - now 129 after IVF  - likely 2/2 hypovolemic hyponatremia  - s/p 1 L bolus NS  - continue to monitor, follow up urine studies Na 124 on admission, improved to 130 after IVF. Likely 2/2 hypovolemic hyponatremia v. desmopressin.    Plan:  - s/p 1 L bolus NS  - start home desmopressin  - f/u urine studies  - monitor BMP

## 2022-09-01 NOTE — H&P ADULT - NSHPLABSRESULTS_GEN_ALL_CORE
< from: CT Cervical Spine No Cont (22 @ 17:15) >    Questionable lucency at the base of the odontoid process which may   represent nondisplaced type II fracture    < end of copied text >    .  LABS:                         12.3   8.90  )-----------( 290      ( 31 Aug 2022 15:11 )             37.4         129<L>  |  95<L>  |  19  ----------------------------<  139<H>  4.0   |  32<H>  |  0.87    Ca    8.5      31 Aug 2022 19:40  Mg     2.2         TPro  8.0  /  Alb  4.1  /  TBili  0.5  /  DBili  x   /  AST  38<H>  /  ALT  29  /  AlkPhos  103      PT/INR - ( 31 Aug 2022 15:11 )   PT: 10.9 sec;   INR: 0.94          PTT - ( 31 Aug 2022 15:11 )  PTT:33.0 sec  Urinalysis Basic - ( 31 Aug 2022 15:11 )    Color: Yellow / Appearance: Clear / S.015 / pH: x  Gluc: x / Ketone: NEGATIVE  / Bili: NEGATIVE / Urobili: 0.2 E.U./dL   Blood: x / Protein: Trace mg/dL / Nitrite: NEGATIVE   Leuk Esterase: NEGATIVE / RBC: 5-10 /HPF / WBC < 5 /HPF   Sq Epi: x / Non Sq Epi: 5-10 /HPF / Bacteria: Present /HPF      CARDIAC MARKERS ( 31 Aug 2022 15:11 )  x     / x     / 258 U/L / x     / x                RADIOLOGY, EKG & ADDITIONAL TESTS: Reviewed.     < from: CT Head No Cont (22 @ 17:22) >    No acute intracranial hemorrhage or calvarial fracture.    < end of copied text >    < from: CT Cervical Spine No Cont (22 @ 17:15) >    Questionable lucency at the base of the odontoid process which may   represent nondisplaced type II fracture    < end of copied text >

## 2022-09-01 NOTE — H&P ADULT - ASSESSMENT
89 yo F with PMHx of HTN, HLD, CHF, DM, and aortic valve stenosis presenting to the ED after a fall today while at home. Likely mechanical fall vs 2/2 orthostatic hypotension. No syncope.  89 yo F with PMHx of HTN, HLD, CHF, DM, and aortic valve stenosis presenting to the ED after a fall today while at home. Likely 2/2 hyponatremia v. drug adverse effect 89 yo F with PMHx of HTN, HLD, CHF, and aortic valve stenosis presenting to the ED after a fall today while at home. Likely 2/2 hyponatremia v. drug adverse effect

## 2022-09-01 NOTE — PHYSICAL THERAPY INITIAL EVALUATION ADULT - PERTINENT HX OF CURRENT PROBLEM, REHAB EVAL
89 yo F with PMHx of HTN, HLD, CHF, DM, and aortic valve stenosis presenting to the ED after a fall today while at home. She denies LOC, but did hit her head. She states she was feeling lightheaded while walking to bathroom and she fell backwards and hit her head on furniture, she remembers fall. She reports pain at the back of her head. Denies HA. Denies chest pain, palpitations, or dyspnea prior to falling. She denies frequent falls or hx of dizziness while standing. She lives with 24hr HHA, lives alone. She usually walks with walker. She denies fever, chills, dysuria, abd pain, N/V/D. She reports adequate PO intake and food intake.

## 2022-09-01 NOTE — CONSULT NOTE ADULT - ATTENDING COMMENTS
89 year old female s/p fall with neck pain and CT cervical showing non-displaced Dens fracture.  Patient has no radicular pain. She is neurologically intact. This fracture can be treated conservatively with bracing. She will be placed in a hard external orthosis at all times.  Can mobilize in brace.  Followup in my office 2 weeks after patient's discharge ([address] [place] [address] [address] [facility], [place] [address] phone [phone]).

## 2022-09-01 NOTE — CONSULT NOTE ADULT - SUBJECTIVE AND OBJECTIVE BOX
Orthopaedic Surgery Consult Note    Attending Physician: Hima  Consult requested by: Medicine    CC:     HPI:  89 yo F with PMHx of HTN, HLD, CHF, DM, and aortic valve stenosis presenting to the ED after a fall today while at home. Pt reports she stood up from the toilet and tried to walk to her walker when she suddenly felt dizziness. Denies LOC but states she fell down to her knees and hit her head. At the time, pt was standing next to her home health aid who tried to catch her but pt hit the left, back side of her head in the process. Reports mild HA and pain to the left posterior head and upper neck. Denies changes in vision, blacking out, nausea, vomiting, dizziness, abdominal pain, back pain, hip pain, inability to ambulate, or urinary symptoms.    Allergies    erythromycin (Unknown)  penicillin (Unknown)    Intolerances      PAST MEDICAL & SURGICAL HISTORY:  Hyperlipidemia, unspecified hyperlipidemia type      Diabetes insipidus      H/O aortic valve stenosis      Hypertension      Vertigo      Chronic diastolic congestive heart failure      Dyslipidemia      History of pseudoaneurysm      S/P AVR  Bioprosthetic      H/O lumbosacral spine surgery      S/P hip replacement  B/L      S/P right coronary artery (RCA) stent placement          Meds:  acetaminophen     Tablet .. 650 milliGRAM(s) Oral every 6 hours PRN  dextrose 5%. 1000 milliLiter(s) IV Continuous <Continuous>  dextrose 5%. 1000 milliLiter(s) IV Continuous <Continuous>  dextrose 50% Injectable 25 Gram(s) IV Push once  dextrose 50% Injectable 12.5 Gram(s) IV Push once  dextrose 50% Injectable 25 Gram(s) IV Push once  dextrose Oral Gel 15 Gram(s) Oral once PRN  gabapentin 300 milliGRAM(s) Oral every 12 hours  glucagon  Injectable 1 milliGRAM(s) IntraMuscular once  influenza  Vaccine (HIGH DOSE) 0.7 milliLiter(s) IntraMuscular once  insulin lispro (ADMELOG) corrective regimen sliding scale   SubCutaneous three times a day before meals      Family History:  Denies family history of bleeding disorders    Social History:   Pt is a nonsmoker  Social EtOH use     Review of Systems:  All review of systems are negative except for those mentioned in HPI.    Physical Exam:  General: Pt Alert and oriented, NAD  C spine with no midline TTP, has some paraspinal tenderness  Pulses: 2+ radial pulses, wwp, cap refill <3 seconds  RUE  SILT C5-S1 dermatomes   Motor: 5/5 deltoids/biceps/triceps/wrist flexion/wrist extension/finger flexors/finger extensors/intrinsics   LLE  SILT C5-S1 dermatomes  Motor: 5/5 deltoids/biceps/triceps/wrist flexion/wrist extension/finger flexors/finger extensors/intrinsics   No maddox's      Vital Signs Last 24 Hrs  T(C): 36.6 (01 Sep 2022 04:29), Max: 37 (31 Aug 2022 13:55)  T(F): 97.8 (01 Sep 2022 04:29), Max: 98.6 (31 Aug 2022 13:55)  HR: 68 (01 Sep 2022 04:29) (65 - 88)  BP: 169/93 (01 Sep 2022 04:29) (153/76 - 213/96)  BP(mean): --  RR: 18 (01 Sep 2022 04:29) (18 - 20)  SpO2: 98% (01 Sep 2022 04:29) (96% - 99%)    Parameters below as of 01 Sep 2022 04:29  Patient On (Oxygen Delivery Method): room air          Labs:                        12.3   8.90  )-----------( 290      ( 31 Aug 2022 15:11 )             37.4     08-31    129<L>  |  95<L>  |  19  ----------------------------<  139<H>  4.0   |  32<H>  |  0.87    Ca    8.5      31 Aug 2022 19:40  Mg     2.2     08-31    TPro  8.0  /  Alb  4.1  /  TBili  0.5  /  DBili  x   /  AST  38<H>  /  ALT  29  /  AlkPhos  103  08-31    PT/INR - ( 31 Aug 2022 15:11 )   PT: 10.9 sec;   INR: 0.94          PTT - ( 31 Aug 2022 15:11 )  PTT:33.0 sec    Imaging:   CT Cspine w/o contrast- nondisplaced fracture of odontoid waist    A/P: 89yFemale with nondisplaced Type 2 Odontoid fx  - stable, no neuro deficits  - placed in cervical collar, to be WORN at all times  - to be fitted for custom C collar today  - f/u outpatient with Dr. miller for further management  - WBAT with cervical collar at all times.  - Discussed with Attending, Dr. Hima Rivas, PGY-2  Ortho Pager [phone]  
    Patient is a 89y old  Female who presents with a chief complaint of fall (01 Sep 2022 12:58)        HPI:  89 yo F with PMHx of HTN, HLD, CHF, DM, and aortic valve stenosis presenting to the ED after a fall today while at home. She denies LOC, but did hit her head. She states she was feeling lightheaded while walking to bathroom and she fell backwards and hit her head on furniture, she remembers fall. She reports pain at the back of her head. Denies HA. Denies chest pain, palpitations, or dyspnea prior to falling. She denies frequent falls or hx of dizziness while standing. She lives with 24hr HHA, lives alone. She usually walks with walker. She denies fever, chills, dysuria, abd pain, N/V/D. She reports adequate PO intake and food intake.       ED Course:   Vitals: afebrile,  /76 --> 208/95, HR 65-80, 99 on RA   Labs s/f: Na 124,    EKG: NSR with 1st degree AV block   Imaging: Ct head: no acute infarct or hemorrhage, Ct neck with possible nondisplaced type 2 fracture   Interventions: tylenol, tramadol     Patient admitted for cervical fracture and fall.  (01 Sep 2022 06:03)      PAST MEDICAL & SURGICAL HISTORY:  Hyperlipidemia, unspecified hyperlipidemia type      Diabetes insipidus      H/O aortic valve stenosis      Hypertension      Vertigo      Chronic diastolic congestive heart failure      Dyslipidemia      History of pseudoaneurysm      S/P AVR  Bioprosthetic      H/O lumbosacral spine surgery      S/P hip replacement  B/L      S/P right coronary artery (RCA) stent placement          MEDICATIONS  (STANDING):  dextrose 5%. 1000 milliLiter(s) (100 mL/Hr) IV Continuous <Continuous>  dextrose 5%. 1000 milliLiter(s) (50 mL/Hr) IV Continuous <Continuous>  dextrose 50% Injectable 25 Gram(s) IV Push once  dextrose 50% Injectable 12.5 Gram(s) IV Push once  dextrose 50% Injectable 25 Gram(s) IV Push once  enoxaparin Injectable 30 milliGRAM(s) SubCutaneous every 24 hours  gabapentin 300 milliGRAM(s) Oral every 12 hours  glucagon  Injectable 1 milliGRAM(s) IntraMuscular once  influenza  Vaccine (HIGH DOSE) 0.7 milliLiter(s) IntraMuscular once  insulin lispro (ADMELOG) corrective regimen sliding scale   SubCutaneous Before meals and at bedtime    MEDICATIONS  (PRN):  acetaminophen     Tablet .. 650 milliGRAM(s) Oral every 6 hours PRN Temp greater or equal to 38C (100.4F), Mild Pain (1 - 3)  dextrose Oral Gel 15 Gram(s) Oral once PRN Blood Glucose LESS THAN 70 milliGRAM(s)/deciliter               FAMILY HISTORY:  FH: lung cancer (Father)      CBC Full  -  ( 01 Sep 2022 07:54 )  WBC Count : 10.48 K/uL  RBC Count : 3.72 M/uL  Hemoglobin : 11.4 g/dL  Hematocrit : 34.2 %  Platelet Count - Automated : 287 K/uL  Mean Cell Volume : 91.9 fl  Mean Cell Hemoglobin : 30.6 pg  Mean Cell Hemoglobin Concentration : 33.3 gm/dL  Auto Neutrophil # : 7.95 K/uL  Auto Lymphocyte # : 1.25 K/uL  Auto Monocyte # : 1.01 K/uL  Auto Eosinophil # : 0.15 K/uL  Auto Basophil # : 0.05 K/uL  Auto Neutrophil % : 75.9 %  Auto Lymphocyte % : 11.9 %  Auto Monocyte % : 9.6 %  Auto Eosinophil % : 1.4 %  Auto Basophil % : 0.5 %          130<L>  |  92<L>  |  15  ----------------------------<  94  4.4   |  29  |  0.74    Ca    9.2      01 Sep 2022 07:54  Mg     2.2     -    TPro  8.0  /  Alb  4.1  /  TBili  0.5  /  DBili  x   /  AST  38<H>  /  ALT  29  /  AlkPhos  103        Urinalysis Basic - ( 31 Aug 2022 15:11 )    Color: Yellow / Appearance: Clear / S.015 / pH: x  Gluc: x / Ketone: NEGATIVE  / Bili: NEGATIVE / Urobili: 0.2 E.U./dL   Blood: x / Protein: Trace mg/dL / Nitrite: NEGATIVE   Leuk Esterase: NEGATIVE / RBC: 5-10 /HPF / WBC < 5 /HPF   Sq Epi: x / Non Sq Epi: 5-10 /HPF / Bacteria: Present /HPF          Radiology :    < from: CT Head No Cont (22 @ 17:22) >  ACC: [id number] EXAM:  CT BRAIN                          PROCEDURE DATE:  2022          INTERPRETATION:  PROCEDURE: CT head without intravenous contrast    INDICATIONS: Fall.    TECHNIQUE:  Serial axial images were obtained from the skull base to the   vertex without the use of intravenous contrast. Coronal and sagittal   reformatted images were obtained.    COMPARISON EXAMINATION: CT head 2021.    FINDINGS:  VENTRICLES AND SULCI: Stable age-related parenchymal volume loss with   unchanged ventricular size and configuration. No hydrocephalus.  INTRA-AXIAL: No acute intracranial hemorrhage or midline shift is   present. There is patchy and confluent hypodensity within the   periventricular and subcortical white matter, consistent with sequelae of   chronic small vessel ischemic disease. The gray-white matter   differentiation is preserved.  EXTRA-AXIAL: No extra-axial fluid collection is present.  VISUALIZED SINUSES: The visualized paranasal sinuses are clear.  VISUALIZED MASTOIDS:  Clear.  CALVARIUM:  Normal.  MISCELLANEOUS:  The native ocular lenses are absent bilaterally.    IMPRESSION:  No acute intracranial hemorrhage or calvarial fracture.      < from: CT Cervical Spine No Cont (22 @ 17:15) >  ACC: [id number] EXAM:  CT CERVICAL SPINE                          PROCEDURE DATE:  2022          INTERPRETATION:  EXAM: CT cervical spine without contrast    INDICATION: Status post fall. Concern for cervical spine injury    TECHNIQUE: CT exam cervical spine without contrast. Multiple axial images   obtained from mid orbits to the sternoclavicular joint. Sagittal/coronal   reformatted images obtained from axial data set. Images reviewed in soft   tissue and bone windows.    COMPARISON: Noneavailable    FINDINGS:    Markedly motion degraded exam.    There is questionable lucency at the base of the odontoid process which   may represent nondisplaced type II fracture, series 608, image 22    Cervical lordosis maintained Cervical vertebralbody heights are   maintained. Mild C7-T1 anterolisthesis. Cervical spinous processes are   intact. Articular pillars of cervical spine are intact. No jumped or   perched facets identified. Arch of C1 appear to be intact..   Atlantooccipital and atlantodental articulations are within normal   limits. The atlantodental interval within normal limits. There are   multilevel degenerative endplate changes with osteophyte formation,   intervertebral disc space narrowing/desiccation, Schmorl's nodes and  endplate irregularity. .. Paravertebral soft tissues are within normal   limits. Visualized lung apices are unremarkable.    There are multilevel findings as follows:    Craniocervical junction unremarkable.        C2-C3: No significant canal or foraminal stenosis.    C3-C4: Posterior osteophytic ridging with disc bulging and bilateral   uncovertebral/facet hypertrophy contributing to no significant canal   stenosis and mild bilateral foraminal stenosis.  C4-C5: Posterior osteophytic ridging with disc bulging and bilateral   uncovertebral/facet hypertrophy contributing to mild canal stenosis and   moderate to severe bilateral foraminal stenosis.    C5-C6: Posterior osteophytic ridging with disc bulging and bilateral   uncovertebral/facet hypertrophy contributing to mild canal stenosis and   moderate severe bilateral foraminal stenosis.  C6-C7: Posterior osteophytic ridging with disc bulging and bilateral   uncovertebral/facet hypertrophy contributing to mild canal stenosis and   moderate to severe bilateral foraminal stenosis.    C7-T1: No significant canal or foraminal stenosis.      IMPRESSION:    Markedly motion degraded exam.    Questionable lucency at the base of the odontoid process which may   represent nondisplaced type II fracture    Multilevel cervical spondylitic changes as above.                    Vital Signs Last 24 Hrs  T(C): 36.6 (01 Sep 2022 12:25), Max: 37 (31 Aug 2022 13:55)  T(F): 97.9 (01 Sep 2022 12:25), Max: 98.6 (31 Aug 2022 13:55)  HR: 65 (01 Sep 2022 12:25) (65 - 88)  BP: 121/75 (01 Sep 2022 12:25) (121/75 - 213/96)  BP(mean): --  RR: 18 (01 Sep 2022 12:25) (18 - 20)  SpO2: 96% (01 Sep 2022 12:25) (96% - 99%)    Parameters below as of 01 Sep 2022 12:25  Patient On (Oxygen Delivery Method): room air            REVIEW OF SYSTEMS:  per HPI            Physical Exam:   89 y o woman lying in semi Babin's position , awake , alert , C collar , no acute complaints     Neurologic Exam:    Alert and oriented to person , place , date/year, speech fluent w/o dysarthria , follows commands     Cranial Nerves:     II :                         pupils equal , round and reactive to light , visual fields intact   III/ IV/VI :              extraocular movements intact , neg nystagmus , neg ptosis  V :                        facial sensation intact , V1-3 normal  VII :                      face symmetric , no droop , normal eye closure and smile  VIII :                     hearing intact to finger rub bilaterally  IX and X :             no hoarseness , gag intact , palate/ uvula rise symmetrically  XI :                       SCM / trapezius strength intact bilateral  XII :                      no tongue deviation    Motor Exam:    Right UE:               no focal weakness ,  > 3+/5 throughout     negative pronator drift                               Left UE:                 no focal weakness,  > 3+/5 throughout     negative pronator drift        Right LE:                no focal weakness,  > 3+/5 throughout     Left LE:                  no focal weakness,  > 3+/5 throughout           Sensation:         intact to light touch x 4 extremities                        DTR :                     biceps/brachioradialis : equal                                              patella/ankle : equal                                                                               neg Babinski    neg clonus           Gait :  not tested              PM&R Impression : s/p fall with odontoid Type II fx / C collar to be worn at all times    1) deconditioned    2) no focal neuro deficits      Recommendations / Plan :     1) Physical / Occupational therapy focusing on therapeutic exercises , equipment evaluation , bed mobility/transfer out of bed evaluation , progressive ambulation with assistive devices prn .    2) Anticipated Disposition Plan / Recs  :    subacute rehab placement

## 2022-09-01 NOTE — H&P ADULT - PROBLEM SELECTOR PLAN 1
- Mechanical fall, c/f orthostasis given BP fluctuations and dizziness  - follow up orthostatic vitals, s/p 1 L bolus   - PT/OT May be mechanical v. orthostatic v. 2/2 hyponatremia. Also possibly secondary to recently starting carbamazepine.    Plan:  - s/p 1 L bolus   - orthostatics negative  - d/c carbamazepine  - hold home lasix for now  - PT/OT, f/u recs

## 2022-09-01 NOTE — PATIENT PROFILE ADULT - FALL HARM RISK - HARM RISK INTERVENTIONS
Communicate Risk of Fall with Harm to all staff/Provide patient with walking aids - walker, cane, crutches/Reinforce activity limits and safety measures with patient and family/Sit up slowly, dangle for a short time, stand at bedside before walking/Tailored Fall Risk Interventions/Visual Cue: Yellow wristband and red socks/Bed in lowest position, wheels locked, appropriate side rails in place/Call bell, personal items and telephone in reach/Instruct patient to call for assistance before getting out of bed or chair/Non-slip footwear when patient is out of bed/[place] to call system/Physically safe environment - no spills, clutter or unnecessary equipment/Purposeful Proactive Rounding/Room/bathroom lighting operational, light cord in reach

## 2022-09-01 NOTE — H&P ADULT - PROBLEM SELECTOR PLAN 2
Ct neck: Questionable lucency at the base of the odontoid process which may   represent nondisplaced type II fracture  - neurosurgery consulted, pt in cervical collar    Plan   - pain control   - follow up neurosurgery Ct neck: Questionable lucency at the base of the odontoid process which may represent nondisplaced type II fracture    Plan:  - pain control  - neurosurgery consulted, pt in cervical collar   - per spine, no plan for surgical intervention  - will fit pt for custom c-spine collar

## 2022-09-01 NOTE — H&P ADULT - PROBLEM SELECTOR PLAN 8
F: s/p 1 L bolus   E: replete as needed  N: start diet after dysphagia screen   DVT ppx: lovenox    Dispo: F F: s/p 1 L bolus   E: replete as needed  N: DASH diet  DVT ppx: lovenox  Dispo: [facility]    Plan discussed extensively with attending Dr. Mcpherson. F: s/p 1 L bolus   E: replete as needed  N: DASH diet  DVT ppx: lovenox  Dispo: [facility]    Plan discussed in detail with attending Dr. Mcpherson.

## 2022-09-01 NOTE — PROGRESS NOTE ADULT - SUBJECTIVE AND OBJECTIVE BOX
Interventional, Pulmonary, Critical, Chest Special Procedures. for      Pt was seen and fully examined by myself.     Time spent with patient in minutes:  Chart entry CT/CXR series reviewed   Patient is a 89y old  Female who presents with a chief complaint of fall (01 Sep 2022 07:28)    HPI:  89 yo F with PMHx of HTN, HLD, CHF, DM, and aortic valve stenosis presenting to the ED after a fall today while at home. She denies LOC, but did hit her head. She states she was feeling lightheaded while walking to bathroom and she fell backwards and hit her head on furniture, she remembers fall. She reports pain at the back of her head. Denies HA. Denies chest pain, palpitations, or dyspnea prior to falling. She denies frequent falls or hx of dizziness while standing. She lives with 24hr HHA, lives alone. She usually walks with walker. She denies fever, chills, dysuria, abd pain, N/V/D. She reports adequate PO intake and food intake.       ED Course:   Vitals: afebrile,  /76 --> 208/95, HR 65-80, 99 on RA   Labs s/f: Na 124,    EKG: NSR with 1st degree AV block   Imaging: Ct head: no acute infarct or hemorrhage, Ct neck with possible nondisplaced type 2 fracture   Interventions: tylenol, tramadol     Patient admitted for cervical fracture and fall.  (01 Sep 2022 06:03)      REVIEW OF SYSTEMS:  Constitutional: No fever, weight loss, chills or fatigue  Eyes: No eye pain, visual disturbances, or discharge  ENMT:  No difficulty hearing, tinnitus, vertigo; No sinus or throat pain. No epistaxis, dysphagia, dysphonia, hoarseness or odynophagia  Neck: No pain, stiffness or neck swelling.  No masses or deformities  Respiratory: No cough, wheezing, hemoptysis  - COPD  - ILD   - PE   - ASTHMA     - PNEUMONIA  Cardiovascular: No chest pain, dysrhythmia, palpitations, dizziness or edema - CAD   - CHF   - HTN  Gastrointestinal: No abdominal or epigastric pain. No nausea, vomiting or hematemesis; No diarrhea or constipation. No melena or hematochezia, Icterus.          Genitourinary: No dysuria, frequency, hematuria or incontinence   - CKD/KAYLYN      - ESRD  Neurological: No headaches, memory loss, loss of strength, numbness or tremors      -DEMENTIA     - STROKE    - SEIZURE  Skin: No itching, burning, rashes or lesions   Lymph Nodes: No enlarged glands  Endocrine: No heat or cold intolerance; No hair loss       - DM     - THYROID DISORDER  Musculoskeletal: No joint pain or swelling; No muscle, back or extremity pain, No edema  Psychiatric: No depression, anxiety, mood swings or difficulty sleeping  Heme/Lymph: No easy bruising or bleeding gums         - ANEMIA      - CANCER   -COAGULOPATHY  Allergy and Immunologic: No hives or eczema    PAST MEDICAL & SURGICAL HISTORY:  Hyperlipidemia, unspecified hyperlipidemia type      Diabetes insipidus      H/O aortic valve stenosis      Hypertension      Vertigo      Chronic diastolic congestive heart failure      Dyslipidemia      History of pseudoaneurysm      S/P AVR  Bioprosthetic      H/O lumbosacral spine surgery      S/P hip replacement  B/L      S/P right coronary artery (RCA) stent placement        FAMILY HISTORY:  FH: lung cancer (Father)      SOCIAL HISTORY:      - Tobacco     - ETOH    Allergies    erythromycin (Unknown)  penicillin (Unknown)    Intolerances      Vital Signs Last 24 Hrs  T(C): 36.6 (01 Sep 2022 12:25), Max: 37 (31 Aug 2022 13:55)  T(F): 97.9 (01 Sep 2022 12:25), Max: 98.6 (31 Aug 2022 13:55)  HR: 65 (01 Sep 2022 12:25) (65 - 88)  BP: 121/75 (01 Sep 2022 12:25) (121/75 - 213/96)  BP(mean): --  RR: 18 (01 Sep 2022 12:25) (18 - 20)  SpO2: 96% (01 Sep 2022 12:25) (96% - 99%)    Parameters below as of 01 Sep 2022 12:25  Patient On (Oxygen Delivery Method): room air            MEDICATIONS:  MEDICATIONS  (STANDING):  dextrose 5%. 1000 milliLiter(s) (100 mL/Hr) IV Continuous <Continuous>  dextrose 5%. 1000 milliLiter(s) (50 mL/Hr) IV Continuous <Continuous>  dextrose 50% Injectable 25 Gram(s) IV Push once  dextrose 50% Injectable 12.5 Gram(s) IV Push once  dextrose 50% Injectable 25 Gram(s) IV Push once  enoxaparin Injectable 30 milliGRAM(s) SubCutaneous every 24 hours  gabapentin 300 milliGRAM(s) Oral every 12 hours  glucagon  Injectable 1 milliGRAM(s) IntraMuscular once  influenza  Vaccine (HIGH DOSE) 0.7 milliLiter(s) IntraMuscular once  insulin lispro (ADMELOG) corrective regimen sliding scale   SubCutaneous Before meals and at bedtime    MEDICATIONS  (PRN):  acetaminophen     Tablet .. 650 milliGRAM(s) Oral every 6 hours PRN Temp greater or equal to 38C (100.4F), Mild Pain (1 - 3)  dextrose Oral Gel 15 Gram(s) Oral once PRN Blood Glucose LESS THAN 70 milliGRAM(s)/deciliter      PHYSICAL EXAM:  Comfortable, no distress  Eyes: PERRL, EOM intact; conjunctiva and sclera clear  Head: Normocephalic;  No Trauma  ENMT: No nasal discharge, hoarseness, cough or hemoptysis  Neck: Supple; non tender; no masses or deformities.    No JVD  Respiratory: CTA,  - WHEEZING   - RHONCHI  - RALES  - CRACKLES.  Diminished breath sounds  BILATERAL  RIGHT  LEFT  Cardiovascular: Regular rate and rhythm. S1 and S2 Normal; No murmurs, gallops or rubs     - PPM/AICD  Gastrointestinal: Soft non-tender, non-distended; Normal bowel sounds; No hepatosplenomegaly.     -PEG    -  GT   - VANN  Genitourinary: No costovertebral angle tenderness. No dysuria  Extremities: AROM, No clubbing, cyanosis or edema    Vascular: Peripheral pulses palpable 2+ bilaterally  Neurological: Alert and responisve to stimuli   Skin: Warm and dry. No obvious rash  Lymph Nodes: No acute cervical or supraclavicular adenopathy  Psychiatric: Cooperative and appropriate mood    DEVICES:  - DENTURES   +IV R / L     - ETUBE   -TRACH   -CTUBE  R / L    LABS:                          11.4   10.48 )-----------( 287      ( 01 Sep 2022 07:54 )             34.2     09-01    130<L>  |  92<L>  |  15  ----------------------------<  94  4.4   |  29  |  0.74    Ca    9.2      01 Sep 2022 07:54  Mg     2.2     09-01    TPro  8.0  /  Alb  4.1  /  TBili  0.5  /  DBili  x   /  AST  38<H>  /  ALT  29  /  AlkPhos  103  08-31    PT/INR - ( 31 Aug 2022 15:11 )   PT: 10.9 sec;   INR: 0.94          PTT - ( 31 Aug 2022 15:11 )  PTT:33.0 sec  RADIOLOGY & ADDITIONAL STUDIES (The following images were personally reviewed): Interventional, Pulmonary, Critical, Chest Special Procedures. for      Pt was seen and fully examined by myself.     Time spent with patient in minutes:127  Chart entry CT/CXR series reviewed   Patient is a 89y old  Female who presents with a chief complaint of fall (01 Sep 2022 07:28). Events leading to this admission reviewed. The patient ill appearing, wearing neck immobilizer, not compromising speech and respirations. Eupneic on RA  when seen. Engaging.     HPI:  89 yo F with PMHx of HTN, HLD, CHF, DM, and aortic valve stenosis presenting to the ED after a fall today while at home. She denies LOC, but did hit her head. She states she was feeling lightheaded while walking to bathroom and she fell backwards and hit her head on furniture, she remembers fall. She reports pain at the back of her head. Denies HA. Denies chest pain, palpitations, or dyspnea prior to falling. She denies frequent falls or hx of dizziness while standing. She lives with 24hr HHA, lives alone. She usually walks with walker. She denies fever, chills, dysuria, abd pain, N/V/D. She reports adequate PO intake and food intake.       ED Course:   Vitals: afebrile,  /76 --> 208/95, HR 65-80, 99 on RA   Labs s/f: Na 124,    EKG: NSR with 1st degree AV block   Imaging: Ct head: no acute infarct or hemorrhage, Ct neck with possible nondisplaced type 2 fracture   Interventions: tylenol, tramadol     Patient admitted for cervical fracture and fall.  (01 Sep 2022 06:03)      REVIEW OF SYSTEMS:  Constitutional: No fever, weight loss, chills or fatigue  Eyes: No eye pain, visual disturbances, or discharge  ENMT:  No difficulty hearing, tinnitus, vertigo; No sinus or throat pain. No epistaxis, dysphagia, dysphonia, hoarseness or odynophagia  Neck: No pain, stiffness or neck swelling.  No masses or deformities  Respiratory: No cough, wheezing, hemoptysis  - COPD  - ILD   - PE   - ASTHMA     - PNEUMONIA  Cardiovascular: No chest pain, dysrhythmia, palpitations, dizziness or edema - CAD   - CHF   - HTN  Gastrointestinal: No abdominal or epigastric pain. No nausea, vomiting or hematemesis; No diarrhea or constipation. No melena or hematochezia, Icterus.          Genitourinary: No dysuria, frequency, hematuria or incontinence   - CKD/KAYLYN      - ESRD  Neurological: No headaches, memory loss, loss of strength, numbness or tremors      -DEMENTIA     - STROKE    - SEIZURE  Skin: No itching, burning, rashes or lesions   Lymph Nodes: No enlarged glands  Endocrine: No heat or cold intolerance; No hair loss       - DM     - THYROID DISORDER  Musculoskeletal: No joint pain or swelling; No muscle, back or extremity pain, No edema  Psychiatric: No depression, anxiety, mood swings or difficulty sleeping  Heme/Lymph: No easy bruising or bleeding gums         - ANEMIA      - CANCER   -COAGULOPATHY  Allergy and Immunologic: No hives or eczema    PAST MEDICAL & SURGICAL HISTORY:  Hyperlipidemia, unspecified hyperlipidemia type      Diabetes insipidus      H/O aortic valve stenosis      Hypertension      Vertigo      Chronic diastolic congestive heart failure      Dyslipidemia      History of pseudoaneurysm      S/P AVR  Bioprosthetic      H/O lumbosacral spine surgery      S/P hip replacement  B/L      S/P right coronary artery (RCA) stent placement        FAMILY HISTORY:  FH: lung cancer (Father)      SOCIAL HISTORY:      - Tobacco     - ETOH    Allergies    erythromycin (Unknown)  penicillin (Unknown)    Intolerances      Vital Signs Last 24 Hrs  T(C): 36.6 (01 Sep 2022 12:25), Max: 37 (31 Aug 2022 13:55)  T(F): 97.9 (01 Sep 2022 12:25), Max: 98.6 (31 Aug 2022 13:55)  HR: 65 (01 Sep 2022 12:25) (65 - 88)  BP: 121/75 (01 Sep 2022 12:25) (121/75 - 213/96)  BP(mean): --  RR: 18 (01 Sep 2022 12:25) (18 - 20)  SpO2: 96% (01 Sep 2022 12:25) (96% - 99%)    Parameters below as of 01 Sep 2022 12:25  Patient On (Oxygen Delivery Method): room air            MEDICATIONS:  MEDICATIONS  (STANDING):  dextrose 5%. 1000 milliLiter(s) (100 mL/Hr) IV Continuous <Continuous>  dextrose 5%. 1000 milliLiter(s) (50 mL/Hr) IV Continuous <Continuous>  dextrose 50% Injectable 25 Gram(s) IV Push once  dextrose 50% Injectable 12.5 Gram(s) IV Push once  dextrose 50% Injectable 25 Gram(s) IV Push once  enoxaparin Injectable 30 milliGRAM(s) SubCutaneous every 24 hours  gabapentin 300 milliGRAM(s) Oral every 12 hours  glucagon  Injectable 1 milliGRAM(s) IntraMuscular once  influenza  Vaccine (HIGH DOSE) 0.7 milliLiter(s) IntraMuscular once  insulin lispro (ADMELOG) corrective regimen sliding scale   SubCutaneous Before meals and at bedtime    MEDICATIONS  (PRN):  acetaminophen     Tablet .. 650 milliGRAM(s) Oral every 6 hours PRN Temp greater or equal to 38C (100.4F), Mild Pain (1 - 3)  dextrose Oral Gel 15 Gram(s) Oral once PRN Blood Glucose LESS THAN 70 milliGRAM(s)/deciliter      PHYSICAL EXAM:  Un Comfortable, no distress  Eyes: PERRL, EOM intact; conjunctiva and sclera clear  Head: Normocephalic; wearing neck immobilizer   ENMT: No nasal discharge, hoarseness, cough or hemoptysis  Neck: Supple; non tender; no masses or deformities.    No JVD  Respiratory: CTA,  - WHEEZING   - RHONCHI  - RALES  - CRACKLES.  Diminished breath sounds  BILATERAL  RIGHT  LEFT  Cardiovascular: Regular rate and rhythm. S1 and S2 Normal; + AS 2+ murmurs, gallops or rubs     - PPM/AICD  Gastrointestinal: Soft non-tender, non-distended; Normal bowel sounds; No hepatosplenomegaly.     -PEG    -  GT   - VANN  Genitourinary: No costovertebral angle tenderness. No dysuria  Extremities: AROM, No clubbing, cyanosis or edema    Vascular: Peripheral pulses palpable 2+ bilaterally  Neurological: Alert and responisve to stimuli   Skin: Warm and dry. No obvious rash  Lymph Nodes: No acute cervical or supraclavicular adenopathy  Psychiatric: Cooperative and appropriate mood    DEVICES:  - DENTURES   +IV R / L     - ETUBE   -TRACH   -CTUBE  R / L    LABS:                          11.4   10.48 )-----------( 287      ( 01 Sep 2022 07:54 )             34.2     09-01    130<L>  |  92<L>  |  15  ----------------------------<  94  4.4   |  29  |  0.74    Ca    9.2      01 Sep 2022 07:54  Mg     2.2     09-01    TPro  8.0  /  Alb  4.1  /  TBili  0.5  /  DBili  x   /  AST  38<H>  /  ALT  29  /  AlkPhos  103  08-31    PT/INR - ( 31 Aug 2022 15:11 )   PT: 10.9 sec;   INR: 0.94          PTT - ( 31 Aug 2022 15:11 )  PTT:33.0 sec  RADIOLOGY & ADDITIONAL STUDIES (The following images were personally reviewed):< from: CT Angio Chest PE Protocol w/ IV Cont (11.08.21 @ 02:40) >  EXAM:  CT ANGIO CHEST PULM [facility]                           PROCEDURE DATE:  11/08/2021          INTERPRETATION:  Attending over read. Agree with the below report with following modifications. No visualized PE. Status post TAVR. Dense mitral valve annular calcification. Cardiomegaly. Left ventricular hypertrophy. Evidence of pulmonary arterial hypertension. Mild ectasia of the ascending aorta measuring up to 4.1 cm. 5 mm solid nodule left upper lobe image 82 series 3. Unchanged as compared to CT heart 6/21/2021. 10 mm calcified nodule in the lingula likely granuloma. Small hiatal hernia. Stomach is collapsed. Cannot rule out gastric wall thickening.  Multilevel thoracic vertebral degenerative disc disease. Block vertebra T1 and T2. Marked degenerative disc disease at T2-T3. Slight anterolisthesis of T2 on T3. Accentuated thoracic kyphosis. Decreased bony mineralization. T11 vertebral body hemangioma. Moderate OA bilateral glenohumeral joints. Multiple old left rib fractures.        PROCEDURE INFORMATION:  Exam: CTA Chest With Contrast  Exam date and time: 11/8/2021 2:26 AM  Age: 89 years old  Clinical indication: Other: Chest pain  TECHNIQUE:  Imaging protocol: Computed tomographic angiography of the chest with contrast.  3D rendering (Not supervised by radiologist): MIP and/or 3D reconstructed images were created  by the technologist.  COMPARISON:  MR ANGIO CHEST NOT CARDIAC WITH IV CONTRAST 6/11/2021 3:41 PM    FINDINGS:  Pulmonary arteries: Mild dilatation of the main pulmonary artery to a diameter of 3.21 cm, which  may be cardiogenic in etiology, or may reflect the presence of pulmonary hypertension. No pulmonary  embolism.  Aorta: Ascending thoracic aortic aneurysm with diameter of 4.41 x 4.16 cm.  Lungs: Mild peribronchial thickening. Hyperexpanded lungs with flattening of the hemidiaphragms  and increased AP diameter chest, and air trapping. No parenchymal infiltrate. Densely calcified  granuloma within the anterolateral left lung base measuring 9.3 mm.  Pleural spaces: No pleural effusion.  Heart: Multichamber cardiomegaly without pericardial effusion. Coronary vascular calcifications.  Intracardiac valvular stent.  Lymph nodes: Unremarkable. No enlarged lymph nodes.  Diaphragm: Hiatal hernia measuring 3 cm.  Pancreas: Mild fatty infiltration of the pancreatic tail.  Stomach and bowel: Gastric wall thickening 2.3 cm.  Bones/joints: Arthritis in the shoulders. Arthritis in the thoracic spine.  Soft tissues: Unremarkable.    IMPRESSION:  1. Mild dilatation of the main pulmonary artery which may be cardiogenic in etiology, or may reflect  the presence of pulmonary hypertension.  2. No pulmonary embolism.  3. Multichamber cardiomegaly without pericardial effusion.  4. Inter cardiac valvular stent is noted.  5. Extensive coronary vascular calcifications.  6. Aneurysm of the ascending thoracic aorta and proximal aortic arch with diameter of up to 4.41 cm  without dissection.  7. Extensive arthritis of the thoracic spine without compression fracture.  8. Hiatal hernia.  9. Gastric wall thickening up to 2.3 cm, suggest gastritis.    < end of copied text >

## 2022-09-01 NOTE — H&P ADULT - PROBLEM SELECTOR PLAN 5
- continue with lipitor 40 daily On home med lipitor 40 qhs and ASA 81 qd.    Plan:  - continue with lipitor 40 daily

## 2022-09-01 NOTE — DISCHARGE NOTE PROVIDER - NSDCMRMEDTOKEN_GEN_ALL_CORE_FT
acetaminophen 325 mg oral tablet: 2 tab(s) orally once (at bedtime), As Needed - Mild Pain (1 - 3) - 3)  aspirin 81 mg oral tablet, chewable: 1 tab(s) orally once a day  atorvastatin 40 mg oral tablet: 1 tab(s) orally once a day (at bedtime)  desmopressin 0.1 mg oral tablet: 1.5 tab(s) orally once a day (at bedtime)  fluticasone 50 mcg/inh nasal spray: 2 spray(s) nasal once a day (at bedtime)  gabapentin 300 mg oral capsule: 1 cap(s) orally 2 times a day  K-Tab 10 mEq oral tablet, extended release: 1 tab(s) orally every other day   please take when taking lasix  Lasix 20 mg oral tablet: 1 tab(s) orally every other day   latanoprost 0.005% ophthalmic solution: 1 drop(s) to each affected eye once a day (at bedtime)  Vitamin D3 125 mcg (5000 intl units) oral capsule: 1 cap(s) orally once a day  Zinc 140 mg (as elemental zinc 50 mg) oral tablet: 1 tab(s) orally once a day

## 2022-09-01 NOTE — DISCHARGE NOTE PROVIDER - HOSPITAL COURSE
#Discharge: do not delete    COLLETTE OROZCO is a 89y Female with a past medical history of _____    Presented with _____, found to have _____    Problem List/Main Diagnoses (system-based):   #     #     #    Inpatient treatment course:     New medications:   Labs to be followed outpatient:   Exam to be followed outpatient:       LABS & STUDIES:  COVID-19 PCR: Negative (20 Jul 2022 09:42)  COVID-19 PCR: NotDetec (18 Mar 2022 17:25)   #Discharge: do not delete    COLLETTE OROZCO is a 89y Female with a past medical history of HTN, HLD, CHF, DM, and aortic valve stenosis who presents to ED after a fall on 10/31, found to have a fracture in the cervical spine.   Her past medical history also includes chronic hyponatremia (2/2 DI), and she was being treated for peripheral neuropathy manifesting as diffuse ithcing, and was recently started on Carbamazepine 2 days prior to admission. Reports feeling dizzy before falling but without LOC.     Problem List/Main Diagnoses (system-based):   #     #     #    Inpatient treatment course:     New medications:   Labs to be followed outpatient:   Exam to be followed outpatient:       LABS & STUDIES:  COVID-19 PCR: Negative (20 Jul 2022 09:42)  COVID-19 PCR: NotDetec (18 Mar 2022 17:25)   #Discharge: do not delete    COLLETTE OROZCO is a 89y Female with a past medical history of HTN, HLD, CHF, DM, and aortic valve stenosis who presents to ED after a fall on 10/31, found to have a fracture in the cervical spine.   Her past medical history also includes chronic hyponatremia (2/2 DI), and she was being treated for peripheral neuropathy manifesting as diffuse ithcing, and was recently started on Carbamazepine 2 days prior to admission. Reports feeling dizzy before falling but without LOC.     Problem List/Main Diagnoses (system-based):   89 yo F with PMHx of Diabetes Insipidus, HTN, HLD, CHF, and aortic valve stenosis presenting to the ED after a fall today while at home. Likely 2/2 hyponatremia v. drug adverse effect    #Fall.   May be mechanical v. orthostatic v. 2/2 hyponatremia. Also possibly secondary to recently starting carbamazepine. Orthostatics done on arrival to the hospital floor were negative. We discontinued carbamazepine during this hospitalization. Per PT daniel, recommended home with outpatient PT on discharge.  - STOP carbamazepine  - f/u with PCP  - continue with outpatient PT    #Cervical spine fracture.   CT neck: Questionable lucency at the base of the odontoid process which may represent nondisplaced type II fracture. Neurosurgery consulted, pt in cervical collar and collar will remain in place. No plan for surgical intervention at this time.   - f/u outpatient with Dr. Puckett, plan to get repeat imaging in 1 month    #Diabetes insipidus.  Pt on home desmopressin 0.1mg 1.5 tabs qhs.  - continue desmopressin as prescribed    #Hyponatremia.   Na 124 on admission, improved to 130 after IVF 1L bolus of NS. Likely 2/2 hypovolemic hyponatremia v. desmopressin. ____ on day of discharge.  - monitor BMP outpatient with PCP    #HTN (hypertension).   Pt hypertensive in the ED, but down to 121/75 in the hospital.   - c/w lasix every other day.    #HLD (hyperlipidemia).   On home med Lipitor 40 qhs and ASA 81 qd.  - continue with Lipitor 40 daily.    #Chronic CHF.   Unknown EF. Pt on Lasix 20mg every other day.  - c/w Lasix 20 every other day.    #Peripheral neuropathy.   Per collateral from neurologist, patient was started 2 days ago on carbamazepine 200mg BID for pruritis and peripheral neuropathy symptoms. Reports patient underwent pan-scan which was negative for malignancy. Patient also taking gabapentin 300mg BID. During this hospitalization, we discontinued carbamazepine, but patient can continue with gabapentin as prescribed.  - f/u outpatient with PCP and neurologist    Patient will be discharged to: home    New medications: none  Change in medications: none  Discontinue medications: STOP carbamazepine   Labs to be followed outpatient: f/u electrolytes, f/u scan with neurosurgery    Exam on discharge:   #Discharge: do not delete    COLLETTE OROZCO is a 89y Female with a past medical history of HTN, HLD, CHF, Diabetes Insipidus, and aortic valve stenosis who presents to ED after a fall on 10/31, found to have a fracture in the cervical spine. Her past medical history also includes chronic hyponatremia (2/2 DI), and she was being treated for peripheral neuropathy manifesting as diffuse ithcing, and was recently started on Carbamazepine 2 days prior to admission. Reports feeling dizzy before falling but without LOC.     Problem List/Main Diagnoses (system-based):     #Fall.   May be mechanical v. orthostatic v. 2/2 hyponatremia. Also possibly secondary to recently starting carbamazepine. Orthostatics done on arrival to the hospital floor were negative. We discontinued carbamazepine during this hospitalization. Per PT daniel, recommended home with outpatient PT on discharge.  - STOP carbamazepine  - f/u with PCP  - continue with outpatient PT    #Cervical spine fracture.   CT neck: Questionable lucency at the base of the odontoid process which may represent nondisplaced type II fracture. Neurosurgery consulted, pt in cervical collar and collar will remain in place. No plan for surgical intervention at this time.   - f/u outpatient with Dr. Puckett, plan to get repeat imaging in 1 month    #Diabetes insipidus.  Pt on home desmopressin 0.1mg 1.5 tabs qhs.  - continue desmopressin as prescribed    #Hyponatremia.   Na 124 on admission, improved to 130 after IVF 1L bolus of NS. Likely 2/2 hypovolemic hyponatremia v. desmopressin. ____ on day of discharge.  - monitor BMP outpatient with PCP    #HTN (hypertension).   Pt hypertensive in the ED, but down to 121/75 in the hospital.   - c/w lasix every other day.    #HLD (hyperlipidemia).   On home med Lipitor 40 qhs and ASA 81 qd.  - continue with Lipitor 40 daily.    #Chronic CHF.   Unknown EF. Pt on Lasix 20mg every other day.  - c/w Lasix 20 every other day.    #Peripheral neuropathy.   Per collateral from neurologist, patient was started 2 days ago on carbamazepine 200mg BID for pruritis and peripheral neuropathy symptoms. Reports patient underwent pan-scan which was negative for malignancy. Patient also taking gabapentin 300mg BID. During this hospitalization, we discontinued carbamazepine, but patient can continue with gabapentin as prescribed.  - f/u outpatient with PCP and neurologist    Patient will be discharged to: home    New medications: none  Change in medications: none  Discontinue medications: STOP carbamazepine   Labs to be followed outpatient: f/u electrolytes, f/u scan with neurosurgery    Physical exam on discharge:  Constitutional: NAD   HEENT: NC/AT, PERRL, EOMI, clear conjunctiva, MMM  Neck: C-spine collar in place  Respiratory: CTA b/l, no W/R/R  Cardiac: systolic murmur RUSB, RRR, +S1/S2  Gastrointestinal: soft, NT/ND; no rebound or guarding; +BSx4  Extremities: WWP, no clubbing or cyanosis; no peripheral edema  Vascular: peripheral pulses present   Dermatologic: skin warm, dry and intact; no rashes, wounds, or scars  Neurologic: AAOx3; CNII-XII grossly intact; no focal deficits; intact strength B/L upper & lower extremities #Discharge: do not delete    COLLETTE OROZCO is a 89y Female with a past medical history of HTN, HLD, CHF, Diabetes Insipidus, and aortic valve stenosis who presents to ED after a fall on 10/31, found to have a fracture in the cervical spine. Her past medical history also includes chronic hyponatremia (2/2 DI), and she was being treated for peripheral neuropathy manifesting as diffuse ithcing, and was recently started on Carbamazepine 2 days prior to admission. Reports feeling dizzy before falling but without LOC.     Problem List/Main Diagnoses (system-based):     #Fall.   May be mechanical v. orthostatic v. 2/2 hyponatremia. Also possibly secondary to recently starting carbamazepine. Orthostatics done on arrival to the hospital floor were negative. We discontinued carbamazepine during this hospitalization. Per PT daniel, recommended home with outpatient PT on discharge.  - STOP carbamazepine  - f/u with PCP  - continue with outpatient PT    #Cervical spine fracture.   CT neck: Questionable lucency at the base of the odontoid process which may represent nondisplaced type II fracture. Neurosurgery consulted, pt in cervical collar and collar will remain in place. No plan for surgical intervention at this time.   - f/u outpatient with Dr. Puckett, plan to get repeat imaging in 1 month    #Diabetes insipidus.  Pt on home desmopressin 0.1mg 1.5 tabs qhs.  - continue desmopressin as prescribed    #Hyponatremia.   Na 124 on admission, improved to 130 after IVF 1L bolus of NS. Likely 2/2 hypovolemic hyponatremia v. desmopressin. 134 on day of discharge.  - monitor BMP outpatient with PCP    #HTN (hypertension).   Pt hypertensive in the ED, but down to 121/75 in the hospital.   - c/w lasix every other day.    #HLD (hyperlipidemia).   On home med Lipitor 40 qhs and ASA 81 qd.  - continue with Lipitor 40 daily.    #Chronic CHF.   Unknown EF. Pt on Lasix 20mg every other day.  - c/w Lasix 20 every other day.    #Peripheral neuropathy.   Per collateral from neurologist, patient was started 2 days ago on carbamazepine 200mg BID for pruritis and peripheral neuropathy symptoms. Reports patient underwent pan-scan which was negative for malignancy. Patient also taking gabapentin 300mg BID. During this hospitalization, we discontinued carbamazepine, but patient can continue with gabapentin as prescribed.  - f/u outpatient with PCP and neurologist    Patient will be discharged to: home    New medications: none  Change in medications: none  Discontinue medications: STOP carbamazepine   Labs to be followed outpatient: f/u electrolytes, f/u scan with neurosurgery    Physical exam on discharge:  Constitutional: NAD   HEENT: NC/AT, PERRL, EOMI, clear conjunctiva, MMM  Neck: C-spine collar in place  Respiratory: CTA b/l, no W/R/R  Cardiac: systolic murmur RUSB, RRR, +S1/S2  Gastrointestinal: soft, NT/ND; no rebound or guarding; +BSx4  Extremities: WWP, no clubbing or cyanosis; no peripheral edema  Vascular: peripheral pulses present   Dermatologic: skin warm, dry and intact; no rashes, wounds, or scars  Neurologic: AAOx3; CNII-XII grossly intact; no focal deficits; intact strength B/L upper & lower extremities

## 2022-09-01 NOTE — H&P ADULT - PROBLEM SELECTOR PLAN 7
- continue with ISS Per collateral from neurologist, patient was started 2 days ago on carbamazepine 200mg BID for pruritis and peripheral neuropathy symptoms. Reports patient underwent pan-scan which was negative for malignancy. Patient also taking gabapentin 300mg BID.    Plan:  - d/c carbamazepine  - c/w gabapentin BID  - outpatient f/u

## 2022-09-02 ENCOUNTER — TRANSCRIPTION ENCOUNTER (OUTPATIENT)
Age: 87
End: 2022-09-02

## 2022-09-02 VITALS
RESPIRATION RATE: 19 BRPM | DIASTOLIC BLOOD PRESSURE: 76 MMHG | HEART RATE: 78 BPM | TEMPERATURE: 99 F | OXYGEN SATURATION: 97 % | SYSTOLIC BLOOD PRESSURE: 159 MMHG

## 2022-09-02 DIAGNOSIS — E23.2 DIABETES INSIPIDUS: ICD-10-CM

## 2022-09-02 LAB
A1C WITH ESTIMATED AVERAGE GLUCOSE RESULT: 5.6 % — SIGNIFICANT CHANGE UP (ref 4–5.6)
ANION GAP SERPL CALC-SCNC: 12 MMOL/L — SIGNIFICANT CHANGE UP (ref 5–17)
BUN SERPL-MCNC: 17 MG/DL — SIGNIFICANT CHANGE UP (ref 7–23)
CALCIUM SERPL-MCNC: 9.4 MG/DL — SIGNIFICANT CHANGE UP (ref 8.4–10.5)
CHLORIDE SERPL-SCNC: 95 MMOL/L — LOW (ref 96–108)
CO2 SERPL-SCNC: 27 MMOL/L — SIGNIFICANT CHANGE UP (ref 22–31)
CREAT SERPL-MCNC: 0.87 MG/DL — SIGNIFICANT CHANGE UP (ref 0.5–1.3)
EGFR: 62 ML/MIN/1.73M2 — SIGNIFICANT CHANGE UP
ESTIMATED AVERAGE GLUCOSE: 114 MG/DL — SIGNIFICANT CHANGE UP (ref 68–114)
GLUCOSE BLDC GLUCOMTR-MCNC: 91 MG/DL — SIGNIFICANT CHANGE UP (ref 70–99)
GLUCOSE SERPL-MCNC: 128 MG/DL — HIGH (ref 70–99)
HCT VFR BLD CALC: 38.9 % — SIGNIFICANT CHANGE UP (ref 34.5–45)
HGB BLD-MCNC: 12.5 G/DL — SIGNIFICANT CHANGE UP (ref 11.5–15.5)
MAGNESIUM SERPL-MCNC: 2.3 MG/DL — SIGNIFICANT CHANGE UP (ref 1.6–2.6)
MCHC RBC-ENTMCNC: 31 PG — SIGNIFICANT CHANGE UP (ref 27–34)
MCHC RBC-ENTMCNC: 32.1 GM/DL — SIGNIFICANT CHANGE UP (ref 32–36)
MCV RBC AUTO: 96.5 FL — SIGNIFICANT CHANGE UP (ref 80–100)
NRBC # BLD: 0 /100 WBCS — SIGNIFICANT CHANGE UP (ref 0–0)
PHOSPHATE SERPL-MCNC: 3.9 MG/DL — SIGNIFICANT CHANGE UP (ref 2.5–4.5)
PLATELET # BLD AUTO: 303 K/UL — SIGNIFICANT CHANGE UP (ref 150–400)
POTASSIUM SERPL-MCNC: 4.2 MMOL/L — SIGNIFICANT CHANGE UP (ref 3.5–5.3)
POTASSIUM SERPL-SCNC: 4.2 MMOL/L — SIGNIFICANT CHANGE UP (ref 3.5–5.3)
RBC # BLD: 4.03 M/UL — SIGNIFICANT CHANGE UP (ref 3.8–5.2)
RBC # FLD: 14.1 % — SIGNIFICANT CHANGE UP (ref 10.3–14.5)
SODIUM SERPL-SCNC: 134 MMOL/L — LOW (ref 135–145)
WBC # BLD: 9.75 K/UL — SIGNIFICANT CHANGE UP (ref 3.8–10.5)
WBC # FLD AUTO: 9.75 K/UL — SIGNIFICANT CHANGE UP (ref 3.8–10.5)

## 2022-09-02 RX ADMIN — GABAPENTIN 300 MILLIGRAM(S): 400 CAPSULE ORAL at 11:17

## 2022-09-02 RX ADMIN — Medication 5000 UNIT(S): at 07:09

## 2022-09-02 RX ADMIN — Medication 650 MILLIGRAM(S): at 07:09

## 2022-09-02 RX ADMIN — Medication 650 MILLIGRAM(S): at 00:01

## 2022-09-02 RX ADMIN — Medication 81 MILLIGRAM(S): at 07:09

## 2022-09-02 RX ADMIN — ZINC SULFATE TAB 220 MG (50 MG ZINC EQUIVALENT) 220 MILLIGRAM(S): 220 (50 ZN) TAB at 07:08

## 2022-09-02 RX ADMIN — Medication 650 MILLIGRAM(S): at 11:14

## 2022-09-02 RX ADMIN — Medication 650 MILLIGRAM(S): at 01:00

## 2022-09-02 RX ADMIN — Medication 20 MILLIGRAM(S): at 07:08

## 2022-09-02 RX ADMIN — Medication 650 MILLIGRAM(S): at 08:23

## 2022-09-02 NOTE — PROGRESS NOTE ADULT - PROBLEM SELECTOR PLAN 3
Na 124 on admission, improved to 130 after IVF. Likely 2/2 hypovolemic hyponatremia v. desmopressin.    Plan:  - s/p 1 L bolus NS  - start home desmopressin  - monitor BMP

## 2022-09-02 NOTE — PROGRESS NOTE ADULT - SUBJECTIVE AND OBJECTIVE BOX
Interventional, Pulmonary, Critical, Chest Special Procedures.    Pt was seen and fully examined by myself.     Time spent with patient in minutes: 37    Patient is a 89y old  Female who presents with a chief complaint of fall (02 Sep 2022 10:15) The patient c improved hypo Na, no new symptoms, eupneic on RA    HPI:  89 yo F with PMHx of HTN, HLD, CHF, and aortic valve stenosis presenting to the ED after a fall today while at home. She denies LOC, but did hit her head. She states she was feeling lightheaded while walking to bathroom and she fell backwards and hit her head on furniture, she remembers fall. She reports pain at the back of her head. Denies HA. Denies chest pain, palpitations, or dyspnea prior to falling. She denies frequent falls or hx of dizziness while standing. She lives with 24hr HHA, lives alone. She usually walks with walker. She denies fever, chills, dysuria, abd pain, N/V/D. She reports adequate PO intake and food intake.       ED Course:   Vitals: afebrile,  /76 --> 208/95, HR 65-80, 99 on RA   Labs s/f: Na 124,    EKG: NSR with 1st degree AV block   Imaging: Ct head: no acute infarct or hemorrhage, Ct neck with possible nondisplaced type 2 fracture   Interventions: tylenol, tramadol     Patient admitted for cervical fracture and fall.  (01 Sep 2022 06:03)      Constitutional: No fever, weight loss, chills or fatigue  Eyes: No eye pain, visual disturbances, or discharge  ENMT:  No difficulty hearing, tinnitus, vertigo; No sinus or throat pain. No epistaxis, dysphagia, dysphonia, hoarseness or odynophagia  Neck: No pain, stiffness or neck swelling.  No masses or deformities  Respiratory: No cough, wheezing, hemoptysis  - COPD  - ILD   - PE   - ASTHMA     - PNEUMONIA  Cardiovascular: No chest pain, dysrhythmia, palpitations, dizziness or edema - CAD   - CHF   - HTN  Gastrointestinal: No abdominal or epigastric pain. No nausea, vomiting or hematemesis; No diarrhea or constipation. No melena or hematochezia, Icterus.          Genitourinary: No dysuria, frequency, hematuria or incontinence   - CKD/KAYLYN      - ESRD  Neurological: No headaches, memory loss, loss of strength, numbness or tremors      -DEMENTIA     - STROKE    - SEIZURE  Skin: No itching, burning, rashes or lesions   Lymph Nodes: No enlarged glands  Endocrine: No heat or cold intolerance; No hair loss       - DM     - THYROID DISORDER  Musculoskeletal: No joint pain or swelling; No muscle, back or extremity pain, No edema  Psychiatric: No depression, anxiety, mood swings or difficulty sleeping  Heme/Lymph: No easy bruising or bleeding gums         - ANEMIA      - CANCER   -COAGULOPATHY  Allergy and Immunologic: No hives or eczema      PAST MEDICAL & SURGICAL HISTORY:  Hyperlipidemia, unspecified hyperlipidemia type      Diabetes insipidus      H/O aortic valve stenosis      Hypertension      Vertigo      Chronic diastolic congestive heart failure      Dyslipidemia      History of pseudoaneurysm      S/P AVR  Bioprosthetic      H/O lumbosacral spine surgery      S/P hip replacement  B/L      S/P right coronary artery (RCA) stent placement        FAMILY HISTORY:  FH: lung cancer (Father)      SOCIAL HISTORY:      - Tobacco     - ETOH    Allergies    erythromycin (Unknown)  penicillin (Unknown)    Intolerances      Vital Signs Last 24 Hrs  T(C): 37.1 (02 Sep 2022 06:31), Max: 37.3 (01 Sep 2022 21:55)  T(F): 98.7 (02 Sep 2022 06:31), Max: 99.1 (01 Sep 2022 21:55)  HR: 78 (02 Sep 2022 06:31) (74 - 78)  BP: 159/76 (02 Sep 2022 06:31) (159/76 - 162/98)  BP(mean): --  RR: 19 (02 Sep 2022 06:31) (18 - 19)  SpO2: 97% (02 Sep 2022 06:31) (97% - 97%)    Parameters below as of 02 Sep 2022 06:31  Patient On (Oxygen Delivery Method): room air            MEDICATIONS:  MEDICATIONS  (STANDING):  aspirin  chewable 81 milliGRAM(s) Oral every 24 hours  atorvastatin 40 milliGRAM(s) Oral every 24 hours  cholecalciferol 5000 Unit(s) Oral every 24 hours  desmopressin 0.15 milliGRAM(s) Oral at bedtime  dextrose 5%. 1000 milliLiter(s) (100 mL/Hr) IV Continuous <Continuous>  dextrose 5%. 1000 milliLiter(s) (50 mL/Hr) IV Continuous <Continuous>  dextrose 50% Injectable 25 Gram(s) IV Push once  dextrose 50% Injectable 12.5 Gram(s) IV Push once  dextrose 50% Injectable 25 Gram(s) IV Push once  enoxaparin Injectable 30 milliGRAM(s) SubCutaneous every 24 hours  fluticasone propionate 50 MICROgram(s)/spray Nasal Spray 1 Spray(s) Both Nostrils <User Schedule>  furosemide    Tablet 20 milliGRAM(s) Oral <User Schedule>  gabapentin 300 milliGRAM(s) Oral two times a day  glucagon  Injectable 1 milliGRAM(s) IntraMuscular once  influenza  Vaccine (HIGH DOSE) 0.7 milliLiter(s) IntraMuscular once  insulin lispro (ADMELOG) corrective regimen sliding scale   SubCutaneous Before meals and at bedtime  zinc sulfate 220 milliGRAM(s) Oral every 24 hours    MEDICATIONS  (PRN):  acetaminophen     Tablet .. 650 milliGRAM(s) Oral every 6 hours PRN Temp greater or equal to 38C (100.4F), Mild Pain (1 - 3)  dextrose Oral Gel 15 Gram(s) Oral once PRN Blood Glucose LESS THAN 70 milliGRAM(s)/deciliter      PHYSICAL EXAM:  More  Comfortable, no distress  Eyes: PERRL, EOM intact; conjunctiva and sclera clear  Head: Normocephalic; wearing neck immobilizer   ENMT: No nasal discharge, hoarseness, cough or hemoptysis  Neck: Supple; non tender; no masses or deformities.    No JVD  Respiratory: CTA,  - WHEEZING   - RHONCHI  - RALES  - CRACKLES.  Diminished breath sounds  BILATERAL  RIGHT  LEFT  Cardiovascular: Regular rate and rhythm. S1 and S2 Normal; + AS 2+ murmurs, gallops or rubs     - PPM/AICD  Gastrointestinal: Soft non-tender, non-distended; Normal bowel sounds; No hepatosplenomegaly.     -PEG    -  GT   - VANN  Genitourinary: No costovertebral angle tenderness. No dysuria  Extremities: AROM, No clubbing, cyanosis or edema    Vascular: Peripheral pulses palpable 2+ bilaterally  Neurological: Alert and responisve to stimuli   Skin: Warm and dry. No obvious rash  Lymph Nodes: No acute cervical or supraclavicular adenopathy  Psychiatric: Cooperative and appropriate mood    DEVICES:  - DENTURES   +IV R / L     - ETUBE   -TRACH   -CTUBE  R / L    LABS:                          12.5   9.75  )-----------( 303      ( 02 Sep 2022 11:05 )             38.9     09-02    134<L>  |  95<L>  |  17  ----------------------------<  128<H>  4.2   |  27  |  0.87    Ca    9.4      02 Sep 2022 11:05  Phos  3.9     09-02  Mg     2.3     09-02        RADIOLOGY & ADDITIONAL STUDIES (The following images were personally reviewed):

## 2022-09-02 NOTE — PROGRESS NOTE ADULT - PROBLEM SELECTOR PLAN 6
Unknown EF. Pt on Lasix 20mg every other day.    Plan:  - c/w lasix 20 every other day On home med lipitor 40 qhs and ASA 81 qd.    Plan:  - continue with lipitor 40 daily

## 2022-09-02 NOTE — PROGRESS NOTE ADULT - PROBLEM SELECTOR PLAN 2
Ct neck: Questionable lucency at the base of the odontoid process which may represent nondisplaced type II fracture.    Plan:  - pain control  - neurosurgery consulted, pt in cervical collar   - per spine, no plan for surgical intervention  - fit pt for custom c-spine collar

## 2022-09-02 NOTE — PROGRESS NOTE ADULT - ASSESSMENT
ASSESSMENT/PLAN89 yo F with PMHx of HTN, HLD, CHF, and aortic valve stenosis presenting to the ED after a fall today while at home. Likely 2/2 hyponatremia v. drug adverse effect. L Lung nodules likely granuloma    1. O2 attempt off   2. Bronchodilators:  Atrovent/ albuterol q 4 – 6 hours as needed  3. Corticosteroids: off   4. ID/Antibiotics: off   5. Cardiac/HTN: optimize BP   6. GI: Rx/ prophylaxis c PPI/H2B  7. Heme: Rx/VT prophylaxis c SQH/SCD/AC  8. Aspiration precautions  Discussed with managing team,   O/P follow up c  secured and discussed. 
ASSESSMENT/PLAN89 yo F with PMHx of HTN, HLD, CHF, and aortic valve stenosis presenting to the ED after a fall today while at home. Likely 2/2 hyponatremia v. drug adverse effect. L Lung nodules likely granuloma    1. O2 attempt off   2. Bronchodilators:  Atrovent/ albuterol q 4 – 6 hours as needed  3. Corticosteroids: off   4. ID/Antibiotics: off   5. Cardiac/HTN: optimize BP   6. GI: Rx/ prophylaxis c PPI/H2B  7. Heme: Rx/VT prophylaxis c SQH/SCD/AC  8. Aspiration precautions  Discussed with managing team, discussed c pts son, .  O/P follow up c  secured and discussed.       
89 yo F with PMHx of HTN, HLD, CHF, and aortic valve stenosis presenting to the ED after a fall today while at home. Likely 2/2 hyponatremia v. drug adverse effect

## 2022-09-02 NOTE — PROGRESS NOTE ADULT - PROBLEM SELECTOR PLAN 7
Per collateral from neurologist, patient was started 2 days ago on carbamazepine 200mg BID for pruritis and peripheral neuropathy symptoms. Reports patient underwent pan-scan which was negative for malignancy. Patient also taking gabapentin 300mg BID.    Plan:  - d/c carbamazepine  - c/w gabapentin BID  - outpatient f/u Unknown EF. Pt on Lasix 20mg every other day.    Plan:  - c/w lasix 20 every other day

## 2022-09-02 NOTE — PROGRESS NOTE ADULT - PROBLEM SELECTOR PLAN 8
F: s/p 1 L bolus   E: replete as needed  N: DASH diet  DVT ppx: lovenox  Dispo: RMF    Plan discussed in detail with attending Dr. Mcpherson. Per collateral from neurologist, patient was started 2 days ago on carbamazepine 200mg BID for pruritis and peripheral neuropathy symptoms. Reports patient underwent pan-scan which was negative for malignancy. Patient also taking gabapentin 300mg BID.    Plan:  - d/c carbamazepine  - c/w gabapentin BID  - outpatient f/u

## 2022-09-02 NOTE — DISCHARGE NOTE NURSING/CASE MANAGEMENT/SOCIAL WORK - NSDCPETBCESMAN_GEN_ALL_CORE
You can access the FollowMyHealth Patient Portal offered by [facility] by registering at the following website: [email or web address]. By joining [facility]’s FollowMyHealth portal, you will also be able to view your health information using other applications (apps) compatible with our system.
If you are a smoker, it is important for your health to stop smoking. Please be aware that second hand smoke is also harmful.

## 2022-09-02 NOTE — DISCHARGE NOTE NURSING/CASE MANAGEMENT/SOCIAL WORK - NSDCVIVACCINE_GEN_ALL_CORE_FT
Tdap; 13-Feb-2018 19:16; Lit Romero); [employer]; [id number]; IntraMuscular; Deltoid Right.; 0.5 milliLiter(s); VIS (VIS Published: 09-May-2013, VIS Presented: 13-Feb-2018);

## 2022-09-02 NOTE — PROGRESS NOTE ADULT - PROBLEM SELECTOR PLAN 4
Pt hypertensive in the ED, but down to 121/75 in the hospital.     Plan:  - c/w lasix every other day Pt with h/o DI on desmopressin 0.1mg 1.5 tabs qhs. Na levels normally run around 130; lowest level in the past 116.    Plan:  - c/w home desmopressin  - monitor BMP

## 2022-09-02 NOTE — PROGRESS NOTE ADULT - PROBLEM SELECTOR PLAN 5
On home med lipitor 40 qhs and ASA 81 qd.    Plan:  - continue with lipitor 40 daily Pt hypertensive in the ED, but down to 121/75 in the hospital.     Plan:  - c/w lasix every other day

## 2022-09-02 NOTE — DISCHARGE NOTE NURSING/CASE MANAGEMENT/SOCIAL WORK - NSDCPEFALRISK_GEN_ALL_CORE
For information on Fall & Injury Prevention, visit: [email or web address] OR  [email or web address] OR  https://www.cdc.gov/steadi/patient.html

## 2022-09-02 NOTE — DISCHARGE NOTE NURSING/CASE MANAGEMENT/SOCIAL WORK - NSDCFUADDAPPT_GEN_ALL_CORE_FT
Please bring your Insurance card, Photo ID and Discharge paperwork to the following appointment:    (1) Please follow up with your Orthopedic Provider, Dr. Isaías Rabago at [address] [place] [address], [place] [address] on 09/15/2022 at 11:00am.    Appointment was scheduled by Ms. KAITLYNN Walter, Referral Coordinator.    Please follow up with your PCP Dr. Mcpherson within 1-2 weeks of discharge.    Please follow up with your neurosurgeon Dr. Puckett 4 weeks after discharge for repeat imaging.

## 2022-09-02 NOTE — DISCHARGE NOTE NURSING/CASE MANAGEMENT/SOCIAL WORK - PATIENT PORTAL LINK FT
You can access the FollowMyHealth Patient Portal offered by [facility] by registering at the following website: [email or web address]. By joining [facility]’s FollowMyHealth portal, you will also be able to view your health information using other applications (apps) compatible with our system.

## 2022-09-02 NOTE — PROGRESS NOTE ADULT - PROBLEM SELECTOR PLAN 9
F: s/p 1 L bolus   E: replete as needed  N: DASH diet  DVT ppx: lovenox  Dispo: RMF    Plan discussed in detail with attending Dr. Mcpherson.

## 2022-09-02 NOTE — PROGRESS NOTE ADULT - PROBLEM SELECTOR PLAN 1
May be mechanical v. orthostatic v. 2/2 hyponatremia. Also possibly secondary to recently starting carbamazepine.    Plan:  - s/p 1 L bolus   - orthostatics negative  - d/c carbamazepine  - PT/OT, f/u recs

## 2022-09-02 NOTE — PROGRESS NOTE ADULT - SUBJECTIVE AND OBJECTIVE BOX
ERNESTO COLLETTE  89y, Female    Patient is a 89y old  Female who presents with a chief complaint of fall (01 Sep 2022 15:51)      INTERVAL HPI/OVERNIGHT EVENTS: [place] overnight. Patient seen and examined at bedside. States she had a headache overnight, throbbing in character. Improved with acetaminophen. Otherwise no complaints.    ROS: otherwise negative      T(C): 37.1 (22 @ 06:31), Max: 37.3 (22 @ 21:55)  HR: 78 (22 @ 06:31) (65 - 78)  BP: 159/76 (22 @ 06:31) (121/75 - 162/98)  RR: 19 (22 @ 06:31) (18 - 19)  SpO2: 97% (22 @ 06:31) (96% - 97%)  Wt(kg): --Vital Signs Last 24 Hrs  T(C): 37.1 (02 Sep 2022 06:31), Max: 37.3 (01 Sep 2022 21:55)  T(F): 98.7 (02 Sep 2022 06:31), Max: 99.1 (01 Sep 2022 21:55)  HR: 78 (02 Sep 2022 06:31) (65 - 78)  BP: 159/76 (02 Sep 2022 06:31) (121/75 - 162/98)  BP(mean): --  RR: 19 (02 Sep 2022 06:31) (18 - 19)  SpO2: 97% (02 Sep 2022 06:31) (96% - 97%)    Parameters below as of 02 Sep 2022 06:31  Patient On (Oxygen Delivery Method): room air        PHYSICAL EXAM:  Constitutional: NAD   HEENT: NC/AT, PERRL, EOMI, clear conjunctiva, MMM  Neck: C-spine collar in place  Respiratory: CTA b/l, no W/R/R  Cardiac: systolic murmur RUSB, RRR, +S1/S2  Gastrointestinal: soft, NT/ND; no rebound or guarding; +BSx4  Extremities: WWP, no clubbing or cyanosis; no peripheral edema  Vascular: peripheral pulses present   Dermatologic: skin warm, dry and intact; no rashes, wounds, or scars  Neurologic: AAOx3; CNII-XII grossly intact; no focal deficits; intact strength B/L upper & lower extremities    LABS:                        11.4   10.48 )-----------( 287      ( 01 Sep 2022 07:54 )             34.2         132<L>  |  96  |  16  ----------------------------<  111<H>  4.8   |  27  |  0.78    Ca    9.5      01 Sep 2022 22:45  Mg     2.2         TPro  8.0  /  Alb  4.1  /  TBili  0.5  /  DBili  x   /  AST  38<H>  /  ALT  29  /  AlkPhos  103        PT/INR - ( 31 Aug 2022 15:11 )   PT: 10.9 sec;   INR: 0.94          PTT - ( 31 Aug 2022 15:11 )  PTT:33.0 sec  Urinalysis Basic - ( 31 Aug 2022 15:11 )    Color: Yellow / Appearance: Clear / S.015 / pH: x  Gluc: x / Ketone: NEGATIVE  / Bili: NEGATIVE / Urobili: 0.2 E.U./dL   Blood: x / Protein: Trace mg/dL / Nitrite: NEGATIVE   Leuk Esterase: NEGATIVE / RBC: 5-10 /HPF / WBC < 5 /HPF   Sq Epi: x / Non Sq Epi: 5-10 /HPF / Bacteria: Present /HPF      CAPILLARY BLOOD GLUCOSE      POCT Blood Glucose.: 91 mg/dL (02 Sep 2022 08:58)  POCT Blood Glucose.: 113 mg/dL (01 Sep 2022 22:15)  POCT Blood Glucose.: 162 mg/dL (01 Sep 2022 17:15)  POCT Blood Glucose.: 153 mg/dL (01 Sep 2022 12:02)        Urinalysis Basic - ( 31 Aug 2022 15:11 )    Color: Yellow / Appearance: Clear / S.015 / pH: x  Gluc: x / Ketone: NEGATIVE  / Bili: NEGATIVE / Urobili: 0.2 E.U./dL   Blood: x / Protein: Trace mg/dL / Nitrite: NEGATIVE   Leuk Esterase: NEGATIVE / RBC: 5-10 /HPF / WBC < 5 /HPF   Sq Epi: x / Non Sq Epi: 5-10 /HPF / Bacteria: Present /HPF        MEDICATIONS  (STANDING):  aspirin  chewable 81 milliGRAM(s) Oral every 24 hours  atorvastatin 40 milliGRAM(s) Oral every 24 hours  cholecalciferol 5000 Unit(s) Oral every 24 hours  desmopressin 0.15 milliGRAM(s) Oral at bedtime  dextrose 5%. 1000 milliLiter(s) (100 mL/Hr) IV Continuous <Continuous>  dextrose 5%. 1000 milliLiter(s) (50 mL/Hr) IV Continuous <Continuous>  dextrose 50% Injectable 25 Gram(s) IV Push once  dextrose 50% Injectable 12.5 Gram(s) IV Push once  dextrose 50% Injectable 25 Gram(s) IV Push once  enoxaparin Injectable 30 milliGRAM(s) SubCutaneous every 24 hours  fluticasone propionate 50 MICROgram(s)/spray Nasal Spray 1 Spray(s) Both Nostrils <User Schedule>  furosemide    Tablet 20 milliGRAM(s) Oral <User Schedule>  gabapentin 300 milliGRAM(s) Oral two times a day  glucagon  Injectable 1 milliGRAM(s) IntraMuscular once  influenza  Vaccine (HIGH DOSE) 0.7 milliLiter(s) IntraMuscular once  insulin lispro (ADMELOG) corrective regimen sliding scale   SubCutaneous Before meals and at bedtime  zinc sulfate 220 milliGRAM(s) Oral every 24 hours    MEDICATIONS  (PRN):  acetaminophen     Tablet .. 650 milliGRAM(s) Oral every 6 hours PRN Temp greater or equal to 38C (100.4F), Mild Pain (1 - 3)  dextrose Oral Gel 15 Gram(s) Oral once PRN Blood Glucose LESS THAN 70 milliGRAM(s)/deciliter      RADIOLOGY & ADDITIONAL TESTS:    Imaging Personally Reviewed:  [x] YES  [ ] NO

## 2022-09-06 DIAGNOSIS — S12.101A UNSPECIFIED NONDISPLACED FRACTURE OF SECOND CERVICAL VERTEBRA, INITIAL ENCOUNTER FOR CLOSED FRACTURE: ICD-10-CM

## 2022-09-06 DIAGNOSIS — I50.32 CHRONIC DIASTOLIC (CONGESTIVE) HEART FAILURE: ICD-10-CM

## 2022-09-06 DIAGNOSIS — Z80.1 FAMILY HISTORY OF MALIGNANT NEOPLASM OF TRACHEA, BRONCHUS AND LUNG: ICD-10-CM
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
## 2022-09-06 DIAGNOSIS — Z88.1 ALLERGY STATUS TO OTHER ANTIBIOTIC AGENTS STATUS: ICD-10-CM

## 2022-09-06 DIAGNOSIS — I11.0 HYPERTENSIVE HEART DISEASE WITH HEART FAILURE: ICD-10-CM
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
## 2022-09-06 DIAGNOSIS — E78.5 HYPERLIPIDEMIA, UNSPECIFIED: ICD-10-CM

## 2022-09-06 DIAGNOSIS — Z96.643 PRESENCE OF ARTIFICIAL HIP JOINT, BILATERAL: ICD-10-CM

## 2022-09-06 DIAGNOSIS — Z95.5 PRESENCE OF CORONARY ANGIOPLASTY IMPLANT AND GRAFT: ICD-10-CM

## 2022-09-06 DIAGNOSIS — Z95.3 PRESENCE OF XENOGENIC HEART VALVE: ICD-10-CM

## 2022-09-06 DIAGNOSIS — Z88.0 ALLERGY STATUS TO PENICILLIN: ICD-10-CM

## 2022-09-06 DIAGNOSIS — E23.2 DIABETES INSIPIDUS: ICD-10-CM

## 2022-09-06 DIAGNOSIS — G62.9 POLYNEUROPATHY, UNSPECIFIED: ICD-10-CM

## 2022-09-08 ENCOUNTER — APPOINTMENT (OUTPATIENT)
Dept: ORTHOPEDIC SURGERY | Facility: CLINIC | Age: 87
End: 2022-09-08

## 2022-09-08 PROCEDURE — 99214 OFFICE O/P EST MOD 30 MIN: CPT

## 2022-09-08 PROCEDURE — 72040 X-RAY EXAM NECK SPINE 2-3 VW: CPT

## 2022-09-15 PROCEDURE — 87637 SARSCOV2&INF A&B&RSV AMP PRB: CPT

## 2022-09-15 PROCEDURE — 87086 URINE CULTURE/COLONY COUNT: CPT

## 2022-09-15 PROCEDURE — 81001 URINALYSIS AUTO W/SCOPE: CPT

## 2022-09-15 PROCEDURE — 93005 ELECTROCARDIOGRAM TRACING: CPT

## 2022-09-15 PROCEDURE — 99285 EMERGENCY DEPT VISIT HI MDM: CPT | Mod: 25

## 2022-09-15 PROCEDURE — 84100 ASSAY OF PHOSPHORUS: CPT

## 2022-09-15 PROCEDURE — 85027 COMPLETE CBC AUTOMATED: CPT

## 2022-09-15 PROCEDURE — 36415 COLL VENOUS BLD VENIPUNCTURE: CPT

## 2022-09-15 PROCEDURE — 83036 HEMOGLOBIN GLYCOSYLATED A1C: CPT

## 2022-09-15 PROCEDURE — 84484 ASSAY OF TROPONIN QUANT: CPT

## 2022-09-15 PROCEDURE — 83735 ASSAY OF MAGNESIUM: CPT

## 2022-09-15 PROCEDURE — 80053 COMPREHEN METABOLIC PANEL: CPT

## 2022-09-15 PROCEDURE — 72125 CT NECK SPINE W/O DYE: CPT

## 2022-09-15 PROCEDURE — 85610 PROTHROMBIN TIME: CPT

## 2022-09-15 PROCEDURE — 94640 AIRWAY INHALATION TREATMENT: CPT

## 2022-09-15 PROCEDURE — 82962 GLUCOSE BLOOD TEST: CPT

## 2022-09-15 PROCEDURE — 97161 PT EVAL LOW COMPLEX 20 MIN: CPT

## 2022-09-15 PROCEDURE — 80048 BASIC METABOLIC PNL TOTAL CA: CPT

## 2022-09-15 PROCEDURE — 85730 THROMBOPLASTIN TIME PARTIAL: CPT

## 2022-09-15 PROCEDURE — 70450 CT HEAD/BRAIN W/O DYE: CPT

## 2022-09-15 PROCEDURE — 85025 COMPLETE CBC W/AUTO DIFF WBC: CPT

## 2022-09-15 PROCEDURE — 82550 ASSAY OF CK (CPK): CPT

## 2022-09-20 ENCOUNTER — APPOINTMENT (OUTPATIENT)
Dept: OPHTHALMOLOGY | Facility: CLINIC | Age: 87
End: 2022-09-20

## 2022-09-21 NOTE — PRE-OP CHECKLIST - NEEDLE GAUGE
Social Work Note:  Patient chart reviewed.  Patient discussed in morning rounds.  Barriers to discharge:   * Tele monitor  * Claribel lift.  * Can not stand. Can  Not pivot.  * Needs TCU.  * Needs out-patient dialysis    Writer met patient to check in and offer support.  We discussed discharge planning-the need for TCU placement and out-patient dialysis.  We discussed options/choices/locations.    We discussed current barriers and challenges in location a TCU and dialysis center.  We discuss his current physical barriers that are making placement more challenging.  Patient aware and informed of the discharge planning work writer is doing on his behalf.   Update Clinical information faxed to Jamee at [facility],        Ovidio Jansen, [facility]  [phone]       20

## 2022-09-29 ENCOUNTER — APPOINTMENT (OUTPATIENT)
Dept: ORTHOPEDIC SURGERY | Facility: CLINIC | Age: 87
End: 2022-09-29

## 2022-09-29 PROCEDURE — 99214 OFFICE O/P EST MOD 30 MIN: CPT

## 2022-09-29 PROCEDURE — 72040 X-RAY EXAM NECK SPINE 2-3 VW: CPT

## 2022-10-04 ENCOUNTER — APPOINTMENT (OUTPATIENT)
Dept: HEART AND VASCULAR | Facility: CLINIC | Age: 87
End: 2022-10-04
Payer: MEDICARE

## 2022-10-04 ENCOUNTER — NON-APPOINTMENT (OUTPATIENT)
Age: 87
End: 2022-10-04

## 2022-10-04 VITALS
HEIGHT: 61 IN | BODY MASS INDEX: 30.78 KG/M2 | DIASTOLIC BLOOD PRESSURE: 86 MMHG | WEIGHT: 163 LBS | HEART RATE: 81 BPM | SYSTOLIC BLOOD PRESSURE: 152 MMHG | OXYGEN SATURATION: 96 %

## 2022-10-04 VITALS
SYSTOLIC BLOOD PRESSURE: 152 MMHG | DIASTOLIC BLOOD PRESSURE: 86 MMHG | BODY MASS INDEX: 30.8 KG/M2 | HEART RATE: 81 BPM | RESPIRATION RATE: 12 BRPM | OXYGEN SATURATION: 96 % | WEIGHT: 163 LBS

## 2022-10-04 PROCEDURE — 99215 OFFICE O/P EST HI 40 MIN: CPT

## 2022-10-04 RX ORDER — FUROSEMIDE 20 MG/1
20 TABLET ORAL
Qty: 30 | Refills: 6 | Status: DISCONTINUED | COMMUNITY
Start: 2021-07-07 | End: 2022-10-04

## 2022-10-04 NOTE — PHYSICAL EXAM
[Normal] : alert and oriented, normal memory [de-identified] : 2+ pitting edema to knees bilaterally

## 2022-10-04 NOTE — REASON FOR VISIT
[FreeTextEntry1] : 89 F with a Pmhx of dCHF, diabetes inspidus, HTN, HLD and severe AS s/p Bioprosthetic AV 7 years ago at OSH, c/b by bioprosthetic AV stenosis, now s/p Valve-in-Valve with a Sheri Ultra by [facility] in June of 2021, with course c/b embolization of the RCA stent into the left external iliac artery, as well as a left radial pseudoaneurysm that was repaired by vascular surgery. She presents today for routine f/u. Patient has had worsening lower extremity swelling over the past 4 weeks with associated dyspnea on exertion. She was recently hospitalized about a month ago after a fall which was attributed to possibly hyponatremia -- per daughter, her sodium was lower than it normally was, and she was discharged with a sodium of about 130 which is her baseline. She has been taking DDAVP for years, managed by her PCP Dr. Garcia.

## 2022-10-04 NOTE — ASSESSMENT
[FreeTextEntry1] : 89 F with a Pmhx of dCHF, diabetes inspidus, HTN, HLD and severe AS s/p Bioprosthetic AV 7 years ago at OSH, c/b by bioprosthetic AV stenosis, now s/p Valve-in-Valve with a Sheri Ultra by [facility] in June of 2021, with course c/b embolization of the RCA stent into the left external iliac artery, as well as a left radial pseudoaneurysm that was repaired by vascular surgery. She presents for follow up, with complaints of lower extremity swelling.\par \par #HFpEF\par Patient with history of HFpEF, currently with worsening lower extremity edema and associated dyspnea on exertion\par home meds: lasix 20mg every other day\par - Will discontinue lasix and start tosemide 20mg daily for 1 week and then switch to 20mg torsemide every other day\par - Will start spironolactone 25mg daily \par - Will check CMP as below \par - Return to clinic in 2 weeks for re evaluation of volume status\par \par \par #Severe AS s/p TAVR and Valve-in-Valve\par Stable, TTE (Aug 2021) showing reduced aortic gradients\par - Continue ASA 81mg daily\par - Repeat TTE in 2 weeks on next visit\par \par \par #Hyponatremia\par Reportedly at baseline on recent hospitalization\par - Will check CMP today\par \par RTC in 2 weeks for follow up

## 2022-10-05 LAB
ALBUMIN SERPL ELPH-MCNC: 4.3 G/DL
ALP BLD-CCNC: 87 U/L
ALT SERPL-CCNC: 14 U/L
ANION GAP SERPL CALC-SCNC: 10 MMOL/L
AST SERPL-CCNC: 25 U/L
BILIRUB SERPL-MCNC: 0.4 MG/DL
BUN SERPL-MCNC: 14 MG/DL
CALCIUM SERPL-MCNC: 9.3 MG/DL
CHLORIDE SERPL-SCNC: 94 MMOL/L
CO2 SERPL-SCNC: 30 MMOL/L
CREAT SERPL-MCNC: 0.73 MG/DL
EGFR: 77 ML/MIN/1.73M2
GLUCOSE SERPL-MCNC: 91 MG/DL
POTASSIUM SERPL-SCNC: 5.4 MMOL/L
PROT SERPL-MCNC: 6.6 G/DL
SODIUM SERPL-SCNC: 134 MMOL/L

## 2022-10-11 ENCOUNTER — APPOINTMENT (OUTPATIENT)
Dept: NEUROSURGERY | Facility: CLINIC | Age: 87
End: 2022-10-11

## 2022-10-11 ENCOUNTER — OUTPATIENT (OUTPATIENT)
Dept: OUTPATIENT SERVICES | Facility: HOSPITAL | Age: 87
LOS: 1 days | End: 2022-10-11
Payer: MEDICARE

## 2022-10-11 ENCOUNTER — APPOINTMENT (OUTPATIENT)
Dept: HEART AND VASCULAR | Facility: CLINIC | Age: 87
End: 2022-10-11

## 2022-10-11 ENCOUNTER — NON-APPOINTMENT (OUTPATIENT)
Age: 87
End: 2022-10-11

## 2022-10-11 VITALS
WEIGHT: 163 LBS | SYSTOLIC BLOOD PRESSURE: 137 MMHG | HEART RATE: 74 BPM | HEIGHT: 61 IN | TEMPERATURE: 97 F | RESPIRATION RATE: 12 BRPM | DIASTOLIC BLOOD PRESSURE: 83 MMHG | BODY MASS INDEX: 30.78 KG/M2

## 2022-10-11 DIAGNOSIS — Z95.5 PRESENCE OF CORONARY ANGIOPLASTY IMPLANT AND GRAFT: Chronic | ICD-10-CM

## 2022-10-11 DIAGNOSIS — Z95.2 PRESENCE OF PROSTHETIC HEART VALVE: Chronic | ICD-10-CM

## 2022-10-11 DIAGNOSIS — Z96.649 PRESENCE OF UNSPECIFIED ARTIFICIAL HIP JOINT: Chronic | ICD-10-CM

## 2022-10-11 DIAGNOSIS — Z98.890 OTHER SPECIFIED POSTPROCEDURAL STATES: Chronic | ICD-10-CM

## 2022-10-11 PROCEDURE — 72125 CT NECK SPINE W/O DYE: CPT | Mod: 26,MH

## 2022-10-11 PROCEDURE — 99203 OFFICE O/P NEW LOW 30 MIN: CPT

## 2022-10-11 PROCEDURE — 72125 CT NECK SPINE W/O DYE: CPT

## 2022-10-11 NOTE — REASON FOR VISIT
[New Patient Visit] : a new patient visit [Referred By: _________] : Patient was referred by EMILY [Formal Caregiver] : formal caregiver

## 2022-10-12 NOTE — HISTORY OF PRESENT ILLNESS
[< 3 months] : less than 3 months [de-identified] : 89yo right handed female with PMH Htn, HDL,CHF, Diabetes Insipidus , Chronic hyponatremia  aortic valve stenosis, peripheal neuropathy . s/p fall 8/31/2022 stated she felt dizzy but denies LOC fell and found to have Cervical fracture\par \par Pt presents today for visit follow up from the hospital

## 2022-10-12 NOTE — ASSESSMENT
[FreeTextEntry1] : Odontoid Fx\par \par Assessed pt\par CT C spine completed prior to visit\par Dr Puckett reviewed the preliminary but waiting for the ifnal read\par She is stable  would repeat CT C spine in one month and then call for a telehealth appt\par Continue to wear the collar until follow up CT\par She can remove the collar to shower\par PT script sent [facility]\par No neck manipulation just ambulate\par Continue to foolw up with your PCP and follow there medical plan\par Pt understands the plan and will continue to follow\par If she develops any weakness in any of her extremities she should call or retrun to the ER

## 2022-10-12 NOTE — PHYSICAL EXAM
[de-identified] : General: No acute distress, conversant, well-nourished.\par Head: Normocephalic, atraumatic\par Neck: trachea midline, FROM\par Heart: normotensive and normal rate and rhythm\par Lungs: No labored breathing\par Skin: No abrasions, no rashes, no edema\par Psych: Alert and oriented to person, place and time\par Extremities: no peripheral edema or digital cyanosis\par Vascular: warm and well perfused distally, palpable distal pulses\par \par MSK:\par Spine: \par No tenderness to palpation.  No step-off, no deformity.\par \par NEURO:\par Sensation \par          Left           \par C5     2/2               \par C6     2/2               \par C7     2/2               \par C8     2/2              \par T1     2/2             \par \par          Right         \par C5     2/2               \par C6     2/2               \par C7     2/2               \par C8     2/2              \par T1     2/2      \par \par Motor: \par                                                Left             \par C5 (deltoid abduction)             5/5               \par C6 (biceps flexion)                   5/5                \par C7 (triceps extension)             5/5               \par C8 (finger flexion)                     5/5               \par T1 (interosseous)                     5/5           \par \par                                                Right           \par C5 (deltoid abduction)             5/5               \par C6 (biceps flexion)                   5/5                \par C7 (triceps extension)             5/5               \par C8 (finger flexion)                     5/5               \par T1 (interosseous)                     5/5                     \par \par Sensation \par Left L2  -  2/2            \par Left L3  -  2/2\par Left L4  -  2/2\par Left L5  -  2/2\par Left S1  -  2/2\par \par Right L2  -  2/2            \par Right L3  -  2/2\par Right L4  -  2/2\par Right L5  -  2/2\par Right S1  -  2/2\par \par Motor: \par Left L2 (hip flexion)                            5/5                \par Left L3 (knee extension)                   5/5                \par Left L4 (ankle dorsiflexion)                 5/5                \par Left L5 (long toe extensor)                5/5                \par Left S1 (ankle plantar flexion)           5/5\par \par Right L2 (hip flexion)                            5/5                \par Right L3 (knee extension)                   5/5                \par Right L4 (ankle dorsiflexion)                 5/5                \par Right L5 (long toe extensor)                5/5                \par Right S1 (ankle plantar flexion)           5/5\par \par Reflexes: Normal and symmetric\par Negative Spurling’s test.  Negative Guillory’s reflex.  \par Negative clonus.  Down-going Babinski. [de-identified] : I ordered radiographs to evaluate the patient's symptoms.\par \par Cervical 2 radiographs obtained in the office today shows odontoid fracture with displacement.  Cervical kyphosis.  Cervical spondylosis.

## 2022-10-12 NOTE — ASSESSMENT
[FreeTextEntry1] : 89 year old female followup with neck pain after a mechanical fall. She was seen at [facility] and found to have an odontoid fracture.  She is in a hard cervical orthosis at all times.  Since her last visit her neck pain has continued.  She denies radicular pain.  She is neurologically intact.  Radiographs today show displaced odontoid fracture with no change compared with previous films.  We reviewed treatment options including bracing, halo and surgery. Surgery is high risk.  She will continue conservative treatment in a hard cervical orthosis at all times.  She finds her new brace more comfortable..  She will followup in 4 weeks.  We discussed red flag symptoms that would require emergent evaluation. She knows to call with any questions or concerns or if her symptoms acutely worsen.

## 2022-10-12 NOTE — HISTORY OF PRESENT ILLNESS
[de-identified] : 89 year old female presents with neck pain after a mechanical fall. She was seen at [facility] and found to have an odontoid fracture.  She was placed in a hard cervical orthosis at all times.  She is here for ortho spine followup.  She reports neck pain.  She denies radicular pain, recent illness, fevers, numbness, weakness, saddle anesthesia, urinary retention or fecal incontinence.  She has been able to tolerate a regular diet in the collar. She reports the collar is very uncomfortable.  \par

## 2022-10-12 NOTE — PHYSICAL EXAM
[Oriented To Time, Place, And Person] : oriented to person, place, and time [No Visual Abnormalities] : no visible abnormailities [Sclera] : the sclera and conjunctiva were normal [FreeTextEntry6] : wheel cair \par Antigravity X4\par PISANO   antigravity X4   wheelchair but can ambulate with assistance

## 2022-10-12 NOTE — HISTORY OF PRESENT ILLNESS
[de-identified] : 89 year old female followup with neck pain after a mechanical fall. She was seen at [facility] and found to have an odontoid fracture.  She was placed in a hard cervical orthosis at all times.  Since her last visit she has been wearing her hard cervical orthosis.  She continues to have neck pain.  She denies radicular pain, recent illness, fevers, numbness, weakness, saddle anesthesia, urinary retention or fecal incontinence.  She has been able to tolerate a regular diet in the collar. \par

## 2022-10-12 NOTE — PHYSICAL EXAM
[de-identified] : General: No acute distress, conversant, well-nourished.\par Head: Normocephalic, atraumatic\par Neck: trachea midline, FROM\par Heart: normotensive and normal rate and rhythm\par Lungs: No labored breathing\par Skin: No abrasions, no rashes, no edema\par Psych: Alert and oriented to person, place and time\par Extremities: no peripheral edema or digital cyanosis\par Vascular: warm and well perfused distally, palpable distal pulses\par \par MSK:\par Spine: \par No tenderness to palpation.  No step-off, no deformity.\par \par NEURO:\par Sensation \par          Left           \par C5     2/2               \par C6     2/2               \par C7     2/2               \par C8     2/2              \par T1     2/2             \par \par          Right         \par C5     2/2               \par C6     2/2               \par C7     2/2               \par C8     2/2              \par T1     2/2      \par \par Motor: \par                                                Left             \par C5 (deltoid abduction)             5/5               \par C6 (biceps flexion)                   5/5                \par C7 (triceps extension)             5/5               \par C8 (finger flexion)                     5/5               \par T1 (interosseous)                     5/5           \par \par                                                Right           \par C5 (deltoid abduction)             5/5               \par C6 (biceps flexion)                   5/5                \par C7 (triceps extension)             5/5               \par C8 (finger flexion)                     5/5               \par T1 (interosseous)                     5/5                     \par \par Sensation \par Left L2  -  2/2            \par Left L3  -  2/2\par Left L4  -  2/2\par Left L5  -  2/2\par Left S1  -  2/2\par \par Right L2  -  2/2            \par Right L3  -  2/2\par Right L4  -  2/2\par Right L5  -  2/2\par Right S1  -  2/2\par \par Motor: \par Left L2 (hip flexion)                            5/5                \par Left L3 (knee extension)                   5/5                \par Left L4 (ankle dorsiflexion)                 5/5                \par Left L5 (long toe extensor)                5/5                \par Left S1 (ankle plantar flexion)           5/5\par \par Right L2 (hip flexion)                            5/5                \par Right L3 (knee extension)                   5/5                \par Right L4 (ankle dorsiflexion)                 5/5                \par Right L5 (long toe extensor)                5/5                \par Right S1 (ankle plantar flexion)           5/5\par \par Reflexes: Normal and symmetric\par Negative Spurling’s test.  Negative Guillory’s reflex.  \par Negative clonus.  Down-going Babinski. [de-identified] : I ordered radiographs to evaluate the patient's symptoms.\par \par Cervical 2 radiographs obtained in the office today shows odontoid fracture with displacement. Cervical kyphosis.  Cervical spondylosis.   No change from previous office xrays.

## 2022-10-12 NOTE — ASSESSMENT
[FreeTextEntry1] : 89 year old female presents with neck pain after a mechanical fall. She was seen at [facility] and found to have an odontoid fracture.  She was placed in a hard cervical orthosis at all times.  She is here for ortho spine followup.  She reports neck pain.  She denies radicular pain.  She is neurologically intact.  Radiographs today show displaced odontoid fracture.  We discussed treatment options including bracing, halo and surgery. Surgery is high risk.  She will be treated conservatively in a hard cervical orthosis at all times.  She was given a prescription to obtain a more comfortable brace.  She will followup in 3 weeks.  We discussed red flag symptoms that would require emergent evaluation. She knows to call with any questions or concerns or if her symptoms acutely worsen.

## 2022-10-18 ENCOUNTER — APPOINTMENT (OUTPATIENT)
Dept: HEART AND VASCULAR | Facility: CLINIC | Age: 87
End: 2022-10-18

## 2022-10-25 ENCOUNTER — APPOINTMENT (OUTPATIENT)
Dept: HEART AND VASCULAR | Facility: CLINIC | Age: 87
End: 2022-10-25

## 2022-10-25 VITALS
HEIGHT: 61 IN | SYSTOLIC BLOOD PRESSURE: 119 MMHG | OXYGEN SATURATION: 95 % | BODY MASS INDEX: 30.78 KG/M2 | HEART RATE: 75 BPM | DIASTOLIC BLOOD PRESSURE: 60 MMHG | WEIGHT: 163 LBS

## 2022-10-25 PROCEDURE — 99215 OFFICE O/P EST HI 40 MIN: CPT

## 2022-10-31 ENCOUNTER — APPOINTMENT (OUTPATIENT)
Dept: OPHTHALMOLOGY | Facility: CLINIC | Age: 87
End: 2022-10-31

## 2022-10-31 ENCOUNTER — NON-APPOINTMENT (OUTPATIENT)
Age: 87
End: 2022-10-31

## 2022-10-31 PROCEDURE — 92012 INTRM OPH EXAM EST PATIENT: CPT

## 2022-11-02 ENCOUNTER — APPOINTMENT (OUTPATIENT)
Dept: ORTHOPEDIC SURGERY | Facility: CLINIC | Age: 87
End: 2022-11-02

## 2022-11-02 PROCEDURE — 72040 X-RAY EXAM NECK SPINE 2-3 VW: CPT

## 2022-11-02 PROCEDURE — 99214 OFFICE O/P EST MOD 30 MIN: CPT

## 2022-11-02 NOTE — PHYSICAL EXAM
[de-identified] : General: No acute distress, conversant, well-nourished.\par Head: Normocephalic, atraumatic\par Neck: trachea midline, FROM\par Heart: normotensive and normal rate and rhythm\par Lungs: No labored breathing\par Skin: No abrasions, no rashes, no edema\par Psych: Alert and oriented to person, place and time\par Extremities: no peripheral edema or digital cyanosis\par Vascular: warm and well perfused distally, palpable distal pulses\par \par MSK:\par Spine: \par No tenderness to palpation.  No step-off, no deformity.\par \par NEURO:\par Sensation \par          Left           \par C5     2/2               \par C6     2/2               \par C7     2/2               \par C8     2/2              \par T1     2/2             \par \par          Right         \par C5     2/2               \par C6     2/2               \par C7     2/2               \par C8     2/2              \par T1     2/2      \par \par Motor: \par                                                Left             \par C5 (deltoid abduction)             5/5               \par C6 (biceps flexion)                   5/5                \par C7 (triceps extension)             5/5               \par C8 (finger flexion)                     5/5               \par T1 (interosseous)                     5/5           \par \par                                                Right           \par C5 (deltoid abduction)             5/5               \par C6 (biceps flexion)                   5/5                \par C7 (triceps extension)             5/5               \par C8 (finger flexion)                     5/5               \par T1 (interosseous)                     5/5                     \par \par Sensation \par Left L2  -  2/2            \par Left L3  -  2/2\par Left L4  -  2/2\par Left L5  -  2/2\par Left S1  -  2/2\par \par Right L2  -  2/2            \par Right L3  -  2/2\par Right L4  -  2/2\par Right L5  -  2/2\par Right S1  -  2/2\par \par Motor: \par Left L2 (hip flexion)                            5/5                \par Left L3 (knee extension)                   5/5                \par Left L4 (ankle dorsiflexion)                 5/5                \par Left L5 (long toe extensor)                5/5                \par Left S1 (ankle plantar flexion)           5/5\par \par Right L2 (hip flexion)                            5/5                \par Right L3 (knee extension)                   5/5                \par Right L4 (ankle dorsiflexion)                 5/5                \par Right L5 (long toe extensor)                5/5                \par Right S1 (ankle plantar flexion)           5/5\par \par Reflexes: Normal and symmetric\par Negative Spurling’s test.  Negative Guillory’s reflex.  \par Negative clonus.  Down-going Babinski. [de-identified] : I ordered radiographs to evaluate the patient's symptoms.\par \par Cervical 2 radiographs obtained in the office today shows odontoid fracture with over 50% anterior displacement.  Cervical kyphosis.  Cervical spondylosis.  \par \par CT Cervical (10/11/22): Nondisplaced type II odontoid fracture is more conspicuous on current study.

## 2022-11-02 NOTE — HISTORY OF PRESENT ILLNESS
Health Maintenance Due   Topic Date Due   • DTaP/Tdap/Td Vaccine (5 - Tdap) 08/18/2011       Patient is due for topics as listed above but is not proceeding with Immunization(s) Dtap/Tdap/Td at this time.              [de-identified] : 89 year old female followup with neck pain after a mechanical fall. She was seen at [facility] and found to have an odontoid fracture.  She was placed in a hard cervical orthosis at all times. Since her last visit she has continued to wear the cervical collar.  She has episodes of neck pain.  She denies radicular pain, recent illness, fevers, numbness, weakness, saddle anesthesia, urinary retention or fecal incontinence.  She is eating a regular diet.  She is doing PT working on mobilization.  She had a recent cervical CT scan.

## 2022-11-04 NOTE — PATIENT PROFILE ADULT - NSASFALLATTEMPTOOB_GEN_A_NUR
Patient stated he is good for right now and did not want to schedule any appointments. Patient did explain he is still wanting a new PCP but does not want to schedule with anyone at this time.    ED navigator ensured there was nothing she could help patient with. ED navigator will follow-up with patient on/around 12/16/2022.    Kathe Lira  ED Navigator- [place]  [phone]     no

## 2022-11-10 ENCOUNTER — APPOINTMENT (OUTPATIENT)
Dept: CT IMAGING | Facility: HOSPITAL | Age: 87
End: 2022-11-10

## 2022-11-10 ENCOUNTER — OUTPATIENT (OUTPATIENT)
Dept: OUTPATIENT SERVICES | Facility: HOSPITAL | Age: 87
LOS: 1 days | End: 2022-11-10
Payer: MEDICARE

## 2022-11-10 ENCOUNTER — NON-APPOINTMENT (OUTPATIENT)
Age: 87
End: 2022-11-10

## 2022-11-10 ENCOUNTER — APPOINTMENT (OUTPATIENT)
Dept: NEUROSURGERY | Facility: CLINIC | Age: 87
End: 2022-11-10

## 2022-11-10 VITALS
HEIGHT: 61 IN | SYSTOLIC BLOOD PRESSURE: 97 MMHG | WEIGHT: 148 LBS | DIASTOLIC BLOOD PRESSURE: 63 MMHG | RESPIRATION RATE: 16 BRPM | BODY MASS INDEX: 27.94 KG/M2 | TEMPERATURE: 98.4 F | OXYGEN SATURATION: 98 % | HEART RATE: 71 BPM

## 2022-11-10 DIAGNOSIS — Z95.2 PRESENCE OF PROSTHETIC HEART VALVE: Chronic | ICD-10-CM

## 2022-11-10 DIAGNOSIS — Z95.5 PRESENCE OF CORONARY ANGIOPLASTY IMPLANT AND GRAFT: Chronic | ICD-10-CM

## 2022-11-10 DIAGNOSIS — D18.00 HEMANGIOMA UNSPECIFIED SITE: ICD-10-CM

## 2022-11-10 DIAGNOSIS — Z96.649 PRESENCE OF UNSPECIFIED ARTIFICIAL HIP JOINT: Chronic | ICD-10-CM

## 2022-11-10 DIAGNOSIS — Z98.890 OTHER SPECIFIED POSTPROCEDURAL STATES: Chronic | ICD-10-CM

## 2022-11-10 PROCEDURE — 72125 CT NECK SPINE W/O DYE: CPT

## 2022-11-10 PROCEDURE — 72125 CT NECK SPINE W/O DYE: CPT | Mod: 26,MH

## 2022-11-10 PROCEDURE — 99211 OFF/OP EST MAY X REQ PHY/QHP: CPT

## 2022-11-11 NOTE — PHYSICAL EXAM
[Normal] : alert and oriented, normal memory [de-identified] : Marked improvement in lower extremity edema

## 2022-11-11 NOTE — REASON FOR VISIT
[FreeTextEntry1] : 89 year old female with HFpEF, HTN, HLD, severe AS s/p BioAV c/b BioAV stenosis requiring valve in valve with Sheri Ultra at [facility] 2021, c/b RCA stent embolization as well as radial pseudoaneurysm. Patient was recently seen here in clinic and found to be mildly overloaded -- she was started on torsemide (furosemide was dc'd) and spironolactone. She states that she feels like she has less swelling in her lower extremities. She is also able to work with physical therapy more. Denies any chest pain, fevers, palpitations, abdominal pain, N/V/D.

## 2022-11-11 NOTE — ASSESSMENT
[FreeTextEntry1] : 89 year old female with HFpEF, HTN, HLD, severe AS s/p BioAV c/b BioAV stenosis requiring valve in valve with Sheri Ultra at [facility] 2021, c/b RCA stent embolization as well as radial pseudoaneurysm. Presents for follow up after recent initiation of diuretics. \par \par #HFpEF\par Patient recently started on torsemide 20mg daily and spironolactone 25mg daily. On exam with marked improvement of lower extremity edema, without evidence of crackles or JVD. \par - Will continue torsemide 20mg daily and spironolactone 25mg daily \par - Encouraged continued mobilization including physical therapy, as well as lifestyle modifications with low salt diet\par \par \par #Severe AS s/p BioAV s/p valve in valve\par Patient s/p valve in valve in 2021 with last TTE in 2021 demonstrating no signs of bioprosthetic dysfunction, but did show moderate mitral regurgitation\par - Will repeat TTE to evaluate for any bioprosthetic dysfunction or worsening MR\par \par \par RTC in 1 month \par \par

## 2022-11-15 NOTE — HISTORY OF PRESENT ILLNESS
[< 3 months] : less than 3 months [de-identified] : 89yo right handed female with PMH Htn, HDL,CHF, Diabetes Insipidus , Chronic hyponatremia  aortic valve stenosis, peripheal neuropathy . s/p fall 8/31/2022 stated she felt dizzy but denies LOC fell and found to have Cervical fracture\par \par Pt presents today for review recent images\par \par

## 2022-11-15 NOTE — ADDENDUM
[FreeTextEntry1] : \par \par PATIENT:	Michelle Duval 				\par \par \par Thursday, November 10, 2022\par \par I saw Michelle with her caretaker in the office and spoke to her daughter as well.  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  We have been following her for a number of months, and unfortunately, compared to her CT last month, she appears to have no evidence of bridging bone with a grossly unstable fracture.  I told her and her daughter so.  I have recommended surgical consideration.  She does appear to have a fair amount of osteoporosis based on the CT scan, and I do think a DEXA scan is necessary.  I additionally have asked her to see Camacho Butler in our department and will have her follow up with him in the near future.  We will make a decision regarding surgery once we get the results of her testing.\par \par \par \par Wally Puckett MD\par \par DL/ag [facility] #[id number]\par

## 2022-11-15 NOTE — ASSESSMENT
[FreeTextEntry1] : PLAN\par Examined pt\par Recommend Dexa scan\par Follow up with Dr Burks (Made an appt with Dr Butler 12/8)\par Continue current medical regime\par Pt fverbalzed understanding\par \par \par I, Dr. Puckett, personally performed the evaluation and management (E/M) services for this established patient who presents today with (a) new problem(s)/exacerbation of (an) existing condition(s). That E/M includes conducting the examination, assessing all new/exacerbated conditions, and establishing a new plan of care. Today, my ACP, Mya De La Rosa, was here to observe my evaluation and management services for this new problem/exacerbated condition to be followed going forward.\par \par

## 2022-11-21 ENCOUNTER — RESULT REVIEW (OUTPATIENT)
Age: 87
End: 2022-11-21

## 2022-11-21 ENCOUNTER — APPOINTMENT (OUTPATIENT)
Dept: RADIOLOGY | Facility: HOSPITAL | Age: 87
End: 2022-11-21

## 2022-11-21 ENCOUNTER — OUTPATIENT (OUTPATIENT)
Dept: OUTPATIENT SERVICES | Facility: HOSPITAL | Age: 87
LOS: 1 days | End: 2022-11-21
Payer: MEDICARE

## 2022-11-21 DIAGNOSIS — Z96.649 PRESENCE OF UNSPECIFIED ARTIFICIAL HIP JOINT: Chronic | ICD-10-CM

## 2022-11-21 DIAGNOSIS — Z98.890 OTHER SPECIFIED POSTPROCEDURAL STATES: Chronic | ICD-10-CM

## 2022-11-21 DIAGNOSIS — Z95.5 PRESENCE OF CORONARY ANGIOPLASTY IMPLANT AND GRAFT: Chronic | ICD-10-CM

## 2022-11-21 DIAGNOSIS — Z95.2 PRESENCE OF PROSTHETIC HEART VALVE: Chronic | ICD-10-CM

## 2022-11-21 PROCEDURE — 77085 DXA BONE DENSITY AXL VRT FX: CPT

## 2022-11-21 PROCEDURE — 77085 DXA BONE DENSITY AXL VRT FX: CPT | Mod: 26

## 2022-12-02 ENCOUNTER — APPOINTMENT (OUTPATIENT)
Dept: OPHTHALMOLOGY | Facility: CLINIC | Age: 87
End: 2022-12-02

## 2022-12-02 ENCOUNTER — APPOINTMENT (OUTPATIENT)
Dept: HEART AND VASCULAR | Facility: CLINIC | Age: 87
End: 2022-12-02

## 2022-12-02 ENCOUNTER — NON-APPOINTMENT (OUTPATIENT)
Age: 87
End: 2022-12-02

## 2022-12-02 PROCEDURE — 92012 INTRM OPH EXAM EST PATIENT: CPT

## 2022-12-06 ENCOUNTER — INPATIENT (INPATIENT)
Facility: HOSPITAL | Age: 87
LOS: 3 days | Discharge: HOME HEALTH | DRG: 291 | End: 2022-12-10
Attending: HOSPITALIST | Admitting: HOSPITALIST
Payer: MEDICARE

## 2022-12-06 ENCOUNTER — OUTPATIENT (OUTPATIENT)
Dept: OUTPATIENT SERVICES | Facility: HOSPITAL | Age: 87
LOS: 1 days | End: 2022-12-06

## 2022-12-06 ENCOUNTER — APPOINTMENT (OUTPATIENT)
Dept: HEART AND VASCULAR | Facility: CLINIC | Age: 87
End: 2022-12-06
Payer: MEDICARE

## 2022-12-06 VITALS
SYSTOLIC BLOOD PRESSURE: 130 MMHG | BODY MASS INDEX: 27.94 KG/M2 | TEMPERATURE: 98 F | HEIGHT: 61 IN | WEIGHT: 148 LBS | HEART RATE: 81 BPM | OXYGEN SATURATION: 95 % | DIASTOLIC BLOOD PRESSURE: 70 MMHG

## 2022-12-06 VITALS
TEMPERATURE: 98 F | RESPIRATION RATE: 18 BRPM | DIASTOLIC BLOOD PRESSURE: 69 MMHG | OXYGEN SATURATION: 96 % | WEIGHT: 149.03 LBS | HEART RATE: 79 BPM | SYSTOLIC BLOOD PRESSURE: 123 MMHG

## 2022-12-06 DIAGNOSIS — Z95.5 PRESENCE OF CORONARY ANGIOPLASTY IMPLANT AND GRAFT: Chronic | ICD-10-CM

## 2022-12-06 DIAGNOSIS — Z95.2 PRESENCE OF PROSTHETIC HEART VALVE: Chronic | ICD-10-CM

## 2022-12-06 DIAGNOSIS — S12.9XXA FRACTURE OF NECK, UNSPECIFIED, INITIAL ENCOUNTER: ICD-10-CM

## 2022-12-06 DIAGNOSIS — Z98.890 OTHER SPECIFIED POSTPROCEDURAL STATES: Chronic | ICD-10-CM

## 2022-12-06 DIAGNOSIS — Z96.649 PRESENCE OF UNSPECIFIED ARTIFICIAL HIP JOINT: Chronic | ICD-10-CM

## 2022-12-06 DIAGNOSIS — Z86.39 PERSONAL HISTORY OF OTHER ENDOCRINE, NUTRITIONAL AND METABOLIC DISEASE: ICD-10-CM

## 2022-12-06 DIAGNOSIS — I10 ESSENTIAL (PRIMARY) HYPERTENSION: ICD-10-CM

## 2022-12-06 DIAGNOSIS — Z95.2 PRESENCE OF PROSTHETIC HEART VALVE: ICD-10-CM

## 2022-12-06 DIAGNOSIS — I50.33 ACUTE ON CHRONIC DIASTOLIC (CONGESTIVE) HEART FAILURE: ICD-10-CM

## 2022-12-06 DIAGNOSIS — N17.9 ACUTE KIDNEY FAILURE, UNSPECIFIED: ICD-10-CM

## 2022-12-06 DIAGNOSIS — I50.32 CHRONIC DIASTOLIC (CONGESTIVE) HEART FAILURE: ICD-10-CM

## 2022-12-06 LAB
ALBUMIN SERPL ELPH-MCNC: 4.4 G/DL — SIGNIFICANT CHANGE UP (ref 3.3–5)
ALP SERPL-CCNC: 97 U/L — SIGNIFICANT CHANGE UP (ref 40–120)
ALT FLD-CCNC: 14 U/L — SIGNIFICANT CHANGE UP (ref 10–45)
ANION GAP SERPL CALC-SCNC: 13 MMOL/L — SIGNIFICANT CHANGE UP (ref 5–17)
APTT BLD: 32.1 SEC — SIGNIFICANT CHANGE UP (ref 27.5–35.5)
AST SERPL-CCNC: 26 U/L — SIGNIFICANT CHANGE UP (ref 10–40)
BASOPHILS # BLD AUTO: 0.05 K/UL — SIGNIFICANT CHANGE UP (ref 0–0.2)
BASOPHILS NFR BLD AUTO: 0.6 % — SIGNIFICANT CHANGE UP (ref 0–2)
BILIRUB SERPL-MCNC: 0.3 MG/DL — SIGNIFICANT CHANGE UP (ref 0.2–1.2)
BUN SERPL-MCNC: 35 MG/DL — HIGH (ref 7–23)
CALCIUM SERPL-MCNC: 9.7 MG/DL — SIGNIFICANT CHANGE UP (ref 8.4–10.5)
CHLORIDE SERPL-SCNC: 97 MMOL/L — SIGNIFICANT CHANGE UP (ref 96–108)
CO2 SERPL-SCNC: 31 MMOL/L — SIGNIFICANT CHANGE UP (ref 22–31)
CREAT SERPL-MCNC: 1.18 MG/DL — SIGNIFICANT CHANGE UP (ref 0.5–1.3)
EGFR: 43 ML/MIN/1.73M2 — LOW
EOSINOPHIL # BLD AUTO: 0.41 K/UL — SIGNIFICANT CHANGE UP (ref 0–0.5)
EOSINOPHIL NFR BLD AUTO: 4.7 % — SIGNIFICANT CHANGE UP (ref 0–6)
GLUCOSE SERPL-MCNC: 101 MG/DL — HIGH (ref 70–99)
HCT VFR BLD CALC: 35.2 % — SIGNIFICANT CHANGE UP (ref 34.5–45)
HGB BLD-MCNC: 10.9 G/DL — LOW (ref 11.5–15.5)
IMM GRANULOCYTES NFR BLD AUTO: 0.5 % — SIGNIFICANT CHANGE UP (ref 0–0.9)
INR BLD: 0.99 — SIGNIFICANT CHANGE UP (ref 0.88–1.16)
LYMPHOCYTES # BLD AUTO: 1.46 K/UL — SIGNIFICANT CHANGE UP (ref 1–3.3)
LYMPHOCYTES # BLD AUTO: 16.6 % — SIGNIFICANT CHANGE UP (ref 13–44)
MAGNESIUM SERPL-MCNC: 2.3 MG/DL — SIGNIFICANT CHANGE UP (ref 1.6–2.6)
MCHC RBC-ENTMCNC: 29.9 PG — SIGNIFICANT CHANGE UP (ref 27–34)
MCHC RBC-ENTMCNC: 31 GM/DL — LOW (ref 32–36)
MCV RBC AUTO: 96.4 FL — SIGNIFICANT CHANGE UP (ref 80–100)
MONOCYTES # BLD AUTO: 0.84 K/UL — SIGNIFICANT CHANGE UP (ref 0–0.9)
MONOCYTES NFR BLD AUTO: 9.5 % — SIGNIFICANT CHANGE UP (ref 2–14)
NEUTROPHILS # BLD AUTO: 6 K/UL — SIGNIFICANT CHANGE UP (ref 1.8–7.4)
NEUTROPHILS NFR BLD AUTO: 68.1 % — SIGNIFICANT CHANGE UP (ref 43–77)
NRBC # BLD: 0 /100 WBCS — SIGNIFICANT CHANGE UP (ref 0–0)
PHOSPHATE SERPL-MCNC: 4.5 MG/DL — SIGNIFICANT CHANGE UP (ref 2.5–4.5)
PLATELET # BLD AUTO: 265 K/UL — SIGNIFICANT CHANGE UP (ref 150–400)
POTASSIUM SERPL-MCNC: 4.8 MMOL/L — SIGNIFICANT CHANGE UP (ref 3.5–5.3)
POTASSIUM SERPL-SCNC: 4.8 MMOL/L — SIGNIFICANT CHANGE UP (ref 3.5–5.3)
PROT SERPL-MCNC: 7.7 G/DL — SIGNIFICANT CHANGE UP (ref 6–8.3)
PROTHROM AB SERPL-ACNC: 11.8 SEC — SIGNIFICANT CHANGE UP (ref 10.5–13.4)
RBC # BLD: 3.65 M/UL — LOW (ref 3.8–5.2)
RBC # FLD: 13.9 % — SIGNIFICANT CHANGE UP (ref 10.3–14.5)
SARS-COV-2 RNA SPEC QL NAA+PROBE: NEGATIVE — SIGNIFICANT CHANGE UP
SODIUM SERPL-SCNC: 141 MMOL/L — SIGNIFICANT CHANGE UP (ref 135–145)
WBC # BLD: 8.8 K/UL — SIGNIFICANT CHANGE UP (ref 3.8–10.5)
WBC # FLD AUTO: 8.8 K/UL — SIGNIFICANT CHANGE UP (ref 3.8–10.5)

## 2022-12-06 PROCEDURE — 99214 OFFICE O/P EST MOD 30 MIN: CPT

## 2022-12-06 PROCEDURE — 99285 EMERGENCY DEPT VISIT HI MDM: CPT

## 2022-12-06 PROCEDURE — 93306 TTE W/DOPPLER COMPLETE: CPT | Mod: 26

## 2022-12-06 PROCEDURE — 99223 1ST HOSP IP/OBS HIGH 75: CPT

## 2022-12-06 PROCEDURE — 71045 X-RAY EXAM CHEST 1 VIEW: CPT | Mod: 26

## 2022-12-06 RX ORDER — ZINC SULFATE TAB 220 MG (50 MG ZINC EQUIVALENT) 220 (50 ZN) MG
1 TAB ORAL
Qty: 0 | Refills: 0 | DISCHARGE

## 2022-12-06 RX ORDER — ASPIRIN/CALCIUM CARB/MAGNESIUM 324 MG
81 TABLET ORAL DAILY
Refills: 0 | Status: DISCONTINUED | OUTPATIENT
Start: 2022-12-06 | End: 2022-12-10

## 2022-12-06 RX ORDER — LATANOPROST 0.05 MG/ML
1 SOLUTION/ DROPS OPHTHALMIC; TOPICAL AT BEDTIME
Refills: 0 | Status: DISCONTINUED | OUTPATIENT
Start: 2022-12-06 | End: 2022-12-10

## 2022-12-06 RX ORDER — INFLUENZA VIRUS VACCINE 15; 15; 15; 15 UG/.5ML; UG/.5ML; UG/.5ML; UG/.5ML
0.7 SUSPENSION INTRAMUSCULAR ONCE
Refills: 0 | Status: DISCONTINUED | OUTPATIENT
Start: 2022-12-06 | End: 2022-12-10

## 2022-12-06 RX ORDER — FUROSEMIDE 40 MG
40 TABLET ORAL ONCE
Refills: 0 | Status: COMPLETED | OUTPATIENT
Start: 2022-12-06 | End: 2022-12-06

## 2022-12-06 RX ORDER — ZINC SULFATE TAB 220 MG (50 MG ZINC EQUIVALENT) 220 (50 ZN) MG
220 TAB ORAL DAILY
Refills: 0 | Status: DISCONTINUED | OUTPATIENT
Start: 2022-12-07 | End: 2022-12-10

## 2022-12-06 RX ORDER — FUROSEMIDE 40 MG
5 TABLET ORAL
Qty: 500 | Refills: 0 | Status: DISCONTINUED | OUTPATIENT
Start: 2022-12-06 | End: 2022-12-08

## 2022-12-06 RX ORDER — GABAPENTIN 400 MG/1
300 CAPSULE ORAL
Refills: 0 | Status: DISCONTINUED | OUTPATIENT
Start: 2022-12-06 | End: 2022-12-10

## 2022-12-06 RX ORDER — SPIRONOLACTONE 25 MG/1
25 TABLET, FILM COATED ORAL DAILY
Refills: 0 | Status: DISCONTINUED | OUTPATIENT
Start: 2022-12-07 | End: 2022-12-10

## 2022-12-06 RX ORDER — CHOLECALCIFEROL (VITAMIN D3) 125 MCG
1 CAPSULE ORAL
Qty: 0 | Refills: 0 | DISCHARGE

## 2022-12-06 RX ORDER — DESMOPRESSIN ACETATE 0.1 MG/1
0.15 TABLET ORAL AT BEDTIME
Refills: 0 | Status: DISCONTINUED | OUTPATIENT
Start: 2022-12-06 | End: 2022-12-10

## 2022-12-06 RX ORDER — ATORVASTATIN CALCIUM 80 MG/1
40 TABLET, FILM COATED ORAL AT BEDTIME
Refills: 0 | Status: DISCONTINUED | OUTPATIENT
Start: 2022-12-06 | End: 2022-12-10

## 2022-12-06 RX ORDER — CHOLECALCIFEROL (VITAMIN D3) 125 MCG
5000 CAPSULE ORAL DAILY
Refills: 0 | Status: DISCONTINUED | OUTPATIENT
Start: 2022-12-07 | End: 2022-12-10

## 2022-12-06 RX ORDER — BRIMONIDINE TARTRATE 2 MG/MG
1 SOLUTION/ DROPS OPHTHALMIC
Refills: 0 | Status: DISCONTINUED | OUTPATIENT
Start: 2022-12-06 | End: 2022-12-10

## 2022-12-06 RX ADMIN — DESMOPRESSIN ACETATE 0.15 MILLIGRAM(S): 0.1 TABLET ORAL at 23:47

## 2022-12-06 RX ADMIN — Medication 40 MILLIGRAM(S): at 17:05

## 2022-12-06 RX ADMIN — ATORVASTATIN CALCIUM 40 MILLIGRAM(S): 80 TABLET, FILM COATED ORAL at 23:48

## 2022-12-06 NOTE — H&P ADULT - PROBLEM SELECTOR PLAN 2
continue home dose desmopressin   Na is 141 on admit  monitor lytes with IV lasix drip monitor renal function   Cr 1.8 GFR 43 in setting of decompensated CHF  lytes wnl

## 2022-12-06 NOTE — H&P ADULT - PROBLEM SELECTOR PLAN 5
s/p Valve in valve TAVR in 2021 with echo today unchanged from prior  moderate AS and moderate MR for possible C spine surgery at the end of this week once medically optimized

## 2022-12-06 NOTE — PATIENT PROFILE ADULT - FALL HARM RISK - HARM RISK INTERVENTIONS
Assistance with ambulation/Assistance OOB with selected safe patient handling equipment/Communicate Risk of Fall with Harm to all staff/Discuss with provider need for PT consult/Monitor gait and stability/Provide patient with walking aids - walker, cane, crutches/Reinforce activity limits and safety measures with patient and family/Sit up slowly, dangle for a short time, stand at bedside before walking/Tailored Fall Risk Interventions/Visual Cue: Yellow wristband and red socks/Bed in lowest position, wheels locked, appropriate side rails in place/Call bell, personal items and telephone in reach/Instruct patient to call for assistance before getting out of bed or chair/Non-slip footwear when patient is out of bed/[place] to call system/Physically safe environment - no spills, clutter or unnecessary equipment/Purposeful Proactive Rounding/Room/bathroom lighting operational, light cord in reach

## 2022-12-06 NOTE — ED PROVIDER NOTE - OBJECTIVE STATEMENT
89F c PMH of  HTN, HLD, CHF, Diabetes Insipidus, and aortic valve stenosis and nondisplaced fracture of odontoid waist sustained in fall in September 2022 presents for increased bilat le edema x weeks and also for plan for surgical intervention of C spine fx. Pt was seen in office by Dr. Fernandez today who performed echo and told pt to come to ED. pt denies cp/sob. pt has HHA at bedside. pt states she has chronic urinary incontinence x years that worsened in August 2022, recently had urinary catheter placed. HHA states increased urination ocurred after torsemide initiated. pt denies focal numbness/weakness, denies bowel incontinence.     home meds: torsemide, spironolactone, gabapentin, asa

## 2022-12-06 NOTE — H&P ADULT - NSHPLABSRESULTS_GEN_ALL_CORE
10.9   8.80  )-----------( 265      ( 06 Dec 2022 16:09 )             35.2   12-06    141  |  97  |  35<H>  ----------------------------<  101<H>  4.8   |  31  |  1.18    Ca    9.7      06 Dec 2022 16:09  Phos  4.5     12-06  Mg     2.3     12-06    TPro  7.7  /  Alb  4.4  /  TBili  0.3  /  DBili  x   /  AST  26  /  ALT  14  /  AlkPhos  97  12-06

## 2022-12-06 NOTE — ED ADULT NURSE NOTE - OBJECTIVE STATEMENT
Pt A&Ox4, ambulatory with assist, speaking in clear/full sentences, no acute distress, vital signs stable. PT sent in for BLE swelling. PT denies CP/SOB. PT had echo today and sent into ED by cardiologist.

## 2022-12-06 NOTE — ED PROVIDER NOTE - PHYSICAL EXAMINATION
pt in C collar  BlE: 5/5 motor strength bilat hip flexors, knee flexors and extensors, plantar and dorsiflexors, hallux flexors and extensors. sensation intact to light touch bilat L3-S1; compartment soft, skin warm and dry   BUE: 5/5 motor strength bilat deltoid, biceps, triceps, wrist flexors/extensors, hand . sensation intact to light touch bilat C5-T1; compartment soft, skin warm and dry

## 2022-12-06 NOTE — ED PROVIDER NOTE - CLINICAL SUMMARY MEDICAL DECISION MAKING FREE TEXT BOX
89F c PMH of  HTN, HLD, CHF, Diabetes Insipidus, and aortic valve stenosis and nondisplaced fracture of odontoid waist sustained in fall in September 2022 presents for increased bilat le edema x weeks and also for plan for surgical intervention of C spine fx. On exam, VS wnl, pt in C collar, 2+ bilat pitting edema, otherwise no remarkable exam findings. no focal deficits in upper or lower extremities.    ddx: CHF vs lymphedema vs venous stasis. pt states urinary incontinence is chronic issue and is unchanged from baseline, no focal upper or lower extremity deficits to suggest cord compression    Plan:  - ekg: paced rhythm similar to prior ekg   - labs  - cxr  - covid  - spoke with Dr. Fernandez who saw pt earlier today. requests labs, does not request troponin or cardiac enzymes. recommends lasix 40 mg iv which will be ordered. said ok to speak to cardiology PA separately. spoke with cardiology PA who accepts pt

## 2022-12-06 NOTE — PATIENT PROFILE ADULT - NSPROMEDSADMININFO_GEN_A_NUR
Pts wife Camelia called noting pt noting eating as much, increased fatigue, increased sweating, vomiting off and on since Saturday.     Pt completed abx amoxicillin, has been off oxycodone for 3-4 days. Denies fever. Camelia notes pt coughing a lot but the same as previously due to smokers cough, Blood glucose in the 90's per Camelia.     Blood pressure in chart shows 3-13-17 98/61 and 93/58.     Recommended pt to go to ER if symptoms worsen. Camelia notes they also are in process of getting appt at [place] for pts eye issues.    Sylvia Nolan, RN, BSN.   
no concerns

## 2022-12-06 NOTE — H&P ADULT - PROBLEM SELECTOR PLAN 1
continue IV lasix drip on the floor  place Vega catheter (d/w RN in [facility])  continue aldactone 25mg daily  strict I and O's, daily weights  ECHO done today with NL EF and moderate AS/MR with  unchanged gradients across AV and MV  monitor rosemary continue IV lasix drip on the floor (s/p IV 40mg lasix in ED)  place Vega catheter (d/w RN in ER)  continue aldactone 25mg daily  strict I and O's, daily weights  ECHO done today with NL EF and moderate AS/MR with  unchanged gradients across AV and MV  monitor lytes

## 2022-12-06 NOTE — H&P ADULT - NSHPPHYSICALEXAM_GEN_ALL_CORE
Vital Signs Last 24 Hrs  T(C): 36.7 (06 Dec 2022 17:52), Max: 36.7 (06 Dec 2022 17:52)  T(F): 98 (06 Dec 2022 17:52), Max: 98 (06 Dec 2022 17:52)  HR: 98 (06 Dec 2022 17:52) (75 - 98)  BP: 150/80 (06 Dec 2022 17:52) (123/69 - 150/80)  RR: 18 (06 Dec 2022 17:52) (18 - 18)  SpO2: 95% (06 Dec 2022 17:52) (94% - 96%)  room air    HEENT: neck colar in place  Chest: decreased BS b/l with cracles at both bases  Cor: S1 S2 RRR, + III/VI SM loudest at RUSB  Abd: soft, ND, NT  Ext: 3+ pitting edema both LE up to thighs  Vasc: DP/PT 1+ b/l   Skin: warm, dry, minimal erythema over R shin   no ulcers/open wounds  Neuro: A+O x 3, fluent speech, grossly non focal   Psych: Appropriate affect, cooperative Vital Signs Last 24 Hrs  T(C): 36.7 (06 Dec 2022 17:52), Max: 36.7 (06 Dec 2022 17:52)  T(F): 98 (06 Dec 2022 17:52), Max: 98 (06 Dec 2022 17:52)  HR: 98 (06 Dec 2022 17:52) (75 - 98)  BP: 150/80 (06 Dec 2022 17:52) (123/69 - 150/80)  RR: 18 (06 Dec 2022 17:52) (18 - 18)  SpO2: 95% (06 Dec 2022 17:52) (94% - 96%)  room air    HEENT: neck collar in place  Chest: decreased BS b/l with cracles at both bases  Cor: S1 S2 RRR, + III/VI SM loudest at RUSB  Abd: soft, ND, NT  Ext: 3+ pitting edema both LE up to thighs  Vasc: DP/PT 1+ b/l   Skin: warm, dry, minimal erythema over R shin   no ulcers/open wounds  Neuro: A+O x 3, fluent speech, grossly non focal   Psych: Appropriate affect, cooperative Vital Signs Last 24 Hrs  T(C): 36.7 (06 Dec 2022 17:52), Max: 36.7 (06 Dec 2022 17:52)  T(F): 98 (06 Dec 2022 17:52), Max: 98 (06 Dec 2022 17:52)  HR: 98 (06 Dec 2022 17:52) (75 - 98)  BP: 150/80 (06 Dec 2022 17:52) (123/69 - 150/80)  RR: 18 (06 Dec 2022 17:52) (18 - 18)  SpO2: 95% (06 Dec 2022 17:52) (94% - 96%)  room air    HEENT: neck collar in place  Chest: decreased BS b/l without wheezinf or cracles   Cor: S1 S2 RRR, + III/VI SM loudest at RUSB  Abd: soft, ND, NT  Ext: 3+ pitting edema both LE up to thighs  Vasc: DP/PT 1+ b/l   Skin: warm, dry, minimal erythema over R shin   no ulcers/open wounds  Neuro: A+O x 3, fluent speech, grossly non focal   Psych: Appropriate affect, cooperative

## 2022-12-06 NOTE — H&P ADULT - HISTORY OF PRESENT ILLNESS
89 y/o F with PMH HTN, Hyperlipidemia, chronic diastolic CHF, hx BioAV complicated by stenosis and subsequent valve-in-valve Sheri TAVR at [facility]  in 2021, hx Diabetes Insipidus with hyponatremia, chronic neuropathy, recent admit at [facility]  8/31-9/2 after fall resulting in C spine fracture, who was sent to [facility] for fluid overload/worsened LE edema with plan for IV lasix diuresis and medical optimization prior to a recommended C-Spine surgery planned for 12/9.    Patient was seen today by Dr Fernandez in clinic and found to have fluid overload and worsened b/l LE edema.  ROS negative for SOB, CP, dizziness, syncope.  Patient sleeps in semireclining position due to neck collar.  Minimally ambulatory and walks only indoors with assistance and RW.     TTE 12/06/22:   1. Normal left and right ventricular size and systolic function.   2. Moderate aortic stenosis.   3. Moderate mitral regurgitation.   4. Pulmonary artery systolic pressure is 35 mmHg.   89 y/o F with PMH HTN, Hyperlipidemia, chronic diastolic CHF, hx BioAV complicated by stenosis and subsequent valve-in-valve Sheri TAVR at [facility]  in 2021 c/b  RCA stent embolization as well as radial pseudoaneurysm, hx Diabetes Insipidus with hyponatremia, chronic neuropathy, recent admit at [facility]  8/31-9/2 after fall resulting in C spine fracture, who was sent to [facility] ED from [facility] clinic for fluid overload/worsened LE edema with plan for IV lasix diuresis and medical optimization prior to a recommended C-Spine surgery planned for 12/9.    Patient was seen today by Dr Fernandez in clinic and found to have fluid overload and worsened b/l LE edema.  ROS negative for SOB, CP, dizziness, syncope.  Patient sleeps in semireclining position due to neck collar.  Minimally ambulatory and walks only indoors with assistance and RW.     TTE 12/06/22:   1. Normal left and right ventricular size and systolic function.   2. Moderate aortic stenosis.   3. Moderate mitral regurgitation.   4. Pulmonary artery systolic pressure is 35 mmHg.   89 y/o F with PMH HTN, Hyperlipidemia, chronic diastolic CHF, hx BioAV complicated by stenosis and subsequent valve-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  in 2021 c/b  RCA stent embolization into Left external iliac artery  as well as radial pseudoaneurysm repaired by vascular surgery, hx Diabetes Insipidus with hyponatremia, chronic neuropathy, recent admit at [facility]  8/31-9/2 after fall resulting in C spine fracture, who was sent to [facility] ED [facility] for fluid overload/worsened LE edema with plan for IV lasix diuresis and medical optimization prior to a recommended C-Spine surgery planned for 12/9.    Patient was seen today by Dr Fernandez in clinic and found to have fluid overload and worsened b/l LE edema.  ROS negative for SOB, CP, dizziness, syncope.  Patient sleeps in semireclining position due to neck collar.  Minimally ambulatory and walks only indoors with assistance and RW. ECHO today revealed moderate AS and moderate MR with nl EF.    in ED VSS, EKG  SR with 1st degree AVB,   ms, HI 228ms, Qtc 457 ms  lytes normal Na 141, K 4.8, Mg 2.3  given 40mg IVP lasix in ED       TTE 12/06/22:   1. Normal left and right ventricular size and systolic function.   2. Moderate aortic stenosis.   3. Moderate mitral regurgitation.   4. Pulmonary artery systolic pressure is 35 mmHg.       89 y/o F with PMH HTN, Hyperlipidemia, chronic diastolic CHF, hx BioAV complicated by stenosis and subsequent valve-in-valve TAVR at [facility]  in 2021 c/b  RCA stent embolization into Left external iliac artery  as well as radial pseudoaneurysm repaired by vascular surgery, hx Diabetes Insipidus with hyponatremia, chronic neuropathy, recent admit at [facility]  8/31-9/2 after fall resulting in C spine fracture, who was sent to [facility] ED [facility] for fluid overload/worsened LE edema with plan for IV lasix diuresis and medical optimization prior to a recommended C-Spine surgery planned for 12/9.    Patient was seen today by Dr Fernandez in clinic and found to have fluid overload and worsened b/l LE edema.  ROS negative for SOB, CP, dizziness, syncope.  Patient sleeps in semireclining position due to neck collar.  Minimally ambulatory and walks only indoors with assistance and RW. ECHO today revealed moderate AS and moderate MR with nl EF.    in ED VSS, EKG  SR with 1st degree AVB and IVCD, LVH  (unchanged)    ms, AK 228ms, Qtc 457 ms  lytes normal Na 141, K 4.8, Mg 2.3  given 40mg IVP lasix in ED       TTE 12/06/22:   1. Normal left and right ventricular size and systolic function.   2. Moderate aortic stenosis.   3. Moderate mitral regurgitation.   4. Pulmonary artery systolic pressure is 35 mmHg.       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  in 2021 (c/b  RCA stent embolization into Left external iliac artery  as well as radial pseudoaneurysm repaired by vascular surgery), hx Diabetes Insipidus with hyponatremia, chronic neuropathy, recent admit at [facility]  8/31-9/2 after fall resulting in C spine fracture, who was sent to [facility] ED from [facility] for fluid overload/worsened LE edema with plan for IV lasix diuresis and medical optimization prior to a recommended C-Spine surgery planned for 12/9.    Patient was seen today by Dr Fernandez in clinic today and found to have fluid overload and worsened b/l LE edema.  ROS negative for SOB, CP, dizziness, syncope.  Patient sleeps in semireclining position due to neck collar.  Minimally ambulatory and walks only indoors with assistance and RW. ECHO today revealed moderate AS and moderate MR with nl EF, gradients unchanged from prior 2021 TTE.    in ED VSS, EKG  SR with 1st degree AVB and IVCD, LVH  (unchanged)    ms, [place] 228ms, Qtc 457 ms  CXR fairly clear (wet read)  lytes normal Na 141, K 4.8, Mg 2.3  given 40mg IVP lasix in ED       TTE 12/06/22:   1. Normal left and right ventricular size and systolic function.   2. Moderate aortic stenosis.   3. Moderate mitral regurgitation.   4. Pulmonary artery systolic pressure is 35 mmHg.       89 y/o F with PMH HTN, Hyperlipidemia, chronic diastolic CHF, hx BioAVR complicated by stenosis and subsequent valve-in-valve TAVR at [facility]  in 2021 (c/b  RCA stent embolization into Left external iliac artery  as well as radial pseudoaneurysm repaired by vascular surgery), hx Diabetes Insipidus with hyponatremia, chronic neuropathy, recent admit at [facility]  8/31-9/2 after fall resulting in C spine fracture, who was sent to [facility] ED [facility] for fluid overload/worsened LE edema with plan for IV lasix diuresis and medical optimization prior to a recommended C-Spine surgery planned for 12/9.    Patient was seen today by Dr Fernandez in clinic today and found to have fluid overload and worsened b/l LE edema.  ROS negative for SOB, CP, dizziness, syncope.  Patient sleeps in semireclining position due to neck collar.  Minimally ambulatory and walks only indoors with assistance and RW. ECHO today revealed moderate AS and moderate MR with nl EF, gradients unchanged from prior 2021 TTE.    in ED VSS, EKG  SR with 1st degree AVB and IVCD, LVH  (unchanged)    ms, SD 228ms, Qtc 457 ms  CXR fairly clear (wet read)  lytes normal Na 141, K 4.8, Mg 2.3, CR 1.18 ( baseline 0.8)   given 40mg IVP lasix in ED       TTE 12/06/22:   1. Normal left and right ventricular size and systolic function.   2. Moderate aortic stenosis.   3. Moderate mitral regurgitation.   4. Pulmonary artery systolic pressure is 35 mmHg.

## 2022-12-06 NOTE — H&P ADULT - ASSESSMENT
89 y/o F with PMH HTN, Hyperlipidemia, chronic diastolic CHF, hx BioAVR complicated by stenosis and subsequent valve-in-valve TAVR at [facility]  in 2021, hx Diabetes Insipidus with hyponatremia, chronic neuropathy, recent admit at [facility]  8/31-9/2 after fall resulting in C spine fracture, who was sent to [facility] ED from [facility] for fluid overload/worsened LE edema with plan for IV lasix diuresis and medical optimization prior to a recommended C-Spine surgery planned for 12/9.

## 2022-12-06 NOTE — ED ADULT NURSE NOTE - NSIMPLEMENTINTERV_GEN_ALL_ED
Implemented All Fall Risk Interventions:  [place] to call system. Call bell, personal items and telephone within reach. Instruct patient to call for assistance. Room bathroom lighting operational. Non-slip footwear when patient is off stretcher. Physically safe environment: no spills, clutter or unnecessary equipment. Stretcher in lowest position, wheels locked, appropriate side rails in place. Provide visual cue, wrist band, yellow gown, etc. Monitor gait and stability. Monitor for mental status changes and reorient to person, place, and time. Review medications for side effects contributing to fall risk. Reinforce activity limits and safety measures with patient and family.

## 2022-12-06 NOTE — H&P ADULT - NSICDXPASTMEDICALHX_GEN_ALL_CORE_FT
PAST MEDICAL HISTORY:  Chronic diastolic congestive heart failure     Diabetes insipidus     Dyslipidemia     Glaucoma     H/O aortic valve stenosis     History of pseudoaneurysm     Hyperlipidemia, unspecified hyperlipidemia type     Hypertension     Vertigo

## 2022-12-07 DIAGNOSIS — Z29.9 ENCOUNTER FOR PROPHYLACTIC MEASURES, UNSPECIFIED: ICD-10-CM

## 2022-12-07 PROBLEM — D18.00 CAVERNOMA: Status: ACTIVE | Noted: 2022-12-07

## 2022-12-07 LAB
ANION GAP SERPL CALC-SCNC: 10 MMOL/L — SIGNIFICANT CHANGE UP (ref 5–17)
BUN SERPL-MCNC: 34 MG/DL — HIGH (ref 7–23)
CALCIUM SERPL-MCNC: 9.7 MG/DL — SIGNIFICANT CHANGE UP (ref 8.4–10.5)
CHLORIDE SERPL-SCNC: 98 MMOL/L — SIGNIFICANT CHANGE UP (ref 96–108)
CO2 SERPL-SCNC: 36 MMOL/L — HIGH (ref 22–31)
CREAT SERPL-MCNC: 1.14 MG/DL — SIGNIFICANT CHANGE UP (ref 0.5–1.3)
EGFR: 45 ML/MIN/1.73M2 — LOW
GLUCOSE SERPL-MCNC: 103 MG/DL — HIGH (ref 70–99)
HCT VFR BLD CALC: 35.5 % — SIGNIFICANT CHANGE UP (ref 34.5–45)
HGB BLD-MCNC: 11.1 G/DL — LOW (ref 11.5–15.5)
MAGNESIUM SERPL-MCNC: 2.2 MG/DL — SIGNIFICANT CHANGE UP (ref 1.6–2.6)
MCHC RBC-ENTMCNC: 30.4 PG — SIGNIFICANT CHANGE UP (ref 27–34)
MCHC RBC-ENTMCNC: 31.3 GM/DL — LOW (ref 32–36)
MCV RBC AUTO: 97.3 FL — SIGNIFICANT CHANGE UP (ref 80–100)
NRBC # BLD: 0 /100 WBCS — SIGNIFICANT CHANGE UP (ref 0–0)
PLATELET # BLD AUTO: 269 K/UL — SIGNIFICANT CHANGE UP (ref 150–400)
POTASSIUM SERPL-MCNC: 4.6 MMOL/L — SIGNIFICANT CHANGE UP (ref 3.5–5.3)
POTASSIUM SERPL-SCNC: 4.6 MMOL/L — SIGNIFICANT CHANGE UP (ref 3.5–5.3)
RBC # BLD: 3.65 M/UL — LOW (ref 3.8–5.2)
RBC # FLD: 14.2 % — SIGNIFICANT CHANGE UP (ref 10.3–14.5)
SODIUM SERPL-SCNC: 144 MMOL/L — SIGNIFICANT CHANGE UP (ref 135–145)
WBC # BLD: 9.79 K/UL — SIGNIFICANT CHANGE UP (ref 3.8–10.5)
WBC # FLD AUTO: 9.79 K/UL — SIGNIFICANT CHANGE UP (ref 3.8–10.5)

## 2022-12-07 PROCEDURE — 72125 CT NECK SPINE W/O DYE: CPT | Mod: 26

## 2022-12-07 PROCEDURE — 99221 1ST HOSP IP/OBS SF/LOW 40: CPT

## 2022-12-07 PROCEDURE — 99233 SBSQ HOSP IP/OBS HIGH 50: CPT

## 2022-12-07 PROCEDURE — 93010 ELECTROCARDIOGRAM REPORT: CPT

## 2022-12-07 PROCEDURE — 72141 MRI NECK SPINE W/O DYE: CPT | Mod: 26

## 2022-12-07 RX ADMIN — Medication 81 MILLIGRAM(S): at 15:47

## 2022-12-07 RX ADMIN — LATANOPROST 1 DROP(S): 0.05 SOLUTION/ DROPS OPHTHALMIC; TOPICAL at 22:06

## 2022-12-07 RX ADMIN — BRIMONIDINE TARTRATE 1 DROP(S): 2 SOLUTION/ DROPS OPHTHALMIC at 22:06

## 2022-12-07 RX ADMIN — LATANOPROST 1 DROP(S): 0.05 SOLUTION/ DROPS OPHTHALMIC; TOPICAL at 00:03

## 2022-12-07 RX ADMIN — GABAPENTIN 300 MILLIGRAM(S): 400 CAPSULE ORAL at 06:21

## 2022-12-07 RX ADMIN — BRIMONIDINE TARTRATE 1 DROP(S): 2 SOLUTION/ DROPS OPHTHALMIC at 10:21

## 2022-12-07 RX ADMIN — DESMOPRESSIN ACETATE 0.15 MILLIGRAM(S): 0.1 TABLET ORAL at 22:05

## 2022-12-07 RX ADMIN — ATORVASTATIN CALCIUM 40 MILLIGRAM(S): 80 TABLET, FILM COATED ORAL at 22:05

## 2022-12-07 RX ADMIN — ZINC SULFATE TAB 220 MG (50 MG ZINC EQUIVALENT) 220 MILLIGRAM(S): 220 (50 ZN) TAB at 15:47

## 2022-12-07 RX ADMIN — GABAPENTIN 300 MILLIGRAM(S): 400 CAPSULE ORAL at 17:52

## 2022-12-07 RX ADMIN — Medication 5000 UNIT(S): at 15:48

## 2022-12-07 RX ADMIN — SPIRONOLACTONE 25 MILLIGRAM(S): 25 TABLET, FILM COATED ORAL at 06:21

## 2022-12-07 NOTE — PROGRESS NOTE ADULT - SUBJECTIVE AND OBJECTIVE BOX
*incomplete*  OVERNIGHT EVENTS:    SUBJECTIVE / INTERVAL HPI: Patient seen and examined at bedside.     VITAL SIGNS:  Vital Signs Last 24 Hrs  T(C): 36.9 (07 Dec 2022 06:11), Max: 36.9 (06 Dec 2022 20:40)  T(F): 98.4 (07 Dec 2022 06:11), Max: 98.4 (06 Dec 2022 20:40)  HR: 74 (07 Dec 2022 04:45) (74 - 98)  BP: 135/69 (07 Dec 2022 04:45) (116/78 - 150/80)  BP(mean): 74 (07 Dec 2022 04:45) (74 - 78)  RR: 20 (07 Dec 2022 04:45) (18 - 20)  SpO2: 99% (07 Dec 2022 04:45) (94% - 99%)    Parameters below as of 07 Dec 2022 04:45  Patient On (Oxygen Delivery Method): room air        PHYSICAL EXAM:    General: alert, in no acute distress  HEENT: NC/AT; PERRL, anicteric sclera; MMM  Neck: supple  Cardiovascular: +S1/S2, RRR  Respiratory: CTA B/L; no W/R/R  Gastrointestinal: soft, NT/ND; +BSx4  Extremities: WWP; no edema, clubbing or cyanosis  Vascular: 2+ radial, DP/PT pulses B/L  Neurological: AAOx3; no focal deficits    MEDICATIONS:  MEDICATIONS  (STANDING):  aspirin  chewable 81 milliGRAM(s) Oral daily  atorvastatin 40 milliGRAM(s) Oral at bedtime  brimonidine 0.2% Ophthalmic Solution 1 Drop(s) Both EYES two times a day  cholecalciferol 5000 Unit(s) Oral daily  desmopressin 0.15 milliGRAM(s) Oral at bedtime  furosemide Infusion 5 mG/Hr (2.5 mL/Hr) IV Continuous <Continuous>  gabapentin 300 milliGRAM(s) Oral two times a day  influenza  Vaccine (HIGH DOSE) 0.7 milliLiter(s) IntraMuscular once  latanoprost 0.005% Ophthalmic Solution 1 Drop(s) Both EYES at bedtime  spironolactone 25 milliGRAM(s) Oral daily  zinc sulfate 220 milliGRAM(s) Oral daily    MEDICATIONS  (PRN):      ALLERGIES:  Allergies    [This allergen will not trigger allergy alert] Acetic Tz-Shlkdxcxnc-Wfiycaeru (Other)  erythromycin (Unknown)  penicillin (Unknown)    Intolerances        LABS:                        10.9   8.80  )-----------( 265      ( 06 Dec 2022 16:09 )             35.2     12-06    141  |  97  |  35<H>  ----------------------------<  101<H>  4.8   |  31  |  1.18    Ca    9.7      06 Dec 2022 16:09  Phos  4.5     12-06  Mg     2.3     12-06    TPro  7.7  /  Alb  4.4  /  TBili  0.3  /  DBili  x   /  AST  26  /  ALT  14  /  AlkPhos  97  12-06    PT/INR - ( 06 Dec 2022 16:09 )   PT: 11.8 sec;   INR: 0.99          PTT - ( 06 Dec 2022 16:09 )  PTT:32.1 sec    CAPILLARY BLOOD GLUCOSE          RADIOLOGY & ADDITIONAL TESTS: Reviewed.   OVERNIGHT EVENTS:  NAEON    SUBJECTIVE / INTERVAL HPI: Patient seen and examined at bedside. This morning she complains of neck pain from the c-collar she has on. Denies sob, chest pain, and abdominal pain.     VITAL SIGNS:  Vital Signs Last 24 Hrs  T(C): 36.9 (07 Dec 2022 06:11), Max: 36.9 (06 Dec 2022 20:40)  T(F): 98.4 (07 Dec 2022 06:11), Max: 98.4 (06 Dec 2022 20:40)  HR: 74 (07 Dec 2022 04:45) (74 - 98)  BP: 135/69 (07 Dec 2022 04:45) (116/78 - 150/80)  BP(mean): 74 (07 Dec 2022 04:45) (74 - 78)  RR: 20 (07 Dec 2022 04:45) (18 - 20)  SpO2: 99% (07 Dec 2022 04:45) (94% - 99%)    Parameters below as of 07 Dec 2022 04:45  Patient On (Oxygen Delivery Method): room air        PHYSICAL EXAM:    HEENT: neck collar in place  Chest: decreased breath sounds bilaterally, no increased work of breathing, on 2LNC  Cor: S1 S2 RRR, + III/VI SM loudest at RUSB  Abd: soft, ND, NT  Ext: 2+ pitting edema both LE up to thighs  Vasc: DP/PT 1+ b/l   Skin: warm, dry, minimal erythema over R shin, no ulcers/open wounds  Neuro: AOOx 3, fluent speech;  Psych: Appropriate affect, cooperative    MEDICATIONS:  MEDICATIONS  (STANDING):  aspirin  chewable 81 milliGRAM(s) Oral daily  atorvastatin 40 milliGRAM(s) Oral at bedtime  brimonidine 0.2% Ophthalmic Solution 1 Drop(s) Both EYES two times a day  cholecalciferol 5000 Unit(s) Oral daily  desmopressin 0.15 milliGRAM(s) Oral at bedtime  furosemide Infusion 5 mG/Hr (2.5 mL/Hr) IV Continuous <Continuous>  gabapentin 300 milliGRAM(s) Oral two times a day  influenza  Vaccine (HIGH DOSE) 0.7 milliLiter(s) IntraMuscular once  latanoprost 0.005% Ophthalmic Solution 1 Drop(s) Both EYES at bedtime  spironolactone 25 milliGRAM(s) Oral daily  zinc sulfate 220 milliGRAM(s) Oral daily    MEDICATIONS  (PRN):      ALLERGIES:  Allergies    [This allergen will not trigger allergy alert] Acetic Hi-Moefksjviq-Nfvftnczh (Other)  erythromycin (Unknown)  penicillin (Unknown)    Intolerances        LABS:                        10.9   8.80  )-----------( 265      ( 06 Dec 2022 16:09 )             35.2     12-06    141  |  97  |  35<H>  ----------------------------<  101<H>  4.8   |  31  |  1.18    Ca    9.7      06 Dec 2022 16:09  Phos  4.5     12-06  Mg     2.3     12-06    TPro  7.7  /  Alb  4.4  /  TBili  0.3  /  DBili  x   /  AST  26  /  ALT  14  /  AlkPhos  97  12-06    PT/INR - ( 06 Dec 2022 16:09 )   PT: 11.8 sec;   INR: 0.99          PTT - ( 06 Dec 2022 16:09 )  PTT:32.1 sec    CAPILLARY BLOOD GLUCOSE          RADIOLOGY & ADDITIONAL TESTS: Reviewed.

## 2022-12-07 NOTE — PROGRESS NOTE ADULT - PROBLEM SELECTOR PLAN 5
Recent admit at [facility]  8/31-9/2 after fall resulting in C spine fracture. Has been wearing C-collar since fracture. Pt for possible C spine surgery at the end of this week once medically optimized with Dr. Puckett and Dr. Butler.  - c/w C-collar and place supportive bandaging for skin irritation  - neurosurgery consulted, f/u recs

## 2022-12-07 NOTE — PROGRESS NOTE ADULT - TIME BILLING
89F w/ pmh of dCHF, Severe AS s/p biopros AVR c/b bioprosthetic AV stenosis s/p Bryce TAVR in 2021, now p/w acute on chronic dCHF exacerbation, admitted to Cardiac Tele    -dCHF - acute on chronic dCHF exacerbation - volume overloaded, improving since IV diuresis has started - c/w Lasix gtt @5mg/hr; c/w Spironolactone 25 mg daily; Currently net ~2.5L in first 24 hrs, renal function stable, electrolytes WNL  -Neuro - Pt. had mechanical fall in early September c/b cervical spine fracture requiring cervical collar; She has been following w/ Neurosurgery and is being considered for Neurosurgical intervention. Neurosurgery consulted and following, appreciate recs, will obtain imaging as rec'd and will f/u re: potential surgery. From a Cardiac standpoint - pt. will be fully optimized by Friday. Will re-evaluate tmrw and give formal cardiac clearance/recs  -DASH diet  -OOB to chair / PT  -DVT PPx  -Dispo: Cardiac Tele  -Full Code  -Case d/w Patient and daughter Massiel (by phone)    Al Fernandez MD  Cardiology Attending

## 2022-12-07 NOTE — PROGRESS NOTE ADULT - PROBLEM SELECTOR PLAN 2
Baseline Cr around .8. Admitted w/ Cr 1.18 in setting of decompensated HF  - Cr downtrending to 1.14 this morning  - monitor renal function   - continue to trend creatinine

## 2022-12-07 NOTE — PROGRESS NOTE ADULT - PROBLEM SELECTOR PLAN 4
Hx of HTN. Home meds: spironolactone and torsemide.  - c/w spironolactone  - c/w lasix drip  - monitor BPs

## 2022-12-08 ENCOUNTER — APPOINTMENT (OUTPATIENT)
Dept: SPINE | Facility: CLINIC | Age: 87
End: 2022-12-08

## 2022-12-08 LAB
ANION GAP SERPL CALC-SCNC: 13 MMOL/L — SIGNIFICANT CHANGE UP (ref 5–17)
BUN SERPL-MCNC: 37 MG/DL — HIGH (ref 7–23)
CALCIUM SERPL-MCNC: 9.2 MG/DL — SIGNIFICANT CHANGE UP (ref 8.4–10.5)
CHLORIDE SERPL-SCNC: 94 MMOL/L — LOW (ref 96–108)
CO2 SERPL-SCNC: 32 MMOL/L — HIGH (ref 22–31)
CREAT SERPL-MCNC: 1.21 MG/DL — SIGNIFICANT CHANGE UP (ref 0.5–1.3)
EGFR: 42 ML/MIN/1.73M2 — LOW
GLUCOSE SERPL-MCNC: 89 MG/DL — SIGNIFICANT CHANGE UP (ref 70–99)
HCT VFR BLD CALC: 33.5 % — LOW (ref 34.5–45)
HGB BLD-MCNC: 10.2 G/DL — LOW (ref 11.5–15.5)
MAGNESIUM SERPL-MCNC: 2.1 MG/DL — SIGNIFICANT CHANGE UP (ref 1.6–2.6)
MCHC RBC-ENTMCNC: 29.7 PG — SIGNIFICANT CHANGE UP (ref 27–34)
MCHC RBC-ENTMCNC: 30.4 GM/DL — LOW (ref 32–36)
MCV RBC AUTO: 97.7 FL — SIGNIFICANT CHANGE UP (ref 80–100)
NRBC # BLD: 0 /100 WBCS — SIGNIFICANT CHANGE UP (ref 0–0)
PHOSPHATE SERPL-MCNC: 4.7 MG/DL — HIGH (ref 2.5–4.5)
PLATELET # BLD AUTO: 218 K/UL — SIGNIFICANT CHANGE UP (ref 150–400)
POTASSIUM SERPL-MCNC: 4.4 MMOL/L — SIGNIFICANT CHANGE UP (ref 3.5–5.3)
POTASSIUM SERPL-SCNC: 4.4 MMOL/L — SIGNIFICANT CHANGE UP (ref 3.5–5.3)
RBC # BLD: 3.43 M/UL — LOW (ref 3.8–5.2)
RBC # FLD: 14.4 % — SIGNIFICANT CHANGE UP (ref 10.3–14.5)
SODIUM SERPL-SCNC: 139 MMOL/L — SIGNIFICANT CHANGE UP (ref 135–145)
WBC # BLD: 8.98 K/UL — SIGNIFICANT CHANGE UP (ref 3.8–10.5)
WBC # FLD AUTO: 8.98 K/UL — SIGNIFICANT CHANGE UP (ref 3.8–10.5)

## 2022-12-08 PROCEDURE — 99233 SBSQ HOSP IP/OBS HIGH 50: CPT

## 2022-12-08 RX ORDER — ACETAMINOPHEN 500 MG
650 TABLET ORAL ONCE
Refills: 0 | Status: COMPLETED | OUTPATIENT
Start: 2022-12-08 | End: 2022-12-08

## 2022-12-08 RX ORDER — CALAMINE AND ZINC OXIDE AND PHENOL 160; 10 MG/ML; MG/ML
1 LOTION TOPICAL EVERY 12 HOURS
Refills: 0 | Status: DISCONTINUED | OUTPATIENT
Start: 2022-12-08 | End: 2022-12-10

## 2022-12-08 RX ORDER — FUROSEMIDE 40 MG
2.5 TABLET ORAL
Qty: 500 | Refills: 0 | Status: DISCONTINUED | OUTPATIENT
Start: 2022-12-08 | End: 2022-12-09

## 2022-12-08 RX ORDER — LANOLIN ALCOHOL/MO/W.PET/CERES
5 CREAM (GRAM) TOPICAL AT BEDTIME
Refills: 0 | Status: DISCONTINUED | OUTPATIENT
Start: 2022-12-08 | End: 2022-12-10

## 2022-12-08 RX ORDER — ACETAZOLAMIDE 250 MG/1
500 TABLET ORAL ONCE
Refills: 0 | Status: COMPLETED | OUTPATIENT
Start: 2022-12-08 | End: 2022-12-08

## 2022-12-08 RX ORDER — SODIUM CHLORIDE 5 G/100ML
150 INJECTION, SOLUTION INTRAVENOUS
Refills: 0 | Status: DISCONTINUED | OUTPATIENT
Start: 2022-12-08 | End: 2022-12-08

## 2022-12-08 RX ADMIN — Medication 5 MILLIGRAM(S): at 23:38

## 2022-12-08 RX ADMIN — GABAPENTIN 300 MILLIGRAM(S): 400 CAPSULE ORAL at 17:15

## 2022-12-08 RX ADMIN — Medication 650 MILLIGRAM(S): at 06:26

## 2022-12-08 RX ADMIN — DESMOPRESSIN ACETATE 0.15 MILLIGRAM(S): 0.1 TABLET ORAL at 23:23

## 2022-12-08 RX ADMIN — ATORVASTATIN CALCIUM 40 MILLIGRAM(S): 80 TABLET, FILM COATED ORAL at 23:22

## 2022-12-08 RX ADMIN — BRIMONIDINE TARTRATE 1 DROP(S): 2 SOLUTION/ DROPS OPHTHALMIC at 06:27

## 2022-12-08 RX ADMIN — Medication 1.25 MG/HR: at 18:20

## 2022-12-08 RX ADMIN — Medication 5000 UNIT(S): at 13:08

## 2022-12-08 RX ADMIN — BRIMONIDINE TARTRATE 1 DROP(S): 2 SOLUTION/ DROPS OPHTHALMIC at 17:15

## 2022-12-08 RX ADMIN — GABAPENTIN 300 MILLIGRAM(S): 400 CAPSULE ORAL at 06:27

## 2022-12-08 RX ADMIN — LATANOPROST 1 DROP(S): 0.05 SOLUTION/ DROPS OPHTHALMIC; TOPICAL at 23:03

## 2022-12-08 RX ADMIN — SPIRONOLACTONE 25 MILLIGRAM(S): 25 TABLET, FILM COATED ORAL at 06:26

## 2022-12-08 RX ADMIN — Medication 81 MILLIGRAM(S): at 11:32

## 2022-12-08 RX ADMIN — ZINC SULFATE TAB 220 MG (50 MG ZINC EQUIVALENT) 220 MILLIGRAM(S): 220 (50 ZN) TAB at 11:32

## 2022-12-08 RX ADMIN — ACETAZOLAMIDE 110 MILLIGRAM(S): 250 TABLET ORAL at 17:52

## 2022-12-08 NOTE — PHYSICAL THERAPY INITIAL EVALUATION ADULT - PERTINENT HX OF CURRENT PROBLEM, REHAB EVAL
89 y/o F with PMH hx BioAVR complicated by stenosis and subsequent valve-in-valve TAVR at [facility]  in 2021 recent admit at [facility]  8/31-9/2 after fall resulting in C spine fracture, who was sent to [facility] ED from cards clinic for fluid overload/worsened LE edema with plan for IV lasix diuresis and medical optimization prior to a recommended C-Spine surgery planned for 12/9. Patient was seen by Dr Fernandez in clinic and found to have fluid overload and worsened b/l LE edema.

## 2022-12-08 NOTE — PROGRESS NOTE ADULT - SUBJECTIVE AND OBJECTIVE BOX
OVERNIGHT EVENTS:  NAEON    SUBJECTIVE / INTERVAL HPI: Patient seen and examined at bedside. This morning she      VITAL SIGNS:  Vital Signs Last 24 Hrs  T(C): 36.8 (08 Dec 2022 13:36), Max: 37.2 (07 Dec 2022 15:29)  T(F): 98.2 (08 Dec 2022 13:36), Max: 98.9 (07 Dec 2022 15:29)  HR: 78 (08 Dec 2022 11:37) (70 - 80)  BP: 126/66 (08 Dec 2022 11:37) (125/62 - 141/67)  BP(mean): 90 (08 Dec 2022 11:37) (85 - 97)  RR: 16 (08 Dec 2022 11:37) (16 - 20)  SpO2: 89% (08 Dec 2022 11:37) (89% - 100%)    Parameters below as of 08 Dec 2022 11:37  Patient On (Oxygen Delivery Method): nasal cannula  O2 Flow (L/min): 2      PHYSICAL EXAM:    General: alert, in no acute distress  HEENT: NC/AT; PERRL, anicteric sclera; MMM  Neck: supple  Cardiovascular: +S1/S2, RRR  Respiratory: CTA B/L; no W/R/R  Gastrointestinal: soft, NT/ND; +BSx4  Extremities: WWP; no edema, clubbing or cyanosis  Vascular: 2+ radial, DP/PT pulses B/L  Neurological: AAOx3; no focal deficits    MEDICATIONS:  MEDICATIONS  (STANDING):  aspirin  chewable 81 milliGRAM(s) Oral daily  atorvastatin 40 milliGRAM(s) Oral at bedtime  brimonidine 0.2% Ophthalmic Solution 1 Drop(s) Both EYES two times a day  calamine/zinc oxide Lotion 1 Application(s) Topical every 12 hours  cholecalciferol 5000 Unit(s) Oral daily  desmopressin 0.15 milliGRAM(s) Oral at bedtime  furosemide Infusion 5 mG/Hr (2.5 mL/Hr) IV Continuous <Continuous>  gabapentin 300 milliGRAM(s) Oral two times a day  influenza  Vaccine (HIGH DOSE) 0.7 milliLiter(s) IntraMuscular once  latanoprost 0.005% Ophthalmic Solution 1 Drop(s) Both EYES at bedtime  spironolactone 25 milliGRAM(s) Oral daily  zinc sulfate 220 milliGRAM(s) Oral daily    MEDICATIONS  (PRN):  artificial  tears Solution 1 Drop(s) Both EYES every 6 hours PRN Dry Eyes      ALLERGIES:  Allergies    [This allergen will not trigger allergy alert] Acetic Ao-Duyxmjiict-Pllzfulif (Other)  erythromycin (Unknown)  penicillin (Unknown)    Intolerances        LABS:                        10.2   8.98  )-----------( 218      ( 08 Dec 2022 07:21 )             33.5     12-08    139  |  94<L>  |  37<H>  ----------------------------<  89  4.4   |  32<H>  |  1.21    Ca    9.2      08 Dec 2022 07:21  Phos  4.7     12-08  Mg     2.1     12-08    TPro  7.7  /  Alb  4.4  /  TBili  0.3  /  DBili  x   /  AST  26  /  ALT  14  /  AlkPhos  97  12-06    PT/INR - ( 06 Dec 2022 16:09 )   PT: 11.8 sec;   INR: 0.99          PTT - ( 06 Dec 2022 16:09 )  PTT:32.1 sec    CAPILLARY BLOOD GLUCOSE          RADIOLOGY & ADDITIONAL TESTS: Reviewed. OVERNIGHT EVENTS:  NAEON    SUBJECTIVE / INTERVAL HPI: Patient seen and examined at bedside. This morning she says she wants to go home, she is feeling better overall. She continues to have neck pain from the c-collar. Denies fever, chills, sob, and chest pain.     VITAL SIGNS:  Vital Signs Last 24 Hrs  T(C): 36.8 (08 Dec 2022 13:36), Max: 37.2 (07 Dec 2022 15:29)  T(F): 98.2 (08 Dec 2022 13:36), Max: 98.9 (07 Dec 2022 15:29)  HR: 78 (08 Dec 2022 11:37) (70 - 80)  BP: 126/66 (08 Dec 2022 11:37) (125/62 - 141/67)  BP(mean): 90 (08 Dec 2022 11:37) (85 - 97)  RR: 16 (08 Dec 2022 11:37) (16 - 20)  SpO2: 89% (08 Dec 2022 11:37) (89% - 100%)    Parameters below as of 08 Dec 2022 11:37  Patient On (Oxygen Delivery Method): nasal cannula  O2 Flow (L/min): 2      PHYSICAL EXAM:  HEENT: neck collar in place  Chest: decreased breath sounds bilaterally, no increased work of breathing, on 2LNC  Cor: S1 S2 RRR, + III/VI SM loudest at RUSB  Abd: soft, ND, NT  Ext: 2+ pitting edema both LE up to thighs  Vasc: DP/PT 1+ b/l   Skin: warm, dry, minimal erythema over R shin, no ulcers/open wounds  Neuro: AOOx 3, fluent speech;  Psych: Appropriate affect, cooperative      MEDICATIONS:  MEDICATIONS  (STANDING):  aspirin  chewable 81 milliGRAM(s) Oral daily  atorvastatin 40 milliGRAM(s) Oral at bedtime  brimonidine 0.2% Ophthalmic Solution 1 Drop(s) Both EYES two times a day  calamine/zinc oxide Lotion 1 Application(s) Topical every 12 hours  cholecalciferol 5000 Unit(s) Oral daily  desmopressin 0.15 milliGRAM(s) Oral at bedtime  furosemide Infusion 5 mG/Hr (2.5 mL/Hr) IV Continuous <Continuous>  gabapentin 300 milliGRAM(s) Oral two times a day  influenza  Vaccine (HIGH DOSE) 0.7 milliLiter(s) IntraMuscular once  latanoprost 0.005% Ophthalmic Solution 1 Drop(s) Both EYES at bedtime  spironolactone 25 milliGRAM(s) Oral daily  zinc sulfate 220 milliGRAM(s) Oral daily    MEDICATIONS  (PRN):  artificial  tears Solution 1 Drop(s) Both EYES every 6 hours PRN Dry Eyes      ALLERGIES:  Allergies    [This allergen will not trigger allergy alert] Acetic Ed-Tuecajwmnt-Hvadjvuem (Other)  erythromycin (Unknown)  penicillin (Unknown)    Intolerances        LABS:                        10.2   8.98  )-----------( 218      ( 08 Dec 2022 07:21 )             33.5     12-08    139  |  94<L>  |  37<H>  ----------------------------<  89  4.4   |  32<H>  |  1.21    Ca    9.2      08 Dec 2022 07:21  Phos  4.7     12-08  Mg     2.1     12-08    TPro  7.7  /  Alb  4.4  /  TBili  0.3  /  DBili  x   /  AST  26  /  ALT  14  /  AlkPhos  97  12-06    PT/INR - ( 06 Dec 2022 16:09 )   PT: 11.8 sec;   INR: 0.99          PTT - ( 06 Dec 2022 16:09 )  PTT:32.1 sec    CAPILLARY BLOOD GLUCOSE          RADIOLOGY & ADDITIONAL TESTS: Reviewed.

## 2022-12-08 NOTE — DIETITIAN INITIAL EVALUATION ADULT - NUTRITIONGOAL OUTCOME1
Pt to consistently meet at least 75% of EEE via tolerated route that is consistent with GOC during hospital stay;

## 2022-12-08 NOTE — PROGRESS NOTE ADULT - PROBLEM SELECTOR PLAN 5
Recent admit at [facility]  8/31-9/2 after fall resulting in C spine fracture. Has been wearing C-collar since fracture. Pt for possible C spine surgery by Dr. Butler.  - c/w C-collar and place supportive bandaging for skin irritation  - neurosurgery consulted: no planned surgical intervention at this time  - CT Cervical spine: Nonhealing base of dens fracture. Compared to 11/10/22 there is mild increase in anterior displacement of the dens and C1 lateral masses relative to the base of dens.  - MR Cervical spine: Nonhealing C2 fracture at base of dens, mildly distracted as seen on CT. Edema involving the right greater than left lateral masses likely stress related to altered alignment. No evidence for marrow replacing lesion or other bony edema.

## 2022-12-08 NOTE — PROGRESS NOTE ADULT - PROBLEM SELECTOR PLAN 6
S/p Valve in valve TAVR in 2021 with echo today unchanged from prior  - TTE 12/6: EF> 75%, moderate AS and moderate MR

## 2022-12-08 NOTE — PHYSICAL THERAPY INITIAL EVALUATION ADULT - IMPAIRMENTS CONTRIBUTING TO GAIT DEVIATIONS, PT EVAL
[de-identified] : Viviana appears to have either inflammed or fractured the osteophyte on her patella. She will begin a course of Lodine 400mg bid with food for the next two weeks. She will slowly increase her activities as tolerated. We will see her back on an as needed basis. She understands that she may ultimately require knee replacement. She will call if any issues arise.  Min unsteadiness, forward flexion posture/impaired balance/decreased strength Min unsteadiness, forward flexion posture, semi impulsive/impaired balance/decreased strength

## 2022-12-08 NOTE — DIETITIAN INITIAL EVALUATION ADULT - PERTINENT LABORATORY DATA
12-08    139  |  94<L>  |  37<H>  ----------------------------<  89  4.4   |  32<H>  |  1.21    Ca    9.2      08 Dec 2022 07:21  Phos  4.7     12-08  Mg     2.1     12-08    A1C with Estimated Average Glucose Result: 5.6 % (09-02-22 @ 11:05)

## 2022-12-08 NOTE — DIETITIAN INITIAL EVALUATION ADULT - ADD RECOMMEND
1. Continue with current diet order  2. Encourage pt to meet nutritional needs as able   3. Monitor PO intakes, trend weights (weekly), monitor skin integrity, monitor labs (electrolytes, CMP), monitor GI fxn   4. Encourage adherence to diet education (reinforce as able)   5. Continue cholecalciferol and zinc sulfate   6. Pain and bowel regimen per team   7. Will continue to assess/honor preferences as able   8. Align nutrition interventions with GOC at all times

## 2022-12-08 NOTE — PROGRESS NOTE ADULT - PROBLEM SELECTOR PLAN 2
Baseline Cr around .8. Admitted w/ Cr 1.18 in setting of decompensated HF.  - Cr uptrending to 1.21.  - monitor renal function   - continue to trend creatinine Baseline Cr around .8. Admitted w/ Cr 1.18 in setting of decompensated HF. Currently on lasix drip.  - Cr uptrending to 1.21.  - monitor renal function   - continue to trend creatinine

## 2022-12-08 NOTE — PHYSICAL THERAPY INITIAL EVALUATION ADULT - IMPAIRMENTS FOUND, PT EVAL
aerobic capacity/endurance/arousal, attention, and cognition/gait, locomotion, and balance/gross motor/muscle strength

## 2022-12-08 NOTE — DIETITIAN INITIAL EVALUATION ADULT - OTHER INFO
89 y/o F with PMH HTN, Hyperlipidemia, chronic diastolic CHF, hx BioAVR complicated by stenosis and subsequent valve-in-valve TAVR at [facility]  in 2021, hx Diabetes Insipidus with hyponatremia, chronic neuropathy, recent admit at [facility]  8/31-9/2 after fall resulting in C spine fracture, who was sent to [facility] ED from cards clinic for fluid overload/worsened LE edema with plan for IV lasix diuresis and medical optimization prior to a recommended C-Spine surgery planned for 12/9.    Pt seen on 4UR at bedside.  Appetite currently good per pt; PTA, appetite was good per pt. As per diet/24h recall, PTA pt consumed three meals per day, which consisted of scrambled eggs, toast or english muffin, cup of fruit, goat cheese, cottage cheese, yams, frozen yogurt at times. During current admission, consumption of ~75% meals on average as noted per pt. Preferences: No cultural, ethnic, [place] food preferences noted. GI: s/s within normal limits at this time per chart. No c/o N/V/D/C. Most recent BM on 12/07/22. Jeff: 18. Skin integrity: bruised (ecchymosis), scratches. R leg and foot edema 4+, L leg and foot nonpitting edema noted. I/O: 180/4150 (-3970). Denies pain/discomfort per chart. NKFA. No issues chewing/swallowing noted. Pertinent labs: low H&H and eGFR, elevated Phos and CO2 levels; will monitor trends. Pt is receiving a DASH/TLC diet. Pertinent nutrition-related medications/supplements: pt is receiving cholecalciferol, spironolactone, zinc sulfate, furosemide. Pt stated UBW unknown exactly but pt states it has been stable/is consistent with wt upon admission (~148 pounds). Pt stated ht ~5'1". Pt's IBW is 105#, pt is 141% of IBW. Observed pt with no overt signs of muscle or fat wasting. Based on ASPEN guidelines, pt does not meet criteria for malnutrition at this time. Pt amenable to education; RD provided education in regards to the importance of adequate macro and micronutrients, as well as hydration to support ADLs, maintain energy levels and overall functional/nutritional status. Heart healthy diet education provided, review of pt's DASH/TLC diet order. Pt was receptive and verbalized understanding. No additional nutrition-related concerns. RD will remain available per protocol. Additional nutrition recommendations listed below to follow.  89 y/o F with PMH HTN, Hyperlipidemia, chronic diastolic CHF, hx BioAVR complicated by stenosis and subsequent valve-in-valve TAVR at [facility]  in 2021, hx Diabetes Insipidus with hyponatremia, chronic neuropathy, recent admit at [facility]  8/31-9/2 after fall resulting in C spine fracture, who was sent to [facility] ED from cards clinic for fluid overload/worsened LE edema with plan for IV lasix diuresis and medical optimization prior to a recommended C-Spine surgery planned for 12/9.    Pt seen on 4UR at bedside.  Appetite currently good per pt; PTA, appetite was good per pt. As per diet/24h recall, PTA pt consumed three meals per day, which consisted of scrambled eggs, toast or english muffin, cup of fruit, goat cheese, cottage cheese, yams, frozen yogurt at times. Pt stated she overconsumes frozen yogurt, chocolate and has some trouble with portion control. RD made healthy suggestions for pt regarding portion control, eating when hungry, eating more satiating foods/fiber foods. Pt endorsed her aid does the cooking. During current admission, consumption of ~75% meals on average as noted per pt. Preferences: No cultural, ethnic, Taoism food preferences noted. GI: s/s within normal limits at this time per chart. No c/o N/V/D/C. Most recent BM on 12/07/22. Jeff: 18. Skin integrity: bruised (ecchymosis), scratches. R leg and foot edema 4+, L leg and foot nonpitting edema noted. I/O: 180/4150 (-3970). Denies pain/discomfort per chart. NKFA. No issues chewing/swallowing noted. Pertinent labs: low H&H and eGFR, elevated Phos and CO2 levels; will monitor trends. Pt is receiving a DASH/TLC diet. Pertinent nutrition-related medications/supplements: pt is receiving cholecalciferol, spironolactone, zinc sulfate, furosemide. Pt stated UBW unknown exactly but pt states it has been stable/is consistent with wt upon admission (~148 pounds). Pt stated ht ~5'1". Pt's IBW is 105#, pt is 141% of IBW. Observed pt with no overt signs of muscle or fat wasting. Based on ASPEN guidelines, pt does not meet criteria for malnutrition at this time. Pt amenable to education; RD provided education in regards to the importance of adequate macro and micronutrients, as well as hydration to support ADLs, maintain energy levels and overall functional/nutritional status. Heart healthy diet education provided, review of pt's DASH/TLC diet order. Pt was receptive and verbalized understanding. No additional nutrition-related concerns. RD will remain available per protocol. Additional nutrition recommendations listed below to follow.

## 2022-12-08 NOTE — PHYSICAL THERAPY INITIAL EVALUATION ADULT - ADDITIONAL COMMENTS
As per pt, PTA she required assistance 24h by aide. Used a rollator to ambulate and has a WC which she sometimes uses. Aides assist with all ADLs and IADLs. Pt was previously able to ambulate 4 blocks.

## 2022-12-08 NOTE — DIETITIAN INITIAL EVALUATION ADULT - OTHER CALCULATIONS
Based on Standards of Care pt >120% IBW (ibw is 105# pt is 141% ibw) thus ideal body weight used for all calculations. Needs adjusted for advanced age.

## 2022-12-08 NOTE — DIETITIAN INITIAL EVALUATION ADULT - PERTINENT MEDS FT
MEDICATIONS  (STANDING):  aspirin  chewable 81 milliGRAM(s) Oral daily  atorvastatin 40 milliGRAM(s) Oral at bedtime  brimonidine 0.2% Ophthalmic Solution 1 Drop(s) Both EYES two times a day  calamine/zinc oxide Lotion 1 Application(s) Topical every 12 hours  cholecalciferol 5000 Unit(s) Oral daily  desmopressin 0.15 milliGRAM(s) Oral at bedtime  furosemide Infusion 2.5 mG/Hr (1.25 mL/Hr) IV Continuous <Continuous>  gabapentin 300 milliGRAM(s) Oral two times a day  influenza  Vaccine (HIGH DOSE) 0.7 milliLiter(s) IntraMuscular once  latanoprost 0.005% Ophthalmic Solution 1 Drop(s) Both EYES at bedtime  spironolactone 25 milliGRAM(s) Oral daily  zinc sulfate 220 milliGRAM(s) Oral daily    MEDICATIONS  (PRN):  artificial  tears Solution 1 Drop(s) Both EYES every 6 hours PRN Dry Eyes

## 2022-12-08 NOTE — DIETITIAN INITIAL EVALUATION ADULT - NSICDXPASTSURGICALHX_GEN_ALL_CORE_FT
PAST SURGICAL HISTORY:  H/O lumbosacral spine surgery     S/P AVR Bioprosthetic    S/P hip replacement B/L    S/P right coronary artery (RCA) stent placement      for patient to get stronger to receive her cancer treatment

## 2022-12-09 ENCOUNTER — TRANSCRIPTION ENCOUNTER (OUTPATIENT)
Age: 87
End: 2022-12-09

## 2022-12-09 DIAGNOSIS — E78.5 HYPERLIPIDEMIA, UNSPECIFIED: ICD-10-CM

## 2022-12-09 PROBLEM — H40.9 UNSPECIFIED GLAUCOMA: Chronic | Status: ACTIVE | Noted: 2022-12-06

## 2022-12-09 LAB
ANION GAP SERPL CALC-SCNC: 7 MMOL/L — SIGNIFICANT CHANGE UP (ref 5–17)
BUN SERPL-MCNC: 38 MG/DL — HIGH (ref 7–23)
CALCIUM SERPL-MCNC: 9.3 MG/DL — SIGNIFICANT CHANGE UP (ref 8.4–10.5)
CHLORIDE SERPL-SCNC: 94 MMOL/L — LOW (ref 96–108)
CO2 SERPL-SCNC: 37 MMOL/L — HIGH (ref 22–31)
CREAT SERPL-MCNC: 1.44 MG/DL — HIGH (ref 0.5–1.3)
EGFR: 34 ML/MIN/1.73M2 — LOW
GLUCOSE SERPL-MCNC: 103 MG/DL — HIGH (ref 70–99)
HCT VFR BLD CALC: 34.5 % — SIGNIFICANT CHANGE UP (ref 34.5–45)
HGB BLD-MCNC: 10.4 G/DL — LOW (ref 11.5–15.5)
MAGNESIUM SERPL-MCNC: 2.2 MG/DL — SIGNIFICANT CHANGE UP (ref 1.6–2.6)
MCHC RBC-ENTMCNC: 30.1 GM/DL — LOW (ref 32–36)
MCHC RBC-ENTMCNC: 30.1 PG — SIGNIFICANT CHANGE UP (ref 27–34)
MCV RBC AUTO: 99.7 FL — SIGNIFICANT CHANGE UP (ref 80–100)
NRBC # BLD: 0 /100 WBCS — SIGNIFICANT CHANGE UP (ref 0–0)
PHOSPHATE SERPL-MCNC: 4.8 MG/DL — HIGH (ref 2.5–4.5)
PLATELET # BLD AUTO: 249 K/UL — SIGNIFICANT CHANGE UP (ref 150–400)
POTASSIUM SERPL-MCNC: 4.2 MMOL/L — SIGNIFICANT CHANGE UP (ref 3.5–5.3)
POTASSIUM SERPL-SCNC: 4.2 MMOL/L — SIGNIFICANT CHANGE UP (ref 3.5–5.3)
RBC # BLD: 3.46 M/UL — LOW (ref 3.8–5.2)
RBC # FLD: 13.9 % — SIGNIFICANT CHANGE UP (ref 10.3–14.5)
SODIUM SERPL-SCNC: 138 MMOL/L — SIGNIFICANT CHANGE UP (ref 135–145)
WBC # BLD: 9 K/UL — SIGNIFICANT CHANGE UP (ref 3.8–10.5)
WBC # FLD AUTO: 9 K/UL — SIGNIFICANT CHANGE UP (ref 3.8–10.5)

## 2022-12-09 PROCEDURE — 99233 SBSQ HOSP IP/OBS HIGH 50: CPT

## 2022-12-09 RX ORDER — ZINC SULFATE TAB 220 MG (50 MG ZINC EQUIVALENT) 220 (50 ZN) MG
1 TAB ORAL
Qty: 0 | Refills: 0 | DISCHARGE
Start: 2022-12-09

## 2022-12-09 RX ORDER — ACETAMINOPHEN 500 MG
650 TABLET ORAL ONCE
Refills: 0 | Status: COMPLETED | OUTPATIENT
Start: 2022-12-09 | End: 2022-12-09

## 2022-12-09 RX ORDER — ATORVASTATIN CALCIUM 80 MG/1
1 TABLET, FILM COATED ORAL
Qty: 30 | Refills: 0
Start: 2022-12-09 | End: 2023-01-07

## 2022-12-09 RX ORDER — ZINC SULFATE TAB 220 MG (50 MG ZINC EQUIVALENT) 220 (50 ZN) MG
1 TAB ORAL
Qty: 0 | Refills: 0 | DISCHARGE

## 2022-12-09 RX ADMIN — Medication 1 DROP(S): at 18:10

## 2022-12-09 RX ADMIN — BRIMONIDINE TARTRATE 1 DROP(S): 2 SOLUTION/ DROPS OPHTHALMIC at 06:58

## 2022-12-09 RX ADMIN — Medication 5 MILLIGRAM(S): at 22:15

## 2022-12-09 RX ADMIN — GABAPENTIN 300 MILLIGRAM(S): 400 CAPSULE ORAL at 06:57

## 2022-12-09 RX ADMIN — ZINC SULFATE TAB 220 MG (50 MG ZINC EQUIVALENT) 220 MILLIGRAM(S): 220 (50 ZN) TAB at 10:27

## 2022-12-09 RX ADMIN — LATANOPROST 1 DROP(S): 0.05 SOLUTION/ DROPS OPHTHALMIC; TOPICAL at 22:15

## 2022-12-09 RX ADMIN — ATORVASTATIN CALCIUM 40 MILLIGRAM(S): 80 TABLET, FILM COATED ORAL at 22:14

## 2022-12-09 RX ADMIN — GABAPENTIN 300 MILLIGRAM(S): 400 CAPSULE ORAL at 17:40

## 2022-12-09 RX ADMIN — BRIMONIDINE TARTRATE 1 DROP(S): 2 SOLUTION/ DROPS OPHTHALMIC at 17:41

## 2022-12-09 RX ADMIN — Medication 81 MILLIGRAM(S): at 10:28

## 2022-12-09 RX ADMIN — DESMOPRESSIN ACETATE 0.15 MILLIGRAM(S): 0.1 TABLET ORAL at 22:14

## 2022-12-09 RX ADMIN — Medication 650 MILLIGRAM(S): at 23:41

## 2022-12-09 RX ADMIN — SPIRONOLACTONE 25 MILLIGRAM(S): 25 TABLET, FILM COATED ORAL at 06:57

## 2022-12-09 RX ADMIN — Medication 5000 UNIT(S): at 10:28

## 2022-12-09 NOTE — PROGRESS NOTE ADULT - PROBLEM SELECTOR PLAN 2
Baseline Cr around .8. Admitted w/ Cr 1.18 in setting of decompensated HF.   - Cr uptrending 1.14 -> 1.21 -> 1.44 iso diuretic use  - diuretic now on hold   - monitor renal function   - continue to trend creatinine

## 2022-12-09 NOTE — PROGRESS NOTE ADULT - ASSESSMENT
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  in 2021, hx Diabetes Insipidus with hyponatremia, chronic neuropathy, recent admit at [facility]  8/31-9/2 after fall resulting in C spine fracture, who was sent to [facility] ED from cards clinic for fluid overload/worsened LE edema with plan for IV lasix diuresis and medical optimization.  
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  in 2021, hx Diabetes Insipidus with hyponatremia, chronic neuropathy, recent admit at [facility]  8/31-9/2 after fall resulting in C spine fracture, who was sent to [facility] ED from [facility] clinic for fluid overload/worsened LE edema with plan for IV lasix diuresis and medical optimization prior to a recommended C-Spine surgery possibly planned for 12/9.  
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  in 2021, hx Diabetes Insipidus with hyponatremia, chronic neuropathy, recent admit at [facility]  8/31-9/2 after fall resulting in C spine fracture, who was sent to [facility] ED from [facility] clinic for fluid overload/worsened LE edema with plan for IV lasix diuresis and medical optimization.

## 2022-12-09 NOTE — DISCHARGE NOTE PROVIDER - NSDCFUADDAPPT_GEN_ALL_CORE_FT
1. Please follow-up with Dr. Fernandez, your cardiologist, on 12/13/22 at 2pm at his office on [address] [place] [address], [id number].

## 2022-12-09 NOTE — OCCUPATIONAL THERAPY INITIAL EVALUATION ADULT - MODIFIED CLINICAL TEST OF SENSORY INTEGRATION IN BALANCE TEST
Pt able to ambulate 75'x2 with RW and SBA, no LOB noted although pt with flexed posture and requiring cues to stay within walker/slow down to reduce fall risk.

## 2022-12-09 NOTE — PROGRESS NOTE ADULT - PROBLEM SELECTOR PLAN 1
Patient presenting to Dr. Fernandez's outpatient clinic w/ fluid overload on exam and worsening bilateral LE edema.  S/p IV Lasix 40mg in ED. Started IV lasix drip at 5 mg/hr. Vega catheter in place.  - c/w IV lasix drip at 5mg/hr  - Net negative 1.8 L in past 24 hours  - monitor daily I and O's, daily weights,  - continue aldactone 25mg daily  - strict I and O's  - ECHO done today with EF>75% and moderate AS/MR with unchanged gradients across AV and MV
Patient presenting to Dr. Fernandez's outpatient clinic w/ fluid overload on exam and worsening bilateral LE edema.  S/p IV Lasix 40mg in ED.   - S/p IV lasix drip at 5mg/hr  - Net negative 5.5L during hospital stay.  - monitor daily I and O's, daily weights  - continue aldactone 25mg daily  - strict I and O's  - ECHO 12/7: with EF>75% and moderate AS/MR with unchanged gradients across AV and MV  - Start Torsemide 20mg po on sunday after d/c
Patient presenting to Dr. Fernandez's outpatient clinic w/ fluid overload on exam and worsening bilateral LE edema.  S/p IV Lasix 40mg in ED. Started IV lasix drip at 5 mg/hr. Vega catheter in place.  - c/w IV lasix drip at 5mg/hr  - Net negative 2 L in past 24 hours, 4 L since hospital stay.  - monitor daily I and O's, daily weights  - continue aldactone 25mg daily  - strict I and O's  - ECHO 12/7: with EF>75% and moderate AS/MR with unchanged gradients across AV and MV

## 2022-12-09 NOTE — PROGRESS NOTE ADULT - PROBLEM SELECTOR PLAN 3
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 Na 141 on admission  - c/w home dose desmopressin   - Na 144 this  morning  - continue to monitor lytes while on lasix drip
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 Na 141 on admission  - c/w home dose desmopressin   - Na 139 this  morning  - continue to monitor lytes while on lasix drip
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 Na 141 on admission  - c/w home dose desmopressin   - Na 138 this  morning  - continue to monitor lytes while on lasix drip

## 2022-12-09 NOTE — DISCHARGE NOTE PROVIDER - NSDCFUSCHEDAPPT_GEN_ALL_CORE_FT
Juancho Pandya  [facility]  OPHTHALM [address]  Scheduled Appointment: 02/07/2023     Al Fernandez  [facility]  HEARTVASC [address]  Scheduled Appointment: 12/13/2022    Juancho Pandya  [facility]  OPHTHALM [address]  Scheduled Appointment: 02/07/2023

## 2022-12-09 NOTE — PROGRESS NOTE ADULT - PROBLEM SELECTOR PLAN 5
Recent admit at [facility]  8/31-9/2 after fall resulting in C spine fracture. Has been wearing C-collar since fracture. Pt for possible C spine surgery by Dr. Butler.  - c/w C-collar and place supportive bandaging for skin irritation  - neurosurgery consulted: no planned surgical intervention at this time  - CT Cervical spine: Nonhealing base of dens fracture. Compared to 11/10/22 there is mild increase in anterior displacement of the dens and C1 lateral masses relative to the base of dens.  - MR Cervical spine: Nonhealing C2 fracture at base of dens, mildly distracted as seen on CT. Edema involving the right greater than left lateral masses likely stress related to altered alignment. No evidence for marrow replacing lesion or other bony edema. Hx of HTN. Home meds: spironolactone and torsemide.  - c/w spironolactone  - holding torsemide iso jose  - monitor BPs

## 2022-12-09 NOTE — DISCHARGE NOTE PROVIDER - NSDCCPCAREPLAN_GEN_ALL_CORE_FT
PRINCIPAL DISCHARGE DIAGNOSIS  Diagnosis: Acute on chronic diastolic congestive heart failure  Assessment and Plan of Treatment: You have a known history of diastolic heart failure, for which you take ________________ at home.  Diastolic heart failure means that your left ventricle is stiffer than normal, which prevents your heart from relaxing the way it should. This means that your heart cannot fill up with as much blood, which results in the heart being less efficient at pumping blood to the rest of the body.  -Symptoms of heart failure include trouble breathing (can be with activity, or progress to where there is trouble breathing at rest or when lying flat), swelling in the feet, legs, ankles, or belly, and also feeling tired.  -While in the hospital, we _______________________.  Instructions:  -Please continue to take ________________  -Please make sure you follow up with your cardiologist within one week of discharge.   -Please weigh yourself daily: if you have gained more than 2-3 lbs in one day or 5 lbs in one week contact your doctor immediately as you may be retaining water weight.  In addition, restrict your salt intake to less than 2 grams a day.  If you begin to have the symptoms mentioned above, or if they are getting worse, contact your cardiologist immediately.         PRINCIPAL DISCHARGE DIAGNOSIS  Diagnosis: Acute on chronic diastolic congestive heart failure  Assessment and Plan of Treatment: You have a known history of diastolic heart failure, for which you take Torsemide 20mg and Spironolactone 25mg at home.  Diastolic heart failure means that your left ventricle is stiffer than normal, which prevents your heart from relaxing the way it should. This means that your heart cannot fill up with as much blood, which results in the heart being less efficient at pumping blood to the rest of the body.  -Symptoms of heart failure include trouble breathing (can be with activity, or progress to where there is trouble breathing at rest or when lying flat), swelling in the feet, legs, ankles, or belly, and also feeling tired.  -While in the hospital, we treated you with a lasix drip to get fluid off and then transitioned off of the lasix drip.  Instructions:  ***Please re-start the TORSEMIDE 20mg on Sunday 12/11/2022.  ***Please make sure you follow up with your cardiologist Dr. Fernandez on 12/13/22 at 2pm.  -Please weigh yourself daily: if you have gained more than 2-3 lbs in one day or 5 lbs in one week contact your doctor immediately as you may be retaining water weight.  In addition, restrict your salt intake to less than 2 grams a day.  If you begin to have the symptoms mentioned above, or if they are getting worse, contact your cardiologist immediately.        SECONDARY DISCHARGE DIAGNOSES  Diagnosis: Hyperlipidemia  Assessment and Plan of Treatment: You have a known history of high cholesterol prior to your admission. To manage this you are on a medication called atorvastatin. High cholesterol is bad because it can cause damage to your heart and puts you at risk for heart attack and stroke.  It is important that you continue to take this medication when you are discharged so that you can continue to control your level of cholesterol. Additionally be sure to follow up with your primary care physician on a regular basis to make sure your triglyceride and cholesterol levels continue to be well controlled.  **Please follow-up with Dr. Fernandez on 12/13/22 at 2pm.     PRINCIPAL DISCHARGE DIAGNOSIS  Diagnosis: Acute on chronic diastolic congestive heart failure  Assessment and Plan of Treatment: You have a known history of diastolic heart failure, for which you take Torsemide 20mg and Spironolactone 25mg at home.  Diastolic heart failure means that your left ventricle is stiffer than normal, which prevents your heart from relaxing the way it should. This means that your heart cannot fill up with as much blood, which results in the heart being less efficient at pumping blood to the rest of the body.  -Symptoms of heart failure include trouble breathing (can be with activity, or progress to where there is trouble breathing at rest or when lying flat), swelling in the feet, legs, ankles, or belly, and also feeling tired.  -While in the hospital, we treated you with a lasix drip to get fluid off and then transitioned off of the lasix drip.  Instructions:  ***Please re-start the TORSEMIDE 20mg and Spironolactone 25mg once a day at home.  ***Please make sure you follow up with your cardiologist Dr. Fernandez on 12/13/22 at 2pm.  -Please weigh yourself daily: if you have gained more than 2-3 lbs in one day or 5 lbs in one week contact your doctor immediately as you may be retaining water weight.  In addition, restrict your salt intake to less than 2 grams a day.  If you begin to have the symptoms mentioned above, or if they are getting worse, contact your cardiologist immediately.        SECONDARY DISCHARGE DIAGNOSES  Diagnosis: Hyperlipidemia  Assessment and Plan of Treatment: You have a known history of high cholesterol prior to your admission. To manage this you are on a medication called atorvastatin. High cholesterol is bad because it can cause damage to your heart and puts you at risk for heart attack and stroke.  It is important that you continue to take this medication when you are discharged so that you can continue to control your level of cholesterol. Additionally be sure to follow up with your primary care physician on a regular basis to make sure your triglyceride and cholesterol levels continue to be well controlled.  **Please follow-up with Dr. Fernandez on 12/13/22 at 2pm.

## 2022-12-09 NOTE — PROGRESS NOTE ADULT - PROBLEM SELECTOR PLAN 6
S/p Valve in valve TAVR in 2021 with echo today unchanged from prior  - TTE 12/6: EF> 75%, moderate AS and moderate MR Recent admit at [facility]  8/31-9/2 after fall resulting in C spine fracture. Has been wearing C-collar since fracture. Pt for possible C spine surgery by Dr. Butler.  - c/w C-collar and place supportive bandaging for skin irritation  - neurosurgery consulted: no planned surgical intervention at this time  - CT Cervical spine: Nonhealing base of dens fracture. Compared to 11/10/22 there is mild increase in anterior displacement of the dens and C1 lateral masses relative to the base of dens.  - MR Cervical spine: Nonhealing C2 fracture at base of dens, mildly distracted as seen on CT. Edema involving the right greater than left lateral masses likely stress related to altered alignment. No evidence for marrow replacing lesion or other bony edema.

## 2022-12-09 NOTE — DISCHARGE NOTE PROVIDER - HOSPITAL COURSE
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    Problem List/Main Diagnoses (system-based):                     New medications:   Labs to be followed outpatient:   Exam to be followed outpatient:       Dx: TREATMENT FOR HEART FAILURE THIS ADMISSION: YES           If Yes to STEMI or Heart Failure: 7 day Follow-Up Appointment Made: Yes, Dr. Fernandez on 12/13/22 at 2pm.           Cardiac Rehab Indications: CHF         *Education on benefits of Cardiac Rehab provided to patient: Yes         *Referral and Prescription Given for Cardiac Rehab: No, medical reason- patient getting Home PT and has home services.       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  in 2021, hx Diabetes Insipidus with hyponatremia, chronic neuropathy, recent admit at [facility]  8/31-9/2 after fall resulting in C spine fracture, who was sent to [facility] ED from [facility] clinic for fluid overload/worsened LE edema with plan for IV lasix diuresis and medical optimization.    Problem List/Main Diagnoses (system-based):     #Acute on chronic diastolic congestive heart failure.   Patient presenting to Dr. Fernandez's outpatient clinic w/ fluid overload on exam and worsening bilateral LE edema. S/p IV Lasix 40mg in ED. S/p IV lasix drip at 5mg/hr -> 1.25mg/hr for two days. Lasix dripped stopped on 12/9/22 in the morning. Net negative 5.5L during hospital stay. Continued with home aldactone 25mg daily  - continue aldactone 25mg daily  - ECHO 12/7: with EF>75% and moderate AS/MR with unchanged gradients across AV and MV  - Start Torsemide 20mg po on Sunday after d/c.  - Follow-up with Dr. Fernandez on 12/13/22 at 2pm    #KAYLYN (acute kidney injury).   Baseline Cr around .8. Admitted w/ Cr 1.18 in setting of decompensated HF. Cr uptrending 1.14 -> 1.21 -> 1.44 iso diuretic use.   - diuretic now on hold until Sunday 12/11/22- restart Torsemide 20mg qd     #H/O diabetes insipidus.   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 Na 141 on admission. Na monitored throughout admission.  - c/w home dose desmopressin     #Coronary Artery Disease  Home med: Atorvastatin 40mg and aspirin 81mg qd.   - c/w atorvastatin 40mg and aspirin 81mg qd    #Hyperlipidemia  Hx of HTN. Home meds: spironolactone and torsemide.  - c/w spironolactone  - holding torsemide iso kaylyn- will restart on Sunday 12/11/22 outpatient    # Cervical spine fracture.   Recent admit at [facility]  8/31-9/2 after fall resulting in C spine fracture. Has been wearing C-collar since fracture. Pt for possible C spine surgery by Dr. Butler. Neurosurgery consulted: no planned surgical intervention at this time  - c/w C-collar and place supportive bandaging for skin irritation  - CT Cervical spine: Nonhealing base of dens fracture. Compared to 11/10/22 there is mild increase in anterior displacement of the dens and C1 lateral masses relative to the base of dens.  - MR Cervical spine: Nonhealing C2 fracture at base of dens, mildly distracted as seen on CT. Edema involving the right greater than left lateral masses likely stress related to altered alignment. No evidence for marrow replacing lesion or other bony edema.    #History of transcatheter aortic valve replacement (TAVR).   S/p Valve in valve TAVR in 2021 with echo today unchanged from prior  - TTE 12/6: EF> 75%, moderate AS and moderate MR.    New medications: Start Torsemide 20mg on sunday after discharge.  Labs to be followed outpatient: BMP with PCP for Creatinine level     Dx: TREATMENT FOR HEART FAILURE THIS ADMISSION: YES           If Yes to STEMI or Heart Failure: 7 day Follow-Up Appointment Made: Yes, Dr. Fernandez on 12/13/22 at 2pm.           Cardiac Rehab Indications: CHF         *Education on benefits of Cardiac Rehab provided to patient: Yes         *Referral and Prescription Given for Cardiac Rehab: No, medical reason- patient getting Home PT and has home services.     CHF: Beta Blocker Prescribed: Yes           ACE-I/ARB/Entresto Prescribed: Yes          Weight on Day of Discharge: 70.9 kg    Physical Exam on discharge:  HEENT: neck collar in place  Chest: decreased breath sounds bilaterally, no increased work of breathing;  Cor: S1,S2 RRR, + III/VI SM loudest at RUSB  Abd: soft, ND, NT  Ext: 2+ pitting edema both LE up to thighs  Vasc: DP/PT 1+ b/l   Skin: warm, dry, minimal erythema over R shin, no ulcers/open wounds  Neuro: AAOx 3, fluent speech;  Psych: Appropriate affect, cooperative         #Discharge    89 y/o F with PMH HTN, Hyperlipidemia, chronic diastolic CHF, 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    Problem List/Main Diagnoses (system-based):     #Acute on chronic diastolic congestive heart failure.   Patient presenting to Dr. Fernandez's outpatient clinic w/ fluid overload on exam and worsening bilateral LE edema. S/p IV Lasix 40mg in ED. S/p IV lasix drip at 5mg/hr -> 1.25mg/hr for two days. Lasix drip stopped on 12/9/22 in the morning. Net negative 5.5L during hospital stay. Continued with home aldactone 25mg daily  - continue aldactone 25mg daily  - ECHO 12/7: with EF>75% and moderate AS/MR with unchanged gradients across AV and MV  - Start Torsemide 20mg po on Sunday after d/c.  - Follow-up with Dr. Fernandez on 12/13/22 at 2pm    #KAYLYN (acute kidney injury).   Baseline Cr around .8. Admitted w/ Cr 1.18 in setting of decompensated HF. Cr uptrending 1.14 -> 1.21 -> 1.44 iso diuretic use.   - diuretic now on hold until Sunday 12/11/22- restart Torsemide 20mg qd     #H/O diabetes insipidus.   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 Na 141 on admission. Na monitored throughout admission.  - c/w home dose desmopressin     #Coronary Artery Disease  Home med: Atorvastatin 40mg and aspirin 81mg qd.   - c/w atorvastatin 40mg and aspirin 81mg qd    #Hyperlipidemia  Hx of HTN. Home meds: spironolactone and torsemide.  - c/w spironolactone  - holding torsemide iso kaylny- will restart on Sunday 12/11/22 outpatient    # Cervical spine fracture.   Recent admit at [facility]  8/31-9/2 after fall resulting in C spine fracture. Has been wearing C-collar since fracture. Pt for possible C spine surgery by Dr. Butler. Neurosurgery consulted: no planned surgical intervention at this time  - c/w C-collar and place supportive bandaging for skin irritation  - CT Cervical spine: Nonhealing base of dens fracture. Compared to 11/10/22 there is mild increase in anterior displacement of the dens and C1 lateral masses relative to the base of dens.  - MR Cervical spine: Nonhealing C2 fracture at base of dens, mildly distracted as seen on CT. Edema involving the right greater than left lateral masses likely stress related to altered alignment. No evidence for marrow replacing lesion or other bony edema.    #History of transcatheter aortic valve replacement (TAVR).   S/p Valve in valve TAVR in 2021 with echo today unchanged from prior  - TTE 12/6: EF> 75%, moderate AS and moderate MR.    New medications: Start Torsemide 20mg on sunday after discharge.  Labs to be followed outpatient: BMP with PCP for Creatinine level     Dx: TREATMENT FOR HEART FAILURE THIS ADMISSION: YES           If Yes to STEMI or Heart Failure: 7 day Follow-Up Appointment Made: Yes, Dr. Fernandez on 12/13/22 at 2pm.           Cardiac Rehab Indications: CHF         *Education on benefits of Cardiac Rehab provided to patient: Yes         *Referral and Prescription Given for Cardiac Rehab: No, medical reason- patient getting Home PT and has home services.     CHF: Beta Blocker Prescribed: Yes           ACE-I/ARB/Entresto Prescribed: Yes          Weight on Day of Discharge: 70.9 kg    Physical Exam on discharge:  Gen: NAD  HEENT: neck collar in place  Chest: decreased breath sounds bilaterally, no increased work of breathing;  Cor: S1,S2 RRR, + III/VI SM loudest at RUSB  Abd: soft, ND, NT  Ext: 2+ pitting edema both LE up to thighs  Vasc: DP/PT 1+ b/l   Skin: warm, dry, minimal erythema over R shin, no ulcers/open wounds  Neuro: AAOx 3, fluent speech;  Psych: Appropriate affect, cooperative         #Discharge    89 y/o F with PMH HTN, Hyperlipidemia, chronic diastolic CHF, 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    Problem List/Main Diagnoses (system-based):     #Acute on chronic diastolic congestive heart failure.   Patient presenting to Dr. Fernandez's outpatient clinic w/ fluid overload on exam and worsening bilateral LE edema. S/p IV Lasix 40mg in ED. S/p IV lasix drip at 5mg/hr -> 1.25mg/hr for two days. Lasix drip stopped on 12/9/22 in the morning. Net negative 5.5L during hospital stay. Continued with home aldactone 25mg daily  - continue aldactone 25mg daily  - ECHO 12/7: with EF>75% and moderate AS/MR with unchanged gradients across AV and MV  - Start Torsemide 20mg po on Sunday after d/c.  - Follow-up with Dr. Fernandez on 12/13/22 at 2pm    #KAYLYN (acute kidney injury).   Baseline Cr around .8. Admitted w/ Cr 1.18 in setting of decompensated HF. Cr uptrending 1.14 -> 1.21 -> 1.44 iso diuretic use.   - diuretic now on hold until Sunday 12/11/22- restart Torsemide 20mg qd     #H/O diabetes insipidus.   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 Na 141 on admission. Na monitored throughout admission.  - c/w home dose desmopressin     #Coronary Artery Disease  Home med: Atorvastatin 40mg and aspirin 81mg qd.   - c/w atorvastatin 40mg and aspirin 81mg qd    #Hyperlipidemia  Hx of HTN. Home meds: spironolactone and torsemide.  - c/w spironolactone  - holding torsemide iso kaylyn- will restart on Sunday 12/11/22 outpatient    # Cervical spine fracture.   Recent admit at [facility]  8/31-9/2 after fall resulting in C spine fracture. Has been wearing C-collar since fracture. Pt for possible C spine surgery by Dr. Butler. Neurosurgery consulted: no planned surgical intervention at this time  - c/w C-collar and place supportive bandaging for skin irritation  - CT Cervical spine: Nonhealing base of dens fracture. Compared to 11/10/22 there is mild increase in anterior displacement of the dens and C1 lateral masses relative to the base of dens.  - MR Cervical spine: Nonhealing C2 fracture at base of dens, mildly distracted as seen on CT. Edema involving the right greater than left lateral masses likely stress related to altered alignment. No evidence for marrow replacing lesion or other bony edema.    #History of transcatheter aortic valve replacement (TAVR).   S/p Valve in valve TAVR in 2021 with echo today unchanged from prior  - TTE 12/6: EF> 75%, moderate AS and moderate MR.    New medications: Start Torsemide 20mg on sunday after discharge.  Labs to be followed outpatient: BMP with PCP for Creatinine level     Dx: TREATMENT FOR HEART FAILURE THIS ADMISSION: YES           If Yes to STEMI or Heart Failure: 7 day Follow-Up Appointment Made: Yes, Dr. Fernandez on 12/13/22 at 2pm.           Cardiac Rehab Indications: CHF         *Education on benefits of Cardiac Rehab provided to patient: Yes         *Referral and Prescription Given for Cardiac Rehab: No, medical reason- patient getting Home PT and has home services.     CHF: Beta Blocker Prescribed: Yes           ACE-I/ARB/Entresto Prescribed: Yes          Weight on Day of Discharge: 70.9 kg    Physical Exam on discharge:  Gen: NAD  HEENT: neck collar in place  Chest: decreased breath sounds bilaterally, no increased work of breathing;  Cor: S1,S2 RRR, + III/VI SM loudest at RUSB  Abd: soft, ND, NT  Ext: B/l LE without edema;  Vasc: DP/PT 1+ b/l   Skin: warm, dry, minimal erythema over R shin, no ulcers/open wounds  Neuro: AAOx 3, fluent speech;  Psych: Appropriate affect, cooperative         #Discharge    89 y/o F with PMH HTN, Hyperlipidemia, chronic diastolic CHF, 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    Problem List/Main Diagnoses (system-based):     #Acute on chronic diastolic congestive heart failure.   Patient presenting to Dr. Fernandez's outpatient clinic w/ fluid overload on exam and worsening bilateral LE edema. S/p IV Lasix 40mg in ED. S/p IV lasix drip at 5mg/hr -> 1.25mg/hr for two days. Lasix drip stopped on 12/9/22 in the morning. Net negative 5.5L during hospital stay. Continued with home aldactone 25mg daily  - continue aldactone 25mg daily  - ECHO 12/7: with EF>75% and moderate AS/MR with unchanged gradients across AV and MV  - Start Torsemide 20mg po on Sunday after d/c.  - Follow-up with Dr. Fernandez on 12/13/22 at 2pm    #KAYLYN (acute kidney injury).  Baseline Cr around .8. Admitted w/ Cr 1.18 in setting of decompensated HF. Cr uptrending 1.14 -> 1.21 -> 1.44 iso diuretic use. Prior to discharge, Cr downtrending from 1.44 -> 1.11.   - C/w Torsemide 20mg qd with improved Cr on discharge    #H/O diabetes insipidus.   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 Na 141 on admission. Na monitored throughout admission.  - c/w home dose desmopressin     #Coronary Artery Disease  Home med: Atorvastatin 40mg and aspirin 81mg qd.   - c/w atorvastatin 40mg and aspirin 81mg qd    #Hyperlipidemia  Hx of HTN. Home meds: spironolactone and torsemide.  - c/w spironolactone  - holding torsemide iso kaylyn- will restart on Sunday 12/11/22 outpatient    # Cervical spine fracture.   Recent admit at [facility]  8/31-9/2 after fall resulting in C spine fracture. Has been wearing C-collar since fracture. Pt for possible C spine surgery by Dr. Butler. Neurosurgery consulted: no planned surgical intervention at this time  - c/w C-collar and place supportive bandaging for skin irritation  - CT Cervical spine: Nonhealing base of dens fracture. Compared to 11/10/22 there is mild increase in anterior displacement of the dens and C1 lateral masses relative to the base of dens.  - MR Cervical spine: Nonhealing C2 fracture at base of dens, mildly distracted as seen on CT. Edema involving the right greater than left lateral masses likely stress related to altered alignment. No evidence for marrow replacing lesion or other bony edema.    #History of transcatheter aortic valve replacement (TAVR).   S/p Valve in valve TAVR in 2021 with echo today unchanged from prior  - TTE 12/6: EF> 75%, moderate AS and moderate MR.    New medications: Start Torsemide 20mg qd  Labs to be followed outpatient: None    Dx: TREATMENT FOR HEART FAILURE THIS ADMISSION: YES           If Yes to STEMI or Heart Failure: 7 day Follow-Up Appointment Made: Yes, Dr. Fernandez on 12/13/22 at 2pm.           Cardiac Rehab Indications: CHF         *Education on benefits of Cardiac Rehab provided to patient: Yes         *Referral and Prescription Given for Cardiac Rehab: No, medical reason- patient getting Home PT and has home services.     CHF: Beta Blocker Prescribed: Yes           ACE-I/ARB/Entresto Prescribed: Yes          Weight on Day of Discharge: 70.9 kg    Physical Exam on discharge:  Gen: NAD  HEENT: neck collar in place  Chest: decreased breath sounds bilaterally, no increased work of breathing;  Cor: S1,S2 RRR, + III/VI SM loudest at RUSB  Abd: soft, ND, NT  Ext: B/l LE without edema;  Vasc: DP/PT 1+ b/l   Skin: warm, dry, minimal erythema over R shin, no ulcers/open wounds  Neuro: AAOx 3, fluent speech;  Psych: Appropriate affect, cooperative         #Discharge    89 y/o F with PMH HTN, Hyperlipidemia, chronic diastolic CHF, 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    Problem List/Main Diagnoses (system-based):     #Acute on chronic diastolic congestive heart failure.   Patient presenting to Dr. Fernandez's outpatient clinic w/ fluid overload on exam and worsening bilateral LE edema. S/p IV Lasix 40mg in ED. S/p IV lasix drip at 5mg/hr -> 1.25mg/hr for two days. Lasix drip stopped on 12/9/22 in the morning. Net negative 5.5L during hospital stay. Continued with home aldactone 25mg daily and Torsemide 20mg po qd.  - continue aldactone 25mg daily  - ECHO 12/7: with EF>75% and moderate AS/MR with unchanged gradients across AV and MV  - Start Torsemide 20mg po   - Follow-up with Dr. Fernandez on 12/13/22 at 2pm    #KAYLYN (acute kidney injury).  Baseline Cr around .8. Admitted w/ Cr 1.18 in setting of decompensated HF. Cr uptrending 1.14 -> 1.21 -> 1.44 iso diuretic use. Prior to discharge, Cr downtrending from 1.44 -> 1.11.   - C/w Torsemide 20mg qd with improved Cr on discharge    #H/O diabetes insipidus.   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 Na 141 on admission. Na monitored throughout admission.  - c/w home dose desmopressin     #Coronary Artery Disease  Home med: Atorvastatin 40mg and aspirin 81mg qd.   - c/w atorvastatin 40mg and aspirin 81mg qd    #Hyperlipidemia  Hx of HTN. Home meds: atorvastatin 40mg  - c/w atorvastatin     # Cervical spine fracture.   Recent admit at [facility]  8/31-9/2 after fall resulting in C spine fracture. Has been wearing C-collar since fracture. Pt for possible C spine surgery by Dr. Butler. Neurosurgery consulted: no planned surgical intervention at this time  - c/w C-collar and place supportive bandaging for skin irritation  - CT Cervical spine: Nonhealing base of dens fracture. Compared to 11/10/22 there is mild increase in anterior displacement of the dens and C1 lateral masses relative to the base of dens.  - MR Cervical spine: Nonhealing C2 fracture at base of dens, mildly distracted as seen on CT. Edema involving the right greater than left lateral masses likely stress related to altered alignment. No evidence for marrow replacing lesion or other bony edema.    #History of transcatheter aortic valve replacement (TAVR).   S/p Valve in valve TAVR in 2021 with echo today unchanged from prior  - TTE 12/6: EF> 75%, moderate AS and moderate MR.    New medications: Start Torsemide 20mg qd  Labs to be followed outpatient: None    Dx: TREATMENT FOR HEART FAILURE THIS ADMISSION: YES           If Yes to STEMI or Heart Failure: 7 day Follow-Up Appointment Made: Yes, Dr. Fernandez on 12/13/22 at 2pm.           Cardiac Rehab Indications: CHF         *Education on benefits of Cardiac Rehab provided to patient: Yes         *Referral and Prescription Given for Cardiac Rehab: No, medical reason- patient getting Home PT and has home services.     CHF: Beta Blocker Prescribed: Yes           ACE-I/ARB/Entresto Prescribed: Yes          Weight on Day of Discharge: 70.9 kg    Physical Exam on discharge:  Gen: NAD  HEENT: neck collar in place  Chest: decreased breath sounds bilaterally, no increased work of breathing;  Cor: S1,S2 RRR, + III/VI SM loudest at RUSB  Abd: soft, ND, NT  Ext: B/l LE without edema;  Vasc: DP/PT 1+ b/l   Skin: warm, dry, minimal erythema over R shin, no ulcers/open wounds  Neuro: AAOx 3, fluent speech;  Psych: Appropriate affect, cooperative         #Discharge    89 y/o F with PMH HTN, Hyperlipidemia, chronic diastolic CHF, 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    Problem List/Main Diagnoses (system-based):     #Acute on chronic diastolic congestive heart failure.   Patient presenting to Dr. Fernandez's outpatient clinic w/ fluid overload on exam and worsening bilateral LE edema. S/p IV Lasix 40mg in ED. S/p IV lasix drip at 5mg/hr -> 1.25mg/hr for two days. Lasix drip stopped on 12/9/22 in the morning. Net negative 5.5L during hospital stay. Continued with home aldactone 25mg daily and Torsemide 20mg po qd.  - continue aldactone 25mg daily and Torsemide 20mg po qd  - ECHO 12/7: with EF>75% and moderate AS/MR with unchanged gradients across AV and MV  - Follow-up with Dr. Fernandez on 12/13/22 at 2pm    #KAYLYN (acute kidney injury).  Baseline Cr around .8. Admitted w/ Cr 1.18 in setting of decompensated HF. Cr uptrending 1.14 -> 1.21 -> 1.44 iso diuretic use. Prior to discharge, Cr downtrending from 1.44 -> 1.11.   - C/w Torsemide 20mg qd with improved Cr on discharge    #H/O diabetes insipidus.   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 Na 141 on admission. Na monitored throughout admission.  - c/w home dose desmopressin     #Coronary Artery Disease  Home med: Atorvastatin 40mg and aspirin 81mg qd.   - c/w atorvastatin 40mg and aspirin 81mg qd    #Hyperlipidemia  Hx of HTN. Home meds: atorvastatin 40mg  - c/w atorvastatin     # Cervical spine fracture.   Recent admit at [facility]  8/31-9/2 after fall resulting in C spine fracture. Has been wearing C-collar since fracture. Pt for possible C spine surgery by Dr. Butler. Neurosurgery consulted: no planned surgical intervention at this time  - c/w C-collar and place supportive bandaging for skin irritation  - CT Cervical spine: Nonhealing base of dens fracture. Compared to 11/10/22 there is mild increase in anterior displacement of the dens and C1 lateral masses relative to the base of dens.  - MR Cervical spine: Nonhealing C2 fracture at base of dens, mildly distracted as seen on CT. Edema involving the right greater than left lateral masses likely stress related to altered alignment. No evidence for marrow replacing lesion or other bony edema.    #History of transcatheter aortic valve replacement (TAVR).   S/p Valve in valve TAVR in 2021 with echo today unchanged from prior  - TTE 12/6: EF> 75%, moderate AS and moderate MR.    New medications: Start Torsemide 20mg qd  Labs to be followed outpatient: None    Dx: TREATMENT FOR HEART FAILURE THIS ADMISSION: YES           If Yes to STEMI or Heart Failure: 7 day Follow-Up Appointment Made: Yes, Dr. Fernandez on 12/13/22 at 2pm.           Cardiac Rehab Indications: CHF         *Education on benefits of Cardiac Rehab provided to patient: Yes         *Referral and Prescription Given for Cardiac Rehab: No, medical reason- patient getting Home PT and has home services.     CHF: Beta Blocker Prescribed: Yes           ACE-I/ARB/Entresto Prescribed: Yes          Weight on Day of Discharge: 70.9 kg    Physical Exam on discharge:  Gen: NAD  HEENT: neck collar in place  Chest: decreased breath sounds bilaterally, no increased work of breathing;  Cor: S1,S2 RRR, + III/VI SM loudest at RUSB  Abd: soft, ND, NT  Ext: B/l LE without edema;  Vasc: DP/PT 1+ b/l   Skin: warm, dry, minimal erythema over R shin, no ulcers/open wounds  Neuro: AAOx 3, fluent speech;  Psych: Appropriate affect, cooperative         #Discharge    89 y/o F with PMH HTN, Hyperlipidemia, chronic diastolic CHF, 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    Problem List/Main Diagnoses (system-based):     #Acute on chronic diastolic congestive heart failure.   Patient presenting to Dr. Fernandez's outpatient clinic w/ fluid overload on exam and worsening bilateral LE edema. S/p IV Lasix 40mg in ED. S/p IV lasix drip at 5mg/hr -> 1.25mg/hr for two days. Lasix drip stopped on 12/9/22 in the morning. Net negative 5.5L during hospital stay. Continued with home aldactone 25mg daily.  - continue aldactone 25mg daily and Torsemide 20mg po qd  - ECHO 12/7: with EF>75% and moderate AS/MR with unchanged gradients across AV and MV  - Follow-up with Dr. Fernandez on 12/13/22 at 2pm    #KAYLYN (acute kidney injury).  Baseline Cr around .8. Admitted w/ Cr 1.18 in setting of decompensated HF. Cr uptrending 1.14 -> 1.21 -> 1.44 iso diuretic use. Prior to discharge, Cr downtrending from 1.44 -> 1.11.   - C/w Torsemide 20mg qd with improved Cr on discharge    #H/O diabetes insipidus.   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 Na 141 on admission. Na monitored throughout admission.  - c/w home dose desmopressin     #Coronary Artery Disease  Home med: Atorvastatin 40mg and aspirin 81mg qd.   - c/w atorvastatin 40mg and aspirin 81mg qd    #Hyperlipidemia  Hx of HTN. Home meds: atorvastatin 40mg  - c/w atorvastatin     # Cervical spine fracture.   Recent admit at [facility]  8/31-9/2 after fall resulting in C spine fracture. Has been wearing C-collar since fracture. Pt for possible C spine surgery by Dr. Butler. Neurosurgery consulted: no planned surgical intervention at this time  - c/w C-collar and place supportive bandaging for skin irritation  - CT Cervical spine: Nonhealing base of dens fracture. Compared to 11/10/22 there is mild increase in anterior displacement of the dens and C1 lateral masses relative to the base of dens.  - MR Cervical spine: Nonhealing C2 fracture at base of dens, mildly distracted as seen on CT. Edema involving the right greater than left lateral masses likely stress related to altered alignment. No evidence for marrow replacing lesion or other bony edema.    #History of transcatheter aortic valve replacement (TAVR).   S/p Valve in valve TAVR in 2021 with echo today unchanged from prior  - TTE 12/6: EF> 75%, moderate AS and moderate MR.    New medications: Start Torsemide 20mg qd  Labs to be followed outpatient: None    Dx: TREATMENT FOR HEART FAILURE THIS ADMISSION: YES           If Yes to STEMI or Heart Failure: 7 day Follow-Up Appointment Made: Yes, Dr. Fernandez on 12/13/22 at 2pm.           Cardiac Rehab Indications: CHF         *Education on benefits of Cardiac Rehab provided to patient: Yes         *Referral and Prescription Given for Cardiac Rehab: No, medical reason- patient getting Home PT and has home services.     CHF: Beta Blocker Prescribed: Yes           ACE-I/ARB/Entresto Prescribed: Yes          Weight on Day of Discharge: 70.9 kg    Physical Exam on discharge:  Gen: NAD  HEENT: neck collar in place  Chest: decreased breath sounds bilaterally, no increased work of breathing;  Cor: S1,S2 RRR, + III/VI SM loudest at RUSB  Abd: soft, ND, NT  Ext: B/l LE without edema;  Vasc: DP/PT 1+ b/l   Skin: warm, dry, minimal erythema over R shin, no ulcers/open wounds  Neuro: AAOx 3, fluent speech;  Psych: Appropriate affect, cooperative

## 2022-12-09 NOTE — OCCUPATIONAL THERAPY INITIAL EVALUATION ADULT - ADDITIONAL COMMENTS
Pt reports that she lives alone in an apartment with no ANAIS, has elevator access. Pt has a 24 hour HHA that assists with ADLs as needed primarily bathing, LB dressing, and IADLs. Pt able to ambulate independently using rollator, also owns a w/c which she uses when out in community.  Pt was previously able to ambulate 4 blocks.

## 2022-12-09 NOTE — PROGRESS NOTE ADULT - PROBLEM SELECTOR PLAN 4
Hx of HTN. Home meds: spironolactone and torsemide.  - c/w spironolactone  - holding torsemide iso jose  - monitor BPs Hx of HLD. Home atorvastatin 40mg and aspirin 81mg  - c/w atorvastatin and aspirin

## 2022-12-09 NOTE — PROGRESS NOTE ADULT - PROBLEM SELECTOR PLAN 7
F: None  E: replete as necessary, K>4  N: Low sodium diet  DVT: aspirin S/p Valve in valve TAVR in 2021 with echo today unchanged from prior  - TTE 12/6: EF> 75%, moderate AS and moderate MR

## 2022-12-09 NOTE — DISCHARGE NOTE PROVIDER - PROVIDER TOKENS
PROVIDER:[TOKEN:[[id number]],SCHEDULEDAPPT:[12/13/2022],SCHEDULEDAPPTTIME:[02:00 PM],ESTABLISHEDPATIENT:[T]] PROVIDER:[TOKEN:[[id number]],SCHEDULEDAPPT:[12/13/2022],SCHEDULEDAPPTTIME:[02:00 PM],ESTABLISHEDPATIENT:[T]],PROVIDER:[TOKEN:[[id number]],FOLLOWUP:[1 week],ESTABLISHEDPATIENT:[T]]

## 2022-12-09 NOTE — OCCUPATIONAL THERAPY INITIAL EVALUATION ADULT - DIAGNOSIS, OT EVAL
Pt admitted for CHF exacerbation and fluid overload presents with impaired balance, decreased activity tolerance, and generalized weakness impacting overall ease of completing ADLs and functional mobility tasks at prior level of function.

## 2022-12-09 NOTE — DISCHARGE NOTE PROVIDER - CARE PROVIDER_API CALL
Al Fernandez)  Internal Medicine  [address] [place] [address]  Phone: [phone]  Fax: [phone]  Established Patient  Scheduled Appointment: 12/13/2022 02:00 PM   Al Fernandez)  Internal Medicine  [address], [address] [place] [address]  Phone: [phone]  Fax: [phone]  Established Patient  Scheduled Appointment: 12/13/2022 02:00 PM    Camila Mcpherson)  Internal Medicine  [address] [place] [address]  Phone: [phone]  Fax: [phone]  Established Patient  Follow Up Time: 1 week

## 2022-12-09 NOTE — OCCUPATIONAL THERAPY INITIAL EVALUATION ADULT - GENERAL OBSERVATIONS, REHAB EVAL
Pt received semi-supine in bed, +tele, +2L O2 NC, +aspen collar, +espinoza, in NAD and agreeable to OT. Cleared by ZOILA Archibald to see.

## 2022-12-09 NOTE — DISCHARGE NOTE PROVIDER - NSDCMRMEDTOKEN_GEN_ALL_CORE_FT
aspirin 81 mg oral tablet, chewable: 1 tab(s) orally once a day  atorvastatin 40 mg oral tablet: 1 tab(s) orally once a day (at bedtime)  brimonidine 0.1% ophthalmic solution: 1 drop(s) to each affected eye 2 times a day  desmopressin 0.1 mg oral tablet: 1.5 tab(s) orally once a day (at bedtime)  gabapentin 300 mg oral capsule: 1 cap(s) orally 2 times a day  latanoprost 0.005% ophthalmic solution: 1 drop(s) to each affected eye once a day (at bedtime)  spironolactone 25 mg oral tablet: 1 tab(s) orally once a day  torsemide 20 mg oral tablet: 1 tab(s) orally once a day  Vitamin D3 125 mcg (5000 intl units) oral tablet: 1 tab(s) orally once a day  Zinc 140 mg (as elemental zinc 50 mg) oral tablet: 1 tab(s) orally once a day   aspirin 81 mg oral tablet, chewable: 1 tab(s) orally once a day  atorvastatin 40 mg oral tablet: 1 tab(s) orally once a day (at bedtime)  brimonidine 0.1% ophthalmic solution: 1 drop(s) to each affected eye 2 times a day  desmopressin 0.1 mg oral tablet: 1.5 tab(s) orally once a day (at bedtime)  gabapentin 300 mg oral capsule: 1 cap(s) orally 2 times a day  latanoprost 0.005% ophthalmic solution: 1 drop(s) to each affected eye once a day (at bedtime)  spironolactone 25 mg oral tablet: 1 tab(s) orally once a day  torsemide 20 mg oral tablet: 1 tab(s) orally once a day  Vitamin D3 125 mcg (5000 intl units) oral tablet: 1 tab(s) orally once a day  zinc sulfate 220 mg oral capsule: 1 cap(s) orally once a day   aspirin 81 mg oral tablet, chewable: 1 tab(s) orally once a day  atorvastatin 40 mg oral tablet: 1 tab(s) orally once a day (at bedtime)  brimonidine 0.1% ophthalmic solution: 1 drop(s) to each affected eye 2 times a day  desmopressin 0.1 mg oral tablet: 1.5 tab(s) orally once a day (at bedtime)  gabapentin 300 mg oral capsule: 1 cap(s) orally 2 times a day  latanoprost 0.005% ophthalmic solution: 1 drop(s) to each affected eye once a day (at bedtime)  spironolactone 25 mg oral tablet: 1 tab(s) orally once a day  spironolactone 25 mg oral tablet: 1 tab(s) orally once a day   torsemide 20 mg oral tablet: 1 tab(s) orally once a day  Vitamin D3 125 mcg (5000 intl units) oral tablet: 1 tab(s) orally once a day  zinc sulfate 220 mg oral capsule: 1 cap(s) orally once a day

## 2022-12-09 NOTE — PROGRESS NOTE ADULT - SUBJECTIVE AND OBJECTIVE BOX
OVERNIGHT EVENTS:  NAEON    SUBJECTIVE / INTERVAL HPI: Patient seen and examined at bedside.     VITAL SIGNS:  Vital Signs Last 24 Hrs  T(C): 36.7 (09 Dec 2022 13:37), Max: 36.9 (08 Dec 2022 22:42)  T(F): 98 (09 Dec 2022 13:37), Max: 98.5 (09 Dec 2022 09:09)  HR: 70 (09 Dec 2022 13:36) (67 - 81)  BP: 105/57 (09 Dec 2022 13:36) (101/51 - 147/71)  BP(mean): 75 (09 Dec 2022 13:36) (72 - 102)  RR: 18 (09 Dec 2022 13:36) (18 - 23)  SpO2: 100% (09 Dec 2022 13:36) (89% - 100%)    Parameters below as of 09 Dec 2022 13:36  Patient On (Oxygen Delivery Method): nasal cannula  O2 Flow (L/min): 2      PHYSICAL EXAM:    General: alert, in no acute distress  HEENT: NC/AT; PERRL, anicteric sclera; MMM  Neck: supple  Cardiovascular: +S1/S2, RRR  Respiratory: CTA B/L; no W/R/R  Gastrointestinal: soft, NT/ND; +BSx4  Extremities: WWP; no edema, clubbing or cyanosis  Vascular: 2+ radial, DP/PT pulses B/L  Neurological: AAOx3; no focal deficits    MEDICATIONS:  MEDICATIONS  (STANDING):  artificial  tears Solution 1 Drop(s) Both EYES every 12 hours  aspirin  chewable 81 milliGRAM(s) Oral daily  atorvastatin 40 milliGRAM(s) Oral at bedtime  brimonidine 0.2% Ophthalmic Solution 1 Drop(s) Both EYES two times a day  calamine/zinc oxide Lotion 1 Application(s) Topical every 12 hours  cholecalciferol 5000 Unit(s) Oral daily  desmopressin 0.15 milliGRAM(s) Oral at bedtime  gabapentin 300 milliGRAM(s) Oral two times a day  influenza  Vaccine (HIGH DOSE) 0.7 milliLiter(s) IntraMuscular once  latanoprost 0.005% Ophthalmic Solution 1 Drop(s) Both EYES at bedtime  melatonin 5 milliGRAM(s) Oral at bedtime  spironolactone 25 milliGRAM(s) Oral daily  zinc sulfate 220 milliGRAM(s) Oral daily    MEDICATIONS  (PRN):      ALLERGIES:  Allergies    [This allergen will not trigger allergy alert] Acetic He-Bubldgpogg-Mypyapgon (Other)  erythromycin (Unknown)  penicillin (Unknown)    Intolerances        LABS:                        10.4   9.00  )-----------( 249      ( 09 Dec 2022 06:37 )             34.5     12-09    138  |  94<L>  |  38<H>  ----------------------------<  103<H>  4.2   |  37<H>  |  1.44<H>    Ca    9.3      09 Dec 2022 06:37  Phos  4.8     12-09  Mg     2.2     12-09          CAPILLARY BLOOD GLUCOSE          RADIOLOGY & ADDITIONAL TESTS: Reviewed. OVERNIGHT EVENTS:  NAEON    SUBJECTIVE / INTERVAL HPI: Patient seen and examined at bedside. This morning she says she still has pain from the c-collar and wants to go home. Denies chest pain, sob, and abdominal pain.     VITAL SIGNS:  Vital Signs Last 24 Hrs  T(C): 36.7 (09 Dec 2022 13:37), Max: 36.9 (08 Dec 2022 22:42)  T(F): 98 (09 Dec 2022 13:37), Max: 98.5 (09 Dec 2022 09:09)  HR: 70 (09 Dec 2022 13:36) (67 - 81)  BP: 105/57 (09 Dec 2022 13:36) (101/51 - 147/71)  BP(mean): 75 (09 Dec 2022 13:36) (72 - 102)  RR: 18 (09 Dec 2022 13:36) (18 - 23)  SpO2: 100% (09 Dec 2022 13:36) (89% - 100%)    Parameters below as of 09 Dec 2022 13:36  Patient On (Oxygen Delivery Method): nasal cannula  O2 Flow (L/min): 2      PHYSICAL EXAM:  HEENT: neck collar in place  Chest: decreased breath sounds bilaterally, no increased work of breathing, on 2LNC  Cor: S1 S2 RRR, + III/VI SM loudest at RUSB  Abd: soft, ND, NT  Ext: 2+ pitting edema both LE up to thighs  Vasc: DP/PT 1+ b/l   Skin: warm, dry, minimal erythema over R shin, no ulcers/open wounds  Neuro: AOOx 3, fluent speech;  Psych: Appropriate affect, cooperative    MEDICATIONS:  MEDICATIONS  (STANDING):  artificial  tears Solution 1 Drop(s) Both EYES every 12 hours  aspirin  chewable 81 milliGRAM(s) Oral daily  atorvastatin 40 milliGRAM(s) Oral at bedtime  brimonidine 0.2% Ophthalmic Solution 1 Drop(s) Both EYES two times a day  calamine/zinc oxide Lotion 1 Application(s) Topical every 12 hours  cholecalciferol 5000 Unit(s) Oral daily  desmopressin 0.15 milliGRAM(s) Oral at bedtime  gabapentin 300 milliGRAM(s) Oral two times a day  influenza  Vaccine (HIGH DOSE) 0.7 milliLiter(s) IntraMuscular once  latanoprost 0.005% Ophthalmic Solution 1 Drop(s) Both EYES at bedtime  melatonin 5 milliGRAM(s) Oral at bedtime  spironolactone 25 milliGRAM(s) Oral daily  zinc sulfate 220 milliGRAM(s) Oral daily    MEDICATIONS  (PRN):      ALLERGIES:  Allergies    [This allergen will not trigger allergy alert] Acetic Fc-Xasfdavact-Yvwapiboo (Other)  erythromycin (Unknown)  penicillin (Unknown)    Intolerances        LABS:                        10.4   9.00  )-----------( 249      ( 09 Dec 2022 06:37 )             34.5     12-09    138  |  94<L>  |  38<H>  ----------------------------<  103<H>  4.2   |  37<H>  |  1.44<H>    Ca    9.3      09 Dec 2022 06:37  Phos  4.8     12-09  Mg     2.2     12-09          CAPILLARY BLOOD GLUCOSE          RADIOLOGY & ADDITIONAL TESTS: Reviewed. OVERNIGHT EVENTS:  NAEON    SUBJECTIVE / INTERVAL HPI: Patient seen and examined at bedside. This morning she says she still has pain from the c-collar and wants to go home. Denies chest pain, sob, and abdominal pain.     VITAL SIGNS:  Vital Signs Last 24 Hrs  T(C): 36.7 (09 Dec 2022 13:37), Max: 36.9 (08 Dec 2022 22:42)  T(F): 98 (09 Dec 2022 13:37), Max: 98.5 (09 Dec 2022 09:09)  HR: 70 (09 Dec 2022 13:36) (67 - 81)  BP: 105/57 (09 Dec 2022 13:36) (101/51 - 147/71)  BP(mean): 75 (09 Dec 2022 13:36) (72 - 102)  RR: 18 (09 Dec 2022 13:36) (18 - 23)  SpO2: 100% (09 Dec 2022 13:36) (89% - 100%)    Parameters below as of 09 Dec 2022 13:36  Patient On (Oxygen Delivery Method): nasal cannula  O2 Flow (L/min): 2      PHYSICAL EXAM:  HEENT: neck collar in place  Chest: decreased breath sounds bilaterally, no increased work of breathing;  Cor: S1,S2 RRR, + III/VI SM loudest at RUSB  Abd: soft, ND, NT  Ext: 2+ pitting edema both LE up to thighs  Vasc: DP/PT 1+ b/l   Skin: warm, dry, minimal erythema over R shin, no ulcers/open wounds  Neuro: AAOx 3, fluent speech;  Psych: Appropriate affect, cooperative    MEDICATIONS:  MEDICATIONS  (STANDING):  artificial  tears Solution 1 Drop(s) Both EYES every 12 hours  aspirin  chewable 81 milliGRAM(s) Oral daily  atorvastatin 40 milliGRAM(s) Oral at bedtime  brimonidine 0.2% Ophthalmic Solution 1 Drop(s) Both EYES two times a day  calamine/zinc oxide Lotion 1 Application(s) Topical every 12 hours  cholecalciferol 5000 Unit(s) Oral daily  desmopressin 0.15 milliGRAM(s) Oral at bedtime  gabapentin 300 milliGRAM(s) Oral two times a day  influenza  Vaccine (HIGH DOSE) 0.7 milliLiter(s) IntraMuscular once  latanoprost 0.005% Ophthalmic Solution 1 Drop(s) Both EYES at bedtime  melatonin 5 milliGRAM(s) Oral at bedtime  spironolactone 25 milliGRAM(s) Oral daily  zinc sulfate 220 milliGRAM(s) Oral daily    MEDICATIONS  (PRN):      ALLERGIES:  Allergies    [This allergen will not trigger allergy alert] Acetic Nd-Pkbjzsooee-Tbkccqnsf (Other)  erythromycin (Unknown)  penicillin (Unknown)    Intolerances        LABS:                        10.4   9.00  )-----------( 249      ( 09 Dec 2022 06:37 )             34.5     12-09    138  |  94<L>  |  38<H>  ----------------------------<  103<H>  4.2   |  37<H>  |  1.44<H>    Ca    9.3      09 Dec 2022 06:37  Phos  4.8     12-09  Mg     2.2     12-09          CAPILLARY BLOOD GLUCOSE          RADIOLOGY & ADDITIONAL TESTS: Reviewed. OVERNIGHT EVENTS:  NAEON    SUBJECTIVE / INTERVAL HPI: Patient seen and examined at bedside. This morning she says she still has pain from the c-collar and wants to go home. Denies chest pain, sob, and abdominal pain.     VITAL SIGNS:  Vital Signs Last 24 Hrs  T(C): 36.7 (09 Dec 2022 13:37), Max: 36.9 (08 Dec 2022 22:42)  T(F): 98 (09 Dec 2022 13:37), Max: 98.5 (09 Dec 2022 09:09)  HR: 70 (09 Dec 2022 13:36) (67 - 81)  BP: 105/57 (09 Dec 2022 13:36) (101/51 - 147/71)  BP(mean): 75 (09 Dec 2022 13:36) (72 - 102)  RR: 18 (09 Dec 2022 13:36) (18 - 23)  SpO2: 100% (09 Dec 2022 13:36) (89% - 100%)    Parameters below as of 09 Dec 2022 13:36  Patient On (Oxygen Delivery Method): nasal cannula  O2 Flow (L/min): 2      PHYSICAL EXAM:  HEENT: neck collar in place  Chest: decreased breath sounds bilaterally, no increased work of breathing;  Cor: S1,S2 RRR, + III/VI SM loudest at RUSB  Abd: soft, ND, NT  Ext: 1+ pitting edema in both lower extremities;  Vasc: DP/PT 1+ b/l   Skin: warm, dry, minimal erythema over R shin, no ulcers/open wounds  Neuro: AAOx 3, fluent speech;  Psych: Appropriate affect, cooperative    MEDICATIONS:  MEDICATIONS  (STANDING):  artificial  tears Solution 1 Drop(s) Both EYES every 12 hours  aspirin  chewable 81 milliGRAM(s) Oral daily  atorvastatin 40 milliGRAM(s) Oral at bedtime  brimonidine 0.2% Ophthalmic Solution 1 Drop(s) Both EYES two times a day  calamine/zinc oxide Lotion 1 Application(s) Topical every 12 hours  cholecalciferol 5000 Unit(s) Oral daily  desmopressin 0.15 milliGRAM(s) Oral at bedtime  gabapentin 300 milliGRAM(s) Oral two times a day  influenza  Vaccine (HIGH DOSE) 0.7 milliLiter(s) IntraMuscular once  latanoprost 0.005% Ophthalmic Solution 1 Drop(s) Both EYES at bedtime  melatonin 5 milliGRAM(s) Oral at bedtime  spironolactone 25 milliGRAM(s) Oral daily  zinc sulfate 220 milliGRAM(s) Oral daily    MEDICATIONS  (PRN):      ALLERGIES:  Allergies    [This allergen will not trigger allergy alert] Acetic Rb-Dihibbontx-Hhjcjuuye (Other)  erythromycin (Unknown)  penicillin (Unknown)    Intolerances        LABS:                        10.4   9.00  )-----------( 249      ( 09 Dec 2022 06:37 )             34.5     12-09    138  |  94<L>  |  38<H>  ----------------------------<  103<H>  4.2   |  37<H>  |  1.44<H>    Ca    9.3      09 Dec 2022 06:37  Phos  4.8     12-09  Mg     2.2     12-09          CAPILLARY BLOOD GLUCOSE          RADIOLOGY & ADDITIONAL TESTS: Reviewed.

## 2022-12-09 NOTE — OCCUPATIONAL THERAPY INITIAL EVALUATION ADULT - PERTINENT HX OF CURRENT PROBLEM, REHAB EVAL
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  in 2021 recent admit at [facility]  8/31-9/2 after fall resulting in C spine fracture, who was sent to [facility] ED from cards clinic for fluid overload/worsened LE edema with plan for IV lasix diuresis and medical optimization prior to a recommended C-Spine surgery planned for 12/9. Patient was seen by Dr Fernandez in clinic and found to have fluid overload and worsened b/l LE edema.

## 2022-12-10 ENCOUNTER — TRANSCRIPTION ENCOUNTER (OUTPATIENT)
Age: 87
End: 2022-12-10

## 2022-12-10 VITALS — TEMPERATURE: 98 F

## 2022-12-10 LAB
ANION GAP SERPL CALC-SCNC: 11 MMOL/L — SIGNIFICANT CHANGE UP (ref 5–17)
BUN SERPL-MCNC: 39 MG/DL — HIGH (ref 7–23)
CALCIUM SERPL-MCNC: 9.4 MG/DL — SIGNIFICANT CHANGE UP (ref 8.4–10.5)
CHLORIDE SERPL-SCNC: 95 MMOL/L — LOW (ref 96–108)
CO2 SERPL-SCNC: 33 MMOL/L — HIGH (ref 22–31)
CREAT SERPL-MCNC: 1.11 MG/DL — SIGNIFICANT CHANGE UP (ref 0.5–1.3)
EGFR: 46 ML/MIN/1.73M2 — LOW
GLUCOSE SERPL-MCNC: 149 MG/DL — HIGH (ref 70–99)
HCT VFR BLD CALC: 33.6 % — LOW (ref 34.5–45)
HGB BLD-MCNC: 10.3 G/DL — LOW (ref 11.5–15.5)
MAGNESIUM SERPL-MCNC: 2.4 MG/DL — SIGNIFICANT CHANGE UP (ref 1.6–2.6)
MCHC RBC-ENTMCNC: 30.4 PG — SIGNIFICANT CHANGE UP (ref 27–34)
MCHC RBC-ENTMCNC: 30.7 GM/DL — LOW (ref 32–36)
MCV RBC AUTO: 99.1 FL — SIGNIFICANT CHANGE UP (ref 80–100)
NRBC # BLD: 0 /100 WBCS — SIGNIFICANT CHANGE UP (ref 0–0)
PHOSPHATE SERPL-MCNC: 4.1 MG/DL — SIGNIFICANT CHANGE UP (ref 2.5–4.5)
PLATELET # BLD AUTO: 232 K/UL — SIGNIFICANT CHANGE UP (ref 150–400)
POTASSIUM SERPL-MCNC: 4.6 MMOL/L — SIGNIFICANT CHANGE UP (ref 3.5–5.3)
POTASSIUM SERPL-SCNC: 4.6 MMOL/L — SIGNIFICANT CHANGE UP (ref 3.5–5.3)
RBC # BLD: 3.39 M/UL — LOW (ref 3.8–5.2)
RBC # FLD: 13.7 % — SIGNIFICANT CHANGE UP (ref 10.3–14.5)
SODIUM SERPL-SCNC: 139 MMOL/L — SIGNIFICANT CHANGE UP (ref 135–145)
WBC # BLD: 8.01 K/UL — SIGNIFICANT CHANGE UP (ref 3.8–10.5)
WBC # FLD AUTO: 8.01 K/UL — SIGNIFICANT CHANGE UP (ref 3.8–10.5)

## 2022-12-10 PROCEDURE — 87635 SARS-COV-2 COVID-19 AMP PRB: CPT

## 2022-12-10 PROCEDURE — 85610 PROTHROMBIN TIME: CPT

## 2022-12-10 PROCEDURE — 85730 THROMBOPLASTIN TIME PARTIAL: CPT

## 2022-12-10 PROCEDURE — 93005 ELECTROCARDIOGRAM TRACING: CPT

## 2022-12-10 PROCEDURE — 99285 EMERGENCY DEPT VISIT HI MDM: CPT | Mod: 25

## 2022-12-10 PROCEDURE — 93306 TTE W/DOPPLER COMPLETE: CPT

## 2022-12-10 PROCEDURE — 80053 COMPREHEN METABOLIC PANEL: CPT

## 2022-12-10 PROCEDURE — 72125 CT NECK SPINE W/O DYE: CPT

## 2022-12-10 PROCEDURE — 99221 1ST HOSP IP/OBS SF/LOW 40: CPT

## 2022-12-10 PROCEDURE — 80048 BASIC METABOLIC PNL TOTAL CA: CPT

## 2022-12-10 PROCEDURE — 99239 HOSP IP/OBS DSCHRG MGMT >30: CPT

## 2022-12-10 PROCEDURE — 71045 X-RAY EXAM CHEST 1 VIEW: CPT

## 2022-12-10 PROCEDURE — 97161 PT EVAL LOW COMPLEX 20 MIN: CPT

## 2022-12-10 PROCEDURE — 84100 ASSAY OF PHOSPHORUS: CPT

## 2022-12-10 PROCEDURE — 85027 COMPLETE CBC AUTOMATED: CPT

## 2022-12-10 PROCEDURE — 72141 MRI NECK SPINE W/O DYE: CPT

## 2022-12-10 PROCEDURE — 36415 COLL VENOUS BLD VENIPUNCTURE: CPT

## 2022-12-10 PROCEDURE — 83735 ASSAY OF MAGNESIUM: CPT

## 2022-12-10 PROCEDURE — 85025 COMPLETE CBC W/AUTO DIFF WBC: CPT

## 2022-12-10 RX ORDER — SPIRONOLACTONE 25 MG/1
1 TABLET, FILM COATED ORAL
Qty: 30 | Refills: 2
Start: 2022-12-10 | End: 2023-03-09

## 2022-12-10 RX ADMIN — SPIRONOLACTONE 25 MILLIGRAM(S): 25 TABLET, FILM COATED ORAL at 08:43

## 2022-12-10 RX ADMIN — Medication 5000 UNIT(S): at 08:43

## 2022-12-10 RX ADMIN — BRIMONIDINE TARTRATE 1 DROP(S): 2 SOLUTION/ DROPS OPHTHALMIC at 06:53

## 2022-12-10 RX ADMIN — ZINC SULFATE TAB 220 MG (50 MG ZINC EQUIVALENT) 220 MILLIGRAM(S): 220 (50 ZN) TAB at 08:43

## 2022-12-10 RX ADMIN — Medication 650 MILLIGRAM(S): at 00:11

## 2022-12-10 RX ADMIN — Medication 81 MILLIGRAM(S): at 08:43

## 2022-12-10 RX ADMIN — GABAPENTIN 300 MILLIGRAM(S): 400 CAPSULE ORAL at 08:43

## 2022-12-10 RX ADMIN — CALAMINE AND ZINC OXIDE AND PHENOL 1 APPLICATION(S): 160; 10 LOTION TOPICAL at 06:53

## 2022-12-10 RX ADMIN — Medication 1 DROP(S): at 06:53

## 2022-12-10 NOTE — DISCHARGE NOTE NURSING/CASE MANAGEMENT/SOCIAL WORK - NSDCFUADDAPPT_GEN_ALL_CORE_FT
1. Please follow-up with Dr. Fernandez, your cardiologist, on 12/13/22 at 2pm at his office on [address] [place] [address], [id number].

## 2022-12-10 NOTE — DISCHARGE NOTE NURSING/CASE MANAGEMENT/SOCIAL WORK - PATIENT PORTAL LINK FT
You can access the FollowMyHealth Patient Portal offered by [facility] by registering at the following website: [email or web address]. By joining [facility]’s FollowMyHealth portal, you will also be able to view your health information using other applications (apps) compatible with our system.

## 2022-12-10 NOTE — DISCHARGE NOTE NURSING/CASE MANAGEMENT/SOCIAL WORK - NSDCVIVACCINE_GEN_ALL_CORE_FT
Tdap; 13-Feb-2018 19:16; Lit Romero); [employer]; [id number]; IntraMuscular; Deltoid Right.; 0.5 milliLiter(s); VIS (VIS Published: 09-May-2013, VIS Presented: 13-Feb-2018);

## 2022-12-10 NOTE — DISCHARGE NOTE NURSING/CASE MANAGEMENT/SOCIAL WORK - WAS YOUR LAST COVID-19 VACCINE GREATER THAN OR EQUAL TO TWO MONTHS AGO?
Pt presents today for covid testing. Pt's wife tested + for covid today. Pt states that he has a mild non-productive cough but no other symptoms. Pt denies any fevers.
No

## 2022-12-10 NOTE — DISCHARGE NOTE NURSING/CASE MANAGEMENT/SOCIAL WORK - NSDCPEFALRISK_GEN_ALL_CORE
For information on Fall & Injury Prevention, visit: [email or web address] OR  [email or web address] OR  https://www.cdc.gov/steadi/patient.html

## 2022-12-11 NOTE — CONSULT NOTE ADULT - SUBJECTIVE AND OBJECTIVE BOX
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    Pt was seen by Dr. Puckett outpatient in November who recommended a DEXA scan (which was normal) and a referral to Dr. Butler that was scheduled for 12/8. Pt had a CT c spine on 11/10 showing non-healing and with mild ventral displacement of the dens, as well as right lateral displacement of C2 relative to occipital lateral masses. Pt has been in a [facility] since her original fall 8/31. Patient and her daughter were under the impression there was a scheduled surgery 12/9 with Dr butler, However no such surgery had been scheduled. Pt reports discomfort from the collar (collar on upside down at time of exam) however no other neck/arm/leg pain, no numbness/paresthesias, urinary/bowel incontinence. Pt ambulates with walker at home.      PAST MEDICAL & SURGICAL HISTORY:  Hyperlipidemia, unspecified hyperlipidemia type      Diabetes insipidus      H/O aortic valve stenosis      Hypertension      Vertigo      Chronic diastolic congestive heart failure      Dyslipidemia      History of pseudoaneurysm      Glaucoma      S/P AVR  Bioprosthetic      H/O lumbosacral spine surgery      S/P hip replacement  B/L      S/P right coronary artery (RCA) stent placement        FAMILY HISTORY:  FH: lung cancer (Father)        SOCIAL HISTORY:  Tobacco Use: denies  EtOH use: denies  Substance: denies    Allergies    [This allergen will not trigger allergy alert] Acetic Gt-[id number]-[id number] (Other)  erythromycin (Unknown)  penicillin (Unknown)    Intolerances        REVIEW OF SYSTEMS:  General:	no recent illnesses, no recent wt gain/loss, no chills  Skin/Breast:  no rash, lumps, new moles, erythema, tenderness  Ophthalmologic:  no change in vision, diplopia, pain, redness, tearing, dry eyes	  ENMT:	no hearing loss, tinnitus, ear pain, vertigo, nasal congestion, epistaxis, sore throat  Respiratory and Thorax: no coughing, wheezing, recent URI, shortness of breath	  Cardiovascular: +LE edema. no chest pain, RIOS, irregular rhythm   Gastrointestinal:	no abd pain, nausea, vomiting, diarrhea, constipation, bloody stool, heartburn  Genitourinary: no frequency, dysuria, hematuria  Musculoskeletal:	no joint pain, no joint swelling, no tenderness  Neurological:	 see HPI  Psychiatric:	no confusion, no anxiousness, no depression   Hematology/Lymphatics:	no brusing, easy bleeding, LAD  Endocrine:  	no excess urination/thirst, heat/cold intolerance  Allergic/Immunologic:  no urticaria, sneezing, recurrent infections      MEDICATIONS:  Antibiotics:    Neuro:  gabapentin 300 milliGRAM(s) Oral two times a day    Anticoagulation:  aspirin  chewable 81 milliGRAM(s) Oral daily    OTHER:  artificial  tears Solution 1 Drop(s) Both EYES every 6 hours PRN  atorvastatin 40 milliGRAM(s) Oral at bedtime  brimonidine 0.2% Ophthalmic Solution 1 Drop(s) Both EYES two times a day  desmopressin 0.15 milliGRAM(s) Oral at bedtime  furosemide Infusion 5 mG/Hr IV Continuous <Continuous>  influenza  Vaccine (HIGH DOSE) 0.7 milliLiter(s) IntraMuscular once  latanoprost 0.005% Ophthalmic Solution 1 Drop(s) Both EYES at bedtime  spironolactone 25 milliGRAM(s) Oral daily    IVF:  cholecalciferol 5000 Unit(s) Oral daily  zinc sulfate 220 milliGRAM(s) Oral daily      Vital Signs Last 24 Hrs  T(C): 36.7 (07 Dec 2022 10:18), Max: 36.9 (06 Dec 2022 20:40)  T(F): 98.1 (07 Dec 2022 10:18), Max: 98.4 (06 Dec 2022 20:40)  HR: 75 (07 Dec 2022 09:26) (74 - 98)  BP: 132/64 (07 Dec 2022 09:26) (116/78 - 150/80)  BP(mean): 91 (07 Dec 2022 09:26) (74 - 91)  RR: 20 (07 Dec 2022 09:26) (18 - 20)  SpO2: 97% (07 Dec 2022 09:26) (94% - 99%)    Parameters below as of 07 Dec 2022 09:26  Patient On (Oxygen Delivery Method): room air        PHYSICAL EXAM:  GEN: laying in bed, appears well, NAD  NEURO: AOx3. FC, OE spont, speech intact, face symmetric. CNII-XII intact. PERRL, EOMI. No pronator drift. MAEx4. 5/5 strength throughout. SILT.  CV: RRR +S1/S2  PULM: CTAB  GI: NT/ND, +BS  EXT: ext warm, dry, nontender    LABS:                        11.1   9.79  )-----------( 269      ( 07 Dec 2022 09:28 )             35.5     12-07    144  |  98  |  34<H>  ----------------------------<  103<H>  4.6   |  36<H>  |  1.14    Ca    9.7      07 Dec 2022 09:28  Phos  4.5     12-06  Mg     2.2     12-07    TPro  7.7  /  Alb  4.4  /  TBili  0.3  /  DBili  x   /  AST  26  /  ALT  14  /  AlkPhos  97  12-06    PT/INR - ( 06 Dec 2022 16:09 )   PT: 11.8 sec;   INR: 0.99          PTT - ( 06 Dec 2022 16:09 )  PTT:32.1 sec    CULTURES:      RADIOLOGY & ADDITIONAL STUDIES:    Assessment: 89 y/o F with PMH HTN, Hyperlipidemia, chronic diastolic CHF, 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      Plan:  - No planned surgical intervention at this time (No surgery was ever scheduled for 12/9)  - Recommend repeat CT and MRI c-spine, non-contrast   - Please maintain [place] J collar at all times until further imaging    Discussed with Dr. Butler 
See cardiology HPI for full details.  She feels well this morning, daughter is at the bedside, planning for discharge home today.  Legs are less swollen.  Denies any neck pain.  Breathing is very comfortable.         Remaining ROS negative       PHYSICAL EXAM:    General: WDWN  HEENT: NC/AT;  MMM  Neck: wearing collar  Cardiovascular: +S1/S2, no mrg  Respiratory: CTAB  Gastrointestinal: soft, NT/ND; +BSx4  Extremities: WWP; no edema, clubbing or cyanosis  Vascular: 2+ radial, DP/PT pulses B/L  Neurological: AAOx3; no focal deficits  Psychiatric: pleasant mood and affect  Dermatologic: no appreciable wounds or damage to the skin            MEDICATIONS:  MEDICATIONS  (STANDING):    MEDICATIONS  (PRN):      ALLERGIES:  Allergies    [This allergen will not trigger allergy alert] Acetic Ls-Bhwqrklmlp-Yuuityybt (Other)  erythromycin (Unknown)  penicillin (Unknown)    Intolerances        LABS:                        10.3   8.01  )-----------( 232      ( 10 Dec 2022 09:56 )             33.6     12-10    139  |  95<L>  |  39<H>  ----------------------------<  149<H>  4.6   |  33<H>  |  1.11    Ca    9.4      10 Dec 2022 09:56  Phos  4.1     12-10  Mg     2.4     12-10          CAPILLARY BLOOD GLUCOSE          RADIOLOGY & ADDITIONAL TESTS: Reviewed.

## 2022-12-11 NOTE — CONSULT NOTE ADULT - CONSULT REASON
Vaccine Information Statement(s) was given today. This has been reviewed, questions answered, and verbal consent given by Patient for injection(s) and administration of Influenza (Inactivated).    Patient tolerated without incident. See immunization grid for documentation.    
dens fracture
Co-management

## 2022-12-11 NOTE — CONSULT NOTE ADULT - ASSESSMENT
89F with PMH of HTN, HLD, HFpEF, prior TAVR, DI and neuropathy presenting with volume overload. ,     Acute on chronic heart failure  - plan of care per primary team  - planning on discharge home today    #HTN  #HLD  - continue home meds    #DI  - continue home dose of desmopressin    #Cervical spine fracture  - seen by neurosurgery, no surgical intervention planned at this lena

## 2022-12-13 ENCOUNTER — APPOINTMENT (OUTPATIENT)
Dept: HEART AND VASCULAR | Facility: CLINIC | Age: 87
End: 2022-12-13
Payer: MEDICARE

## 2022-12-13 VITALS — OXYGEN SATURATION: 95 % | HEART RATE: 70 BPM | SYSTOLIC BLOOD PRESSURE: 137 MMHG | DIASTOLIC BLOOD PRESSURE: 59 MMHG

## 2022-12-13 PROCEDURE — 99215 OFFICE O/P EST HI 40 MIN: CPT

## 2022-12-13 NOTE — REVIEW OF SYSTEMS
[Fever] : no fever [Chills] : no chills [Sore Throat] : no sore throat [SOB] : no shortness of breath [Dyspnea on exertion] : dyspnea during exertion [Chest Discomfort] : no chest discomfort [Lower Ext Edema] : lower extremity edema [Leg Claudication] : no intermittent leg claudication [Palpitations] : no palpitations [Orthopnea] : orthopnea [PND] : no PND [Syncope] : no syncope [Abdominal Pain] : no abdominal pain [Nausea] : no nausea [Vomiting] : no vomiting [Diarrhea] : diarrhea [Dysuria] : no dysuria [Joint Pain] : no joint pain [Itching] : itching [Dizziness] : no dizziness [Confusion] : no confusion was observed

## 2022-12-13 NOTE — PHYSICAL EXAM
[Well Developed] : well developed [Normal Venous Pressure] : normal venous pressure [Normal S1, S2] : normal S1, S2 [Clear Lung Fields] : clear lung fields [Soft] : abdomen soft [Non Tender] : non-tender [No Masses/organomegaly] : no masses/organomegaly [Edema ___] : edema [unfilled] [No Focal Deficits] : no focal deficits [Alert and Oriented] : alert and oriented

## 2022-12-13 NOTE — HISTORY OF PRESENT ILLNESS
[FreeTextEntry1] : 89 F with a Pmhx of dCHF, HTN, HLD and severe AS s/p Bioprosthetic AV 7 years ago at OSH, c/b by bioprosthetic AV stenosis, now  s/p Valve-in-Valve with a Sheri Ultra by Structural Heart Team in June of 2021. She presents today for routine f/u and likely inpatient admission for cardiac/volume optimization prior to Surgery\par \par Since her last visit, Ms. Duval has continued to show signs of volume retention, predominantly in her lower extremities. Her PO diuretic was switched from Furosemide to Torsemide w/ no significant increase in UOP or change in overall volume status. Outside of her volume retention, Ms. Duval had a mechanical fall in Sept c/b cervical fracture requiring a hard neck brace since. She has been following w/ Neurosurgery and there is plans for possible neurosurgical intervention. \par \par Today she currently remains volume overloaded w/ 2-3+ pitting edema in b/l lower extremities, no respiratory distress, but does not slight increase in dyspnea, from baseline, when working with home PT. \par \par Based on upcoming surgery and lack of response to PO diuretic. Plan will be for inpatient admission today from clinic w/ 2-3days of IV diuresis in order to optimize volume status prior to surgery.

## 2022-12-13 NOTE — DISCUSSION/SUMMARY
[FreeTextEntry1] : -dCHF - acute on chronic dCHF exacerbation - lack of response to PO diuretic, including after change from Furosemide to Torsemide and increasing doses of Torsemide. Plan will be for inpatient admission and IV diuresis. Will optimize volume status inpatient. Patient, Family and PCP in agreement with plan.

## 2022-12-14 ENCOUNTER — APPOINTMENT (OUTPATIENT)
Dept: CARE COORDINATION | Facility: HOME HEALTH | Age: 87
End: 2022-12-14

## 2022-12-14 ENCOUNTER — TRANSCRIPTION ENCOUNTER (OUTPATIENT)
Age: 87
End: 2022-12-14

## 2022-12-14 VITALS
OXYGEN SATURATION: 96 % | HEART RATE: 76 BPM | DIASTOLIC BLOOD PRESSURE: 77 MMHG | RESPIRATION RATE: 18 BRPM | SYSTOLIC BLOOD PRESSURE: 115 MMHG

## 2022-12-14 DIAGNOSIS — Z95.2 PRESENCE OF PROSTHETIC HEART VALVE: ICD-10-CM

## 2022-12-14 DIAGNOSIS — Z80.1 FAMILY HISTORY OF MALIGNANT NEOPLASM OF TRACHEA, BRONCHUS AND LUNG: ICD-10-CM

## 2022-12-14 DIAGNOSIS — Z87.898 PERSONAL HISTORY OF OTHER SPECIFIED CONDITIONS: ICD-10-CM

## 2022-12-14 DIAGNOSIS — H81.4 VERTIGO OF CENTRAL ORIGIN: ICD-10-CM

## 2022-12-14 DIAGNOSIS — Z60.2 PROBLEMS RELATED TO LIVING ALONE: ICD-10-CM

## 2022-12-14 PROCEDURE — 99348 HOME/RES VST EST LOW MDM 30: CPT

## 2022-12-14 SDOH — SOCIAL STABILITY - SOCIAL INSECURITY: PROBLEMS RELATED TO LIVING ALONE: Z60.2

## 2022-12-14 NOTE — PHYSICAL EXAM
[No Acute Distress] : no acute distress [Well Nourished] : well nourished [Well Developed] : well developed [Well-Appearing] : well-appearing [Normal Sclera/Conjunctiva] : normal sclera/conjunctiva [PERRL] : pupils equal round and reactive to light [Normal Outer Ear/Nose] : the outer ears and nose were normal in appearance [Normal Oropharynx] : the oropharynx was normal [No JVD] : no jugular venous distention [No Lymphadenopathy] : no lymphadenopathy [No Respiratory Distress] : no respiratory distress  [No Accessory Muscle Use] : no accessory muscle use [Clear to Auscultation] : lungs were clear to auscultation bilaterally [Normal Rate] : normal rate  [Regular Rhythm] : with a regular rhythm [Normal S1, S2] : normal S1 and S2 [No Murmur] : no murmur heard [No Abdominal Bruit] : a ~M bruit was not heard ~T in the abdomen [No Varicosities] : no varicosities [No Extremity Clubbing/Cyanosis] : no extremity clubbing/cyanosis [___ +] : bilateral [unfilled]U+ pitting edema to the ankles [Soft] : abdomen soft [Non Tender] : non-tender [Non-distended] : non-distended [Normal Bowel Sounds] : normal bowel sounds [No CVA Tenderness] : no CVA  tenderness [No Joint Swelling] : no joint swelling [No Rash] : no rash [Coordination Grossly Intact] : coordination grossly intact [Normal Gait] : normal gait [Normal Affect] : the affect was normal [Normal Insight/Judgement] : insight and judgment were intact [de-identified] : cervical collar in place due to recent c-spine fx  [de-identified] : Ambulates with walker

## 2022-12-14 NOTE — HISTORY OF PRESENT ILLNESS
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 Patient was admitted from 12/6/22- 12/10/22 to [facility] for a diagnosis of CHF. 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 Patient presenting to Dr. Fernandez's outpatient clinic w/ fluid overload on exam and worsening bilateral LE edema. S/p IV Lasix 40mg in ED. S/p IV lasix drip at 5mg/hr -> 1.25mg/hr for two days. Lasix drip stopped on 12/9/22 in the morning. Net negative 5.5L during hospital stay. Continued with home aldactone 25mg daily. continue aldactone 25mg daily and Torsemide 20mg po qd - ECHO 12/7: with EF>75% and moderate AS/MR with unchanged gradients across AV and MV - Follow-up with Dr. Fernandez on 12/13/22 at 2pm". \par \par Patient observed via home visit and is alert and oriented x 3, in no acute distress. Patient observed sitting comfortably upright in living room chair with home health nurse present during visit. Patient states they are feeling well today. Patient states they are eating and drinking well. Patient reports compliance with medications. Patient reports she followed up with cardiologist  yesterday 12/13/22 and was put on Metolozone x 3 doses to assist with lower extremity edema. Patient reports non compliance with proper leg elevation an daily weights. Denies any cough, fever, chills, abdominal pain, palpitations, nausea, vomiting, diarrhea, lightheadedness, dizziness, shortness of breath, or chest pain.\par

## 2022-12-14 NOTE — ASSESSMENT
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 Patient was admitted from 12/6/22- 12/10/22 to [facility] for a diagnosis of CHF. 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 Patient presenting to Dr. Fernandez's outpatient clinic w/ fluid overload on exam and worsening bilateral LE edema. S/p IV Lasix 40mg in ED. S/p IV lasix drip at 5mg/hr -> 1.25mg/hr for two days. Lasix drip stopped on 12/9/22 in the morning. Net negative 5.5L during hospital stay. Continued with home aldactone 25mg daily. continue aldactone 25mg daily and Torsemide 20mg po qd - ECHO 12/7: with EF>75% and moderate AS/MR with unchanged gradients across AV and MV - Follow-up with Dr. Fernandez on 12/13/22 at 2pm". \par \par Patient observed via home visit and is alert and oriented x 3, in no acute distress. Patient observed sitting comfortably upright in living room chair with home health nurse present during visit. Patient states they are feeling well today. Patient states they are eating and drinking well. Patient reports compliance with medications. Patient reports she followed up with cardiologist  yesterday 12/13/22 and was put on Metolozone x 3 doses to assist with lower extremity edema. Patient reports non compliance with proper leg elevation an daily weights. Denies any cough, fever, chills, abdominal pain, palpitations, nausea, vomiting, diarrhea, lightheadedness, dizziness, shortness of breath, or chest pain.\par

## 2022-12-14 NOTE — REVIEW OF SYSTEMS
[Fever] : no fever [Chills] : no chills [Fatigue] : no fatigue [Hot Flashes] : no hot flashes [Night Sweats] : no night sweats [Chest Pain] : no chest pain [Palpitations] : no palpitations [Leg Claudication] : no leg claudication [Lower Ext Edema] : lower extremity edema [Orthopnea] : no orthopnea [Shortness Of Breath] : no shortness of breath [Wheezing] : no wheezing [Cough] : no cough [Dyspnea on Exertion] : no dyspnea on exertion [Abdominal Pain] : no abdominal pain [Nausea] : no nausea [Constipation] : no constipation [Diarrhea] : diarrhea [Vomiting] : no vomiting [Dysuria] : no dysuria [Incontinence] : no incontinence [Hematuria] : no hematuria [Joint Pain] : joint pain [Joint Swelling] : no joint swelling [Muscle Weakness] : no muscle weakness [Muscle Pain] : no muscle pain [Back Pain] : no back pain [Itching] : no itching [Skin Rash] : no skin rash [Headache] : no headache [Dizziness] : no dizziness [Fainting] : no fainting [Confusion] : no confusion [Memory Loss] : no memory loss [Suicidal] : not suicidal [Insomnia] : no insomnia [Anxiety] : no anxiety [Depression] : no depression [Easy Bleeding] : no easy bleeding [Easy Bruising] : no easy bruising [Negative] : ENT [FreeTextEntry9] : cervical collar in place due to recent c-spine fx

## 2022-12-15 DIAGNOSIS — I50.33 ACUTE ON CHRONIC DIASTOLIC (CONGESTIVE) HEART FAILURE: ICD-10-CM

## 2022-12-15 DIAGNOSIS — S12.100A UNSPECIFIED DISPLACED FRACTURE OF SECOND CERVICAL VERTEBRA, INITIAL ENCOUNTER FOR CLOSED FRACTURE: ICD-10-CM

## 2022-12-15 DIAGNOSIS — E23.2 DIABETES INSIPIDUS: ICD-10-CM

## 2022-12-15 DIAGNOSIS — Z95.5 PRESENCE OF CORONARY ANGIOPLASTY IMPLANT AND GRAFT: ICD-10-CM

## 2022-12-15 DIAGNOSIS — I25.10 ATHEROSCLEROTIC HEART DISEASE OF NATIVE CORONARY ARTERY WITHOUT ANGINA PECTORIS: ICD-10-CM

## 2022-12-15 DIAGNOSIS — Z96.649 PRESENCE OF UNSPECIFIED ARTIFICIAL HIP JOINT: ICD-10-CM

## 2022-12-15 DIAGNOSIS — G62.9 POLYNEUROPATHY, UNSPECIFIED: ICD-10-CM

## 2022-12-15 DIAGNOSIS — Z88.1 ALLERGY STATUS TO OTHER ANTIBIOTIC AGENTS STATUS: ICD-10-CM
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
## 2022-12-15 DIAGNOSIS — N17.9 ACUTE KIDNEY FAILURE, UNSPECIFIED: ICD-10-CM

## 2022-12-15 DIAGNOSIS — R60.9 EDEMA, UNSPECIFIED: ICD-10-CM
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
## 2022-12-15 DIAGNOSIS — Z96.0 PRESENCE OF UROGENITAL IMPLANTS: ICD-10-CM

## 2022-12-15 DIAGNOSIS — H40.9 UNSPECIFIED GLAUCOMA: ICD-10-CM

## 2022-12-15 DIAGNOSIS — Z79.84 LONG TERM (CURRENT) USE OF ORAL HYPOGLYCEMIC DRUGS: ICD-10-CM

## 2022-12-15 DIAGNOSIS — Z88.0 ALLERGY STATUS TO PENICILLIN: ICD-10-CM

## 2022-12-15 DIAGNOSIS — Z95.2 PRESENCE OF PROSTHETIC HEART VALVE: ICD-10-CM

## 2022-12-15 DIAGNOSIS — Z79.82 LONG TERM (CURRENT) USE OF ASPIRIN: ICD-10-CM

## 2022-12-15 DIAGNOSIS — I35.2 NONRHEUMATIC AORTIC (VALVE) STENOSIS WITH INSUFFICIENCY: ICD-10-CM

## 2022-12-15 DIAGNOSIS — I44.0 ATRIOVENTRICULAR BLOCK, FIRST DEGREE: ICD-10-CM

## 2022-12-15 DIAGNOSIS — R32 UNSPECIFIED URINARY INCONTINENCE: ICD-10-CM

## 2022-12-15 DIAGNOSIS — I11.0 HYPERTENSIVE HEART DISEASE WITH HEART FAILURE: ICD-10-CM
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
## 2022-12-15 DIAGNOSIS — E78.5 HYPERLIPIDEMIA, UNSPECIFIED: ICD-10-CM

## 2022-12-15 DIAGNOSIS — Z91.81 HISTORY OF FALLING: ICD-10-CM
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
## 2022-12-20 ENCOUNTER — TRANSCRIPTION ENCOUNTER (OUTPATIENT)
Age: 87
End: 2022-12-20

## 2022-12-28 ENCOUNTER — TRANSCRIPTION ENCOUNTER (OUTPATIENT)
Age: 87
End: 2022-12-28

## 2023-01-06 ENCOUNTER — TRANSCRIPTION ENCOUNTER (OUTPATIENT)
Age: 88
End: 2023-01-06

## 2023-01-12 ENCOUNTER — APPOINTMENT (OUTPATIENT)
Dept: SPINE | Facility: CLINIC | Age: 88
End: 2023-01-12
Payer: MEDICARE

## 2023-01-12 ENCOUNTER — NON-APPOINTMENT (OUTPATIENT)
Age: 88
End: 2023-01-12

## 2023-01-12 VITALS
SYSTOLIC BLOOD PRESSURE: 145 MMHG | HEIGHT: 61 IN | HEART RATE: 76 BPM | DIASTOLIC BLOOD PRESSURE: 75 MMHG | BODY MASS INDEX: 27.94 KG/M2 | OXYGEN SATURATION: 93 % | TEMPERATURE: 98 F | WEIGHT: 148 LBS | RESPIRATION RATE: 16 BRPM

## 2023-01-12 PROCEDURE — 99215 OFFICE O/P EST HI 40 MIN: CPT

## 2023-01-13 NOTE — REVIEW OF SYSTEMS
Patient is a 58y old  Female who presents with a chief complaint of mechanical fall (10 Rashaad 2023 16:18)      HPI:  59 y/o F w/ PMH of COPD (on Home O2), adrenal insufficieny, (on chronic steroids), hypogammaglobulinemia, schizoaffective disorder, chronic constipation, neurogenic bladder s/p suprapubic catheter, chronic hyponatremia, CHF, anxiety, anemia, duodenal ulcer w/ h/o bleeding, empyema, endometriosis, GERD, depression, a-fib s/p ablation, MRSA/pseudomonal cellulitis, asthma /tracheobronchomalacia, p/w mechanical fall. Patient states that she went to get pre-op testing, and was leaving when she was going down 1 step and had a mechanical fall. States she fell on L side. C/o L knee pain and L shoulder pain since it occurred. Unable to bear weight on L knee and unable to move L shoulder as well. Denies LOC. Denies CP, SOB, cough, runny nose, sore throat   Now endorses minimal dyspnea   Used BiPAP overnight  For the asthma-copd (mild) on [facility] outpatient 2 puff BID      No acute pulmonary events occurred overnight     covering for Dr Medina  some cough and sputum production  not labored breathing  seen and examined while in bed      MEDICATIONS  (STANDING):  cholecalciferol 2000 Unit(s) Oral daily  cholecalciferol 2000 Unit(s) Oral <User Schedule>  DULoxetine 30 milliGRAM(s) Oral daily  famotidine    Tablet 40 milliGRAM(s) Oral at bedtime  fexofenadine Tablet 180 milliGRAM(s) Oral daily  heparin   Injectable 5000 Unit(s) SubCutaneous every 8 hours  hydrocortisone sodium succinate Injectable 50 milliGRAM(s) IV Push every 6 hours  Ingrezza (Valbenazine) 80mg 1 Capsule(s) 1 Capsule(s) Oral daily  lamoTRIgine 200 milliGRAM(s) Oral daily  lamoTRIgine 100 milliGRAM(s) Oral at bedtime  levothyroxine 50 MICROGram(s) Oral daily  lidocaine 5% Ointment 1 Application(s) Topical three times a day  linaclotide 290 MICROGram(s) Oral daily  lubiprostone 24 MICROGram(s) Oral two times a day  magnesium hydroxide Suspension 30 milliLiter(s) Oral <User Schedule>  mirabegron ER 50 milliGRAM(s) Oral daily  misoprostol 200 MICROGram(s) Oral <User Schedule>  oxybutynin 10 milliGRAM(s) Oral daily  polyethylene glycol 3350 17 Gram(s) Oral <User Schedule>  QUEtiapine 100 milliGRAM(s) Oral two times a day  QUEtiapine 300 milliGRAM(s) Oral at bedtime  senna 2 Tablet(s) Oral at bedtime  sodium chloride 0.9%. 1000 milliLiter(s) (75 mL/Hr) IV Continuous <Continuous>  sucralfate suspension 1 Gram(s) Oral four times a day    MEDICATIONS  (PRN):  acetaminophen     Tablet .. 650 milliGRAM(s) Oral every 6 hours PRN Mild Pain (1 - 3)  albuterol    90 MICROgram(s) HFA Inhaler 2 Puff(s) Inhalation every 6 hours PRN Bronchospasm  aluminum hydroxide/magnesium hydroxide/simethicone Suspension 30 milliLiter(s) Oral every 4 hours PRN Dyspepsia  diazepam    Tablet 5 milliGRAM(s) Oral three times a day PRN anxiety  ketorolac   Injectable 30 milliGRAM(s) IV Push every 6 hours PRN Severe Pain (7 - 10)  melatonin 3 milliGRAM(s) Oral at bedtime PRN Insomnia  methocarbamol 750 milliGRAM(s) Oral every 8 hours PRN Muscle Spasm  ondansetron   Disintegrating Tablet 8 milliGRAM(s) Oral three times a day PRN Nausea  ondansetron Injectable 4 milliGRAM(s) IV Push every 8 hours PRN Nausea and/or Vomiting      Vital Signs Last 24 Hrs  T(C): 36.7 (2023 23:30), Max: 36.8 (2023 07:56)  T(F): 98.1 (2023 23:30), Max: 98.2 (2023 07:56)  HR: 79 (2023 23:30) (79 - 85)  BP: 101/48 (2023 23:30) (101/48 - 135/70)  BP(mean): --  RR: 18 (2023 23:30) (18 - 18)  SpO2: 100% (2023 23:30) (99% - 100%)    Parameters below as of 2023 23:30  Patient On (Oxygen Delivery Method): BiPAP/CPAP          PHYSICAL EXAM  General Appearance: Dyskinesia when speaking  HEENT: PERRL, conjunctiva clear, EOM's intact, non injected pharynx, no exudate, TM   normal  Neck: Supple, , no adenopathy, thyroid: not enlarged, no carotid bruit or JVD  Back: Symmetric, no  tenderness,no soft tissue tenderness  Lungs: slightly coarse at the upper lobes  Heart: Regular rate and rhythm, S1, S2 normal, no murmur, rub or gallop  Abdomen: Soft, non-tender, bowel sounds active , no hepatosplenomegaly  Extremities: no cyanosis or edema, no joint swelling  Skin: Skin color, texture normal, no rashes   Neurologic: Alert and oriented X3 , cranial nerves intact, sensory and motor normal,    ECG:    LABS:                          12.4   4.87  )-----------( 175      ( 2023 21:19 )             36.8         130<L>  |  97  |  15  ----------------------------<  90  4.2   |  27  |  0.75    Ca    8.7      2023 21:19    TPro  6.5  /  Alb  3.5  /  TBili  0.4  /  DBili  x   /  AST  17  /  ALT  14  /  AlkPhos  76  -            PT/INR - ( 2023 21:19 )   PT: 12.3 sec;   INR: 1.06 ratio         PTT - ( 2023 21:19 )  PTT:31.6 sec  Urinalysis Basic - ( 2023 14:55 )    Color: Yellow / Appearance: Clear / S.005 / pH: x  Gluc: x / Ketone: Negative  / Bili: Negative / Urobili: Negative   Blood: x / Protein: Negative / Nitrite: Negative   Leuk Esterase: Small / RBC: 3-5 /HPF / WBC 3-5 /HPF   Sq Epi: x / Non Sq Epi: Occasional / Bacteria: Occasional            RADIOLOGY & ADDITIONAL STUDIES:     [Fever] : no fever [Chills] : no chills [Leg Claudication] : no intermittent leg claudication [Lower Ext Edema] : lower extremity edema [As Noted in HPI] : as noted in HPI [Limb Pain] : no limb pain [Limb Swelling] : limb swelling [Negative] : Heme/Lymph

## 2023-01-30 NOTE — HISTORY OF PRESENT ILLNESS
[FreeTextEntry1] : 89 F with a Pmhx of dCHF, HTN, HLD and severe AS s/p Bioprosthetic AV 7 years ago at OSH, c/b by bioprosthetic AV stenosis, now  s/p Valve-in-Valve with a Sheri Ultra by Structural Heart Team in June of 2021. She presents today for routine f/u \par \par Since her last visit, she has been noted to have increased lower extremity edema, mild RIOS. After d/w outpatient Primary -> decision made to change Lasix to Torsemide 20 PO daily and start Spironolactone. This resulted in increased UOP and mild improvement in lower extremity edema. She otherwise states she feels well, denies any SOB at rest, CP, Orhopnea/PND, lightheadedness or syncope.

## 2023-01-30 NOTE — DISCUSSION/SUMMARY
[FreeTextEntry1] : 1) Diastolic CHF, Heart Failure Stage C, NYHA Class I\par -Patient currently remains mildly overloaded on exam. Does endorse subjective improvement in UOP and breathing; Will c/w Torsemide 20 PO daily, Spironolactone 25 daily, Will consider Metolazone 2-3x/weekly; Pt. may require short hospitalization for IV diuresis if not adequately responding to PO diuretics\par \par 2) AS s/p Bioprosthetic AV (2014) and ROSLYN (2021)\par -s/p successful ROSLYN procedure - euvolemic on exam\par -Echo shows normal BI-Ventricular function, no evidence of AI and mild MS\par -c/w ASA 81mg daily\par -cont with diuresis as above given dCHF, Mild AS. \par \par \par RTC in 2-3 months

## 2023-01-30 NOTE — REVIEW OF SYSTEMS
[Fever] : no fever [Chills] : no chills [Blurry Vision] : no blurred vision [SOB] : no shortness of breath [Dyspnea on exertion] : dyspnea during exertion [Chest Discomfort] : no chest discomfort [Lower Ext Edema] : lower extremity edema [Palpitations] : no palpitations [Orthopnea] : no orthopnea [PND] : no PND [Syncope] : no syncope [Cough] : no cough [Wheezing] : no wheezing [Abdominal Pain] : no abdominal pain [Nausea] : no nausea [Vomiting] : no vomiting [Change in Appetite] : no change in appetite [Joint Pain] : no joint pain [Joint Swelling] : no joint swelling [Dizziness] : no dizziness

## 2023-01-30 NOTE — DISCUSSION/SUMMARY
[FreeTextEntry1] : 1) Diastolic CHF, Heart Failure Stage C, NYHA Class II\par --s/p recent hospitalization for volume optimization s/p significant IV diuresis on Lasix gtt -6L/72hrs; Ultimately discharged on Torsemide 20 PO daily and Spironolactone 25 daily\par -Patient currently mildly overloaded on exam, no resp distress, 1+ pitting edema b/l, but w/ no significant increase since discharge; c/w Torsemide 20 PO daily, Alfred 25 daily and will add Metolazone 5mg PO three times weekly\par -rec compression stockings\par \par 2) AS s/p Bioprosthetic AV (2014) and ROSLYN (2021)\par -s/p successful ROSLYN procedure \par -Echo shows normal BI-Ventricular function, no evidence of AI and mild MS\par -c/w ASA 81mg daily\par -cont with diuresis as above given dCHF, Mild AS. \par \par 3) Pre-op risk assessment: Patient is an intermediate-high risk patient for intermediate-risk procedure; CCTA in June '21 showed non-obstructive CAD; TTE in Dect '22 showed Normal LVEF, Normal RV function, mean transvalvular AV gradient 25mmHg and Moderate MR; Pt. is now s/p inpatient admission for volume optimization; No further testing needed;\par -Ok to hold Aspirin as indicated by Neurosurgeon and would restart post procedure; Will also hold PO diuretics on the day of the procedure; Would be followed by Cardiology consult service while inpatient\par \par \par RTC in 4 months

## 2023-01-30 NOTE — PHYSICAL EXAM
[Well Developed] : well developed [Normal Venous Pressure] : normal venous pressure [Normal S1, S2] : normal S1, S2 [Clear Lung Fields] : clear lung fields [Soft] : abdomen soft [Non Tender] : non-tender [No Masses/organomegaly] : no masses/organomegaly [Edema ___] : edema [unfilled] [Alert and Oriented] : alert and oriented [No Focal Deficits] : no focal deficits

## 2023-01-30 NOTE — HISTORY OF PRESENT ILLNESS
[FreeTextEntry1] : 89 F with a Pmhx of dCHF, HTN, HLD and severe AS s/p Bioprosthetic AV 7 years ago at OSH, c/b by bioprosthetic AV stenosis, now  s/p Valve-in-Valve with a Sheri Ultra by Structural Heart Team in June of 2021. She presents today for routine f/u after her hospital discharge on 12/10.\par \par At her last visit on 12/6, decision was made to admit her to inpatient cardiac telemetry for IV diuresis to optimize volume status prior to surgery for cervical fracture. Ms. Duval responded very well to IV Lasix drip and we were able to remove ~5-6L of volume over 72hrs. She had a small bump in Cr from 1->1.4 on Friday 12/9, Lasix drip was discontinued as adequate diuresis had been achieved and patient was transitioned to PO Torsemide 20 daily. Of note. Cr. trended down from 1.4->1.1 on the day of discharged. She was discharged on Torsemide 20 PO daily and Spironolactone 25 daily. Her cervical procedure has yet to be scheduled, the date of surgery will be determined by Neurosurgery in conjunction with her PCP.\par \par Today, she feels well. She has maintained a steady weight and seen no significant increase in lower extremity swelling since her last visit. She otherwise denies any F/C, CP, SOB, orthopnea, PND, dysuria or syncope

## 2023-02-06 ENCOUNTER — APPOINTMENT (OUTPATIENT)
Dept: OPHTHALMOLOGY | Facility: CLINIC | Age: 88
End: 2023-02-06

## 2023-02-07 ENCOUNTER — NON-APPOINTMENT (OUTPATIENT)
Age: 88
End: 2023-02-07

## 2023-02-07 ENCOUNTER — APPOINTMENT (OUTPATIENT)
Dept: OPHTHALMOLOGY | Facility: CLINIC | Age: 88
End: 2023-02-07
Payer: MEDICARE

## 2023-02-07 PROCEDURE — 92012 INTRM OPH EXAM EST PATIENT: CPT

## 2023-02-09 NOTE — HISTORY OF PRESENT ILLNESS
[de-identified] : Patient is a 89-year-old woman who is in a cervical collar for a C2 fracture.  She presents for consideration of surgical therapy

## 2023-02-09 NOTE — DISCUSSION/SUMMARY
[de-identified] : She has a completely normal neurologic exam and while she does not have signs of healing she has anterior displacement of the dens away from the spinal cord and the likelihood of her having a catastrophic spinal cord injury is very low.  We had a long discussion about the natural history of odontoid fractures and we discussed the role of surgical intervention.  89 given her medical comorbidities I do not recommend surgical intervention.  We discussed with her family on the phone and they are in agreement.  We will remove her from the collar and monitor her very closely I answered all of her questions to the best my ability.\par \par Camacho Butler M.D., M.Sc.\par \par Department of Neurosurgery\par [facility]\par [facility]\par [facility]\par [place], [place]\par [email or web address]\par [phone]

## 2023-03-14 ENCOUNTER — APPOINTMENT (OUTPATIENT)
Dept: HEART AND VASCULAR | Facility: CLINIC | Age: 88
End: 2023-03-14
Payer: MEDICARE

## 2023-03-14 VITALS
BODY MASS INDEX: 30.02 KG/M2 | OXYGEN SATURATION: 95 % | SYSTOLIC BLOOD PRESSURE: 150 MMHG | DIASTOLIC BLOOD PRESSURE: 80 MMHG | RESPIRATION RATE: 16 BRPM | HEIGHT: 61 IN | TEMPERATURE: 97.7 F | WEIGHT: 159 LBS | HEART RATE: 73 BPM

## 2023-03-14 PROCEDURE — 99215 OFFICE O/P EST HI 40 MIN: CPT

## 2023-04-10 NOTE — HISTORY OF PRESENT ILLNESS
[FreeTextEntry1] : 89 F with a Pmhx of dCHF, HTN, HLD and severe AS s/p Bioprosthetic AV 7 years ago at OSH, c/b by bioprosthetic AV stenosis, now  s/p Valve-in-Valve with a Sheri Ultra by Structural Heart Team in June of 2021. She presents today for routine f/u\par \par At her last visit on 12/13, She was noted to have slight increase in lower extremity edema after recent discharge from [facility] on 12/10. Today she continue to have significant lower extremity edema, but otherwise denies any SOB. Pt. low baseline functional status, <4 METS, but is able to participate in PT 3x weekly\par \par Today, she feels well, at her baseline state of health. We, including her daughter Massiel by phone, discussed that her lower extremity edema is likely not solely due to diastolic CHF, especially given lack of pulmonary congestion. More likely related to chronic venous insufficiency. We also discussed the balance between kidney function and chasing her lower extremity edema with diuretics, and using other conservative measures such as increased ambulation, elevation of the legs and compression stockings. She otherwise maintains a low salt diet and compliance with her current diuretic regimen.

## 2023-04-10 NOTE — DISCUSSION/SUMMARY
[FreeTextEntry1] : Chronic dCHF - Pt. w/ 2-3+ pitting edema in b/l lower extremities but otherwise w/o signs of pulmonary congestion. Her baseline functional status remains stable. Able to work w/ PT 3x/weekly. Lower extremity edema likely multifactorial including chronic venous insufficiency\par -c/w Torsemide 20 PO daily and Metolazone 5mg, both 3x/week\par -c/w Spironolactone 25 daily\par -Renal functional stable, Cr. ~1.2-1.4\par -RTC in 2-3 months

## 2023-04-11 ENCOUNTER — APPOINTMENT (OUTPATIENT)
Dept: HEART AND VASCULAR | Facility: CLINIC | Age: 88
End: 2023-04-11
Payer: MEDICARE

## 2023-04-11 VITALS
HEIGHT: 61 IN | WEIGHT: 160 LBS | BODY MASS INDEX: 30.21 KG/M2 | HEART RATE: 62 BPM | SYSTOLIC BLOOD PRESSURE: 120 MMHG | OXYGEN SATURATION: 96 % | DIASTOLIC BLOOD PRESSURE: 78 MMHG | TEMPERATURE: 97.8 F

## 2023-04-11 PROCEDURE — 99215 OFFICE O/P EST HI 40 MIN: CPT

## 2023-05-03 NOTE — HISTORY OF PRESENT ILLNESS
[FreeTextEntry1] : 89 F with a Pmhx of dCHF, HTN, HLD and severe AS s/p Bioprosthetic AV 7 years ago at OSH, c/b by bioprosthetic AV stenosis, now s/p Valve-in-Valve with a Sheri Ultra by Structural Heart Team in June of 2021. She presents today for routine f/u. Shes still has some residual lower extremity edema but has not been having any worsening. She states that she sleeps while sitting in her chair. Otherwise, denies any chest pain, dyspnea, orthopnea, etc.

## 2023-05-03 NOTE — DISCUSSION/SUMMARY
[FreeTextEntry1] : 89 F with a Pmhx of dCHF, HTN, HLD and severe AS s/p Bioprosthetic AV 7 years ago at OSH, c/b by bioprosthetic AV stenosis, now s/p Valve-in-Valve with a Sheri Ultra by Structural Heart Team in June of 2021. Presents for follow up. \par \par Chronic dCHF - Pt. w/ 1-2+ pitting edema in b/l lower extremities but otherwise w/o signs of pulmonary congestion. Her baseline functional status remains stable. Able to work w/ PT 3x/weekly. Lower extremity edema likely multifactorial including chronic venous insufficiency. Encouraged patient to use her hospital bed to sleep rather than sitting and sleeping in her wheelchair, as that also leads to fluid retention. \par -c/w Torsemide 20 PO daily and Metolazone 5mg, both 3x/week\par -c/w Spironolactone 25 daily\par \par \par RTC in 2-3 months

## 2023-06-06 ENCOUNTER — NON-APPOINTMENT (OUTPATIENT)
Age: 88
End: 2023-06-06

## 2023-06-06 ENCOUNTER — APPOINTMENT (OUTPATIENT)
Dept: OPHTHALMOLOGY | Facility: CLINIC | Age: 88
End: 2023-06-06
Payer: MEDICARE

## 2023-06-06 PROCEDURE — 92014 COMPRE OPH EXAM EST PT 1/>: CPT

## 2023-06-06 PROCEDURE — 92133 CPTRZD OPH DX IMG PST SGM ON: CPT

## 2023-06-29 NOTE — ED ADULT NURSE NOTE - SKIN TEMPERATURE MOISTURE
warm Z Plasty Text: The lesion was extirpated to the level of the fat with a #15 scalpel blade.  Given the location of the defect, shape of the defect and the proximity to free margins a Z-plasty was deemed most appropriate for repair.  Using a sterile surgical marker, the appropriate transposition arms of the Z-plasty were drawn incorporating the defect and placing the expected incisions within the relaxed skin tension lines where possible.    The area thus outlined was incised deep to adipose tissue with a #15 scalpel blade.  The skin margins were undermined to an appropriate distance in all directions utilizing iris scissors.  The opposing transposition arms were then transposed into place in opposite direction and anchored with interrupted buried subcutaneous sutures.

## 2023-07-11 ENCOUNTER — OUTPATIENT (OUTPATIENT)
Dept: OUTPATIENT SERVICES | Facility: HOSPITAL | Age: 88
LOS: 1 days | End: 2023-07-11
Payer: MEDICARE

## 2023-07-11 ENCOUNTER — APPOINTMENT (OUTPATIENT)
Dept: HEART AND VASCULAR | Facility: CLINIC | Age: 88
End: 2023-07-11
Payer: MEDICARE

## 2023-07-11 ENCOUNTER — APPOINTMENT (OUTPATIENT)
Dept: CT IMAGING | Facility: HOSPITAL | Age: 88
End: 2023-07-11

## 2023-07-11 ENCOUNTER — NON-APPOINTMENT (OUTPATIENT)
Age: 88
End: 2023-07-11

## 2023-07-11 VITALS
DIASTOLIC BLOOD PRESSURE: 62 MMHG | BODY MASS INDEX: 27 KG/M2 | HEIGHT: 61 IN | OXYGEN SATURATION: 96 % | WEIGHT: 143 LBS | HEART RATE: 62 BPM | SYSTOLIC BLOOD PRESSURE: 119 MMHG

## 2023-07-11 DIAGNOSIS — Z98.890 OTHER SPECIFIED POSTPROCEDURAL STATES: Chronic | ICD-10-CM

## 2023-07-11 DIAGNOSIS — Z95.2 PRESENCE OF PROSTHETIC HEART VALVE: Chronic | ICD-10-CM

## 2023-07-11 DIAGNOSIS — Z95.5 PRESENCE OF CORONARY ANGIOPLASTY IMPLANT AND GRAFT: Chronic | ICD-10-CM

## 2023-07-11 PROCEDURE — 99215 OFFICE O/P EST HI 40 MIN: CPT

## 2023-07-11 PROCEDURE — 93000 ELECTROCARDIOGRAM COMPLETE: CPT

## 2023-07-11 PROCEDURE — 72125 CT NECK SPINE W/O DYE: CPT | Mod: MD

## 2023-07-11 PROCEDURE — 72125 CT NECK SPINE W/O DYE: CPT | Mod: 26,MD

## 2023-07-23 PROBLEM — S12.110A ODONTOID FRACTURE: Status: ACTIVE | Noted: 2022-09-08

## 2023-07-25 ENCOUNTER — OUTPATIENT (OUTPATIENT)
Dept: OUTPATIENT SERVICES | Facility: HOSPITAL | Age: 88
LOS: 1 days | End: 2023-07-25
Payer: MEDICARE

## 2023-07-25 ENCOUNTER — APPOINTMENT (OUTPATIENT)
Dept: MRI IMAGING | Facility: HOSPITAL | Age: 88
End: 2023-07-25
Payer: MEDICARE

## 2023-07-25 ENCOUNTER — APPOINTMENT (OUTPATIENT)
Dept: MRI IMAGING | Facility: HOSPITAL | Age: 88
End: 2023-07-25

## 2023-07-25 DIAGNOSIS — Z96.649 PRESENCE OF UNSPECIFIED ARTIFICIAL HIP JOINT: Chronic | ICD-10-CM

## 2023-07-25 DIAGNOSIS — Z95.5 PRESENCE OF CORONARY ANGIOPLASTY IMPLANT AND GRAFT: Chronic | ICD-10-CM

## 2023-07-25 DIAGNOSIS — Z95.2 PRESENCE OF PROSTHETIC HEART VALVE: Chronic | ICD-10-CM

## 2023-07-25 DIAGNOSIS — Z98.890 OTHER SPECIFIED POSTPROCEDURAL STATES: Chronic | ICD-10-CM

## 2023-07-25 PROCEDURE — 72141 MRI NECK SPINE W/O DYE: CPT | Mod: 26,MH

## 2023-07-25 PROCEDURE — 72141 MRI NECK SPINE W/O DYE: CPT

## 2023-07-27 ENCOUNTER — APPOINTMENT (OUTPATIENT)
Dept: NEUROSURGERY | Facility: CLINIC | Age: 88
End: 2023-07-27
Payer: MEDICARE

## 2023-07-27 VITALS
HEART RATE: 66 BPM | TEMPERATURE: 97.5 F | SYSTOLIC BLOOD PRESSURE: 134 MMHG | OXYGEN SATURATION: 96 % | DIASTOLIC BLOOD PRESSURE: 80 MMHG

## 2023-07-27 DIAGNOSIS — S12.110A ANTERIOR DISPLACED TYPE II DENS FRACTURE, INITIAL ENCOUNTER FOR CLOSED FRACTURE: ICD-10-CM

## 2023-07-27 PROCEDURE — 99215 OFFICE O/P EST HI 40 MIN: CPT

## 2023-07-27 NOTE — HISTORY OF PRESENT ILLNESS
[de-identified] : COLLETTE OROZCO is a 89 year old female being seen for a follow-up visit for C2 fracture and neck pain. Patient was seen by Dr. Butler on 1/12/23 who , given her medical comorbidities, did not recommend surgical intervention. \par \par The patient returns today 7/27/23  Patient accompanied by her private doctor and aid. Her daughter Massiel was on the phone. she is in a wheelchair wearing a hard cervical collar.. she reports worsening neck pain since her last visit yet has improved in the last two weeks. The patient reports tremors to bilat legs with shooting pain-improved over the last 2 weeks. She also reports increased frequency of HA's

## 2023-07-27 NOTE — ADDENDUM
[FreeTextEntry1] : Patient Name: COLLETTE OROZCO\par \par Subjective:\par The patient has a non-healing fracture on the tip of the dens. Initially, we attempted to stabilize it with a collar but we noticed signs of loosening in the dens tip. Later, she developed a malalignment in the fracture. Currently, the patient is now symptomatic and is reporting pain. Her home remedy consists of wearing a soft collar and using an airplane pillow, which is not adequately supporting the neck.\par \par Objective:\par Reviewing her CT scan from last August, there was a small fracture in the tip of the dens. In October, another scan shows signs of non-healing fracture and loosening in the tip of the dens. By November, she notes a shift in the fracture. The most recent scan in July shows a total malalignment; the whole dens has moved forward and in flexion. Though extension could provide some relief, there are risks associated with it, including potential damage to the spinal cord. \par \par Assessment:\par The patient has developed a significant and symptomatic malalignment in her dens fracture. This is causing considerable pain and is impacting her quality of life. Attempt to stabilize the fracture has proven ineffective. She's also at risk of developing a spinal cord injury which would have irreversible consequences. \par \par Plan:\par Considering the progressive worsening of her condition, surgical intervention appears inevitable, pending her medical clearance. The patient should continue to wear a cervical collar around the clock for support but a hard collar should be recommended as the soft collar she is currently using is not offering adequate support. Until surgery is undertaken, she should limit her neck movement to avoid any exacerbation or secondary complications. Her bone health needs to be assessed and if needed, supplemental therapy should be considered to enhance her bone strength to optimize the surgical outcome and recovery.

## 2023-07-27 NOTE — ASSESSMENT
[FreeTextEntry1] : I, Dr. Puckett, personally performed the evaluation and management (E/M) services for this established patient who presents today with (a) new problem(s)/exacerbation of (an) existing condition(s). That E/M includes conducting the examination, assessing all new/exacerbated conditions, and establishing a new plan of care. Today, my ACP, Unique Nunez, was here to observe my evaluation and management services for this new problem/exacerbated condition to be followed going forward.\par \par PLAN:\par - Recommended C1-2 fusion\par

## 2023-07-27 NOTE — REASON FOR VISIT
[New Patient Visit] : a new patient visit [Other: _____] : [unfilled] [FreeTextEntry1] : Type II dens fracture of second cervical vertebra

## 2023-08-02 ENCOUNTER — INPATIENT (INPATIENT)
Facility: HOSPITAL | Age: 88
LOS: 7 days | Discharge: HOME | DRG: 644 | End: 2023-08-10
Attending: INTERNAL MEDICINE | Admitting: STUDENT IN AN ORGANIZED HEALTH CARE EDUCATION/TRAINING PROGRAM
Payer: MEDICARE

## 2023-08-02 VITALS
TEMPERATURE: 98 F | RESPIRATION RATE: 16 BRPM | WEIGHT: 169.98 LBS | OXYGEN SATURATION: 98 % | DIASTOLIC BLOOD PRESSURE: 64 MMHG | SYSTOLIC BLOOD PRESSURE: 106 MMHG | HEART RATE: 64 BPM

## 2023-08-02 DIAGNOSIS — D64.9 ANEMIA, UNSPECIFIED: ICD-10-CM

## 2023-08-02 DIAGNOSIS — Z98.890 OTHER SPECIFIED POSTPROCEDURAL STATES: Chronic | ICD-10-CM

## 2023-08-02 DIAGNOSIS — S12.9XXA FRACTURE OF NECK, UNSPECIFIED, INITIAL ENCOUNTER: ICD-10-CM

## 2023-08-02 DIAGNOSIS — N39.0 URINARY TRACT INFECTION, SITE NOT SPECIFIED: ICD-10-CM

## 2023-08-02 DIAGNOSIS — Z29.9 ENCOUNTER FOR PROPHYLACTIC MEASURES, UNSPECIFIED: ICD-10-CM

## 2023-08-02 DIAGNOSIS — E87.1 HYPO-OSMOLALITY AND HYPONATREMIA: ICD-10-CM

## 2023-08-02 DIAGNOSIS — E23.2 DIABETES INSIPIDUS: ICD-10-CM

## 2023-08-02 DIAGNOSIS — Z95.5 PRESENCE OF CORONARY ANGIOPLASTY IMPLANT AND GRAFT: Chronic | ICD-10-CM

## 2023-08-02 DIAGNOSIS — Z95.2 PRESENCE OF PROSTHETIC HEART VALVE: ICD-10-CM

## 2023-08-02 DIAGNOSIS — Z96.649 PRESENCE OF UNSPECIFIED ARTIFICIAL HIP JOINT: Chronic | ICD-10-CM

## 2023-08-02 DIAGNOSIS — I50.32 CHRONIC DIASTOLIC (CONGESTIVE) HEART FAILURE: ICD-10-CM

## 2023-08-02 DIAGNOSIS — E78.5 HYPERLIPIDEMIA, UNSPECIFIED: ICD-10-CM

## 2023-08-02 DIAGNOSIS — I25.10 ATHEROSCLEROTIC HEART DISEASE OF NATIVE CORONARY ARTERY WITHOUT ANGINA PECTORIS: ICD-10-CM

## 2023-08-02 DIAGNOSIS — Z95.2 PRESENCE OF PROSTHETIC HEART VALVE: Chronic | ICD-10-CM

## 2023-08-02 LAB
ALBUMIN SERPL ELPH-MCNC: 3.9 G/DL — SIGNIFICANT CHANGE UP (ref 3.3–5)
ALP SERPL-CCNC: 79 U/L — SIGNIFICANT CHANGE UP (ref 40–120)
ALT FLD-CCNC: 11 U/L — SIGNIFICANT CHANGE UP (ref 10–45)
ANION GAP SERPL CALC-SCNC: 11 MMOL/L — SIGNIFICANT CHANGE UP (ref 5–17)
ANION GAP SERPL CALC-SCNC: 12 MMOL/L — SIGNIFICANT CHANGE UP (ref 5–17)
APPEARANCE UR: CLEAR — SIGNIFICANT CHANGE UP
AST SERPL-CCNC: 26 U/L — SIGNIFICANT CHANGE UP (ref 10–40)
BACTERIA # UR AUTO: PRESENT /HPF
BASOPHILS # BLD AUTO: 0.02 K/UL — SIGNIFICANT CHANGE UP (ref 0–0.2)
BASOPHILS NFR BLD AUTO: 0.2 % — SIGNIFICANT CHANGE UP (ref 0–2)
BILIRUB SERPL-MCNC: 0.4 MG/DL — SIGNIFICANT CHANGE UP (ref 0.2–1.2)
BILIRUB UR-MCNC: NEGATIVE — SIGNIFICANT CHANGE UP
BUN SERPL-MCNC: 16 MG/DL — SIGNIFICANT CHANGE UP (ref 7–23)
BUN SERPL-MCNC: 17 MG/DL — SIGNIFICANT CHANGE UP (ref 7–23)
CALCIUM SERPL-MCNC: 8.8 MG/DL — SIGNIFICANT CHANGE UP (ref 8.4–10.5)
CALCIUM SERPL-MCNC: 8.8 MG/DL — SIGNIFICANT CHANGE UP (ref 8.4–10.5)
CHLORIDE SERPL-SCNC: 81 MMOL/L — LOW (ref 96–108)
CHLORIDE SERPL-SCNC: 82 MMOL/L — LOW (ref 96–108)
CO2 SERPL-SCNC: 27 MMOL/L — SIGNIFICANT CHANGE UP (ref 22–31)
CO2 SERPL-SCNC: 28 MMOL/L — SIGNIFICANT CHANGE UP (ref 22–31)
COLOR SPEC: YELLOW — SIGNIFICANT CHANGE UP
CREAT SERPL-MCNC: 0.93 MG/DL — SIGNIFICANT CHANGE UP (ref 0.5–1.3)
CREAT SERPL-MCNC: 0.95 MG/DL — SIGNIFICANT CHANGE UP (ref 0.5–1.3)
DIFF PNL FLD: ABNORMAL
EGFR: 56 ML/MIN/1.73M2 — LOW
EGFR: 57 ML/MIN/1.73M2 — LOW
EOSINOPHIL # BLD AUTO: 0.07 K/UL — SIGNIFICANT CHANGE UP (ref 0–0.5)
EOSINOPHIL NFR BLD AUTO: 0.7 % — SIGNIFICANT CHANGE UP (ref 0–6)
EPI CELLS # UR: SIGNIFICANT CHANGE UP /HPF (ref 0–5)
GLUCOSE SERPL-MCNC: 94 MG/DL — SIGNIFICANT CHANGE UP (ref 70–99)
GLUCOSE SERPL-MCNC: 99 MG/DL — SIGNIFICANT CHANGE UP (ref 70–99)
GLUCOSE UR QL: NEGATIVE — SIGNIFICANT CHANGE UP
HCT VFR BLD CALC: 32.7 % — LOW (ref 34.5–45)
HGB BLD-MCNC: 11.2 G/DL — LOW (ref 11.5–15.5)
IMM GRANULOCYTES NFR BLD AUTO: 0.8 % — SIGNIFICANT CHANGE UP (ref 0–0.9)
KETONES UR-MCNC: NEGATIVE — SIGNIFICANT CHANGE UP
LEUKOCYTE ESTERASE UR-ACNC: ABNORMAL
LYMPHOCYTES # BLD AUTO: 0.93 K/UL — LOW (ref 1–3.3)
LYMPHOCYTES # BLD AUTO: 9.1 % — LOW (ref 13–44)
MAGNESIUM SERPL-MCNC: 1.4 MG/DL — LOW (ref 1.6–2.6)
MCHC RBC-ENTMCNC: 30.2 PG — SIGNIFICANT CHANGE UP (ref 27–34)
MCHC RBC-ENTMCNC: 34.3 GM/DL — SIGNIFICANT CHANGE UP (ref 32–36)
MCV RBC AUTO: 88.1 FL — SIGNIFICANT CHANGE UP (ref 80–100)
MONOCYTES # BLD AUTO: 0.68 K/UL — SIGNIFICANT CHANGE UP (ref 0–0.9)
MONOCYTES NFR BLD AUTO: 6.7 % — SIGNIFICANT CHANGE UP (ref 2–14)
NEUTROPHILS # BLD AUTO: 8.42 K/UL — HIGH (ref 1.8–7.4)
NEUTROPHILS NFR BLD AUTO: 82.5 % — HIGH (ref 43–77)
NITRITE UR-MCNC: POSITIVE
NRBC # BLD: 0 /100 WBCS — SIGNIFICANT CHANGE UP (ref 0–0)
OSMOLALITY SERPL: 252 MOSM/KG — LOW (ref 280–301)
OSMOLALITY UR: 417 MOSM/KG — SIGNIFICANT CHANGE UP (ref 300–900)
PH UR: 7.5 — SIGNIFICANT CHANGE UP (ref 5–8)
PHOSPHATE SERPL-MCNC: 3.6 MG/DL — SIGNIFICANT CHANGE UP (ref 2.5–4.5)
PLATELET # BLD AUTO: 295 K/UL — SIGNIFICANT CHANGE UP (ref 150–400)
POTASSIUM SERPL-MCNC: 3.8 MMOL/L — SIGNIFICANT CHANGE UP (ref 3.5–5.3)
POTASSIUM SERPL-MCNC: 4.2 MMOL/L — SIGNIFICANT CHANGE UP (ref 3.5–5.3)
POTASSIUM SERPL-SCNC: 3.8 MMOL/L — SIGNIFICANT CHANGE UP (ref 3.5–5.3)
POTASSIUM SERPL-SCNC: 4.2 MMOL/L — SIGNIFICANT CHANGE UP (ref 3.5–5.3)
PROT SERPL-MCNC: 6.5 G/DL — SIGNIFICANT CHANGE UP (ref 6–8.3)
PROT UR-MCNC: ABNORMAL MG/DL
RBC # BLD: 3.71 M/UL — LOW (ref 3.8–5.2)
RBC # FLD: 14.6 % — HIGH (ref 10.3–14.5)
RBC CASTS # UR COMP ASSIST: ABNORMAL /HPF
SODIUM SERPL-SCNC: 120 MMOL/L — CRITICAL LOW (ref 135–145)
SODIUM SERPL-SCNC: 121 MMOL/L — LOW (ref 135–145)
SODIUM UR-SCNC: 38 MMOL/L — SIGNIFICANT CHANGE UP
SP GR SPEC: 1.01 — SIGNIFICANT CHANGE UP (ref 1–1.03)
TSH SERPL-MCNC: 0.88 UIU/ML — SIGNIFICANT CHANGE UP (ref 0.27–4.2)
UROBILINOGEN FLD QL: 0.2 E.U./DL — SIGNIFICANT CHANGE UP
WBC # BLD: 10.2 K/UL — SIGNIFICANT CHANGE UP (ref 3.8–10.5)
WBC # FLD AUTO: 10.2 K/UL — SIGNIFICANT CHANGE UP (ref 3.8–10.5)
WBC UR QL: ABNORMAL /HPF

## 2023-08-02 PROCEDURE — 99223 1ST HOSP IP/OBS HIGH 75: CPT | Mod: GC

## 2023-08-02 PROCEDURE — 71045 X-RAY EXAM CHEST 1 VIEW: CPT | Mod: 26

## 2023-08-02 PROCEDURE — 99223 1ST HOSP IP/OBS HIGH 75: CPT

## 2023-08-02 PROCEDURE — 99285 EMERGENCY DEPT VISIT HI MDM: CPT

## 2023-08-02 RX ORDER — LATANOPROST 0.05 MG/ML
1 SOLUTION/ DROPS OPHTHALMIC; TOPICAL AT BEDTIME
Refills: 0 | Status: DISCONTINUED | OUTPATIENT
Start: 2023-08-02 | End: 2023-08-10

## 2023-08-02 RX ORDER — ENOXAPARIN SODIUM 100 MG/ML
40 INJECTION SUBCUTANEOUS EVERY 24 HOURS
Refills: 0 | Status: DISCONTINUED | OUTPATIENT
Start: 2023-08-02 | End: 2023-08-10

## 2023-08-02 RX ORDER — ATORVASTATIN CALCIUM 80 MG/1
40 TABLET, FILM COATED ORAL AT BEDTIME
Refills: 0 | Status: DISCONTINUED | OUTPATIENT
Start: 2023-08-02 | End: 2023-08-10

## 2023-08-02 RX ORDER — CHOLECALCIFEROL (VITAMIN D3) 125 MCG
1 CAPSULE ORAL
Qty: 0 | Refills: 0 | DISCHARGE

## 2023-08-02 RX ORDER — CHOLECALCIFEROL (VITAMIN D3) 125 MCG
1000 CAPSULE ORAL DAILY
Refills: 0 | Status: DISCONTINUED | OUTPATIENT
Start: 2023-08-02 | End: 2023-08-10

## 2023-08-02 RX ORDER — CEFTRIAXONE 500 MG/1
1000 INJECTION, POWDER, FOR SOLUTION INTRAMUSCULAR; INTRAVENOUS EVERY 24 HOURS
Refills: 0 | Status: DISCONTINUED | OUTPATIENT
Start: 2023-08-02 | End: 2023-08-02

## 2023-08-02 RX ORDER — GABAPENTIN 400 MG/1
300 CAPSULE ORAL
Refills: 0 | Status: DISCONTINUED | OUTPATIENT
Start: 2023-08-02 | End: 2023-08-10

## 2023-08-02 RX ORDER — LANOLIN ALCOHOL/MO/W.PET/CERES
3 CREAM (GRAM) TOPICAL
Refills: 0 | DISCHARGE

## 2023-08-02 RX ORDER — ASPIRIN/CALCIUM CARB/MAGNESIUM 324 MG
81 TABLET ORAL DAILY
Refills: 0 | Status: DISCONTINUED | OUTPATIENT
Start: 2023-08-02 | End: 2023-08-10

## 2023-08-02 RX ORDER — SENNA PLUS 8.6 MG/1
2 TABLET ORAL AT BEDTIME
Refills: 0 | Status: DISCONTINUED | OUTPATIENT
Start: 2023-08-02 | End: 2023-08-02

## 2023-08-02 RX ORDER — DESMOPRESSIN ACETATE 0.1 MG/1
0.1 TABLET ORAL AT BEDTIME
Refills: 0 | Status: DISCONTINUED | OUTPATIENT
Start: 2023-08-02 | End: 2023-08-03

## 2023-08-02 RX ORDER — ACETAMINOPHEN 500 MG
650 TABLET ORAL EVERY 6 HOURS
Refills: 0 | Status: DISCONTINUED | OUTPATIENT
Start: 2023-08-02 | End: 2023-08-10

## 2023-08-02 RX ORDER — POLYETHYLENE GLYCOL 3350 17 G/17G
17 POWDER, FOR SOLUTION ORAL DAILY
Refills: 0 | Status: DISCONTINUED | OUTPATIENT
Start: 2023-08-02 | End: 2023-08-02

## 2023-08-02 RX ORDER — CHOLECALCIFEROL (VITAMIN D3) 125 MCG
2 CAPSULE ORAL
Refills: 0 | DISCHARGE

## 2023-08-02 RX ORDER — GABAPENTIN 400 MG/1
1 CAPSULE ORAL
Qty: 0 | Refills: 0 | DISCHARGE

## 2023-08-02 RX ORDER — SODIUM CHLORIDE 5 G/100ML
500 INJECTION, SOLUTION INTRAVENOUS
Refills: 0 | Status: DISCONTINUED | OUTPATIENT
Start: 2023-08-02 | End: 2023-08-03

## 2023-08-02 RX ORDER — MORPHINE SULFATE 50 MG/1
2 CAPSULE, EXTENDED RELEASE ORAL EVERY 4 HOURS
Refills: 0 | Status: DISCONTINUED | OUTPATIENT
Start: 2023-08-02 | End: 2023-08-02

## 2023-08-02 RX ORDER — MAGNESIUM SULFATE 500 MG/ML
2 VIAL (ML) INJECTION ONCE
Refills: 0 | Status: COMPLETED | OUTPATIENT
Start: 2023-08-02 | End: 2023-08-02

## 2023-08-02 RX ORDER — OXYCODONE HYDROCHLORIDE 5 MG/1
10 TABLET ORAL EVERY 6 HOURS
Refills: 0 | Status: DISCONTINUED | OUTPATIENT
Start: 2023-08-02 | End: 2023-08-02

## 2023-08-02 RX ORDER — ZINC SULFATE TAB 220 MG (50 MG ZINC EQUIVALENT) 220 (50 ZN) MG
220 TAB ORAL DAILY
Refills: 0 | Status: DISCONTINUED | OUTPATIENT
Start: 2023-08-02 | End: 2023-08-10

## 2023-08-02 RX ORDER — SPIRONOLACTONE 25 MG/1
25 TABLET, FILM COATED ORAL DAILY
Refills: 0 | Status: DISCONTINUED | OUTPATIENT
Start: 2023-08-02 | End: 2023-08-07

## 2023-08-02 RX ORDER — CEFTRIAXONE 500 MG/1
1000 INJECTION, POWDER, FOR SOLUTION INTRAMUSCULAR; INTRAVENOUS EVERY 24 HOURS
Refills: 0 | Status: COMPLETED | OUTPATIENT
Start: 2023-08-02 | End: 2023-08-04

## 2023-08-02 RX ORDER — OXYCODONE HYDROCHLORIDE 5 MG/1
5 TABLET ORAL EVERY 6 HOURS
Refills: 0 | Status: DISCONTINUED | OUTPATIENT
Start: 2023-08-02 | End: 2023-08-02

## 2023-08-02 RX ORDER — BRIMONIDINE TARTRATE 2 MG/MG
1 SOLUTION/ DROPS OPHTHALMIC
Qty: 0 | Refills: 0 | DISCHARGE

## 2023-08-02 RX ORDER — LANOLIN ALCOHOL/MO/W.PET/CERES
3 CREAM (GRAM) TOPICAL AT BEDTIME
Refills: 0 | Status: DISCONTINUED | OUTPATIENT
Start: 2023-08-02 | End: 2023-08-10

## 2023-08-02 RX ORDER — BRIMONIDINE TARTRATE 2 MG/MG
1 SOLUTION/ DROPS OPHTHALMIC
Refills: 0 | Status: DISCONTINUED | OUTPATIENT
Start: 2023-08-02 | End: 2023-08-10

## 2023-08-02 RX ADMIN — DESMOPRESSIN ACETATE 0.1 MILLIGRAM(S): 0.1 TABLET ORAL at 23:09

## 2023-08-02 RX ADMIN — Medication 650 MILLIGRAM(S): at 20:29

## 2023-08-02 RX ADMIN — CEFTRIAXONE 100 MILLIGRAM(S): 500 INJECTION, POWDER, FOR SOLUTION INTRAMUSCULAR; INTRAVENOUS at 19:25

## 2023-08-02 RX ADMIN — Medication 25 GRAM(S): at 16:45

## 2023-08-02 RX ADMIN — ENOXAPARIN SODIUM 40 MILLIGRAM(S): 100 INJECTION SUBCUTANEOUS at 19:26

## 2023-08-02 RX ADMIN — SODIUM CHLORIDE 30 MILLILITER(S): 5 INJECTION, SOLUTION INTRAVENOUS at 19:58

## 2023-08-02 RX ADMIN — ATORVASTATIN CALCIUM 40 MILLIGRAM(S): 80 TABLET, FILM COATED ORAL at 23:08

## 2023-08-02 RX ADMIN — Medication 650 MILLIGRAM(S): at 19:28

## 2023-08-02 RX ADMIN — Medication 3 MILLIGRAM(S): at 23:08

## 2023-08-02 NOTE — CONSULT NOTE ADULT - ATTENDING COMMENTS
I agree with the fellow's findings and plans as written above with the following additions/amendments:    Seen and examined at bedside. Patient pleasantly demented, not able to follow conversation regarding sodium. Discussed with outpatient provider - this appears to be a decrease in her mentation from baseline, will treat as possible symptomatic chronic hypoatnremia. Of note patient has had a similar issue to this before, had a drop in sodium while on 2 mcg ddavp, and then was decreased to 1.5 and has been stable on this dose for about 2 years, now again with another drop in sodium. Will decrease ddavp and give 3% saline as above. Discussed in detail with outpatient provider. Discused with primary team. I agree with the fellow's findings and plans as written above with the following additions/amendments:    Seen and examined at bedside. Patient pleasantly demented, not able to follow conversation regarding sodium. Discussed with outpatient provider - this appears to be a decrease in her mentation from baseline, will treat as possible symptomatic chronic hypoatnremia. Of note patient has had a similar issue to this before, had a drop in sodium while on 2 mcg ddavp, and then was decreased to 1.5 and has been stable on this dose for about 2 years, now again with another drop in sodium. Will decrease ddavp and give 3% saline as above. Discussed in detail with outpatient provider. Discussed with primary team.

## 2023-08-02 NOTE — H&P ADULT - PROBLEM SELECTOR PLAN 4
Hx of CHF. Follows w/ Dr Fernandez. Admission in 12/22 for acute on chronic exacerbation. ECHO 12/7: with EF>75% and moderate AS/MR with unchanged gradients across AV and MV  - continue aldactone 25mg daily   - hold Torsemide 20mg po qd ISO hyponatremia Hx of CHF. Follows w/ Dr Fernandez. Admission in 12/22 for acute on chronic exacerbation. ECHO 12/7: with EF>75% and moderate AS/MR with unchanged gradients across AV and MV  - continue aldactone 25mg daily   - hold Torsemide 20mg po Mon,Wed,Fri ISO hyponatremia and   - hold Metolazone 5mg Mon,Wed, Fri ISO hyponatremia Hgb on admission 11.3, MCV 88.1. Currently no signs of active bleeding (no hematochezia, melena, hemoptysis, hematuria). Baseline Hb from previous admission in 12/22 10-11  - trend CBC  - maintain active T&S  - transfuse if Hgb <7

## 2023-08-02 NOTE — PATIENT PROFILE ADULT - NSTOBACCONEVERSMOKERY/N_GEN_A
Detail Level: Detailed
Quality 226: Preventive Care And Screening: Tobacco Use: Screening And Cessation Intervention: Patient screened for tobacco and never smoked
Quality 154 Part A: Falls: Risk Assessment (Should Be Reported With Measure 155.): Falls risk assessment completed and documented in the past 12 months.
Quality 110: Preventive Care And Screening: Influenza Immunization: Influenza Immunization previously received during influenza season
Quality 154 Part B: Falls: Risk Screening (Should Be Reported With Measure 155.): Patient screened for future fall risk; documentation of no falls in the past year or only one fall without injury in the past year
Quality 431: Preventive Care And Screening: Unhealthy Alcohol Use - Screening: Patient screened for unhealthy alcohol use using a single question and scores less than 2 times per year
Yes

## 2023-08-02 NOTE — HISTORY OF PRESENT ILLNESS
-- Message is from the [facility]--    Reason for Call: Patients mother has had a fever of 102 since Tuesday and she would like a call with some advice he seems to have no other symptoms.     Caller Information       Type Contact Phone    09/12/2019 04:30 PM Phone (Incoming) tomasa clark (Mother) [phone]          Alternative phone number: n/a    Turnaround time given to caller:   \"This message will be sent to [state Provider's name]. The clinical team will fulfill your request as soon as they review your message when the office opens on Monday (or after the holiday).\"     [FreeTextEntry1] : 89 F with a Pmhx of dCHF, HTN, HLD and severe AS s/p Bioprosthetic AV 7 years ago at OSH, c/b by bioprosthetic AV stenosis, now  s/p Valve-in-Valve with a Sheri Ultra by Structural Heart Team in June of 2021. She presents today for routine f/u  At her last visit on April 11th.  She was noted to  residual lower extremity edema but has not been having any worsening SOB at rest or RIOS more than baseline. We elected to continue with her current diuretic regimen while also including more conservative measures i.e. compressions stockings, regular PT, elevation of legs and maintaining a low salt diet  Today, she feels well, at her baseline state of health. Notably her lower extremity edema has significantly improved. She continues to work with PT regularly and follow all of the above conservative measures. She otherwise has no new complaints today.

## 2023-08-02 NOTE — H&P ADULT - PROBLEM SELECTOR PLAN 2
On home desmopressin. Na 120 on admission.  - hold home desmopressin ISO hyponatremia  - nephrology consulted - f/u recs On home desmopressin. Na 120 on admission.  - nephrology consulted - f/u recs re DDAVP

## 2023-08-02 NOTE — H&P ADULT - PROBLEM SELECTOR PLAN 8
S/p Valve in valve TAVR in 2021 with echo today unchanged from prior TTE 12/22: EF> 75%, moderate AS and moderate MR.  - cont to monitor [facility]  8/31-9/2 after fall resulting in C spine fracture. Imaging from 12/22 admission CT Cervical spine: Nonhealing base of dens fracture. Compared to 11/10/22 there is mild increase in anterior displacement of the dens and C1 lateral masses relative to the base of dens. MR Cervical spine: Nonhealing C2 fracture at base of dens, mildly distracted as seen on CT. Edema involving the right greater than left lateral masses likely stress related to altered alignment. No evidence for marrow replacing lesion or other bony edema.Has been wearing C-collar since fracture. 07/23 CT and MR w/ interval displacement of dens fracture   - c/w C-collar

## 2023-08-02 NOTE — ED PROVIDER NOTE - PROGRESS NOTE DETAILS
Dr. Mcpherson at bedside. Has contacted Dr. Hardwick/ Nephrology who will follow pt. Case discussed with nephrology fellow and urine studies ordered.

## 2023-08-02 NOTE — H&P ADULT - ATTENDING COMMENTS
89 y/o F with PMH HTN, HLD, chronic diastolic CHF, hx BioAVR complicated by stenosis and subsequent valve-in-valve TAVR at [facility] in 2021, hx Diabetes Insipidus with hyponatremia (on DDAVP 1.5mg daily), chronic neuropathy, C spine fracture (C-spine collar when out of her home secondary to poor healing), dementia, depression presented with 1 day history of generalized weakness and a witnessed fall and admitted for hyponatremia.      Labs and imaging reviewed    Problem List  #Symptomatic Hyponatremia - Hypertonic Saline, dose reduce DDAVP, Rpt BMP q6h next 10p  #DI - continue DDAVP but dose reduce     Rest as above

## 2023-08-02 NOTE — H&P ADULT - PROBLEM SELECTOR PLAN 3
Hgb on admission 11.3, MCV 88.1. Currently no signs of active bleeding (no hematochezia, melena, hemoptysis, hematuria). Baseline Hb from previous admission in 12/22 10-11  - trend CBC  - maintain active T&S  - transfuse if Hgb <7 Pt presenting w/ 2d sx of discomfort on urination. UA + nitrites, LE, blood, WCC and bacteria. No suprapubic or CV tenderness on exam.  - ceftriaxone 1g QD for 3 days

## 2023-08-02 NOTE — H&P ADULT - PROBLEM SELECTOR PLAN 9
Plan:  F: none   E: replete K<4, Mg<2  N: regular  VTE Prophylaxis: lovenox 40mg QD  GI: none  C: Full Code  D: TAN S/p Valve in valve TAVR in 2021 with echo today unchanged from prior TTE 12/22: EF> 75%, moderate AS and moderate MR.  - cont to monitor

## 2023-08-02 NOTE — H&P ADULT - HISTORY OF PRESENT ILLNESS
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 Pt claiming that she is in the hospital due to chronic pain in her back. Aid at bedside endorsed that patient was feeling dizzy and felt like her legs were giving up when ambulating this AM. Pt did not have a fall but her aid lowered her to sit on the ground. Aid also claims that pt saw her OP Dr Camila Pardo who asked her  to go to ED on 8/1 for OP Na+ 118 but pt refused. Aid also claiming that pt has been c/o urinary discomfort over last few days which pt corroborated and that she had 2 episodes of non-bloody diarrhea on morning of admission. Pt has no complaints other than mild discomfort on urination. Pt denies HA, dizziness, CP, SOB, changes in BMs.    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 Pt claiming that she is in the hospital due to chronic pain in her back. Aid at bedside endorsed that patient was feeling dizzy and felt like her legs were giving up when ambulating this AM. Pt did not have a fall but her aid lowered her to sit on the ground. Aid also claims that pt saw her OP Dr Camila Mcpherson who asked her  to go to ED on 8/1 for OP Na+ 118 but pt refused. Aid also claiming that pt has been c/o urinary discomfort over last few days which pt corroborated and that she had 2 episodes of non-bloody diarrhea on morning of admission. Pt has no complaints other than mild discomfort on urination. Pt denies HA, dizziness, CP, SOB, changes in BMs.    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 in 2021, hx Diabetes Insipidus with hyponatremia (on DDAVP 1.5mg daily), chronic neuropathy, C spine fracture (C-spine collar when out of her home secondary to poor healing), dementia, depression presented with 1 day history of generalized weakness and a witnessed fall. Pt claiming that she is in the hospital due to chronic pain in her back. Aid at bedside endorsed that patient was feeling dizzy and felt like her legs were giving up when ambulating this AM. Pt did not have a fall but her aid lowered her to sit on the ground. Aid also claims that pt saw her OP Dr Camila Mcpherson who asked her  to go to ED on 8/1 for OP Na+ 118 but pt refused. Aid also claiming that pt has been c/o urinary discomfort over last few days which pt corroborated and that she had 2 episodes of non-bloody diarrhea on morning of admission. Pt has no complaints other than mild discomfort on urination. Pt denies HA, dizziness, CP, SOB, changes in BMs.    ED Course:  Vitals: 98.4F, HR 64, /64, RR 16 (98%) on RA  Labs: Hb 11.2, MCV 88.1, Na+ 120, Cl- 81, Mg2+ 1.4  Imaging: CXR dextroscoliosis and TAVR - lung parenchyma clear  EKG: SR w/ 1st degree AVB, LBBB - unchanged from 8/31/22  Consults: nephrology  Interventions: Mg 2g

## 2023-08-02 NOTE — ED PROVIDER NOTE - CLINICAL SUMMARY MEDICAL DECISION MAKING FREE TEXT BOX
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 in 2021, hx Diabetes Insipidus with hyponatremia, chronic neuropathy, admission at [facility] a year ago after fall resulting in C spine fracture and still wears a C-spine collar when out of her home secondary to poor healing, dementia, depression, walks with a walker at home and uses a wheelchair outside her home, with 24 hours aides, Presents with 2 aides at bedside with generalized weakness and "his legs giving out" earlier this morning when patient was ambulating with her walker.  Patient was caught by her aide and was lowered to the floor without any head injury.  EMS was called and patient was brought to [facility] ED for further evaluation.  Of note, patient had blood work drawn at her home yesterday and aides were notified today that her sodium level was 118 and she should be brought to the ED for further evaluation. Patient with no complaints.  Denies chest pain, shortness of breath, abdominal pain, nausea, vomiting.  Per home health aides patient has been complaining about burning with urination for the past week and has been incontinent of urine and stool.  Patient also with 1-2 episode of loose nonbloody stool today.  Denies any fevers, cough, URI symptoms.   History obtained from patient's healthcare aides X 2 who are at bedside and old charts.  Patient is pleasantly demented.      ED course: Vital signs noted.  Patient is afebrile and rest of vital signs within normal limits.  Will obtain labs/UA.  Will reevaluate. 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 in 2021, hx Diabetes Insipidus with hyponatremia on desmopressin, chronic neuropathy, admission at [facility] a year ago after fall resulting in C spine fracture and still wears a C-spine collar when out of her home secondary to poor healing, dementia, depression, walks with a walker at home and uses a wheelchair outside her home, with 24 hours aides, Presents with 2 aides at bedside with generalized weakness and "his legs giving out" earlier this morning when patient was ambulating with her walker.  Patient was caught by her aide and was lowered to the floor without any head injury.  EMS was called and patient was brought to [facility] ED for further evaluation.  Of note, patient had blood work drawn at her home yesterday and aides were notified today that her sodium level was 118 and she should be brought to the ED for further evaluation. Patient with no complaints.  Denies chest pain, shortness of breath, abdominal pain, nausea, vomiting.  Per home health aides patient has been complaining about burning with urination for the past week and has been incontinent of urine and stool.  Patient also with 1-2 episode of loose nonbloody stool today.  Denies any fevers, cough, URI symptoms.   History obtained from patient's healthcare aides X 2 who are at bedside and old charts.  Patient is pleasantly demented.      ED course: Vital signs noted.  Patient is afebrile and rest of vital signs within normal limits.  Will obtain labs/UA.  Will reevaluate. 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  Patient was caught by her aide and was lowered to the floor without any head injury.  EMS was called and patient was brought to [facility] ED for further evaluation.  Of note, patient had blood work drawn at her home yesterday and aides were notified today that her sodium level was 118 and she should be brought to the ED for further evaluation. Patient with no complaints.  Denies chest pain, shortness of breath, abdominal pain, nausea, vomiting.  Per home health aides patient has been complaining about burning with urination for the past week and has been incontinent of urine and stool.  Patient also with 1-2 episode of loose nonbloody stool today.  Denies any fevers, cough, URI symptoms.   History obtained from patient's healthcare aides X 2 who are at bedside and old charts.  Patient is pleasantly demented.      ED course: Vital signs noted.  Patient is afebrile and rest of vital signs within normal limits.  ECG with NAD. Labs noted and with Na of 119. Case discussed with Nephrology and pt admitted to Medicine for further evaluation. Stable in ED. 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 in 2021, hx Diabetes Insipidus with hyponatremia on desmopressin, chronic neuropathy, admission at [facility] a year ago after fall resulting in C spine fracture and still wears a C-spine collar when out of her home secondary to poor healing, dementia, depression, walks with a walker at home and uses a wheelchair outside her home, with 24 hours aides, Presents with 2 aides at bedside with generalized weakness and "his legs giving out" earlier this morning when patient was ambulating with her walker.  Patient was caught by her aide and was lowered to the floor without any head injury.  EMS was called and patient was brought to [facility] ED for further evaluation.  Of note, patient had blood work drawn at her home yesterday and aides were notified today that her sodium level was 118 and she should be brought to the ED for further evaluation. Patient with no complaints.  Denies chest pain, shortness of breath, abdominal pain, nausea, vomiting.  Per home health aides patient has been complaining about burning with urination for the past week and has been incontinent of urine and stool.  Patient also with 1-2 episode of loose nonbloody stool today.  Denies any fevers, cough, URI symptoms.   History obtained from patient's healthcare aides X 2 who are at bedside and old charts.  Patient is pleasantly demented.      ED course: Vital signs noted.  Patient is afebrile and rest of vital signs within normal limits.  ECG with NAD. Labs noted and with Na of 120 and Mg1.4. IV Mg given. Case discussed with Nephrology and pt admitted to Medicine for further evaluation. Stable in ED.

## 2023-08-02 NOTE — ED ADULT NURSE NOTE - NSFALLHARMRISKINTERV_ED_ALL_ED

## 2023-08-02 NOTE — H&P ADULT - HIV OFFER
COVID-19 Pre-surgery screening:    Pt did not answer. Voice mail and call back number were left.          Opt out

## 2023-08-02 NOTE — ED ADULT NURSE NOTE - OBJECTIVE STATEMENT
"Pt sent by PCP for  on labs drawn yesterday. HHA present who reports pt with increasing weakness, typically ambulates with walker and was too weak to walk to the bathroom this AM- pt had a near fall, she was caught and lowered to the ground without head injury. Hx DI on Desmopressin."    triage note above.  pt a&ox2, disoriented to time.  HHA reported pt had weakness and fatigue walking with walker to bathroom this morning.  "she almost fell but the night aide caught her." pt presents with [place] collar on. "I have a broken neck" HHA reports that pt fell last year and sustained a broken neck however "it didn't heal right" no wounds noted.

## 2023-08-02 NOTE — H&P ADULT - NSHPPHYSICALEXAM_GEN_ALL_CORE
VITAL SIGNS:  T(F): 97.6 (08-02-23 @ 16:32)  HR: 66 (08-02-23 @ 16:32)  BP: 134/78 (08-02-23 @ 16:32)  RR: 18 (08-02-23 @ 16:32)  SpO2: 97% (08-02-23 @ 16:32)  Wt(kg): --    PHYSICAL EXAM:  Constitutional: NAD  HEENT: PERRL, EOMI, sclera non-icteric, neck supple, trachea midline, no masses, no JVD, MMM  Respiratory: CTA b/l, good air entry b/l, no wheezing, no rhonchi, no rales, without accessory muscle use and no intercostal retractions  Cardiovascular: RRR, normal S1S2, no M/R/G  Gastrointestinal: soft, NTND, no masses palpable, BS normal  Extremities: Warm, well perfused, pulses equal bilateral upper and lower extremities, no edema, no clubbing  Neurological: AAOx3, CN Grossly intact  Skin: Normal temperature, warm, dry VITAL SIGNS:  T(F): 97.6 (08-02-23 @ 16:32)  HR: 66 (08-02-23 @ 16:32)  BP: 134/78 (08-02-23 @ 16:32)  RR: 18 (08-02-23 @ 16:32)  SpO2: 97% (08-02-23 @ 16:32)  Wt(kg): --    PHYSICAL EXAM:  Constitutional: NAD  HEENT: PERRL, EOMI, sclera non-icteric, neck supple, trachea midline, no masses, no JVD, MMM  Respiratory: CTA b/l, good air entry b/l, no wheezing, no rhonchi, no rales, without accessory muscle use and no intercostal retractions  Cardiovascular: RRR, normal S1S2 w/ harsh systolic murmur best heard in aortic area  Gastrointestinal: soft, NTND, no masses palpable, BS normal  Extremities: Warm, well perfused, pulses equal bilateral upper and lower extremities, trace pitting edema to ankles b/l, no clubbing  Neurological: AAOx3, CN Grossly intact  Skin: Normal temperature, warm, dry

## 2023-08-02 NOTE — H&P ADULT - PROBLEM SELECTOR PLAN 7
[facility]  8/31-9/2 after fall resulting in C spine fracture. Imaging from 12/22 admission CT Cervical spine: Nonhealing base of dens fracture. Compared to 11/10/22 there is mild increase in anterior displacement of the dens and C1 lateral masses relative to the base of dens. MR Cervical spine: Nonhealing C2 fracture at base of dens, mildly distracted as seen on CT. Edema involving the right greater than left lateral masses likely stress related to altered alignment. No evidence for marrow replacing lesion or other bony edema.Has been wearing C-collar since fracture. 07/23 CT and MR w/ interval displacement of dens fracture   - c/w C-collar Hx of HTN. Home meds: atorvastatin 40mg  - c/w atorvastatin

## 2023-08-02 NOTE — CONSULT NOTE ADULT - SUBJECTIVE AND OBJECTIVE BOX
HPI:  89 y/o F with HTN, Diabetes Insipidus on DDAVP presents with 2 aides at bedside with generalized weakness and "his legs giving out" earlier this morning when patient was ambulating with her walker.  Patient was caught by her aide and was lowered to the floor without any head injury.  EMS was called and patient was brought to [facility] ED for further evaluation.  Of note, patient had blood work drawn at her home yesterday and aides were notified today that her sodium level was 118 and she should be brought to the ED for further evaluation. Patient with no complaints.  Denies chest pain, shortness of breath, abdominal pain, nausea, vomiting.  Per home health aides patient has been complaining about burning with urination for the past week and has been incontinent of urine and stool.  Patient also with 1-2 episode of loose nonbloody stool today.  Denies any fevers, cough, URI symptoms.   History obtained from patient's healthcare aides X 2 who are at bedside and old charts.  Patient is pleasantly demented.    PAST MEDICAL & SURGICAL HISTORY:  Hyperlipidemia, unspecified hyperlipidemia type      Diabetes insipidus      H/O aortic valve stenosis      Hypertension      Vertigo      Chronic diastolic congestive heart failure      Dyslipidemia      History of pseudoaneurysm      Glaucoma      S/P AVR  Bioprosthetic      H/O lumbosacral spine surgery      S/P hip replacement  B/L      S/P right coronary artery (RCA) stent placement            Allergies:  penicillin (Unknown)  [This allergen will not trigger allergy alert] Acetic Tc-[id number]-[id number] (Other)  erythromycin (Unknown)      Home Medications:   aspirin 81 mg oral tablet, chewable: 1 tab(s) orally once a day  atorvastatin 40 mg oral tablet: 1 tab(s) orally once a day (at bedtime)  brimonidine 0.1% ophthalmic solution: 1 drop(s) to each affected eye 2 times a day  desmopressin 0.1 mg oral tablet: 1.5 tab(s) orally once a day (at bedtime)  gabapentin 300 mg oral capsule: 1 cap(s) orally 2 times a day  latanoprost 0.005% ophthalmic solution: 1 drop(s) to each affected eye once a day (at bedtime)  spironolactone 25 mg oral tablet: 1 tab(s) orally once a day  torsemide 20 mg oral tablet: 1 tab(s) orally once a day  Vitamin D3 125 mcg (5000 intl units) oral tablet: 1 tab(s) orally once a day  zinc sulfate 220 mg oral capsule: 1 cap(s) orally once a day      Hospital Medications:   MEDICATIONS  (STANDING):      SOCIAL HISTORY:  Denies ETOh, Smoking    Family History:  FAMILY HISTORY:  FH: lung cancer (Father)          VITALS:  T(F): 98.2 (08-02-23 @ 12:30), Max: 98.4 (08-02-23 @ 11:20)  HR: 71 (08-02-23 @ 12:30)  BP: 111/67 (08-02-23 @ 12:30)  RR: 18 (08-02-23 @ 12:30)  SpO2: 99% (08-02-23 @ 12:30)  Wt(kg): --      Weight (kg): 77.1 (08-02 @ 11:20)  CAPILLARY BLOOD GLUCOSE          Review of Systems:  Negative except as mentioned in HPI    PHYSICAL EXAM:  GENERAL: Alert, awake, NAD  CHEST/LUNG: Bilateral clear breath sounds  HEART: S1, S2, RRR  ABDOMEN: Soft, nontender, non distended  EXTREMITIES: no edema  Neurology: Pleasantly demented, interactive      LABS:  08-02    x   |  81<L>  |  17  ----------------------------<  94  4.2   |  27  |  0.95    Ca    8.8      02 Aug 2023 13:13  Phos  3.6     08-02  Mg     1.4     08-02    TPro  6.5  /  Alb  3.9  /  TBili  0.4  /  DBili      /  AST  26  /  ALT  11  /  AlkPhos  79  08-02    Creatinine Trend: 0.95 <--                        11.2   10.20 )-----------( 295      ( 02 Aug 2023 14:35 )             32.7     Urine Studies:  Urinalysis Basic - ( 02 Aug 2023 13:13 )    Color:  / Appearance:  / SG:  / pH:   Gluc: 94 mg/dL / Ketone:   / Bili:  / Urobili:    Blood:  / Protein:  / Nitrite:    Leuk Esterase:  / RBC:  / WBC    Sq Epi:  / Non Sq Epi:  / Bacteria:                    HPI:  89 y/o F with HTN, Diabetes Insipidus on DDAVP presents with 2 aides at bedside with generalized weakness and "his legs giving out" earlier this morning when patient was ambulating with her walker.  Patient was caught by her aide and was lowered to the floor without any head injury.  EMS was called and patient was brought to [facility] ED for further evaluation.  Of note, patient had blood work drawn at her home yesterday and aides were notified today that her sodium level was 118 and she should be brought to the ED for further evaluation. Patient with no complaints.  Denies chest pain, shortness of breath, abdominal pain, nausea, vomiting.  Per home health aides patient has been complaining about burning with urination for the past week and has been incontinent of urine and stool.  Patient also with 1-2 episode of loose nonbloody stool today.  Denies any fevers, cough, URI symptoms.   History obtained from patient's healthcare aides X 2 who are at bedside and old charts.  Patient is pleasantly demented.    PAST MEDICAL & SURGICAL HISTORY:  Hyperlipidemia, unspecified hyperlipidemia type      Diabetes insipidus      H/O aortic valve stenosis      Hypertension      Vertigo      Chronic diastolic congestive heart failure      Dyslipidemia      History of pseudoaneurysm      Glaucoma      S/P AVR  Bioprosthetic      H/O lumbosacral spine surgery      S/P hip replacement  B/L      S/P right coronary artery (RCA) stent placement            Allergies:  penicillin (Unknown)  [This allergen will not trigger allergy alert] Acetic Rs-Nvdsiquntd-Ihljkbfgf (Other)  erythromycin (Unknown)      Home Medications:   aspirin 81 mg oral tablet, chewable: 1 tab(s) orally once a day  atorvastatin 40 mg oral tablet: 1 tab(s) orally once a day (at bedtime)  brimonidine 0.1% ophthalmic solution: 1 drop(s) to each affected eye 2 times a day  desmopressin 0.1 mg oral tablet: 1.5 tab(s) orally once a day (at bedtime)  gabapentin 300 mg oral capsule: 1 cap(s) orally 2 times a day  latanoprost 0.005% ophthalmic solution: 1 drop(s) to each affected eye once a day (at bedtime)  spironolactone 25 mg oral tablet: 1 tab(s) orally once a day  torsemide 20 mg oral tablet: 1 tab(s) orally once a day  Vitamin D3 125 mcg (5000 intl units) oral tablet: 1 tab(s) orally once a day  zinc sulfate 220 mg oral capsule: 1 cap(s) orally once a day      Hospital Medications:   MEDICATIONS  (STANDING):      SOCIAL HISTORY:  Denies ETOh, Smoking    Family History:  FAMILY HISTORY:  FH: lung cancer (Father)          VITALS:  T(F): 98.2 (08-02-23 @ 12:30), Max: 98.4 (08-02-23 @ 11:20)  HR: 71 (08-02-23 @ 12:30)  BP: 111/67 (08-02-23 @ 12:30)  RR: 18 (08-02-23 @ 12:30)  SpO2: 99% (08-02-23 @ 12:30)  Wt(kg): --      Weight (kg): 77.1 (08-02 @ 11:20)  CAPILLARY BLOOD GLUCOSE          Review of Systems:  Negative except as mentioned in HPI    PHYSICAL EXAM:  GENERAL: Alert, awake, NAD  HEENT: normocephalic, atraumatic  CHEST/LUNG: Bilateral clear breath sounds. No accessory muscle use  HEART: S1, S2, RRR  ABDOMEN: Soft, nontender, non distended  EXTREMITIES: no edema, warm  Neurology: Pleasantly demented, interactive  Derm: No visible rashes or wounds    LABS:  08-02    x   |  81<L>  |  17  ----------------------------<  94  4.2   |  27  |  0.95    Ca    8.8      02 Aug 2023 13:13  Phos  3.6     08-02  Mg     1.4     08-02    TPro  6.5  /  Alb  3.9  /  TBili  0.4  /  DBili      /  AST  26  /  ALT  11  /  AlkPhos  79  08-02    Creatinine Trend: 0.95 <--                        11.2   10.20 )-----------( 295      ( 02 Aug 2023 14:35 )             32.7     Urine Studies:  Urinalysis Basic - ( 02 Aug 2023 13:13 )    Color:  / Appearance:  / SG:  / pH:   Gluc: 94 mg/dL / Ketone:   / Bili:  / Urobili:    Blood:  / Protein:  / Nitrite:    Leuk Esterase:  / RBC:  / WBC    Sq Epi:  / Non Sq Epi:  / Bacteria:

## 2023-08-02 NOTE — H&P ADULT - PROBLEM SELECTOR PLAN 5
Home med: Atorvastatin 40mg and aspirin 81mg qd.   - c/w atorvastatin 40mg and aspirin 81mg q Hx of CHF. Follows w/ Dr Fernandez. Admission in 12/22 for acute on chronic exacerbation. ECHO 12/7: with EF>75% and moderate AS/MR with unchanged gradients across AV and MV  - continue aldactone 25mg daily   - hold Torsemide 20mg po Mon,Wed,Fri ISO hyponatremia and   - hold Metolazone 5mg Mon,Wed, Fri ISO hyponatremia

## 2023-08-02 NOTE — ED ADULT NURSE NOTE - CHIEF COMPLAINT QUOTE
Pt sent by PCP for  on labs drawn yesterday. HHA present who reports pt with increasing weakness, typically ambulates with walker and was too weak to walk to the bathroom this AM- pt had a near fall, she was caught and lowered to the ground without head injury. Hx DI on Desmopressin.

## 2023-08-02 NOTE — ED PROVIDER NOTE - OBJECTIVE STATEMENT
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  Patient was caught by her aide and was lowered to the floor without any head injury.  EMS was called and patient was brought to [facility] ED for further evaluation.  Of note, patient had blood work drawn at her home yesterday and aides were notified today that her sodium level was 118 and she should be brought to the ED for further evaluation. Patient with no complaints.  Denies chest pain, shortness of breath, abdominal pain, nausea, vomiting.  Per home health aides patient has been complaining about burning with urination for the past week and has been incontinent of urine and stool.  Patient also with 1-2 episode of loose nonbloody stool today.  Denies any fevers, cough, URI symptoms.   History obtained from patient's healthcare aides X 2 who are at bedside and old charts.  Patient is pleasantly demented. 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 and subsequent valve-in-valve TAVR at [facility] in 2021, hx Diabetes Insipidus with hyponatremia (on DDAVP 1.5mg daily), chronic neuropathy, admission at [facility] a year ago after fall resulting in C spine fracture and still wears a C-spine collar when out of her home secondary to poor healing, dementia, depression, walks with a walker at home and uses a wheelchair outside her home, with 24 hours aides, Presents with 2 aides at bedside with generalized weakness and "his legs giving out" earlier this morning when patient was ambulating with her walker.  Patient was caught by her aide and was lowered to the floor without any head injury.  EMS was called and patient was brought to [facility] ED for further evaluation.  Of note, patient had blood work drawn at her home yesterday and aides were notified today that her sodium level was 118 and she should be brought to the ED for further evaluation. Patient with no complaints.  Denies chest pain, shortness of breath, abdominal pain, nausea, vomiting.  Per home health aides patient has been complaining about burning with urination for the past week and has been incontinent of urine and stool.  Patient also with 1-2 episode of loose nonbloody stool today.  Denies any fevers, cough, URI symptoms.   History obtained from patient's healthcare aides X 2 who are at bedside and old charts.  Patient is pleasantly demented.

## 2023-08-02 NOTE — H&P ADULT - NSHPLABSRESULTS_GEN_ALL_CORE
LABS:                        11.2   10.20 )-----------( 295      ( 02 Aug 2023 14:35 )             32.7     08-02    120<LL>  |  81<L>  |  17  ----------------------------<  94  4.2   |  27  |  0.95    Ca    8.8      02 Aug 2023 13:13  Phos  3.6     08-02  Mg     1.4     08-02    TPro  6.5  /  Alb  3.9  /  TBili  0.4  /  DBili  x   /  AST  26  /  ALT  11  /  AlkPhos  79  08-02      Urinalysis Basic - ( 02 Aug 2023 13:13 )    Color: x / Appearance: x / SG: x / pH: x  Gluc: 94 mg/dL / Ketone: x  / Bili: x / Urobili: x   Blood: x / Protein: x / Nitrite: x   Leuk Esterase: x / RBC: x / WBC x   Sq Epi: x / Non Sq Epi: x / Bacteria: x          RADIOLOGY & ADDITIONAL TESTS:  Reviewed

## 2023-08-02 NOTE — H&P ADULT - PROBLEM SELECTOR PLAN 6
Hx of HTN. Home meds: atorvastatin 40mg  - c/w atorvastatin Home med: Atorvastatin 40mg and aspirin 81mg qd.   - c/w atorvastatin 40mg and aspirin 81mg q

## 2023-08-02 NOTE — ED PROVIDER NOTE - CONSTITUTIONAL, MLM
normal... Well appearing, awake, alert, oriented to person and in no apparent distress. In C-spine collar

## 2023-08-02 NOTE — ED ADULT TRIAGE NOTE - CHIEF COMPLAINT QUOTE
Pt sent by PCP for  on labs drawn yesterday. HHA present who reports pt with increasing weakness, typically ambulates with walker and was too weak to walk to the bathroom this AM- pt had a near fall, she was caught and lowered to the ground without head injury. Pt sent by PCP for  on labs drawn yesterday. HHA present who reports pt with increasing weakness, typically ambulates with walker and was too weak to walk to the bathroom this AM- pt had a near fall, she was caught and lowered to the ground without head injury. Hx DI on Desmopressin.

## 2023-08-02 NOTE — DISCUSSION/SUMMARY
[FreeTextEntry1] : 89 F with a Pmhx of dCHF, HTN, HLD and severe AS s/p Bioprosthetic AV 7 years ago at OSH, c/b by bioprosthetic AV stenosis, now s/p Valve-in-Valve with a Sheri Ultra by Structural Heart Team in June of 2021. Presents for follow up.   Chronic dCHF - Improved volume status; Pt. w/ trace to 1+ pitting edema in b/l lower extremities but otherwise w/o signs of pulmonary congestion. Her baseline functional status remains stable. Able to work w/ PT 3x/weekly. Lower extremity edema likely multifactorial including chronic venous insufficiency. She will continue to follow the conservative measures incl: compression stockings, leg elevations and working regularly with PT -c/w Torsemide 20 PO daily and Metolazone 5mg, both 3x/week -c/w Spironolactone 25 daily -Case d/w DaughterMassiel by phone during visit  RTC in 2-3 months [EKG obtained to assist in diagnosis and management of assessed problem(s)] : EKG obtained to assist in diagnosis and management of assessed problem(s)

## 2023-08-02 NOTE — REVIEW OF SYSTEMS
[Fever] : no fever [Chills] : no chills [Blurry Vision] : no blurred vision [SOB] : no shortness of breath [Dyspnea on exertion] : not dyspnea during exertion [Chest Discomfort] : no chest discomfort [Lower Ext Edema] : lower extremity edema [Palpitations] : no palpitations [Orthopnea] : no orthopnea [PND] : no PND [Syncope] : no syncope [Cough] : no cough [Wheezing] : no wheezing [Abdominal Pain] : no abdominal pain [Nausea] : no nausea [Vomiting] : no vomiting [Change in Appetite] : no change in appetite [Joint Pain] : no joint pain [Joint Swelling] : no joint swelling [Dizziness] : no dizziness [Negative] : Heme/Lymph [FreeTextEntry5] : Trace to 1+ pittting edema, L>R

## 2023-08-02 NOTE — H&P ADULT - ASSESSMENT
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        # Cervical spine fracture.   Recent admit at [facility]  8/31-9/2 after fall resulting in C spine fracture. Has been wearing C-collar since fracture. Pt for possible C spine surgery by Dr. Butler. Neurosurgery consulted: no planned surgical intervention at this time  - c/w C-collar and place supportive bandaging for skin irritation  - CT Cervical spine: Nonhealing base of dens fracture. Compared to 11/10/22 there is mild increase in anterior displacement of the dens and C1 lateral masses relative to the base of dens.  - MR Cervical spine: Nonhealing C2 fracture at base of dens, mildly distracted as seen on CT. Edema involving the right greater than left lateral masses likely stress related to altered alignment. No evidence for marrow replacing lesion or other bony edema.    #History of transcatheter aortic valve replacement (TAVR).   S/p Valve in valve TAVR in 2021 with echo today unchanged from prior  - TTE 12/6: EF> 75%, moderate AS and moderate MR. 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

## 2023-08-02 NOTE — PHYSICAL EXAM
[Well Developed] : well developed [Normal Venous Pressure] : normal venous pressure [Normal S1, S2] : normal S1, S2 [Clear Lung Fields] : clear lung fields [Soft] : abdomen soft [Non Tender] : non-tender [No Masses/organomegaly] : no masses/organomegaly [Abnormal Gait] : abnormal gait [Edema ___] : edema [unfilled] [No Focal Deficits] : no focal deficits [Normal Speech] : normal speech [Alert and Oriented] : alert and oriented [Normal memory] : normal memory

## 2023-08-02 NOTE — PATIENT PROFILE ADULT - FALL HARM RISK - HARM RISK INTERVENTIONS

## 2023-08-02 NOTE — H&P ADULT - PROBLEM SELECTOR PLAN 1
Na 120. Calculated serum Osm 251. 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   - nephrology consulted - f/u recs  -f/u Serum Osm, Urine Osm, Urine Na  -f/u TSH

## 2023-08-03 LAB
ANION GAP SERPL CALC-SCNC: 11 MMOL/L — SIGNIFICANT CHANGE UP (ref 5–17)
ANION GAP SERPL CALC-SCNC: 11 MMOL/L — SIGNIFICANT CHANGE UP (ref 5–17)
ANION GAP SERPL CALC-SCNC: 14 MMOL/L — SIGNIFICANT CHANGE UP (ref 5–17)
BUN SERPL-MCNC: 10 MG/DL — SIGNIFICANT CHANGE UP (ref 7–23)
BUN SERPL-MCNC: 12 MG/DL — SIGNIFICANT CHANGE UP (ref 7–23)
BUN SERPL-MCNC: 12 MG/DL — SIGNIFICANT CHANGE UP (ref 7–23)
CALCIUM SERPL-MCNC: 8.8 MG/DL — SIGNIFICANT CHANGE UP (ref 8.4–10.5)
CALCIUM SERPL-MCNC: 8.9 MG/DL — SIGNIFICANT CHANGE UP (ref 8.4–10.5)
CALCIUM SERPL-MCNC: 9 MG/DL — SIGNIFICANT CHANGE UP (ref 8.4–10.5)
CHLORIDE SERPL-SCNC: 84 MMOL/L — LOW (ref 96–108)
CHLORIDE SERPL-SCNC: 87 MMOL/L — LOW (ref 96–108)
CHLORIDE SERPL-SCNC: 92 MMOL/L — LOW (ref 96–108)
CO2 SERPL-SCNC: 24 MMOL/L — SIGNIFICANT CHANGE UP (ref 22–31)
CO2 SERPL-SCNC: 25 MMOL/L — SIGNIFICANT CHANGE UP (ref 22–31)
CO2 SERPL-SCNC: 26 MMOL/L — SIGNIFICANT CHANGE UP (ref 22–31)
CREAT ?TM UR-MCNC: 38 MG/DL — SIGNIFICANT CHANGE UP
CREAT SERPL-MCNC: 0.67 MG/DL — SIGNIFICANT CHANGE UP (ref 0.5–1.3)
CREAT SERPL-MCNC: 0.72 MG/DL — SIGNIFICANT CHANGE UP (ref 0.5–1.3)
CREAT SERPL-MCNC: 0.73 MG/DL — SIGNIFICANT CHANGE UP (ref 0.5–1.3)
EGFR: 76 ML/MIN/1.73M2 — SIGNIFICANT CHANGE UP
EGFR: 77 ML/MIN/1.73M2 — SIGNIFICANT CHANGE UP
EGFR: 81 ML/MIN/1.73M2 — SIGNIFICANT CHANGE UP
GLUCOSE SERPL-MCNC: 104 MG/DL — HIGH (ref 70–99)
GLUCOSE SERPL-MCNC: 123 MG/DL — HIGH (ref 70–99)
GLUCOSE SERPL-MCNC: 98 MG/DL — SIGNIFICANT CHANGE UP (ref 70–99)
HCT VFR BLD CALC: 33 % — LOW (ref 34.5–45)
HGB BLD-MCNC: 10.5 G/DL — LOW (ref 11.5–15.5)
MAGNESIUM SERPL-MCNC: 1.7 MG/DL — SIGNIFICANT CHANGE UP (ref 1.6–2.6)
MCHC RBC-ENTMCNC: 29.8 PG — SIGNIFICANT CHANGE UP (ref 27–34)
MCHC RBC-ENTMCNC: 31.8 GM/DL — LOW (ref 32–36)
MCV RBC AUTO: 93.8 FL — SIGNIFICANT CHANGE UP (ref 80–100)
NRBC # BLD: 0 /100 WBCS — SIGNIFICANT CHANGE UP (ref 0–0)
OSMOLALITY SERPL: 256 MOSM/KG — LOW (ref 280–301)
OSMOLALITY SERPL: 257 MOSM/KG — LOW (ref 280–301)
OSMOLALITY SERPL: 262 MOSM/KG — LOW (ref 280–301)
OSMOLALITY UR: 357 MOSM/KG — SIGNIFICANT CHANGE UP (ref 300–900)
OSMOLALITY UR: 420 MOSM/KG — SIGNIFICANT CHANGE UP (ref 300–900)
OSMOLALITY UR: 455 MOSM/KG — SIGNIFICANT CHANGE UP (ref 300–900)
PHOSPHATE SERPL-MCNC: 2.5 MG/DL — SIGNIFICANT CHANGE UP (ref 2.5–4.5)
PLATELET # BLD AUTO: 228 K/UL — SIGNIFICANT CHANGE UP (ref 150–400)
POTASSIUM SERPL-MCNC: 3.9 MMOL/L — SIGNIFICANT CHANGE UP (ref 3.5–5.3)
POTASSIUM SERPL-MCNC: 4.2 MMOL/L — SIGNIFICANT CHANGE UP (ref 3.5–5.3)
POTASSIUM SERPL-MCNC: 4.3 MMOL/L — SIGNIFICANT CHANGE UP (ref 3.5–5.3)
POTASSIUM SERPL-SCNC: 3.9 MMOL/L — SIGNIFICANT CHANGE UP (ref 3.5–5.3)
POTASSIUM SERPL-SCNC: 4.2 MMOL/L — SIGNIFICANT CHANGE UP (ref 3.5–5.3)
POTASSIUM SERPL-SCNC: 4.3 MMOL/L — SIGNIFICANT CHANGE UP (ref 3.5–5.3)
RBC # BLD: 3.52 M/UL — LOW (ref 3.8–5.2)
RBC # FLD: 15 % — HIGH (ref 10.3–14.5)
SODIUM SERPL-SCNC: 121 MMOL/L — LOW (ref 135–145)
SODIUM SERPL-SCNC: 125 MMOL/L — LOW (ref 135–145)
SODIUM SERPL-SCNC: 128 MMOL/L — LOW (ref 135–145)
SODIUM UR-SCNC: 102 MMOL/L — SIGNIFICANT CHANGE UP
SODIUM UR-SCNC: 138 MMOL/L — SIGNIFICANT CHANGE UP
SODIUM UR-SCNC: 61 MMOL/L — SIGNIFICANT CHANGE UP
WBC # BLD: 6.98 K/UL — SIGNIFICANT CHANGE UP (ref 3.8–10.5)
WBC # FLD AUTO: 6.98 K/UL — SIGNIFICANT CHANGE UP (ref 3.8–10.5)

## 2023-08-03 PROCEDURE — 99233 SBSQ HOSP IP/OBS HIGH 50: CPT | Mod: GC

## 2023-08-03 PROCEDURE — 99232 SBSQ HOSP IP/OBS MODERATE 35: CPT

## 2023-08-03 RX ORDER — SODIUM CHLORIDE 5 G/100ML
500 INJECTION, SOLUTION INTRAVENOUS
Refills: 0 | Status: DISCONTINUED | OUTPATIENT
Start: 2023-08-03 | End: 2023-08-03

## 2023-08-03 RX ORDER — SODIUM CHLORIDE 5 G/100ML
500 INJECTION, SOLUTION INTRAVENOUS
Refills: 0 | Status: COMPLETED | OUTPATIENT
Start: 2023-08-03 | End: 2023-08-03

## 2023-08-03 RX ADMIN — SPIRONOLACTONE 25 MILLIGRAM(S): 25 TABLET, FILM COATED ORAL at 06:09

## 2023-08-03 RX ADMIN — BRIMONIDINE TARTRATE 1 DROP(S): 2 SOLUTION/ DROPS OPHTHALMIC at 06:09

## 2023-08-03 RX ADMIN — Medication 1000 UNIT(S): at 12:21

## 2023-08-03 RX ADMIN — CEFTRIAXONE 100 MILLIGRAM(S): 500 INJECTION, POWDER, FOR SOLUTION INTRAMUSCULAR; INTRAVENOUS at 18:54

## 2023-08-03 RX ADMIN — Medication 3 MILLIGRAM(S): at 22:13

## 2023-08-03 RX ADMIN — SODIUM CHLORIDE 30 MILLILITER(S): 5 INJECTION, SOLUTION INTRAVENOUS at 06:51

## 2023-08-03 RX ADMIN — LATANOPROST 1 DROP(S): 0.05 SOLUTION/ DROPS OPHTHALMIC; TOPICAL at 22:13

## 2023-08-03 RX ADMIN — LATANOPROST 1 DROP(S): 0.05 SOLUTION/ DROPS OPHTHALMIC; TOPICAL at 00:02

## 2023-08-03 RX ADMIN — BRIMONIDINE TARTRATE 1 DROP(S): 2 SOLUTION/ DROPS OPHTHALMIC at 18:54

## 2023-08-03 RX ADMIN — SODIUM CHLORIDE 30 MILLILITER(S): 5 INJECTION, SOLUTION INTRAVENOUS at 12:20

## 2023-08-03 RX ADMIN — Medication 81 MILLIGRAM(S): at 12:20

## 2023-08-03 RX ADMIN — ENOXAPARIN SODIUM 40 MILLIGRAM(S): 100 INJECTION SUBCUTANEOUS at 18:53

## 2023-08-03 RX ADMIN — GABAPENTIN 300 MILLIGRAM(S): 400 CAPSULE ORAL at 18:54

## 2023-08-03 RX ADMIN — ATORVASTATIN CALCIUM 40 MILLIGRAM(S): 80 TABLET, FILM COATED ORAL at 22:13

## 2023-08-03 RX ADMIN — ZINC SULFATE TAB 220 MG (50 MG ZINC EQUIVALENT) 220 MILLIGRAM(S): 220 (50 ZN) TAB at 12:21

## 2023-08-03 RX ADMIN — GABAPENTIN 300 MILLIGRAM(S): 400 CAPSULE ORAL at 06:09

## 2023-08-03 NOTE — DIETITIAN INITIAL EVALUATION ADULT - PROBLEM SELECTOR PLAN 8
[facility]  8/31-9/2 after fall resulting in C spine fracture. Imaging from 12/22 admission CT Cervical spine: Nonhealing base of dens fracture. Compared to 11/10/22 there is mild increase in anterior displacement of the dens and C1 lateral masses relative to the base of dens. MR Cervical spine: Nonhealing C2 fracture at base of dens, mildly distracted as seen on CT. Edema involving the right greater than left lateral masses likely stress related to altered alignment. No evidence for marrow replacing lesion or other bony edema.Has been wearing C-collar since fracture. 07/23 CT and MR w/ interval displacement of dens fracture   - c/w C-collar

## 2023-08-03 NOTE — CONSULT NOTE ADULT - SUBJECTIVE AND OBJECTIVE BOX
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 Pt claiming that she is in the hospital due to chronic pain in her back. Aid at bedside endorsed that patient was feeling dizzy and felt like her legs were giving up when ambulating this AM. Pt did not have a fall but her aid lowered her to sit on the ground. Aid also claims that pt saw her OP Dr Camila Mcpherson who asked her  to go to ED on 8/1 for OP Na+ 118 but pt refused. Aid also claiming that pt has been c/o urinary discomfort over last few days which pt corroborated and that she had 2 episodes of non-bloody diarrhea on morning of admission. Pt has no complaints other than mild discomfort on urination. Pt denies HA, dizziness, CP, SOB, changes in BMs.    ED Course:  Vitals: 98.4F, HR 64, /64, RR 16 (98%) on RA  Labs: Hb 11.2, MCV 88.1, Na+ 120, Cl- 81, Mg2+ 1.4  Imaging: CXR dextroscoliosis and TAVR - lung parenchyma clear  EKG: SR w/ 1st degree AVB, LBBB - unchanged from 8/31/22  Consults: nephrology  Interventions: Mg 2g       (02 Aug 2023 16:27)    PAST MEDICAL & SURGICAL HISTORY:  Hyperlipidemia, unspecified hyperlipidemia type      Diabetes insipidus      H/O aortic valve stenosis      Hypertension      Vertigo      Chronic diastolic congestive heart failure      Dyslipidemia      History of pseudoaneurysm      Glaucoma      S/P AVR  Bioprosthetic      H/O lumbosacral spine surgery      S/P hip replacement  B/L      S/P right coronary artery (RCA) stent placement        MEDICATIONS  (STANDING):  aspirin  chewable 81 milliGRAM(s) Oral daily  atorvastatin 40 milliGRAM(s) Oral at bedtime  brimonidine 0.2% Ophthalmic Solution 1 Drop(s) Both EYES two times a day  cefTRIAXone   IVPB 1000 milliGRAM(s) IV Intermittent every 24 hours  cholecalciferol 1000 Unit(s) Oral daily  desmopressin 0.1 milliGRAM(s) Oral at bedtime  enoxaparin Injectable 40 milliGRAM(s) SubCutaneous every 24 hours  gabapentin 300 milliGRAM(s) Oral two times a day  latanoprost 0.005% Ophthalmic Solution 1 Drop(s) Both EYES at bedtime  melatonin 3 milliGRAM(s) Oral at bedtime  spironolactone 25 milliGRAM(s) Oral daily  zinc sulfate 220 milliGRAM(s) Oral daily    MEDICATIONS  (PRN):  acetaminophen     Tablet .. 650 milliGRAM(s) Oral every 6 hours PRN Mild Pain (1 - 3)            FAMILY HISTORY:  FH: lung cancer (Father)        CBC Full  -  ( 02 Aug 2023 14:35 )  WBC Count : 10.20 K/uL  RBC Count : 3.71 M/uL  Hemoglobin : 11.2 g/dL  Hematocrit : 32.7 %  Platelet Count - Automated : 295 K/uL  Mean Cell Volume : 88.1 fl  Mean Cell Hemoglobin : 30.2 pg  Mean Cell Hemoglobin Concentration : 34.3 gm/dL  Auto Neutrophil # : 8.42 K/uL  Auto Lymphocyte # : 0.93 K/uL  Auto Monocyte # : 0.68 K/uL  Auto Eosinophil # : 0.07 K/uL  Auto Basophil # : 0.02 K/uL  Auto Neutrophil % : 82.5 %  Auto Lymphocyte % : 9.1 %  Auto Monocyte % : 6.7 %  Auto Eosinophil % : 0.7 %  Auto Basophil % : 0.2 %      08-02    121<L>  |  82<L>  |  16  ----------------------------<  99  3.8   |  28  |  0.93    Ca    8.8      02 Aug 2023 22:21  Phos  3.6     08-02  Mg     1.4     08-02    TPro  6.5  /  Alb  3.9  /  TBili  0.4  /  DBili  x   /  AST  26  /  ALT  11  /  AlkPhos  79  08-02      Urinalysis Basic - ( 02 Aug 2023 22:21 )    Color: x / Appearance: x / SG: x / pH: x  Gluc: 99 mg/dL / Ketone: x  / Bili: x / Urobili: x   Blood: x / Protein: x / Nitrite: x   Leuk Esterase: x / RBC: x / WBC x   Sq Epi: x / Non Sq Epi: x / Bacteria: x        Radiology :     < from: Xray Chest 1 View-PORTABLE IMMEDIATE (Xray Chest 1 View-PORTABLE IMMEDIATE .) (08.02.23 @ 15:05) >  ACC: [id number] EXAM:  XR CHEST PORTABLE IMMED 1V   ORDERED BY: CAROLE MANUEL     PROCEDURE DATE:  08/02/2023          INTERPRETATION:  PORTABLE CHEST X-RAY    HISTORY: weakness    PRIOR STUDIES: 12/6/2022    FINDINGS: No cardiomegaly. Status post TAVR. Dense mitral valve annular   calcification. The lungs are clear. No pneumothorax or pleural effusion.   Diffuse decrease in bony mineralization. Old left rib fractures. Thoracic   dextroscoliosis.    IMPRESSION:  The lungs are clear.              Review of Systems : per HPI               Vital Signs Last 24 Hrs  T(C): 36.6 (03 Aug 2023 06:06), Max: 37.1 (02 Aug 2023 20:59)  T(F): 97.9 (03 Aug 2023 06:06), Max: 98.7 (02 Aug 2023 20:59)  HR: 82 (03 Aug 2023 06:06) (64 - 82)  BP: 116/74 (03 Aug 2023 06:06) (106/64 - 134/78)  BP(mean): --  RR: 18 (03 Aug 2023 06:06) (16 - 18)  SpO2: 97% (03 Aug 2023 06:06) (97% - 99%)    Parameters below as of 03 Aug 2023 06:06  Patient On (Oxygen Delivery Method): room air            Physical Exam :   89 y o woman lying comfortably in semi Babin's position , awake , alert , no acute complaints     Head : normocephalic , atraumatic    Eyes : PERRLA , EOMI , no nystagmus , sclera anicteric    ENT / FACE : neg nasal discharge , uvula midline , no oropharyngeal erythema / exudate    Neck : supple , negative JVD , negative carotid bruits , no thyromegaly    Chest : CTA bilaterally , neg wheeze / rhonchi / rales / crackles / egophany    Cardiovascular : regular rate and rhythm , neg murmurs / rubs / gallops    Abdomen : soft , non distended , non tender to palpation in all 4 quadrants ,  normal bowel sounds     Extremities :  trace pedal edema     Neurologic Exam :    Alert and oriented to person , place , date/year , speech fluent w/o dysarthria , follows commands      Cranial Nerves:     II :                         pupils equal , round and reactive to light , visual fields intact   III/ IV/VI :              extraocular movements intact , neg nystagmus , neg ptosis  V :                        facial sensation intact , V1-3 normal  VII :                      face symmetric , no droop , normal eye closure and smile  VIII :                     hearing intact to finger rub bilaterally  IX and X :             no hoarseness , gag intact , palate/ uvula rise symmetrically  XI :                       SCM / trapezius strength intact bilateral  XII :                      no tongue deviation    Motor Exam:          Right UE:               no focal weakness ,  > 3+/5 throughout  , no drift     Left UE:                 no focal weakness ,  > 3+/5 throughout  , no drift         Right LE:    no focal weakness ,  > 3+/5 throughout        Left LE:    no focal weakness ,  > 3+/5 throughout        Sensation :         intact to light touch x 4 extremities                            no neglect or extinction on double simultaneous testing      DTR :     biceps/brachioradialis : equal                      patella/ankle : equal     neg Babinski       Coordination :      Finger to Nose :  neg dysmetria bilaterally       Gait :  not tested          PM&R Impression :     admitted for witnessed fall , found to be hyponatremic    - deconditioned    - no focal weakness         Recommendations / Plan :           1) Physical / Occupational therapy focusing on therapeutic exercises , equipment evaluation , bed mobility/transfer out of bed evaluation , progressive ambulation with assistive devices prn .    2) Current disposition plan recommendation  :   d/c home , home PT , continue 24/7 HHA

## 2023-08-03 NOTE — PROGRESS NOTE ADULT - PROBLEM SELECTOR PLAN 8
[facility]  8/31-9/2 after fall resulting in C spine fracture. Imaging from 12/22 admission CT Cervical spine: Nonhealing base of dens fracture. Compared to 11/10/22 there is mild increase in anterior displacement of the dens and C1 lateral masses relative to the base of dens. MR Cervical spine: Nonhealing C2 fracture at base of dens, mildly distracted as seen on CT. Edema involving the right greater than left lateral masses likely stress related to altered alignment. No evidence for marrow replacing lesion or other bony edema.Has been wearing C-collar since fracture. 07/23 CT and MR w/ interval displacement of dens fracture   - c/w C-collar [facility]  8/31-9/2 after fall resulting in C spine fracture. Imaging from 12/22 admission CT Cervical spine: Nonhealing base of dens fracture. Compared to 11/10/22 there is mild increase in anterior displacement of the dens and C1 lateral masses relative to the base of dens. MR Cervical spine: Nonhealing C2 fracture at base of dens, mildly distracted as seen on CT. Edema involving the right greater than left lateral masses likely stress related to altered alignment. No evidence for marrow replacing lesion or other bony edema.Has been wearing C-collar since fracture. 07/23 CT and MR w/ interval displacement of dens fracture   Plan:  - PT consulted   - Careful handling

## 2023-08-03 NOTE — DIETITIAN INITIAL EVALUATION ADULT - PERTINENT MEDS FT
MEDICATIONS  (STANDING):  aspirin  chewable 81 milliGRAM(s) Oral daily  atorvastatin 40 milliGRAM(s) Oral at bedtime  brimonidine 0.2% Ophthalmic Solution 1 Drop(s) Both EYES two times a day  cefTRIAXone   IVPB 1000 milliGRAM(s) IV Intermittent every 24 hours  cholecalciferol 1000 Unit(s) Oral daily  desmopressin 0.1 milliGRAM(s) Oral at bedtime  enoxaparin Injectable 40 milliGRAM(s) SubCutaneous every 24 hours  gabapentin 300 milliGRAM(s) Oral two times a day  latanoprost 0.005% Ophthalmic Solution 1 Drop(s) Both EYES at bedtime  melatonin 3 milliGRAM(s) Oral at bedtime  sodium chloride 3%. 500 milliLiter(s) (30 mL/Hr) IV Continuous <Continuous>  spironolactone 25 milliGRAM(s) Oral daily  zinc sulfate 220 milliGRAM(s) Oral daily    MEDICATIONS  (PRN):  acetaminophen     Tablet .. 650 milliGRAM(s) Oral every 6 hours PRN Mild Pain (1 - 3)

## 2023-08-03 NOTE — PHYSICAL THERAPY INITIAL EVALUATION ADULT - GAIT DEVIATIONS NOTED, PT EVAL
Pt slightly unsteady, maintained hunched posture. Dec step length and simona. Pt at reported gait baseline./decreased simona/decreased weight-shifting ability

## 2023-08-03 NOTE — DIETITIAN INITIAL EVALUATION ADULT - OTHER CALCULATIONS
Based on Standards of Care pt above % IBW (162%) thus ideal body weight used for all calculations (105%). Needs adjusted for advanced age and PI. Fluid recs per team.

## 2023-08-03 NOTE — DIETITIAN INITIAL EVALUATION ADULT - NSFNSPHYEXAMSKINFT_GEN_A_CORE
Pressure Injury 1: sacrum, Stage II  Pressure Injury 2: none, none  Pressure Injury 3: none, none  Pressure Injury 4: none, none  Pressure Injury 5: none, none  Pressure Injury 6: none, none  Pressure Injury 7: none, none  Pressure Injury 8: none, none  Pressure Injury 9: none, none  Pressure Injury 10: none, none  Pressure Injury 11: none, none, Pressure Injury 1: sacrum, Stage II  Pressure Injury 2: none, none  Pressure Injury 3: none, none  Pressure Injury 4: none, none  Pressure Injury 5: none, none  Pressure Injury 6: none, none  Pressure Injury 7: none, none  Pressure Injury 8: none, none  Pressure Injury 9: none, none  Pressure Injury 10: none, none  Pressure Injury 11: none, none

## 2023-08-03 NOTE — PROGRESS NOTE ADULT - ASSESSMENT
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

## 2023-08-03 NOTE — DIETITIAN INITIAL EVALUATION ADULT - PROBLEM/PLAN-6
Patient requesting to come in for an ear flush, cannot hear out of right ear.  Needs an appointment sooner than next available.  Please advise.     Next available 12/30/2019.   DISPLAY PLAN FREE TEXT

## 2023-08-03 NOTE — DIETITIAN INITIAL EVALUATION ADULT - PROBLEM SELECTOR PLAN 4
Hgb on admission 11.3, MCV 88.1. Currently no signs of active bleeding (no hematochezia, melena, hemoptysis, hematuria). Baseline Hb from previous admission in 12/22 10-11  - trend CBC  - maintain active T&S  - transfuse if Hgb <7

## 2023-08-03 NOTE — PHYSICAL THERAPY INITIAL EVALUATION ADULT - PERTINENT HX OF CURRENT PROBLEM, REHAB EVAL
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 Pt claiming that she is in the hospital due to chronic pain in her back. Aid at bedside endorsed that patient was feeling dizzy and felt like her legs were giving up when ambulating this AM. Pt did not have a fall but her aid lowered her to sit on the ground. Aid also claims that pt saw her OP Dr Camila Mcpherson who asked her  to go to ED on 8/1 for OP Na+ 118 but pt refused. Aid also claiming that pt has been c/o urinary discomfort over last few days which pt corroborated and that she had 2 episodes of non-bloody diarrhea on morning of admission. Pt has no complaints other than mild discomfort on urination. Pt denies HA, dizziness, CP, SOB, changes in BMs.

## 2023-08-03 NOTE — PROGRESS NOTE ADULT - PROBLEM SELECTOR PLAN 4
Hgb on admission 11.3, MCV 88.1. Currently no signs of active bleeding (no hematochezia, melena, hemoptysis, hematuria). Baseline Hb from previous admission in 12/22 10-11  - trend CBC  - maintain active T&S  - transfuse if Hgb <7 Hgb on admission 11.3, repeat 10.5 at 10am , MCV 88.1.   Currently no signs of active bleeding (no hematochezia, melena, hemoptysis, hematuria).   Baseline Hb from previous admission in 12/22 10-11  Plan:   - trend CBC  - maintain active T&S  - transfuse if Hgb <7

## 2023-08-03 NOTE — DIETITIAN INITIAL EVALUATION ADULT - OTHER INFO
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 Pt claiming that she is in the hospital due to chronic pain in her back. Aid at bedside endorsed that patient was feeling dizzy and felt like her legs were giving up when ambulating this AM. Pt did not have a fall but her aid lowered her to sit on the ground. Aid also claims that pt saw her OP Dr Camila Mcpherson who asked her  to go to ED on 8/1 for OP Na+ 118 but pt refused. Aid also claiming that pt has been c/o urinary discomfort over last few days which pt corroborated and that she had 2 episodes of non-bloody diarrhea on morning of admission. Pt has no complaints other than mild discomfort on urination. Pt denies HA, dizziness, CP, SOB, changes in BMs.    Patient and HHA seen at bedside for MST/PI assessment. Current diet order: DASH/TLC, 1L fluid restriction. NKFA. No difficulty chewing/swallowing reported. HHA reports pt has excellent appetite, consumed 100% of breakfast which included scrambled eggs, turkey odell, toast. PTA, reports great appetite and PO intake with no nutrition-related concerns. Food preferences reviewed: no red meat- documented in CBORD. No dosing height in EMR, per HHA pt is 5'1". Dosing weight: 170#, BMI 32.1, HHA unsure of UBW but denies any recent weight loss. Noted with stage 2 sacral PI, pt meeting 100% protein needs via PO intake, will continue to monitor for need for protein supplement. No edema documented. Denies n/v/d/c/abd pain, last BM 8/3 per EMR. Labs: Na low (121), glucose trending  x24hrs. Meds: abx, vitamin D, zinc. Observed with no overt signs and symptoms of muscle or fat wasting. Based on ASPEN guidelines, pt does not meet criteria for malnutrition. See nutrition recommendations below.

## 2023-08-03 NOTE — DIETITIAN INITIAL EVALUATION ADULT - PROBLEM SELECTOR PLAN 3
Pt presenting w/ 2d sx of discomfort on urination. UA + nitrites, LE, blood, WCC and bacteria. No suprapubic or CV tenderness on exam.  - ceftriaxone 1g QD for 3 days

## 2023-08-03 NOTE — DIETITIAN INITIAL EVALUATION ADULT - PROBLEM SELECTOR PLAN 10
Plan:  F: none   E: replete K<4, Mg<2  N: regular  VTE Prophylaxis: lovenox 40mg QD  GI: none  C: Full Code  D: TAN

## 2023-08-03 NOTE — DIETITIAN INITIAL EVALUATION ADULT - PERTINENT LABORATORY DATA
08-03    121<L>  |  84<L>  |  12  ----------------------------<  104<H>  3.9   |  26  |  0.73    Ca    8.8      03 Aug 2023 10:00  Phos  2.5     08-03  Mg     1.7     08-03    TPro  6.5  /  Alb  3.9  /  TBili  0.4  /  DBili  x   /  AST  26  /  ALT  11  /  AlkPhos  79  08-02  A1C with Estimated Average Glucose Result: 5.6 % (09-02-22 @ 11:05)

## 2023-08-03 NOTE — PROGRESS NOTE ADULT - ATTENDING COMMENTS
Patient is 89 yo woman with Ms,MR s/p TAVR, dCHF, on desmopressin and diuretic therapy at home, admitted with fatigue and found to be hyponatremic. Patient's mental status is at her baseline as clarified with her aid, She is conversant, pleasant and cooperative. Noted to have systolic and diastolic murmurs, clear to auscultation lungs, trace LE edema.   On Labs pt's Na is 120-121 without much change even after infusion of hypertonic saline.   ----hyponatremia appears to be multifactorial but may be related to long-standing diuretic therapy. As per discussion with the renal team will institute strict I&O for urine output management, hold desmopressin therapy and likely stop hypertonic saline infusion. Will follow on BMP in am. Need to clarify how diagnosis of DI was made  ----cardiac valvular abnormalities and dCHF, will need to monitor for the signs of fluid overload as patient is off diuretic therapy  ----for dispo, pt resides at home with assistance from aids 24/7

## 2023-08-03 NOTE — PHYSICAL THERAPY INITIAL EVALUATION ADULT - MANUAL MUSCLE TESTING RESULTS, REHAB EVAL
At least 3/5 BL UE & LE based on observed ability to perform antigravity mobility. Grossly good minus bilateral  strength noted.

## 2023-08-03 NOTE — PROGRESS NOTE ADULT - PROBLEM SELECTOR PLAN 9
S/p Valve in valve TAVR in 2021 with echo today unchanged from prior TTE 12/22: EF> 75%, moderate AS and moderate MR.  - cont to monitor S/p Valve in valve TAVR in 2021  Echo today unchanged from prior TTE 12/22: EF> 75%, moderate AS and moderate MR.  Plan:  - cont to monitor

## 2023-08-03 NOTE — PROGRESS NOTE ADULT - PROBLEM SELECTOR PLAN 7
Hx of HTN. Home meds: atorvastatin 40mg  - c/w atorvastatin Hx of HTN. Home meds: atorvastatin 40mg  Plan:  - c/w atorvastatin 40mg

## 2023-08-03 NOTE — PROGRESS NOTE ADULT - SUBJECTIVE AND OBJECTIVE BOX
OVERNIGHT EVENTS:     SUBJECTIVE:    VITAL SIGNS:  Vital Signs Last 24 Hrs  T(C): 36.6 (03 Aug 2023 06:06), Max: 37.1 (02 Aug 2023 20:59)  T(F): 97.9 (03 Aug 2023 06:06), Max: 98.7 (02 Aug 2023 20:59)  HR: 82 (03 Aug 2023 06:06) (64 - 82)  BP: 116/74 (03 Aug 2023 06:06) (106/64 - 134/78)  BP(mean): --  RR: 18 (03 Aug 2023 06:06) (16 - 18)  SpO2: 97% (03 Aug 2023 06:06) (97% - 99%)    Parameters below as of 03 Aug 2023 06:06  Patient On (Oxygen Delivery Method): room air        PHYSICAL EXAM:  General: NAD; speaking in full sentences  HEENT: NC/AT; PERRL; EOMI; MMM  Neck: supple; no JVD  Cardiac: RRR; +S1/S2  Pulm: CTA B/L; no W/R/R  GI: soft, NT/ND, +BS  Extremities: WWP; no edema, clubbing or cyanosis  Vasc: 2+ radial, DP pulses B/L  Neuro: AAOx3; no focal deficits    MEDICATIONS:  MEDICATIONS  (STANDING):  aspirin  chewable 81 milliGRAM(s) Oral daily  atorvastatin 40 milliGRAM(s) Oral at bedtime  brimonidine 0.2% Ophthalmic Solution 1 Drop(s) Both EYES two times a day  cefTRIAXone   IVPB 1000 milliGRAM(s) IV Intermittent every 24 hours  cholecalciferol 1000 Unit(s) Oral daily  desmopressin 0.1 milliGRAM(s) Oral at bedtime  enoxaparin Injectable 40 milliGRAM(s) SubCutaneous every 24 hours  gabapentin 300 milliGRAM(s) Oral two times a day  latanoprost 0.005% Ophthalmic Solution 1 Drop(s) Both EYES at bedtime  melatonin 3 milliGRAM(s) Oral at bedtime  spironolactone 25 milliGRAM(s) Oral daily  zinc sulfate 220 milliGRAM(s) Oral daily    MEDICATIONS  (PRN):  acetaminophen     Tablet .. 650 milliGRAM(s) Oral every 6 hours PRN Mild Pain (1 - 3)      ALLERGIES:  Allergies    penicillin (Unknown)  [This allergen will not trigger allergy alert] Acetic Pi-Lklivftpfa-Pihdjapor (Other)  erythromycin (Unknown)    Intolerances        LABS:                        11.2   10.20 )-----------( 295      ( 02 Aug 2023 14:35 )             32.7     08-02    121<L>  |  82<L>  |  16  ----------------------------<  99  3.8   |  28  |  0.93    Ca    8.8      02 Aug 2023 22:21  Phos  3.6     08-02  Mg     1.4     08-02    TPro  6.5  /  Alb  3.9  /  TBili  0.4  /  DBili  x   /  AST  26  /  ALT  11  /  AlkPhos  79  08-02        RADIOLOGY & ADDITIONAL TESTS: Reviewed. OVERNIGHT EVENTS:     SUBJECTIVE: Patient was seen and examined at bedside. She was accompanied by her HHA, Angelica. To expand on her original HPI, patient reports that she did not have any head injury when her aid lowered her to the ground. At the time, patient reports she felt dizzy, but she notes that she often feels dizzy when she stands from a seated position. The patient also reports of recent urinary frequency, but denies dysuria. She currently has a Vega catheter in place, but has never had one in the past. Patient states she has had urinary and bowel incontinence recently as well.    VITAL SIGNS:  Vital Signs Last 24 Hrs  T(C): 36.6 (03 Aug 2023 06:06), Max: 37.1 (02 Aug 2023 20:59)  T(F): 97.9 (03 Aug 2023 06:06), Max: 98.7 (02 Aug 2023 20:59)  HR: 82 (03 Aug 2023 06:06) (64 - 82)  BP: 116/74 (03 Aug 2023 06:06) (106/64 - 134/78)  BP(mean): --  RR: 18 (03 Aug 2023 06:06) (16 - 18)  SpO2: 97% (03 Aug 2023 06:06) (97% - 99%)    Parameters below as of 03 Aug 2023 06:06  Patient On (Oxygen Delivery Method): room air        PHYSICAL EXAM:  General: NAD; speaking in full sentences  HEENT: NC/AT; PERRL; EOMI; MMM  Neck: supple; no JVD  Cardiac: RRR; +S1/S2, systolic murmur consistent with valve replacement  Pulm: CTA B/L; no W/R/R  GI: soft, NT/ND, +BS  Extremities: WWP; no edema, clubbing or cyanosis, pain with palpation of shins  Vasc: 2+ radial, DP pulses B/L  Neuro: AAOx3; no focal deficits    MEDICATIONS:  MEDICATIONS  (STANDING):  aspirin  chewable 81 milliGRAM(s) Oral daily  atorvastatin 40 milliGRAM(s) Oral at bedtime  brimonidine 0.2% Ophthalmic Solution 1 Drop(s) Both EYES two times a day  cefTRIAXone   IVPB 1000 milliGRAM(s) IV Intermittent every 24 hours  cholecalciferol 1000 Unit(s) Oral daily  desmopressin 0.1 milliGRAM(s) Oral at bedtime  enoxaparin Injectable 40 milliGRAM(s) SubCutaneous every 24 hours  gabapentin 300 milliGRAM(s) Oral two times a day  latanoprost 0.005% Ophthalmic Solution 1 Drop(s) Both EYES at bedtime  melatonin 3 milliGRAM(s) Oral at bedtime  spironolactone 25 milliGRAM(s) Oral daily  zinc sulfate 220 milliGRAM(s) Oral daily    MEDICATIONS  (PRN):  acetaminophen     Tablet .. 650 milliGRAM(s) Oral every 6 hours PRN Mild Pain (1 - 3)      ALLERGIES:  Allergies    penicillin (Unknown)  [This allergen will not trigger allergy alert] Acetic Zc-Ewmurrwdjv-Ldkqpcozv (Other)  erythromycin (Unknown)    Intolerances        LABS:                        11.2   10.20 )-----------( 295      ( 02 Aug 2023 14:35 )             32.7     08-02    121<L>  |  82<L>  |  16  ----------------------------<  99  3.8   |  28  |  0.93    Ca    8.8      02 Aug 2023 22:21  Phos  3.6     08-02  Mg     1.4     08-02    TPro  6.5  /  Alb  3.9  /  TBili  0.4  /  DBili  x   /  AST  26  /  ALT  11  /  AlkPhos  79  08-02        RADIOLOGY & ADDITIONAL TESTS: Reviewed. OVERNIGHT EVENTS:     SUBJECTIVE: Patient was seen and examined at bedside. She was accompanied by her HHA, Angelica. To expand on her original HPI, patient reports that she did not have any head injury when her aid lowered her to the ground. At the time, patient reports she felt dizzy, but she notes that she often feels dizzy when she stands from a seated position. The patient also reports of recent urinary frequency, but has not had dysuria or hematuria. She currently has a Vega catheter in place, but has never had one in the past. Patient states she has had urinary and bowel incontinence recently as well.    She denies any headache, fever, shortness of breath, chest pain, or abdominal pain currently.    VITAL SIGNS:  Vital Signs Last 24 Hrs  T(C): 36.6 (03 Aug 2023 06:06), Max: 37.1 (02 Aug 2023 20:59)  T(F): 97.9 (03 Aug 2023 06:06), Max: 98.7 (02 Aug 2023 20:59)  HR: 82 (03 Aug 2023 06:06) (64 - 82)  BP: 116/74 (03 Aug 2023 06:06) (106/64 - 134/78)  BP(mean): --  RR: 18 (03 Aug 2023 06:06) (16 - 18)  SpO2: 97% (03 Aug 2023 06:06) (97% - 99%)    Parameters below as of 03 Aug 2023 06:06  Patient On (Oxygen Delivery Method): room air        PHYSICAL EXAM:  General: NAD; speaking in full sentences  HEENT: NC/AT; PERRL; EOMI; MMM  Neck: supple; no JVD  Cardiac: RRR; +S1/S2, systolic murmur consistent with valve replacement  Pulm: CTA B/L; no W/R/R  GI: soft, NT/ND, +BS  Extremities: WWP; no edema, clubbing or cyanosis, pain with palpation of shins  Vasc: 2+ radial, DP pulses B/L  Neuro: AAOx3; no focal deficits    MEDICATIONS:  MEDICATIONS  (STANDING):  aspirin  chewable 81 milliGRAM(s) Oral daily  atorvastatin 40 milliGRAM(s) Oral at bedtime  brimonidine 0.2% Ophthalmic Solution 1 Drop(s) Both EYES two times a day  cefTRIAXone   IVPB 1000 milliGRAM(s) IV Intermittent every 24 hours  cholecalciferol 1000 Unit(s) Oral daily  desmopressin 0.1 milliGRAM(s) Oral at bedtime  enoxaparin Injectable 40 milliGRAM(s) SubCutaneous every 24 hours  gabapentin 300 milliGRAM(s) Oral two times a day  latanoprost 0.005% Ophthalmic Solution 1 Drop(s) Both EYES at bedtime  melatonin 3 milliGRAM(s) Oral at bedtime  spironolactone 25 milliGRAM(s) Oral daily  zinc sulfate 220 milliGRAM(s) Oral daily    MEDICATIONS  (PRN):  acetaminophen     Tablet .. 650 milliGRAM(s) Oral every 6 hours PRN Mild Pain (1 - 3)      ALLERGIES:  Allergies    penicillin (Unknown)  [This allergen will not trigger allergy alert] Acetic Ps-Gcjlpngpgw-Islynczcq (Other)  erythromycin (Unknown)    Intolerances        LABS:                        11.2   10.20 )-----------( 295      ( 02 Aug 2023 14:35 )             32.7     08-02    121<L>  |  82<L>  |  16  ----------------------------<  99  3.8   |  28  |  0.93    Ca    8.8      02 Aug 2023 22:21  Phos  3.6     08-02  Mg     1.4     08-02    TPro  6.5  /  Alb  3.9  /  TBili  0.4  /  DBili  x   /  AST  26  /  ALT  11  /  AlkPhos  79  08-02        RADIOLOGY & ADDITIONAL TESTS: Reviewed. OVERNIGHT EVENTS:     SUBJECTIVE: Patient was seen and examined at bedside. She was accompanied by her HHA, Angelica. To expand on her original HPI, patient reports that she did not have any head injury when her aid lowered her to the ground. At the time, patient reports she felt dizzy, but she notes that she often feels dizzy when she stands from a seated position. The patient also reports of recent urinary frequency, but has not had dysuria or hematuria. She currently has a Purewick in place. Patient states she has had urinary and bowel incontinence recently as well.    She denies any headache, fever, shortness of breath, chest pain, or abdominal pain currently.    VITAL SIGNS:  Vital Signs Last 24 Hrs  T(C): 36.6 (03 Aug 2023 06:06), Max: 37.1 (02 Aug 2023 20:59)  T(F): 97.9 (03 Aug 2023 06:06), Max: 98.7 (02 Aug 2023 20:59)  HR: 82 (03 Aug 2023 06:06) (64 - 82)  BP: 116/74 (03 Aug 2023 06:06) (106/64 - 134/78)  BP(mean): --  RR: 18 (03 Aug 2023 06:06) (16 - 18)  SpO2: 97% (03 Aug 2023 06:06) (97% - 99%)    Parameters below as of 03 Aug 2023 06:06  Patient On (Oxygen Delivery Method): room air        PHYSICAL EXAM:  General: NAD; speaking in full sentences  HEENT: NC/AT; PERRL; EOMI; MMM  Neck: supple; no JVD  Cardiac: RRR; +S1/S2, systolic murmur consistent with valve replacement  Pulm: CTA B/L; no W/R/R  GI: soft, NT/ND, +BS  Extremities: WWP; no edema, clubbing or cyanosis, pain with palpation of shins  Vasc: 2+ radial, DP pulses B/L  Neuro: AAOx3; no focal deficits    MEDICATIONS:  MEDICATIONS  (STANDING):  aspirin  chewable 81 milliGRAM(s) Oral daily  atorvastatin 40 milliGRAM(s) Oral at bedtime  brimonidine 0.2% Ophthalmic Solution 1 Drop(s) Both EYES two times a day  cefTRIAXone   IVPB 1000 milliGRAM(s) IV Intermittent every 24 hours  cholecalciferol 1000 Unit(s) Oral daily  desmopressin 0.1 milliGRAM(s) Oral at bedtime  enoxaparin Injectable 40 milliGRAM(s) SubCutaneous every 24 hours  gabapentin 300 milliGRAM(s) Oral two times a day  latanoprost 0.005% Ophthalmic Solution 1 Drop(s) Both EYES at bedtime  melatonin 3 milliGRAM(s) Oral at bedtime  spironolactone 25 milliGRAM(s) Oral daily  zinc sulfate 220 milliGRAM(s) Oral daily    MEDICATIONS  (PRN):  acetaminophen     Tablet .. 650 milliGRAM(s) Oral every 6 hours PRN Mild Pain (1 - 3)      ALLERGIES:  Allergies    penicillin (Unknown)  [This allergen will not trigger allergy alert] Acetic Di-Bovmiuruno-Ymgvonxmw (Other)  erythromycin (Unknown)    Intolerances        LABS:                        11.2   10.20 )-----------( 295      ( 02 Aug 2023 14:35 )             32.7     08-02    121<L>  |  82<L>  |  16  ----------------------------<  99  3.8   |  28  |  0.93    Ca    8.8      02 Aug 2023 22:21  Phos  3.6     08-02  Mg     1.4     08-02    TPro  6.5  /  Alb  3.9  /  TBili  0.4  /  DBili  x   /  AST  26  /  ALT  11  /  AlkPhos  79  08-02        RADIOLOGY & ADDITIONAL TESTS: Reviewed.

## 2023-08-03 NOTE — DIETITIAN INITIAL EVALUATION ADULT - ADD RECOMMEND
1. Continue current diet order  >>encourage and monitor PO intake, consider oral nutrition supplement if PO intake <50%  2. MVI/vitamin C for wound healing   3. Monitor labs: electrolytes, cmp  4. Monitor weights   5. Pain and bowel regimen per team   6. Will continue to assess/honor food preferences as able

## 2023-08-03 NOTE — PHYSICAL THERAPY INITIAL EVALUATION ADULT - ADDITIONAL COMMENTS
Pt currently resides alone in elevator apt. Pt has HHA 24/7 to assist w/ all functional mob, dressing and showering tasks. Primarily amb w/ rollator w/ assistance. Also owns WC. Denied falls within past 6 months. Receives HPT 2x/ week. Has doctor visit home frequently.

## 2023-08-03 NOTE — PROGRESS NOTE ADULT - ATTENDING COMMENTS
I agree with the fellow's findings and plans as written above with the following additions/amendments:    Seen and examined at bedside multiple times during day. Discussed with aides and  at bedside, patient at baseline mentation again. given continued hyponatremia on ddavp 1mcg, there is a possibility that DI was an incorrect diagnosis and it was actually for nocturia, will see if overcorrects when holding ddavp, will need to track urine output and urine osm closely. Further recs as above, discussed in detail with primary team

## 2023-08-03 NOTE — PROGRESS NOTE ADULT - PROBLEM SELECTOR PLAN 5
Hx of CHF. Follows w/ Dr Fernandez. Admission in 12/22 for acute on chronic exacerbation. ECHO 12/7: with EF>75% and moderate AS/MR with unchanged gradients across AV and MV  - continue aldactone 25mg daily   - hold Torsemide 20mg po Mon,Wed,Fri ISO hyponatremia and   - hold Metolazone 5mg Mon,Wed, Fri ISO hyponatremia

## 2023-08-03 NOTE — PROGRESS NOTE ADULT - SUBJECTIVE AND OBJECTIVE BOX
Patient is a 89y Female seen and evaluated at bedside. Laying in bed. Denies any symptoms. Per aide at bedside, pt appears to be at her baseline mentation.      Meds:    acetaminophen     Tablet .. 650 every 6 hours PRN  aspirin  chewable 81 daily  atorvastatin 40 at bedtime  brimonidine 0.2% Ophthalmic Solution 1 two times a day  cefTRIAXone   IVPB 1000 every 24 hours  cholecalciferol 1000 daily  desmopressin 0.1 at bedtime  enoxaparin Injectable 40 every 24 hours  gabapentin 300 two times a day  latanoprost 0.005% Ophthalmic Solution 1 at bedtime  melatonin 3 at bedtime  sodium chloride 3%. 500 <Continuous>  spironolactone 25 daily  zinc sulfate 220 daily      T(C): , Max: 37.1 (08-02-23 @ 20:59)  T(F): , Max: 98.7 (08-02-23 @ 20:59)  HR: 82 (08-03-23 @ 06:06)  BP: 116/74 (08-03-23 @ 06:06)  BP(mean): --  RR: 18 (08-03-23 @ 06:06)  SpO2: 97% (08-03-23 @ 06:06)  Wt(kg): --        Review of Systems:  ROS negative except as per HPI      PHYSICAL EXAM:  GENERAL: Alert, awake, NAD  HEENT: normocephalic, atraumatic  CHEST/LUNG: Bilateral clear breath sounds. No accessory muscle use  HEART: S1, S2, RRR  ABDOMEN: Soft, nontender, non distended  EXTREMITIES: no edema, warm  Neurology: Pleasantly demented, interactive  Derm: No visible rashes or wounds      LABS:                        10.5   6.98  )-----------( 228      ( 03 Aug 2023 10:00 )             33.0     08-03    121<L>  |  84<L>  |  12  ----------------------------<  104<H>  3.9   |  26  |  0.73    Ca    8.8      03 Aug 2023 10:00  Phos  2.5     08-03  Mg     1.7     08-03    TPro  6.5  /  Alb  3.9  /  TBili  0.4  /  DBili  x   /  AST  26  /  ALT  11  /  AlkPhos  79  08-02    Osmolality, Serum: 256 mosm/kg *L* [280 - 301] (08-03 @ 10:00)  Osmolality, Serum: 252 mosm/kg *L* [280 - 301] (08-02 @ 22:21)      Urinalysis Basic - ( 03 Aug 2023 10:00 )    Color: x / Appearance: x / SG: x / pH: x  Gluc: 104 mg/dL / Ketone: x  / Bili: x / Urobili: x   Blood: x / Protein: x / Nitrite: x   Leuk Esterase: x / RBC: x / WBC x   Sq Epi: x / Non Sq Epi: x / Bacteria: x      Sodium, Random Urine: 102 mmol/L (08-03 @ 08:35)  Osmolality, Random Urine: 357 mosm/kg (08-03 @ 08:35)  Creatinine, Random Urine: 38 mg/dL (08-03 @ 08:35)  Osmolality, Random Urine: 455 mosm/kg (08-03 @ 00:00)  Sodium, Random Urine: 61 mmol/L (08-03 @ 00:00)  Osmolality, Random Urine: 417 mosm/kg (08-02 @ 20:02)  Sodium, Random Urine: 38 mmol/L (08-02 @ 16:35)        RADIOLOGY & ADDITIONAL STUDIES:           Patient is a 89y Female seen and evaluated at bedside. Laying in bed. Denies any symptoms. Per aide at bedside, pt appears to be at her baseline mentation.      Meds:    acetaminophen     Tablet .. 650 every 6 hours PRN  aspirin  chewable 81 daily  atorvastatin 40 at bedtime  brimonidine 0.2% Ophthalmic Solution 1 two times a day  cefTRIAXone   IVPB 1000 every 24 hours  cholecalciferol 1000 daily  desmopressin 0.1 at bedtime  enoxaparin Injectable 40 every 24 hours  gabapentin 300 two times a day  latanoprost 0.005% Ophthalmic Solution 1 at bedtime  melatonin 3 at bedtime  sodium chloride 3%. 500 <Continuous>  spironolactone 25 daily  zinc sulfate 220 daily      T(C): , Max: 37.1 (08-02-23 @ 20:59)  T(F): , Max: 98.7 (08-02-23 @ 20:59)  HR: 82 (08-03-23 @ 06:06)  BP: 116/74 (08-03-23 @ 06:06)  BP(mean): --  RR: 18 (08-03-23 @ 06:06)  SpO2: 97% (08-03-23 @ 06:06)  Wt(kg): --        Review of Systems:  ROS negative except as per HPI      PHYSICAL EXAM:  GENERAL: Alert, awake, NAD  HEENT: normocephalic, atraumatic  CHEST/LUNG: Bilateral clear breath sounds. No accessory muscle use  HEART: S1, S2, RRR  ABDOMEN: Soft, nontender, non distended  EXTREMITIES: no edema, warm  Neurology: Pleasantly demented, interactive  Derm: No visible rashes or wounds      LABS:                        10.5   6.98  )-----------( 228      ( 03 Aug 2023 10:00 )             33.0     08-03    121<L>  |  84<L>  |  12  ----------------------------<  104<H>  3.9   |  26  |  0.73    Ca    8.8      03 Aug 2023 10:00  Phos  2.5     08-03  Mg     1.7     08-03    TPro  6.5  /  Alb  3.9  /  TBili  0.4  /  DBili  x   /  AST  26  /  ALT  11  /  AlkPhos  79  08-02    Osmolality, Serum: 256 mosm/kg *L* [280 - 301] (08-03 @ 10:00)  Osmolality, Serum: 252 mosm/kg *L* [280 - 301] (08-02 @ 22:21)      Urinalysis Basic - ( 03 Aug 2023 10:00 )    Color: x / Appearance: x / SG: x / pH: x  Gluc: 104 mg/dL / Ketone: x  / Bili: x / Urobili: x   Blood: x / Protein: x / Nitrite: x   Leuk Esterase: x / RBC: x / WBC x   Sq Epi: x / Non Sq Epi: x / Bacteria: x      Sodium, Random Urine: 102 mmol/L (08-03 @ 08:35)  Osmolality, Random Urine: 357 mosm/kg (08-03 @ 08:35)  Creatinine, Random Urine: 38 mg/dL (08-03 @ 08:35)  Osmolality, Random Urine: 455 mosm/kg (08-03 @ 00:00)  Sodium, Random Urine: 61 mmol/L (08-03 @ 00:00)  Osmolality, Random Urine: 417 mosm/kg (08-02 @ 20:02)  Sodium, Random Urine: 38 mmol/L (08-02 @ 16:35)        RADIOLOGY & ADDITIONAL STUDIES:      Sodium: 128 mmol/L (08-03-23 @ 20:22)  Sodium: 125 mmol/L (08-03-23 @ 15:32)  Sodium: 121 mmol/L (08-03-23 @ 10:00)  Sodium: 121 mmol/L (08-02-23 @ 22:21)  Sodium: 120 mmol/L (08-02-23 @ 13:13)  Sodium, Serum: 139 mmol/L (12-10-22 @ 09:56)  Sodium, Serum: 138 mmol/L (12-09-22 @ 06:37)  Sodium, Serum: 139 mmol/L (12-08-22 @ 07:21)  Sodium, Serum: 144 mmol/L (12-07-22 @ 09:28)  Sodium, Serum: 141 mmol/L (12-06-22 @ 16:09)

## 2023-08-03 NOTE — DIETITIAN INITIAL EVALUATION ADULT - NS FNS DIET ORDER
Diet, DASH/TLC:   Sodium & Cholesterol Restricted  1000mL Fluid Restriction ([id number]) (08-03-23 @ 11:43)

## 2023-08-03 NOTE — PROGRESS NOTE ADULT - PROBLEM SELECTOR PLAN 1
Na 120. Calculated serum Osm 251. 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   - nephrology consulted - f/u recs  -f/u Serum Osm, Urine Osm, Urine Na  -f/u TSH Na 120. Calculated serum Osm 251.   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

## 2023-08-03 NOTE — PROGRESS NOTE ADULT - PROBLEM SELECTOR PLAN 3
Pt presenting w/ 2d sx of discomfort on urination. UA + nitrites, LE, blood, WCC and bacteria. No suprapubic or CV tenderness on exam.  - ceftriaxone 1g QD for 3 days Pt presenting w/ 2d sx of discomfort on urination. UA + nitrites, LE, blood, WCC and bacteria. No suprapubic or CV tenderness on exam.  Cultures growing Proteus mirabilis  Plan:  - ceftriaxone 1g QD for 3 days  - f/u sensitivities

## 2023-08-04 DIAGNOSIS — I35.0 NONRHEUMATIC AORTIC (VALVE) STENOSIS: ICD-10-CM

## 2023-08-04 DIAGNOSIS — R53.81 OTHER MALAISE: ICD-10-CM

## 2023-08-04 DIAGNOSIS — Z51.5 ENCOUNTER FOR PALLIATIVE CARE: ICD-10-CM

## 2023-08-04 DIAGNOSIS — S31.000A UNSPECIFIED OPEN WOUND OF LOWER BACK AND PELVIS WITHOUT PENETRATION INTO RETROPERITONEUM, INITIAL ENCOUNTER: ICD-10-CM

## 2023-08-04 DIAGNOSIS — Z71.89 OTHER SPECIFIED COUNSELING: ICD-10-CM

## 2023-08-04 LAB
-  AMPICILLIN/SULBACTAM: SIGNIFICANT CHANGE UP
-  AMPICILLIN: SIGNIFICANT CHANGE UP
-  CEFAZOLIN: SIGNIFICANT CHANGE UP
-  CEFEPIME: SIGNIFICANT CHANGE UP
-  CEFTRIAXONE: SIGNIFICANT CHANGE UP
-  CIPROFLOXACIN: SIGNIFICANT CHANGE UP
-  ERTAPENEM: SIGNIFICANT CHANGE UP
-  GENTAMICIN: SIGNIFICANT CHANGE UP
-  NITROFURANTOIN: SIGNIFICANT CHANGE UP
-  PIPERACILLIN/TAZOBACTAM: SIGNIFICANT CHANGE UP
-  TOBRAMYCIN: SIGNIFICANT CHANGE UP
-  TRIMETHOPRIM/SULFAMETHOXAZOLE: SIGNIFICANT CHANGE UP
ALBUMIN SERPL ELPH-MCNC: 3.6 G/DL — SIGNIFICANT CHANGE UP (ref 3.3–5)
ALP SERPL-CCNC: 73 U/L — SIGNIFICANT CHANGE UP (ref 40–120)
ALT FLD-CCNC: 10 U/L — SIGNIFICANT CHANGE UP (ref 10–45)
ANION GAP SERPL CALC-SCNC: 10 MMOL/L — SIGNIFICANT CHANGE UP (ref 5–17)
ANION GAP SERPL CALC-SCNC: 13 MMOL/L — SIGNIFICANT CHANGE UP (ref 5–17)
ANION GAP SERPL CALC-SCNC: 9 MMOL/L — SIGNIFICANT CHANGE UP (ref 5–17)
APPEARANCE UR: CLEAR — SIGNIFICANT CHANGE UP
AST SERPL-CCNC: 27 U/L — SIGNIFICANT CHANGE UP (ref 10–40)
BACTERIA # UR AUTO: PRESENT /HPF
BILIRUB SERPL-MCNC: 0.5 MG/DL — SIGNIFICANT CHANGE UP (ref 0.2–1.2)
BILIRUB UR-MCNC: NEGATIVE — SIGNIFICANT CHANGE UP
BUN SERPL-MCNC: 10 MG/DL — SIGNIFICANT CHANGE UP (ref 7–23)
BUN SERPL-MCNC: 10 MG/DL — SIGNIFICANT CHANGE UP (ref 7–23)
BUN SERPL-MCNC: 11 MG/DL — SIGNIFICANT CHANGE UP (ref 7–23)
CALCIUM SERPL-MCNC: 8.9 MG/DL — SIGNIFICANT CHANGE UP (ref 8.4–10.5)
CALCIUM SERPL-MCNC: 9 MG/DL — SIGNIFICANT CHANGE UP (ref 8.4–10.5)
CALCIUM SERPL-MCNC: 9 MG/DL — SIGNIFICANT CHANGE UP (ref 8.4–10.5)
CHLORIDE SERPL-SCNC: 89 MMOL/L — LOW (ref 96–108)
CHLORIDE SERPL-SCNC: 91 MMOL/L — LOW (ref 96–108)
CHLORIDE SERPL-SCNC: 93 MMOL/L — LOW (ref 96–108)
CO2 SERPL-SCNC: 26 MMOL/L — SIGNIFICANT CHANGE UP (ref 22–31)
CO2 SERPL-SCNC: 27 MMOL/L — SIGNIFICANT CHANGE UP (ref 22–31)
CO2 SERPL-SCNC: 28 MMOL/L — SIGNIFICANT CHANGE UP (ref 22–31)
COLOR SPEC: YELLOW — SIGNIFICANT CHANGE UP
CREAT SERPL-MCNC: 0.74 MG/DL — SIGNIFICANT CHANGE UP (ref 0.5–1.3)
CREAT SERPL-MCNC: 0.75 MG/DL — SIGNIFICANT CHANGE UP (ref 0.5–1.3)
CREAT SERPL-MCNC: 0.78 MG/DL — SIGNIFICANT CHANGE UP (ref 0.5–1.3)
CULTURE RESULTS: SIGNIFICANT CHANGE UP
DIFF PNL FLD: ABNORMAL
EGFR: 70 ML/MIN/1.73M2 — SIGNIFICANT CHANGE UP
EGFR: 74 ML/MIN/1.73M2 — SIGNIFICANT CHANGE UP
EGFR: 75 ML/MIN/1.73M2 — SIGNIFICANT CHANGE UP
EPI CELLS # UR: SIGNIFICANT CHANGE UP /HPF (ref 0–5)
GLUCOSE SERPL-MCNC: 121 MG/DL — HIGH (ref 70–99)
GLUCOSE SERPL-MCNC: 133 MG/DL — HIGH (ref 70–99)
GLUCOSE SERPL-MCNC: 85 MG/DL — SIGNIFICANT CHANGE UP (ref 70–99)
GLUCOSE UR QL: NEGATIVE — SIGNIFICANT CHANGE UP
HCT VFR BLD CALC: 31.6 % — LOW (ref 34.5–45)
HGB BLD-MCNC: 10.7 G/DL — LOW (ref 11.5–15.5)
HYALINE CASTS # UR AUTO: SIGNIFICANT CHANGE UP /LPF (ref 0–2)
KETONES UR-MCNC: ABNORMAL MG/DL
LEUKOCYTE ESTERASE UR-ACNC: ABNORMAL
MAGNESIUM SERPL-MCNC: 1.7 MG/DL — SIGNIFICANT CHANGE UP (ref 1.6–2.6)
MCHC RBC-ENTMCNC: 30.3 PG — SIGNIFICANT CHANGE UP (ref 27–34)
MCHC RBC-ENTMCNC: 33.9 GM/DL — SIGNIFICANT CHANGE UP (ref 32–36)
MCV RBC AUTO: 89.5 FL — SIGNIFICANT CHANGE UP (ref 80–100)
METHOD TYPE: SIGNIFICANT CHANGE UP
NITRITE UR-MCNC: POSITIVE
NRBC # BLD: 0 /100 WBCS — SIGNIFICANT CHANGE UP (ref 0–0)
ORGANISM # SPEC MICROSCOPIC CNT: SIGNIFICANT CHANGE UP
ORGANISM # SPEC MICROSCOPIC CNT: SIGNIFICANT CHANGE UP
OSMOLALITY SERPL: 261 MOSM/KG — LOW (ref 280–301)
OSMOLALITY SERPL: 264 MOSM/KG — LOW (ref 280–301)
OSMOLALITY UR: 292 MOSM/KG — LOW (ref 300–900)
OSMOLALITY UR: 396 MOSM/KG — SIGNIFICANT CHANGE UP (ref 300–900)
PH UR: 7 — SIGNIFICANT CHANGE UP (ref 5–8)
PHOSPHATE SERPL-MCNC: 2.3 MG/DL — LOW (ref 2.5–4.5)
PLATELET # BLD AUTO: 337 K/UL — SIGNIFICANT CHANGE UP (ref 150–400)
POTASSIUM SERPL-MCNC: 3.9 MMOL/L — SIGNIFICANT CHANGE UP (ref 3.5–5.3)
POTASSIUM SERPL-MCNC: 4.3 MMOL/L — SIGNIFICANT CHANGE UP (ref 3.5–5.3)
POTASSIUM SERPL-MCNC: 4.3 MMOL/L — SIGNIFICANT CHANGE UP (ref 3.5–5.3)
POTASSIUM SERPL-SCNC: 3.9 MMOL/L — SIGNIFICANT CHANGE UP (ref 3.5–5.3)
POTASSIUM SERPL-SCNC: 4.3 MMOL/L — SIGNIFICANT CHANGE UP (ref 3.5–5.3)
POTASSIUM SERPL-SCNC: 4.3 MMOL/L — SIGNIFICANT CHANGE UP (ref 3.5–5.3)
PROT SERPL-MCNC: 6.2 G/DL — SIGNIFICANT CHANGE UP (ref 6–8.3)
PROT UR-MCNC: ABNORMAL MG/DL
RBC # BLD: 3.53 M/UL — LOW (ref 3.8–5.2)
RBC # FLD: 15.1 % — HIGH (ref 10.3–14.5)
RBC CASTS # UR COMP ASSIST: ABNORMAL /HPF
SODIUM SERPL-SCNC: 125 MMOL/L — LOW (ref 135–145)
SODIUM SERPL-SCNC: 130 MMOL/L — LOW (ref 135–145)
SODIUM SERPL-SCNC: 131 MMOL/L — LOW (ref 135–145)
SODIUM UR-SCNC: 130 MMOL/L — SIGNIFICANT CHANGE UP
SODIUM UR-SCNC: 85 MMOL/L — SIGNIFICANT CHANGE UP
SP GR SPEC: 1.01 — SIGNIFICANT CHANGE UP (ref 1–1.03)
SPECIMEN SOURCE: SIGNIFICANT CHANGE UP
UROBILINOGEN FLD QL: 0.2 E.U./DL — SIGNIFICANT CHANGE UP
WBC # BLD: 9.14 K/UL — SIGNIFICANT CHANGE UP (ref 3.8–10.5)
WBC # FLD AUTO: 9.14 K/UL — SIGNIFICANT CHANGE UP (ref 3.8–10.5)
WBC UR QL: > 10 /HPF

## 2023-08-04 PROCEDURE — 99233 SBSQ HOSP IP/OBS HIGH 50: CPT

## 2023-08-04 PROCEDURE — 99221 1ST HOSP IP/OBS SF/LOW 40: CPT

## 2023-08-04 PROCEDURE — 99223 1ST HOSP IP/OBS HIGH 75: CPT

## 2023-08-04 PROCEDURE — 99233 SBSQ HOSP IP/OBS HIGH 50: CPT | Mod: GC

## 2023-08-04 RX ORDER — SODIUM CHLORIDE 9 MG/ML
500 INJECTION, SOLUTION INTRAVENOUS
Refills: 0 | Status: DISCONTINUED | OUTPATIENT
Start: 2023-08-04 | End: 2023-08-04

## 2023-08-04 RX ORDER — SODIUM,POTASSIUM PHOSPHATES 278-250MG
1 POWDER IN PACKET (EA) ORAL ONCE
Refills: 0 | Status: COMPLETED | OUTPATIENT
Start: 2023-08-04 | End: 2023-08-04

## 2023-08-04 RX ORDER — SODIUM CHLORIDE 9 MG/ML
250 INJECTION, SOLUTION INTRAVENOUS
Refills: 0 | Status: DISCONTINUED | OUTPATIENT
Start: 2023-08-04 | End: 2023-08-04

## 2023-08-04 RX ADMIN — Medication 3 MILLIGRAM(S): at 22:41

## 2023-08-04 RX ADMIN — Medication 1000 UNIT(S): at 11:06

## 2023-08-04 RX ADMIN — GABAPENTIN 300 MILLIGRAM(S): 400 CAPSULE ORAL at 06:28

## 2023-08-04 RX ADMIN — ATORVASTATIN CALCIUM 40 MILLIGRAM(S): 80 TABLET, FILM COATED ORAL at 22:41

## 2023-08-04 RX ADMIN — LATANOPROST 1 DROP(S): 0.05 SOLUTION/ DROPS OPHTHALMIC; TOPICAL at 22:40

## 2023-08-04 RX ADMIN — GABAPENTIN 300 MILLIGRAM(S): 400 CAPSULE ORAL at 18:56

## 2023-08-04 RX ADMIN — SODIUM CHLORIDE 62 MILLILITER(S): 9 INJECTION, SOLUTION INTRAVENOUS at 10:24

## 2023-08-04 RX ADMIN — Medication 1 PACKET(S): at 09:01

## 2023-08-04 RX ADMIN — Medication 81 MILLIGRAM(S): at 11:07

## 2023-08-04 RX ADMIN — SPIRONOLACTONE 25 MILLIGRAM(S): 25 TABLET, FILM COATED ORAL at 06:29

## 2023-08-04 RX ADMIN — BRIMONIDINE TARTRATE 1 DROP(S): 2 SOLUTION/ DROPS OPHTHALMIC at 18:56

## 2023-08-04 RX ADMIN — ZINC SULFATE TAB 220 MG (50 MG ZINC EQUIVALENT) 220 MILLIGRAM(S): 220 (50 ZN) TAB at 11:07

## 2023-08-04 RX ADMIN — BRIMONIDINE TARTRATE 1 DROP(S): 2 SOLUTION/ DROPS OPHTHALMIC at 06:29

## 2023-08-04 RX ADMIN — CEFTRIAXONE 100 MILLIGRAM(S): 500 INJECTION, POWDER, FOR SOLUTION INTRAMUSCULAR; INTRAVENOUS at 18:56

## 2023-08-04 RX ADMIN — ENOXAPARIN SODIUM 40 MILLIGRAM(S): 100 INJECTION SUBCUTANEOUS at 18:55

## 2023-08-04 NOTE — CONSULT NOTE ADULT - ATTENDING COMMENTS
Patient is a 89 F with a Pmhx of dCHF, HTN, HLD and severe AS s/p Bioprosthetic AV 7 years ago at OSH, c/b by bioprosthetic AV stenosis, s/p Valve-in-Valve with a Sheri Ultra by Structural Heart Team at [facility] in June of 2021, who presented to the ER with fatigue, generalized weakness, found to be severely Hyponatremic, with UTI. Cardiology consulted for Optimization    Review of Studies  - Echo 12/06/2022: Normal left and right ventricular size and systolic function. Moderate aortic stenosis. Moderate mitral regurgitation. Pulmonary artery systolic pressure is 35 mmHg.    # dCHD  # Severe AS s/p Bio AVR, s/p ROSLYN Sheri Ultra Patient is a 89 F with a Pmhx of dCHF, HTN, HLD and severe AS s/p Bioprosthetic AV 7 years ago at OSH, c/b by bioprosthetic AV stenosis, s/p Valve-in-Valve with a Sheri Ultra by Structural Heart Team at [facility] in June of 2021, who presented to the ER with fatigue, generalized weakness, found to be severely Hyponatremic requiring Hypertonic saline, found to have a UTI. Cardiology consulted for Optimization    Review of Studies  - Echo 12/06/2022: Normal left and right ventricular size and systolic function. Moderate aortic stenosis. Moderate mitral regurgitation. Pulmonary artery systolic pressure is 35 mmHg.    # HFpEF  # Severe AS s/p Bio AVR, s/p ROSLYN Sheri Ultra    # HFpEF  - Patient with HFpEF and Multivalvular heart disease, here for weakness and lethargy in the setting of Marked Hyponatremia, now improved post Hypertonic saline  - Clinically she is euvolemic without JVD, pulmonary edema. Lower extremity edema known to be chronic with L>R leg after a vascular injury  - She has been maintained on Spironolactone 25 mg po daily since admission, however Torsemide and home Metolazone have been held  - Agree with decision to hold Diuretic as patient currently euvolemic  with active UTI being treated  - Would reassess fluid status along with hyponatremia on the daily prior to resuming Torsemide. Patient currently on it 3 times weekly  - Please measure I/o's    # Severe AS s/p Bio AVR, s/p ROSLYN Sheri Ultra  - Patient with valvular CArdiomyopathy with s/p BioAVR and subsequent ROSLYN 2 years ago. Echo from December show Moderate AS. Patient also has known Moderate MR and mild MS.  - Clinically she denies chest pain, palpitation, RIOS or other anginal symptoms. HHA at the bedside agrees  - Cont with ASA 81 mg po daily    PLease ensure patient has outpatient follow up Established within 1-2 week of DC with her outpatient Cardiologist Dr Fernandez. He has been made aware of her admission.  Please call cardiology with any questions

## 2023-08-04 NOTE — PROGRESS NOTE ADULT - PROBLEM SELECTOR PLAN 4
Hgb on admission 11.3, repeat 10.5 at 10am , MCV 88.1.   Currently no signs of active bleeding (no hematochezia, melena, hemoptysis, hematuria).   Baseline Hb from previous admission in 12/22 10-11  Plan:   - trend CBC  - maintain active T&S  - transfuse if Hgb <7

## 2023-08-04 NOTE — PROGRESS NOTE ADULT - PROBLEM SELECTOR PLAN 2
Patient and HHA bedside state patient has chronic sacral wound  Patient endorses itchiness worse today    Consider wound care consult   consider calamine lotion to area

## 2023-08-04 NOTE — PROGRESS NOTE ADULT - PROBLEM SELECTOR PLAN 1
Na 120. Calculated serum Osm 251.   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

## 2023-08-04 NOTE — PROGRESS NOTE ADULT - ASSESSMENT
89 y/o F with PMH HTN, HLD, chronic diastolic CHF, hx BioAVR complicated by stenosis and subsequent valve-in-valve TAVR at [facility] in 2021, hx Diabetes Insipidus with hyponatremia (on DDAVP 1.5mg daily), chronic neuropathy, C spine fracture (C-spine collar when out of her home secondary to poor healing), dementia, depression presented with 1 day history of generalized weakness and a witnessed fall and admitted for symptomatic chronic hyponatremia Palliative care consulted for advanced care planning/ goals of care discussion.    89 y/o F with PMH HTN, HLD, chronic diastolic CHF, hx BioAVR complicated by stenosis and subsequent valve-in-valve TAVR at [facility] in 2021, hx Diabetes Insipidus with hyponatremia (on DDAVP 1.5mg daily), chronic neuropathy, C spine fracture (C-spine collar when out of her home secondary to poor healing), dementia, depression presented with 1 day history of generalized weakness and a witnessed fall and admitted for symptomatic chronic hyponatremia Palliative care consulted for advanced care planning/ goals of care discussion.

## 2023-08-04 NOTE — PROGRESS NOTE ADULT - PROBLEM SELECTOR PLAN 8
Medical decision making/ symptom management in the setting of illness     Will continue to follow for ongoing GOC discussion/ symptom management. Emotional support provided, questions answered.

## 2023-08-04 NOTE — CONSULT NOTE ADULT - TIME BILLING
As above
Emotional Support/Supportive Care and Clarification of Potential Disease Trajectory related to CHF  Assessment of Symptom Morrison and Palliative regimen  Exploration of GOC/Advanced Directives including HCP designation, code status, and hospice eligibility    Time inclusive of chart review, medication ordering, discussion with primary team, clinical documentation, and communication with family/caregiver

## 2023-08-04 NOTE — PROGRESS NOTE ADULT - ATTENDING COMMENTS
Patient is 89 yo woman with Ms,MR s/p TAVR, dCHF, on desmopressin and diuretic therapy at home, admitted with fatigue and found to be hyponatremic.   ----sodium levels increased with infusion of hypertonic saline and holding desmopressin. UO was 1650 in the past 24 hours. Will follow on urine output closely as well as afternoon BMP and urine osmolality. If urine output appears to be high and sodium levels cont to increase inspite of hypotonic saline infusion patient may need to be restarted on desmopressin. Nephrology team input appreciated.   ----cardiac valvular abnormalities and dCHF, Cardiology consult is requested given that diuretics are being held.   ----for dispo, pt resides at home with assistance from aids 24/7.

## 2023-08-04 NOTE — CONSULT NOTE ADULT - SUBJECTIVE AND OBJECTIVE BOX
HPI:  89 y/o F with PMH HTN, HLD, chronic diastolic CHF, hx BioAVR complicated by stenosis and subsequent valve-in-valve TAVR at [facility] in 2021, hx Diabetes Insipidus with hyponatremia (on DDAVP 1.5mg daily), chronic neuropathy, C spine fracture (C-spine collar when out of her home secondary to poor healing), dementia, depression presented with 1 day history of generalized weakness and a witnessed fall. Pt claiming that she is in the hospital due to chronic pain in her back. Aid at bedside endorsed that patient was feeling dizzy and felt like her legs were giving up when ambulating this AM. Pt did not have a fall but her aid lowered her to sit on the ground. Aid also claims that pt saw her OP Dr Camila Mcpherson who asked her  to go to ED on 8/1 for OP Na+ 118 but pt refused. Aid also claiming that pt has been c/o urinary discomfort over last few days which pt corroborated and that she had 2 episodes of non-bloody diarrhea on morning of admission. Pt has no complaints other than mild discomfort on urination. Pt denies HA, dizziness, CP, SOB, changes in BMs.    ROS: Per HPI    PAST MEDICAL & SURGICAL HISTORY:  Hyperlipidemia, unspecified hyperlipidemia type      Diabetes insipidus  H/O aortic valve stenosis  Hypertension  Vertigo  Chronic diastolic congestive heart failure  Dyslipidemia  History of pseudoaneurysm      Glaucoma      S/P AVR  Bioprosthetic      H/O lumbosacral spine surgery      S/P hip replacement  B/L      S/P right coronary artery (RCA) stent placement        SOCIAL HISTORY:  FAMILY HISTORY:  FH: lung cancer (Father)      ALLERGIES: 	  penicillin (Unknown)  [This allergen will not trigger allergy alert] Acetic [id number]-Kmcttbqbh (Other)  erythromycin (Unknown)          MEDICATIONS:  acetaminophen     Tablet .. 650 milliGRAM(s) Oral every 6 hours PRN  aspirin  chewable 81 milliGRAM(s) Oral daily  atorvastatin 40 milliGRAM(s) Oral at bedtime  brimonidine 0.2% Ophthalmic Solution 1 Drop(s) Both EYES two times a day  cefTRIAXone   IVPB 1000 milliGRAM(s) IV Intermittent every 24 hours  cholecalciferol 1000 Unit(s) Oral daily  dextrose 5%. 250 milliLiter(s) IV Continuous <Continuous>  enoxaparin Injectable 40 milliGRAM(s) SubCutaneous every 24 hours  gabapentin 300 milliGRAM(s) Oral two times a day  latanoprost 0.005% Ophthalmic Solution 1 Drop(s) Both EYES at bedtime  melatonin 3 milliGRAM(s) Oral at bedtime  spironolactone 25 milliGRAM(s) Oral daily  zinc sulfate 220 milliGRAM(s) Oral daily      T(C): 36.8 (08-04-23 @ 12:51), Max: 36.8 (08-03-23 @ 20:48)  HR: 74 (08-04-23 @ 12:51) (71 - 75)  BP: 115/71 (08-04-23 @ 12:51) (106/78 - 133/78)  RR: 18 (08-04-23 @ 12:51) (17 - 18)  SpO2: 95% (08-04-23 @ 12:51) (93% - 96%)    08-03-23 @ 07:01  -  08-04-23 @ 07:00  --------------------------------------------------------  IN: 0 mL / OUT: 1650 mL / NET: -1650 mL        PHYSICAL EXAM:    Constitutional: resting comfortably in bed; NAD  HEENT: NC/AT, PERRL, EOMI, anicteric sclera, no nasal discharge; uvula midline, no oropharyngeal erythema or exudates; MMM  Neck: supple; no thyromegaly, JVP ? cm H20, JVD +/-  Respiratory: CTA B/L; no W/R/R, no retractions  Cardiac: +S1/S2; RRR; no M/R/G; PMI non-displaced  Gastrointestinal: soft, NT/ND; no rebound or guarding; +BSx4  Extremities: WWP, no clubbing or cyanosis; no peripheral edema  Musculoskeletal: NROM x4; no joint swelling, tenderness or erythema  Vascular: 2+ radial, DP/PT pulses B/L  Dermatologic: skin warm, dry and intact; no rashes, wounds, or scars  Lymphatic: no submandibular or cervical LAD  Neurologic: AAOx3; CNII-XII grossly intact; no focal deficits        I&O's Summary    03 Aug 2023 07:01  -  04 Aug 2023 07:00  --------------------------------------------------------  IN: 0 mL / OUT: 1650 mL / NET: -1650 mL        LABS:	 	                        10.7   9.14  )-----------( 337      ( 04 Aug 2023 05:30 )             31.6     08-04    131<L>  |  91<L>  |  10  ----------------------------<  85  3.9   |  27  |  0.75    Ca    8.9      04 Aug 2023 05:30  Phos  2.3     08-04  Mg     1.7     08-04    TPro  6.2  /  Alb  3.6  /  TBili  0.5  /  DBili  x   /  AST  27  /  ALT  10  /  AlkPhos  73  08-04        proBNP:   Lipid Profile:   HgA1c:   TSH:     TELEMETRY: 	    ECG:  	  RADIOLOGY:   ECHO:  STRESS:  CATH:   HPI:  89 F with a Pmhx of dCHF, HTN, HLD and severe AS s/p Bioprosthetic AV 7 years ago at OSH, c/b by bioprosthetic AV stenosis, s/p Valve-in-Valve with a Sheri Ultra by Structural Heart Team at [facility] in June of 2021, who presented to the ER with fatigue, generalized weakness, found to be severely Hyponatremic s/p hypertonic saline and UTI. Cardiology consulted for Optimization    Patient seen at the bedside with aid. Reports feeling well. Denies chest pain, shortness of breath, or palpitations. LE edema improved from usual.     Home meds:   Lipitor 40mg   asa 81mg   desmopressin 0.1mg   torsemide 20 mg TIW   metolazone 5 mg TIW  spironolactone 25mg qd        ROS: Per HPI    PAST MEDICAL & SURGICAL HISTORY:  Hyperlipidemia, unspecified hyperlipidemia type      Diabetes insipidus  H/O aortic valve stenosis  Hypertension  Vertigo  Chronic diastolic congestive heart failure  Dyslipidemia  History of pseudoaneurysm      Glaucoma      S/P AVR  Bioprosthetic      H/O lumbosacral spine surgery      S/P hip replacement  B/L      S/P right coronary artery (RCA) stent placement        SOCIAL HISTORY:  FAMILY HISTORY:  FH: lung cancer (Father)      ALLERGIES: 	  penicillin (Unknown)  [This allergen will not trigger allergy alert] Acetic Aa-Jxcervjpyo-Gnhxcqxal (Other)  erythromycin (Unknown)          MEDICATIONS:  acetaminophen     Tablet .. 650 milliGRAM(s) Oral every 6 hours PRN  aspirin  chewable 81 milliGRAM(s) Oral daily  atorvastatin 40 milliGRAM(s) Oral at bedtime  brimonidine 0.2% Ophthalmic Solution 1 Drop(s) Both EYES two times a day  cefTRIAXone   IVPB 1000 milliGRAM(s) IV Intermittent every 24 hours  cholecalciferol 1000 Unit(s) Oral daily  dextrose 5%. 250 milliLiter(s) IV Continuous <Continuous>  enoxaparin Injectable 40 milliGRAM(s) SubCutaneous every 24 hours  gabapentin 300 milliGRAM(s) Oral two times a day  latanoprost 0.005% Ophthalmic Solution 1 Drop(s) Both EYES at bedtime  melatonin 3 milliGRAM(s) Oral at bedtime  spironolactone 25 milliGRAM(s) Oral daily  zinc sulfate 220 milliGRAM(s) Oral daily      T(C): 36.8 (08-04-23 @ 12:51), Max: 36.8 (08-03-23 @ 20:48)  HR: 74 (08-04-23 @ 12:51) (71 - 75)  BP: 115/71 (08-04-23 @ 12:51) (106/78 - 133/78)  RR: 18 (08-04-23 @ 12:51) (17 - 18)  SpO2: 95% (08-04-23 @ 12:51) (93% - 96%)    08-03-23 @ 07:01  -  08-04-23 @ 07:00  --------------------------------------------------------  IN: 0 mL / OUT: 1650 mL / NET: -1650 mL        PHYSICAL EXAM:    Elderly pleasant female   lying in bed NAD   rrr, + systolic murmur LSB _III/V  ctab  soft ntnd   trace b/l edema   talkative, conversing appropriately moving all extremities      I&O's Summary    03 Aug 2023 07:01  -  04 Aug 2023 07:00  --------------------------------------------------------  IN: 0 mL / OUT: 1650 mL / NET: -1650 mL        LABS:	 	                        10.7   9.14  )-----------( 337      ( 04 Aug 2023 05:30 )             31.6     08-04    131<L>  |  91<L>  |  10  ----------------------------<  85  3.9   |  27  |  0.75    Ca    8.9      04 Aug 2023 05:30  Phos  2.3     08-04  Mg     1.7     08-04    TPro  6.2  /  Alb  3.6  /  TBili  0.5  /  DBili  x   /  AST  27  /  ALT  10  /  AlkPhos  73  08-04

## 2023-08-04 NOTE — PROGRESS NOTE ADULT - PROBLEM SELECTOR PLAN 4
hx BioAVR complicated by stenosis and subsequent valve-in-valve TAVR at [facility] in 2021

## 2023-08-04 NOTE — PROGRESS NOTE ADULT - PROBLEM SELECTOR PLAN 8
[facility]  8/31-9/2 after fall resulting in C spine fracture. Imaging from 12/22 admission CT Cervical spine: Nonhealing base of dens fracture. Compared to 11/10/22 there is mild increase in anterior displacement of the dens and C1 lateral masses relative to the base of dens. MR Cervical spine: Nonhealing C2 fracture at base of dens, mildly distracted as seen on CT. Edema involving the right greater than left lateral masses likely stress related to altered alignment. No evidence for marrow replacing lesion or other bony edema.Has been wearing C-collar since fracture. 07/23 CT and MR w/ interval displacement of dens fracture   Plan:  - PT consulted   - Careful handling

## 2023-08-04 NOTE — PROGRESS NOTE ADULT - ATTENDING COMMENTS
I agree with the fellow's findings and plans as written above with the following additions/amendments:    Seen and examined at bedside multiple times with discussions with patient, daughter, primary team, and outpatient PCP throughout day. Patient with questionable history of DI, as history and urine osm not in line, appears to be more frequency based than true hypotonic diuresis with DI. Continuing to hold desmopressin and given fluids as above, goal sodium 129 today and will be above 130s tomorrow. Further recs as above.

## 2023-08-04 NOTE — PROGRESS NOTE ADULT - SUBJECTIVE AND OBJECTIVE BOX
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 Patient was seen at bedside, called daughter on the phone at bedside. Patient lives alone in [facility] building with 24 hour HHA. Patient is wheelchair bound. Patient states she feels well today. Her only complaint is her buttock wound is itchy and she has dry mouth. Patient states it is taking her longer to recall things and she memory difficulties, especially since shes been in the hospital. She denies any headaches, dizziness, sob, abdominal pain, constipation, changes in appetite, pain        (02 Aug 2023 16:27)    PERTINENT PM/SXH:   Essential hypertension    Hyperlipidemia, unspecified hyperlipidemia type    Diabetes insipidus    H/O aortic valve stenosis    Hypertension    Vertigo    Chronic diastolic congestive heart failure    Dyslipidemia    History of pseudoaneurysm    Glaucoma      History of orthopedic surgery    S/P AVR    H/O lumbosacral spine surgery    S/P hip replacement, right    S/P hip replacement  S/P right coronary artery (RCA) stent placement      FAMILY HISTORY:  FH: lung cancer (Father)      ITEMS NOT CHECKED ARE NOT PRESENT    SOCIAL HISTORY:  Living Situation: [X]Home  []Long term care  []Rehab []Other    DECISION MAKER(s):  [x] Health Care Proxy(s)  [] Surrogate(s)  [] Guardian           Name(s)/Phone Number(s): Massiel Duval [phone]    BASELINE (I)ADLs (prior to admission):  [place]: []Total  [] Moderate [X]Dependent    ALLERGIES:  penicillin (Unknown)  [This allergen will not trigger allergy alert] Acetic Iu-Kgjmczattb-Hlmcsvjji (Other)  erythromycin (Unknown)    MEDICATIONS  (STANDING):  aspirin  chewable 81 milliGRAM(s) Oral daily  atorvastatin 40 milliGRAM(s) Oral at bedtime  brimonidine 0.2% Ophthalmic Solution 1 Drop(s) Both EYES two times a day  cefTRIAXone   IVPB 1000 milliGRAM(s) IV Intermittent every 24 hours  cholecalciferol 1000 Unit(s) Oral daily  dextrose 5%. 250 milliLiter(s) (62 mL/Hr) IV Continuous <Continuous>  enoxaparin Injectable 40 milliGRAM(s) SubCutaneous every 24 hours  gabapentin 300 milliGRAM(s) Oral two times a day  latanoprost 0.005% Ophthalmic Solution 1 Drop(s) Both EYES at bedtime  melatonin 3 milliGRAM(s) Oral at bedtime  spironolactone 25 milliGRAM(s) Oral daily  zinc sulfate 220 milliGRAM(s) Oral daily    MEDICATIONS  (PRN):  acetaminophen     Tablet .. 650 milliGRAM(s) Oral every 6 hours PRN Mild Pain (1 - 3)    PRESENT SYMPTOMS: []Unable to obtain due to poor mentation/encephalopathy  Source if other than patient:  []Family   []Team     Pain: [ ] yes [x] no  QOL impact -   Location -                    Aggravating Factors -  Quality -  Radiation -  Timing -  Severity (0-10 scale) -   Minimal Acceptable Level (0-10 scale) -    PAIN AD Score:  [email or web address] (press ctrl +  left click to view)    Dyspnea:                           []Mild  []Moderate []Severe  Anxiety:                             []Mild []Moderate []Severe  Fatigue:                             []Mild []Moderate []Severe  Nausea:                             []Mild []Moderate []Severe  Loss of Appetite:              []Mild []Moderate []Severe  Constipation:                    []Mild []Moderate []Severe    Other Symptoms:  [x]All other review of systems negative     Palliative Performance Status Version 2:  %    http://npcrc.org/files/news/palliative_performance_scale_ppsv2.pdf    Vital Signs Last 24 Hrs  T(C): 36.4 (04 Aug 2023 05:08), Max: 36.8 (03 Aug 2023 20:48)  T(F): 97.6 (04 Aug 2023 05:08), Max: 98.3 (03 Aug 2023 20:48)  HR: 71 (04 Aug 2023 05:08) (71 - 75)  BP: 106/78 (04 Aug 2023 05:08) (106/78 - 133/78)  BP(mean): --  RR: 17 (04 Aug 2023 05:08) (17 - 18)  SpO2: 96% (04 Aug 2023 05:08) (93% - 96%)    Parameters below as of 04 Aug 2023 05:08  Patient On (Oxygen Delivery Method): room air         LABS:                        10.7   9.14  )-----------( 337      ( 04 Aug 2023 05:30 )             31.6   08-04    131<L>  |  91<L>  |  10  ----------------------------<  85  3.9   |  27  |  0.75    Ca    8.9      04 Aug 2023 05:30  Phos  2.3     08-04  Mg     1.7     08-04    TPro  6.2  /  Alb  3.6  /  TBili  0.5  /  DBili  x   /  AST  27  /  ALT  10  /  AlkPhos  73  08-04    Urinalysis Basic - ( 04 Aug 2023 05:30 )    Color: x / Appearance: x / SG: x / pH: x  Gluc: 85 mg/dL / Ketone: x  / Bili: x / Urobili: x   Blood: x / Protein: x / Nitrite: x   Leuk Esterase: x / RBC: x / WBC x   Sq Epi: x / Non Sq Epi: x / Bacteria: x        RADIOLOGY & ADDITIONAL STUDIES: 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 Patient was seen at bedside, called daughter on the phone at bedside. Patient lives alone in [facility] building with 24 hour HHA. Patient is wheelchair bound. Patient states she feels well today. Her only complaint is her buttock wound is itchy and she has dry mouth. Patient states it is taking her longer to recall things and she memory difficulties, especially since shes been in the hospital.  Patients aid at bedside states patients mental status is at her baseline . She states she was feeling a little anxious this morning when a team of doctors came to see her, but she is no longer feeling anxious. She denies any headaches, dizziness, fatigue, sob, abdominal pain, constipation, changes in appetite, pain. Patient states her daughter is her HCP Massiel Duval [phone]. Daughter states patient has had multiple hospitalizations recently, and has filled MOLST form at previous visits. Patient and daughter states that the patient do not invasive interventions long term, but still would like certain interventions that would prolong life. Daughter states she would like to discuss code status further on Monday, as her mother is seems more disoriented than normal. Daughter states she will bring previous MOLST on Monday.        (02 Aug 2023 16:27)    PERTINENT PM/SXH:   Essential hypertension    Hyperlipidemia, unspecified hyperlipidemia type    Diabetes insipidus    H/O aortic valve stenosis    Hypertension    Vertigo    Chronic diastolic congestive heart failure    Dyslipidemia    History of pseudoaneurysm    Glaucoma      History of orthopedic surgery    S/P AVR    H/O lumbosacral spine surgery    S/P hip replacement, right    S/P hip replacement  S/P right coronary artery (RCA) stent placement      FAMILY HISTORY:  FH: lung cancer (Father)      ITEMS NOT CHECKED ARE NOT PRESENT    SOCIAL HISTORY:  Living Situation: [X]Home  []Long term care  []Rehab []Other    DECISION MAKER(s):  [x] Health Care Proxy(s)  [] Surrogate(s)  [] Guardian           Name(s)/Phone Number(s): Massiel Duval [phone]    BASELINE (I)ADLs (prior to admission):  [place]: []Total  [] Moderate [X]Dependent    ALLERGIES:  penicillin (Unknown)  [This allergen will not trigger allergy alert] Acetic Bx-Gwqxyfghzr-Duxuvddpl (Other)  erythromycin (Unknown)    MEDICATIONS  (STANDING):  aspirin  chewable 81 milliGRAM(s) Oral daily  atorvastatin 40 milliGRAM(s) Oral at bedtime  brimonidine 0.2% Ophthalmic Solution 1 Drop(s) Both EYES two times a day  cefTRIAXone   IVPB 1000 milliGRAM(s) IV Intermittent every 24 hours  cholecalciferol 1000 Unit(s) Oral daily  dextrose 5%. 250 milliLiter(s) (62 mL/Hr) IV Continuous <Continuous>  enoxaparin Injectable 40 milliGRAM(s) SubCutaneous every 24 hours  gabapentin 300 milliGRAM(s) Oral two times a day  latanoprost 0.005% Ophthalmic Solution 1 Drop(s) Both EYES at bedtime  melatonin 3 milliGRAM(s) Oral at bedtime  spironolactone 25 milliGRAM(s) Oral daily  zinc sulfate 220 milliGRAM(s) Oral daily    MEDICATIONS  (PRN):  acetaminophen     Tablet .. 650 milliGRAM(s) Oral every 6 hours PRN Mild Pain (1 - 3)    PRESENT SYMPTOMS: []Unable to obtain due to poor mentation/encephalopathy  Source if other than patient:  []Family   []Team     Pain: [ ] yes [x] no  QOL impact -   Location -                    Aggravating Factors -  Quality -  Radiation -  Timing -  Severity (0-10 scale) -   Minimal Acceptable Level (0-10 scale) -    PAIN AD Score:  http://geriatrictoolkit.[employer]/cog/painad.pdf (press ctrl +  left click to view)    Dyspnea:                           []Mild  []Moderate []Severe  Anxiety:                             []Mild []Moderate []Severe  Fatigue:                             []Mild []Moderate []Severe  Nausea:                             []Mild []Moderate []Severe  Loss of Appetite:              []Mild []Moderate []Severe  Constipation:                    []Mild []Moderate []Severe    Other Symptoms:  [x]All other review of systems negative     Palliative Performance Status Version 2:  %    http://npcrc.org/files/news/palliative_performance_scale_ppsv2.pdf    Vital Signs Last 24 Hrs  T(C): 36.4 (04 Aug 2023 05:08), Max: 36.8 (03 Aug 2023 20:48)  T(F): 97.6 (04 Aug 2023 05:08), Max: 98.3 (03 Aug 2023 20:48)  HR: 71 (04 Aug 2023 05:08) (71 - 75)  BP: 106/78 (04 Aug 2023 05:08) (106/78 - 133/78)  BP(mean): --  RR: 17 (04 Aug 2023 05:08) (17 - 18)  SpO2: 96% (04 Aug 2023 05:08) (93% - 96%)    Parameters below as of 04 Aug 2023 05:08  Patient On (Oxygen Delivery Method): room air         LABS:                        10.7   9.14  )-----------( 337      ( 04 Aug 2023 05:30 )             31.6   08-04    131<L>  |  91<L>  |  10  ----------------------------<  85  3.9   |  27  |  0.75    Ca    8.9      04 Aug 2023 05:30  Phos  2.3     08-04  Mg     1.7     08-04    TPro  6.2  /  Alb  3.6  /  TBili  0.5  /  DBili  x   /  AST  27  /  ALT  10  /  AlkPhos  73  08-04    Urinalysis Basic - ( 04 Aug 2023 05:30 )    Color: x / Appearance: x / SG: x / pH: x  Gluc: 85 mg/dL / Ketone: x  / Bili: x / Urobili: x   Blood: x / Protein: x / Nitrite: x   Leuk Esterase: x / RBC: x / WBC x   Sq Epi: x / Non Sq Epi: x / Bacteria: x        RADIOLOGY & ADDITIONAL STUDIES:

## 2023-08-04 NOTE — CONSULT NOTE ADULT - ASSESSMENT
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 in 2021, hx Diabetes Insipidus with hyponatremia (on DDAVP 1.5mg daily), chronic neuropathy, C spine fracture (C-spine collar when out of her home secondary to poor healing), dementia, depression presented with 1 day history of generalized weakness and a witnessed fall and admitted for hyponatremia.      Problem/Plan - 1:  ·  Problem: Hyponatremia.   ·  Plan: Na 120. Calculated serum Osm 251. 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   - nephrology consulted - f/u recs  -f/u Serum Osm, Urine Osm, Urine Na  -f/u TSH.    Problem/Plan - 2:  ·  Problem: Diabetes insipidus.   ·  Plan: On home desmopressin. Na 120 on admission.  - nephrology consulted - f/u recs re DDAVP.    Problem/Plan - 3:  ·  Problem: UTI (urinary tract infection), uncomplicated.   ·  Plan: Pt presenting w/ 2d sx of discomfort on urination. UA + nitrites, LE, blood, WCC and bacteria. No suprapubic or CV tenderness on exam.  - ceftriaxone 1g QD for 3 days.    Problem/Plan - 4:  ·  Problem: Anemia.   ·  Plan: Hgb on admission 11.3, MCV 88.1. Currently no signs of active bleeding (no hematochezia, melena, hemoptysis, hematuria). Baseline Hb from previous admission in 12/22 10-11  - trend CBC  - maintain active T&S  - transfuse if Hgb <7.    Problem/Plan - 5:  ·  Problem: Chronic diastolic congestive heart failure.   ·  Plan: Hx of CHF. Follows w/ Dr Fernandez. Admission in 12/22 for acute on chronic exacerbation. ECHO 12/7: with EF>75% and moderate AS/MR with unchanged gradients across AV and MV  - continue aldactone 25mg daily   - hold Torsemide 20mg po Mon,Wed,Fri ISO hyponatremia and   - hold Metolazone 5mg Mon,Wed, Fri ISO hyponatremia.    Problem/Plan - 6:  ·  Problem: CAD (coronary artery disease).   ·  Plan: Home med: Atorvastatin 40mg and aspirin 81mg qd.   - c/w atorvastatin 40mg and aspirin 81mg q.    Problem/Plan - 7:  ·  Problem: HLD (hyperlipidemia).   ·  Plan: Hx of HTN. Home meds: atorvastatin 40mg  - c/w atorvastatin.    Problem/Plan - 8:  ·  Problem: Cervical spine fracture.   ·  Plan: [facility]  8/31-9/2 after fall resulting in C spine fracture. Imaging from 12/22 admission CT Cervical spine: Nonhealing base of dens fracture. Compared to 11/10/22 there is mild increase in anterior displacement of the dens and C1 lateral masses relative to the base of dens. MR Cervical spine: Nonhealing C2 fracture at base of dens, mildly distracted as seen on CT. Edema involving the right greater than left lateral masses likely stress related to altered alignment. No evidence for marrow replacing lesion or other bony edema.Has been wearing C-collar since fracture. 07/23 CT and MR w/ interval displacement of dens fracture   - c/w C-collar.    Problem/Plan - 9:  ·  Problem: S/P TAVR (transcatheter aortic valve replacement).   ·  Plan: S/p Valve in valve TAVR in 2021 with echo today unchanged from prior TTE 12/22: EF> 75%, moderate AS and moderate MR.  - cont to monitor.    Problem/Plan - 10:  ·  Problem: Prophylactic measure.   ·  Plan; Plan:  F: none   E: replete K<4, Mg<2  N: regular  VTE Prophylaxis: lovenox 40mg QD  GI: none  C: Full Code  D: RMF.    
Renal consulted for Na 120. Pt is a 89 yo F w/ DI on DDAVP brought to ED due to outpatient labs 8/1 resulting in Na 118. Pt also with generalized leg weakness and near fall prior to coming to ED    Asymptomatic chronic hyponatremia  Na 120 now  Casper 38  Pending UOsm  On ddavp 1.5mcg at home. Of note, also on metolazone at home per aide    Recommend:  Check UOsm stat  Decrease desmopressin to 1mcg PO (QHS as pt takes it at night at home)  Start hypertonic 3% saline at 30 ml/hr x 6 hours. Expect this to raise Na by 2 meq  BMP at 10PM with Casper and UOsm  Goal Na 126 by 8/3 1 PM  Also check S.Osm, TSH  Hold home metolazone
89 F with a Pmhx of dCHF, HTN, HLD and severe AS s/p Bioprosthetic AV 7 years ago at OSH, c/b by bioprosthetic AV stenosis, s/p Valve-in-Valve with a Sheri Ultra by Structural Heart Team at [facility] in June of 2021, who presented to the ER with fatigue, generalized weakness, found to be severely Hyponatremic s/p hypertonic saline and UTI. Cardiology consulted for Optimization    Review of studies.   - Echo 12/06/2022: Normal left and right ventricular size and systolic function. Moderate aortic stenosis. Moderate mitral regurgitation. Pulmonary artery systolic pressure is 35 mmHg.    #HFpEF, appears euvolemic   - agree with holding diuretic today  - c/w spironolactone  - reassess fluid status and Na level tomorrow - consider restarting if s/o overload- home dose is torsemide TIW  - measure Is and Os  - c/w atorvastatin 40mg    #severe AS s/p Bio AVR, s/p ROSLYN Sheri Ultra  denies chest pain, sob  - c/w ASA 81mg qd    Ensure Followup with Dr. Fernandez (patient's cardiologist) within 1 - 2 weeks of discharge
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 in 2021, hx Diabetes Insipidus with hyponatremia (on DDAVP 1.5mg daily), chronic neuropathy, C spine fracture (C-spine collar when out of her home secondary to poor healing), dementia, depression presented with 1 day history of generalized weakness and a witnessed fall and admitted for symptomatic chronic hyponatremia Palliative care consulted for advanced care planning/ goals of care discussion.

## 2023-08-04 NOTE — CONSULT NOTE ADULT - PROBLEM SELECTOR RECOMMENDATION 8
Medical decision making/ symptom management in the setting of advanced illness     Will continue to follow for ongoing GOC discussion/ symptom management. Emotional support provided, questions answered

## 2023-08-04 NOTE — PROGRESS NOTE ADULT - ASSESSMENT
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 in 2021, hx Diabetes Insipidus with hyponatremia (on DDAVP 1.5mg daily), chronic neuropathy, C spine fracture (C-spine collar when out of her home secondary to poor healing), dementia, depression presented with 1 day history of generalized weakness and a witnessed fall and admitted for hyponatremia.       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

## 2023-08-04 NOTE — CONSULT NOTE ADULT - PROBLEM SELECTOR RECOMMENDATION 6
CT Cervical spine 12/2022 Nonhealing base of dens fracture. Compared to 11/10/22 there is mild increase in anterior displacement of the dens and C1 lateral masses relative to the base of dens. MR Cervical spine: Nonhealing C2 fracture at base of dens, mildly distracted as seen on CT. Edema involving the right greater than left lateral masses likely stress related to altered alignment. No evidence for marrow replacing lesion or other bony edema. Has been wearing C-collar since fracture. 07/23 CT and MR w/ interval displacement of dens fracture     PT consulted, rec HPT w/ continued 24/7 HHA

## 2023-08-04 NOTE — CONSULT NOTE ADULT - CONSULT REASON
PM&R evaluation
Hyponatremia
Symptom Management and Complex Medical Decision Making/GOC in the setting of Advanced Illness
Aortic Stenosis

## 2023-08-04 NOTE — PROGRESS NOTE ADULT - ASSESSMENT
Renal consulted for Na 120. Pt is a 89 yo F w/ DI on DDAVP brought to ED due to outpatient labs 8/1 resulting in Na 118. Pt also with generalized leg weakness and near fall prior to coming to ED    #Asymptomatic chronic hyponatremia  Patient p/w Na 120.   On 8/2, patient was given desmopressin + 180 cc 3% hypertonic saline  On 8/3: 120 cc 3% hypertonic saline >Na 121> 150 cc hypertonic saline >Na 125. 10;30 pm Na 128  On 8/4: Na 131  TSH wnl  Home meds that can cause hyponatremia: desmopressin, metolazone, torsemide  Lab and urine studies c/w hypotonic hyponatremia iso SIADH  In terms of cause most likely iso poor PO intake/diarrhea and SIADH iso desmopressin use.    Plan  -Patient overcorrected this AM. Goal 6-8 Na raise in 24 hrs (raised 10 in 24 hrs). Please give 500 cc IV D5W to treat overcorrection, encourage PO intake  - Follow up PM Na, Uosm, and Casper to be collected at 2 pm   - Hold desmopressin, torsemide, and metolazone  -BMP Q6hr with Casper and UOsm    #central DI  According to daughter, patient has been on desmopressin for 50 years. She was started iso nocturia.   Unsure at this time if patient was worked up for central DI previously. May have been prescribed desmopressin for refractory nocturia.     Plan  - hold desmopressin  - based on labs over weekend, will determine need/dosing of desmopressin  - pending labs, will consider started diuretics on Sunday to determine effects on Na prior to discharge Renal consulted for Na 120. Pt is a 89 yo F w/ DI on DDAVP brought to ED due to outpatient labs 8/1 resulting in Na 118. Pt also with generalized leg weakness and near fall prior to coming to ED    #Asymptomatic chronic hyponatremia  Patient p/w Na 120.   On 8/2, patient was given desmopressin + 180 cc 3% hypertonic saline  On 8/3: 120 cc 3% hypertonic saline >Na 121> 150 cc hypertonic saline >Na 125. 10;30 pm Na 128  On 8/4: Na 131  TSH wnl  Home meds that can cause hyponatremia: desmopressin, metolazone, torsemide  PE, Lab and urine studies c/w  hypotonic hyponatremia iso SIADH  In terms of cause most likely iso poor PO intake/diarrhea and SIADH iso desmopressin use.    Plan  -Patient overcorrected this AM. Goal 6-8 Na raise in 24 hrs (raised 10 in 24 hrs). Please give 500 cc IV D5W to treat overcorrection, encourage PO intake  - Follow up PM Na, Uosm, and Casper to be collected at 2 pm   - Hold desmopressin, torsemide, and metolazone  -BMP Q6hr with Casper and UOsm    #central DI  According to daughter, patient has been on desmopressin for 50 years. She was started iso nocturia.   Unsure at this time if patient was worked up for central DI previously. May have been prescribed desmopressin for refractory nocturia.     Plan  - hold desmopressin  - based on labs over weekend, will determine need/dosing of desmopressin  - pending labs, will consider started diuretics on Sunday to determine effects on Na prior to discharge

## 2023-08-04 NOTE — PROGRESS NOTE ADULT - PROBLEM SELECTOR PLAN 3
Na 131 this morning, patient mentating at baseline per aides at bedside    continue to monitor  care per nephrology and primary team

## 2023-08-04 NOTE — PROGRESS NOTE ADULT - SUBJECTIVE AND OBJECTIVE BOX
OROZCOCOLLETTE  89y  Female    Patient is a 89y old  Female who presents with a chief complaint of hyponatremia (04 Aug 2023 12:11)      INTERVAL HPI/OVERNIGHT EVENTS:    ROS: fever/chills, fatigue, nausea, vomiting, headache, stuffiness, sore throat, chest pain, palpitations, edema, SOB, cough, wheezing, changes in appetite, changes in bowel movements, contipation, diarrhea, abdominal pain, dizziness, fainting/LOC      T(C): 36.4 (08-04-23 @ 05:08), Max: 36.8 (08-03-23 @ 20:48)  HR: 71 (08-04-23 @ 05:08) (71 - 75)  BP: 106/78 (08-04-23 @ 05:08) (106/78 - 133/78)  RR: 17 (08-04-23 @ 05:08) (17 - 18)  SpO2: 96% (08-04-23 @ 05:08) (93% - 96%)  Wt(kg): --Vital Signs Last 24 Hrs  T(C): 36.4 (04 Aug 2023 05:08), Max: 36.8 (03 Aug 2023 20:48)  T(F): 97.6 (04 Aug 2023 05:08), Max: 98.3 (03 Aug 2023 20:48)  HR: 71 (04 Aug 2023 05:08) (71 - 75)  BP: 106/78 (04 Aug 2023 05:08) (106/78 - 133/78)  BP(mean): --  RR: 17 (04 Aug 2023 05:08) (17 - 18)  SpO2: 96% (04 Aug 2023 05:08) (93% - 96%)    Parameters below as of 04 Aug 2023 05:08  Patient On (Oxygen Delivery Method): room air        PHYSICAL EXAM:  GENERAL: NAD; well-groomed; well-developed; AAO x 3; good concentration   Neuro: CN2-12 grossly intact; speech clear; +2/4 DTR in UE and LE b/l; light touch sensation intact of UE and le b/l;  negative Romberg test; no pronator drift; intact tandem gait; intact heel and toe walking; intact finger to nose testing; intact rapid alternating movements  HEENT: NC/AT; MMM; neck supple; good dentition; no nystagmus; no scleral icterus; nasal passages clear; no throid or LN enlargement  Heart: RRR; +s1 and s2; no MRG (or grade 2 _ murmur appreciated at _ ICS); non displaced PMI; no JVD  Lungs: CTA b/l; normal effort; no accesory muscle use; symmetric inhalation and exhalation; vesicular breath sounds; no WWR; normal tactile fremitus; persussion resonant  GI: nondistended; nontender; normal bowel sounds [id number]; percussion typmanic; no organomegaly   Extremities: +2 pulses in UE and LE b/l; no clubbing, cyanosis, or edema b/l, capillary refill <2 sec b/l  Skin: skin dry and warm; no skin tenting; no rashes or lesions  MSK: normal tone; +5/5 strength in upper and lower extremities b/l    Consultant(s) Notes Reviewed:  [x ] YES  [ ] NO  Care Discussed with Consultants/Other Providers [ x] YES  [ ] NO    LABS:                        10.7   9.14  )-----------( 337      ( 04 Aug 2023 05:30 )             31.6     08-04    131<L>  |  91<L>  |  10  ----------------------------<  85  3.9   |  27  |  0.75    Ca    8.9      04 Aug 2023 05:30  Phos  2.3     08-04  Mg     1.7     08-04    TPro  6.2  /  Alb  3.6  /  TBili  0.5  /  DBili  x   /  AST  27  /  ALT  10  /  AlkPhos  73  08-04        Urinalysis Basic - ( 04 Aug 2023 05:30 )    Color: x / Appearance: x / SG: x / pH: x  Gluc: 85 mg/dL / Ketone: x  / Bili: x / Urobili: x   Blood: x / Protein: x / Nitrite: x   Leuk Esterase: x / RBC: x / WBC x   Sq Epi: x / Non Sq Epi: x / Bacteria: x      CAPILLARY BLOOD GLUCOSE            Urinalysis Basic - ( 04 Aug 2023 05:30 )    Color: x / Appearance: x / SG: x / pH: x  Gluc: 85 mg/dL / Ketone: x  / Bili: x / Urobili: x   Blood: x / Protein: x / Nitrite: x   Leuk Esterase: x / RBC: x / WBC x   Sq Epi: x / Non Sq Epi: x / Bacteria: x        MEDICATIONS  (STANDING):  aspirin  chewable 81 milliGRAM(s) Oral daily  atorvastatin 40 milliGRAM(s) Oral at bedtime  brimonidine 0.2% Ophthalmic Solution 1 Drop(s) Both EYES two times a day  cefTRIAXone   IVPB 1000 milliGRAM(s) IV Intermittent every 24 hours  cholecalciferol 1000 Unit(s) Oral daily  dextrose 5%. 250 milliLiter(s) (62 mL/Hr) IV Continuous <Continuous>  enoxaparin Injectable 40 milliGRAM(s) SubCutaneous every 24 hours  gabapentin 300 milliGRAM(s) Oral two times a day  latanoprost 0.005% Ophthalmic Solution 1 Drop(s) Both EYES at bedtime  melatonin 3 milliGRAM(s) Oral at bedtime  spironolactone 25 milliGRAM(s) Oral daily  zinc sulfate 220 milliGRAM(s) Oral daily    MEDICATIONS  (PRN):  acetaminophen     Tablet .. 650 milliGRAM(s) Oral every 6 hours PRN Mild Pain (1 - 3)      RADIOLOGY & ADDITIONAL TESTS:    Imaging Personally Reviewed:  [ ] YES  [ ] NO   COLLETET OROZCO  89y  Female    Patient is a 89y old  Female who presents with a chief complaint of hyponatremia (04 Aug 2023 12:11)      INTERVAL HPI/OVERNIGHT EVENTS:  10:30 pm: Na 128. Patient able to work with PT.   Patient was seen and examined at bedside. Patient and HHA at bedside reports patient at baseline mental status.   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 Patient has been on desmopressin for last 50 years.     T(C): 36.4 (08-04-23 @ 05:08), Max: 36.8 (08-03-23 @ 20:48)  HR: 71 (08-04-23 @ 05:08) (71 - 75)  BP: 106/78 (08-04-23 @ 05:08) (106/78 - 133/78)  RR: 17 (08-04-23 @ 05:08) (17 - 18)  SpO2: 96% (08-04-23 @ 05:08) (93% - 96%)  Wt(kg): --Vital Signs Last 24 Hrs  T(C): 36.4 (04 Aug 2023 05:08), Max: 36.8 (03 Aug 2023 20:48)  T(F): 97.6 (04 Aug 2023 05:08), Max: 98.3 (03 Aug 2023 20:48)  HR: 71 (04 Aug 2023 05:08) (71 - 75)  BP: 106/78 (04 Aug 2023 05:08) (106/78 - 133/78)  BP(mean): --  RR: 17 (04 Aug 2023 05:08) (17 - 18)  SpO2: 96% (04 Aug 2023 05:08) (93% - 96%)    Parameters below as of 04 Aug 2023 05:08  Patient On (Oxygen Delivery Method): room air        PHYSICAL EXAM:  GENERAL: NAD; pleasantly demented  Neuro: AAOx3  HEENT: NC/AT; MMM; neck supple; no nystagmus; no scleral icterus  Heart: RRR; +s1 and s2; holosystolic murmur best appreciated at RUSB; no JVD  Lungs: expiratory wheezing; normal effort; no accessory muscle use  GI: nondistended; nontender; normal bowel sounds [id number]  Extremities: +2 pulses in UE and LE b/l; no clubbing, cyanosis, or edema b/l, capillary refill <2 sec b/l  Skin: skin dry and warm; no skin tenting; no rashes or lesions  MSK: normal tone    Consultant(s) Notes Reviewed:  [x ] YES  [ ] NO  Care Discussed with Consultants/Other Providers [ x] YES  [ ] NO    LABS:                        10.7   9.14  )-----------( 337      ( 04 Aug 2023 05:30 )             31.6     08-04    131<L>  |  91<L>  |  10  ----------------------------<  85  3.9   |  27  |  0.75    Ca    8.9      04 Aug 2023 05:30  Phos  2.3     08-04  Mg     1.7     08-04    TPro  6.2  /  Alb  3.6  /  TBili  0.5  /  DBili  x   /  AST  27  /  ALT  10  /  AlkPhos  73  08-04        Urinalysis Basic - ( 04 Aug 2023 05:30 )    Color: x / Appearance: x / SG: x / pH: x  Gluc: 85 mg/dL / Ketone: x  / Bili: x / Urobili: x   Blood: x / Protein: x / Nitrite: x   Leuk Esterase: x / RBC: x / WBC x   Sq Epi: x / Non Sq Epi: x / Bacteria: x      CAPILLARY BLOOD GLUCOSE            Urinalysis Basic - ( 04 Aug 2023 05:30 )    Color: x / Appearance: x / SG: x / pH: x  Gluc: 85 mg/dL / Ketone: x  / Bili: x / Urobili: x   Blood: x / Protein: x / Nitrite: x   Leuk Esterase: x / RBC: x / WBC x   Sq Epi: x / Non Sq Epi: x / Bacteria: x        MEDICATIONS  (STANDING):  aspirin  chewable 81 milliGRAM(s) Oral daily  atorvastatin 40 milliGRAM(s) Oral at bedtime  brimonidine 0.2% Ophthalmic Solution 1 Drop(s) Both EYES two times a day  cefTRIAXone   IVPB 1000 milliGRAM(s) IV Intermittent every 24 hours  cholecalciferol 1000 Unit(s) Oral daily  dextrose 5%. 250 milliLiter(s) (62 mL/Hr) IV Continuous <Continuous>  enoxaparin Injectable 40 milliGRAM(s) SubCutaneous every 24 hours  gabapentin 300 milliGRAM(s) Oral two times a day  latanoprost 0.005% Ophthalmic Solution 1 Drop(s) Both EYES at bedtime  melatonin 3 milliGRAM(s) Oral at bedtime  spironolactone 25 milliGRAM(s) Oral daily  zinc sulfate 220 milliGRAM(s) Oral daily    MEDICATIONS  (PRN):  acetaminophen     Tablet .. 650 milliGRAM(s) Oral every 6 hours PRN Mild Pain (1 - 3)      RADIOLOGY & ADDITIONAL TESTS:    Imaging Personally Reviewed:  [ ] YES  [ ] NO

## 2023-08-04 NOTE — PROGRESS NOTE ADULT - SUBJECTIVE AND OBJECTIVE BOX
** INCOMPLETE **    OVERNIGHT EVENTS:     SUBJECTIVE:    VITAL SIGNS:  Vital Signs Last 24 Hrs  T(C): 36.4 (04 Aug 2023 05:08), Max: 36.8 (03 Aug 2023 20:48)  T(F): 97.6 (04 Aug 2023 05:08), Max: 98.3 (03 Aug 2023 20:48)  HR: 71 (04 Aug 2023 05:08) (71 - 77)  BP: 106/78 (04 Aug 2023 05:08) (106/78 - 133/78)  BP(mean): --  RR: 17 (04 Aug 2023 05:08) (17 - 18)  SpO2: 96% (04 Aug 2023 05:08) (93% - 96%)    Parameters below as of 04 Aug 2023 05:08  Patient On (Oxygen Delivery Method): room air        PHYSICAL EXAM:  General: NAD; speaking in full sentences  HEENT: NC/AT; PERRL; EOMI; MMM  Neck: supple; no JVD  Cardiac: RRR; +S1/S2  Pulm: CTA B/L; no W/R/R  GI: soft, NT/ND, +BS  Extremities: WWP; no edema, clubbing or cyanosis  Vasc: 2+ radial, DP pulses B/L  Neuro: AAOx3; no focal deficits    MEDICATIONS:  MEDICATIONS  (STANDING):  aspirin  chewable 81 milliGRAM(s) Oral daily  atorvastatin 40 milliGRAM(s) Oral at bedtime  brimonidine 0.2% Ophthalmic Solution 1 Drop(s) Both EYES two times a day  cefTRIAXone   IVPB 1000 milliGRAM(s) IV Intermittent every 24 hours  cholecalciferol 1000 Unit(s) Oral daily  enoxaparin Injectable 40 milliGRAM(s) SubCutaneous every 24 hours  gabapentin 300 milliGRAM(s) Oral two times a day  latanoprost 0.005% Ophthalmic Solution 1 Drop(s) Both EYES at bedtime  melatonin 3 milliGRAM(s) Oral at bedtime  spironolactone 25 milliGRAM(s) Oral daily  zinc sulfate 220 milliGRAM(s) Oral daily    MEDICATIONS  (PRN):  acetaminophen     Tablet .. 650 milliGRAM(s) Oral every 6 hours PRN Mild Pain (1 - 3)      ALLERGIES:  Allergies    penicillin (Unknown)  [This allergen will not trigger allergy alert] Acetic Lm-Rkqwnmfykx-Kicsdvmix (Other)  erythromycin (Unknown)    Intolerances        LABS:                        10.5   6.98  )-----------( 228      ( 03 Aug 2023 10:00 )             33.0     08-03    128<L>  |  92<L>  |  10  ----------------------------<  123<H>  4.2   |  25  |  0.67    Ca    8.9      03 Aug 2023 20:22  Phos  2.5     08-03  Mg     1.7     08-03    TPro  6.5  /  Alb  3.9  /  TBili  0.4  /  DBili  x   /  AST  26  /  ALT  11  /  AlkPhos  79  08-02        RADIOLOGY & ADDITIONAL TESTS: Reviewed. ** INCOMPLETE **    OVERNIGHT EVENTS: None    SUBJECTIVE: Patient was seen and examined at bedside with her HHA. She was seen by nephrology yesterday and they recommended to continue with their plan (desmopressin 1 mcg PO qhs, 3% hypertonic saline at 30 mL/hr). The patient reports feeling lethargic today as well as itchy above her buttocks. The HHA states that her previous sacral pressure ulcer had dried up and healed, but yesterday a patch was placed on it.     Patient denies any headache, nausea, vomiting, chest pain, shortness of breath, or dizziness today.    VITAL SIGNS:  Vital Signs Last 24 Hrs  T(C): 36.4 (04 Aug 2023 05:08), Max: 36.8 (03 Aug 2023 20:48)  T(F): 97.6 (04 Aug 2023 05:08), Max: 98.3 (03 Aug 2023 20:48)  HR: 71 (04 Aug 2023 05:08) (71 - 77)  BP: 106/78 (04 Aug 2023 05:08) (106/78 - 133/78)  BP(mean): --  RR: 17 (04 Aug 2023 05:08) (17 - 18)  SpO2: 96% (04 Aug 2023 05:08) (93% - 96%)    Parameters below as of 04 Aug 2023 05:08  Patient On (Oxygen Delivery Method): room air      PHYSICAL EXAM:  General: Lethargic, NAD; speaking in full sentences  HEENT: NC/AT; PERRL; EOMI; MMM  Neck: supple; no JVD  Cardiac: RRR; +S1/S2, systolic murmur consistent with aortic valve stenosis and replacement  Pulm: CTA B/L; no W/R/R  GI: soft, NT/ND, +BS  Extremities: WWP; no edema, clubbing or cyanosis  Vasc: 2+ radial, DP pulses B/L  Neuro: AAOx3; no focal deficits  MSK: Patch lying over sacrum    MEDICATIONS:  MEDICATIONS  (STANDING):  aspirin  chewable 81 milliGRAM(s) Oral daily  atorvastatin 40 milliGRAM(s) Oral at bedtime  brimonidine 0.2% Ophthalmic Solution 1 Drop(s) Both EYES two times a day  cefTRIAXone   IVPB 1000 milliGRAM(s) IV Intermittent every 24 hours  cholecalciferol 1000 Unit(s) Oral daily  enoxaparin Injectable 40 milliGRAM(s) SubCutaneous every 24 hours  gabapentin 300 milliGRAM(s) Oral two times a day  latanoprost 0.005% Ophthalmic Solution 1 Drop(s) Both EYES at bedtime  melatonin 3 milliGRAM(s) Oral at bedtime  spironolactone 25 milliGRAM(s) Oral daily  zinc sulfate 220 milliGRAM(s) Oral daily    MEDICATIONS  (PRN):  acetaminophen     Tablet .. 650 milliGRAM(s) Oral every 6 hours PRN Mild Pain (1 - 3)      ALLERGIES:  Allergies    penicillin (Unknown)  [This allergen will not trigger allergy alert] Acetic Nu-Vleisewuxl-Vrjhlslnn (Other)  erythromycin (Unknown)    Intolerances        LABS:                        10.5   6.98  )-----------( 228      ( 03 Aug 2023 10:00 )             33.0     08-03    128<L>  |  92<L>  |  10  ----------------------------<  123<H>  4.2   |  25  |  0.67    Ca    8.9      03 Aug 2023 20:22  Phos  2.5     08-03  Mg     1.7     08-03    TPro  6.5  /  Alb  3.9  /  TBili  0.4  /  DBili  x   /  AST  26  /  ALT  11  /  AlkPhos  79  08-02        RADIOLOGY & ADDITIONAL TESTS: Reviewed. OVERNIGHT EVENTS: None    SUBJECTIVE: Patient was seen and examined at bedside with her HHA. She was seen by nephrology yesterday and they recommended to continue with their plan (desmopressin 1 mcg PO qhs, 3% hypertonic saline at 30 mL/hr). The patient reports feeling lethargic today as well as itchy above her buttocks. The HHA states that her previous sacral pressure ulcer had dried up and healed, but yesterday a patch was placed on it.     Patient denies any headache, nausea, vomiting, chest pain, shortness of breath, or dizziness today.    VITAL SIGNS:  Vital Signs Last 24 Hrs  T(C): 36.4 (04 Aug 2023 05:08), Max: 36.8 (03 Aug 2023 20:48)  T(F): 97.6 (04 Aug 2023 05:08), Max: 98.3 (03 Aug 2023 20:48)  HR: 71 (04 Aug 2023 05:08) (71 - 77)  BP: 106/78 (04 Aug 2023 05:08) (106/78 - 133/78)  BP(mean): --  RR: 17 (04 Aug 2023 05:08) (17 - 18)  SpO2: 96% (04 Aug 2023 05:08) (93% - 96%)    Parameters below as of 04 Aug 2023 05:08  Patient On (Oxygen Delivery Method): room air      PHYSICAL EXAM:  General: Lethargic, NAD; speaking in full sentences  HEENT: NC/AT; PERRL; EOMI; MMM  Neck: supple; no JVD  Cardiac: RRR; +S1/S2, systolic murmur consistent with aortic valve stenosis and replacement  Pulm: CTA B/L; no W/R/R  GI: soft, NT/ND, +BS  Extremities: WWP; no edema, clubbing or cyanosis  Vasc: 2+ radial, DP pulses B/L  Neuro: AAOx3; no focal deficits  MSK: Patch lying over sacrum    MEDICATIONS:  MEDICATIONS  (STANDING):  aspirin  chewable 81 milliGRAM(s) Oral daily  atorvastatin 40 milliGRAM(s) Oral at bedtime  brimonidine 0.2% Ophthalmic Solution 1 Drop(s) Both EYES two times a day  cefTRIAXone   IVPB 1000 milliGRAM(s) IV Intermittent every 24 hours  cholecalciferol 1000 Unit(s) Oral daily  enoxaparin Injectable 40 milliGRAM(s) SubCutaneous every 24 hours  gabapentin 300 milliGRAM(s) Oral two times a day  latanoprost 0.005% Ophthalmic Solution 1 Drop(s) Both EYES at bedtime  melatonin 3 milliGRAM(s) Oral at bedtime  spironolactone 25 milliGRAM(s) Oral daily  zinc sulfate 220 milliGRAM(s) Oral daily    MEDICATIONS  (PRN):  acetaminophen     Tablet .. 650 milliGRAM(s) Oral every 6 hours PRN Mild Pain (1 - 3)      ALLERGIES:  Allergies    penicillin (Unknown)  [This allergen will not trigger allergy alert] Acetic Kd-Djcwauqfxx-Hbaxfnycw (Other)  erythromycin (Unknown)    Intolerances        LABS:                        10.5   6.98  )-----------( 228      ( 03 Aug 2023 10:00 )             33.0     08-03    128<L>  |  92<L>  |  10  ----------------------------<  123<H>  4.2   |  25  |  0.67    Ca    8.9      03 Aug 2023 20:22  Phos  2.5     08-03  Mg     1.7     08-03    TPro  6.5  /  Alb  3.9  /  TBili  0.4  /  DBili  x   /  AST  26  /  ALT  11  /  AlkPhos  79  08-02        RADIOLOGY & ADDITIONAL TESTS: Reviewed.

## 2023-08-04 NOTE — PROGRESS NOTE ADULT - PROBLEM SELECTOR PLAN 7
Patients daughter will bring MOLST in on Monday, and would like to have GOC discussion on Monday  - will continue to follow clinical course and help patient with decision making

## 2023-08-04 NOTE — CONSULT NOTE ADULT - PROBLEM SELECTOR RECOMMENDATION 4
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 in 2021

## 2023-08-04 NOTE — CONSULT NOTE ADULT - ATTENDING COMMENTS
89yo F with PMH of Chronic Diastolic HF, AS s/p TAVR, HTN, and Cervical Fracture (now wheelchair-bound) p/w fall and found to have symptomatic hyponatremia. Palliative consulted for complex medical decision making in the setting of advanced illness ue to STAR diagnosis. Patient without significant complaints, no acute symptom management needs. Broached advance directives and began discussion with patient's daughter (designated HCP). She will be present on Monday and can provide the documentation the patient already has. May complete MOLST after further exploration. Patient does not have a hospice-qualifying diagnosis. Emotional support provided and questions answered.    Will continue to follow for ongoing GOC discussion / titration of palliative regimen. Emotional support provided, questions answered.  Active Psychosocial Referrals:  [x]Social Work/Case management [x]PT/OT []Chaplaincy []Hospice  []Patient/Family Support []Holistic RN []Massage Therapy [x]Music Therapy []Ethics  Coping: [x] well [] with difficulty [] poor coping [] unable to assess  Support system: [x] strong [] adequate [] inadequate    For new or uncontrolled symptoms, please call Palliative Care at [phone]). The service is available 24/7 (including nights & weekends) to provide symptom management recommendations over the phone as appropriate

## 2023-08-04 NOTE — PROGRESS NOTE ADULT - PROBLEM SELECTOR PLAN 9
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  Plan:  - cont to monitor

## 2023-08-04 NOTE — CONSULT NOTE ADULT - SUBJECTIVE AND OBJECTIVE BOX
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 Patient was seen at bedside, called daughter on the phone at bedside. Patient lives alone in [facility] building with 24 hour HHA. Patient is wheelchair bound. Patient states she feels well today. Her only complaint is her buttock wound is itchy and she has dry mouth. Patient states it is taking her longer to recall things and she memory difficulties, especially since shes been in the hospital.  Patients aid at bedside states patients mental status is at her baseline . She states she was feeling a little anxious this morning when a team of doctors came to see her, but she is no longer feeling anxious. She denies any headaches, dizziness, fatigue, sob, abdominal pain, constipation, changes in appetite, pain. Patient states her daughter is her HCP Massiel Duval [phone]. Daughter states patient has had multiple hospitalizations recently, and has filled MOLST form at previous visits. Patient and daughter states that the patient do not invasive interventions long term, but still would like certain interventions that would prolong life. Daughter states she would like to discuss code status further on Monday, as her mother is seems more disoriented than normal. Daughter states she will bring previous MOLST on Monday.        (02 Aug 2023 16:27)    PERTINENT PM/SXH:   Essential hypertension    Hyperlipidemia, unspecified hyperlipidemia type    Diabetes insipidus    H/O aortic valve stenosis    Hypertension    Vertigo    Chronic diastolic congestive heart failure    Dyslipidemia    History of pseudoaneurysm    Glaucoma      History of orthopedic surgery    S/P AVR    H/O lumbosacral spine surgery    S/P hip replacement, right    S/P hip replacement  S/P right coronary artery (RCA) stent placement      FAMILY HISTORY:  FH: lung cancer (Father)      ITEMS NOT CHECKED ARE NOT PRESENT    SOCIAL HISTORY:  Living Situation: [X]Home  []Long term care  []Rehab []Other    DECISION MAKER(s):  [x] Health Care Proxy(s)  [] Surrogate(s)  [] Guardian           Name(s)/Phone Number(s): Massiel Duval [phone]    BASELINE (I)ADLs (prior to admission):  [place]: []Total  [] Moderate [X]Dependent    ALLERGIES:  penicillin (Unknown)  [This allergen will not trigger allergy alert] Acetic Pn-Fhvgdnpokn-Scadfnhib (Other)  erythromycin (Unknown)    MEDICATIONS  (STANDING):  aspirin  chewable 81 milliGRAM(s) Oral daily  atorvastatin 40 milliGRAM(s) Oral at bedtime  brimonidine 0.2% Ophthalmic Solution 1 Drop(s) Both EYES two times a day  cefTRIAXone   IVPB 1000 milliGRAM(s) IV Intermittent every 24 hours  cholecalciferol 1000 Unit(s) Oral daily  dextrose 5%. 250 milliLiter(s) (62 mL/Hr) IV Continuous <Continuous>  enoxaparin Injectable 40 milliGRAM(s) SubCutaneous every 24 hours  gabapentin 300 milliGRAM(s) Oral two times a day  latanoprost 0.005% Ophthalmic Solution 1 Drop(s) Both EYES at bedtime  melatonin 3 milliGRAM(s) Oral at bedtime  spironolactone 25 milliGRAM(s) Oral daily  zinc sulfate 220 milliGRAM(s) Oral daily    MEDICATIONS  (PRN):  acetaminophen     Tablet .. 650 milliGRAM(s) Oral every 6 hours PRN Mild Pain (1 - 3)    PRESENT SYMPTOMS: []Unable to obtain due to poor mentation/encephalopathy  Source if other than patient:  []Family   []Team     Pain: [ ] yes [x] no  QOL impact -   Location -                    Aggravating Factors -  Quality -  Radiation -  Timing -  Severity (0-10 scale) -   Minimal Acceptable Level (0-10 scale) -    PAIN AD Score:  http://geriatrictoolkit.[employer].edu/cog/painad.pdf (press ctrl +  left click to view)    Dyspnea:                           []Mild  []Moderate []Severe  Anxiety:                             []Mild []Moderate []Severe  Fatigue:                             []Mild []Moderate []Severe  Nausea:                             []Mild []Moderate []Severe  Loss of Appetite:              []Mild []Moderate []Severe  Constipation:                    []Mild []Moderate []Severe    Other Symptoms:  [x]All other review of systems negative     Palliative Performance Status Version 2:  %    http://npcrc.org/files/news/palliative_performance_scale_ppsv2.pdf    Vital Signs Last 24 Hrs  T(C): 36.4 (04 Aug 2023 05:08), Max: 36.8 (03 Aug 2023 20:48)  T(F): 97.6 (04 Aug 2023 05:08), Max: 98.3 (03 Aug 2023 20:48)  HR: 71 (04 Aug 2023 05:08) (71 - 75)  BP: 106/78 (04 Aug 2023 05:08) (106/78 - 133/78)  BP(mean): --  RR: 17 (04 Aug 2023 05:08) (17 - 18)  SpO2: 96% (04 Aug 2023 05:08) (93% - 96%)    Parameters below as of 04 Aug 2023 05:08  Patient On (Oxygen Delivery Method): room air         LABS:                        10.7   9.14  )-----------( 337      ( 04 Aug 2023 05:30 )             31.6   08-04    131<L>  |  91<L>  |  10  ----------------------------<  85  3.9   |  27  |  0.75    Ca    8.9      04 Aug 2023 05:30  Phos  2.3     08-04  Mg     1.7     08-04    TPro  6.2  /  Alb  3.6  /  TBili  0.5  /  DBili  x   /  AST  27  /  ALT  10  /  AlkPhos  73  08-04    Urinalysis Basic - ( 04 Aug 2023 05:30 )    Color: x / Appearance: x / SG: x / pH: x  Gluc: 85 mg/dL / Ketone: x  / Bili: x / Urobili: x   Blood: x / Protein: x / Nitrite: x   Leuk Esterase: x / RBC: x / WBC x   Sq Epi: x / Non Sq Epi: x / Bacteria: x        RADIOLOGY & ADDITIONAL STUDIES:   [facility] and Palliative [facility]  Contact Info: Call [phone] (including Nights/Weekend)    HPI:  89 y/o F with PMH HTN, HLD, chronic diastolic CHF, 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 Patient was seen at bedside, called daughter on the phone at bedside. Patient lives alone in [facility] building with 24 hour HHA. Patient is wheelchair bound. Patient states she feels well today. Her only complaint is her buttock wound is itchy and she has dry mouth. Patient states it is taking her longer to recall things and she memory difficulties, especially since shes been in the hospital.  Patients aid at bedside states patients mental status is at her baseline . She states she was feeling a little anxious this morning when a team of doctors came to see her, but she is no longer feeling anxious. She denies any headaches, dizziness, fatigue, sob, abdominal pain, constipation, changes in appetite, pain. Patient states her daughter is her HCP Massiel Duval [phone]. Daughter states patient has had multiple hospitalizations recently, and has filled MOLST form at previous visits. Patient and daughter states that the patient do not invasive interventions long term, but still would like certain interventions that would prolong life. Daughter states she would like to discuss code status further on Monday, as her mother is seems more disoriented than normal. Daughter states she will bring previous MOLST on Monday.     PERTINENT PM/SXH:   Essential hypertension  Hyperlipidemia, unspecified hyperlipidemia type  Diabetes insipidus  H/O aortic valve stenosis  Hypertension  Vertigo  Chronic diastolic congestive heart failure  Dyslipidemia  History of pseudoaneurysm  Glaucoma  History of orthopedic surgery  S/P AVR  H/O lumbosacral spine surgery  S/P hip replacement, right  S/P hip replacement  S/P right coronary artery (RCA) stent placement    FAMILY HISTORY:  FH: lung cancer (Father)    ITEMS NOT CHECKED ARE NOT PRESENT    SOCIAL HISTORY:  Living Situation: [X]Home  []Long term care  []Rehab []Other    DECISION MAKER(s):  [x] Health Care Proxy(s)  [] Surrogate(s)  [] Guardian           Name(s)/Phone Number(s): Massiel Duval [phone]    BASELINE (I)ADLs (prior to admission):  [place]: []Total  [] Moderate [X]Dependent    ALLERGIES:  penicillin (Unknown)  [This allergen will not trigger allergy alert] Acetic Ne-Unhundkljl-Ipvccycdl (Other)  erythromycin (Unknown)    MEDICATIONS  (STANDING):  aspirin  chewable 81 milliGRAM(s) Oral daily  atorvastatin 40 milliGRAM(s) Oral at bedtime  brimonidine 0.2% Ophthalmic Solution 1 Drop(s) Both EYES two times a day  cefTRIAXone   IVPB 1000 milliGRAM(s) IV Intermittent every 24 hours  cholecalciferol 1000 Unit(s) Oral daily  dextrose 5%. 250 milliLiter(s) (62 mL/Hr) IV Continuous <Continuous>  enoxaparin Injectable 40 milliGRAM(s) SubCutaneous every 24 hours  gabapentin 300 milliGRAM(s) Oral two times a day  latanoprost 0.005% Ophthalmic Solution 1 Drop(s) Both EYES at bedtime  melatonin 3 milliGRAM(s) Oral at bedtime  spironolactone 25 milliGRAM(s) Oral daily  zinc sulfate 220 milliGRAM(s) Oral daily    MEDICATIONS  (PRN):  acetaminophen     Tablet .. 650 milliGRAM(s) Oral every 6 hours PRN Mild Pain (1 - 3)    PRESENT SYMPTOMS: []Unable to obtain due to poor mentation/encephalopathy  Source if other than patient:  []Family   []Team     Pain: [ ] yes [x] no  QOL impact -   Location -                    Aggravating Factors -  Quality -  Radiation -  Timing -  Severity (0-10 scale) -   Minimal Acceptable Level (0-10 scale) -    Dyspnea:                           []Mild  []Moderate []Severe  Anxiety:                             []Mild []Moderate []Severe  Fatigue:                             []Mild []Moderate []Severe  Nausea:                             []Mild []Moderate []Severe  Loss of Appetite:              []Mild []Moderate []Severe  Constipation:                    []Mild []Moderate []Severe    Other Symptoms:  [x]All other review of systems negative     Palliative Performance Status Version 2:  40%    http://npcrc.org/files/news/palliative_performance_scale_ppsv2.pdf    GENERAL:  [x] NAD [x]Alert []Lethargic  []Cachexia  []Unarousable  [x]Verbal  []Non-Verbal  BEHAVIORAL:   []Anxiety  []Delirium []Agitation [x]Cooperative [x]Oriented x2  HEENT:  [x]Normal  [x] Moist Mucous Membranes []Dry mouth   []ET Tube/Trach  []Oral lesions  PULMONARY:   [x]Clear []Tachypnea  []Audible excessive secretions  [x]Normal Work of Breathing []Labored Breathing  []Rhonchi []Crackles []Wheezing  CARDIOVASCULAR:    [x]Regular Rate [x]Regular Rhythm []Irregular []Tachy  []Jonas  GASTROINTESTINAL:  [x]Soft  []Distended   [x]+BS  [x]Non tender []Tender  []PEG []OGT/ NGT  Last BM:  GENITOURINARY:  [x]Normal [] Incontinent   []Oliguria/Anuria   []Vega  MUSCULOSKELETAL:   []Normal Extremities  [x]Weakness  [x]Bed/Wheelchair bound []Edema  NEUROLOGIC:   []No focal deficits  [x]Cognitive impairment  []Dysphagia []Dysarthria []Paresis []Encephalopathic  SKIN:   []Normal   [x]Pressure ulcer(s)  []Rash    Vital Signs Last 24 Hrs  T(C): 36.4 (04 Aug 2023 05:08), Max: 36.8 (03 Aug 2023 20:48)  T(F): 97.6 (04 Aug 2023 05:08), Max: 98.3 (03 Aug 2023 20:48)  HR: 71 (04 Aug 2023 05:08) (71 - 75)  BP: 106/78 (04 Aug 2023 05:08) (106/78 - 133/78)  BP(mean): --  RR: 17 (04 Aug 2023 05:08) (17 - 18)  SpO2: 96% (04 Aug 2023 05:08) (93% - 96%)    Parameters below as of 04 Aug 2023 05:08  Patient On (Oxygen Delivery Method): room air    LABS:                        10.7   9.14  )-----------( 337      ( 04 Aug 2023 05:30 )             31.6   08-04    131<L>  |  91<L>  |  10  ----------------------------<  85  3.9   |  27  |  0.75    Ca    8.9      04 Aug 2023 05:30  Phos  2.3     08-04  Mg     1.7     08-04    TPro  6.2  /  Alb  3.6  /  TBili  0.5  /  DBili  x   /  AST  27  /  ALT  10  /  AlkPhos  73  08-04    Urinalysis Basic - ( 04 Aug 2023 05:30 )  Color: x / Appearance: x / SG: x / pH: x  Gluc: 85 mg/dL / Ketone: x  / Bili: x / Urobili: x   Blood: x / Protein: x / Nitrite: x   Leuk Esterase: x / RBC: x / WBC x   Sq Epi: x / Non Sq Epi: x / Bacteria: x    RADIOLOGY & ADDITIONAL STUDIES:  < from: Xray Chest 1 View-PORTABLE IMMEDIATE (Xray Chest 1 View-PORTABLE IMMEDIATE .) (08.02.23 @ 15:05) >  No cardiomegaly. Status post TAVR. Dense mitral valve annular   calcification. The lungs are clear. No pneumothorax or pleural effusion.   Diffuse decrease in bony mineralization. Old left rib fractures. Thoracic   dextroscoliosis.    DISCUSSION OF CARE: Daughter - to provide updates and emotional support

## 2023-08-04 NOTE — PROGRESS NOTE ADULT - PROBLEM SELECTOR PLAN 3
Pt presenting w/ 2d sx of discomfort on urination. UA + nitrites, LE, blood, WCC and bacteria. No suprapubic or CV tenderness on exam.  Cultures growing Proteus mirabilis  Plan:  - ceftriaxone 1g QD for 3 days  - f/u sensitivities

## 2023-08-04 NOTE — CONSULT NOTE ADULT - PROBLEM SELECTOR RECOMMENDATION 5
Hx of CHF. Follows w/ Dr Fernandez. Admission in 12/22 for acute on chronic exacerbation. ECHO 12/7: with EF>75% and moderate AS/MR with unchanged gradients across AV and MV    - currently on aldactone 25mg daily   - holding Torsemide 20mg po Mon,Wed,Fri ISO hyponatremia   - holding Metolazone 5mg Mon,Wed, Fri ISO hyponatremia  care as per primary team

## 2023-08-05 LAB
ANION GAP SERPL CALC-SCNC: 10 MMOL/L — SIGNIFICANT CHANGE UP (ref 5–17)
ANION GAP SERPL CALC-SCNC: 8 MMOL/L — SIGNIFICANT CHANGE UP (ref 5–17)
ANION GAP SERPL CALC-SCNC: 9 MMOL/L — SIGNIFICANT CHANGE UP (ref 5–17)
BASOPHILS # BLD AUTO: 0.04 K/UL — SIGNIFICANT CHANGE UP (ref 0–0.2)
BASOPHILS NFR BLD AUTO: 0.4 % — SIGNIFICANT CHANGE UP (ref 0–2)
BUN SERPL-MCNC: 10 MG/DL — SIGNIFICANT CHANGE UP (ref 7–23)
BUN SERPL-MCNC: 12 MG/DL — SIGNIFICANT CHANGE UP (ref 7–23)
BUN SERPL-MCNC: 14 MG/DL — SIGNIFICANT CHANGE UP (ref 7–23)
CALCIUM SERPL-MCNC: 8.8 MG/DL — SIGNIFICANT CHANGE UP (ref 8.4–10.5)
CALCIUM SERPL-MCNC: 8.9 MG/DL — SIGNIFICANT CHANGE UP (ref 8.4–10.5)
CALCIUM SERPL-MCNC: 9.1 MG/DL — SIGNIFICANT CHANGE UP (ref 8.4–10.5)
CHLORIDE SERPL-SCNC: 91 MMOL/L — LOW (ref 96–108)
CHLORIDE SERPL-SCNC: 92 MMOL/L — LOW (ref 96–108)
CHLORIDE SERPL-SCNC: 93 MMOL/L — LOW (ref 96–108)
CO2 SERPL-SCNC: 26 MMOL/L — SIGNIFICANT CHANGE UP (ref 22–31)
CO2 SERPL-SCNC: 27 MMOL/L — SIGNIFICANT CHANGE UP (ref 22–31)
CO2 SERPL-SCNC: 28 MMOL/L — SIGNIFICANT CHANGE UP (ref 22–31)
CREAT SERPL-MCNC: 0.85 MG/DL — SIGNIFICANT CHANGE UP (ref 0.5–1.3)
CREAT SERPL-MCNC: 0.9 MG/DL — SIGNIFICANT CHANGE UP (ref 0.5–1.3)
CREAT SERPL-MCNC: 0.91 MG/DL — SIGNIFICANT CHANGE UP (ref 0.5–1.3)
EGFR: 58 ML/MIN/1.73M2 — LOW
EGFR: 59 ML/MIN/1.73M2 — LOW
EGFR: 63 ML/MIN/1.73M2 — SIGNIFICANT CHANGE UP
EOSINOPHIL # BLD AUTO: 0.36 K/UL — SIGNIFICANT CHANGE UP (ref 0–0.5)
EOSINOPHIL NFR BLD AUTO: 3.8 % — SIGNIFICANT CHANGE UP (ref 0–6)
GLUCOSE SERPL-MCNC: 112 MG/DL — HIGH (ref 70–99)
GLUCOSE SERPL-MCNC: 135 MG/DL — HIGH (ref 70–99)
GLUCOSE SERPL-MCNC: 96 MG/DL — SIGNIFICANT CHANGE UP (ref 70–99)
HCT VFR BLD CALC: 32.8 % — LOW (ref 34.5–45)
HGB BLD-MCNC: 11 G/DL — LOW (ref 11.5–15.5)
IMM GRANULOCYTES NFR BLD AUTO: 0.7 % — SIGNIFICANT CHANGE UP (ref 0–0.9)
LYMPHOCYTES # BLD AUTO: 1.1 K/UL — SIGNIFICANT CHANGE UP (ref 1–3.3)
LYMPHOCYTES # BLD AUTO: 11.5 % — LOW (ref 13–44)
MAGNESIUM SERPL-MCNC: 1.8 MG/DL — SIGNIFICANT CHANGE UP (ref 1.6–2.6)
MCHC RBC-ENTMCNC: 30.5 PG — SIGNIFICANT CHANGE UP (ref 27–34)
MCHC RBC-ENTMCNC: 33.5 GM/DL — SIGNIFICANT CHANGE UP (ref 32–36)
MCV RBC AUTO: 90.9 FL — SIGNIFICANT CHANGE UP (ref 80–100)
MONOCYTES # BLD AUTO: 1.24 K/UL — HIGH (ref 0–0.9)
MONOCYTES NFR BLD AUTO: 13 % — SIGNIFICANT CHANGE UP (ref 2–14)
NEUTROPHILS # BLD AUTO: 6.76 K/UL — SIGNIFICANT CHANGE UP (ref 1.8–7.4)
NEUTROPHILS NFR BLD AUTO: 70.6 % — SIGNIFICANT CHANGE UP (ref 43–77)
NRBC # BLD: 0 /100 WBCS — SIGNIFICANT CHANGE UP (ref 0–0)
OSMOLALITY UR: 106 MOSM/KG — LOW (ref 300–900)
OSMOLALITY UR: 113 MOSM/KG — LOW (ref 300–900)
OSMOLALITY UR: 126 MOSM/KG — LOW (ref 300–900)
PHOSPHATE SERPL-MCNC: 2.7 MG/DL — SIGNIFICANT CHANGE UP (ref 2.5–4.5)
PLATELET # BLD AUTO: 294 K/UL — SIGNIFICANT CHANGE UP (ref 150–400)
POTASSIUM SERPL-MCNC: 4 MMOL/L — SIGNIFICANT CHANGE UP (ref 3.5–5.3)
POTASSIUM SERPL-MCNC: 4.3 MMOL/L — SIGNIFICANT CHANGE UP (ref 3.5–5.3)
POTASSIUM SERPL-MCNC: 4.5 MMOL/L — SIGNIFICANT CHANGE UP (ref 3.5–5.3)
POTASSIUM SERPL-SCNC: 4 MMOL/L — SIGNIFICANT CHANGE UP (ref 3.5–5.3)
POTASSIUM SERPL-SCNC: 4.3 MMOL/L — SIGNIFICANT CHANGE UP (ref 3.5–5.3)
POTASSIUM SERPL-SCNC: 4.5 MMOL/L — SIGNIFICANT CHANGE UP (ref 3.5–5.3)
RBC # BLD: 3.61 M/UL — LOW (ref 3.8–5.2)
RBC # FLD: 15.3 % — HIGH (ref 10.3–14.5)
SODIUM SERPL-SCNC: 127 MMOL/L — LOW (ref 135–145)
SODIUM SERPL-SCNC: 128 MMOL/L — LOW (ref 135–145)
SODIUM SERPL-SCNC: 129 MMOL/L — LOW (ref 135–145)
SODIUM UR-SCNC: 23 MMOL/L — SIGNIFICANT CHANGE UP
SODIUM UR-SCNC: 25 MMOL/L — SIGNIFICANT CHANGE UP
SODIUM UR-SCNC: 29 MMOL/L — SIGNIFICANT CHANGE UP
WBC # BLD: 9.57 K/UL — SIGNIFICANT CHANGE UP (ref 3.8–10.5)
WBC # FLD AUTO: 9.57 K/UL — SIGNIFICANT CHANGE UP (ref 3.8–10.5)

## 2023-08-05 PROCEDURE — 99233 SBSQ HOSP IP/OBS HIGH 50: CPT

## 2023-08-05 RX ORDER — SODIUM CHLORIDE 9 MG/ML
240 INJECTION INTRAMUSCULAR; INTRAVENOUS; SUBCUTANEOUS
Refills: 0 | Status: DISCONTINUED | OUTPATIENT
Start: 2023-08-05 | End: 2023-08-06

## 2023-08-05 RX ADMIN — LATANOPROST 1 DROP(S): 0.05 SOLUTION/ DROPS OPHTHALMIC; TOPICAL at 22:27

## 2023-08-05 RX ADMIN — Medication 650 MILLIGRAM(S): at 21:30

## 2023-08-05 RX ADMIN — BRIMONIDINE TARTRATE 1 DROP(S): 2 SOLUTION/ DROPS OPHTHALMIC at 06:24

## 2023-08-05 RX ADMIN — ENOXAPARIN SODIUM 40 MILLIGRAM(S): 100 INJECTION SUBCUTANEOUS at 18:12

## 2023-08-05 RX ADMIN — Medication 650 MILLIGRAM(S): at 22:30

## 2023-08-05 RX ADMIN — GABAPENTIN 300 MILLIGRAM(S): 400 CAPSULE ORAL at 06:23

## 2023-08-05 RX ADMIN — BRIMONIDINE TARTRATE 1 DROP(S): 2 SOLUTION/ DROPS OPHTHALMIC at 17:18

## 2023-08-05 RX ADMIN — ATORVASTATIN CALCIUM 40 MILLIGRAM(S): 80 TABLET, FILM COATED ORAL at 21:25

## 2023-08-05 RX ADMIN — ZINC SULFATE TAB 220 MG (50 MG ZINC EQUIVALENT) 220 MILLIGRAM(S): 220 (50 ZN) TAB at 11:30

## 2023-08-05 RX ADMIN — SPIRONOLACTONE 25 MILLIGRAM(S): 25 TABLET, FILM COATED ORAL at 06:23

## 2023-08-05 RX ADMIN — Medication 1000 UNIT(S): at 11:30

## 2023-08-05 RX ADMIN — Medication 3 MILLIGRAM(S): at 21:25

## 2023-08-05 RX ADMIN — GABAPENTIN 300 MILLIGRAM(S): 400 CAPSULE ORAL at 17:18

## 2023-08-05 RX ADMIN — SODIUM CHLORIDE 60 MILLILITER(S): 9 INJECTION INTRAMUSCULAR; INTRAVENOUS; SUBCUTANEOUS at 18:13

## 2023-08-05 RX ADMIN — Medication 81 MILLIGRAM(S): at 11:30

## 2023-08-05 NOTE — PROGRESS NOTE ADULT - PROBLEM SELECTOR PLAN 1
Na 120. Calculated serum Osm 251.   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 Calculated serum Osm 251.   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

## 2023-08-05 NOTE — PROGRESS NOTE ADULT - SUBJECTIVE AND OBJECTIVE BOX
CC: HYPONATREMIA        INTERVAL HISTORY: resting in NAD      ROS: No chest pain, no sob, no abd pain. No n/v/d    PAST MEDICAL & SURGICAL HISTORY:  Essential hypertension    Hyperlipidemia, unspecified hyperlipidemia type    Diabetes insipidus    H/O aortic valve stenosis    Hypertension    Vertigo    Chronic diastolic congestive heart failure    Dyslipidemia    History of pseudoaneurysm    Glaucoma    History of orthopedic surgery  multiple    S/P AVR  Bioprosthetic    H/O lumbosacral spine surgery    S/P hip replacement, right    S/P hip replacement  B/L    S/P right coronary artery (RCA) stent placement        PHYSICAL EXAM:  T(C): 36.6 (08-05-23 @ 05:47), Max: 36.8 (08-04-23 @ 12:51)  HR: 76 (08-05-23 @ 05:47)  BP: 111/67 (08-05-23 @ 05:47) (111/67 - 125/77)  RR: 18 (08-05-23 @ 05:47)  SpO2: 96% (08-05-23 @ 05:47)  Wt(kg): --  I&O's Summary    04 Aug 2023 07:01  -  05 Aug 2023 07:00  --------------------------------------------------------  IN: 0 mL / OUT: 2075 mL / NET: -2075 mL      Weight 77.1 (08-02 @ 11:20)  General: ,  NAD.  HEENT: moist mucous membranes, no pallor/cyanosis.  Neck: no JVD visible.  Cardiac: S1, S2. RRR. No murmurs   Respratory: CTA b/l, no access muscle use.   Abdomen: soft. nontender. nondistended  Skin: no rashes.  Extremities: no LE edema b/l  Access:       DATA:                        11.0<L>  9.57  )-----------( 294      ( 05 Aug 2023 07:18 )             32.8<L>        128<L>  |  93<L>  |  10     ----------------------------<  96     Ca:9.1   (05 Aug 2023 07:18)  4.0     |  26     |  0.85         TPro  6.2    /  Alb  3.6    /  TBili  0.5    /  DBili  x      /  AST  27     /  ALT  10     /  AlkPhos  73     04 Aug 2023 05:30    Osmolality, Serum: 261 mosm/kg *L* (08-04 @ 22:54)  Osmolality, Serum: 264 mosm/kg *L* (08-04 @ 15:37)  Osmolality, Serum: 257 mosm/kg *L* (08-03 @ 20:22)  Osmolality, Serum: 262 mosm/kg *L* (08-03 @ 15:32)  Osmolality, Serum: 256 mosm/kg *L* (08-03 @ 10:00)      Urinalysis Basic - ( 05 Aug 2023 07:18 )  Color: x / Appearance: x / SG: x / pH: x  Gluc: 96 mg/dL / Ketone: x  / Bili: x / Urobili: x   Blood: x / Protein: x / Nitrite: x   Leuk Esterase: x / RBC: x / WBC x   Sq Epi: x / Non Sq Epi: x / Bacteria: x      Sodium, Random Urine: 25 mmol/L (08-05 @ 06:46)  Osmolality, Random Urine: 113 mosm/kg (08-05 @ 06:46)  Osmolality, Random Urine: 126 mosm/kg (08-05 @ 00:37)  Sodium, Random Urine: 29 mmol/L (08-05 @ 00:37)  Sodium, Random Urine: 85 mmol/L (08-04 @ 16:55)  Osmolality, Random Urine: 292 mosm/kg (08-04 @ 16:55)  Osmolality, Random Urine: 396 mosm/kg (08-04 @ 04:43)  Sodium, Random Urine: 130 mmol/L (08-04 @ 04:43)  Osmolality, Random Urine: 420 mosm/kg (08-03 @ 19:54)  Sodium, Random Urine: 138 mmol/L (08-03 @ 19:54)            MEDICATIONS  (STANDING):  aspirin  chewable 81 milliGRAM(s) Oral daily  atorvastatin 40 milliGRAM(s) Oral at bedtime  brimonidine 0.2% Ophthalmic Solution 1 Drop(s) Both EYES two times a day  cholecalciferol 1000 Unit(s) Oral daily  enoxaparin Injectable 40 milliGRAM(s) SubCutaneous every 24 hours  gabapentin 300 milliGRAM(s) Oral two times a day  latanoprost 0.005% Ophthalmic Solution 1 Drop(s) Both EYES at bedtime  melatonin 3 milliGRAM(s) Oral at bedtime  spironolactone 25 milliGRAM(s) Oral daily  zinc sulfate 220 milliGRAM(s) Oral daily    MEDICATIONS  (PRN):  acetaminophen     Tablet .. 650 milliGRAM(s) Oral every 6 hours PRN Mild Pain (1 - 3)

## 2023-08-05 NOTE — PROGRESS NOTE ADULT - ASSESSMENT
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

## 2023-08-05 NOTE — PROGRESS NOTE ADULT - ASSESSMENT
{\rtf1\jvthfa98704\ansi\skslhqi1816\ftnbj\uc1\deff0  {\fonttbl{\f0 \fnil Segoe UI;}{\f1 \fnil \fcharset0 Segoe UI;}{\f2 \fnil Times New Jasmeet;}}  {\colortbl ;\mkg227\iomig298\iuuf189 ;\red0\green0\blue0 ;\red0\green0\lpuy829 ;\red0\green0\blue0 ;}  {\stylesheet{\f0\fs20 Normal;}{\cs1 Default Paragraph Font;}{\cs2\f0\fs16 Line Number;}{\cs3\f2\fs24\ul\cf3 Hyperlink;}}  {\*\revtbl{Unknown;}}  \fzpwah88372\rzuarn68866\wllwe1324\adqro9200\ilohk2160\hvsnu5723\icwtryh593\qocgkpc676\nogrowautofit\joorjo155\formshade\nofeaturethrottle1\dntblnsbdb\fet4\aendnotes\aftnnrlc\pgbrdrhead\pgbrdrfoot  \sectd\pbflsr21905\teuzdc92215\guttersxn0\txxgqacr4712\ywbctcan9904\pbuxfmwr1671\hhatdgyp4876\zhqdmhx565\rntfoph428\sbkpage\pgncont\pgndec  \plain\plain\f0\fs24\ql\plain\f0\fs24\plain\f0\fs20\jsrs4447\hich\f0\dbch\f0\loch\f0\fs20 I M\par  \par  89 y o F with PMH HTN, HLD, chronic diastolic CHF, 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 \par   \par  \plain\f1\fs20\ibpj2544\hich\f1\dbch\f1\loch\f1\cf2\fs20\ul{\field{\*\fldinst HYPERLINK [id number] }{\fldrslt Problem/Plan - 1:}}\plain\f0\fs20\oywm3577\hich\f0\dbch\f0\loch\f0\fs20\ql\par  \'b7  {\*\bkmkstart [id number]}{\*\bkmkend ff90774932485}Problem: {\*\bkmkstart qj34918252345}{\*\bkmkend gd69332674075}Hyponatremia. \par  \'b7  {\*\bkmkstart jx03384828344}{\*\bkmkend hx64656616514}Plan: {\*\bkmkstart dd00712082232}{\*\bkmkend gr96183014657}Na 120. Calculated serum Osm 251. 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 + loose stools and poor PO intake \par  nephrology consulted\par  Plan:\par  - s/p hypertonic 3% saline at 30 mg/hr (expect 100ml of 3% to raise Na by 1) \par  - Hold desmopressin and Metolazone\par  - BMP, [place] and UOsm q6h.\plain\f1\fs20\kiao2027\hich\f1\dbch\f1\loch\f1\cf2\fs20\strike\plain\f0\fs20\cnji3025\hich\f0\dbch\f0\loch\f0\fs20\par  \par  \plain\f1\fs20\ezox0893\hich\f1\dbch\f1\loch\f1\cf2\fs20\ul{\field{\*\fldinst HYPERLINK [id number] }{\fldrslt Problem/Plan - 2:}}\plain\f0\fs20\whic6045\hich\f0\dbch\f0\loch\f0\fs20\ql\par  \'b7  {\*\bkmkstart np73662992155}{\*\bkmkend rh23273166725}Problem: {\*\bkmkstart tq13188127532}{\*\bkmkend ps74584554639}Diabetes insipidus. \par  \'b7  {\*\bkmkstart af44235961761}{\*\bkmkend jb57921523217}Plan: {\*\bkmkstart hh90218423057}{\*\bkmkend oe26913614159}On home desmopressin. Na 120 on admission.\par  - nephrology consulted - f/u recs re DDAVP.\par  \par  \plain\f1\fs20\bsfj0075\hich\f1\dbch\f1\loch\f1\cf2\fs20\ul{\field{\*\fldinst HYPERLINK [id number] }{\fldrslt Problem/Plan - 3:}}\plain\f0\fs20\djby9857\hich\f0\dbch\f0\loch\f0\fs20\ql\par  \'b7  {\*\bkmkstart sa24754123228}{\*\bkmkend fg56981802961}Problem: {\*\bkmkstart ut68638485195}{\*\bkmkend xb79901620505}UTI (urinary tract infection), uncomplicated. \par  \'b7  {\*\bkmkstart ma44296577800}{\*\bkmkend gx78497868114}Plan: {\*\bkmkstart gr42499388442}{\*\bkmkend xb42469057334}Pt presenting w/ 2d sx of discomfort on urination. UA + nitrites, LE, blood, WCC and bacteria. No suprapubic or CV tenderness on exam.\par  Cultures growing Proteus mirabilis sensitive to CTX\par  Plan:\par  - ceftriaxone 1g QD for 3 days (completed on 08/04).\par  \par  \plain\f1\fs20\cmoh8294\hich\f1\dbch\f1\loch\f1\cf2\fs20\ul{\field{\*\fldinst HYPERLINK [id number] }{\fldrslt Problem/Plan - 4:}}\plain\f0\fs20\rkmn6554\hich\f0\dbch\f0\loch\f0\fs20\ql\par  \'b7  {\*\bkmkstart oz15457504031}{\*\bkmkend co76960104365}Problem: {\*\bkmkstart mt76788969113}{\*\bkmkend ze14073746608}Anemia. \par  \'b7  {\*\bkmkstart tn57417631094}{\*\bkmkend tr38018275040}Plan: {\*\bkmkstart ck46455233236}{\*\bkmkend hi12124779666}Hgb on admission 11.3, repeat 10.5 at 10am , MCV 88.1. \par  Currently no signs of active bleeding (no hematochezia, melena, hemoptysis, hematuria). \par  Baseline Hb from previous admission in 12/22 10-11\par  Plan: \par  - trend CBC\par  - maintain active T&S\par  - transfuse if Hgb <7.\par  \par  \plain\f1\fs20\zaqv6338\hich\f1\dbch\f1\loch\f1\cf2\fs20\ul{\field{\*\fldinst HYPERLINK [id number] }{\fldrslt Problem/Plan - 5:}}\plain\f0\fs20\mkas7015\hich\f0\dbch\f0\loch\f0\fs20\ql\par  \'b7  {\*\bkmkstart ky75322866593}{\*\bkmkend kx48404370842}Problem: {\*\bkmkstart br41317807617}{\*\bkmkend bz46215349142}Chronic diastolic congestive heart failure. \par  \'b7  {\*\bkmkstart cp43107947755}{\*\bkmkend bn77713056689}Plan: {\*\bkmkstart mv05119678330}{\*\bkmkend tj00598247601}Hx of CHF. Follows w/ Dr Fernandez. Admission in 12/22 for acute on chronic exacerbation. ECHO 12/7: with EF>75% and moderate AS/MR   with unchanged gradients across AV and MV\par  - continue aldactone 25mg daily \par  - hold Torsemide 20mg po Mon,Wed,Fri ISO hyponatremia and \par  - hold Metolazone 5mg Mon,Wed, Fri ISO hyponatremia\par  - cardiology following.\par  \par  \plain\f1\fs20\nync1957\hich\f1\dbch\f1\loch\f1\cf2\fs20\ul{\field{\*\fldinst HYPERLINK [id number] }{\fldrslt Problem/Plan - 6:}}\plain\f0\fs20\fxae6377\hich\f0\dbch\f0\loch\f0\fs20\ql\par  \'b7  {\*\bkmkstart et55114324656}{\*\bkmkend fe62434561837}Problem: {\*\bkmkstart uh30958940415}{\*\bkmkend jg55396712917}CAD (coronary artery disease). \par  \'b7  {\*\bkmkstart ys55632581787}{\*\bkmkend rm19186334976}Plan: {\*\bkmkstart ce32976119763}{\*\bkmkend kp74709484441}Home med: Atorvastatin 40mg and aspirin 81mg qd. \par  - c/w atorvastatin 40mg and aspirin 81mg q.\par  \par  \plain\f1\fs20\wmlk4336\hich\f1\dbch\f1\loch\f1\cf2\fs20\ul{\field{\*\fldinst HYPERLINK [id number] }{\fldrslt Problem/Plan - 7:}}\plain\f0\fs20\bdrg3180\hich\f0\dbch\f0\loch\f0\fs20\ql\par  \'b7  {\*\bkmkstart fe57545656857}{\*\bkmkend rs43656227005}Problem: {\*\bkmkstart yn80934528170}{\*\bkmkend bl91279448149}HLD (hyperlipidemia). \par  \'b7  {\*\bkmkstart bh76895383071}{\*\bkmkend cn17778717621}Plan: {\*\bkmkstart ac73732478479}{\*\bkmkend he87723362682}Hx of HTN. Home meds: atorvastatin 40mg\par  Plan:\par  - c/w atorvastatin 40mg.\par  \par  \plain\f1\fs20\nzsj2015\hich\f1\dbch\f1\loch\f1\cf2\fs20\ul{\field{\*\fldinst HYPERLINK [id number] }{\fldrslt Problem/Plan - 8:}}\plain\f0\fs20\burn9996\hich\f0\dbch\f0\loch\f0\fs20\ql\par  \'b7  {\*\bkmkstart tx71499302324}{\*\bkmkend ai63923504004}Problem: {\*\bkmkstart xn43051281664}{\*\bkmkend tw23847049899}Cervical spine fracture. \par  \'b7  {\*\bkmkstart jb07063357323}{\*\bkmkend ln71362918811}Plan: {\*\bkmkstart lw53595425717}{\*\bkmkend vm46950569279}[facility]  8/31-9/2 after fall resulting in C spine fracture. Imaging from 12/22 admission CT Cervical spine: Nonhealing base of dens fracture.   Compared to 11/10/22 there is mild increase in anterior displacement of the dens and C1 lateral masses relative to the base of dens. MR Cervical spine: Nonhealing C2 fracture at base of dens, mildly distracted as seen on CT. Edema involving the right   greater than left lateral masses likely stress related to altered alignment. No evidence for marrow replacing lesion or other bony edema.Has been wearing C-collar since fracture. 07/23 CT and MR w/ interval displacement of dens fracture \par  Plan:\par  - PT consulted \par  - Careful handling.\par  \par  \plain\f1\fs20\cccd6486\hich\f1\dbch\f1\loch\f1\cf2\fs20\ul{\field{\*\fldinst HYPERLINK [id number] }{\fldrslt Problem/Plan - 9:}}\plain\f0\fs20\vayj8656\hich\f0\dbch\f0\loch\f0\fs20\ql\par  \'b7  {\*\bkmkstart dz28591217802}{\*\bkmkend oq25105974160}Problem: {\*\bkmkstart hr80026794311}{\*\bkmkend zf61933935786}S/P TAVR (transcatheter aortic valve replacement). \par  \'b7  {\*\bkmkstart he84357760173}{\*\bkmkend sp48264336251}Plan: {\*\bkmkstart kw25047498958}{\*\bkmkend nm87274593518}S/p Valve in valve TAVR in 2021\par  Echo today unchanged from prior TTE 12/22: EF> 75%, moderate AS and moderate MR.\par  Plan:\par  - cont to monitor.\par  \par  \plain\f1\fs20\bgtj8275\hich\f1\dbch\f1\loch\f1\cf2\fs20\ul{\field{\*\fldinst HYPERLINK [id number] }{\fldrslt Problem/Plan - 10:}}\plain\f0\fs20\tjno6855\hich\f0\dbch\f0\loch\f0\fs20\ql\par  \'b7  {\*\bkmkstart vn90278811578}{\*\bkmkend zb18352421708}Problem: {\*\bkmkstart sk79529042926}{\*\bkmkend re85970257851}Prophylactic measure. \par  \'b7  {\*\bkmkstart ax99702554349}{\*\bkmkend lv71361757036}Plan; {\*\bkmkstart om91561728899}{\*\bkmkend ur38064612535}Plan:\par  F: none \par  E: replete K<4, Mg<2\par  N: regular\par  VTE Prophylaxis: lovenox 40mg QD\par  GI: none\par  C: Full Code\par  D: RMF.\par  \par  \par  \plain\f1\fs16\mohi8649\hich\f1\dbch\f1\loch\f1\cf2\fs16\par  \plain\f0\fs20\oxmx2192\hich\f0\dbch\f0\loch\f0\fs20\par  }

## 2023-08-05 NOTE — PROGRESS NOTE ADULT - PROBLEM SELECTOR PLAN 8
[facility]  8/31-9/2 after fall resulting in C spine fracture. Imaging from 12/22 admission CT Cervical spine: Nonhealing base of dens fracture. Compared to 11/10/22 there is mild increase in anterior displacement of the dens and C1 lateral masses relative to the base of dens. MR Cervical spine: Nonhealing C2 fracture at base of dens, mildly distracted as seen on CT. Edema involving the right greater than left lateral masses likely stress related to altered alignment. No evidence for marrow replacing lesion or other bony edema.Has been wearing C-collar since fracture. 07/23 CT and MR w/ interval displacement of dens fracture   Plan:  - PT consulted   - Careful handling

## 2023-08-05 NOTE — PROGRESS NOTE ADULT - PROBLEM SELECTOR PLAN 5
Hx of CHF. Follows w/ Dr Fernandez. Admission in 12/22 for acute on chronic exacerbation. ECHO 12/7: with EF>75% and moderate AS/MR with unchanged gradients across AV and MV  - continue aldactone 25mg daily   - hold Torsemide 20mg po Mon,Wed,Fri ISO hyponatremia and   - hold Metolazone 5mg Mon,Wed, Fri ISO hyponatremia  - cardiology following

## 2023-08-05 NOTE — PROGRESS NOTE ADULT - PROBLEM SELECTOR PLAN 3
Pt presenting w/ 2d sx of discomfort on urination. UA + nitrites, LE, blood, WCC and bacteria. No suprapubic or CV tenderness on exam.  Cultures growing Proteus mirabilis sensitive to CTX  Plan:  - ceftriaxone 1g QD for 3 days (completed on 08/04)

## 2023-08-05 NOTE — PROGRESS NOTE ADULT - ASSESSMENT
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 in 2021, hx Diabetes Insipidus with hyponatremia (on DDAVP 1.5mg daily), chronic neuropathy, C spine fracture (C-spine collar when out of her home secondary to poor healing), dementia, depression presented with 1 day history of generalized weakness and a witnessed fall and admitted for hyponatremia

## 2023-08-05 NOTE — PROGRESS NOTE ADULT - PROBLEM SELECTOR PLAN 1
stable hyponatremia -s/p  hypertonic saline and treatment with D5 for overcorrection on 8/4  desmopressin on hold  fluid restriction/ encourage PO intake  daily Uosm

## 2023-08-05 NOTE — PROGRESS NOTE ADULT - SUBJECTIVE AND OBJECTIVE BOX
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 Pt claiming that she is in the hospital due to chronic pain in her back. Aid at bedside endorsed that patient was feeling dizzy and felt like her legs were giving up when ambulating this AM. Pt did not have a fall but her aid lowered her to sit on the ground. Aid also claims that pt saw her OP Dr Camila Mcpherson who asked her  to go to ED on 8/1 for OP Na+ 118 but pt refused. Aid also claiming that pt has been c/o urinary discomfort over last few days which pt corroborated and that she had 2 episodes of non-bloody diarrhea on morning of admission. Pt has no complaints other than mild discomfort on urination. Pt denies HA, dizziness, CP, SOB, changes in BMs.    ED Course:  Vitals: 98.4F, HR 64, /64, RR 16 (98%) on RA  Labs: Hb 11.2, MCV 88.1, Na+ 120, Cl- 81, Mg2+ 1.4  Imaging: CXR dextroscoliosis and TAVR - lung parenchyma clear  EKG: SR w/ 1st degree AVB, LBBB - unchanged from 8/31/22  Consults: nephrology  Interventions: Mg 2g       (02 Aug 2023 16:27)                            11.0   9.57  )-----------( 294      ( 05 Aug 2023 07:18 )             32.8       08-05    128<L>  |  93<L>  |  10  ----------------------------<  96  4.0   |  26  |  0.85    Ca    9.1      05 Aug 2023 07:18  Phos  2.7     08-05  Mg     1.8     08-05    TPro  6.2  /  Alb  3.6  /  TBili  0.5  /  DBili  x   /  AST  27  /  ALT  10  /  AlkPhos  73  08-04    Vital Signs Last 24 Hrs  T(C): 36.7 (05 Aug 2023 13:15), Max: 36.7 (05 Aug 2023 13:15)  T(F): 98.1 (05 Aug 2023 13:15), Max: 98.1 (05 Aug 2023 13:15)  HR: 79 (05 Aug 2023 13:15) (76 - 79)  BP: 118/75 (05 Aug 2023 13:15) (111/67 - 125/77)  BP(mean): --  RR: 17 (05 Aug 2023 13:15) (17 - 18)  SpO2: 95% (05 Aug 2023 13:15) (95% - 97%)    Parameters below as of 05 Aug 2023 13:15  Patient On (Oxygen Delivery Method): room air        MEDICATIONS  (STANDING):  aspirin  chewable 81 milliGRAM(s) Oral daily  atorvastatin 40 milliGRAM(s) Oral at bedtime  brimonidine 0.2% Ophthalmic Solution 1 Drop(s) Both EYES two times a day  cholecalciferol 1000 Unit(s) Oral daily  enoxaparin Injectable 40 milliGRAM(s) SubCutaneous every 24 hours  gabapentin 300 milliGRAM(s) Oral two times a day  latanoprost 0.005% Ophthalmic Solution 1 Drop(s) Both EYES at bedtime  melatonin 3 milliGRAM(s) Oral at bedtime  spironolactone 25 milliGRAM(s) Oral daily  zinc sulfate 220 milliGRAM(s) Oral daily    MEDICATIONS  (PRN):  acetaminophen     Tablet .. 650 milliGRAM(s) Oral every 6 hours PRN Mild Pain (1 - 3)          Initial Functional Status Assessment :       Previous Level of Function:     · Additional Comments	Pt currently resides alone in elevator apt. Pt has HHA 24/7 to assist w/ all functional mob, dressing and showering tasks. Primarily amb w/ rollator w/ assistance. Also owns WC. Denied falls within past 6 months. Receives HPT 2x/ week. Has doctor visit home frequently.    Cognitive Status Examination:   · Orientation	person; place; Reoriented to time (year being 2023)  · Level of Consciousness	alert; confused  · Follows Commands and Answers Questions	100% of the time  · Personal Safety and Judgment	at risk behaviors demonstrated    Range of Motion Exam:   · Active Range of Motion Examination	no Active ROM deficits were identified    Manual Muscle Testing:   · Manual Muscle Testing Results	At least 3/5 BL UE & LE based on observed ability to perform antigravity mobility. Grossly good minus bilateral  strength noted.    Bed Mobility: Rolling/Turning:     · Level of [place]	supervision    Bed Mobility: Scooting/Bridging:     · Level of [place]	supervision    Bed Mobility: Sit to Supine:     · Level of [place]	supervision    Bed Mobility: Supine to Sit:     · Level of [place]	supervision    Bed Mobility Analysis:     · Bed Mobility Limitations	impaired ability to control trunk for mobility; Lummi cervical collar donned while sitting at EOB.  · Impairments Contributing to Impaired Bed Mobility	decreased strength    Transfer: Sit to Stand:     · Level of [place]	contact guard  · Assistive Device	rolling walker    Transfer: Stand to Sit:     · Level of [place]	contact guard  · Assistive Device	rolling walker    Sit/Stand Transfer Safety Analysis:     · Transfer Safety Concerns Noted	decreased weight-shifting ability  · Impairments Contributing to Impaired Transfers	decreased strength    Gait Skills:     · Level of [place]	contact guard  · Assistive Device	rolling walker  · Gait Distance	5 feet; distance limited due to IV line.    Gait Analysis:     · Gait Pattern Used	3-point gait  · Gait Deviations Noted	decreased simona; decreased weight-shifting ability; Pt slightly unsteady, maintained hunched posture. Dec step length and simona. Pt at reported gait baseline.  · Impairments Contributing to Gait Deviations	decreased strength    Balance Skills Assessment:     · Sitting Balance: Static	good minus  · Sitting Balance: Dynamic	good minus  · Sit-to-Stand Balance	good minus  · Standing Balance: Static	fair plus  · Standing Balance: Dynamic	fair plus    Sensory Examination:   Sensory Examination:    Grossly Intact:   · Gross Sensory Examination	Grossly intact to light touch sensation throughout BLE and BUE. Denies numbness, tingling, burning or radiating symptoms along BLE and BUE.        PM&R Impression : as above    Current Disposition Plan Recommendations :   d/c home with home physical therapy , resume home care services

## 2023-08-05 NOTE — PROGRESS NOTE ADULT - SUBJECTIVE AND OBJECTIVE BOX
OVERNIGHT EVENTS: None    SUBJECTIVE:  Patient seen and examined at bedside. Pt is sleepy. Aide at beside states that she could hear some wheezing throughout the night. Pt denies any sob, chest pain, or coughing. She denies urinary frequency. Urine in collecting tube is clear and yellow.     Vital Signs Last 12 Hrs  T(F): 97.8 (08-05-23 @ 05:47), Max: 97.8 (08-05-23 @ 05:47)  HR: 76 (08-05-23 @ 05:47) (76 - 76)  BP: 111/67 (08-05-23 @ 05:47) (111/67 - 111/67)  BP(mean): --  RR: 18 (08-05-23 @ 05:47) (18 - 18)  SpO2: 96% (08-05-23 @ 05:47) (96% - 96%)  I&O's Summary    04 Aug 2023 07:01  -  05 Aug 2023 07:00  --------------------------------------------------------  IN: 0 mL / OUT: 2075 mL / NET: -2075 mL        PHYSICAL EXAM:  Constitutional: NAD, comfortable in bed.  HEENT: NC/AT, EOMI, no conjunctival pallor  Respiratory: CTA B/L. No w/r/r.   Cardiovascular: RRR, normal S1 and S2, no m/r/g.   Gastrointestinal: +BS, soft NTND, no guarding or rebound tenderness  Extremities: no edema.   Neurological: AAOx3, no CN deficits          LABS:                        11.0   9.57  )-----------( 294      ( 05 Aug 2023 07:18 )             32.8     08-05    128<L>  |  93<L>  |  10  ----------------------------<  96  4.0   |  26  |  0.85    Ca    9.1      05 Aug 2023 07:18  Phos  2.7     08-05  Mg     1.8     08-05    TPro  6.2  /  Alb  3.6  /  TBili  0.5  /  DBili  x   /  AST  27  /  ALT  10  /  AlkPhos  73  08-04      Urinalysis Basic - ( 05 Aug 2023 07:18 )    Color: x / Appearance: x / SG: x / pH: x  Gluc: 96 mg/dL / Ketone: x  / Bili: x / Urobili: x   Blood: x / Protein: x / Nitrite: x   Leuk Esterase: x / RBC: x / WBC x   Sq Epi: x / Non Sq Epi: x / Bacteria: x          RADIOLOGY & ADDITIONAL TESTS: No new imaging.    MEDICATIONS  (STANDING):  aspirin  chewable 81 milliGRAM(s) Oral daily  atorvastatin 40 milliGRAM(s) Oral at bedtime  brimonidine 0.2% Ophthalmic Solution 1 Drop(s) Both EYES two times a day  cholecalciferol 1000 Unit(s) Oral daily  enoxaparin Injectable 40 milliGRAM(s) SubCutaneous every 24 hours  gabapentin 300 milliGRAM(s) Oral two times a day  latanoprost 0.005% Ophthalmic Solution 1 Drop(s) Both EYES at bedtime  melatonin 3 milliGRAM(s) Oral at bedtime  spironolactone 25 milliGRAM(s) Oral daily  zinc sulfate 220 milliGRAM(s) Oral daily    MEDICATIONS  (PRN):  acetaminophen     Tablet .. 650 milliGRAM(s) Oral every 6 hours PRN Mild Pain (1 - 3)

## 2023-08-06 LAB
ALBUMIN SERPL ELPH-MCNC: 3.6 G/DL — SIGNIFICANT CHANGE UP (ref 3.3–5)
ALP SERPL-CCNC: 70 U/L — SIGNIFICANT CHANGE UP (ref 40–120)
ALT FLD-CCNC: 11 U/L — SIGNIFICANT CHANGE UP (ref 10–45)
ANION GAP SERPL CALC-SCNC: 11 MMOL/L — SIGNIFICANT CHANGE UP (ref 5–17)
ANION GAP SERPL CALC-SCNC: 9 MMOL/L — SIGNIFICANT CHANGE UP (ref 5–17)
ANION GAP SERPL CALC-SCNC: 9 MMOL/L — SIGNIFICANT CHANGE UP (ref 5–17)
AST SERPL-CCNC: 20 U/L — SIGNIFICANT CHANGE UP (ref 10–40)
BASOPHILS # BLD AUTO: 0.05 K/UL — SIGNIFICANT CHANGE UP (ref 0–0.2)
BASOPHILS NFR BLD AUTO: 0.5 % — SIGNIFICANT CHANGE UP (ref 0–2)
BILIRUB SERPL-MCNC: 0.4 MG/DL — SIGNIFICANT CHANGE UP (ref 0.2–1.2)
BUN SERPL-MCNC: 12 MG/DL — SIGNIFICANT CHANGE UP (ref 7–23)
BUN SERPL-MCNC: 12 MG/DL — SIGNIFICANT CHANGE UP (ref 7–23)
BUN SERPL-MCNC: 20 MG/DL — SIGNIFICANT CHANGE UP (ref 7–23)
CALCIUM SERPL-MCNC: 9 MG/DL — SIGNIFICANT CHANGE UP (ref 8.4–10.5)
CALCIUM SERPL-MCNC: 9.1 MG/DL — SIGNIFICANT CHANGE UP (ref 8.4–10.5)
CALCIUM SERPL-MCNC: 9.3 MG/DL — SIGNIFICANT CHANGE UP (ref 8.4–10.5)
CHLORIDE SERPL-SCNC: 100 MMOL/L — SIGNIFICANT CHANGE UP (ref 96–108)
CHLORIDE SERPL-SCNC: 96 MMOL/L — SIGNIFICANT CHANGE UP (ref 96–108)
CHLORIDE SERPL-SCNC: 97 MMOL/L — SIGNIFICANT CHANGE UP (ref 96–108)
CO2 SERPL-SCNC: 27 MMOL/L — SIGNIFICANT CHANGE UP (ref 22–31)
CO2 SERPL-SCNC: 28 MMOL/L — SIGNIFICANT CHANGE UP (ref 22–31)
CO2 SERPL-SCNC: 28 MMOL/L — SIGNIFICANT CHANGE UP (ref 22–31)
CREAT SERPL-MCNC: 0.87 MG/DL — SIGNIFICANT CHANGE UP (ref 0.5–1.3)
CREAT SERPL-MCNC: 0.89 MG/DL — SIGNIFICANT CHANGE UP (ref 0.5–1.3)
CREAT SERPL-MCNC: 1.19 MG/DL — SIGNIFICANT CHANGE UP (ref 0.5–1.3)
EGFR: 42 ML/MIN/1.73M2 — LOW
EGFR: 60 ML/MIN/1.73M2 — SIGNIFICANT CHANGE UP
EGFR: 62 ML/MIN/1.73M2 — SIGNIFICANT CHANGE UP
EOSINOPHIL # BLD AUTO: 0.34 K/UL — SIGNIFICANT CHANGE UP (ref 0–0.5)
EOSINOPHIL NFR BLD AUTO: 3.7 % — SIGNIFICANT CHANGE UP (ref 0–6)
GLUCOSE SERPL-MCNC: 106 MG/DL — HIGH (ref 70–99)
GLUCOSE SERPL-MCNC: 111 MG/DL — HIGH (ref 70–99)
GLUCOSE SERPL-MCNC: 118 MG/DL — HIGH (ref 70–99)
HCT VFR BLD CALC: 34.5 % — SIGNIFICANT CHANGE UP (ref 34.5–45)
HGB BLD-MCNC: 11.4 G/DL — LOW (ref 11.5–15.5)
IMM GRANULOCYTES NFR BLD AUTO: 0.7 % — SIGNIFICANT CHANGE UP (ref 0–0.9)
LYMPHOCYTES # BLD AUTO: 1.34 K/UL — SIGNIFICANT CHANGE UP (ref 1–3.3)
LYMPHOCYTES # BLD AUTO: 14.7 % — SIGNIFICANT CHANGE UP (ref 13–44)
MAGNESIUM SERPL-MCNC: 1.7 MG/DL — SIGNIFICANT CHANGE UP (ref 1.6–2.6)
MCHC RBC-ENTMCNC: 30.2 PG — SIGNIFICANT CHANGE UP (ref 27–34)
MCHC RBC-ENTMCNC: 33 GM/DL — SIGNIFICANT CHANGE UP (ref 32–36)
MCV RBC AUTO: 91.5 FL — SIGNIFICANT CHANGE UP (ref 80–100)
MONOCYTES # BLD AUTO: 0.97 K/UL — HIGH (ref 0–0.9)
MONOCYTES NFR BLD AUTO: 10.6 % — SIGNIFICANT CHANGE UP (ref 2–14)
NEUTROPHILS # BLD AUTO: 6.37 K/UL — SIGNIFICANT CHANGE UP (ref 1.8–7.4)
NEUTROPHILS NFR BLD AUTO: 69.8 % — SIGNIFICANT CHANGE UP (ref 43–77)
NRBC # BLD: 0 /100 WBCS — SIGNIFICANT CHANGE UP (ref 0–0)
OSMOLALITY UR: 103 MOSM/KG — LOW (ref 300–900)
OSMOLALITY UR: 136 MOSM/KG — LOW (ref 300–900)
PHOSPHATE SERPL-MCNC: 3.6 MG/DL — SIGNIFICANT CHANGE UP (ref 2.5–4.5)
PLATELET # BLD AUTO: 327 K/UL — SIGNIFICANT CHANGE UP (ref 150–400)
POTASSIUM SERPL-MCNC: 4.4 MMOL/L — SIGNIFICANT CHANGE UP (ref 3.5–5.3)
POTASSIUM SERPL-MCNC: 4.7 MMOL/L — SIGNIFICANT CHANGE UP (ref 3.5–5.3)
POTASSIUM SERPL-MCNC: 5 MMOL/L — SIGNIFICANT CHANGE UP (ref 3.5–5.3)
POTASSIUM SERPL-SCNC: 4.4 MMOL/L — SIGNIFICANT CHANGE UP (ref 3.5–5.3)
POTASSIUM SERPL-SCNC: 4.7 MMOL/L — SIGNIFICANT CHANGE UP (ref 3.5–5.3)
POTASSIUM SERPL-SCNC: 5 MMOL/L — SIGNIFICANT CHANGE UP (ref 3.5–5.3)
PROT SERPL-MCNC: 6.5 G/DL — SIGNIFICANT CHANGE UP (ref 6–8.3)
RBC # BLD: 3.77 M/UL — LOW (ref 3.8–5.2)
RBC # FLD: 15.6 % — HIGH (ref 10.3–14.5)
SODIUM SERPL-SCNC: 134 MMOL/L — LOW (ref 135–145)
SODIUM SERPL-SCNC: 134 MMOL/L — LOW (ref 135–145)
SODIUM SERPL-SCNC: 137 MMOL/L — SIGNIFICANT CHANGE UP (ref 135–145)
SODIUM UR-SCNC: 24 MMOL/L — SIGNIFICANT CHANGE UP
SODIUM UR-SCNC: 27 MMOL/L — SIGNIFICANT CHANGE UP
WBC # BLD: 9.13 K/UL — SIGNIFICANT CHANGE UP (ref 3.8–10.5)
WBC # FLD AUTO: 9.13 K/UL — SIGNIFICANT CHANGE UP (ref 3.8–10.5)

## 2023-08-06 PROCEDURE — 99233 SBSQ HOSP IP/OBS HIGH 50: CPT

## 2023-08-06 RX ORDER — DESMOPRESSIN ACETATE 0.1 MG/1
0.1 TABLET ORAL AT BEDTIME
Refills: 0 | Status: DISCONTINUED | OUTPATIENT
Start: 2023-08-06 | End: 2023-08-06

## 2023-08-06 RX ORDER — DESMOPRESSIN ACETATE 0.1 MG/1
0.1 TABLET ORAL ONCE
Refills: 0 | Status: COMPLETED | OUTPATIENT
Start: 2023-08-06 | End: 2023-08-06

## 2023-08-06 RX ORDER — SODIUM CHLORIDE 5 G/100ML
180 INJECTION, SOLUTION INTRAVENOUS
Refills: 0 | Status: DISCONTINUED | OUTPATIENT
Start: 2023-08-06 | End: 2023-08-06

## 2023-08-06 RX ADMIN — GABAPENTIN 300 MILLIGRAM(S): 400 CAPSULE ORAL at 18:33

## 2023-08-06 RX ADMIN — Medication 1000 UNIT(S): at 14:22

## 2023-08-06 RX ADMIN — BRIMONIDINE TARTRATE 1 DROP(S): 2 SOLUTION/ DROPS OPHTHALMIC at 18:34

## 2023-08-06 RX ADMIN — Medication 81 MILLIGRAM(S): at 14:22

## 2023-08-06 RX ADMIN — LATANOPROST 1 DROP(S): 0.05 SOLUTION/ DROPS OPHTHALMIC; TOPICAL at 21:28

## 2023-08-06 RX ADMIN — DESMOPRESSIN ACETATE 0.1 MILLIGRAM(S): 0.1 TABLET ORAL at 19:47

## 2023-08-06 RX ADMIN — ATORVASTATIN CALCIUM 40 MILLIGRAM(S): 80 TABLET, FILM COATED ORAL at 21:27

## 2023-08-06 RX ADMIN — ENOXAPARIN SODIUM 40 MILLIGRAM(S): 100 INJECTION SUBCUTANEOUS at 18:33

## 2023-08-06 RX ADMIN — Medication 3 MILLIGRAM(S): at 21:27

## 2023-08-06 RX ADMIN — SODIUM CHLORIDE 30 MILLILITER(S): 5 INJECTION, SOLUTION INTRAVENOUS at 01:32

## 2023-08-06 RX ADMIN — ZINC SULFATE TAB 220 MG (50 MG ZINC EQUIVALENT) 220 MILLIGRAM(S): 220 (50 ZN) TAB at 14:22

## 2023-08-06 RX ADMIN — Medication 650 MILLIGRAM(S): at 22:59

## 2023-08-06 RX ADMIN — SPIRONOLACTONE 25 MILLIGRAM(S): 25 TABLET, FILM COATED ORAL at 10:33

## 2023-08-06 RX ADMIN — BRIMONIDINE TARTRATE 1 DROP(S): 2 SOLUTION/ DROPS OPHTHALMIC at 10:18

## 2023-08-06 RX ADMIN — Medication 650 MILLIGRAM(S): at 23:59

## 2023-08-06 RX ADMIN — GABAPENTIN 300 MILLIGRAM(S): 400 CAPSULE ORAL at 10:33

## 2023-08-06 NOTE — PROGRESS NOTE ADULT - SUBJECTIVE AND OBJECTIVE BOX
CC: HYPONATREMIA        INTERVAL HISTORY: sleeping  could not examine patient      ROS: No chest pain, no sob, no abd pain. No n/v/d    PAST MEDICAL & SURGICAL HISTORY:  Essential hypertension    Hyperlipidemia, unspecified hyperlipidemia type    Diabetes insipidus    H/O aortic valve stenosis    Hypertension    Vertigo    Chronic diastolic congestive heart failure    Dyslipidemia    History of pseudoaneurysm    Glaucoma    History of orthopedic surgery  multiple    S/P AVR  Bioprosthetic    H/O lumbosacral spine surgery    S/P hip replacement, right    S/P hip replacement  B/L    S/P right coronary artery (RCA) stent placement        PHYSICAL EXAM:  T(C): 36.6 (08-06-23 @ 06:25), Max: 37.1 (08-05-23 @ 21:00)  HR: 77 (08-06-23 @ 06:25)  BP: 126/70 (08-06-23 @ 06:25) (116/78 - 126/70)  RR: 19 (08-06-23 @ 06:25)  SpO2: 95% (08-06-23 @ 06:25)  Wt(kg): --  I&O's Summary    05 Aug 2023 07:01  -  06 Aug 2023 07:00  --------------------------------------------------------  IN: 0 mL / OUT: 1200 mL / NET: -1200 mL      Weight 77.1 (08-02 @ 11:20)  General:,  NAD.  HEENT: moist mucous membranes, no pallor/cyanosis.  Neck: no JVD visible.  Cardiac: S1, S2. RRR. No murmurs   Respratory: CTA b/l, no access muscle use.   Abdomen: soft. nontender. nondistended  Skin: no rashes.  Extremities: no LE edema b/l  Access:       DATA:                        11.0<L>  9.57  )-----------( 294      ( 05 Aug 2023 07:18 )             32.8<L>        127<L>  |  92<L>  |  14     ----------------------------<  112<H>  Ca:8.8   (05 Aug 2023 22:39)  4.5     |  27     |  0.90         TPro  6.2    /  Alb  3.6    /  TBili  0.5    /  DBili  x      /  AST  27     /  ALT  10     /  AlkPhos  73     04 Aug 2023 05:30    Osmolality, Serum: 261 mosm/kg *L* (08-04 @ 22:54)  Osmolality, Serum: 264 mosm/kg *L* (08-04 @ 15:37)      Urinalysis Basic - ( 05 Aug 2023 22:39 )  Color: x / Appearance: x / SG: x / pH: x  Gluc: 112 mg/dL<H> / Ketone: x  / Bili: x / Urobili: x   Blood: x / Protein: x / Nitrite: x   Leuk Esterase: x / RBC: x / WBC x   Sq Epi: x / Non Sq Epi: x / Bacteria: x      Sodium, Random Urine: 23 mmol/L (08-05 @ 13:44)  Osmolality, Random Urine: 106 mosm/kg (08-05 @ 13:44)  Sodium, Random Urine: 25 mmol/L (08-05 @ 06:46)  Osmolality, Random Urine: 113 mosm/kg (08-05 @ 06:46)  Osmolality, Random Urine: 126 mosm/kg (08-05 @ 00:37)  Sodium, Random Urine: 29 mmol/L (08-05 @ 00:37)  Sodium, Random Urine: 85 mmol/L (08-04 @ 16:55)  Osmolality, Random Urine: 292 mosm/kg (08-04 @ 16:55)            MEDICATIONS  (STANDING):  aspirin  chewable 81 milliGRAM(s) Oral daily  atorvastatin 40 milliGRAM(s) Oral at bedtime  brimonidine 0.2% Ophthalmic Solution 1 Drop(s) Both EYES two times a day  cholecalciferol 1000 Unit(s) Oral daily  enoxaparin Injectable 40 milliGRAM(s) SubCutaneous every 24 hours  gabapentin 300 milliGRAM(s) Oral two times a day  latanoprost 0.005% Ophthalmic Solution 1 Drop(s) Both EYES at bedtime  melatonin 3 milliGRAM(s) Oral at bedtime  sodium chloride 0.9%. 240 milliLiter(s) (60 mL/Hr) IV Continuous <Continuous>  sodium chloride 3%. 180 milliLiter(s) (30 mL/Hr) IV Continuous <Continuous>  spironolactone 25 milliGRAM(s) Oral daily  zinc sulfate 220 milliGRAM(s) Oral daily    MEDICATIONS  (PRN):  acetaminophen     Tablet .. 650 milliGRAM(s) Oral every 6 hours PRN Mild Pain (1 - 3)

## 2023-08-06 NOTE — PROGRESS NOTE ADULT - PROBLEM SELECTOR PLAN 4
Hgb on admission 11.3, repeat Hb stable   Currently no signs of active bleeding (no hematochezia, melena, hemoptysis, hematuria).   Baseline Hb from previous admission in 12/22 10-11  Plan:   - trend CBC  - maintain active T&S  - transfuse if Hgb <7

## 2023-08-06 NOTE — PROGRESS NOTE ADULT - ASSESSMENT
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 in 2021, hx Diabetes Insipidus with hyponatremia (on DDAVP 1.5mg daily), chronic neuropathy, C spine fracture (C-spine collar when out of her home secondary to poor healing), dementia, depression presented with 1 day history of generalized weakness and a witnessed fall and admitted for hyponatremia.

## 2023-08-06 NOTE — PROGRESS NOTE ADULT - PROBLEM SELECTOR PLAN 1
Improving   Given hypertonic saline again last night. Na improved to 134 this AM. No additional fluids  Nephro following. Appreciate recs   Hold desmopressin and Metolazone and start salt tabs per renal   Likely 2/2 DI?   Daily UOsm

## 2023-08-06 NOTE — PROGRESS NOTE ADULT - SUBJECTIVE AND OBJECTIVE BOX
INTERVAL EVENTS: No acute events     PAST MEDICAL & SURGICAL HISTORY:  Essential hypertension    Hyperlipidemia, unspecified hyperlipidemia type    Diabetes insipidus    H/O aortic valve stenosis    Hypertension    Vertigo    Chronic diastolic congestive heart failure    Dyslipidemia    History of pseudoaneurysm    Glaucoma    History of orthopedic surgery  multiple    S/P AVR  Bioprosthetic    H/O lumbosacral spine surgery    S/P hip replacement, right    S/P hip replacement  B/L    S/P right coronary artery (RCA) stent placement        MEDICATIONS  (STANDING):  aspirin  chewable 81 milliGRAM(s) Oral daily  atorvastatin 40 milliGRAM(s) Oral at bedtime  brimonidine 0.2% Ophthalmic Solution 1 Drop(s) Both EYES two times a day  cholecalciferol 1000 Unit(s) Oral daily  enoxaparin Injectable 40 milliGRAM(s) SubCutaneous every 24 hours  gabapentin 300 milliGRAM(s) Oral two times a day  latanoprost 0.005% Ophthalmic Solution 1 Drop(s) Both EYES at bedtime  melatonin 3 milliGRAM(s) Oral at bedtime  spironolactone 25 milliGRAM(s) Oral daily  zinc sulfate 220 milliGRAM(s) Oral daily    MEDICATIONS  (PRN):  acetaminophen     Tablet .. 650 milliGRAM(s) Oral every 6 hours PRN Mild Pain (1 - 3)    Vital Signs Last 24 Hrs  T(C): 36.6 (06 Aug 2023 06:25), Max: 37.1 (05 Aug 2023 21:00)  T(F): 97.9 (06 Aug 2023 06:25), Max: 98.8 (05 Aug 2023 21:00)  HR: 77 (06 Aug 2023 06:25) (77 - 81)  BP: 126/70 (06 Aug 2023 06:25) (116/78 - 126/70)  BP(mean): --  RR: 19 (06 Aug 2023 06:25) (17 - 19)  SpO2: 95% (06 Aug 2023 06:25) (94% - 95%)    Parameters below as of 06 Aug 2023 06:25  Patient On (Oxygen Delivery Method): room air        PHYSICAL EXAM:  GEN: Awake, alert. NAD.   HEENT: NCAT, PERRL, EOMI. Mucosa moist. No JVD.  RESP: CTA b/l  CV: RRR. Normal S1/S2. No m/r/g.  ABD: Soft. NT/ND. BS+  EXT: Warm. No edema, clubbing, or cyanosis.       LABS:                        11.4   9.13  )-----------( 327      ( 06 Aug 2023 10:11 )             34.5     08-06    134<L>  |  97  |  12  ----------------------------<  106<H>  4.4   |  28  |  0.87    Ca    9.3      06 Aug 2023 10:11  Phos  3.6     08-06  Mg     1.7     08-06    TPro  6.5  /  Alb  3.6  /  TBili  0.4  /  DBili  x   /  AST  20  /  ALT  11  /  AlkPhos  70  08-06          Urinalysis Basic - ( 06 Aug 2023 10:11 )    Color: x / Appearance: x / SG: x / pH: x  Gluc: 106 mg/dL / Ketone: x  / Bili: x / Urobili: x   Blood: x / Protein: x / Nitrite: x   Leuk Esterase: x / RBC: x / WBC x   Sq Epi: x / Non Sq Epi: x / Bacteria: x      I&O's Summary    05 Aug 2023 07:01  -  06 Aug 2023 07:00  --------------------------------------------------------  IN: 0 mL / OUT: 1200 mL / NET: -1200 mL

## 2023-08-06 NOTE — PROGRESS NOTE ADULT - ASSESSMENT
89 y/o F with PMH HTN, HLD, chronic diastolic CHF, hx BioAVR complicated by stenosis and subsequent valve-in-valve TAVR at [facility] in 2021, hx Diabetes Insipidus with hyponatremia (on DDAVP 1.5mg daily), chronic neuropathy, C spine fracture (C-spine collar when out of her home secondary to poor healing), dementia, depression presented with 1 day history of generalized weakness and a witnessed fall and admitted for hyponatremia

## 2023-08-06 NOTE — PROGRESS NOTE ADULT - PROBLEM SELECTOR PLAN 1
Serum Na still hovering between ~127-129  received 250cc bolus NS yesterday  to receive additional bolus today  please check daily Uosm  1L fluid restriction  continue to hold dDAVP Serum Na still hovering between ~127-129  received 250cc NS over 4 hours  yesterday followed by hypertonic saline overnight  no change in Serum Na  may be combined picture of DI together with "tea and toast" from poor PO intake  do not give any additional fluid today  encourage PO intake and start NaCl tablets TID  please check daily Uosm  1L fluid restriction  continue to hold dDAVP

## 2023-08-07 ENCOUNTER — TRANSCRIPTION ENCOUNTER (OUTPATIENT)
Age: 88
End: 2023-08-07

## 2023-08-07 LAB
ANION GAP SERPL CALC-SCNC: 10 MMOL/L — SIGNIFICANT CHANGE UP (ref 5–17)
ANION GAP SERPL CALC-SCNC: 14 MMOL/L — SIGNIFICANT CHANGE UP (ref 5–17)
BASOPHILS # BLD AUTO: 0.06 K/UL — SIGNIFICANT CHANGE UP (ref 0–0.2)
BASOPHILS NFR BLD AUTO: 0.6 % — SIGNIFICANT CHANGE UP (ref 0–2)
BUN SERPL-MCNC: 19 MG/DL — SIGNIFICANT CHANGE UP (ref 7–23)
BUN SERPL-MCNC: 21 MG/DL — SIGNIFICANT CHANGE UP (ref 7–23)
CALCIUM SERPL-MCNC: 8.4 MG/DL — SIGNIFICANT CHANGE UP (ref 8.4–10.5)
CALCIUM SERPL-MCNC: 8.8 MG/DL — SIGNIFICANT CHANGE UP (ref 8.4–10.5)
CHLORIDE SERPL-SCNC: 92 MMOL/L — LOW (ref 96–108)
CHLORIDE SERPL-SCNC: 96 MMOL/L — SIGNIFICANT CHANGE UP (ref 96–108)
CO2 SERPL-SCNC: 22 MMOL/L — SIGNIFICANT CHANGE UP (ref 22–31)
CO2 SERPL-SCNC: 27 MMOL/L — SIGNIFICANT CHANGE UP (ref 22–31)
CREAT SERPL-MCNC: 1.09 MG/DL — SIGNIFICANT CHANGE UP (ref 0.5–1.3)
CREAT SERPL-MCNC: 1.18 MG/DL — SIGNIFICANT CHANGE UP (ref 0.5–1.3)
EGFR: 43 ML/MIN/1.73M2 — LOW
EGFR: 47 ML/MIN/1.73M2 — LOW
EOSINOPHIL # BLD AUTO: 0.57 K/UL — HIGH (ref 0–0.5)
EOSINOPHIL NFR BLD AUTO: 5.8 % — SIGNIFICANT CHANGE UP (ref 0–6)
GLUCOSE SERPL-MCNC: 120 MG/DL — HIGH (ref 70–99)
GLUCOSE SERPL-MCNC: 85 MG/DL — SIGNIFICANT CHANGE UP (ref 70–99)
HCT VFR BLD CALC: 32.4 % — LOW (ref 34.5–45)
HGB BLD-MCNC: 10.2 G/DL — LOW (ref 11.5–15.5)
IMM GRANULOCYTES NFR BLD AUTO: 0.9 % — SIGNIFICANT CHANGE UP (ref 0–0.9)
LYMPHOCYTES # BLD AUTO: 2.25 K/UL — SIGNIFICANT CHANGE UP (ref 1–3.3)
LYMPHOCYTES # BLD AUTO: 22.7 % — SIGNIFICANT CHANGE UP (ref 13–44)
MAGNESIUM SERPL-MCNC: 1.6 MG/DL — SIGNIFICANT CHANGE UP (ref 1.6–2.6)
MCHC RBC-ENTMCNC: 29.8 PG — SIGNIFICANT CHANGE UP (ref 27–34)
MCHC RBC-ENTMCNC: 31.5 GM/DL — LOW (ref 32–36)
MCV RBC AUTO: 94.7 FL — SIGNIFICANT CHANGE UP (ref 80–100)
MONOCYTES # BLD AUTO: 1.16 K/UL — HIGH (ref 0–0.9)
MONOCYTES NFR BLD AUTO: 11.7 % — SIGNIFICANT CHANGE UP (ref 2–14)
NEUTROPHILS # BLD AUTO: 5.77 K/UL — SIGNIFICANT CHANGE UP (ref 1.8–7.4)
NEUTROPHILS NFR BLD AUTO: 58.3 % — SIGNIFICANT CHANGE UP (ref 43–77)
NRBC # BLD: 0 /100 WBCS — SIGNIFICANT CHANGE UP (ref 0–0)
OSMOLALITY UR: 445 MOSM/KG — SIGNIFICANT CHANGE UP (ref 300–900)
OSMOLALITY UR: 509 MOSM/KG — SIGNIFICANT CHANGE UP (ref 300–900)
PHOSPHATE SERPL-MCNC: 3.8 MG/DL — SIGNIFICANT CHANGE UP (ref 2.5–4.5)
PLATELET # BLD AUTO: 321 K/UL — SIGNIFICANT CHANGE UP (ref 150–400)
POTASSIUM SERPL-MCNC: 4.8 MMOL/L — SIGNIFICANT CHANGE UP (ref 3.5–5.3)
POTASSIUM SERPL-MCNC: 5.1 MMOL/L — SIGNIFICANT CHANGE UP (ref 3.5–5.3)
POTASSIUM SERPL-SCNC: 4.8 MMOL/L — SIGNIFICANT CHANGE UP (ref 3.5–5.3)
POTASSIUM SERPL-SCNC: 5.1 MMOL/L — SIGNIFICANT CHANGE UP (ref 3.5–5.3)
RBC # BLD: 3.42 M/UL — LOW (ref 3.8–5.2)
RBC # FLD: 15.8 % — HIGH (ref 10.3–14.5)
SODIUM SERPL-SCNC: 129 MMOL/L — LOW (ref 135–145)
SODIUM SERPL-SCNC: 132 MMOL/L — LOW (ref 135–145)
SODIUM UR-SCNC: 37 MMOL/L — SIGNIFICANT CHANGE UP
SODIUM UR-SCNC: 41 MMOL/L — SIGNIFICANT CHANGE UP
WBC # BLD: 9.9 K/UL — SIGNIFICANT CHANGE UP (ref 3.8–10.5)
WBC # FLD AUTO: 9.9 K/UL — SIGNIFICANT CHANGE UP (ref 3.8–10.5)

## 2023-08-07 PROCEDURE — 99233 SBSQ HOSP IP/OBS HIGH 50: CPT | Mod: GC

## 2023-08-07 PROCEDURE — 99233 SBSQ HOSP IP/OBS HIGH 50: CPT

## 2023-08-07 PROCEDURE — 99232 SBSQ HOSP IP/OBS MODERATE 35: CPT

## 2023-08-07 PROCEDURE — 99497 ADVNCD CARE PLAN 30 MIN: CPT | Mod: 25

## 2023-08-07 RX ORDER — SODIUM CHLORIDE 9 MG/ML
1 INJECTION INTRAMUSCULAR; INTRAVENOUS; SUBCUTANEOUS THREE TIMES A DAY
Refills: 0 | Status: DISCONTINUED | OUTPATIENT
Start: 2023-08-07 | End: 2023-08-07

## 2023-08-07 RX ORDER — SODIUM CHLORIDE 5 G/100ML
390 INJECTION, SOLUTION INTRAVENOUS
Refills: 0 | Status: DISCONTINUED | OUTPATIENT
Start: 2023-08-07 | End: 2023-08-08

## 2023-08-07 RX ADMIN — GABAPENTIN 300 MILLIGRAM(S): 400 CAPSULE ORAL at 17:46

## 2023-08-07 RX ADMIN — SPIRONOLACTONE 25 MILLIGRAM(S): 25 TABLET, FILM COATED ORAL at 09:14

## 2023-08-07 RX ADMIN — Medication 81 MILLIGRAM(S): at 11:04

## 2023-08-07 RX ADMIN — BRIMONIDINE TARTRATE 1 DROP(S): 2 SOLUTION/ DROPS OPHTHALMIC at 17:46

## 2023-08-07 RX ADMIN — Medication 3 MILLIGRAM(S): at 21:35

## 2023-08-07 RX ADMIN — ENOXAPARIN SODIUM 40 MILLIGRAM(S): 100 INJECTION SUBCUTANEOUS at 18:47

## 2023-08-07 RX ADMIN — ZINC SULFATE TAB 220 MG (50 MG ZINC EQUIVALENT) 220 MILLIGRAM(S): 220 (50 ZN) TAB at 11:04

## 2023-08-07 RX ADMIN — LATANOPROST 1 DROP(S): 0.05 SOLUTION/ DROPS OPHTHALMIC; TOPICAL at 21:35

## 2023-08-07 RX ADMIN — BRIMONIDINE TARTRATE 1 DROP(S): 2 SOLUTION/ DROPS OPHTHALMIC at 09:14

## 2023-08-07 RX ADMIN — ATORVASTATIN CALCIUM 40 MILLIGRAM(S): 80 TABLET, FILM COATED ORAL at 21:35

## 2023-08-07 RX ADMIN — GABAPENTIN 300 MILLIGRAM(S): 400 CAPSULE ORAL at 09:14

## 2023-08-07 RX ADMIN — Medication 1000 UNIT(S): at 11:04

## 2023-08-07 NOTE — PROGRESS NOTE ADULT - PROBLEM SELECTOR PLAN 2
On home desmopressin. Na 120 on admission.  - nephrology following. Hold DDAVP On home desmopressin for >20 years. Na 120 on admission.  Na 137 -> 134 -> 132 in AM of 8/7  Uosm 136 -> 445 in AM 8/7  Casper 24 -> 37 in AM 8/7   Renal on board, unsure if true DI as pt seems to be properly correcting without desmopressin   Plan:  - Holding Desmopressin  - Will assess need for desmopressin once d/c based on PM Serum Na, Casper and Uosm

## 2023-08-07 NOTE — PROGRESS NOTE ADULT - ASSESSMENT
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       89 y/o F with PMH HTN, HLD, chronic diastolic CHF, hx BioAVR complicated by stenosis and subsequent valve-in-valve TAVR at [facility] in 2021, hx Diabetes Insipidus with hyponatremia (on DDAVP 1.5mg daily), chronic neuropathy, C spine fracture (C-spine collar when out of her home secondary to poor healing), dementia, depression presented with 1 day history of generalized weakness and a witnessed fall. On arrival, found to be hyponatremic (120) and to have a UTI. The pt was admitted for hyponatremia and abx treatment for UTI

## 2023-08-07 NOTE — PROGRESS NOTE ADULT - SUBJECTIVE AND OBJECTIVE BOX
[facility]  Contact Info: Call [phone] (including Nights/Weekend)    SUBJECTIVE AND OBJECTIVE: Patient states she feels well this morning. No acute complaints. States she has a mild headache this morning. She states that her itchiness around her buttock wound has improved. he denies any headaches, dizziness, fatigue, sob, abdominal pain, constipation, changes in appetite, pain. She states her anxiety has improved throughout the admission. Spoke to her daughter and HCP Massiel Duval [phone] on the phone with the patient. Patient and daughter both states she would like to be DNR/ DNI. Patient and daughter states that the patient does not want invasive interventions long term.    INTERVAL HPI/OVERNIGHT EVENTS: No acute events       ALLERGIES:  penicillin (Unknown)  [This allergen will not trigger allergy alert] Acetic [id number] (Other)  erythromycin (Unknown)    MEDICATIONS  (STANDING):  aspirin  chewable 81 milliGRAM(s) Oral daily  atorvastatin 40 milliGRAM(s) Oral at bedtime  brimonidine 0.2% Ophthalmic Solution 1 Drop(s) Both EYES two times a day  cholecalciferol 1000 Unit(s) Oral daily  enoxaparin Injectable 40 milliGRAM(s) SubCutaneous every 24 hours  gabapentin 300 milliGRAM(s) Oral two times a day  latanoprost 0.005% Ophthalmic Solution 1 Drop(s) Both EYES at bedtime  melatonin 3 milliGRAM(s) Oral at bedtime  spironolactone 25 milliGRAM(s) Oral daily  zinc sulfate 220 milliGRAM(s) Oral daily    MEDICATIONS  (PRN):  acetaminophen     Tablet .. 650 milliGRAM(s) Oral every 6 hours PRN Mild Pain (1 - 3)      ITEMS UNCHECKED ARE NOT PRESENT    PRESENT SYMPTOMS: []Unable to obtain due to poor mentation   Source if other than patient:  []Family   []Team     Pain: [] yes [x]no  QOL impact -   Location -                    Aggravating factors -  Quality -  Radiation -  Timing -  Severity (0-10 scale) -  Minimal acceptable level (0-10 scale) -    PAIN AD Score:  [email or web address] (press ctrl +  left click to view)    Dyspnea:                           []Mild  []Moderate []Severe  Anxiety:                             []Mild []Moderate []Severe  Fatigue:                             []Mild []Moderate []Severe  Nausea:                             []Mild []Moderate []Severe  Loss of appetite:              []Mild []Moderate []Severe  Constipation:                    []Mild []Moderate []Severe    Other Symptoms:  []All other review of systems negative     Palliative Performance Status Version 2:     Vital Signs Last 24 Hrs  T(C): 36.8 (07 Aug 2023 11:59), Max: 37.1 (06 Aug 2023 14:00)  T(F): 98.3 (07 Aug 2023 11:59), Max: 98.7 (06 Aug 2023 14:00)  HR: 76 (07 Aug 2023 11:59) (71 - 88)  BP: 109/67 (07 Aug 2023 11:59) (103/71 - 127/79)  BP(mean): --  RR: 18 (07 Aug 2023 11:59) (18 - 18)  SpO2: 95% (07 Aug 2023 11:59) (95% - 96%)    Parameters below as of 07 Aug 2023 11:59  Patient On (Oxygen Delivery Method): room air        GENERAL:  GENERAL:  [x] NAD [x]Alert []Lethargic  []Cachexia  []Unarousable  [x]Verbal  []Non-Verbal  BEHAVIORAL:   []Anxiety  []Delirium []Agitation [x]Cooperative [x]Oriented x2  HEENT:  [x]Normal  [x] Moist Mucous Membranes []Dry mouth   []ET Tube/Trach  []Oral lesions  PULMONARY:   [x]Clear []Tachypnea  []Audible excessive secretions  [x]Normal Work of Breathing []Labored Breathing  []Rhonchi []Crackles []Wheezing  CARDIOVASCULAR:    [x]Regular Rate [x]Regular Rhythm []Irregular []Tachy  []Jonas  GASTROINTESTINAL:  [x]Soft  []Distended   [x]+BS  [x]Non tender []Tender  []PEG []OGT/ NGT  Last BM:  GENITOURINARY:  [x]Normal [] Incontinent   []Oliguria/Anuria   []Vega  MUSCULOSKELETAL:   []Normal Extremities  [x]Weakness  [x]Bed/Wheelchair bound []Edema  NEUROLOGIC:   []No focal deficits  [x]Cognitive impairment  []Dysphagia []Dysarthria []Paresis []Encephalopathic  SKIN:       CRITICAL CARE:  [ ]Shock Present  [ ]Septic [ ]Cardiogenic [ ]Neurologic [ ]Hypovolemic  [ ] Vasopressors [ ]Inotropes   [ ]Respiratory failure present [ ]Mechanical Ventilation [ ]Non-invasive ventilatory support [ ]High-Flow  [ ]Acute  [ ]Chronic [ ]Hypoxic  [ ]Hypercarbic   [ ]Other organ failure    LABS:                         10.2   9.90  )-----------( 321      ( 07 Aug 2023 06:47 )             32.4   08-07    132<L>  |  96  |  19  ----------------------------<  85  4.8   |  22  |  1.09    Ca    8.4      07 Aug 2023 06:47  Phos  3.8     08-07  Mg     1.6     08-07    TPro  6.5  /  Alb  3.6  /  TBili  0.4  /  DBili  x   /  AST  20  /  ALT  11  /  AlkPhos  70  08-06      RADIOLOGY & ADDITIONAL STUDIES: None new    REFERRALS:  [x]Social Work  []Case management []PT/OT []Chaplaincy  []Hospice  []Patient/Family Support

## 2023-08-07 NOTE — PROGRESS NOTE ADULT - PROBLEM SELECTOR PLAN 4
Hgb on admission 11.3, repeat Hb stable   Currently no signs of active bleeding (no hematochezia, melena, hemoptysis, hematuria).   Baseline Hb from previous admission in 12/22 10-11  Plan:   - trend CBC  - maintain active T&S  - transfuse if Hgb <7 Likely chronic  Hgb on admission 11.3, stable   Currently no signs of active bleeding (no hematochezia, melena, hemoptysis, hematuria).   Baseline Hb from previous admission in 12/22 10-11  Plan:   - trend CBC  - maintain active T&S  - transfuse if Hgb <7

## 2023-08-07 NOTE — PROGRESS NOTE ADULT - ATTENDING COMMENTS
89yo F with PMH of Chronic Diastolic HF, AS s/p TAVR, HTN, and Cervical Fracture (now wheelchair-bound) p/w fall and found to have symptomatic hyponatremia. Palliative consulted for complex medical decision making in the setting of advanced illness ue to STAR diagnosis. Patient without significant complaints, no acute symptom management needs. Completed MOLST with DNR/DNI after discussion with patient and daughter.    In addition to the E/M visit, an advance care planning meeting was performed. Start time: 11:00AM; End time: 11:16AM; Total time: 16min. A face to face meeting to discuss advance care planning was held today regarding: COLLETTE OROZCO. Primary decision maker: Patient is unable to participate in decision making; Alternate/surrogate: Daughter. Discussed advance directives including, but not limited to, healthcare proxy and code status. Decision regarding code status: DNR/DNI; Documentation completed today: MOLST GOC note    Emotional support provided, questions answered.  Active Psychosocial Referrals:  [x]Social Work/Case management [x]PT/OT []Chaplaincy []Hospice  []Patient/Family Support []Holistic RN []Massage Therapy [x]Music Therapy []Ethics  Coping: [x] well [] with difficulty [] poor coping [] unable to assess  Support system: [x] strong [] adequate [] inadequate    For new or uncontrolled symptoms, please call Palliative Care at [phone]). The service is available 24/7 (including nights & weekends) to provide symptom management recommendations over the phone as appropriate

## 2023-08-07 NOTE — PROGRESS NOTE ADULT - ASSESSMENT
Renal consulted for Na 120. Pt is a 89 yo F w/ DI on DDAVP brought to ED due to outpatient labs 8/1 resulting in Na 118. Pt also with generalized leg weakness and near fall prior to coming to ED    #Asymptomatic chronic hyponatremia  Patient p/w Na 120.   On 8/2, patient was given desmopressin + 180 cc 3% hypertonic saline  On 8/3: 120 cc 3% hypertonic saline >Na 121> 150 cc hypertonic saline >Na 125. 10;30 pm Na 128  On 8/4: Na 131  On 8/5: Na 128>129>127; hypertonics given   On 8/6: 134>127>134 ddAVP given  On 8/7: Na 132  TSH wnl  Home meds that can cause hyponatremia: desmopressin, metolazone, torsemide  PE, Lab and urine studies c/w  hypotonic hyponatremia iso SIADH  In terms of cause most likely iso poor PO intake/diarrhea and SIADH iso desmopressin use. Patient was appropriately correcting Na and concentrating prior to ddAVP use.     Plan  -Given downtrend in Na, will rec keeping patient. Will want to see patients response once ddAVP wears off.   - Follow up PM Na, Uosm, and Casper to be collected at 8 pm   - Hold desmopressin, torsemide, and metolazone    #central DI  According to daughter, patient has been on desmopressin for 50 years. She was started iso nocturia.   Unsure at this time if patient was worked up for central DI previously. May have been prescribed desmopressin for refractory nocturia.     Plan  - hold desmopressin  - based on nect 24 hrs, will determine need/dosing of desmopressin  - pending labs, will consider starting diuretics to determine effects on Na prior to discharge

## 2023-08-07 NOTE — PROGRESS NOTE ADULT - ASSESSMENT
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

## 2023-08-07 NOTE — DISCHARGE NOTE PROVIDER - HOSPITAL COURSE
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 On arrival, found to be hyponatremic (120) and to have a UTI. The pt was admitted for hyponatremia and abx treatment for UTI      Problem List/Main Diagnoses:     #Hyponatremia (IMPROVED)   Likely 2/2 over-treatment with desmopressin, loose stools and poor PO intake   On admission, Na was 120, Uosm , desmopressin and diuretics were held, pt started on hypertonic saline with improvement in Na on admission to ____.  Pt's mentation was at baseline through the hospitalization  Nephrology was following,   Plan:  -    #Diabetes Insipidus   Pt with Hx of DI on desmopressin.     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 On arrival, found to be hyponatremic (120) and to have a UTI. The pt was admitted for hyponatremia and abx treatment for UTI      Problem List/Main Diagnoses:     #Hyponatremia (IMPROVED)   Likely 2/2 over-treatment with desmopressin, loose stools and poor PO intake   On admission, Na was 120, Uosm , desmopressin and diuretics were held, pt started on hypertonic saline with improvement in Na on admission to ____.  Pt's mentation was at baseline through the hospitalization  Nephrology was following,   Plan:  -    #Diabetes Insipidus   Pt with Hx of DI on desmopressin for many years. Unclear if true DI as pt was correcting properly on her own without desmopressin.     #Urinary Tract Infection  Pt with symptoms of dysuria and with UA with positive bacteria, leukocyte esterase and WBC   Pt was treated with Ceftriaxone for 3 days with improvement in symptoms   Plan:  - Follow up as outpatient with your primary care provider     #Chronic HFpEF  Follows w/ Dr Fernandez. Admission in 12/22 for acute on chronic exacerbation. ECHO 12/7: with EF>75% and moderate AS/MR with unchanged gradients across AV and MV  Torsemide and Metolazone were held on this admission iso hyponatremia, spironolactone was continued   Plan:  - Follow up with your outpatient cardiologist for management of diuretic therapy in setting of recent hyponatremic episodes         New medications/therapies:   New lines/hardware:  Labs to be followed outpatient: Suggest continuous monitoring of BMP given recent admission for low Na.   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 On arrival, found to be hyponatremic (120) and to have a UTI. The pt was admitted for hyponatremia and abx treatment for UTI      Problem List/Main Diagnoses:    #Hyponatremia (IMPROVED)   Likely 2/2 over-treatment with desmopressin, loose stools and poor PO intake. On admission, Na was 120, Uosm , desmopressin and diuretics were held, pt started on hypertonic saline with improvement in Na on admission to ____ on discharge. Pt's mentation was at baseline through the hospitalization. Nephrology was following.  Plan:  -     #Diabetes Insipidus   Pt with Hx of DI on desmopressin for many years. Trial off of desmopressin showed -----    #Urinary Tract Infection  Pt with symptoms of dysuria and with UA with positive bacteria, leukocyte esterase and WBC   Pt was treated with Ceftriaxone for 3 days with improvement in symptoms   Plan:  - Follow up as outpatient with your primary care provider at your scheduled appointment     #Chronic HFpEF  Follows w/ Dr Fernandez. Admission in 12/22 for acute on chronic exacerbation. ECHO 12/7: with EF>75% and moderate AS/MR with unchanged gradients across AV and MV  Torsemide and Metolazone were held on this admission iso hyponatremia, spironolactone was continued   Plan:  - Follow up with your outpatient cardiologist for management of diuretic therapy in setting of recent hyponatremic episodes         New medications/therapies:   New lines/hardware: None   Labs to be followed outpatient: Suggest continuous monitoring of BMP given recent admission for low Na.   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 On arrival, found to be hyponatremic (120) and to have a UTI. The pt was admitted for hyponatremia and abx treatment for UTI.    Problem List/Main Diagnoses:    #Hyponatremia (IMPROVED)   Likely 2/2 over-treatment with diuretics, desmopressin, loose stools and poor PO intake. On admission, Na was 120, Uosm 417, desmopressin and diuretics were held, pt started on hypertonic saline with improvement in Na on admission to home with 24 hour HHA on discharge. Pt's mentation was at baseline through the hospitalization. Nephrology was following.  Plan:  -     #Diabetes Insipidus   Pt with Hx of DI on desmopressin for many years. Trial off of desmopressin showed -----    #Urinary Tract Infection  Pt with symptoms of dysuria and with UA with positive bacteria, leukocyte esterase and WBC   Pt was treated with Ceftriaxone for 3 days with improvement in symptoms   Plan:  - Follow up as outpatient with your primary care provider at your scheduled appointment     #Chronic HFpEF  Follows w/ Dr Fernandez. Admission in 12/22 for acute on chronic exacerbation. ECHO 12/7: with EF>75% and moderate AS/MR with unchanged gradients across AV and MV  Torsemide and Metolazone were held on this admission iso hyponatremia, spironolactone was continued   Plan:  - Follow up with your outpatient cardiologist for management of diuretic therapy in setting of recent hyponatremic episodes         New medications/therapies:   New lines/hardware: None   Labs to be followed outpatient: Suggest continuous monitoring of BMP given recent admission for low Na.   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 On arrival, found to be hyponatremic (120) and to have a UTI. The pt was admitted for hyponatremia and abx treatment for UTI.    Problem List/Main Diagnoses:    #Hyponatremia (IMPROVED)   Likely 2/2 over-treatment with diuretics, desmopressin, loose stools and poor PO intake. Patient has reported history of DI, on home DDAVP for many years. On admission, Na was 120, Uosm 417, desmopressin and diuretics were held, pt started on hypertonic saline with improvement in Na on admission to home with 24 hour HHA on discharge. Pt's mentation was at baseline through the hospitalization. Renal consulted, sodium followed very closely throughout admission and home DDAVP along with diuretics held. Sodium uptrended to 136. Per renal discharge off all diuretics and off DDAVP with outpatient follow up.  - hold metolazone, spironolactone and torsemide   -hold DDAVP  -repeat BMP as outpatient in few days to ensure sodium not increasing or decreasing   -f/u with renal and cardiology as outpatient     #Urinary Tract Infection  Pt with symptoms of frequency/urgency and dysuria. UA positive on admission, urine cultures growing proteus sensitive to ceftriaxone.   Pt was treated with Ceftriaxone 1g q24 for 3 days with improvement in symptoms   - Follow up as outpatient with your primary care provider at your scheduled appointment     #Chronic HFpEF  Follows w/ Dr Fernandez. Admission in 12/22 for acute on chronic exacerbation. ECHO 12/7: with EF>75% and moderate AS/MR with unchanged gradients across AV and MV  Torsemide, Metolazone and spironolactone held in setting of hyponatremia.   Plan:  - Follow up with your outpatient cardiologist for management of diuretic therapy in setting of recent hyponatremic episodes     New medications/therapies: none  New lines/hardware: None   Labs to be followed outpatient: repeat BMP, monitor Na   Exam to be followed outpatient: f/u with renal, cardiology and PCP    Discharge plan: home with home PT and home care     Physical Exam Upon Discharge:  General: NAD; speaking in full sentences  HEENT: NC/AT; PERRL; EOMI; MMM  Neck: supple; no JVD  Cardiac: RRR; +S1/S2  Pulm: CTA B/L; no W/R/R  GI: soft, NT/ND, +BS  Extremities: Tenderness to palpation of bilateral LE, WWP; no edema, clubbing or cyanosis  Neuro: AAOx3; no focal deficits     #Discharge: do not delete    89 y/o F with PMH HTN, HLD, chronic diastolic CHF, 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 On arrival, found to be hyponatremic (120) and to have a UTI. The pt was admitted for hyponatremia and abx treatment for UTI.    Problem List/Main Diagnoses:    #Hyponatremia (IMPROVED)   Likely 2/2 over-treatment with diuretics, desmopressin, loose stools and poor PO intake. Patient has reported history of DI, on home DDAVP for many years. On admission, Na was 120, Uosm 417, desmopressin and diuretics were held, pt started on hypertonic saline with improvement in Na on admission to home with 24 hour HHA on discharge. Pt's mentation was at baseline through the hospitalization. Renal consulted, sodium followed very closely throughout admission and home DDAVP along with diuretics held. Sodium uptrended to 136. Per renal discharge off all diuretics and off DDAVP with outpatient follow up.  - hold metolazone, spironolactone and torsemide   -hold DDAVP  -repeat BMP as outpatient in few days to ensure sodium not increasing or decreasing   -f/u with renal and cardiology as outpatient     #Urinary Tract Infection  Pt with symptoms of frequency/urgency and dysuria. UA positive on admission, urine cultures growing proteus sensitive to ceftriaxone.   Pt was treated with Ceftriaxone 1g q24 for 3 days with improvement in symptoms   - Follow up as outpatient with your primary care provider at your scheduled appointment     #Chronic HFpEF  Follows w/ Dr Fernandez. Admission in 12/22 for acute on chronic exacerbation. ECHO 12/7: with EF>75% and moderate AS/MR with unchanged gradients across AV and MV  Torsemide, Metolazone and spironolactone held in setting of hyponatremia.   Plan:  - Follow up with your outpatient cardiologist for management of diuretic therapy in setting of recent hyponatremic episodes     New medications/therapies: none  New lines/hardware: None   Labs to be followed outpatient: repeat BMP, monitor Na   Exam to be followed outpatient: f/u with renal, cardiology and PCP    Discharge plan: [facility]    Physical Exam Upon Discharge:  General: NAD; speaking in full sentences  HEENT: NC/AT; PERRL; EOMI; MMM  Neck: supple; no JVD  Cardiac: RRR; +S1/S2  Pulm: CTA B/L; no W/R/R  GI: soft, NT/ND, +BS  Extremities: Tenderness to palpation of bilateral LE, WWP; no edema, clubbing or cyanosis  Neuro: AAOx3; no focal deficits     #Discharge: do not delete    89 y/o F with PMH HTN, HLD, chronic diastolic CHF, 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 in 2021, hx Diabetes Insipidus with hyponatremia (on DDAVP 1.5mg daily), chronic neuropathy, C spine fracture (C-spine collar when out of her home secondary to poor healing), dementia, depression presented with 1 day history of generalized weakness and a witnessed fall. On arrival, found to be hyponatremic (120) and to have a UTI. The pt was admitted for hyponatremia and abx treatment for UTI.    Problem List/Main Diagnoses:    #Hyponatremia (IMPROVED)   Likely 2/2 over-treatment with diuretics, desmopressin, loose stools and poor PO intake. Patient has reported history of DI, on home DDAVP for many years. On admission, Na was 120, Uosm 417, desmopressin and diuretics were held, pt started on hypertonic saline with improvement in Na on admission to home with 24 hour HHA on discharge. Pt's mentation was at baseline through the hospitalization. Renal consulted, sodium followed very closely throughout admission and home DDAVP along with diuretics held. Sodium uptrended to 136. Per renal discharge off all diuretics and off DDAVP with outpatient follow up.  - hold metolazone, spironolactone and torsemide   -hold DDAVP  -repeat BMP as outpatient in few days to ensure sodium not increasing or decreasing   -f/u with renal and cardiology as outpatient     #Urinary Tract Infection  Pt with symptoms of frequency/urgency and dysuria. UA positive on admission, urine cultures growing proteus sensitive to ceftriaxone.   Pt was treated with Ceftriaxone 1g q24 for 3 days with improvement in symptoms   - Follow up as outpatient with your primary care provider at your scheduled appointment     #Chronic HFpEF  Follows w/ Dr Fernandez. Admission in 12/22 for acute on chronic exacerbation. ECHO 12/7: with EF>75% and moderate AS/MR with unchanged gradients across AV and MV  Torsemide, Metolazone and spironolactone held in setting of hyponatremia.   Plan:  - Follow up with your outpatient cardiologist for management of diuretic therapy in setting of recent hyponatremic episodes     #At risk for falls.   Patient will need commode for home. Patient cannot safely ambulate to bathroom    New medications/therapies: none  New lines/hardware: None   Labs to be followed outpatient: repeat BMP, monitor Na   Exam to be followed outpatient: f/u with renal, cardiology and PCP    Discharge plan: Home with HHA    Physical Exam Upon Discharge:  General: NAD; speaking in full sentences  HEENT: NC/AT; PERRL; EOMI; MMM  Neck: supple; no JVD  Cardiac: RRR; +S1/S2  Pulm: CTA B/L; no W/R/R  GI: soft, NT/ND, +BS  Extremities: Tenderness to palpation of bilateral LE, WWP; no edema, clubbing or cyanosis  Neuro: AAOx3; no focal deficits     #Discharge: do not delete    89 y/o F with PMH HTN, HLD, chronic diastolic CHF, 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 in 2021, hx Diabetes Insipidus with hyponatremia (on DDAVP 1.5mg daily), chronic neuropathy, C spine fracture (C-spine collar when out of her home secondary to poor healing), dementia, depression presented with 1 day history of generalized weakness and a witnessed fall. On arrival, found to be hyponatremic (120) and to have a UTI. The pt was admitted for management of acute hyponatremia.    Problem List/Main Diagnoses:    #Hyponatremia (IMPROVED)   Likely 2/2 over-treatment with diuretics, desmopressin, high water intake and poor PO intake. Patient has reported history of DI, on home DDAVP 0.15mg for many years. On admission, Na was 120, Uosm 417, renal consulted and desmopressin and home diuretics were held, pt started on hypertonic saline with improvement in Na. Pt's mentation was at baseline through the hospitalization. Renal consulted, sodium followed very closely throughout admission and home DDAVP along with diuretics held. Sodium uptrended to 136. Per renal will discharge off all diuretics and discharge home on reduced dose of desmopressin at 0.05mg nightly.  -per renal: initially symptomatic at 120 (118 outpatient) likely due to ddavp (dose probably higher for her age and weight) and high water intake  s/p 300 ml of 3% 8/2-8/3, Na 120-->121  s/p 180 ml of 3% 8/3, Na 121-->131  s/p 250 ml of D5 8/4 for therapeutic relowering, Na 131-->125  s/p 180 ml of 3% 8/6, Na 127-->134  Na improved to 137 8/6 w/ urine studies c/w appropriate correction, but then dropped to 129 after ddavp was resumed due to concerns of low UOsm close to 100  s/p 330 ml of 3% 8/8 to bring Na back up from 129  Na then 136 8/9 without ddavp, though w/ UOsm 167, Cas[place] 23. 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 Has high urinary frequency. Given significant nocturia that effects her quality of life, Ok to discharge on lowest possible dose of ddavp; 1/2 tablet of 0.05mg at bedtime. Will need to follow up outpatient with BMP in a week    hold home metolazone, spironolactone and torsemide ; f/u with outpatient cardiologist    -discharge on desmopressin 0.05mg nightly   -repeat BMP as outpatient in few days to ensure sodium not increasing or decreasing   -f/u with renal and cardiology as outpatient     #Urinary Tract Infection-RESOLVED   Pt with symptoms of frequency/urgency and dysuria. UA positive on admission, urine cultures growing proteus sensitive to ceftriaxone.   Pt was treated with Ceftriaxone 1g q24 for 3 days with improvement in symptoms   - Follow up as outpatient with your primary care provider at your scheduled appointment     #Chronic HFpEF  -patient with HFpEF, severe AS s/p Bioprosthetic AV 7 years ago at OSH, c/b by bioprosthetic AV stenosis, s/p Valve-in-Valve with a Sheri Ultra by Structural Heart Team at [facility] in June of 2021  Follows w/ Dr Fernandez. Admission in 12/22 for acute on chronic exacerbation. On home torsemide 20mg M/W/F, metolazone 5mg M/W/F, and spironolactone 25mg qd.  -last TTE 12/06/2022: Normal left and right ventricular size and systolic function. Moderate aortic stenosis. The peak transvalvular velocity is 3.10 m/s, the mean transvalvular gradient is 25.00 mmHg, and the LVOT/AV velocity ratio is 0.44. The aortic valve area (estimated via the continuity method) is 1.12 cm. The calculated aortic valve area indexed to body surface area is 0.72 cm/m. Moderate mitral regurgitation. Pulmonary artery systolic pressure is 35 mmHg.  -home Torsemide, Metolazone and spironolactone held in setting of hyponatremia.   -POCUS done by primary team with collapsable IVC 8/9; patient euvolemic on exam  -cardiology consulted for diuretic management, given patient's presentation of hyponatremia while on desmopressin, spironolactone, metolazone, and torsemide s/p hypertonic saline and now appears euvolemic with nl NA, agree with nephrology and primary team to hold off on restarting diuretics.   -follow-up with Dr. Fernandez (patient's cardiologist) within 1 - 2 weeks of discharge for evaluation of volume status.     #At risk for falls.   -PT evaluated, patient will be discharge with home PT and commode with continued HHA    New medications/therapies: none  New lines/hardware: None   Labs to be followed outpatient: repeat BMP, monitor Na   Exam to be followed outpatient: f/u with renal, cardiology and PCP    Discharge plan: Home with HHA and home PT     Physical Exam Upon Discharge:  General: NAD; speaking in full sentences  HEENT: NC/AT; PERRL; EOMI; MMM  Neck: supple; no JVD  Cardiac: RRR; +S1/S2  Pulm: CTA B/L; no W/R/R  GI: soft, NT/ND, +BS  Extremities: Tenderness to palpation of bilateral LE, WWP; no edema, clubbing or cyanosis  Neuro: AAOx3; no focal deficits

## 2023-08-07 NOTE — DISCHARGE NOTE PROVIDER - NSDCFUSCHEDAPPT_GEN_ALL_CORE_FT
Juancho Pandya  [facility]  OPHTHALM [address]  Scheduled Appointment: 10/10/2023     Al Fernandez  [facility]  HEARTVASC [address]  Scheduled Appointment: 08/29/2023    Rodrigo Bowling  [facility]  NEPHRO [address]  Scheduled Appointment: 09/21/2023    Juancho Pandya  [facility]  OPHTHALM [address]  Scheduled Appointment: 10/10/2023

## 2023-08-07 NOTE — PROGRESS NOTE ADULT - PROBLEM SELECTOR PLAN 8
Medical decision making/ symptom management in the setting of advanced illness     Will continue to follow for ongoing GOC discussion/ symptom management. Emotional support provided, questions answered.

## 2023-08-07 NOTE — DISCHARGE NOTE PROVIDER - NSDCFUADDAPPT_GEN_ALL_CORE_FT
Dr. Camila Mcpherson (PCP) was contacted for follow-up appointment after discharge from the hospital. She will contact Ms. Duval's daughter (Iris) to set up an appointment. Dr. Camila Mcpherson (PCP) was contacted for follow-up appointment after discharge from the hospital. She will contact Ms. Duval's daughter (Massiel) to set up an appointment.    Dr. Rodrigo Bowling - Pending appointment on 9/21/2023 at 10AM, but the clinic will contact the patient over the phone if an earlier date/time becomes available.

## 2023-08-07 NOTE — DISCHARGE NOTE PROVIDER - NSDCMRMEDTOKEN_GEN_ALL_CORE_FT
aspirin 81 mg oral tablet, chewable: 1 tab(s) orally once a day  atorvastatin 40 mg oral tablet: 1 tab(s) orally once a day (at bedtime)  brimonidine 0.1% ophthalmic solution: 1 drop(s) to each affected eye 2 times a day  cholecalciferol 125 mcg (5000 intl units) oral tablet: 2 tab(s) orally 2 times a day  desmopressin 0.1 mg oral tablet: 1.5 tab(s) orally once a day (at bedtime)  gabapentin 300 mg oral capsule: 1 cap(s) orally 2 times a day  latanoprost 0.005% ophthalmic solution: 1 drop(s) to each affected eye once a day (at bedtime)  melatonin 1 mg oral tablet: 3 tab(s) orally once a day (at bedtime)  metOLazone 5 mg oral tablet: 1 orally Monday, Wednesday, and Friday  spironolactone 25 mg oral tablet: 1 tab(s) orally once a day  torsemide 20 mg oral tablet: 1 tab(s) orally Monday, Wednesday, and Friday  zinc sulfate 220 mg oral capsule: 1 cap(s) orally once a day   aspirin 81 mg oral tablet, chewable: 1 tab(s) orally once a day  atorvastatin 40 mg oral tablet: 1 tab(s) orally once a day (at bedtime)  brimonidine 0.1% ophthalmic solution: 1 drop(s) to each affected eye 2 times a day  cholecalciferol 125 mcg (5000 intl units) oral tablet: 2 tab(s) orally 2 times a day  gabapentin 300 mg oral capsule: 1 cap(s) orally 2 times a day  latanoprost 0.005% ophthalmic solution: 1 drop(s) to each affected eye once a day (at bedtime)  melatonin 1 mg oral tablet: 3 tab(s) orally once a day (at bedtime)  zinc sulfate 220 mg oral capsule: 1 cap(s) orally once a day   aspirin 81 mg oral tablet, chewable: 1 tab(s) orally once a day  atorvastatin 40 mg oral tablet: 1 tab(s) orally once a day (at bedtime)  brimonidine 0.1% ophthalmic solution: 1 drop(s) to each affected eye 2 times a day  cholecalciferol 125 mcg (5000 intl units) oral tablet: 2 tab(s) orally 2 times a day  desmopressin 0.1 mg oral tablet: 0.5 tab(s) orally once a day (at bedtime)  gabapentin 300 mg oral capsule: 1 cap(s) orally 2 times a day  latanoprost 0.005% ophthalmic solution: 1 drop(s) to each affected eye once a day (at bedtime)  melatonin 1 mg oral tablet: 3 tab(s) orally once a day (at bedtime)  polyethylene glycol 3350 oral powder for reconstitution: 17 gram(s) orally once a day  zinc sulfate 220 mg oral capsule: 1 cap(s) orally once a day

## 2023-08-07 NOTE — PROGRESS NOTE ADULT - PROBLEM SELECTOR PLAN 5
Hx of CHF. Follows w/ Dr Fernandez. Admission in 12/22 for acute on chronic exacerbation. ECHO 12/7: with EF>75% and moderate AS/MR with unchanged gradients across AV and MV    - currently on aldactone 25mg daily   - holding Torsemide 20mg po Mon,Wed,Fri ISO hyponatremia   - holding Metolazone 5mg Mon,Wed, Fri ISO hyponatremia  care as per primary team.

## 2023-08-07 NOTE — PROGRESS NOTE ADULT - PROBLEM SELECTOR PLAN 2
Patient and HHA bedside state patient has chronic sacral wound  Patient endorses itchiness has improved today     Consider wound care consult   consider calamine lotion to area.

## 2023-08-07 NOTE — PROGRESS NOTE ADULT - PROBLEM SELECTOR PLAN 5
Hx of CHF. Follows w/ Dr Fernandez. Admission in 12/22 for acute on chronic exacerbation. ECHO 12/7: with EF>75% and moderate AS/MR with unchanged gradients across AV and MV  - continue aldactone 25mg daily   - hold Torsemide 20mg po Mon,Wed,Fri ISO hyponatremia and   - hold Metolazone 5mg Mon,Wed, Fri ISO hyponatremia  - cardiology following Hx of CHF. Follows w/ Dr Fernandez. Admission in 12/22 for acute on chronic exacerbation. ECHO 12/7: with EF>75% and moderate AS/MR with unchanged gradients across AV and MV  - continue aldactone 25mg daily   - hold Torsemide 20mg po and Metolazone 5mg ISO hyponatremia and   - cardiology following, follow up as outpatient for management

## 2023-08-07 NOTE — PROGRESS NOTE ADULT - PROBLEM SELECTOR PLAN 1
Improving   Given hypertonic saline again last night. Na improved to 134 this AM. No additional fluids  Nephro following. Appreciate recs   Hold desmopressin and Metolazone and start salt tabs per renal   Likely 2/2 DI?   Daily UOsm Improving   Given hypertonic saline again last night. Na improved to 134 this AM. No additional fluids  Nephro following.  Holding desmopressin and Metolazone  Daily UOsm Improving  Like iso over-treatment with Desmopressin, loose stools and poor PO intake   Pt mentating at baseline  Pt given Desmopressin overnight, Na 137 -> 134 -> 132 in AM of 8/7  Uosm 136 -> 445 in AM 8/7  [place] 24 -> 37 in AM 8/7   Nephro following  Plan:  - Will continue to hold desmopressin and Metolazone  - Will Hold hypertonic saline as pt likely to correct on her own   - f/u PM Na, [place] and UOsm  - Renal to do a trial of diuretics prior to d/c to assess effect on Na

## 2023-08-07 NOTE — DISCHARGE NOTE PROVIDER - CARE PROVIDER_API CALL
Rodrigo Bowling  Nephrology  [address]  [place] [address]  Phone: [phone]  Fax: [phone]  Scheduled Appointment: 09/21/2023 10:00 AM    Al Fernandez  Cardiovascular Disease  [address] [place] [address]  Phone: [phone]  Fax: [phone]  Established Patient  Scheduled Appointment: 08/29/2023 02:20 PM

## 2023-08-07 NOTE — PROGRESS NOTE ADULT - PROBLEM SELECTOR PLAN 8
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 Stable at this time   Plan:  - cont to monitor

## 2023-08-07 NOTE — DISCHARGE NOTE PROVIDER - PROVIDER TOKENS
PROVIDER:[TOKEN:[[id number]],SCHEDULEDAPPT:[09/21/2023],SCHEDULEDAPPTTIME:[10:00 AM]],FREE:[LAST:[Jim],FIRST:[Al Carreon],PHONE:[phone]:[phone]],ADDRESS:[Cardiovascular Disease  [address] [place] [address]],SCHEDULEDAPPT:[08/29/2023],SCHEDULEDAPPTTIME:[02:20 PM],ESTABLISHEDPATIENT:[T]]

## 2023-08-07 NOTE — PROGRESS NOTE ADULT - PROBLEM SELECTOR PLAN 9
Plan:  F: none   E: replete K<4, Mg<2  N: regular  VTE Prophylaxis: lovenox 40mg QD  GI: none  C: Full Code  D: TAN Plan:  F: none   E: replete K<4, Mg<2  N: regular with 1L fluid restriction  VTE Prophylaxis: lovenox 40mg QD  GI: none  C: DNR/DNI  D: Home with Home Care

## 2023-08-07 NOTE — PROGRESS NOTE ADULT - ATTENDING COMMENTS
I agree with the fellow's findings and plans as written above with the following additions/amendments:    Seen and examined at bedside. Feels well, DDAVP likely caused todays hyponatremia, given this would continue to hold desmopressin, if not DI patient will continue to have moderate urine output with adequately hypotonic urine. Further recs as above, discussed in detail with patient and primary team

## 2023-08-07 NOTE — CHART NOTE - NSCHARTNOTEFT_GEN_A_CORE
PM labs reviewed, sodium downtrending and now at 129. Urine osm 509. Please give hypertonic saline at 30cc/hr for 13 hours. Check BMP and Uosm at 2AM. Please reach out to on-call Nephrology fellow with result if serum sodium is downtrending beyond 129 or has increased above 134. Discussed with attending Dr. Bowling.

## 2023-08-07 NOTE — PHARMACY COMMUNICATION NOTE - COMMENTS
Updated primary pharmacy - [facility] [id number] - [phone].    Active medications:   Gabapentin 300 mg capsule - Take 1 capsule twice daily  Desmopressin Acetate 0.1 mg oral tablet - Take 1.5 tablet by mouth at night time  Atorvastatin 40 mg tablet - Take 1 tablet at night time  Melatonin 3 mg tablet - Take 3 tablets at night time  Latanoprost 0.005 % ophthalmic solution - Apply 1 drop to both eyes at bedtime   Brimonidine 0.1 % ophthalmic solution - Apply 1 drop to both eyes at bedtime 2x daily  Metolazone 5 mg tablet - Take 1 tablet orally (Monday, Wednesday and Friday)   Torsemide 20 mg tablet -  Take 1 tablet orally (Monday, Wednesday and Friday)   Spironolactone 25 mg tablet - Take 1 tablet orally once a day  Aspirin 81 mg chewable tablet - Take 1 tablet orally once a day   Cholecalciferol 125 mcg (5000 IU) oral tablet - Take 2 tablet orally twice a day   Zinc sulfate 220 mg oral capsule - Take 1 capsule orally once a day     Pt’s home health aide had a complete list of her medications.  No discrepancies found.

## 2023-08-07 NOTE — PROGRESS NOTE ADULT - CONVERSATION DETAILS
Patient and daughter both states the patient would like to be DNR/ DNI. Patient and daughter states that the patient does not want invasive interventions long term. Patient states she just wants to be comfortable and pain free when she passes away. Her daughter has a copy of her living will and HCP form.  MOLST form filled, and placed in chart

## 2023-08-07 NOTE — PROGRESS NOTE ADULT - SUBJECTIVE AND OBJECTIVE BOX
ALFONSO OROZCODRA  89y  Female    Patient is a 89y old  Female who presents with a chief complaint of hyponatremia (07 Aug 2023 12:21)      INTERVAL HPI/OVERNIGHT EVENTS:  Patient was given ddAVP last night. Na this .   Patient was seen and examined at bedside. Patient with mental status much improved. Patient understands the importance of following sodium level to best determine treatment plan from renal standpoint. Otherwise neg ROS    T(C): 36.8 (08-07-23 @ 11:59), Max: 37.1 (08-06-23 @ 14:00)  HR: 76 (08-07-23 @ 11:59) (71 - 88)  BP: 109/67 (08-07-23 @ 11:59) (103/71 - 127/79)  RR: 18 (08-07-23 @ 11:59) (18 - 18)  SpO2: 95% (08-07-23 @ 11:59) (95% - 96%)  Wt(kg): --Vital Signs Last 24 Hrs  T(C): 36.8 (07 Aug 2023 11:59), Max: 37.1 (06 Aug 2023 14:00)  T(F): 98.3 (07 Aug 2023 11:59), Max: 98.7 (06 Aug 2023 14:00)  HR: 76 (07 Aug 2023 11:59) (71 - 88)  BP: 109/67 (07 Aug 2023 11:59) (103/71 - 127/79)  BP(mean): --  RR: 18 (07 Aug 2023 11:59) (18 - 18)  SpO2: 95% (07 Aug 2023 11:59) (95% - 96%)    Parameters below as of 07 Aug 2023 11:59  Patient On (Oxygen Delivery Method): room air        PHYSICAL EXAM:  GENERAL: NAD; pleasantly demented  Neuro: AAOx2  HEENT: NC/AT; MMM; neck supple; no nystagmus; no scleral icterus  Heart: RRR; +s1 and s2; holosystolic murmur best appreciated at RUSB; no JVD  Lungs: expiratory wheezing; normal effort; no accessory muscle use  GI: nondistended; nontender; normal bowel sounds [id number]  Extremities: +2 pulses in UE and LE b/l; no clubbing, cyanosis, or edema b/l, capillary refill <2 sec b/l  Skin: skin dry and warm; no skin tenting; no rashes or lesions  MSK: normal tone    Consultant(s) Notes Reviewed:  [x ] YES  [ ] NO  Care Discussed with Consultants/Other Providers [ x] YES  [ ] NO    LABS:                        10.2   9.90  )-----------( 321      ( 07 Aug 2023 06:47 )             32.4     08-07    132<L>  |  96  |  19  ----------------------------<  85  4.8   |  22  |  1.09    Ca    8.4      07 Aug 2023 06:47  Phos  3.8     08-07  Mg     1.6     08-07    TPro  6.5  /  Alb  3.6  /  TBili  0.4  /  DBili  x   /  AST  20  /  ALT  11  /  AlkPhos  70  08-06        Urinalysis Basic - ( 07 Aug 2023 06:47 )    Color: x / Appearance: x / SG: x / pH: x  Gluc: 85 mg/dL / Ketone: x  / Bili: x / Urobili: x   Blood: x / Protein: x / Nitrite: x   Leuk Esterase: x / RBC: x / WBC x   Sq Epi: x / Non Sq Epi: x / Bacteria: x      CAPILLARY BLOOD GLUCOSE            Urinalysis Basic - ( 07 Aug 2023 06:47 )    Color: x / Appearance: x / SG: x / pH: x  Gluc: 85 mg/dL / Ketone: x  / Bili: x / Urobili: x   Blood: x / Protein: x / Nitrite: x   Leuk Esterase: x / RBC: x / WBC x   Sq Epi: x / Non Sq Epi: x / Bacteria: x        MEDICATIONS  (STANDING):  aspirin  chewable 81 milliGRAM(s) Oral daily  atorvastatin 40 milliGRAM(s) Oral at bedtime  brimonidine 0.2% Ophthalmic Solution 1 Drop(s) Both EYES two times a day  cholecalciferol 1000 Unit(s) Oral daily  enoxaparin Injectable 40 milliGRAM(s) SubCutaneous every 24 hours  gabapentin 300 milliGRAM(s) Oral two times a day  latanoprost 0.005% Ophthalmic Solution 1 Drop(s) Both EYES at bedtime  melatonin 3 milliGRAM(s) Oral at bedtime  spironolactone 25 milliGRAM(s) Oral daily  zinc sulfate 220 milliGRAM(s) Oral daily    MEDICATIONS  (PRN):  acetaminophen     Tablet .. 650 milliGRAM(s) Oral every 6 hours PRN Mild Pain (1 - 3)      RADIOLOGY & ADDITIONAL TESTS:    Imaging Personally Reviewed:  [ ] YES  [ ] NO

## 2023-08-07 NOTE — PROGRESS NOTE ADULT - ATTENDING COMMENTS
Hyponatremia improving.  Appears euvolemic.  Restart furosemide if positive fluid balance, avoid metolazone at this time.

## 2023-08-07 NOTE — PROGRESS NOTE ADULT - SUBJECTIVE AND OBJECTIVE BOX
** INCOMPLETE **    OVERNIGHT EVENTS:     SUBJECTIVE:    VITAL SIGNS:  Vital Signs Last 24 Hrs  T(C): 36.8 (07 Aug 2023 06:16), Max: 37.1 (06 Aug 2023 14:00)  T(F): 98.2 (07 Aug 2023 06:16), Max: 98.7 (06 Aug 2023 14:00)  HR: 74 (07 Aug 2023 06:16) (71 - 88)  BP: 127/79 (07 Aug 2023 06:16) (103/71 - 127/79)  BP(mean): --  RR: 18 (07 Aug 2023 06:16) (18 - 18)  SpO2: 96% (07 Aug 2023 06:16) (96% - 96%)    Parameters below as of 07 Aug 2023 06:16  Patient On (Oxygen Delivery Method): room air        PHYSICAL EXAM:  General: NAD; speaking in full sentences  HEENT: NC/AT; PERRL; EOMI; MMM  Neck: supple; no JVD  Cardiac: RRR; +S1/S2  Pulm: CTA B/L; no W/R/R  GI: soft, NT/ND, +BS  Extremities: WWP; no edema, clubbing or cyanosis  Vasc: 2+ radial, DP pulses B/L  Neuro: AAOx3; no focal deficits    MEDICATIONS:  MEDICATIONS  (STANDING):  aspirin  chewable 81 milliGRAM(s) Oral daily  atorvastatin 40 milliGRAM(s) Oral at bedtime  brimonidine 0.2% Ophthalmic Solution 1 Drop(s) Both EYES two times a day  cholecalciferol 1000 Unit(s) Oral daily  enoxaparin Injectable 40 milliGRAM(s) SubCutaneous every 24 hours  gabapentin 300 milliGRAM(s) Oral two times a day  latanoprost 0.005% Ophthalmic Solution 1 Drop(s) Both EYES at bedtime  melatonin 3 milliGRAM(s) Oral at bedtime  spironolactone 25 milliGRAM(s) Oral daily  zinc sulfate 220 milliGRAM(s) Oral daily    MEDICATIONS  (PRN):  acetaminophen     Tablet .. 650 milliGRAM(s) Oral every 6 hours PRN Mild Pain (1 - 3)      ALLERGIES:  Allergies    penicillin (Unknown)  [This allergen will not trigger allergy alert] Acetic Bp-Ijsthsprbq-Tgjycindu (Other)  erythromycin (Unknown)    Intolerances        LABS:                        11.4   9.13  )-----------( 327      ( 06 Aug 2023 10:11 )             34.5     08-06    134<L>  |  96  |  20  ----------------------------<  118<H>  4.7   |  27  |  1.19    Ca    9.1      06 Aug 2023 22:27  Phos  3.6     08-06  Mg     1.7     08-06    TPro  6.5  /  Alb  3.6  /  TBili  0.4  /  DBili  x   /  AST  20  /  ALT  11  /  AlkPhos  70  08-06        RADIOLOGY & ADDITIONAL TESTS: Reviewed. ** INCOMPLETE **    OVERNIGHT EVENTS: None    SUBJECTIVE: Patient was seen and examined at bedside. She reports feeling better compared to when she first arrived to the hospital. She finished her ceftriaxone course on 8/4. Patient was given desmopressin 0.1mg last night and Nephro recommended starting salt tabs.     She denies fever, chest pain, shortness of breath, abdominal pain.    VITAL SIGNS:  Vital Signs Last 24 Hrs  T(C): 36.8 (07 Aug 2023 06:16), Max: 37.1 (06 Aug 2023 14:00)  T(F): 98.2 (07 Aug 2023 06:16), Max: 98.7 (06 Aug 2023 14:00)  HR: 74 (07 Aug 2023 06:16) (71 - 88)  BP: 127/79 (07 Aug 2023 06:16) (103/71 - 127/79)  BP(mean): --  RR: 18 (07 Aug 2023 06:16) (18 - 18)  SpO2: 96% (07 Aug 2023 06:16) (96% - 96%)    Parameters below as of 07 Aug 2023 06:16  Patient On (Oxygen Delivery Method): room air        PHYSICAL EXAM:  General: NAD; speaking in full sentences  HEENT: NC/AT; PERRL; EOMI; MMM  Neck: supple; no JVD  Cardiac: RRR; +S1/S2. Systolic murmur consistent with aortic stenosis and AVR.  Pulm: CTA B/L; no W/R/R  GI: soft, NT/ND, +BS  Extremities: WWP; no edema, clubbing or cyanosis  Vasc: 2+ radial, DP pulses B/L  Neuro: AAOx3; no focal deficits    MEDICATIONS:  MEDICATIONS  (STANDING):  aspirin  chewable 81 milliGRAM(s) Oral daily  atorvastatin 40 milliGRAM(s) Oral at bedtime  brimonidine 0.2% Ophthalmic Solution 1 Drop(s) Both EYES two times a day  cholecalciferol 1000 Unit(s) Oral daily  enoxaparin Injectable 40 milliGRAM(s) SubCutaneous every 24 hours  gabapentin 300 milliGRAM(s) Oral two times a day  latanoprost 0.005% Ophthalmic Solution 1 Drop(s) Both EYES at bedtime  melatonin 3 milliGRAM(s) Oral at bedtime  spironolactone 25 milliGRAM(s) Oral daily  zinc sulfate 220 milliGRAM(s) Oral daily    MEDICATIONS  (PRN):  acetaminophen     Tablet .. 650 milliGRAM(s) Oral every 6 hours PRN Mild Pain (1 - 3)      ALLERGIES:  Allergies    penicillin (Unknown)  [This allergen will not trigger allergy alert] Acetic Cb-Vwcrdqrrlk-Unwhgpwuy (Other)  erythromycin (Unknown)    Intolerances        LABS:                        11.4   9.13  )-----------( 327      ( 06 Aug 2023 10:11 )             34.5     08-06    134<L>  |  96  |  20  ----------------------------<  118<H>  4.7   |  27  |  1.19    Ca    9.1      06 Aug 2023 22:27  Phos  3.6     08-06  Mg     1.7     08-06    TPro  6.5  /  Alb  3.6  /  TBili  0.4  /  DBili  x   /  AST  20  /  ALT  11  /  AlkPhos  70  08-06        RADIOLOGY & ADDITIONAL TESTS: Reviewed. ** INCOMPLETE **    OVERNIGHT EVENTS: None    SUBJECTIVE: Patient was seen and examined at bedside. She reports feeling better compared to when she first arrived to the hospital. She finished her ceftriaxone course on 8/4. Patient was given desmopressin 0.1mg last night and Nephro recommended starting salt tabs.     Her daughter was on the phone and the plan to continue monitoring the patient's sodium levels and reassessing if desmopressin is necessary upon discharge was discussed.     She denies fever, chest pain, shortness of breath, abdominal pain.    VITAL SIGNS:  Vital Signs Last 24 Hrs  T(C): 36.8 (07 Aug 2023 06:16), Max: 37.1 (06 Aug 2023 14:00)  T(F): 98.2 (07 Aug 2023 06:16), Max: 98.7 (06 Aug 2023 14:00)  HR: 74 (07 Aug 2023 06:16) (71 - 88)  BP: 127/79 (07 Aug 2023 06:16) (103/71 - 127/79)  BP(mean): --  RR: 18 (07 Aug 2023 06:16) (18 - 18)  SpO2: 96% (07 Aug 2023 06:16) (96% - 96%)    Parameters below as of 07 Aug 2023 06:16  Patient On (Oxygen Delivery Method): room air        PHYSICAL EXAM:  General: NAD; speaking in full sentences  HEENT: NC/AT; PERRL; EOMI; MMM  Neck: supple; no JVD  Cardiac: RRR; +S1/S2. Systolic murmur consistent with aortic stenosis and AVR.  Pulm: CTA B/L; no W/R/R  GI: soft, NT/ND, +BS  Extremities: WWP; no edema, clubbing or cyanosis  Vasc: 2+ radial, DP pulses B/L  Neuro: AAOx3; no focal deficits    MEDICATIONS:  MEDICATIONS  (STANDING):  aspirin  chewable 81 milliGRAM(s) Oral daily  atorvastatin 40 milliGRAM(s) Oral at bedtime  brimonidine 0.2% Ophthalmic Solution 1 Drop(s) Both EYES two times a day  cholecalciferol 1000 Unit(s) Oral daily  enoxaparin Injectable 40 milliGRAM(s) SubCutaneous every 24 hours  gabapentin 300 milliGRAM(s) Oral two times a day  latanoprost 0.005% Ophthalmic Solution 1 Drop(s) Both EYES at bedtime  melatonin 3 milliGRAM(s) Oral at bedtime  spironolactone 25 milliGRAM(s) Oral daily  zinc sulfate 220 milliGRAM(s) Oral daily    MEDICATIONS  (PRN):  acetaminophen     Tablet .. 650 milliGRAM(s) Oral every 6 hours PRN Mild Pain (1 - 3)      ALLERGIES:  Allergies    penicillin (Unknown)  [This allergen will not trigger allergy alert] Acetic Zk-Esdxxihoic-Omsaenzyc (Other)  erythromycin (Unknown)    Intolerances        LABS:                        11.4   9.13  )-----------( 327      ( 06 Aug 2023 10:11 )             34.5     08-06    134<L>  |  96  |  20  ----------------------------<  118<H>  4.7   |  27  |  1.19    Ca    9.1      06 Aug 2023 22:27  Phos  3.6     08-06  Mg     1.7     08-06    TPro  6.5  /  Alb  3.6  /  TBili  0.4  /  DBili  x   /  AST  20  /  ALT  11  /  AlkPhos  70  08-06        RADIOLOGY & ADDITIONAL TESTS: Reviewed. OVERNIGHT EVENTS: None    SUBJECTIVE: Patient was seen and examined at bedside. She reports feeling better compared to when she first arrived to the hospital. Pt's daughter was on the phone and the plan was discussed at length. Today she denies fever, chest pain, shortness of breath, abdominal pain.    VITAL SIGNS:  Vital Signs Last 24 Hrs  T(C): 36.8 (07 Aug 2023 06:16), Max: 37.1 (06 Aug 2023 14:00)  T(F): 98.2 (07 Aug 2023 06:16), Max: 98.7 (06 Aug 2023 14:00)  HR: 74 (07 Aug 2023 06:16) (71 - 88)  BP: 127/79 (07 Aug 2023 06:16) (103/71 - 127/79)  BP(mean): --  RR: 18 (07 Aug 2023 06:16) (18 - 18)  SpO2: 96% (07 Aug 2023 06:16) (96% - 96%)    Parameters below as of 07 Aug 2023 06:16  Patient On (Oxygen Delivery Method): room air        PHYSICAL EXAM:  General: NAD; speaking in full sentences  HEENT: PERRL; EOMI; MMM  Neck: supple; no JVD  Cardiac: RRR; +S1/S2. Systolic murmur consistent with aortic stenosis and AVR.  Pulm: CTA B/L; no W/R/R  GI: soft, NT/ND, +BS  Extremities: WWP; no edema, clubbing or cyanosis  Vasc: 2+ radial, DP pulses B/L  Neuro: AAOx3; no focal deficits    MEDICATIONS:  MEDICATIONS  (STANDING):  aspirin  chewable 81 milliGRAM(s) Oral daily  atorvastatin 40 milliGRAM(s) Oral at bedtime  brimonidine 0.2% Ophthalmic Solution 1 Drop(s) Both EYES two times a day  cholecalciferol 1000 Unit(s) Oral daily  enoxaparin Injectable 40 milliGRAM(s) SubCutaneous every 24 hours  gabapentin 300 milliGRAM(s) Oral two times a day  latanoprost 0.005% Ophthalmic Solution 1 Drop(s) Both EYES at bedtime  melatonin 3 milliGRAM(s) Oral at bedtime  spironolactone 25 milliGRAM(s) Oral daily  zinc sulfate 220 milliGRAM(s) Oral daily    MEDICATIONS  (PRN):  acetaminophen     Tablet .. 650 milliGRAM(s) Oral every 6 hours PRN Mild Pain (1 - 3)      ALLERGIES:  Allergies    penicillin (Unknown)  [This allergen will not trigger allergy alert] Acetic Oh-Gpptpkigaa-Xdgyvxjmy (Other)  erythromycin (Unknown)    Intolerances        LABS:                        11.4   9.13  )-----------( 327      ( 06 Aug 2023 10:11 )             34.5     08-06    134<L>  |  96  |  20  ----------------------------<  118<H>  4.7   |  27  |  1.19    Ca    9.1      06 Aug 2023 22:27  Phos  3.6     08-06  Mg     1.7     08-06    TPro  6.5  /  Alb  3.6  /  TBili  0.4  /  DBili  x   /  AST  20  /  ALT  11  /  AlkPhos  70  08-06        RADIOLOGY & ADDITIONAL TESTS: Reviewed. OVERNIGHT EVENTS: None    SUBJECTIVE:   Patient was seen and examined at bedside. She reports feeling better compared to when she first arrived to the hospital. Pt's daughter was on the phone and the plan was discussed at length. Today she denies fever, chest pain, shortness of breath, abdominal pain.    VITAL SIGNS:  Vital Signs Last 24 Hrs  T(C): 36.8 (07 Aug 2023 06:16), Max: 37.1 (06 Aug 2023 14:00)  T(F): 98.2 (07 Aug 2023 06:16), Max: 98.7 (06 Aug 2023 14:00)  HR: 74 (07 Aug 2023 06:16) (71 - 88)  BP: 127/79 (07 Aug 2023 06:16) (103/71 - 127/79)  BP(mean): --  RR: 18 (07 Aug 2023 06:16) (18 - 18)  SpO2: 96% (07 Aug 2023 06:16) (96% - 96%)    Parameters below as of 07 Aug 2023 06:16  Patient On (Oxygen Delivery Method): room air        PHYSICAL EXAM:  General: NAD; speaking in full sentences, more awake this AM compared to yesterday's exam  HEENT: PERRL; EOMI; MMM  Neck: supple; no JVD  Cardiac: RRR; +S1/S2. Systolic murmur consistent with aortic stenosis and AVR.  Pulm: CTA B/L; no W/R/R  GI: soft, NT/ND, +BS  Extremities: WWP; no edema, clubbing or cyanosis  Neuro: AAOx3; no focal deficits    MEDICATIONS:  MEDICATIONS  (STANDING):  aspirin  chewable 81 milliGRAM(s) Oral daily  atorvastatin 40 milliGRAM(s) Oral at bedtime  brimonidine 0.2% Ophthalmic Solution 1 Drop(s) Both EYES two times a day  cholecalciferol 1000 Unit(s) Oral daily  enoxaparin Injectable 40 milliGRAM(s) SubCutaneous every 24 hours  gabapentin 300 milliGRAM(s) Oral two times a day  latanoprost 0.005% Ophthalmic Solution 1 Drop(s) Both EYES at bedtime  melatonin 3 milliGRAM(s) Oral at bedtime  spironolactone 25 milliGRAM(s) Oral daily  zinc sulfate 220 milliGRAM(s) Oral daily    MEDICATIONS  (PRN):  acetaminophen     Tablet .. 650 milliGRAM(s) Oral every 6 hours PRN Mild Pain (1 - 3)      ALLERGIES:  Allergies    penicillin (Unknown)  [This allergen will not trigger allergy alert] Acetic Ip-Ddthpfnnyd-Mjydqlwwz (Other)  erythromycin (Unknown)    Intolerances        LABS:                        11.4   9.13  )-----------( 327      ( 06 Aug 2023 10:11 )             34.5     08-06    134<L>  |  96  |  20  ----------------------------<  118<H>  4.7   |  27  |  1.19    Ca    9.1      06 Aug 2023 22:27  Phos  3.6     08-06  Mg     1.7     08-06    TPro  6.5  /  Alb  3.6  /  TBili  0.4  /  DBili  x   /  AST  20  /  ALT  11  /  AlkPhos  70  08-06        RADIOLOGY & ADDITIONAL TESTS: Reviewed.

## 2023-08-07 NOTE — PROGRESS NOTE ADULT - PROBLEM SELECTOR PLAN 7
Patient and daughter both states she would like to be DNR/ DNI. Patient and daughter states that the patient does not want invasive interventions long term.  MOLST form filled, and placed in chart    - will continue to follow clinical course and help patient with decision making.

## 2023-08-07 NOTE — DISCHARGE NOTE PROVIDER - NSDCCPCAREPLAN_GEN_ALL_CORE_FT
PRINCIPAL DISCHARGE DIAGNOSIS  Diagnosis: Hyponatremia  Assessment and Plan of Treatment: You were admitted to the hospital with low sodium levels. This can be dangerous as it can cause brain damage. Our kidney doctors followed you while you were in the hospital and your sodium levels were slowly corrected. You should stop taking your metolazone, spironolactone, and torsemide. You should also stop taking your desmopressin. Limit your fluid intake to 1L-1.5L a day. Please make sure to follow up with your cardiologist, kidney doctor, and primary care doctor after you leave the hospital and have your sodium levels monitored closely as well.     PRINCIPAL DISCHARGE DIAGNOSIS  Diagnosis: Hyponatremia  Assessment and Plan of Treatment: You were admitted to the hospital with low sodium levels. Hyponatremia means that the sodium level in the blood is below normal. Your body needs sodium for fluid balance, blood pressure control, as well as the nerves and muscles. The normal blood sodium level is 135 to 145 milliequivalents/liter (mEq/L). Hyponatremia occurs when your blood sodium level goes below 135 mEq/L.  When the sodium level in your blood is too low, extra water goes into your cells and makes them swell. This swelling can be dangerous especially in the brain, since the brain cannot expand past the skull.  Our kidney doctors followed you while you were in the hospital and your sodium levels were slowly corrected. You should stop taking your metolazone, spironolactone, and torsemide as these medications can cause low sodium levels. Also, your desmopressin dose will be reduced to 0.05mg at night, so please start taking HALF of your 0.1mg pill nightly. Please make sure to follow up with your cardiologist, kidney doctor, and primary care doctor after you leave the hospital and have your sodium levels monitored closely as well.

## 2023-08-07 NOTE — PROGRESS NOTE ADULT - PROBLEM SELECTOR PLAN 3
Na 132 this morning, patient mentating at baseline per aides at bedside    continue to monitor  care per nephrology and primary team.

## 2023-08-07 NOTE — PROGRESS NOTE ADULT - ASSESSMENT
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 Cardiology consulted for Optimization    Review of studies.   - Echo 12/06/2022: Normal left and right ventricular size and systolic function. Moderate aortic stenosis. Moderate mitral regurgitation. Pulmonary artery systolic pressure is 35 mmHg.    #HFpEF, appears euvolemic   net negative 5225 since admission  - agree with holding lasix and metolazone today  - spironolactone on hold  - reassess fluid status tomorrow  - measure Is and Os  - c/w atorvastatin 40mg    #severe AS s/p Bio AVR, s/p ROSLYN Sheri Ultra  denies chest pain, sob  - c/w ASA 81mg qd    #hyponatremia   Na 132 today, DDAVP, diuretics on hold  - fu nephrology recommendations    Ensure Followup with Dr. Fernandez (patient's cardiologist) within 1 - 2 weeks of discharge

## 2023-08-07 NOTE — PATIENT PROFILE ADULT - FUNCTIONAL ASSESSMENT - BASIC MOBILITY 3.
Assisted pt to [facility] with wheelchair      Soila Rolle RN  08/06/23 [id number]
2 = A lot of assistance

## 2023-08-07 NOTE — PROGRESS NOTE ADULT - SUBJECTIVE AND OBJECTIVE BOX
Cardiology Consult    O/N:  Interval History/HPI: Pt seen and examined at bedside, NAD, no complaints at this time. feeling well. Denies cp, sob.  Telemetry:    OBJECTIVE  T(C): 36.8 (08-07-23 @ 11:59), Max: 36.8 (08-07-23 @ 06:16)  HR: 76 (08-07-23 @ 11:59) (71 - 76)  BP: 109/67 (08-07-23 @ 11:59) (103/71 - 127/79)  RR: 18 (08-07-23 @ 11:59) (18 - 18)  SpO2: 95% (08-07-23 @ 11:59) (95% - 96%)    08-07-23 @ 07:01  -  08-07-23 @ 19:24  --------------------------------------------------------  IN: 0 mL / OUT: 300 mL / NET: -300 mL        PHYSICAL EXAM:    Elderly pleasant female   lying in bed NAD   rrr, + systolic murmur LSB _III/V  ctab  soft ntnd   trace b/l edema   talkative, conversing appropriately moving all extremities      LABS:                        10.2   9.90  )-----------( 321      ( 07 Aug 2023 06:47 )             32.4     08-07    132<L>  |  96  |  19  ----------------------------<  85  4.8   |  22  |  1.09    Ca    8.4      07 Aug 2023 06:47  Phos  3.8     08-07  Mg     1.6     08-07    TPro  6.5  /  Alb  3.6  /  TBili  0.4  /  DBili  x   /  AST  20  /  ALT  11  /  AlkPhos  70  08-06      Urinalysis Basic - ( 07 Aug 2023 06:47 )    Color: x / Appearance: x / SG: x / pH: x  Gluc: 85 mg/dL / Ketone: x  / Bili: x / Urobili: x   Blood: x / Protein: x / Nitrite: x   Leuk Esterase: x / RBC: x / WBC x   Sq Epi: x / Non Sq Epi: x / Bacteria: x        RADIOLOGY & ADDITIONAL TESTS:  Reviewed .    MEDICATIONS  (STANDING):  aspirin  chewable 81 milliGRAM(s) Oral daily  atorvastatin 40 milliGRAM(s) Oral at bedtime  brimonidine 0.2% Ophthalmic Solution 1 Drop(s) Both EYES two times a day  cholecalciferol 1000 Unit(s) Oral daily  enoxaparin Injectable 40 milliGRAM(s) SubCutaneous every 24 hours  gabapentin 300 milliGRAM(s) Oral two times a day  latanoprost 0.005% Ophthalmic Solution 1 Drop(s) Both EYES at bedtime  melatonin 3 milliGRAM(s) Oral at bedtime  zinc sulfate 220 milliGRAM(s) Oral daily    MEDICATIONS  (PRN):  acetaminophen     Tablet .. 650 milliGRAM(s) Oral every 6 hours PRN Mild Pain (1 - 3)

## 2023-08-08 LAB
ANION GAP SERPL CALC-SCNC: 6 MMOL/L — SIGNIFICANT CHANGE UP (ref 5–17)
ANION GAP SERPL CALC-SCNC: 7 MMOL/L — SIGNIFICANT CHANGE UP (ref 5–17)
ANION GAP SERPL CALC-SCNC: 7 MMOL/L — SIGNIFICANT CHANGE UP (ref 5–17)
ANION GAP SERPL CALC-SCNC: 8 MMOL/L — SIGNIFICANT CHANGE UP (ref 5–17)
BUN SERPL-MCNC: 16 MG/DL — SIGNIFICANT CHANGE UP (ref 7–23)
BUN SERPL-MCNC: 19 MG/DL — SIGNIFICANT CHANGE UP (ref 7–23)
BUN SERPL-MCNC: 22 MG/DL — SIGNIFICANT CHANGE UP (ref 7–23)
BUN SERPL-MCNC: 24 MG/DL — HIGH (ref 7–23)
CALCIUM SERPL-MCNC: 8.9 MG/DL — SIGNIFICANT CHANGE UP (ref 8.4–10.5)
CALCIUM SERPL-MCNC: 9.1 MG/DL — SIGNIFICANT CHANGE UP (ref 8.4–10.5)
CALCIUM SERPL-MCNC: 9.4 MG/DL — SIGNIFICANT CHANGE UP (ref 8.4–10.5)
CALCIUM SERPL-MCNC: 9.5 MG/DL — SIGNIFICANT CHANGE UP (ref 8.4–10.5)
CHLORIDE SERPL-SCNC: 101 MMOL/L — SIGNIFICANT CHANGE UP (ref 96–108)
CHLORIDE SERPL-SCNC: 103 MMOL/L — SIGNIFICANT CHANGE UP (ref 96–108)
CHLORIDE SERPL-SCNC: 95 MMOL/L — LOW (ref 96–108)
CHLORIDE SERPL-SCNC: 98 MMOL/L — SIGNIFICANT CHANGE UP (ref 96–108)
CO2 SERPL-SCNC: 27 MMOL/L — SIGNIFICANT CHANGE UP (ref 22–31)
CO2 SERPL-SCNC: 28 MMOL/L — SIGNIFICANT CHANGE UP (ref 22–31)
CO2 SERPL-SCNC: 29 MMOL/L — SIGNIFICANT CHANGE UP (ref 22–31)
CO2 SERPL-SCNC: 30 MMOL/L — SIGNIFICANT CHANGE UP (ref 22–31)
CREAT SERPL-MCNC: 0.84 MG/DL — SIGNIFICANT CHANGE UP (ref 0.5–1.3)
CREAT SERPL-MCNC: 0.91 MG/DL — SIGNIFICANT CHANGE UP (ref 0.5–1.3)
CREAT SERPL-MCNC: 1.11 MG/DL — SIGNIFICANT CHANGE UP (ref 0.5–1.3)
CREAT SERPL-MCNC: 1.14 MG/DL — SIGNIFICANT CHANGE UP (ref 0.5–1.3)
EGFR: 45 ML/MIN/1.73M2 — LOW
EGFR: 46 ML/MIN/1.73M2 — LOW
EGFR: 58 ML/MIN/1.73M2 — LOW
EGFR: 64 ML/MIN/1.73M2 — SIGNIFICANT CHANGE UP
GLUCOSE SERPL-MCNC: 101 MG/DL — HIGH (ref 70–99)
GLUCOSE SERPL-MCNC: 106 MG/DL — HIGH (ref 70–99)
GLUCOSE SERPL-MCNC: 117 MG/DL — HIGH (ref 70–99)
GLUCOSE SERPL-MCNC: 92 MG/DL — SIGNIFICANT CHANGE UP (ref 70–99)
HCT VFR BLD CALC: 32.5 % — LOW (ref 34.5–45)
HGB BLD-MCNC: 10.5 G/DL — LOW (ref 11.5–15.5)
MAGNESIUM SERPL-MCNC: 1.6 MG/DL — SIGNIFICANT CHANGE UP (ref 1.6–2.6)
MCHC RBC-ENTMCNC: 30.4 PG — SIGNIFICANT CHANGE UP (ref 27–34)
MCHC RBC-ENTMCNC: 32.3 GM/DL — SIGNIFICANT CHANGE UP (ref 32–36)
MCV RBC AUTO: 94.2 FL — SIGNIFICANT CHANGE UP (ref 80–100)
NRBC # BLD: 0 /100 WBCS — SIGNIFICANT CHANGE UP (ref 0–0)
OSMOLALITY UR: 129 MOSM/KG — LOW (ref 300–900)
OSMOLALITY UR: 141 MOSM/KG — LOW (ref 300–900)
OSMOLALITY UR: 246 MOSM/KG — LOW (ref 300–900)
OSMOLALITY UR: 499 MOSM/KG — SIGNIFICANT CHANGE UP (ref 300–900)
PHOSPHATE SERPL-MCNC: 4.1 MG/DL — SIGNIFICANT CHANGE UP (ref 2.5–4.5)
PLATELET # BLD AUTO: 296 K/UL — SIGNIFICANT CHANGE UP (ref 150–400)
POTASSIUM SERPL-MCNC: 4.8 MMOL/L — SIGNIFICANT CHANGE UP (ref 3.5–5.3)
POTASSIUM SERPL-MCNC: 4.9 MMOL/L — SIGNIFICANT CHANGE UP (ref 3.5–5.3)
POTASSIUM SERPL-MCNC: 4.9 MMOL/L — SIGNIFICANT CHANGE UP (ref 3.5–5.3)
POTASSIUM SERPL-MCNC: 5.3 MMOL/L — SIGNIFICANT CHANGE UP (ref 3.5–5.3)
POTASSIUM SERPL-SCNC: 4.8 MMOL/L — SIGNIFICANT CHANGE UP (ref 3.5–5.3)
POTASSIUM SERPL-SCNC: 4.9 MMOL/L — SIGNIFICANT CHANGE UP (ref 3.5–5.3)
POTASSIUM SERPL-SCNC: 4.9 MMOL/L — SIGNIFICANT CHANGE UP (ref 3.5–5.3)
POTASSIUM SERPL-SCNC: 5.3 MMOL/L — SIGNIFICANT CHANGE UP (ref 3.5–5.3)
RBC # BLD: 3.45 M/UL — LOW (ref 3.8–5.2)
RBC # FLD: 15.8 % — HIGH (ref 10.3–14.5)
SODIUM SERPL-SCNC: 130 MMOL/L — LOW (ref 135–145)
SODIUM SERPL-SCNC: 133 MMOL/L — LOW (ref 135–145)
SODIUM SERPL-SCNC: 136 MMOL/L — SIGNIFICANT CHANGE UP (ref 135–145)
SODIUM SERPL-SCNC: 140 MMOL/L — SIGNIFICANT CHANGE UP (ref 135–145)
SODIUM UR-SCNC: 27 MMOL/L — SIGNIFICANT CHANGE UP
SODIUM UR-SCNC: 28 MMOL/L — SIGNIFICANT CHANGE UP
SODIUM UR-SCNC: 38 MMOL/L — SIGNIFICANT CHANGE UP
WBC # BLD: 9.02 K/UL — SIGNIFICANT CHANGE UP (ref 3.8–10.5)
WBC # FLD AUTO: 9.02 K/UL — SIGNIFICANT CHANGE UP (ref 3.8–10.5)

## 2023-08-08 PROCEDURE — 99233 SBSQ HOSP IP/OBS HIGH 50: CPT

## 2023-08-08 PROCEDURE — 99232 SBSQ HOSP IP/OBS MODERATE 35: CPT

## 2023-08-08 PROCEDURE — 99232 SBSQ HOSP IP/OBS MODERATE 35: CPT | Mod: GC

## 2023-08-08 RX ORDER — MAGNESIUM SULFATE 500 MG/ML
2 VIAL (ML) INJECTION ONCE
Refills: 0 | Status: DISCONTINUED | OUTPATIENT
Start: 2023-08-08 | End: 2023-08-08

## 2023-08-08 RX ADMIN — Medication 3 MILLIGRAM(S): at 22:21

## 2023-08-08 RX ADMIN — Medication 650 MILLIGRAM(S): at 23:21

## 2023-08-08 RX ADMIN — LATANOPROST 1 DROP(S): 0.05 SOLUTION/ DROPS OPHTHALMIC; TOPICAL at 22:22

## 2023-08-08 RX ADMIN — BRIMONIDINE TARTRATE 1 DROP(S): 2 SOLUTION/ DROPS OPHTHALMIC at 06:28

## 2023-08-08 RX ADMIN — ENOXAPARIN SODIUM 40 MILLIGRAM(S): 100 INJECTION SUBCUTANEOUS at 18:39

## 2023-08-08 RX ADMIN — Medication 650 MILLIGRAM(S): at 22:21

## 2023-08-08 RX ADMIN — GABAPENTIN 300 MILLIGRAM(S): 400 CAPSULE ORAL at 17:34

## 2023-08-08 RX ADMIN — BRIMONIDINE TARTRATE 1 DROP(S): 2 SOLUTION/ DROPS OPHTHALMIC at 17:34

## 2023-08-08 RX ADMIN — Medication 81 MILLIGRAM(S): at 11:31

## 2023-08-08 RX ADMIN — GABAPENTIN 300 MILLIGRAM(S): 400 CAPSULE ORAL at 06:28

## 2023-08-08 RX ADMIN — Medication 650 MILLIGRAM(S): at 00:36

## 2023-08-08 RX ADMIN — Medication 650 MILLIGRAM(S): at 01:15

## 2023-08-08 RX ADMIN — ZINC SULFATE TAB 220 MG (50 MG ZINC EQUIVALENT) 220 MILLIGRAM(S): 220 (50 ZN) TAB at 11:31

## 2023-08-08 RX ADMIN — ATORVASTATIN CALCIUM 40 MILLIGRAM(S): 80 TABLET, FILM COATED ORAL at 22:22

## 2023-08-08 RX ADMIN — Medication 1000 UNIT(S): at 11:31

## 2023-08-08 RX ADMIN — SODIUM CHLORIDE 30 MILLILITER(S): 5 INJECTION, SOLUTION INTRAVENOUS at 00:22

## 2023-08-08 NOTE — PROGRESS NOTE ADULT - PROBLEM SELECTOR PLAN 1
Improving  Like iso over-treatment with Desmopressin, loose stools and poor PO intake   Pt mentating at baseline  Pt given Desmopressin overnight, Na 137 -> 134 -> 132 in AM of 8/7  Uosm 136 -> 445 in AM 8/7  [place] 24 -> 37 in AM 8/7   Nephro following  Plan:  - Will continue to hold desmopressin and Metolazone  - Will Hold hypertonic saline as pt likely to correct on her own   - f/u PM Na, [place] and UOsm  - Renal to do a trial of diuretics prior to d/c to assess effect on Na Improving  Like iso over-treatment with Desmopressin, loose stools and poor PO intake   Pt mentating at baseline  Pt off desmopressin since 8/7. Overnight Na was 129, pt given hypertonic saline. Repeat Na qas 130 and this AM was 133. Pt was d/c of hypertonic saline   Nephro following  Plan:  - Will continue to hold desmopressin and Metolazone  - Will Hold hypertonic saline as pt likely to correct on her own   - f/u PM Na, [place] and UOsm  - Renal to do a trial of diuretics prior to d/c to assess effect on Na

## 2023-08-08 NOTE — PROGRESS NOTE ADULT - PROBLEM SELECTOR PLAN 4
Likely chronic  Hgb on admission 11.3, stable   Currently no signs of active bleeding (no hematochezia, melena, hemoptysis, hematuria).   Baseline Hb from previous admission in 12/22 10-11  Plan:   - trend CBC  - maintain active T&S  - transfuse if Hgb <7

## 2023-08-08 NOTE — PROGRESS NOTE ADULT - ASSESSMENT
Patient is a 89 yo F with ?DI on ddavp presenting with symptomatic hyponatremia to 118 now improving off of ddavp    Hyponatremia - initially symptomatic at 118 now improved, given ddavp on 8/6 which caused repeat hyponatremia 137->128, improving with 3% this AM. 3% held at around 10am, repeat labs around 2pm Na 140, Urine osm 129, urine sodium 28, consistent with appropriate correction with DDAVP off, however DI not yet ruled out  - Would repeat BMP, urine osm, urine sodium tonight 8pm to ensure not DI, if not DI can be d/c safely off of DDAVP    HTN - not elevated, would discuss spironolactone with cardiology as was heart failure recommendation    HFpEF - no signs of fluid overload at this time, would continue to monitor for now, will monitor closely to restart home torsemide or not    Discussed with primary team in detail. Will follow closely with you, please call with questions or concerns

## 2023-08-08 NOTE — PROGRESS NOTE ADULT - TIME BILLING
Emotional Support/Supportive Care and Clarification of Potential Disease Trajectory related to Cardiac Disease  Assessment of Symptom [place] and Palliative regimen  Exploration of GOC/Advanced Directives including HCP designation, code status, and hospice eligibility    Time inclusive of chart review, medication ordering, discussion with primary team, clinical documentation, and communication with family/caregiver

## 2023-08-08 NOTE — PROGRESS NOTE ADULT - SUBJECTIVE AND OBJECTIVE BOX
NEPHROLOGY PROGRESS NOTE    Subjective: Patient seen and examined at bedside. Feels well, her usual self. Overnight Na required 3% saline to improve given DDAVP still lingering and drop in serum sodium, now improved, awaiting resolution of DDAVP in blood.     [OBJECTIVE]:    Vital Signs:  T(F): , Max: 99.5 (08-07-23 @ 20:23)  HR:  (68 - 75)  BP:  (97/64 - 130/83)  BP(mean): --  ABP: --  ABP(mean): --  RR:  (17 - 18)  SpO2:  (95% - 96%)  CVP(mm Hg): --  CVP(cm H2O): --      08-07 @ 07:01  -  08-08 @ 07:00  --------------------------------------------------------  IN: 0 mL / OUT: 300 mL / NET: -300 mL    08-08 @ 07:01  -  08-08 @ 14:59  --------------------------------------------------------  IN: 90 mL / OUT: 0 mL / NET: 90 mL      CAPILLARY BLOOD GLUCOSE          .  VITAL SIGNS:  T(F): 98.4 (08-08-23 @ 12:18), Max: 99.5 (08-07-23 @ 20:23)  HR: 70 (08-08-23 @ 12:18) (68 - 75)  BP: 130/83 (08-08-23 @ 12:18) (97/64 - 130/83)  BP(mean): --  RR: 17 (08-08-23 @ 12:18) (17 - 18)  SpO2: 95% (08-08-23 @ 12:18) (95% - 96%)    PHYSICAL EXAM:  Constitutional: Resting comfortably in bed; NAD  HEENT:  EOMI, anicteric sclera; MMM  Respiratory: CTA B/L; +Holosystolic murmur, no accessory muscle use  Cardiac: +S1/S2; RRR; no M/R/G  Gastrointestinal: soft, NT/ND; no rebound or guarding; +BS  Extremities: WWP, no clubbing or cyanosis; no peripheral edema  Dermatologic: skin warm, dry and intact; no rashes, wounds, or scars    Medications:  MEDICATIONS  (STANDING):  aspirin  chewable 81 milliGRAM(s) Oral daily  atorvastatin 40 milliGRAM(s) Oral at bedtime  brimonidine 0.2% Ophthalmic Solution 1 Drop(s) Both EYES two times a day  cholecalciferol 1000 Unit(s) Oral daily  enoxaparin Injectable 40 milliGRAM(s) SubCutaneous every 24 hours  gabapentin 300 milliGRAM(s) Oral two times a day  latanoprost 0.005% Ophthalmic Solution 1 Drop(s) Both EYES at bedtime  melatonin 3 milliGRAM(s) Oral at bedtime  zinc sulfate 220 milliGRAM(s) Oral daily    MEDICATIONS  (PRN):  acetaminophen     Tablet .. 650 milliGRAM(s) Oral every 6 hours PRN Mild Pain (1 - 3)      Allergies:  Allergies    penicillin (Unknown)  [This allergen will not trigger allergy alert] Acetic Jv-Zmlcxmaqlv-Fimcjjiqe (Other)  erythromycin (Unknown)    Intolerances        Labs:                        10.5   9.02  )-----------( 296      ( 08 Aug 2023 05:30 )             32.5     08-08    133<L>  |  98  |  22  ----------------------------<  92  4.9   |  28  |  1.11    Ca    9.4      08 Aug 2023 05:30  Phos  4.1     08-08  Mg     1.6     08-08        Urinalysis Basic - ( 08 Aug 2023 05:30 )    Color: x / Appearance: x / SG: x / pH: x  Gluc: 92 mg/dL / Ketone: x  / Bili: x / Urobili: x   Blood: x / Protein: x / Nitrite: x   Leuk Esterase: x / RBC: x / WBC x   Sq Epi: x / Non Sq Epi: x / Bacteria: x        Radiology and other tests:

## 2023-08-08 NOTE — PROGRESS NOTE ADULT - PROBLEM SELECTOR PLAN 4
CT Cervical spine 12/2022 Nonhealing base of dens fracture. Compared to 11/10/22 there is mild increase in anterior displacement of the dens and C1 lateral masses relative to the base of dens. MR Cervical spine: Nonhealing C2 fracture at base of dens, mildly distracted as seen on CT. Edema involving the right greater than left lateral masses likely stress related to altered alignment. No evidence for marrow replacing lesion or other bony edema. Has been wearing C-collar since fracture. 07/23 CT and MR w/ interval displacement of dens fracture

## 2023-08-08 NOTE — PROGRESS NOTE ADULT - PROBLEM SELECTOR PLAN 8
S/p Valve in valve TAVR in 2021  Echo from prior admission 12/22: EF> 75%, moderate AS and moderate MR. Stable at this time   Plan:  - cont to monitor

## 2023-08-08 NOTE — PROGRESS NOTE ADULT - PROBLEM SELECTOR PLAN 9
Plan:  F: none   E: replete K<4, Mg<2  N: regular with 1L fluid restriction  VTE Prophylaxis: lovenox 40mg QD  GI: none  C: DNR/DNI  D: Home with Home Care

## 2023-08-08 NOTE — PROGRESS NOTE ADULT - PROBLEM SELECTOR PLAN 5
Hx of CHF. Follows w/ Dr Fernandez. Admission in 12/22 for acute on chronic exacerbation. ECHO 12/7: with EF>75% and moderate AS/MR with unchanged gradients across AV and MV  - continue aldactone 25mg daily   - hold Torsemide 20mg po and Metolazone 5mg ISO hyponatremia and   - cardiology following, follow up as outpatient for management

## 2023-08-08 NOTE — PROGRESS NOTE ADULT - ATTENDING COMMENTS
Patient is 89 yo woman with Ms,MR s/p TAVR, dCHF, on desmopressin and diuretic therapy at home, admitted with fatigue and found to be hyponatremic.   ----hyponatremia in the setting of tripple diuretic therapy and concurrent long standing desmopressin therapy. Patient's serum Na and urine osmolality are being followed off medications that directly affect sodium excretion to decide if DDAVP analog is indicated for the patient.  For the next 12 hours we will not be giving any infusate or desmopressin and will follow on urine output volume, urine osms, serum sodium.   ----cardiac valvular abnormalities and dCHF, Cardiology input appreciated given that diuretics are being held.   ----for dispo, pt resides at home with assistance from aids 24/7. Likely discharge home tomorrow

## 2023-08-08 NOTE — PROGRESS NOTE ADULT - SUBJECTIVE AND OBJECTIVE BOX
OVERNIGHT EVENTS:     SUBJECTIVE:    VITAL SIGNS:  Vital Signs Last 24 Hrs  T(C): 37.5 (07 Aug 2023 20:23), Max: 37.5 (07 Aug 2023 20:23)  T(F): 99.5 (07 Aug 2023 20:23), Max: 99.5 (07 Aug 2023 20:23)  HR: 75 (07 Aug 2023 20:23) (74 - 76)  BP: 97/64 (07 Aug 2023 20:23) (97/64 - 127/79)  BP(mean): --  RR: 18 (07 Aug 2023 20:23) (18 - 18)  SpO2: 95% (07 Aug 2023 20:23) (95% - 96%)    Parameters below as of 07 Aug 2023 20:23  Patient On (Oxygen Delivery Method): room air        PHYSICAL EXAM:  General: NAD; speaking in full sentences  HEENT: NC/AT; PERRL; EOMI; MMM  Neck: supple; no JVD  Cardiac: RRR; +S1/S2  Pulm: CTA B/L; no W/R/R  GI: soft, NT/ND, +BS  Extremities: WWP; no edema, clubbing or cyanosis  Vasc: 2+ radial, DP pulses B/L  Neuro: AAOx3; no focal deficits    MEDICATIONS:  MEDICATIONS  (STANDING):  aspirin  chewable 81 milliGRAM(s) Oral daily  atorvastatin 40 milliGRAM(s) Oral at bedtime  brimonidine 0.2% Ophthalmic Solution 1 Drop(s) Both EYES two times a day  cholecalciferol 1000 Unit(s) Oral daily  enoxaparin Injectable 40 milliGRAM(s) SubCutaneous every 24 hours  gabapentin 300 milliGRAM(s) Oral two times a day  latanoprost 0.005% Ophthalmic Solution 1 Drop(s) Both EYES at bedtime  melatonin 3 milliGRAM(s) Oral at bedtime  sodium chloride 3%. 390 milliLiter(s) (30 mL/Hr) IV Continuous <Continuous>  zinc sulfate 220 milliGRAM(s) Oral daily    MEDICATIONS  (PRN):  acetaminophen     Tablet .. 650 milliGRAM(s) Oral every 6 hours PRN Mild Pain (1 - 3)      ALLERGIES:  Allergies    penicillin (Unknown)  [This allergen will not trigger allergy alert] Acetic Hg-Aeqgvppynj-Aecjcetux (Other)  erythromycin (Unknown)    Intolerances        LABS:                        10.2   9.90  )-----------( 321      ( 07 Aug 2023 06:47 )             32.4     08-08    130<L>  |  95<L>  |  24<H>  ----------------------------<  101<H>  4.9   |  29  |  1.14    Ca    9.1      08 Aug 2023 02:34  Phos  3.8     08-07  Mg     1.6     08-07    TPro  6.5  /  Alb  3.6  /  TBili  0.4  /  DBili  x   /  AST  20  /  ALT  11  /  AlkPhos  70  08-06        RADIOLOGY & ADDITIONAL TESTS: Reviewed. OVERNIGHT EVENTS: GABRIEL    SUBJECTIVE:  The pt was seen and examined at the bedside this AM. The pt states that she is feeling well and has no new complains.    VITAL SIGNS:  Vital Signs Last 24 Hrs  T(C): 37.5 (07 Aug 2023 20:23), Max: 37.5 (07 Aug 2023 20:23)  T(F): 99.5 (07 Aug 2023 20:23), Max: 99.5 (07 Aug 2023 20:23)  HR: 75 (07 Aug 2023 20:23) (74 - 76)  BP: 97/64 (07 Aug 2023 20:23) (97/64 - 127/79)  BP(mean): --  RR: 18 (07 Aug 2023 20:23) (18 - 18)  SpO2: 95% (07 Aug 2023 20:23) (95% - 96%)    Parameters below as of 07 Aug 2023 20:23  Patient On (Oxygen Delivery Method): room air        PHYSICAL EXAM:  General: NAD; speaking in full sentences  HEENT: NC/AT; PERRL; EOMI; MMM  Neck: supple; no JVD  Cardiac: RRR; +S1/S2  Pulm: CTA B/L; no W/R/R  GI: soft, NT/ND, +BS  Extremities: Tenderness to palpation of bilateral LE, WWP; no edema, clubbing or cyanosis  Vasc: 2+ radial, DP pulses B/L  Neuro: AAOx3; no focal deficits    MEDICATIONS:  MEDICATIONS  (STANDING):  aspirin  chewable 81 milliGRAM(s) Oral daily  atorvastatin 40 milliGRAM(s) Oral at bedtime  brimonidine 0.2% Ophthalmic Solution 1 Drop(s) Both EYES two times a day  cholecalciferol 1000 Unit(s) Oral daily  enoxaparin Injectable 40 milliGRAM(s) SubCutaneous every 24 hours  gabapentin 300 milliGRAM(s) Oral two times a day  latanoprost 0.005% Ophthalmic Solution 1 Drop(s) Both EYES at bedtime  melatonin 3 milliGRAM(s) Oral at bedtime  sodium chloride 3%. 390 milliLiter(s) (30 mL/Hr) IV Continuous <Continuous>  zinc sulfate 220 milliGRAM(s) Oral daily    MEDICATIONS  (PRN):  acetaminophen     Tablet .. 650 milliGRAM(s) Oral every 6 hours PRN Mild Pain (1 - 3)      ALLERGIES:  Allergies    penicillin (Unknown)  [This allergen will not trigger allergy alert] Acetic Wn-Kbsomrmukx-Mpjqfkgro (Other)  erythromycin (Unknown)    Intolerances        LABS:                        10.2   9.90  )-----------( 321      ( 07 Aug 2023 06:47 )             32.4     08-08    130<L>  |  95<L>  |  24<H>  ----------------------------<  101<H>  4.9   |  29  |  1.14    Ca    9.1      08 Aug 2023 02:34  Phos  3.8     08-07  Mg     1.6     08-07    TPro  6.5  /  Alb  3.6  /  TBili  0.4  /  DBili  x   /  AST  20  /  ALT  11  /  AlkPhos  70  08-06        RADIOLOGY & ADDITIONAL TESTS: Reviewed.

## 2023-08-08 NOTE — PROGRESS NOTE ADULT - SUBJECTIVE AND OBJECTIVE BOX
[facility]  Renaldo Dueñas, Palliative Care Attending  Contact Info: Call [phone] (HEAL Line) or message on Microsoft Teams (Renaldo Dueñas)    SUBJECTIVE AND OBJECTIVE:  INTERVAL HPI/OVERNIGHT EVENTS: Interval events noted. See patient's PRN use for the past 24hrs noted below. Comprehensive symptom assessment and GOC exploration as noted below. Extensive time spent discussing plan of care with patient/family.    ALLERGIES:  penicillin (Unknown)  [This allergen will not trigger allergy alert] Acetic Qa-Lzytfnoplb-Orwrmxkjd (Other)  erythromycin (Unknown)    MEDICATIONS  (STANDING):  aspirin  chewable 81 milliGRAM(s) Oral daily  atorvastatin 40 milliGRAM(s) Oral at bedtime  brimonidine 0.2% Ophthalmic Solution 1 Drop(s) Both EYES two times a day  cholecalciferol 1000 Unit(s) Oral daily  enoxaparin Injectable 40 milliGRAM(s) SubCutaneous every 24 hours  gabapentin 300 milliGRAM(s) Oral two times a day  latanoprost 0.005% Ophthalmic Solution 1 Drop(s) Both EYES at bedtime  melatonin 3 milliGRAM(s) Oral at bedtime  zinc sulfate 220 milliGRAM(s) Oral daily    MEDICATIONS  (PRN):  acetaminophen     Tablet .. 650 milliGRAM(s) Oral every 6 hours PRN Mild Pain (1 - 3)    Analgesic Use (Scheduled and PRNs) for past 24 hours:  acetaminophen     Tablet ..   650 milliGRAM(s) Oral (08-08-23 @ 00:36)  gabapentin   300 milliGRAM(s) Oral (08-08-23 @ 17:34)   300 milliGRAM(s) Oral (08-08-23 @ 06:28)  melatonin   3 milliGRAM(s) Oral (08-07-23 @ 21:35)    ITEMS UNCHECKED ARE NOT PRESENT  PRESENT SYMPTOMS/REVIEW OF SYSTEMS: []Unable to obtain due to poor mentation   Source if other than patient:  []Family   []Team         Vital Signs Last 24 Hrs  T(C): 36.9 (08 Aug 2023 12:18), Max: 37.5 (07 Aug 2023 20:23)  T(F): 98.4 (08 Aug 2023 12:18), Max: 99.5 (07 Aug 2023 20:23)  HR: 70 (08 Aug 2023 12:18) (68 - 75)  BP: 130/83 (08 Aug 2023 12:18) (97/64 - 130/83)  BP(mean): --  RR: 17 (08 Aug 2023 12:18) (17 - 18)  SpO2: 95% (08 Aug 2023 12:18) (95% - 96%)    Parameters below as of 08 Aug 2023 12:18  Patient On (Oxygen Delivery Method): room air        LABS:                       10.5   9.02  )-----------( 296      ( 08 Aug 2023 05:30 )             32.5   08-08    140  |  103  |  19  ----------------------------<  106<H>  5.3   |  30  |  0.91    Ca    9.5      08 Aug 2023 14:30  Phos  4.1     08-08  Mg     1.6     08-08    RADIOLOGY & ADDITIONAL STUDIES: None new    DISCUSSION OF CASE: Daughter - to provide updates and emotional support; Medicine - to discuss plan of care [facility]  Renaldo Dueñas, Palliative Care Attending  Contact Info: Call [phone] (HEAL Line) or message on Microsoft Teams (Renaldo Dueñas)    SUBJECTIVE AND OBJECTIVE:  INTERVAL HPI/OVERNIGHT EVENTS: Interval events noted. See patient's PRN use for the past 24hrs noted below. Comprehensive symptom assessment and GOC exploration as noted below. Extensive time spent discussing plan of care with patient/family. Patient without complaints.    ALLERGIES:  penicillin (Unknown)  [This allergen will not trigger allergy alert] Acetic Qv-Vceuucbzit-Yvurxaygb (Other)  erythromycin (Unknown)    MEDICATIONS  (STANDING):  aspirin  chewable 81 milliGRAM(s) Oral daily  atorvastatin 40 milliGRAM(s) Oral at bedtime  brimonidine 0.2% Ophthalmic Solution 1 Drop(s) Both EYES two times a day  cholecalciferol 1000 Unit(s) Oral daily  enoxaparin Injectable 40 milliGRAM(s) SubCutaneous every 24 hours  gabapentin 300 milliGRAM(s) Oral two times a day  latanoprost 0.005% Ophthalmic Solution 1 Drop(s) Both EYES at bedtime  melatonin 3 milliGRAM(s) Oral at bedtime  zinc sulfate 220 milliGRAM(s) Oral daily    MEDICATIONS  (PRN):  acetaminophen     Tablet .. 650 milliGRAM(s) Oral every 6 hours PRN Mild Pain (1 - 3)    Analgesic Use (Scheduled and PRNs) for past 24 hours:  acetaminophen     Tablet ..   650 milliGRAM(s) Oral (08-08-23 @ 00:36)  gabapentin   300 milliGRAM(s) Oral (08-08-23 @ 17:34)   300 milliGRAM(s) Oral (08-08-23 @ 06:28)  melatonin   3 milliGRAM(s) Oral (08-07-23 @ 21:35)    ITEMS UNCHECKED ARE NOT PRESENT  PRESENT SYMPTOMS/REVIEW OF SYSTEMS: []Unable to obtain due to poor mentation   Source if other than patient:  []Family   []Team     Pain: [] yes [x]no  QOL impact -   Location -                    Aggravating factors -  Quality -  Radiation -  Timing -  Severity (0-10 scale) -  Minimal acceptable level (0-10 scale) -    Dyspnea:                           []Mild  []Moderate []Severe  Anxiety:                             []Mild []Moderate []Severe  Fatigue:                             []Mild []Moderate []Severe  Nausea:                             []Mild []Moderate []Severe  Loss of appetite:              []Mild []Moderate []Severe  Constipation:                    []Mild []Moderate []Severe    Other Symptoms:  [x]All other review of systems negative     Palliative Performance Status Version 2: 40%    Vital Signs Last 24 Hrs  T(C): 36.8 (07 Aug 2023 11:59), Max: 37.1 (06 Aug 2023 14:00)  T(F): 98.3 (07 Aug 2023 11:59), Max: 98.7 (06 Aug 2023 14:00)  HR: 76 (07 Aug 2023 11:59) (71 - 88)  BP: 109/67 (07 Aug 2023 11:59) (103/71 - 127/79)  BP(mean): --  RR: 18 (07 Aug 2023 11:59) (18 - 18)  SpO2: 95% (07 Aug 2023 11:59) (95% - 96%)    Parameters below as of 07 Aug 2023 11:59  Patient On (Oxygen Delivery Method): room air    GENERAL:  [x] NAD [x]Alert []Lethargic  []Cachexia  []Unarousable  [x]Verbal  []Non-Verbal  BEHAVIORAL:   []Anxiety  []Delirium []Agitation [x]Cooperative [x]Oriented x2  HEENT:  [x]Normal  [x] Moist Mucous Membranes []Dry mouth   []ET Tube/Trach  []Oral lesions  PULMONARY:   [x]Clear []Tachypnea  []Audible excessive secretions  [x]Normal Work of Breathing []Labored Breathing  []Rhonchi []Crackles []Wheezing  CARDIOVASCULAR:    [x]Regular Rate [x]Regular Rhythm []Irregular []Tachy  []Jonas  GASTROINTESTINAL:  [x]Soft  []Distended   [x]+BS  [x]Non tender []Tender  []PEG []OGT/ NGT  Last BM:  GENITOURINARY:  [x]Normal [] Incontinent   []Oliguria/Anuria   []Vega  MUSCULOSKELETAL:   []Normal Extremities  [x]Weakness  [x]Bed/Wheelchair bound []Edema  NEUROLOGIC:   []No focal deficits  [x]Cognitive impairment  []Dysphagia []Dysarthria []Paresis []Encephalopathic  SKIN:   [x]Normal    Vital Signs Last 24 Hrs  T(C): 36.9 (08 Aug 2023 12:18), Max: 37.5 (07 Aug 2023 20:23)  T(F): 98.4 (08 Aug 2023 12:18), Max: 99.5 (07 Aug 2023 20:23)  HR: 70 (08 Aug 2023 12:18) (68 - 75)  BP: 130/83 (08 Aug 2023 12:18) (97/64 - 130/83)  BP(mean): --  RR: 17 (08 Aug 2023 12:18) (17 - 18)  SpO2: 95% (08 Aug 2023 12:18) (95% - 96%)    Parameters below as of 08 Aug 2023 12:18  Patient On (Oxygen Delivery Method): room air    LABS:                       10.5   9.02  )-----------( 296      ( 08 Aug 2023 05:30 )             32.5   08-08    140  |  103  |  19  ----------------------------<  106<H>  5.3   |  30  |  0.91    Ca    9.5      08 Aug 2023 14:30  Phos  4.1     08-08  Mg     1.6     08-08    RADIOLOGY & ADDITIONAL STUDIES: None new    DISCUSSION OF CASE: Daughter - to provide updates and emotional support; Medicine - to discuss plan of care

## 2023-08-08 NOTE — PROGRESS NOTE ADULT - ASSESSMENT
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 On arrival, found to be hyponatremic (120) and to have a UTI. The pt was admitted for hyponatremia and abx treatment for UTI

## 2023-08-08 NOTE — PROGRESS NOTE ADULT - PROBLEM SELECTOR PLAN 5
Patient and daughter now opting to rescind DNR/DNI as they would like to discuss with her PCP  - MOLST removed and copy sent to daughter so she can review

## 2023-08-08 NOTE — PROGRESS NOTE ADULT - ASSESSMENT
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    ·	patient and daughter requested to rescind MOLST until they can discuss with her PCP  ·	no acute symptom management needs  ·	does not have a hospice-qualifying diagnosis

## 2023-08-08 NOTE — PROGRESS NOTE ADULT - ASSESSMENT
89 F with a Pmhx of dCHF, HTN, HLD and severe AS s/p Bioprosthetic AV 7 years ago at OSH, c/b by bioprosthetic AV stenosis, s/p Valve-in-Valve with a Sheri Ultra by Structural Heart Team at [facility] in June of 2021, who presented to the ER with fatigue, generalized weakness, found to be severely Hyponatremic s/p hypertonic saline and UTI. Cardiology consulted for Optimization    Review of studies.   - Echo 12/06/2022: Normal left and right ventricular size and systolic function. Moderate aortic stenosis. Moderate mitral regurgitation. Pulmonary artery systolic pressure is 35 mmHg.    #HFpEF, appears euvolemic   net negative 5225 since admission  - agree with holding torsemide and metolazone today  - continue with spironolactone  - reassess fluid status tomorrow  - measure Is and Os - net neg 5L since admission, + 90 in 24 hours  - c/w atorvastatin 40mg  - will likely need to be discharged with her home torsemide 20mg TIW  - Ensure Followup with Dr. Fernandez (patient's cardiologist) within 1 - 2 weeks of discharge    #severe AS s/p Bio AVR, s/p ROSLYN Sheri Ultra  denies chest pain, sob  - c/w ASA 81mg qd    #hyponatremia   Na 132 today, DDAVP, diuretics on hold  - fu nephrology recommendations       89 F with a Pmhx of dCHF, HTN, HLD and severe AS s/p Bioprosthetic AV 7 years ago at OSH, c/b by bioprosthetic AV stenosis, s/p Valve-in-Valve with a Sheri Ultra by Structural Heart Team at [facility] in June of 2021, who presented to the ER with fatigue, generalized weakness, found to be severely Hyponatremic s/p hypertonic saline and UTI. Cardiology consulted for Optimization    Review of studies.   - Echo 12/06/2022: Normal left and right ventricular size and systolic function. Moderate aortic stenosis. Moderate mitral regurgitation. Pulmonary artery systolic pressure is 35 mmHg.    #HFpEF, appears euvolemic   net negative 5225 since admission  - agree with holding torsemide and metolazone today  - continue with spironolactone  - reassess fluid status tomorrow  - measure Is and Os - net neg 5L since admission, + 90 in 24 hours  - c/w atorvastatin 40mg  - will likely need to be discharged with her home torsemide 20mg TIW  - Ensure Followup with Dr. Fernandez (patient's cardiologist) within 1 - 2 weeks of discharge    #severe AS s/p Bio AVR, s/p ORSLYN Sheri Ultra  denies chest pain, sob  - c/w ASA 81mg qd    #hyponatremia   - fu nephrology recommendations    Case d/w attending, Dr. Pandya

## 2023-08-08 NOTE — PROGRESS NOTE ADULT - SUBJECTIVE AND OBJECTIVE BOX
Cardiology Consult    O/N: Na overnight 129, given hypertonic saline.   Seen by palliative care. Now DNR/DNi  Interval History/HPI: Pt seen and examined at bedside, NAD. Denies cp, sob, palpitations, worsening le edema.       OBJECTIVE  T(C): 36.9 (08-08-23 @ 12:18), Max: 37.5 (08-07-23 @ 20:23)  HR: 70 (08-08-23 @ 12:18) (68 - 75)  BP: 130/83 (08-08-23 @ 12:18) (97/64 - 130/83)  RR: 17 (08-08-23 @ 12:18) (17 - 18)  SpO2: 95% (08-08-23 @ 12:18) (95% - 96%)    08-07-23 @ 07:01  -  08-08-23 @ 07:00  --------------------------------------------------------  IN: 0 mL / OUT: 300 mL / NET: -300 mL    08-08-23 @ 07:01  -  08-08-23 @ 13:16  --------------------------------------------------------  IN: 90 mL / OUT: 0 mL / NET: 90 mL        PHYSICAL EXAM:    Elderly pleasant female   lying in bed NAD   rrr, + systolic murmur LSB _III/V  ctab  soft ntnd   trace b/l edema, slightly more than yesterday  talkative, conversing appropriately moving all extremities    LABS:                        10.5   9.02  )-----------( 296      ( 08 Aug 2023 05:30 )             32.5     08-08    133<L>  |  98  |  22  ----------------------------<  92  4.9   |  28  |  1.11    Ca    9.4      08 Aug 2023 05:30  Phos  4.1     08-08  Mg     1.6     08-08        Urinalysis Basic - ( 08 Aug 2023 05:30 )    Color: x / Appearance: x / SG: x / pH: x  Gluc: 92 mg/dL / Ketone: x  / Bili: x / Urobili: x   Blood: x / Protein: x / Nitrite: x   Leuk Esterase: x / RBC: x / WBC x   Sq Epi: x / Non Sq Epi: x / Bacteria: x        RADIOLOGY & ADDITIONAL TESTS:  Reviewed .    MEDICATIONS  (STANDING):  aspirin  chewable 81 milliGRAM(s) Oral daily  atorvastatin 40 milliGRAM(s) Oral at bedtime  brimonidine 0.2% Ophthalmic Solution 1 Drop(s) Both EYES two times a day  cholecalciferol 1000 Unit(s) Oral daily  enoxaparin Injectable 40 milliGRAM(s) SubCutaneous every 24 hours  gabapentin 300 milliGRAM(s) Oral two times a day  latanoprost 0.005% Ophthalmic Solution 1 Drop(s) Both EYES at bedtime  melatonin 3 milliGRAM(s) Oral at bedtime  zinc sulfate 220 milliGRAM(s) Oral daily    MEDICATIONS  (PRN):  acetaminophen     Tablet .. 650 milliGRAM(s) Oral every 6 hours PRN Mild Pain (1 - 3)

## 2023-08-08 NOTE — PROGRESS NOTE ADULT - PROBLEM SELECTOR PLAN 2
On home desmopressin for >20 years. Na 120 on admission.  Na 137 -> 134 -> 132 in AM of 8/7  Uosm 136 -> 445 in AM 8/7  [place] 24 -> 37 in AM 8/7   Renal on board, unsure if true DI as pt seems to be properly correcting without desmopressin   Plan:  - Holding Desmopressin  - Will assess need for desmopressin once d/c based on PM Serum Na, Casper and Uosm

## 2023-08-08 NOTE — PROGRESS NOTE ADULT - ATTENDING COMMENTS
89 yo lady PMHx of severe AS s/p bioprosthetic AVR 7y ago c/b aortic stenosis s/p valve in valve June/2021 and HFpEF who was admitted for weakness, found to be in hyponatremia s/p hypertonic saline, with improving hyponatremia    Assessment  1) Severe AS s/p bioprosthetic AVR 7y ago c/b aortic stenosis s/p valve in valve June/2021  2) HFpEF - euvolemic  3) Hyponatremia - improving    Plan  1) C/w home ASA 81mg daily and high intensity lipitor  2) Daily BMP and management of hyponatremia as per nephrology  3) Should resume home dose torsemide 20gm MWF starting tomorrow  4) Can hold metolazone until next outpatient cardiology appt     Thank you for the consult    Byjorgito Pandya M.D.  CARDIOLOGY ATTENDING    Please call with any questions:  Service Line: [phone] (PAGER)    Total of 35 minutes were spent in reviewing the patient’s chart, examining the patient, and formulating patient’s assessment and plan.

## 2023-08-09 DIAGNOSIS — Z91.81 HISTORY OF FALLING: ICD-10-CM

## 2023-08-09 LAB
ANION GAP SERPL CALC-SCNC: 8 MMOL/L — SIGNIFICANT CHANGE UP (ref 5–17)
ANION GAP SERPL CALC-SCNC: 8 MMOL/L — SIGNIFICANT CHANGE UP (ref 5–17)
BUN SERPL-MCNC: 16 MG/DL — SIGNIFICANT CHANGE UP (ref 7–23)
BUN SERPL-MCNC: 16 MG/DL — SIGNIFICANT CHANGE UP (ref 7–23)
CALCIUM SERPL-MCNC: 9.2 MG/DL — SIGNIFICANT CHANGE UP (ref 8.4–10.5)
CALCIUM SERPL-MCNC: 9.7 MG/DL — SIGNIFICANT CHANGE UP (ref 8.4–10.5)
CHLORIDE SERPL-SCNC: 103 MMOL/L — SIGNIFICANT CHANGE UP (ref 96–108)
CHLORIDE SERPL-SCNC: 104 MMOL/L — SIGNIFICANT CHANGE UP (ref 96–108)
CO2 SERPL-SCNC: 24 MMOL/L — SIGNIFICANT CHANGE UP (ref 22–31)
CO2 SERPL-SCNC: 28 MMOL/L — SIGNIFICANT CHANGE UP (ref 22–31)
CREAT SERPL-MCNC: 0.81 MG/DL — SIGNIFICANT CHANGE UP (ref 0.5–1.3)
CREAT SERPL-MCNC: 0.93 MG/DL — SIGNIFICANT CHANGE UP (ref 0.5–1.3)
EGFR: 57 ML/MIN/1.73M2 — LOW
EGFR: 67 ML/MIN/1.73M2 — SIGNIFICANT CHANGE UP
GLUCOSE SERPL-MCNC: 135 MG/DL — HIGH (ref 70–99)
GLUCOSE SERPL-MCNC: 95 MG/DL — SIGNIFICANT CHANGE UP (ref 70–99)
MAGNESIUM SERPL-MCNC: 1.8 MG/DL — SIGNIFICANT CHANGE UP (ref 1.6–2.6)
OSMOLALITY UR: 167 MOSM/KG — LOW (ref 300–900)
OSMOLALITY UR: 173 MOSM/KG — LOW (ref 300–900)
PHOSPHATE SERPL-MCNC: 4.5 MG/DL — SIGNIFICANT CHANGE UP (ref 2.5–4.5)
POTASSIUM SERPL-MCNC: 4.7 MMOL/L — SIGNIFICANT CHANGE UP (ref 3.5–5.3)
POTASSIUM SERPL-MCNC: SIGNIFICANT CHANGE UP MMOL/L (ref 3.5–5.3)
POTASSIUM SERPL-SCNC: 4.7 MMOL/L — SIGNIFICANT CHANGE UP (ref 3.5–5.3)
POTASSIUM SERPL-SCNC: SIGNIFICANT CHANGE UP MMOL/L (ref 3.5–5.3)
SODIUM SERPL-SCNC: 136 MMOL/L — SIGNIFICANT CHANGE UP (ref 135–145)
SODIUM SERPL-SCNC: 139 MMOL/L — SIGNIFICANT CHANGE UP (ref 135–145)
SODIUM UR-SCNC: 22 MMOL/L — SIGNIFICANT CHANGE UP
SODIUM UR-SCNC: 23 MMOL/L — SIGNIFICANT CHANGE UP
SODIUM UR-SCNC: 26 MMOL/L — SIGNIFICANT CHANGE UP

## 2023-08-09 PROCEDURE — 99233 SBSQ HOSP IP/OBS HIGH 50: CPT

## 2023-08-09 PROCEDURE — 99232 SBSQ HOSP IP/OBS MODERATE 35: CPT

## 2023-08-09 RX ORDER — METOLAZONE 5 MG/1
1 TABLET ORAL
Refills: 0 | DISCHARGE

## 2023-08-09 RX ORDER — SPIRONOLACTONE 25 MG/1
1 TABLET, FILM COATED ORAL
Qty: 0 | Refills: 0 | DISCHARGE

## 2023-08-09 RX ADMIN — Medication 1000 UNIT(S): at 11:13

## 2023-08-09 RX ADMIN — Medication 3 MILLIGRAM(S): at 22:27

## 2023-08-09 RX ADMIN — Medication 650 MILLIGRAM(S): at 22:28

## 2023-08-09 RX ADMIN — BRIMONIDINE TARTRATE 1 DROP(S): 2 SOLUTION/ DROPS OPHTHALMIC at 17:50

## 2023-08-09 RX ADMIN — BRIMONIDINE TARTRATE 1 DROP(S): 2 SOLUTION/ DROPS OPHTHALMIC at 06:09

## 2023-08-09 RX ADMIN — GABAPENTIN 300 MILLIGRAM(S): 400 CAPSULE ORAL at 17:57

## 2023-08-09 RX ADMIN — ATORVASTATIN CALCIUM 40 MILLIGRAM(S): 80 TABLET, FILM COATED ORAL at 22:28

## 2023-08-09 RX ADMIN — Medication 650 MILLIGRAM(S): at 23:28

## 2023-08-09 RX ADMIN — GABAPENTIN 300 MILLIGRAM(S): 400 CAPSULE ORAL at 06:08

## 2023-08-09 RX ADMIN — LATANOPROST 1 DROP(S): 0.05 SOLUTION/ DROPS OPHTHALMIC; TOPICAL at 22:27

## 2023-08-09 RX ADMIN — Medication 81 MILLIGRAM(S): at 11:13

## 2023-08-09 RX ADMIN — ENOXAPARIN SODIUM 40 MILLIGRAM(S): 100 INJECTION SUBCUTANEOUS at 18:37

## 2023-08-09 RX ADMIN — ZINC SULFATE TAB 220 MG (50 MG ZINC EQUIVALENT) 220 MILLIGRAM(S): 220 (50 ZN) TAB at 11:13

## 2023-08-09 NOTE — PROGRESS NOTE ADULT - PROBLEM SELECTOR PROBLEM 9
Prophylactic measure At risk for falls Humira Counseling:  I discussed with the patient the risks of adalimumab including but not limited to myelosuppression, immunosuppression, autoimmune hepatitis, demyelinating diseases, lymphoma, and serious infections.  The patient understands that monitoring is required including a PPD at baseline and must alert us or the primary physician if symptoms of infection or other concerning signs are noted.

## 2023-08-09 NOTE — PROGRESS NOTE ADULT - NS PANP COMMENT GEN_ALL_CORE FT
89 yo lady PMHx of severe AS s/p bioprosthetic AVR 7y ago c/b aortic stenosis s/p valve in valve June/2021 and HFpEF who was admitted for weakness, found to be in hyponatremia s/p hypertonic saline, with improving hyponatremia    Assessment  1) Severe AS s/p bioprosthetic AVR 7y ago c/b aortic stenosis s/p valve in valve June/2021  2) HFpEF - euvolemic  3) Hyponatremia - improving    Plan  1) C/w home ASA 81mg daily and high intensity lipitor  2) Daily BMP and management of hyponatremia as per nephrology  3) Should resume home dose torsemide 20gm MWF starting today  4) Can hold metolazone until next outpatient cardiology appt     Thank you for the consult    Byjorgito Pandya M.D.  CARDIOLOGY ATTENDING    Please call with any questions:  Service Line: [phone] (PAGER)    Total of 35 minutes were spent in reviewing the patient’s chart, examining the patient, and formulating patient’s assessment and plan.

## 2023-08-09 NOTE — PROGRESS NOTE ADULT - PROBLEM SELECTOR PLAN 9
Plan:  F: none   E: replete K<4, Mg<2  N: regular with 1L fluid restriction  VTE Prophylaxis: lovenox 40mg QD  GI: none  C: DNR/DNI  D: KAIT Patient will need commode for home. Patient cannot safely ambulate to bathroom.

## 2023-08-09 NOTE — PROGRESS NOTE ADULT - PROBLEM SELECTOR PLAN 1
Pt made aware   I mailed form to pt Improving  Like iso over-treatment with Desmopressin, loose stools and poor PO intake   Pt mentating at baseline  Pt off desmopressin since 8/7. Overnight Na was 129, pt given hypertonic saline. Repeat Na qas 130 and this AM was 133. Pt was d/c of hypertonic saline   Nephro following  Plan:  - Will continue to hold desmopressin and Metolazone  - Will Hold hypertonic saline as pt likely to correct on her own   - f/u PM and AM CMP, Casper and UOsm  - Renal to do a trial of diuretics prior to d/c to assess effect on Na

## 2023-08-09 NOTE — PROGRESS NOTE ADULT - ATTENDING COMMENTS
I agree with the fellow's findings and plans as written above with the following additions/amendments:    Seen and examined at bedside. Discussed with patient, primary team, daughter, and PCP at length throughout day, hyponatremia resolved off medications will continue to hold and monitor for now. PT and home discharge plan pending. Further recs as above

## 2023-08-09 NOTE — PROGRESS NOTE ADULT - ASSESSMENT
89 F with a Pmhx of dCHF, HTN, HLD and severe AS s/p Bioprosthetic AV 7 years ago at OSH, c/b by bioprosthetic AV stenosis, s/p Valve-in-Valve with a Sheri Ultra by Structural Heart Team at [facility] in June of 2021, who presented to the ER with fatigue, generalized weakness, found to be severely Hyponatremic s/p hypertonic saline and UTI. Cardiology consulted for Optimization    Review of studies.   - Echo 12/06/2022: Normal left and right ventricular size and systolic function. Moderate aortic stenosis. Moderate mitral regurgitation. Pulmonary artery systolic pressure is 35 mmHg.    #HFpEF, appears euvolemic   net negative [id number] since admission  - recommend restarting home dose of torsemide today, discharge on TIW dosing. Would not continue metolazone on discharge  - continue with spironolactone  - reassess fluid status tomorrow  - measure Is and Os  - c/w atorvastatin 40mg  - Ensure Followup with Dr. Fernandez (patient's cardiologist) within 1 - 2 weeks of discharge    #severe AS s/p Bio AVR, s/p ROSLYN Sheri Ultra  denies chest pain, sob  - c/w ASA 81mg qd    #hyponatremia   - fu nephrology recommendations    Case d/w attending, Dr. Pandya

## 2023-08-09 NOTE — PROGRESS NOTE ADULT - SUBJECTIVE AND OBJECTIVE BOX
SUBJECTIVE/OVERNIGHT EVENTS: No acute overnight events. Pt seen in AM at bedside, resting comfortably in bed, and does not appear to be in any acute distress. When asked, pt denies any recent or active fever, chills, nausea, vomiting, headache, acute sob, chest pain, abdominal pain, genitourinary sx, extremity pain or swelling.    Initial plan was to discharge home today. However, new recommendation is KAIT. Patient will be stating an additional night pending KAIT placement.    VITAL SIGNS:  Vital Signs Last 24 Hrs  T(C): 37.1 (09 Aug 2023 13:38), Max: 37.1 (09 Aug 2023 13:38)  T(F): 98.8 (09 Aug 2023 13:38), Max: 98.8 (09 Aug 2023 13:38)  HR: 82 (09 Aug 2023 13:38) (77 - 83)  BP: 122/74 (09 Aug 2023 13:38) (105/64 - 122/74)  BP(mean): --  RR: 17 (09 Aug 2023 13:38) (17 - 18)  SpO2: 96% (09 Aug 2023 13:38) (94% - 96%)    Parameters below as of 09 Aug 2023 13:38  Patient On (Oxygen Delivery Method): room air    PHYSICAL EXAM:  General: NAD; speaking in full sentences  HEENT: NC/AT; PERRL; EOMI; MMM  Neck: supple; no JVD  Cardiac: RRR; +S1/S2  Pulm: CTA B/L; no W/R/R  GI: soft, NT/ND, +BS  Extremities: Tenderness to palpation of bilateral LE, WWP; no edema, clubbing or cyanosis  Vasc: 2+ radial, DP pulses B/L  Neuro: AAOx3; no focal deficits    MEDICATIONS:  MEDICATIONS  (STANDING):  aspirin  chewable 81 milliGRAM(s) Oral daily  atorvastatin 40 milliGRAM(s) Oral at bedtime  brimonidine 0.2% Ophthalmic Solution 1 Drop(s) Both EYES two times a day  cholecalciferol 1000 Unit(s) Oral daily  enoxaparin Injectable 40 milliGRAM(s) SubCutaneous every 24 hours  gabapentin 300 milliGRAM(s) Oral two times a day  latanoprost 0.005% Ophthalmic Solution 1 Drop(s) Both EYES at bedtime  melatonin 3 milliGRAM(s) Oral at bedtime  zinc sulfate 220 milliGRAM(s) Oral daily    MEDICATIONS  (PRN):  acetaminophen     Tablet .. 650 milliGRAM(s) Oral every 6 hours PRN Mild Pain (1 - 3)      ALLERGIES:  Allergies    penicillin (Unknown)  [This allergen will not trigger allergy alert] Acetic Cv-Qfnmvwxziz-Aymtgpans (Other)  erythromycin (Unknown)    Intolerances        LABS:                        10.5   9.02  )-----------( 296      ( 08 Aug 2023 05:30 )             32.5     08-09    136  |  104  |  16  ----------------------------<  95  see note   |  24  |  0.81    Ca    9.2      09 Aug 2023 08:53  Phos  4.5     08-09  Mg     1.8     08-09          RADIOLOGY & ADDITIONAL TESTS: Reviewed. SUBJECTIVE/OVERNIGHT EVENTS: No acute overnight events. Pt seen in AM at bedside, resting comfortably in bed, and does not appear to be in any acute distress. When asked, pt denies any recent or active fever, chills, nausea, vomiting, headache, acute sob, chest pain, abdominal pain, genitourinary sx, extremity pain or swelling.    Initial plan was to discharge home today. However, [facility] Patient will need commode for home. Patient cannot safely ambulate to bathroom.    VITAL SIGNS:  Vital Signs Last 24 Hrs  T(C): 37.1 (09 Aug 2023 13:38), Max: 37.1 (09 Aug 2023 13:38)  T(F): 98.8 (09 Aug 2023 13:38), Max: 98.8 (09 Aug 2023 13:38)  HR: 82 (09 Aug 2023 13:38) (77 - 83)  BP: 122/74 (09 Aug 2023 13:38) (105/64 - 122/74)  BP(mean): --  RR: 17 (09 Aug 2023 13:38) (17 - 18)  SpO2: 96% (09 Aug 2023 13:38) (94% - 96%)    Parameters below as of 09 Aug 2023 13:38  Patient On (Oxygen Delivery Method): room air    PHYSICAL EXAM:  General: NAD; speaking in full sentences  HEENT: NC/AT; PERRL; EOMI; MMM  Neck: supple; no JVD  Cardiac: RRR; +S1/S2  Pulm: CTA B/L; no W/R/R  GI: soft, NT/ND, +BS  Extremities: Tenderness to palpation of bilateral LE, WWP; no edema, clubbing or cyanosis  Vasc: 2+ radial, DP pulses B/L  Neuro: AAOx3; no focal deficits    MEDICATIONS:  MEDICATIONS  (STANDING):  aspirin  chewable 81 milliGRAM(s) Oral daily  atorvastatin 40 milliGRAM(s) Oral at bedtime  brimonidine 0.2% Ophthalmic Solution 1 Drop(s) Both EYES two times a day  cholecalciferol 1000 Unit(s) Oral daily  enoxaparin Injectable 40 milliGRAM(s) SubCutaneous every 24 hours  gabapentin 300 milliGRAM(s) Oral two times a day  latanoprost 0.005% Ophthalmic Solution 1 Drop(s) Both EYES at bedtime  melatonin 3 milliGRAM(s) Oral at bedtime  zinc sulfate 220 milliGRAM(s) Oral daily    MEDICATIONS  (PRN):  acetaminophen     Tablet .. 650 milliGRAM(s) Oral every 6 hours PRN Mild Pain (1 - 3)      ALLERGIES:  Allergies    penicillin (Unknown)  [This allergen will not trigger allergy alert] Acetic Rq-Kmycrksgkb-Qoevewgbf (Other)  erythromycin (Unknown)    Intolerances        LABS:                        10.5   9.02  )-----------( 296      ( 08 Aug 2023 05:30 )             32.5     08-09    136  |  104  |  16  ----------------------------<  95  see note   |  24  |  0.81    Ca    9.2      09 Aug 2023 08:53  Phos  4.5     08-09  Mg     1.8     08-09          RADIOLOGY & ADDITIONAL TESTS: Reviewed.

## 2023-08-09 NOTE — PROGRESS NOTE ADULT - ATTENDING COMMENTS
Patient is 89 yo woman with Ms,MR s/p TAVR, dCHF, on desmopressin and diuretic therapy at home, admitted with fatigue and found to be hyponatremic.   ----hyponatremia in the setting of triple diuretic therapy and concurrent long standing desmopressin therapy. Patient's sodium levels improved with fluid restriction while being off desmopressin and diuretics. Urine osms are low. Patient is planned on discharge home today without desmopressin or diuretics to be followed in the outpatient setting with regular blood work (next one in 2 days). Patient's PCP is in communication with Renal team to ensure appropriate medical therapy if patient's sodium levels change or other symptoms occur.   ----for dispo, pt resides at home with assistance from aids 24/7. Set for discharge today Patient is 89 yo woman with Ms,MR s/p TAVR, dCHF, on desmopressin and diuretic therapy at home, admitted with fatigue and found to be hyponatremic.   ----hyponatremia in the setting of triple diuretic therapy and concurrent long standing desmopressin therapy. Patient's sodium levels improved with fluid restriction while being off desmopressin and diuretics. Urine osms are low. Will cont with urine osm checks and serum sodium checks BID for another 24 hours  ----for dispo, pt resides at home with assistance from aids 24/7. Will need 2 person assist at home

## 2023-08-09 NOTE — PROGRESS NOTE ADULT - ASSESSMENT
89 y/o F with PMH HTN, HLD, chronic diastolic CHF, hx BioAVR complicated by stenosis and subsequent valve-in-valve TAVR at [facility] in 2021, hx Diabetes Insipidus with hyponatremia (on DDAVP 1.5mg daily), chronic neuropathy, C spine fracture (C-spine collar when out of her home secondary to poor healing), dementia, depression presented with 1 day history of generalized weakness and a witnessed fall. On arrival, found to be hyponatremic (120) and to have a UTI. The pt was admitted for hyponatremia and abx treatment for UTI

## 2023-08-09 NOTE — PROGRESS NOTE ADULT - SUBJECTIVE AND OBJECTIVE BOX
Cardiology Consult    O/N:  Interval History/HPI: Pt seen and examined at bedside, NAD, no complaints. Denies cp, sob, palpitations. Lying flat.     OBJECTIVE  T(C): 37.1 (08-09-23 @ 13:38), Max: 37.1 (08-09-23 @ 13:38)  HR: 82 (08-09-23 @ 13:38) (77 - 83)  BP: 122/74 (08-09-23 @ 13:38) (105/64 - 122/74)  RR: 17 (08-09-23 @ 13:38) (17 - 18)  SpO2: 96% (08-09-23 @ 13:38) (94% - 96%)    08-08-23 @ 07:01  -  08-09-23 @ 07:00  --------------------------------------------------------  IN: 90 mL / OUT: 2000 mL / NET: -1910 mL    08-09-23 @ 07:01  -  08-09-23 @ 16:56  --------------------------------------------------------  IN: 0 mL / OUT: 350 mL / NET: -350 mL        PHYSICAL EXAM:      Elderly pleasant female   lying in bed NAD   rrr, + systolic murmur LSB _III/V  ctab  soft ntnd   trace to 1+ b/l edema, b/l shins tender to palpation  talkative, conversing appropriately moving all extremities    LABS:                        10.5   9.02  )-----------( 296      ( 08 Aug 2023 05:30 )             32.5     08-09    136  |  104  |  16  ----------------------------<  95  see note   |  24  |  0.81    Ca    9.2      09 Aug 2023 08:53  Phos  4.5     08-09  Mg     1.8     08-09        Urinalysis Basic - ( 09 Aug 2023 08:53 )    Color: x / Appearance: x / SG: x / pH: x  Gluc: 95 mg/dL / Ketone: x  / Bili: x / Urobili: x   Blood: x / Protein: x / Nitrite: x   Leuk Esterase: x / RBC: x / WBC x   Sq Epi: x / Non Sq Epi: x / Bacteria: x        RADIOLOGY & ADDITIONAL TESTS:  Reviewed .    MEDICATIONS  (STANDING):  aspirin  chewable 81 milliGRAM(s) Oral daily  atorvastatin 40 milliGRAM(s) Oral at bedtime  brimonidine 0.2% Ophthalmic Solution 1 Drop(s) Both EYES two times a day  cholecalciferol 1000 Unit(s) Oral daily  enoxaparin Injectable 40 milliGRAM(s) SubCutaneous every 24 hours  gabapentin 300 milliGRAM(s) Oral two times a day  latanoprost 0.005% Ophthalmic Solution 1 Drop(s) Both EYES at bedtime  melatonin 3 milliGRAM(s) Oral at bedtime  zinc sulfate 220 milliGRAM(s) Oral daily    MEDICATIONS  (PRN):  acetaminophen     Tablet .. 650 milliGRAM(s) Oral every 6 hours PRN Mild Pain (1 - 3)

## 2023-08-09 NOTE — PROGRESS NOTE ADULT - ASSESSMENT
*************INCOMPLETE**************      Patient is a 89 yo F with ?DI on ddavp presenting with symptomatic hyponatremia to 118 now improved off of ddavp    Hyponatremia - initially symptomatic at 118 now improved, given ddavp on 8/6 which caused repeat hyponatremia 137->128, improved now (received with 3% this AM. 3% held at around 10am, repeat labs around 2pm Na 140, Urine osm 129, urine sodium 28, consistent with appropriate correction with DDAVP off, however DI not yet ruled out  - Would repeat BMP, urine osm, urine sodium tonight 8pm to ensure not DI, if not DI can be d/c safely off of DDAVP    HTN - not elevated, would discuss spironolactone with cardiology as was heart failure recommendation    HFpEF - no signs of fluid overload at this time, would continue to monitor for now, will monitor closely to restart home torsemide or not    Discussed with primary team in detail. Will follow closely with you, please call with questions or concerns Patient is a 89 yo F with ?DI on ddavp presented with symptomatic hyponatremia to 118 now improved off of ddavp    #Hyponatremia - initially symptomatic at 120 (118 outpatient) likely due to ddavp (dose probably higher for her age and weight) and high water intake  s/p 300 ml of 3% 8/2-8/3, Na 120-->121  s/p 180 ml of 3% 8/3, Na 121-->131  s/p 250 ml of D5 8/4 for therapeutic relowering, Na 131-->125  s/p 180 ml of 3% 8/6, Na 127-->134  Na improved to 137 8/6 w/ urine studies c/w appropriate correction, but then dropped to 129 after ddavp was resumed due to concerns of low UOsm close to 100  s/p 330 ml of 3% 8/8 to bring Na back up from 129  Na now 136 without ddavp, though w/ UOsm 167, Casper 23. 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 Ok for discharge today, but recommend going home without ddavp as pt has shown she tends to become hyponatremic with ddavp. Will need to follow up outpatient with BMP in a week     HTN - not elevated, would discuss spironolactone with cardiology as was heart failure recommendation    HFpEF - no signs of fluid overload at this time, would continue to monitor for now, will monitor closely to restart home torsemide or not    Discussed with primary team in detail. Will follow closely with you, please call with questions or concerns

## 2023-08-09 NOTE — PROGRESS NOTE ADULT - SUBJECTIVE AND OBJECTIVE BOX
Patient is a 89y Female seen and evaluated at bedside.       Meds:    acetaminophen     Tablet .. 650 every 6 hours PRN  aspirin  chewable 81 daily  atorvastatin 40 at bedtime  brimonidine 0.2% Ophthalmic Solution 1 two times a day  cholecalciferol 1000 daily  enoxaparin Injectable 40 every 24 hours  gabapentin 300 two times a day  latanoprost 0.005% Ophthalmic Solution 1 at bedtime  melatonin 3 at bedtime  zinc sulfate 220 daily      T(C): , Max: 37.1 (08-09-23 @ 13:38)  T(F): , Max: 98.8 (08-09-23 @ 13:38)  HR: 82 (08-09-23 @ 13:38)  BP: 122/74 (08-09-23 @ 13:38)  BP(mean): --  RR: 17 (08-09-23 @ 13:38)  SpO2: 96% (08-09-23 @ 13:38)  Wt(kg): --    08-08 @ 07:01  -  08-09 @ 07:00  --------------------------------------------------------  IN: 90 mL / OUT: 2000 mL / NET: -1910 mL    08-09 @ 07:01  -  08-09 @ 13:58  --------------------------------------------------------  IN: 0 mL / OUT: 350 mL / NET: -350 mL          Review of Systems:  ROS negative except as per HPI      PHYSICAL EXAM:  Constitutional: Resting comfortably in bed; NAD  HEENT:  EOMI, anicteric sclera; MMM  Respiratory: CTA B/L; +Holosystolic murmur, no accessory muscle use  Cardiac: +S1/S2; RRR; no M/R/G  Gastrointestinal: soft, NT/ND; no rebound or guarding; +BS  Extremities: WWP, no clubbing or cyanosis; no peripheral edema  Dermatologic: skin warm, dry and intact; no rashes, wounds, or scars      LABS:                        10.5   9.02  )-----------( 296      ( 08 Aug 2023 05:30 )             32.5     08-09    136  |  104  |  16  ----------------------------<  95  see note   |  24  |  0.81    Ca    9.2      09 Aug 2023 08:53  Phos  4.5     08-09  Mg     1.8     08-09          Urinalysis Basic - ( 09 Aug 2023 08:53 )    Color: x / Appearance: x / SG: x / pH: x  Gluc: 95 mg/dL / Ketone: x  / Bili: x / Urobili: x   Blood: x / Protein: x / Nitrite: x   Leuk Esterase: x / RBC: x / WBC x   Sq Epi: x / Non Sq Epi: x / Bacteria: x      Osmolality, Random Urine: 167 mosm/kg (08-09 @ 06:01)  Sodium, Random Urine: 23 mmol/L (08-09 @ 06:01)  Osmolality, Random Urine: 141 mosm/kg (08-08 @ 23:02)  Sodium, Random Urine: 22 mmol/L (08-08 @ 23:02)  Osmolality, Random Urine: 129 mosm/kg (08-08 @ 13:49)  Sodium, Random Urine: 28 mmol/L (08-08 @ 13:49)  Sodium, Random Urine: 27 mmol/L (08-08 @ 07:15)  Osmolality, Random Urine: 246 mosm/kg (08-08 @ 07:15)  Sodium, Random Urine: 38 mmol/L (08-08 @ 02:01)  Osmolality, Random Urine: 499 mosm/kg (08-08 @ 02:01)  Osmolality, Random Urine: 509 mosm/kg (08-07 @ 21:14)  Sodium, Random Urine: 41 mmol/L (08-07 @ 21:14)        RADIOLOGY & ADDITIONAL STUDIES:           Patient is a 89y Female seen and evaluated at bedside.       Meds:    acetaminophen     Tablet .. 650 every 6 hours PRN  aspirin  chewable 81 daily  atorvastatin 40 at bedtime  brimonidine 0.2% Ophthalmic Solution 1 two times a day  cholecalciferol 1000 daily  enoxaparin Injectable 40 every 24 hours  gabapentin 300 two times a day  latanoprost 0.005% Ophthalmic Solution 1 at bedtime  melatonin 3 at bedtime  zinc sulfate 220 daily      T(C): , Max: 37.1 (08-09-23 @ 13:38)  T(F): , Max: 98.8 (08-09-23 @ 13:38)  HR: 82 (08-09-23 @ 13:38)  BP: 122/74 (08-09-23 @ 13:38)  BP(mean): --  RR: 17 (08-09-23 @ 13:38)  SpO2: 96% (08-09-23 @ 13:38)  Wt(kg): --    08-08 @ 07:01  -  08-09 @ 07:00  --------------------------------------------------------  IN: 90 mL / OUT: 2000 mL / NET: -1910 mL    08-09 @ 07:01  -  08-09 @ 13:58  --------------------------------------------------------  IN: 0 mL / OUT: 350 mL / NET: -350 mL          Review of Systems:  ROS negative except as per HPI      PHYSICAL EXAM:  Constitutional: Resting comfortably in bed; NAD  HEENT:  EOMI, anicteric sclera; MMM  Respiratory: CTA B/L; +Holosystolic murmur, no accessory muscle use  Cardiac: +S1/S2; RRR; no M/R/G  Gastrointestinal: soft, NT/ND; no rebound or guarding; +BS  Extremities: WWP, no clubbing or cyanosis; no peripheral edema  Dermatologic: skin warm, dry and intact; no rashes, wounds, or scars      LABS:                        10.5   9.02  )-----------( 296      ( 08 Aug 2023 05:30 )             32.5     08-09    136  |  104  |  16  ----------------------------<  95  see note   |  24  |  0.81    Ca    9.2      09 Aug 2023 08:53  Phos  4.5     08-09  Mg     1.8     08-09          Urinalysis Basic - ( 09 Aug 2023 08:53 )    Color: x / Appearance: x / SG: x / pH: x  Gluc: 95 mg/dL / Ketone: x  / Bili: x / Urobili: x   Blood: x / Protein: x / Nitrite: x   Leuk Esterase: x / RBC: x / WBC x   Sq Epi: x / Non Sq Epi: x / Bacteria: x      Osmolality, Random Urine: 167 mosm/kg (08-09 @ 06:01)  Sodium, Random Urine: 23 mmol/L (08-09 @ 06:01)  Osmolality, Random Urine: 141 mosm/kg (08-08 @ 23:02)  Sodium, Random Urine: 22 mmol/L (08-08 @ 23:02)  Osmolality, Random Urine: 129 mosm/kg (08-08 @ 13:49)  Sodium, Random Urine: 28 mmol/L (08-08 @ 13:49)  Sodium, Random Urine: 27 mmol/L (08-08 @ 07:15)  Osmolality, Random Urine: 246 mosm/kg (08-08 @ 07:15)  Sodium, Random Urine: 38 mmol/L (08-08 @ 02:01)  Osmolality, Random Urine: 499 mosm/kg (08-08 @ 02:01)  Osmolality, Random Urine: 509 mosm/kg (08-07 @ 21:14)  Sodium, Random Urine: 41 mmol/L (08-07 @ 21:14)        RADIOLOGY & ADDITIONAL STUDIES:      Creatinine: 0.93 mg/dL (08-09-23 @ 20:43)  Creatinine: 0.81 mg/dL (08-09-23 @ 08:53)  Creatinine: 0.84 mg/dL (08-08-23 @ 22:25)  Creatinine: 0.91 mg/dL (08-08-23 @ 14:30)  Creatinine: 1.11 mg/dL (08-08-23 @ 05:30)  Creatinine: 1.14 mg/dL (08-08-23 @ 02:34)  Creatinine: 1.18 mg/dL (08-07-23 @ 20:47)  Creatinine: 1.09 mg/dL (08-07-23 @ 06:47)  Creatinine: 1.19 mg/dL (08-06-23 @ 22:27)  Creatinine: 0.89 mg/dL (08-06-23 @ 14:28)

## 2023-08-10 ENCOUNTER — TRANSCRIPTION ENCOUNTER (OUTPATIENT)
Age: 88
End: 2023-08-10

## 2023-08-10 VITALS
TEMPERATURE: 98 F | SYSTOLIC BLOOD PRESSURE: 110 MMHG | OXYGEN SATURATION: 97 % | DIASTOLIC BLOOD PRESSURE: 68 MMHG | RESPIRATION RATE: 18 BRPM | HEART RATE: 80 BPM

## 2023-08-10 LAB
ANION GAP SERPL CALC-SCNC: 8 MMOL/L — SIGNIFICANT CHANGE UP (ref 5–17)
BUN SERPL-MCNC: 16 MG/DL — SIGNIFICANT CHANGE UP (ref 7–23)
CALCIUM SERPL-MCNC: 9.9 MG/DL — SIGNIFICANT CHANGE UP (ref 8.4–10.5)
CHLORIDE SERPL-SCNC: 102 MMOL/L — SIGNIFICANT CHANGE UP (ref 96–108)
CO2 SERPL-SCNC: 28 MMOL/L — SIGNIFICANT CHANGE UP (ref 22–31)
CREAT SERPL-MCNC: 0.99 MG/DL — SIGNIFICANT CHANGE UP (ref 0.5–1.3)
EGFR: 53 ML/MIN/1.73M2 — LOW
GLUCOSE SERPL-MCNC: 112 MG/DL — HIGH (ref 70–99)
HCT VFR BLD CALC: 35.8 % — SIGNIFICANT CHANGE UP (ref 34.5–45)
HGB BLD-MCNC: 11.4 G/DL — LOW (ref 11.5–15.5)
MAGNESIUM SERPL-MCNC: 2 MG/DL — SIGNIFICANT CHANGE UP (ref 1.6–2.6)
MCHC RBC-ENTMCNC: 30.4 PG — SIGNIFICANT CHANGE UP (ref 27–34)
MCHC RBC-ENTMCNC: 31.8 GM/DL — LOW (ref 32–36)
MCV RBC AUTO: 95.5 FL — SIGNIFICANT CHANGE UP (ref 80–100)
NRBC # BLD: 0 /100 WBCS — SIGNIFICANT CHANGE UP (ref 0–0)
OSMOLALITY UR: 153 MOSM/KG — LOW (ref 300–900)
PHOSPHATE SERPL-MCNC: 3.6 MG/DL — SIGNIFICANT CHANGE UP (ref 2.5–4.5)
PLATELET # BLD AUTO: 308 K/UL — SIGNIFICANT CHANGE UP (ref 150–400)
POTASSIUM SERPL-MCNC: 4.8 MMOL/L — SIGNIFICANT CHANGE UP (ref 3.5–5.3)
POTASSIUM SERPL-SCNC: 4.8 MMOL/L — SIGNIFICANT CHANGE UP (ref 3.5–5.3)
RBC # BLD: 3.75 M/UL — LOW (ref 3.8–5.2)
RBC # FLD: 16.2 % — HIGH (ref 10.3–14.5)
SODIUM SERPL-SCNC: 138 MMOL/L — SIGNIFICANT CHANGE UP (ref 135–145)
SODIUM UR-SCNC: <20 MMOL/L — SIGNIFICANT CHANGE UP
WBC # BLD: 10.04 K/UL — SIGNIFICANT CHANGE UP (ref 3.8–10.5)
WBC # FLD AUTO: 10.04 K/UL — SIGNIFICANT CHANGE UP (ref 3.8–10.5)

## 2023-08-10 PROCEDURE — 99233 SBSQ HOSP IP/OBS HIGH 50: CPT

## 2023-08-10 PROCEDURE — 84100 ASSAY OF PHOSPHORUS: CPT

## 2023-08-10 PROCEDURE — 85027 COMPLETE CBC AUTOMATED: CPT

## 2023-08-10 PROCEDURE — 83735 ASSAY OF MAGNESIUM: CPT

## 2023-08-10 PROCEDURE — 84300 ASSAY OF URINE SODIUM: CPT

## 2023-08-10 PROCEDURE — 36415 COLL VENOUS BLD VENIPUNCTURE: CPT

## 2023-08-10 PROCEDURE — 97164 PT RE-EVAL EST PLAN CARE: CPT

## 2023-08-10 PROCEDURE — 83930 ASSAY OF BLOOD OSMOLALITY: CPT

## 2023-08-10 PROCEDURE — 82570 ASSAY OF URINE CREATININE: CPT

## 2023-08-10 PROCEDURE — 99233 SBSQ HOSP IP/OBS HIGH 50: CPT | Mod: GC

## 2023-08-10 PROCEDURE — 71045 X-RAY EXAM CHEST 1 VIEW: CPT

## 2023-08-10 PROCEDURE — 80053 COMPREHEN METABOLIC PANEL: CPT

## 2023-08-10 PROCEDURE — 81001 URINALYSIS AUTO W/SCOPE: CPT

## 2023-08-10 PROCEDURE — 99285 EMERGENCY DEPT VISIT HI MDM: CPT

## 2023-08-10 PROCEDURE — 87186 SC STD MICRODIL/AGAR DIL: CPT

## 2023-08-10 PROCEDURE — 85025 COMPLETE CBC W/AUTO DIFF WBC: CPT

## 2023-08-10 PROCEDURE — 87086 URINE CULTURE/COLONY COUNT: CPT

## 2023-08-10 PROCEDURE — 83935 ASSAY OF URINE OSMOLALITY: CPT

## 2023-08-10 PROCEDURE — 97161 PT EVAL LOW COMPLEX 20 MIN: CPT

## 2023-08-10 PROCEDURE — 80048 BASIC METABOLIC PNL TOTAL CA: CPT

## 2023-08-10 PROCEDURE — 84443 ASSAY THYROID STIM HORMONE: CPT

## 2023-08-10 PROCEDURE — 93005 ELECTROCARDIOGRAM TRACING: CPT

## 2023-08-10 RX ORDER — POLYETHYLENE GLYCOL 3350 17 G/17G
17 POWDER, FOR SOLUTION ORAL DAILY
Refills: 0 | Status: DISCONTINUED | OUTPATIENT
Start: 2023-08-10 | End: 2023-08-10

## 2023-08-10 RX ORDER — SENNA PLUS 8.6 MG/1
2 TABLET ORAL AT BEDTIME
Refills: 0 | Status: DISCONTINUED | OUTPATIENT
Start: 2023-08-10 | End: 2023-08-10

## 2023-08-10 RX ORDER — POLYETHYLENE GLYCOL 3350 17 G/17G
17 POWDER, FOR SOLUTION ORAL
Qty: 238 | Refills: 0
Start: 2023-08-10 | End: 2023-08-23

## 2023-08-10 RX ORDER — DESMOPRESSIN ACETATE 0.1 MG/1
0.5 TABLET ORAL
Qty: 7 | Refills: 0
Start: 2023-08-10 | End: 2023-08-23

## 2023-08-10 RX ADMIN — GABAPENTIN 300 MILLIGRAM(S): 400 CAPSULE ORAL at 06:22

## 2023-08-10 RX ADMIN — Medication 81 MILLIGRAM(S): at 11:39

## 2023-08-10 RX ADMIN — BRIMONIDINE TARTRATE 1 DROP(S): 2 SOLUTION/ DROPS OPHTHALMIC at 06:23

## 2023-08-10 RX ADMIN — ZINC SULFATE TAB 220 MG (50 MG ZINC EQUIVALENT) 220 MILLIGRAM(S): 220 (50 ZN) TAB at 11:39

## 2023-08-10 RX ADMIN — Medication 1000 UNIT(S): at 11:39

## 2023-08-10 RX ADMIN — POLYETHYLENE GLYCOL 3350 17 GRAM(S): 17 POWDER, FOR SOLUTION ORAL at 12:07

## 2023-08-10 NOTE — PROGRESS NOTE ADULT - ASSESSMENT
89 F with h/p  of dCHF, HTN, HLD and severe AS s/p Bioprosthetic AV 7 years ago at OSH, c/b by bioprosthetic AV stenosis, s/p Valve-in-Valve with a Sheri Ultra by Structural Heart Team at [facility] in June of 2021, who presented to the ER with fatigue, generalized weakness, found to be severely Hyponatremic s/p hypertonic saline and UTI. Cardiology consulted for medication optimization    Review of studies.   - Echo 12/06/2022: Normal left and right ventricular size and systolic function. Moderate aortic stenosis. The peak transvalvular velocity is 3.10 m/s, the mean transvalvular gradient is 25.00 mmHg, and the LVOT/AV velocity ratio is 0.44. The aortic valve area (estimated via the   continuity method) is 1.12 cm. The calculated aortic valve area indexed to body surface area is 0.72 cm/m.Moderate mitral regurgitation. Pulmonary artery systolic pressure is 35 mmHg.    #HFpEF, appears euvolemic   #severe AS s/p Bio AVR, s/p ROSLYN Sheri Ultra  #hyponatremia    Nephrology recommending holding off on further desmopressin  POCUS by primary team with collapsable IVC 8/9  Na wnl  Uop 350 cc in 12 hours, net negative 7.5 L since admission  Patient appears euvolemic on exam  Given patient's presentation of hyponatremia while on desmopressin, spironolactone, metolazone, and torsemide s/p hypertonic saline and now appears euvolemic with nl NA, agree with nephrology and primary team to hold off on restarting diuretics.   Patient should have close follow-up with Dr. Fernandez (patient's cardiologist) within 1 - 2 weeks of discharge for evaluation of volume status.       Case d/w attending, Dr. Joe

## 2023-08-10 NOTE — PROGRESS NOTE ADULT - ATTENDING SUPERVISION STATEMENT
Fellow
Fellow
Resident
Fellow
Resident

## 2023-08-10 NOTE — PROGRESS NOTE ADULT - NS ATTEST RISK GEN_ALL_CORE
Risk Statement (NON-critical care)
Hemostasis: Electrocautery

## 2023-08-10 NOTE — PROGRESS NOTE ADULT - ATTENDING COMMENTS
I agree with the fellow's findings and plans as written above with the following additions/amendments:    Seen and examined at bedside. Feels well, waiting to go home. Denies CP, palpitations, N/V/D. Discussed urinary frequency management at length with primary team, outpatient provider, and patient's daughter. Will have blood draw tomorrow to ensure no drop in sodium. Further recs as above

## 2023-08-10 NOTE — PROGRESS NOTE ADULT - SUBJECTIVE AND OBJECTIVE BOX
Patient is a 89y Female seen and evaluated at bedside.       Meds:    acetaminophen     Tablet .. 650 every 6 hours PRN  aspirin  chewable 81 daily  atorvastatin 40 at bedtime  brimonidine 0.2% Ophthalmic Solution 1 two times a day  cholecalciferol 1000 daily  enoxaparin Injectable 40 every 24 hours  gabapentin 300 two times a day  latanoprost 0.005% Ophthalmic Solution 1 at bedtime  melatonin 3 at bedtime  polyethylene glycol 3350 17 daily  senna 2 at bedtime  zinc sulfate 220 daily      T(C): , Max: 37 (08-09-23 @ 20:42)  T(F): , Max: 98.6 (08-09-23 @ 20:42)  HR: 80 (08-10-23 @ 13:16)  BP: 110/68 (08-10-23 @ 13:16)  BP(mean): --  RR: 18 (08-10-23 @ 13:16)  SpO2: 97% (08-10-23 @ 13:16)  Wt(kg): --    08-09 @ 07:01  -  08-10 @ 07:00  --------------------------------------------------------  IN: 0 mL / OUT: 350 mL / NET: -350 mL          Review of Systems:  ROS negative except as per HPI      PHYSICAL EXAM:  Constitutional: Resting comfortably in bed; NAD  HEENT:  EOMI, anicteric sclera; MMM  Respiratory: CTA B/L; +Holosystolic murmur, no accessory muscle use  Cardiac: +S1/S2; RRR; no M/R/G  Gastrointestinal: soft, NT/ND; no rebound or guarding; +BS  Extremities: WWP, no clubbing or cyanosis; no peripheral edema  Dermatologic: skin warm, dry and intact; no rashes, wounds, or scars    LABS:                        11.4   10.04 )-----------( 308      ( 10 Aug 2023 05:30 )             35.8     08-10    138  |  102  |  16  ----------------------------<  112<H>  4.8   |  28  |  0.99    Ca    9.9      10 Aug 2023 05:30  Phos  3.6     08-10  Mg     2.0     08-10          Urinalysis Basic - ( 10 Aug 2023 05:30 )    Color: x / Appearance: x / SG: x / pH: x  Gluc: 112 mg/dL / Ketone: x  / Bili: x / Urobili: x   Blood: x / Protein: x / Nitrite: x   Leuk Esterase: x / RBC: x / WBC x   Sq Epi: x / Non Sq Epi: x / Bacteria: x      Osmolality, Random Urine: 153 mosm/kg (08-10 @ 05:30)  Sodium, Random Urine: <20 mmol/L (08-10 @ 05:30)  Sodium, Random Urine: 26 mmol/L (08-09 @ 21:31)  Osmolality, Random Urine: 173 mosm/kg (08-09 @ 21:31)  Osmolality, Random Urine: 167 mosm/kg (08-09 @ 06:01)  Sodium, Random Urine: 23 mmol/L (08-09 @ 06:01)  Osmolality, Random Urine: 141 mosm/kg (08-08 @ 23:02)  Sodium, Random Urine: 22 mmol/L (08-08 @ 23:02)        RADIOLOGY & ADDITIONAL STUDIES:           Patient is a 89y Female seen and evaluated at bedside.       Meds:    acetaminophen     Tablet .. 650 every 6 hours PRN  aspirin  chewable 81 daily  atorvastatin 40 at bedtime  brimonidine 0.2% Ophthalmic Solution 1 two times a day  cholecalciferol 1000 daily  enoxaparin Injectable 40 every 24 hours  gabapentin 300 two times a day  latanoprost 0.005% Ophthalmic Solution 1 at bedtime  melatonin 3 at bedtime  polyethylene glycol 3350 17 daily  senna 2 at bedtime  zinc sulfate 220 daily      T(C): , Max: 37 (08-09-23 @ 20:42)  T(F): , Max: 98.6 (08-09-23 @ 20:42)  HR: 80 (08-10-23 @ 13:16)  BP: 110/68 (08-10-23 @ 13:16)  BP(mean): --  RR: 18 (08-10-23 @ 13:16)  SpO2: 97% (08-10-23 @ 13:16)  Wt(kg): --    08-09 @ 07:01  -  08-10 @ 07:00  --------------------------------------------------------  IN: 0 mL / OUT: 350 mL / NET: -350 mL          Review of Systems:  ROS negative except as per HPI      PHYSICAL EXAM:  Constitutional: Resting comfortably in bed; NAD  HEENT:  EOMI, anicteric sclera; MMM  Respiratory: CTA B/L; +Holosystolic murmur, no accessory muscle use  Cardiac: +S1/S2; RRR; no M/R/G  Gastrointestinal: soft, NT/ND; no rebound or guarding; +BS  Extremities: WWP, no clubbing or cyanosis; no peripheral edema  Dermatologic: skin warm, dry and intact; no rashes, wounds, or scars    LABS:                        11.4   10.04 )-----------( 308      ( 10 Aug 2023 05:30 )             35.8     08-10    138  |  102  |  16  ----------------------------<  112<H>  4.8   |  28  |  0.99    Ca    9.9      10 Aug 2023 05:30  Phos  3.6     08-10  Mg     2.0     08-10          Urinalysis Basic - ( 10 Aug 2023 05:30 )    Color: x / Appearance: x / SG: x / pH: x  Gluc: 112 mg/dL / Ketone: x  / Bili: x / Urobili: x   Blood: x / Protein: x / Nitrite: x   Leuk Esterase: x / RBC: x / WBC x   Sq Epi: x / Non Sq Epi: x / Bacteria: x      Osmolality, Random Urine: 153 mosm/kg (08-10 @ 05:30)  Sodium, Random Urine: <20 mmol/L (08-10 @ 05:30)  Sodium, Random Urine: 26 mmol/L (08-09 @ 21:31)  Osmolality, Random Urine: 173 mosm/kg (08-09 @ 21:31)  Osmolality, Random Urine: 167 mosm/kg (08-09 @ 06:01)  Sodium, Random Urine: 23 mmol/L (08-09 @ 06:01)  Osmolality, Random Urine: 141 mosm/kg (08-08 @ 23:02)  Sodium, Random Urine: 22 mmol/L (08-08 @ 23:02)        RADIOLOGY & ADDITIONAL STUDIES:    Creatinine: 0.99 mg/dL (08-10-23 @ 05:30)  Creatinine: 0.93 mg/dL (08-09-23 @ 20:43)  Creatinine: 0.81 mg/dL (08-09-23 @ 08:53)  Creatinine: 0.84 mg/dL (08-08-23 @ 22:25)  Creatinine: 0.91 mg/dL (08-08-23 @ 14:30)  Creatinine: 1.11 mg/dL (08-08-23 @ 05:30)  Creatinine: 1.14 mg/dL (08-08-23 @ 02:34)  Creatinine: 1.18 mg/dL (08-07-23 @ 20:47)  Creatinine: 1.09 mg/dL (08-07-23 @ 06:47)  Creatinine: 1.19 mg/dL (08-06-23 @ 22:27)      Sodium: 138 mmol/L (08-10-23 @ 05:30)  Sodium: 139 mmol/L (08-09-23 @ 20:43)  Sodium: 136 mmol/L (08-09-23 @ 08:53)  Sodium: 136 mmol/L (08-08-23 @ 22:25)  Sodium: 140 mmol/L (08-08-23 @ 14:30)  Sodium: 133 mmol/L (08-08-23 @ 05:30)  Sodium: 130 mmol/L (08-08-23 @ 02:34)  Sodium: 129 mmol/L (08-07-23 @ 20:47)  Sodium: 132 mmol/L (08-07-23 @ 06:47)  Sodium: 134 mmol/L (08-06-23 @ 22:27)

## 2023-08-10 NOTE — PROGRESS NOTE ADULT - PROBLEM SELECTOR PROBLEM 4
Chronic diastolic congestive heart failure
S/P TAVR (transcatheter aortic valve replacement)
S/P TAVR (transcatheter aortic valve replacement)
Chronic diastolic congestive heart failure
Anemia
Cervical spine fracture
Anemia
Anemia

## 2023-08-10 NOTE — PROGRESS NOTE ADULT - PROBLEM SELECTOR PLAN 2
On home desmopressin for >20 years. Na 120 on admission.  Na 137 -> 134 -> 132 in AM of 8/7  Uosm 136 -> 445 in AM 8/7  Casper 24 -> 37 in AM 8/7   Renal on board, unsure if true DI as pt seems to be properly correcting without desmopressin   Plan:  - Holding Desmopressin  - Discharge with desmopressin 0.1 On home desmopressin for >20 years. Na 120 on admission.  Na 137 -> 134 -> 132 in AM of 8/7  Uosm 136 -> 445 in AM 8/7  Casper 24 -> 37 in AM 8/7   Renal on board, unsure if true DI as pt seems to be properly correcting without desmopressin   Plan:  - Holding Desmopressin  - Discharge with desmopressin 0.05 mg QHS

## 2023-08-10 NOTE — CHART NOTE - NSCHARTNOTEFT_GEN_A_CORE
Admitting Diagnosis:   Patient is a 89y old  Female who presents with a chief complaint of hyponatremia (10 Aug 2023 14:08)      PAST MEDICAL & SURGICAL HISTORY:  Hyperlipidemia, unspecified hyperlipidemia type  Diabetes insipidus  H/O aortic valve stenosis  Hypertension  Vertigo  Chronic diastolic congestive heart failure  Dyslipidemia  History of pseudoaneurysm  Glaucoma  S/P AVR  Bioprosthetic  H/O lumbosacral spine surgery  S/P hip replacement  B/L  S/P right coronary artery (RCA) stent placement        Current Nutrition Order:   Diet, Regular (08-10-23 @ 08:30)      PO Intake: Good (%) [   ]  Fair (50-75%) [ x ] Poor (<25%) [   ]    GI Issues: A endorses constipation, last BM 8/8 per EMR     Skin Integrity: stage 2 sacral PI, no edema documented     Labs:   08-10    138  |  102  |  16  ----------------------------<  112<H>  4.8   |  28  |  0.99    Ca    9.9      10 Aug 2023 05:30  Phos  3.6     08-10  Mg     2.0     08-10      CAPILLARY BLOOD GLUCOSE      Medications:  MEDICATIONS  (STANDING):  aspirin  chewable 81 milliGRAM(s) Oral daily  atorvastatin 40 milliGRAM(s) Oral at bedtime  brimonidine 0.2% Ophthalmic Solution 1 Drop(s) Both EYES two times a day  cholecalciferol 1000 Unit(s) Oral daily  enoxaparin Injectable 40 milliGRAM(s) SubCutaneous every 24 hours  gabapentin 300 milliGRAM(s) Oral two times a day  latanoprost 0.005% Ophthalmic Solution 1 Drop(s) Both EYES at bedtime  melatonin 3 milliGRAM(s) Oral at bedtime  polyethylene glycol 3350 17 Gram(s) Oral daily  senna 2 Tablet(s) Oral at bedtime  zinc sulfate 220 milliGRAM(s) Oral daily    MEDICATIONS  (PRN):  acetaminophen     Tablet .. 650 milliGRAM(s) Oral every 6 hours PRN Mild Pain (1 - 3)      Anthropometrics:  Dosing height: 170#  Dosing weight: 61in  BMI: 32.1  IBW: 105#       %IBW: 162%    Weight Change: no new weights obtained since admit     Nutrition Focused Physical Exam: Completed [ x ]  Not Pertinent [   ]  >>see initial nutrition assessment 8/3, observed with no s/sx muscle/fat wasting     Estimated energy needs:   Weight used for calculations	IBW  Estimated Energy Needs Weight (lbs)	105 lb  Estimated Energy Needs Weight (kg)	47.6 kg  Estimated Energy Needs From (monica/kg)	30  Estimated Energy Needs To (monica/kg)	35  Estimated Energy Needs Calculated From (monica/kg)	1428  Estimated Energy Needs Calculated To (monica/kg)	1666  Weight used for calculations	IBW  Estimated Protein Needs Weight (lbs)	170 lb  Estimated Protein Needs Weight (kg)	77.1 kg  Estimated Protein Needs From (g/kg)	1.3  Estimated Protein Needs To (g/kg)	1.6  Estimated Protein Needs Calculated From (g/kg)	100.23  Estimated Protein Needs Calculated To (g/kg)	123.36    Other Calculations	Based on Standards of Care pt above % IBW (162%) thus ideal body weight used for all calculations (105%). Needs adjusted for advanced age and PI. Fluid recs per team.    Subjective:   89 y/o F with PMH HTN, HLD, chronic diastolic CHF, hx BioAVR complicated by stenosis and subsequent valve-in-valve TAVR at [facility] in 2021, hx Diabetes Insipidus with hyponatremia (on DDAVP 1.5mg daily), chronic neuropathy, C spine fracture (C-spine collar when out of her home secondary to poor healing), dementia, depression presented with 1 day history of generalized weakness and a witnessed fall. Pt claiming that she is in the hospital due to chronic pain in her back. Aid at bedside endorsed that patient was feeling dizzy and felt like her legs were giving up when ambulating this AM. Pt did not have a fall but her aid lowered her to sit on the ground. Aid also claims that pt saw her OP Dr Camila Mcpherson who asked her  to go to ED on 8/1 for OP Na+ 118 but pt refused. Aid also claiming that pt has been c/o urinary discomfort over last few days which pt corroborated and that she had 2 episodes of non-bloody diarrhea on morning of admission. Pt has no complaints other than mild discomfort on urination. Pt denies HA, dizziness, CP, SOB, changes in BMs.    Pt and HHA seen at bedside for follow up assessment. On Regular diet. Consumed bread w/ butter and yogurt for breakfast this AM. Per HHA, appetite is good, however notes patient to be eating slightly less than usual intake at home, likely related to dislike of facility food. PO intake encouraged, food preferences reviewed and documented in [facility]ORD: bananas. No new weights obtained since admit, recommend to obtain. HHA endorses constipation, last BM 8/8 per EMR - on bowel regimen. Noted with stage 2 sacral PI - encouraged continued intake of protein for wound healing. No edema documented. Labs: glucose trending 112-135 x 24 hours, GFR 53. Meds: vitamin D, zinc. RD to remain available for additional nutrition interventions as needed.     Previous Nutrition Diagnosis: Increased nutrient needs related to PI as evidenced by increased protein demands for wound healing     Active [ x ]  Resolved [   ]    Goal: Pt to meet at least 75% of nutritional needs consistently     Recommendations:  1. Continue current diet order  2. Monitor PO intake/appetite, GI distress, diet tolerance, labs, weights  2. Honor pt food preferences as able      Risk Level: High [   ] Moderate [ x ] Low [   ]

## 2023-08-10 NOTE — PROGRESS NOTE ADULT - ATTENDING COMMENTS
yes Patient is 89 yo woman with Ms,MR s/p TAVR, dCHF, on desmopressin and diuretic therapy at home, admitted with fatigue and found to be hyponatremic.   ----hyponatremia in the setting of triple diuretic therapy and concurrent long standing desmopressin therapy. Patient's sodium levels improved with fluid restriction while being off desmopressin and diuretics. Urine osms are low and urine output is greater then fluid intake.   After interdisciplinary discussion with Cardiology and Nephrology teams, it is decided that patient will be discharged home off diuretics, and on lowest possible dose of Desmopressin 0.05mg qHS. Patient will be closely followed by her PCP Dr. Raza who will be in communication with patient's nephrologist and cardiologist regarding any need for future adjustment of desmopressin dose.   ----for dispo, pt resides at home with assistance from aids 24/7. Hoping for discharge home today. Patient will be given enema for BM before discharge and pt to be seen by PT as per daughter's request.

## 2023-08-10 NOTE — PROGRESS NOTE ADULT - PROBLEM SELECTOR PLAN 1
RESOLVED  Like iso over-treatment with Desmopressin, loose stools and poor PO intake   Pt mentating at baseline  Pt off desmopressin since 8/7. Overnight Na was 129, pt given hypertonic saline. Repeat Na qas 130 and this AM was 133. Pt was d/c of hypertonic saline   Nephro following  Plan:  - Will continue to hold desmopressin and Metolazone  - Will Hold hypertonic saline as pt likely to correct on her own   - f/u PM and AM CMP, Casper and UOsm  - Renal to do a trial of diuretics prior to d/c to assess effect on Na

## 2023-08-10 NOTE — DISCHARGE NOTE NURSING/CASE MANAGEMENT/SOCIAL WORK - NSDCFUADDAPPT_GEN_ALL_CORE_FT
Dr. Camila Mcpherson (PCP) was contacted for follow-up appointment after discharge from the hospital. She will contact Ms. Duval's daughter (Massiel) to set up an appointment.    Dr. Rodrigo Bowling - Pending appointment on 9/21/2023 at 10AM, but the clinic will contact the patient over the phone if an earlier date/time becomes available.

## 2023-08-10 NOTE — PROGRESS NOTE ADULT - PROBLEM SELECTOR PROBLEM 6
CAD (coronary artery disease)
CAD (coronary artery disease)
Cervical spine fracture
CAD (coronary artery disease)
CAD (coronary artery disease)
HLD (hyperlipidemia)
HLD (hyperlipidemia)
Cervical spine fracture
CAD (coronary artery disease)
Encounter for palliative care
CAD (coronary artery disease)

## 2023-08-10 NOTE — PROGRESS NOTE ADULT - ASSESSMENT
89 y/o F with PMH HTN, HLD, chronic diastolic CHF, hx BioAVR complicated by stenosis and subsequent valve-in-valve TAVR at [facility] in 2021, hx Diabetes Insipidus with hyponatremia (on DDAVP 1.5mg daily), chronic neuropathy, C spine fracture (C-spine collar when out of her home secondary to poor healing), dementia, depression presented with 1 day history of generalized weakness and a witnessed fall. On arrival, found to be hyponatremic (120) and to have a UTI. The pt was admitted for hyponatremia and abx treatment for UTI

## 2023-08-10 NOTE — PROGRESS NOTE ADULT - PROBLEM SELECTOR PROBLEM 2
Diabetes insipidus
Sacral wound
Diabetes insipidus
Sacral wound
Diabetes insipidus
Diabetes insipidus
Sacral wound
Diabetes insipidus

## 2023-08-10 NOTE — PROGRESS NOTE ADULT - PROVIDER SPECIALTY LIST ADULT
Internal Medicine
Internal Medicine
Nephrology
Internal Medicine
Nephrology
Nephrology
Palliative Care
Rehab Medicine
Cardiology
Cardiology
Nephrology
Nephrology
Palliative Care
Rehab Medicine
Cardiology
Cardiology
Nephrology
Internal Medicine
Palliative Care
Internal Medicine
Internal Medicine

## 2023-08-10 NOTE — PROGRESS NOTE ADULT - SUBJECTIVE AND OBJECTIVE BOX
SUBJECTIVE/OVERNIGHT EVENTS: No acute overnight events. Pt seen in AM at bedside, resting comfortably in bed, and does not appear to be in any acute distress. When asked, pt denies any recent or active fever, chills, nausea, vomiting, headache, acute sob, chest pain, abdominal pain, genitourinary sx, extremity pain or swelling.    VITAL SIGNS:  Vital Signs Last 24 Hrs  T(C): 37 (10 Aug 2023 05:33), Max: 37.1 (09 Aug 2023 13:38)  T(F): 98.6 (10 Aug 2023 05:33), Max: 98.8 (09 Aug 2023 13:38)  HR: 82 (10 Aug 2023 05:33) (82 - 90)  BP: 109/65 (10 Aug 2023 05:33) (109/65 - 122/74)  BP(mean): --  RR: 17 (10 Aug 2023 05:33) (17 - 18)  SpO2: 98% (10 Aug 2023 05:33) (93% - 98%)    Parameters below as of 10 Aug 2023 05:33  Patient On (Oxygen Delivery Method): room air        PHYSICAL EXAM:  General: NAD; speaking in full sentences  HEENT: NC/AT; PERRL; EOMI; MMM  Neck: supple; no JVD  Cardiac: RRR; +S1/S2  Pulm: CTA B/L; no W/R/R  GI: soft, NT/ND, +BS  Extremities: Tenderness to palpation of bilateral LE, WWP; no edema, clubbing or cyanosis  Vasc: 2+ radial, DP pulses B/L  Neuro: AAOx3; no focal deficits    MEDICATIONS:  MEDICATIONS  (STANDING):  aspirin  chewable 81 milliGRAM(s) Oral daily  atorvastatin 40 milliGRAM(s) Oral at bedtime  brimonidine 0.2% Ophthalmic Solution 1 Drop(s) Both EYES two times a day  cholecalciferol 1000 Unit(s) Oral daily  enoxaparin Injectable 40 milliGRAM(s) SubCutaneous every 24 hours  gabapentin 300 milliGRAM(s) Oral two times a day  latanoprost 0.005% Ophthalmic Solution 1 Drop(s) Both EYES at bedtime  melatonin 3 milliGRAM(s) Oral at bedtime  zinc sulfate 220 milliGRAM(s) Oral daily    MEDICATIONS  (PRN):  acetaminophen     Tablet .. 650 milliGRAM(s) Oral every 6 hours PRN Mild Pain (1 - 3)      ALLERGIES:  Allergies    penicillin (Unknown)  [This allergen will not trigger allergy alert] Acetic [id number] (Other)  erythromycin (Unknown)    Intolerances        LABS:                        11.4   10.04 )-----------( 308      ( 10 Aug 2023 05:30 )             35.8     08-10    138  |  102  |  16  ----------------------------<  112<H>  4.8   |  28  |  0.99    Ca    9.9      10 Aug 2023 05:30  Phos  3.6     08-10  Mg     2.0     08-10          RADIOLOGY & ADDITIONAL TESTS: Reviewed.

## 2023-08-10 NOTE — PROGRESS NOTE ADULT - PROBLEM SELECTOR PLAN 10
Plan:  F: none   E: replete K<4, Mg<2  N: regular  VTE Prophylaxis: lovenox 40mg QD  GI: none  C: Full Code  D: TAN
Plan:  F: none   E: replete K<4, Mg<2  N: regular with 1L fluid restriction  VTE Prophylaxis: lovenox 40mg QD  GI: none  C: DNR/DNI  D: Home with hha
Plan:  F: none   E: replete K<4, Mg<2  N: regular with 1L fluid restriction  VTE Prophylaxis: lovenox 40mg QD  GI: none  C: DNR/DNI  D: Home with hha

## 2023-08-10 NOTE — PROGRESS NOTE ADULT - PROBLEM SELECTOR PROBLEM 1
Debility
Hyponatremia
Debility
Debility
Hyponatremia

## 2023-08-10 NOTE — PROGRESS NOTE ADULT - ASSESSMENT
{\rtf1\qldofc78085\ansi\kdzllid0716\ftnbj\uc1\deff0  {\fonttbl{\f0 \fnil Segoe UI;}{\f1 \fnil \fcharset0 Segoe UI;}{\f2 \fnil Times New Jasmeet;}}  {\colortbl ;\boh144\yhpaq490\cxqj515 ;\red0\green0\blue0 ;\red0\green0\haed849 ;\red0\green0\blue0 ;}  {\stylesheet{\f0\fs20 Normal;}{\cs1 Default Paragraph Font;}{\cs2\f0\fs16 Line Number;}{\cs3\f2\fs24\ul\cf3 Hyperlink;}}  {\*\revtbl{Unknown;}}  \xvcrts35367\naaojb56466\ryork1681\jdhwe2909\shjzv7082\optkf3691\rpaeozg972\oeurmjm558\nogrowautofit\kszasp248\formshade\nofeaturethrottle1\dntblnsbdb\fet4\aendnotes\aftnnrlc\pgbrdrhead\pgbrdrfoot  \sectd\vjoguv98769\cudinr46651\guttersxn0\lnqpsnft9388\ldkaermg3911\hylykyxg3199\mofuncqx9087\zjsuxcg790\cozdysp240\sbkpage\pgncont\pgndec  \plain\plain\f0\fs24\ql\plain\f0\fs24\plain\f0\fs20\uomk8276\hich\f0\dbch\f0\loch\f0\fs20 I M\par  \par  89 y o F with PMH HTN, HLD, chronic diastolic CHF, 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 in 2021, hx Diabetes Insipidus with hyponatremia (on DDAVP 1.5mg daily), chronic neuropathy, C spine fracture (C-spine collar   when out of her home secondary to poor healing), dementia, depression presented with 1 day history of generalized weakness and a witnessed fall. On arrival, found to be hyponatremic (120) and to have a UTI. The pt was admitted for hyponatremia and abx   treatment for UTI\par  \par  \plain\f1\fs20\kisj6555\hich\f1\dbch\f1\loch\f1\cf2\fs20\ul{\field{\*\fldinst HYPERLINK [id number] }{\fldrslt Problem/Plan - 1:}}\plain\f0\fs20\mtdo1697\hich\f0\dbch\f0\loch\f0\fs20\ql\par  \'b7  {\*\bkmkstart [id number]}{\*\bkmkend mi47516029425}Problem: {\*\bkmkstart tt51009375372}{\*\bkmkend hh13187264428}Hyponatremia. \par  \'b7  {\*\bkmkstart yo32728058113}{\*\bkmkend dc67346626465}Plan: {\*\bkmkstart vn46873110915}{\*\bkmkend yu15998467882}RESOLVED\par  Like iso over-treatment with Desmopressin, loose stools and poor PO intake \par  Pt mentating at baseline\par  Pt off desmopressin since 8/7. Overnight Na was 129, pt given hypertonic saline. Repeat Na qas 130 and this AM was 133. Pt was d/c of hypertonic saline \par  Nephro following\par  Plan:\par  - Will continue to hold desmopressin and Metolazone\par  - Will Hold hypertonic saline as pt likely to correct on her own \par  - f/u PM and AM CMP, Casper and UOsm\par  - Renal to do a trial of diuretics prior to d/c to assess effect on Na.\par  \par  \plain\f1\fs20\oqxt7331\hich\f1\dbch\f1\loch\f1\cf2\fs20\ul{\field{\*\fldinst HYPERLINK [id number] }{\fldrslt Problem/Plan - 2:}}\plain\f0\fs20\gtqd1884\hich\f0\dbch\f0\loch\f0\fs20\ql\par  \'b7  {\*\bkmkstart yb05882746807}{\*\bkmkend um72349105873}Problem: {\*\bkmkstart fp58127100570}{\*\bkmkend gf38914974658}Diabetes insipidus. \par  \'b7  {\*\bkmkstart fi36868261968}{\*\bkmkend dk69234091725}Plan: {\*\bkmkstart kq64493215968}{\*\bkmkend nq64517729926}On home desmopressin for >20 years. Na 120 on admission.\par  Na 137 -> 134 -> 132 in AM of 8/7\par  Uosm 136 -> 445 in AM 8/7\par  Casper 24 -> 37 in AM 8/7 \par  Renal on board, unsure if true DI as pt seems to be properly correcting without desmopressin \par  Plan:\par  - Holding Desmopressin\par  - Discharge with desmopressin 0.05 mg QHS.\plain\f1\fs20\oyel0735\hich\f1\dbch\f1\loch\f1\cf2\fs20\strike\plain\f0\fs20\dnzk4382\hich\f0\dbch\f0\loch\f0\fs20\par  \par  \plain\f1\fs20\nlwh6706\hich\f1\dbch\f1\loch\f1\cf2\fs20\ul{\field{\*\fldinst HYPERLINK [id number] }{\fldrslt Problem/Plan - 3:}}\plain\f0\fs20\jsqj6287\hich\f0\dbch\f0\loch\f0\fs20\ql\par  \'b7  {\*\bkmkstart pa97956652137}{\*\bkmkend iw19677730407}Problem: {\*\bkmkstart gy03932226987}{\*\bkmkend co57184637745}Anemia. \par  \'b7  {\*\bkmkstart dh17129008143}{\*\bkmkend sj57629718585}Plan: {\*\bkmkstart is25598735099}{\*\bkmkend ia83771060074}Likely chronic\par  Hgb on admission 11.3, stable \par  Currently no signs of active bleeding (no hematochezia, melena, hemoptysis, hematuria). \par  Baseline Hb from previous admission in 12/22 10-11\par  Plan: \par  - trend CBC\par  - maintain active T&S\par  - transfuse if Hgb <7.\par  \par  \plain\f1\fs20\ncyd2970\hich\f1\dbch\f1\loch\f1\cf2\fs20\ul{\field{\*\fldinst HYPERLINK [id number] }{\fldrslt Problem/Plan - 4:}}\plain\f0\fs20\pxnz3860\hich\f0\dbch\f0\loch\f0\fs20\ql\par  \'b7  {\*\bkmkstart tc68065291668}{\*\bkmkend eg09330041904}Problem: {\*\bkmkstart xd77295319607}{\*\bkmkend za92123592569}Chronic diastolic congestive heart failure. \par  \'b7  {\*\bkmkstart bg03079497541}{\*\bkmkend ij52396170263}Plan: {\*\bkmkstart yb84853048370}{\*\bkmkend sl31175451325}Hx of CHF. Follows w/ Dr Fernandez. Admission in 12/22 for acute on chronic exacerbation. ECHO 12/7: with EF>75% and moderate AS/MR   with unchanged gradients across AV and MV\par  - continue aldactone 25mg daily \par  - hold Torsemide 20mg po and Metolazone 5mg ISO hyponatremia and \par  - cardiology following, follow up as outpatient for management.\par  \par  \plain\f1\fs20\ueud7366\hich\f1\dbch\f1\loch\f1\cf2\fs20\ul{\field{\*\fldinst HYPERLINK [id number] }{\fldrslt Problem/Plan - 5:}}\plain\f0\fs20\reri2527\hich\f0\dbch\f0\loch\f0\fs20\ql\par  \'b7  {\*\bkmkstart jd38009922868}{\*\bkmkend dn34988536212}Problem: {\*\bkmkstart uk30830308505}{\*\bkmkend rr76229569147}CAD (coronary artery disease). \par  \'b7  {\*\bkmkstart qi48242157409}{\*\bkmkend dz82867594219}Plan: {\*\bkmkstart oy96331847123}{\*\bkmkend xz01840170150}Home med: Atorvastatin 40mg and aspirin 81mg qd. \par  - c/w atorvastatin 40mg and aspirin 81mg q.\par  \par  \plain\f1\fs20\oeyf0793\hich\f1\dbch\f1\loch\f1\cf2\fs20\ul{\field{\*\fldinst HYPERLINK [id number] }{\fldrslt Problem/Plan - 6:}}\plain\f0\fs20\byuv4156\hich\f0\dbch\f0\loch\f0\fs20\ql\par  \'b7  {\*\bkmkstart mp12982495655}{\*\bkmkend nq52340531625}Problem: {\*\bkmkstart iq05689848818}{\*\bkmkend xz13297734642}HLD (hyperlipidemia). \par  \'b7  {\*\bkmkstart ra43812504176}{\*\bkmkend zn71637064185}Plan: {\*\bkmkstart vt79344804053}{\*\bkmkend ta29271949235}Hx of HTN. Home meds: atorvastatin 40mg\par  Plan:\par  - c/w atorvastatin 40mg.\par  \par  \plain\f1\fs20\oosn7256\hich\f1\dbch\f1\loch\f1\cf2\fs20\ul{\field{\*\fldinst HYPERLINK [id number] }{\fldrslt Problem/Plan - 7:}}\plain\f0\fs20\rrkv4221\hich\f0\dbch\f0\loch\f0\fs20\ql\par  \'b7  {\*\bkmkstart cm82470575330}{\*\bkmkend fx07823115337}Problem: {\*\bkmkstart nx27368483326}{\*\bkmkend sj98083985303}S/P TAVR (transcatheter aortic valve replacement). \par  \'b7  {\*\bkmkstart bo37600792043}{\*\bkmkend pw84992762471}Plan: {\*\bkmkstart yp41772598555}{\*\bkmkend oa78539000712}S/p Valve in valve TAVR in 2021\par  Echo from prior admission 12/22: EF> 75%, moderate AS and moderate MR. Stable at this time \par  Plan:\par  - cont to monitor.\par  \par  \plain\f1\fs20\anig2384\hich\f1\dbch\f1\loch\f1\cf2\fs20\ul{\field{\*\fldinst HYPERLINK [id number] }{\fldrslt Problem/Plan - 8:}}\plain\f0\fs20\swmi0610\hich\f0\dbch\f0\loch\f0\fs20\ql\par  \'b7  {\*\bkmkstart ud79279379980}{\*\bkmkend tz99384902543}Problem: {\*\bkmkstart wm85480335767}{\*\bkmkend vf30131231672}UTI (urinary tract infection), uncomplicated. \par  \'b7  {\*\bkmkstart rd05850159515}{\*\bkmkend gk54195793200}Plan: {\*\bkmkstart xi06440641050}{\*\bkmkend am50359617260}RESOLVED \par  - ceftriaxone 1g QD for 3 days (completed on 08/04).\par  \par  \plain\f1\fs20\ulde7109\hich\f1\dbch\f1\loch\f1\cf2\fs20\ul{\field{\*\fldinst HYPERLINK [id number] }{\fldrslt Problem/Plan - 9:}}\plain\f0\fs20\fixm3459\hich\f0\dbch\f0\loch\f0\fs20\ql\par  \'b7  {\*\bkmkstart rr87116348680}{\*\bkmkend ot11971028260}Problem: {\*\bkmkstart li03395234472}{\*\bkmkend dp37543604146}At risk for falls. \par  \'b7  {\*\bkmkstart hp00121385673}{\*\bkmkend gx86663987460}Plan: {\*\bkmkstart ig73468596422}{\*\bkmkend lp13620399128}Patient will need commode for home. Patient cannot safely ambulate to bathroom.\par  \par  \plain\f1\fs20\ufdn0486\hich\f1\dbch\f1\loch\f1\cf2\fs20\ul{\field{\*\fldinst HYPERLINK [id number] }{\fldrslt Problem/Plan - 10:}}\plain\f0\fs20\hdli4874\hich\f0\dbch\f0\loch\f0\fs20\ql\par  \'b7  {\*\bkmkstart oc41869649149}{\*\bkmkend zz31076575288}Problem: {\*\bkmkstart wz70414803859}{\*\bkmkend gh49678678613}Prophylactic measure. \par  \'b7  {\*\bkmkstart vz00318346390}{\*\bkmkend cq28857560883}Plan; {\*\bkmkstart sa36662188388}{\*\bkmkend uc96003825923}Plan:\par  F: none \par  E: replete K<4, Mg<2\par  N: regular with 1L fluid restriction\par  VTE Prophylaxis: lovenox 40mg QD\par  GI: none\par  C: DNR/DNI\par  D: Home with hha.\par  \par  }

## 2023-08-10 NOTE — PROGRESS NOTE ADULT - SUBJECTIVE AND OBJECTIVE BOX
Cardiology Consult    Interval History/HPI: Pt seen and examined at bedside, NAD. Feeling well. Denies chest pain, sob.       OBJECTIVE  T(C): 37 (08-10-23 @ 05:33), Max: 37.1 (08-09-23 @ 13:38)  HR: 82 (08-10-23 @ 05:33) (82 - 90)  BP: 109/65 (08-10-23 @ 05:33) (109/65 - 122/74)  RR: 17 (08-10-23 @ 05:33) (17 - 18)  SpO2: 98% (08-10-23 @ 05:33) (93% - 98%)    08-09-23 @ 07:01  -  08-10-23 @ 07:00  --------------------------------------------------------  IN: 0 mL / OUT: 350 mL / NET: -350 mL        PHYSICAL EXAM:    Elderly pleasant female   lying in bed NAD   rrr, + systolic murmur LSB _III/V  ctab  soft ntnd   trace b/l edema, b/l shins tender to palpation  talkative, conversing appropriately moving all extremities      LABS:                        11.4   10.04 )-----------( 308      ( 10 Aug 2023 05:30 )             35.8     08-10    138  |  102  |  16  ----------------------------<  112<H>  4.8   |  28  |  0.99    Ca    9.9      10 Aug 2023 05:30  Phos  3.6     08-10  Mg     2.0     08-10        Urinalysis Basic - ( 10 Aug 2023 05:30 )    Color: x / Appearance: x / SG: x / pH: x  Gluc: 112 mg/dL / Ketone: x  / Bili: x / Urobili: x   Blood: x / Protein: x / Nitrite: x   Leuk Esterase: x / RBC: x / WBC x   Sq Epi: x / Non Sq Epi: x / Bacteria: x        RADIOLOGY & ADDITIONAL TESTS:  Reviewed .    MEDICATIONS  (STANDING):  aspirin  chewable 81 milliGRAM(s) Oral daily  atorvastatin 40 milliGRAM(s) Oral at bedtime  brimonidine 0.2% Ophthalmic Solution 1 Drop(s) Both EYES two times a day  cholecalciferol 1000 Unit(s) Oral daily  enoxaparin Injectable 40 milliGRAM(s) SubCutaneous every 24 hours  gabapentin 300 milliGRAM(s) Oral two times a day  latanoprost 0.005% Ophthalmic Solution 1 Drop(s) Both EYES at bedtime  melatonin 3 milliGRAM(s) Oral at bedtime  polyethylene glycol 3350 17 Gram(s) Oral daily  senna 2 Tablet(s) Oral at bedtime  zinc sulfate 220 milliGRAM(s) Oral daily    MEDICATIONS  (PRN):  acetaminophen     Tablet .. 650 milliGRAM(s) Oral every 6 hours PRN Mild Pain (1 - 3)

## 2023-08-10 NOTE — PROGRESS NOTE ADULT - ASSESSMENT
Patient is a 89 yo F with ?DI on ddavp presented with symptomatic hyponatremia to 118 now improved off of ddavp    #Hyponatremia - initially symptomatic at 120 (118 outpatient) likely due to ddavp (dose probably higher for her age and weight) and high water intake  s/p 300 ml of 3% 8/2-8/3, Na 120-->121  s/p 180 ml of 3% 8/3, Na 121-->131  s/p 250 ml of D5 8/4 for therapeutic relowering, Na 131-->125  s/p 180 ml of 3% 8/6, Na 127-->134  Na improved to 137 8/6 w/ urine studies c/w appropriate correction, but then dropped to 129 after ddavp was resumed due to concerns of low UOsm close to 100  s/p 330 ml of 3% 8/8 to bring Na back up from 129  Na then 136 8/9 without ddavp, though w/ UOsm 167, Casper 23. 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 Has high urinary frequency. Given significant nocturia that effects her quality of life, Ok to discharge on lowest possible dose of ddavp; 1/2 tablet of 0.05mg at bedtime. Will need to follow up outpatient with BMP in a week     #HTN - not elevated, would discuss spironolactone with cardiology as was heart failure recommendation    #HFpEF - no signs of fluid overload at this time, would continue to monitor for now, will monitor closely to restart home torsemide or not

## 2023-08-10 NOTE — PROGRESS NOTE ADULT - PROBLEM SELECTOR PROBLEM 5
Chronic diastolic congestive heart failure
CAD (coronary artery disease)
Chronic diastolic congestive heart failure
Advance care planning
Chronic diastolic congestive heart failure
CAD (coronary artery disease)

## 2023-08-10 NOTE — DISCHARGE NOTE NURSING/CASE MANAGEMENT/SOCIAL WORK - NSDCPEFALRISK_GEN_ALL_CORE
For information on Fall & Injury Prevention, visit: [email or web address] OR  [email or web address] OR  https://www.cdc.gov/steadi/patient.html

## 2023-08-10 NOTE — PROGRESS NOTE ADULT - PROBLEM SELECTOR PROBLEM 7
S/P TAVR (transcatheter aortic valve replacement)
HLD (hyperlipidemia)
Advance care planning
HLD (hyperlipidemia)
S/P TAVR (transcatheter aortic valve replacement)
HLD (hyperlipidemia)
Advance care planning
HLD (hyperlipidemia)

## 2023-08-10 NOTE — PROGRESS NOTE ADULT - REASON FOR ADMISSION
hyponatremia
hyponatremia and UTI
hyponatremia

## 2023-08-10 NOTE — DISCHARGE NOTE NURSING/CASE MANAGEMENT/SOCIAL WORK - NSDCVIVACCINE_GEN_ALL_CORE_FT
Tdap; 13-Feb-2018 19:16; Lit Romero); [employer]; [id number]; IntraMuscular; Deltoid Right.; 0.5 milliLiter(s); VIS (VIS Published: 09-May-2013, VIS Presented: 13-Feb-2018);

## 2023-08-10 NOTE — DISCHARGE NOTE NURSING/CASE MANAGEMENT/SOCIAL WORK - PATIENT PORTAL LINK FT
You can access the FollowMyHealth Patient Portal offered by [facility] by registering at the following website: [email or web address]. By joining [employer]’s FollowMyHealth portal, you will also be able to view your health information using other applications (apps) compatible with our system.

## 2023-08-10 NOTE — PROGRESS NOTE ADULT - PROBLEM SELECTOR PLAN 6
Home med: Atorvastatin 40mg and aspirin 81mg qd.   - c/w atorvastatin 40mg and aspirin 81mg q
Home med: Atorvastatin 40mg and aspirin 81mg qd.   - c/w atorvastatin 40mg and aspirin 81mg q
Hx of HTN. Home meds: atorvastatin 40mg  Plan:  - c/w atorvastatin 40mg
Medical decision making/ symptom management in the setting of advanced illness     Goals are established; Symptoms are managed. Palliative Care will SIGN OFF.  Please reconsult with any new issues or concerns: Call [phone]
Home med: Atorvastatin 40mg and aspirin 81mg qd.   - c/w atorvastatin 40mg and aspirin 81mg q
CT Cervical spine 12/2022 Nonhealing base of dens fracture. Compared to 11/10/22 there is mild increase in anterior displacement of the dens and C1 lateral masses relative to the base of dens. MR Cervical spine: Nonhealing C2 fracture at base of dens, mildly distracted as seen on CT. Edema involving the right greater than left lateral masses likely stress related to altered alignment. No evidence for marrow replacing lesion or other bony edema. Has been wearing C-collar since fracture. 07/23 CT and MR w/ interval displacement of dens fracture     PT consulted, rec HPT w/ continued 24/7 HHA
CT Cervical spine 12/2022 Nonhealing base of dens fracture. Compared to 11/10/22 there is mild increase in anterior displacement of the dens and C1 lateral masses relative to the base of dens. MR Cervical spine: Nonhealing C2 fracture at base of dens, mildly distracted as seen on CT. Edema involving the right greater than left lateral masses likely stress related to altered alignment. No evidence for marrow replacing lesion or other bony edema. Has been wearing C-collar since fracture. 07/23 CT and MR w/ interval displacement of dens fracture     PT consulted, rec HPT w/ continued 24/7 HHA.
Home med: Atorvastatin 40mg and aspirin 81mg qd.   - c/w atorvastatin 40mg and aspirin 81mg q
Hx of HTN. Home meds: atorvastatin 40mg  Plan:  - c/w atorvastatin 40mg

## 2023-08-10 NOTE — PROGRESS NOTE ADULT - SUBJECTIVE AND OBJECTIVE BOX
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 Pt claiming that she is in the hospital due to chronic pain in her back. Aid at bedside endorsed that patient was feeling dizzy and felt like her legs were giving up when ambulating this AM. Pt did not have a fall but her aid lowered her to sit on the ground. Aid also claims that pt saw her OP Dr Camila Mcpherson who asked her  to go to ED on 8/1 for OP Na+ 118 but pt refused. Aid also claiming that pt has been c/o urinary discomfort over last few days which pt corroborated and that she had 2 episodes of non-bloody diarrhea on morning of admission. Pt has no complaints other than mild discomfort on urination. Pt denies HA, dizziness, CP, SOB, changes in BMs.    ED Course:  Vitals: 98.4F, HR 64, /64, RR 16 (98%) on RA  Labs: Hb 11.2, MCV 88.1, Na+ 120, Cl- 81, Mg2+ 1.4  Imaging: CXR dextroscoliosis and TAVR - lung parenchyma clear  EKG: SR w/ 1st degree AVB, LBBB - unchanged from 8/31/22  Consults: nephrology  Interventions: Mg 2g       (02 Aug 2023 16:27)                            11.4   10.04 )-----------( 308      ( 10 Aug 2023 05:30 )             35.8       08-10    138  |  102  |  16  ----------------------------<  112<H>  4.8   |  28  |  0.99    Ca    9.9      10 Aug 2023 05:30  Phos  3.6     08-10  Mg     2.0     08-10      Vital Signs Last 24 Hrs  T(C): 37 (10 Aug 2023 05:33), Max: 37.1 (09 Aug 2023 13:38)  T(F): 98.6 (10 Aug 2023 05:33), Max: 98.8 (09 Aug 2023 13:38)  HR: 82 (10 Aug 2023 05:33) (82 - 90)  BP: 109/65 (10 Aug 2023 05:33) (109/65 - 122/74)  BP(mean): --  RR: 17 (10 Aug 2023 05:33) (17 - 18)  SpO2: 98% (10 Aug 2023 05:33) (93% - 98%)    Parameters below as of 10 Aug 2023 05:33  Patient On (Oxygen Delivery Method): room air        MEDICATIONS  (STANDING):  aspirin  chewable 81 milliGRAM(s) Oral daily  atorvastatin 40 milliGRAM(s) Oral at bedtime  brimonidine 0.2% Ophthalmic Solution 1 Drop(s) Both EYES two times a day  cholecalciferol 1000 Unit(s) Oral daily  enoxaparin Injectable 40 milliGRAM(s) SubCutaneous every 24 hours  gabapentin 300 milliGRAM(s) Oral two times a day  latanoprost 0.005% Ophthalmic Solution 1 Drop(s) Both EYES at bedtime  melatonin 3 milliGRAM(s) Oral at bedtime  polyethylene glycol 3350 17 Gram(s) Oral daily  senna 2 Tablet(s) Oral at bedtime  zinc sulfate 220 milliGRAM(s) Oral daily    MEDICATIONS  (PRN):  acetaminophen     Tablet .. 650 milliGRAM(s) Oral every 6 hours PRN Mild Pain (1 - 3)       Functional Status Assessment :   8/9/2023           PM&R Impression : as above    Current Disposition Plan Recommendations :    subacute rehab placement

## 2023-08-10 NOTE — PROGRESS NOTE ADULT - PROBLEM SELECTOR PROBLEM 8
Cervical spine fracture
UTI (urinary tract infection), uncomplicated
Cervical spine fracture
Encounter for palliative care
UTI (urinary tract infection), uncomplicated
S/P TAVR (transcatheter aortic valve replacement)
Cervical spine fracture
Encounter for palliative care
S/P TAVR (transcatheter aortic valve replacement)
S/P TAVR (transcatheter aortic valve replacement)

## 2023-08-10 NOTE — PROGRESS NOTE ADULT - NS ATTEST RISK PROBLEM GEN_ALL_CORE FT
hyponatremia  valvular cardiomyopathy
Decision Made To Not Resuscitate (DNR)
hyponatremia
hyponatremia  valvular cardiomyopathy

## 2023-08-15 DIAGNOSIS — E23.2 DIABETES INSIPIDUS: ICD-10-CM

## 2023-08-15 DIAGNOSIS — I11.0 HYPERTENSIVE HEART DISEASE WITH HEART FAILURE: ICD-10-CM

## 2023-08-15 DIAGNOSIS — Z96.641 PRESENCE OF RIGHT ARTIFICIAL HIP JOINT: ICD-10-CM

## 2023-08-15 DIAGNOSIS — N39.0 URINARY TRACT INFECTION, SITE NOT SPECIFIED: ICD-10-CM

## 2023-08-15 DIAGNOSIS — G62.9 POLYNEUROPATHY, UNSPECIFIED: ICD-10-CM

## 2023-08-15 DIAGNOSIS — E22.2 SYNDROME OF INAPPROPRIATE SECRETION OF ANTIDIURETIC HORMONE: ICD-10-CM

## 2023-08-15 DIAGNOSIS — Z95.2 PRESENCE OF PROSTHETIC HEART VALVE: ICD-10-CM

## 2023-08-15 DIAGNOSIS — M84.48XK: ICD-10-CM

## 2023-08-15 DIAGNOSIS — F03.90 UNSPECIFIED DEMENTIA WITHOUT BEHAVIORAL DISTURBANCE: ICD-10-CM

## 2023-08-15 DIAGNOSIS — I50.32 CHRONIC DIASTOLIC (CONGESTIVE) HEART FAILURE: ICD-10-CM

## 2023-08-29 ENCOUNTER — APPOINTMENT (OUTPATIENT)
Dept: HEART AND VASCULAR | Facility: CLINIC | Age: 88
End: 2023-08-29
Payer: MEDICARE

## 2023-08-29 VITALS — OXYGEN SATURATION: 96 % | HEART RATE: 68 BPM | DIASTOLIC BLOOD PRESSURE: 75 MMHG | SYSTOLIC BLOOD PRESSURE: 145 MMHG

## 2023-08-29 PROCEDURE — 99215 OFFICE O/P EST HI 40 MIN: CPT

## 2023-08-29 RX ORDER — FLUTICASONE PROPIONATE 50 UG/1
50 SPRAY, METERED NASAL TWICE DAILY
Refills: 0 | Status: DISCONTINUED | COMMUNITY
Start: 2021-07-07 | End: 2023-08-29

## 2023-08-29 RX ORDER — METOLAZONE 5 MG/1
TABLET ORAL
Refills: 0 | Status: DISCONTINUED | COMMUNITY
End: 2023-08-29

## 2023-08-29 RX ORDER — ACETAMINOPHEN 325 MG/1
325 TABLET ORAL EVERY 6 HOURS
Refills: 0 | Status: DISCONTINUED | COMMUNITY
Start: 2021-07-07 | End: 2023-08-29

## 2023-08-29 RX ORDER — TORSEMIDE 20 MG/1
20 TABLET ORAL DAILY
Qty: 90 | Refills: 2 | Status: DISCONTINUED | COMMUNITY
Start: 2022-10-05 | End: 2023-08-29

## 2023-08-29 RX ORDER — METOLAZONE 5 MG/1
5 TABLET ORAL
Qty: 30 | Refills: 2 | Status: DISCONTINUED | COMMUNITY
Start: 2022-12-13 | End: 2023-08-29

## 2023-08-29 RX ORDER — SPIRONOLACTONE 25 MG/1
25 TABLET ORAL DAILY
Qty: 60 | Refills: 0 | Status: DISCONTINUED | COMMUNITY
Start: 2022-10-05 | End: 2023-08-29

## 2023-08-29 RX ORDER — CHLORHEXIDINE GLUCONATE 4 %
325 (65 FE) LIQUID (ML) TOPICAL
Refills: 0 | Status: DISCONTINUED | COMMUNITY
Start: 2021-07-07 | End: 2023-08-29

## 2023-08-29 RX ORDER — OFLOXACIN 3 MG/ML
0.3 SOLUTION/ DROPS OPHTHALMIC
Qty: 10 | Refills: 0 | Status: DISCONTINUED | COMMUNITY
Start: 2022-07-07 | End: 2023-08-29

## 2023-08-29 RX ORDER — MELOXICAM 7.5 MG/1
7.5 TABLET ORAL
Qty: 7 | Refills: 0 | Status: DISCONTINUED | COMMUNITY
Start: 2022-02-01 | End: 2023-08-29

## 2023-08-29 RX ORDER — DESMOPRESSIN ACETATE 0.1 MG/1
0.1 TABLET ORAL TWICE DAILY
Refills: 0 | Status: DISCONTINUED | COMMUNITY
Start: 2021-07-07 | End: 2023-08-29

## 2023-09-27 ENCOUNTER — APPOINTMENT (OUTPATIENT)
Dept: NEPHROLOGY | Facility: CLINIC | Age: 88
End: 2023-09-27
Payer: MEDICARE

## 2023-09-27 VITALS
OXYGEN SATURATION: 98 % | SYSTOLIC BLOOD PRESSURE: 138 MMHG | TEMPERATURE: 97.6 F | DIASTOLIC BLOOD PRESSURE: 75 MMHG | HEART RATE: 62 BPM

## 2023-09-27 PROCEDURE — 99215 OFFICE O/P EST HI 40 MIN: CPT

## 2023-09-27 PROCEDURE — G2212 PROLONG OUTPT/OFFICE VIS: CPT

## 2023-10-03 ENCOUNTER — APPOINTMENT (OUTPATIENT)
Dept: HEART AND VASCULAR | Facility: CLINIC | Age: 88
End: 2023-10-03
Payer: MEDICARE

## 2023-10-03 VITALS — OXYGEN SATURATION: 95 % | BODY MASS INDEX: 25.91 KG/M2 | HEART RATE: 72 BPM | WEIGHT: 132 LBS | HEIGHT: 60 IN

## 2023-10-03 PROCEDURE — 99215 OFFICE O/P EST HI 40 MIN: CPT

## 2023-10-03 RX ORDER — BRINZOLAMIDE/BRIMONIDINE TARTRATE 10; 2 MG/ML; MG/ML
1-0.2 SUSPENSION/ DROPS OPHTHALMIC
Qty: 8 | Refills: 0 | Status: DISCONTINUED | COMMUNITY
Start: 2022-04-29 | End: 2023-10-03

## 2023-10-03 RX ORDER — GLUCOSAMINE/MSM/CHONDROIT SULF 500-166.6
10 TABLET ORAL AT BEDTIME
Refills: 0 | Status: ACTIVE | COMMUNITY

## 2023-10-03 RX ORDER — CHROMIUM 200 MCG
TABLET ORAL DAILY
Refills: 0 | Status: ACTIVE | COMMUNITY

## 2023-10-03 RX ORDER — HYDROCORTISONE 25 MG/G
2.5 CREAM TOPICAL
Qty: 28 | Refills: 0 | Status: DISCONTINUED | COMMUNITY
Start: 2022-07-03 | End: 2023-10-03

## 2023-10-03 RX ORDER — VIT A ACET/VIT C/ZINC/PROPOLIS
LOZENGE ORAL WEEKLY
Refills: 0 | Status: ACTIVE | COMMUNITY

## 2023-10-03 RX ORDER — TRIAMCINOLONE ACETONIDE 1 MG/G
0.1 CREAM TOPICAL
Qty: 454 | Refills: 0 | Status: DISCONTINUED | COMMUNITY
Start: 2022-02-23 | End: 2023-10-03

## 2023-10-03 RX ORDER — ASCORBIC ACID 500 MG
500 TABLET ORAL DAILY
Refills: 0 | Status: ACTIVE | COMMUNITY

## 2023-10-03 RX ORDER — HYDROCORTISONE 25 MG/G
2.5 OINTMENT TOPICAL
Qty: 29 | Refills: 0 | Status: DISCONTINUED | COMMUNITY
Start: 2021-09-14 | End: 2023-10-03

## 2023-10-03 RX ORDER — MELATONIN 500 MCG
500 TABLET ORAL
Refills: 0 | Status: ACTIVE | COMMUNITY

## 2023-10-03 RX ORDER — PIMECROLIMUS 10 MG/G
1 CREAM TOPICAL
Qty: 30 | Refills: 0 | Status: DISCONTINUED | COMMUNITY
Start: 2022-07-19 | End: 2023-10-03

## 2023-10-03 RX ORDER — PANTOPRAZOLE 40 MG/1
40 TABLET, DELAYED RELEASE ORAL DAILY
Refills: 0 | Status: DISCONTINUED | COMMUNITY
Start: 2021-07-07 | End: 2023-10-03

## 2023-10-06 ENCOUNTER — RX RENEWAL (OUTPATIENT)
Age: 88
End: 2023-10-06

## 2023-10-10 ENCOUNTER — NON-APPOINTMENT (OUTPATIENT)
Age: 88
End: 2023-10-10

## 2023-10-10 ENCOUNTER — APPOINTMENT (OUTPATIENT)
Dept: OPHTHALMOLOGY | Facility: CLINIC | Age: 88
End: 2023-10-10
Payer: MEDICARE

## 2023-10-10 PROCEDURE — 92133 CPTRZD OPH DX IMG PST SGM ON: CPT

## 2023-10-10 PROCEDURE — 92014 COMPRE OPH EXAM EST PT 1/>: CPT

## 2023-10-12 ENCOUNTER — LABORATORY RESULT (OUTPATIENT)
Age: 88
End: 2023-10-12

## 2023-10-18 ENCOUNTER — APPOINTMENT (OUTPATIENT)
Dept: NEPHROLOGY | Facility: CLINIC | Age: 88
End: 2023-10-18
Payer: MEDICARE

## 2023-10-18 VITALS
OXYGEN SATURATION: 96 % | TEMPERATURE: 97.6 F | SYSTOLIC BLOOD PRESSURE: 117 MMHG | DIASTOLIC BLOOD PRESSURE: 63 MMHG | WEIGHT: 132 LBS | BODY MASS INDEX: 25.78 KG/M2 | HEART RATE: 71 BPM

## 2023-10-18 DIAGNOSIS — Z00.00 ENCOUNTER FOR GENERAL ADULT MEDICAL EXAMINATION W/OUT ABNORMAL FINDINGS: ICD-10-CM

## 2023-10-18 PROCEDURE — 99215 OFFICE O/P EST HI 40 MIN: CPT

## 2023-10-18 PROCEDURE — G2212 PROLONG OUTPT/OFFICE VIS: CPT

## 2023-11-08 ENCOUNTER — LABORATORY RESULT (OUTPATIENT)
Age: 88
End: 2023-11-08

## 2023-11-08 NOTE — ED PROVIDER NOTE - IV ALTEPLASE EXCL REL HIDDEN
Refill Routing Note   Medication(s) are not appropriate for processing by [facility] for the following reason(s):      No active prescription written by PCP    [facility] action(s):  Defer Care Due:  None identified          Appointments  past 12m or future 3m with PCP    Date Provider   Last Visit   9/21/2023 Destiney Mckee, DO   Next Visit   Visit date not found Destiney Mckee, DO   ED visits in past 90 days: 0        Note composed:6:56 PM 11/07/2023            show

## 2023-11-09 ENCOUNTER — LABORATORY RESULT (OUTPATIENT)
Age: 88
End: 2023-11-09

## 2023-11-14 ENCOUNTER — APPOINTMENT (OUTPATIENT)
Dept: HEART AND VASCULAR | Facility: CLINIC | Age: 88
End: 2023-11-14

## 2023-11-15 ENCOUNTER — APPOINTMENT (OUTPATIENT)
Dept: NEPHROLOGY | Facility: CLINIC | Age: 88
End: 2023-11-15
Payer: MEDICARE

## 2023-11-15 VITALS — HEIGHT: 60 IN | WEIGHT: 130 LBS | BODY MASS INDEX: 25.52 KG/M2

## 2023-11-15 DIAGNOSIS — I72.9 ANEURYSM OF UNSPECIFIED SITE: ICD-10-CM

## 2023-11-15 PROCEDURE — G2212 PROLONG OUTPT/OFFICE VIS: CPT

## 2023-11-15 PROCEDURE — 99215 OFFICE O/P EST HI 40 MIN: CPT

## 2023-11-15 PROCEDURE — 99417 PROLNG OP E/M EACH 15 MIN: CPT

## 2023-11-15 RX ORDER — ESCITALOPRAM OXALATE 20 MG/1
20 TABLET ORAL DAILY
Qty: 90 | Refills: 1 | Status: ACTIVE | COMMUNITY
Start: 2023-09-27 | End: 1900-01-01

## 2023-11-16 LAB
APPEARANCE: CLEAR
BACTERIA: NEGATIVE /HPF
BILIRUBIN URINE: NEGATIVE
BLOOD URINE: NEGATIVE
CAST: 0 /LPF
COLOR: YELLOW
CREAT SPEC-SCNC: 20 MG/DL
CREAT SPEC-SCNC: 20 MG/DL
CREAT/PROT UR: NORMAL RATIO
EPITHELIAL CELLS: 0 /HPF
GLUCOSE QUALITATIVE U: NEGATIVE MG/DL
KETONES URINE: NEGATIVE MG/DL
LEUKOCYTE ESTERASE URINE: ABNORMAL
MICROALBUMIN 24H UR DL<=1MG/L-MCNC: <1.2 MG/DL
MICROALBUMIN/CREAT 24H UR-RTO: NORMAL MG/G
MICROSCOPIC-UA: NORMAL
NITRITE URINE: NEGATIVE
PH URINE: 6.5
PROT UR-MCNC: <4 MG/DL
PROTEIN URINE: NEGATIVE MG/DL
RED BLOOD CELLS URINE: 1 /HPF
SODIUM ?TM SUB UR QN: <20 MMOL/L
SPECIFIC GRAVITY URINE: 1.01
UROBILINOGEN URINE: 0.2 MG/DL
WHITE BLOOD CELLS URINE: 0 /HPF

## 2023-11-28 ENCOUNTER — RX RENEWAL (OUTPATIENT)
Age: 88
End: 2023-11-28

## 2023-12-05 ENCOUNTER — APPOINTMENT (OUTPATIENT)
Dept: HEART AND VASCULAR | Facility: CLINIC | Age: 88
End: 2023-12-05
Payer: MEDICARE

## 2023-12-05 VITALS
BODY MASS INDEX: 24.84 KG/M2 | HEIGHT: 60 IN | WEIGHT: 126.5 LBS | HEART RATE: 69 BPM | TEMPERATURE: 97.7 F | SYSTOLIC BLOOD PRESSURE: 151 MMHG | DIASTOLIC BLOOD PRESSURE: 84 MMHG | OXYGEN SATURATION: 95 %

## 2023-12-05 DIAGNOSIS — H53.8 OTHER VISUAL DISTURBANCES: ICD-10-CM

## 2023-12-05 PROCEDURE — 99214 OFFICE O/P EST MOD 30 MIN: CPT

## 2023-12-05 RX ORDER — VIBEGRON 75 MG/1
75 TABLET, FILM COATED ORAL AT BEDTIME
Refills: 0 | Status: ACTIVE | COMMUNITY

## 2023-12-05 RX ORDER — GABAPENTIN 300 MG/1
300 CAPSULE ORAL DAILY
Refills: 0 | Status: DISCONTINUED | COMMUNITY
Start: 2021-07-07 | End: 2023-12-05

## 2023-12-05 RX ORDER — PREDNISOLONE ACETATE 10 MG/ML
1 SUSPENSION/ DROPS OPHTHALMIC
Qty: 10 | Refills: 0 | Status: DISCONTINUED | COMMUNITY
Start: 2022-07-07 | End: 2023-12-05

## 2023-12-05 RX ORDER — POTASSIUM CHLORIDE 750 MG/1
10 TABLET, FILM COATED, EXTENDED RELEASE ORAL
Refills: 0 | Status: DISCONTINUED | COMMUNITY
Start: 2021-07-07 | End: 2023-12-05

## 2023-12-05 RX ORDER — SENNA 8.6 MG/1
8.6 TABLET, FILM COATED ORAL AT BEDTIME
Refills: 0 | Status: DISCONTINUED | COMMUNITY
Start: 2021-07-07 | End: 2023-12-05

## 2023-12-17 PROBLEM — H53.8 BLURRY VISION: Status: ACTIVE | Noted: 2023-12-05

## 2023-12-17 NOTE — DISCUSSION/SUMMARY
[FreeTextEntry1] : -dCHF - remains euvolemic off diuretics, lower extremity edema has nearly resolved w/ conservative measures only; c/w compressions stockings and leg elevation; c/w PT as tolerated -Hyponatremia - Sodium remains stable since discontinuation of DDAVP and diuretics; will continue to monitor; Follows closely w/ Renal  -RTC in three months -Case discussed by phone with Daughter Massiel during the visit.  RTC in 2-3 months

## 2023-12-17 NOTE — PHYSICAL EXAM
[Well Developed] : well developed [No Acute Distress] : no acute distress [Normal Venous Pressure] : normal venous pressure [Normal S1, S2] : normal S1, S2 [Clear Lung Fields] : clear lung fields [Soft] : abdomen soft [Non Tender] : non-tender [No Masses/organomegaly] : no masses/organomegaly [Abnormal Gait] : abnormal gait [Edema ___] : edema [unfilled] [No Focal Deficits] : no focal deficits [Normal Speech] : normal speech [Alert and Oriented] : alert and oriented [Normal memory] : normal memory

## 2023-12-17 NOTE — HISTORY OF PRESENT ILLNESS
[FreeTextEntry1] : 89 F with a Pmhx of dCHF, HTN, HLD and severe AS s/p Bioprosthetic AV 7 years ago at [facility], c/b by bioprosthetic AV stenosis, now  s/p Valve-in-Valve with a Sheri Ultra by Structural Heart Team in June of 2021. She presents today for routine f/u  Today she feels well, at her baseline state of health. She has minimal lower extremity swelling off diuretics. She denies any lightheadedness or syncope. She continues to work with PT regularly. She has no new complaints today.

## 2023-12-17 NOTE — REVIEW OF SYSTEMS
[Fever] : no fever [Chills] : no chills [Blurry Vision] : no blurred vision [SOB] : no shortness of breath [Dyspnea on exertion] : not dyspnea during exertion [Chest Discomfort] : no chest discomfort [Lower Ext Edema] : lower extremity edema [Palpitations] : no palpitations [Orthopnea] : no orthopnea [PND] : no PND [Syncope] : no syncope [Cough] : no cough [Wheezing] : no wheezing [Abdominal Pain] : no abdominal pain [Vomiting] : no vomiting [Nausea] : no nausea [Change in Appetite] : no change in appetite [Joint Pain] : no joint pain [Joint Swelling] : no joint swelling [Dizziness] : no dizziness [Negative] : Heme/Lymph [FreeTextEntry5] : Trace edema b/l

## 2023-12-27 ENCOUNTER — APPOINTMENT (OUTPATIENT)
Dept: NEPHROLOGY | Facility: CLINIC | Age: 88
End: 2023-12-27
Payer: MEDICARE

## 2023-12-27 VITALS
BODY MASS INDEX: 24.54 KG/M2 | HEART RATE: 72 BPM | SYSTOLIC BLOOD PRESSURE: 132 MMHG | OXYGEN SATURATION: 94 % | DIASTOLIC BLOOD PRESSURE: 70 MMHG | TEMPERATURE: 97.7 F | WEIGHT: 125 LBS | HEIGHT: 60 IN

## 2023-12-27 DIAGNOSIS — S12.110A ANTERIOR DISPLACED TYPE II DENS FRACTURE, INITIAL ENCOUNTER FOR CLOSED FRACTURE: ICD-10-CM

## 2023-12-27 PROCEDURE — 99215 OFFICE O/P EST HI 40 MIN: CPT

## 2023-12-28 PROBLEM — S12.110A: Status: ACTIVE | Noted: 2023-01-12

## 2023-12-28 NOTE — PHYSICAL EXAM
[General Appearance - Alert] : alert [General Appearance - In No Acute Distress] : in no acute distress [Sclera] : the sclera and conjunctiva were normal [Extraocular Movements] : extraocular movements were intact [Neck Appearance] : the appearance of the neck was normal [Neck Cervical Mass (___cm)] : no neck mass was observed [Jugular Venous Distention Increased] : there was no jugular-venous distention [Respiration, Rhythm And Depth] : normal respiratory rhythm and effort [Exaggerated Use Of Accessory Muscles For Inspiration] : no accessory muscle use [Auscultation Breath Sounds / Voice Sounds] : lungs were clear to auscultation bilaterally [Heart Rate And Rhythm] : heart rate was normal and rhythm regular [Heart Sounds] : normal S1 and S2 [Heart Sounds Gallop] : no gallops [Heart Sounds Pericardial Friction Rub] : no pericardial rub [Edema] : there was no peripheral edema [Veins - Varicosity Changes] : there were no varicosital changes [Bowel Sounds] : normal bowel sounds [Abdomen Soft] : soft [Abdomen Tenderness] : non-tender [No CVA Tenderness] : no ~M costovertebral angle tenderness [No Spinal Tenderness] : no spinal tenderness [Involuntary Movements] : no involuntary movements were seen [Skin Color & Pigmentation] : normal skin color and pigmentation [Skin Turgor] : normal skin turgor [] : no rash [Cranial Nerves] : cranial nerves 2-12 were intact [No Focal Deficits] : no focal deficits [Affect] : the affect was normal [Mood] : the mood was normal [FreeTextEntry1] : Anxious at baseline

## 2023-12-28 NOTE — HISTORY OF PRESENT ILLNESS
[FreeTextEntry1] : Patient is a 89 yo F with dCHF, HTN, HLD, Severe AS s/p bioprosthetic AV c/b stenosis s/o valve in valve, hyponatremia 2/2 DDAVP use presenting for followup  Incontinence has improved, urinating very little at night now - sleeps a good 4-5 hours without waking up for urine. Commode at bedside has helped  Since stopped ensure the diarrhea has improved,   Had a headache yesterday that was relieved by going outside - perhaps carbon monoxide? Will get detector for both smoke and CO  Diet recall - varied, but not high enough calories - will add back in a different type of ensure   Labs reviewed, fantastic stability Given everything would continue on current meds and will monitor   Trying to find more sam - went out to the movies. Reading more poems. For her birthday she has gotten a Sam box with quotes. Making small difference, will keep trying.   RTC 6-8 weeks for now to moniotr weight loss

## 2024-01-19 ENCOUNTER — APPOINTMENT (OUTPATIENT)
Dept: OPHTHALMOLOGY | Facility: CLINIC | Age: 89
End: 2024-01-19
Payer: MEDICARE

## 2024-01-19 ENCOUNTER — NON-APPOINTMENT (OUTPATIENT)
Age: 89
End: 2024-01-19

## 2024-01-19 PROCEDURE — 92014 COMPRE OPH EXAM EST PT 1/>: CPT

## 2024-01-19 PROCEDURE — 92133 CPTRZD OPH DX IMG PST SGM ON: CPT

## 2024-01-23 NOTE — ED ADULT NURSE NOTE - NSFALLRSKUNASSIST_ED_ALL_ED
[FreeTextEntry1] : Pt with BPH and low testosterone for checkup  - PSA, urine cytology, and testosterone are sent - Will follow up in one year or as needed
no

## 2024-01-26 ENCOUNTER — LABORATORY RESULT (OUTPATIENT)
Age: 89
End: 2024-01-26

## 2024-01-31 ENCOUNTER — APPOINTMENT (OUTPATIENT)
Dept: NEPHROLOGY | Facility: CLINIC | Age: 89
End: 2024-01-31
Payer: MEDICARE

## 2024-01-31 VITALS
OXYGEN SATURATION: 96 % | WEIGHT: 125.5 LBS | HEART RATE: 72 BPM | TEMPERATURE: 97.3 F | SYSTOLIC BLOOD PRESSURE: 150 MMHG | HEIGHT: 60 IN | BODY MASS INDEX: 24.64 KG/M2 | DIASTOLIC BLOOD PRESSURE: 84 MMHG

## 2024-01-31 PROCEDURE — 99215 OFFICE O/P EST HI 40 MIN: CPT

## 2024-01-31 PROCEDURE — G2211 COMPLEX E/M VISIT ADD ON: CPT

## 2024-02-05 NOTE — HISTORY OF PRESENT ILLNESS
[FreeTextEntry1] : Patient is a 89 yo F with dCHF, HTN, HLD, Severe AS s/p bioprosthetic AV c/b stenosis s/o valve in valve, hyponatremia 2/2 DDAVP use presenting for followup  Incontinence improved overnight, sleeping at least 4-5 hours still. Commode at bedside.  Diet record reviewed in detail, doing well increasing calories and good fats.  Lasbs reviewed in detail  Micro boost and electrolyze supplements - Fulvic biomass Food diary excellent.  Will decrease zyrtec, skin improving.  ESR and CRP reviewed with PCP, below threshold for concern

## 2024-02-05 NOTE — PHYSICAL EXAM
[General Appearance - Alert] : alert [General Appearance - In No Acute Distress] : in no acute distress [Sclera] : the sclera and conjunctiva were normal [Extraocular Movements] : extraocular movements were intact [Neck Appearance] : the appearance of the neck was normal [Neck Cervical Mass (___cm)] : no neck mass was observed [Jugular Venous Distention Increased] : there was no jugular-venous distention [Respiration, Rhythm And Depth] : normal respiratory rhythm and effort [Exaggerated Use Of Accessory Muscles For Inspiration] : no accessory muscle use [Auscultation Breath Sounds / Voice Sounds] : lungs were clear to auscultation bilaterally [Heart Rate And Rhythm] : heart rate was normal and rhythm regular [Heart Sounds] : normal S1 and S2 [Heart Sounds Gallop] : no gallops [Heart Sounds Pericardial Friction Rub] : no pericardial rub [Edema] : there was no peripheral edema [Veins - Varicosity Changes] : there were no varicosital changes [Bowel Sounds] : normal bowel sounds [Abdomen Soft] : soft [Abdomen Tenderness] : non-tender [Involuntary Movements] : no involuntary movements were seen [Skin Color & Pigmentation] : normal skin color and pigmentation [Skin Turgor] : normal skin turgor [] : no rash [Cranial Nerves] : cranial nerves 2-12 were intact [No Focal Deficits] : no focal deficits [Affect] : the affect was normal [Mood] : the mood was normal [FreeTextEntry1] : Anxious at baseline

## 2024-02-05 NOTE — ASSESSMENT
[FreeTextEntry1] : Patient is a 89 yo F with dCHF, HTN, HLD, Severe AS s/p bioprosthetic AV c/b stenosis s/o valve in valve, hyponatremia 2/2 DDAVP use presenting for followup  Hyponatremia - resolved off of DDAVP  Urinary fz - Improved with gemtessa  Primary polydipsia - oral care with biotene discussed at length, continue lexapro at 20mg - Will discuss other medications  Itching skin rash - improved with topical and gabapentin - Continue sarna and start calomine for now for symptomatic relief  HTN - at goal, continue as is  Hypovitaminosis D - on supplement  Insomnia - Improved with melatononin and lexapro, continue for now  Anxisety/depression - to meet with psychiatry when available, lexapro as above - Find daily source of sam  dCHF - no edema, monitor for now with frequent visits and lab test  RTC in 1 month for repeat physical exam.

## 2024-02-19 ENCOUNTER — RX RENEWAL (OUTPATIENT)
Age: 89
End: 2024-02-19

## 2024-02-19 RX ORDER — ESCITALOPRAM OXALATE 10 MG/1
10 TABLET ORAL
Qty: 60 | Refills: 0 | Status: ACTIVE | COMMUNITY
Start: 2024-02-19 | End: 1900-01-01

## 2024-02-19 NOTE — ED ADULT NURSE REASSESSMENT NOTE - NS ED NURSE REASSESS COMMENT FT1
pt wants to leave , does not want to be admitted ,pt advised to stay by ED MD and RN for further evaluation re cause of syncope , pt refusing , pt is alert and oriented x 3 and is accompanied by her carer, pt denies any symptoms at this time and states that her PCD will follow up with her at home Kj, PGY2: Patient signed out to me by night team.    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  CT angio chest shows the following findings:   - Patchy tree-in-bud nodules in both lower lungs   - Patchy right lower lobe groundglass opacity (1.8 x 1.2 cm)    -1.6 x 1 cm right hilar lymph node versus postsurgical changes    In summary, findings are concerning for aspiration bronchopneumonia versus atypical lung infection.  Plan to discharge patient home on antibiotics (Augmentin, azithromycin) with outpatient follow-up.  Follow-up chest CT scan recommended in 3 months.

## 2024-02-27 ENCOUNTER — APPOINTMENT (OUTPATIENT)
Dept: NEPHROLOGY | Facility: CLINIC | Age: 89
End: 2024-02-27
Payer: MEDICARE

## 2024-02-27 VITALS
WEIGHT: 123 LBS | HEIGHT: 60 IN | TEMPERATURE: 97.7 F | HEART RATE: 69 BPM | OXYGEN SATURATION: 95 % | DIASTOLIC BLOOD PRESSURE: 66 MMHG | BODY MASS INDEX: 24.15 KG/M2 | SYSTOLIC BLOOD PRESSURE: 112 MMHG

## 2024-02-27 DIAGNOSIS — I50.32 CHRONIC DIASTOLIC (CONGESTIVE) HEART FAILURE: ICD-10-CM

## 2024-02-27 DIAGNOSIS — S12.9XXA FRACTURE OF NECK, UNSPECIFIED, INITIAL ENCOUNTER: ICD-10-CM

## 2024-02-27 PROCEDURE — 99215 OFFICE O/P EST HI 40 MIN: CPT

## 2024-02-27 PROCEDURE — G2211 COMPLEX E/M VISIT ADD ON: CPT

## 2024-02-29 PROBLEM — S12.9XXA CERVICAL SPINE FRACTURE: Status: ACTIVE | Noted: 2022-12-14

## 2024-02-29 PROBLEM — I50.32 CHRONIC DIASTOLIC CONGESTIVE HEART FAILURE: Status: ACTIVE | Noted: 2023-01-30

## 2024-02-29 NOTE — PHYSICAL EXAM
[General Appearance - Alert] : alert [General Appearance - In No Acute Distress] : in no acute distress [Sclera] : the sclera and conjunctiva were normal [Extraocular Movements] : extraocular movements were intact [Neck Appearance] : the appearance of the neck was normal [Neck Cervical Mass (___cm)] : no neck mass was observed [Jugular Venous Distention Increased] : there was no jugular-venous distention [Respiration, Rhythm And Depth] : normal respiratory rhythm and effort [Exaggerated Use Of Accessory Muscles For Inspiration] : no accessory muscle use [Auscultation Breath Sounds / Voice Sounds] : lungs were clear to auscultation bilaterally [Heart Sounds] : normal S1 and S2 [Heart Rate And Rhythm] : heart rate was normal and rhythm regular [Heart Sounds Gallop] : no gallops [Heart Sounds Pericardial Friction Rub] : no pericardial rub [Edema] : there was no peripheral edema [Veins - Varicosity Changes] : there were no varicosital changes [Bowel Sounds] : normal bowel sounds [Abdomen Soft] : soft [Abdomen Tenderness] : non-tender [No CVA Tenderness] : no ~M costovertebral angle tenderness [No Spinal Tenderness] : no spinal tenderness [Involuntary Movements] : no involuntary movements were seen [Skin Color & Pigmentation] : normal skin color and pigmentation [Skin Turgor] : normal skin turgor [] : no rash [Cranial Nerves] : cranial nerves 2-12 were intact [No Focal Deficits] : no focal deficits [Affect] : the affect was normal [Mood] : the mood was normal [FreeTextEntry1] : Anxious at baseline

## 2024-02-29 NOTE — HISTORY OF PRESENT ILLNESS
[FreeTextEntry1] : Patient is a 89 yo F with dCHF, HTN, HLD, Severe AS s/p bioprosthetic AV c/b stenosis s/o valve in valve, hyponatremia 2/2 DDAVP use presenting for followup  Labs have been excellent Discussed can restart conversation about neck with Dr soriano Neck pain/headache - likely fracture related, will trial topical CBD, then topical voltaren gel if not enough relief

## 2024-02-29 NOTE — ASSESSMENT
[FreeTextEntry1] : Patient is a 89 yo F with dCHF, HTN, HLD, Severe AS s/p bioprosthetic AV c/b stenosis s/o valve in valve, hyponatremia 2/2 DDAVP use presenting for followup  Hyponatremia - resolved off of DDAVP  Urinary fz - Improved with gemtessa  Primary polydipsia - oral care with biotene discussed at length, continue lexapro at 20mg  Itching skin rash - improved with topical, titrating off gabapentin  HTN - at goal, continue as is  Hypovitaminosis D - on supplement  Insomnia - Improved with melatononin and lexapro, continue for now  Anxiety/depression - has weekly psychotherapy which is helping, lexapro as above - Find daily source of sam  dCHF - no edema, monitor for now with frequent visits and lab test  RTC in 2 months for repeat physical, call with issues

## 2024-03-05 ENCOUNTER — APPOINTMENT (OUTPATIENT)
Dept: HEART AND VASCULAR | Facility: CLINIC | Age: 89
End: 2024-03-05

## 2024-03-12 ENCOUNTER — NON-APPOINTMENT (OUTPATIENT)
Age: 89
End: 2024-03-12

## 2024-03-12 ENCOUNTER — APPOINTMENT (OUTPATIENT)
Dept: HEART AND VASCULAR | Facility: CLINIC | Age: 89
End: 2024-03-12
Payer: MEDICARE

## 2024-03-12 VITALS
HEART RATE: 65 BPM | BODY MASS INDEX: 24.35 KG/M2 | WEIGHT: 124 LBS | HEIGHT: 60 IN | DIASTOLIC BLOOD PRESSURE: 88 MMHG | OXYGEN SATURATION: 96 % | SYSTOLIC BLOOD PRESSURE: 153 MMHG

## 2024-03-12 DIAGNOSIS — H04.123 DRY MOUTH, UNSPECIFIED: ICD-10-CM

## 2024-03-12 DIAGNOSIS — I50.32 CHRONIC DIASTOLIC (CONGESTIVE) HEART FAILURE: ICD-10-CM

## 2024-03-12 DIAGNOSIS — E78.5 HYPERLIPIDEMIA, UNSPECIFIED: ICD-10-CM

## 2024-03-12 DIAGNOSIS — R68.2 DRY MOUTH, UNSPECIFIED: ICD-10-CM

## 2024-03-12 PROCEDURE — 93000 ELECTROCARDIOGRAM COMPLETE: CPT

## 2024-03-12 PROCEDURE — 99214 OFFICE O/P EST MOD 30 MIN: CPT

## 2024-03-18 NOTE — HISTORY OF PRESENT ILLNESS
[FreeTextEntry1] : 89YO female with history of HFpEF, HTN, HLD, severe AS s/p bioprosthetic AV with valve-in-valve TAVR in 2021 at [facility], presents for routine follow up. Overall, there are no acute complaints. She is accompanied by an aid. She still gets occasional neck aches that she is using CBD oil for. She also still has a dry mouth, for which she has been using OTC medication. Otherwise, no chest pain, dyspnea, palpitations, or lightheadedness.

## 2024-03-18 NOTE — ASSESSMENT
[FreeTextEntry1] : 89YO female with history of HFpEF, HTN, HLD, severe AS s/p bioprosthetic AV with valve-in-valve TAVR in 2021 at [facility], presents for routine follow up.   -HFpEF - remains euvolemic off diuretics; c/w compressions stockings and leg elevation; c/w PT as tolerated -Hyponatremia - Sodium remains stable, no longer on DDAVP and diuretics; follows closely w/ Renal   RTC in 2-3 months.

## 2024-03-18 NOTE — REASON FOR VISIT
[FreeTextEntry1] : 89YO female with history of HFpEF, HTN, HLD, severe AS s/p bioprosthetic AV with valve-in-valve TAVR [Structural Heart and Valve Disease] : structural heart and valve disease

## 2024-03-22 ENCOUNTER — LABORATORY RESULT (OUTPATIENT)
Age: 89
End: 2024-03-22

## 2024-04-09 ENCOUNTER — APPOINTMENT (OUTPATIENT)
Dept: NEPHROLOGY | Facility: CLINIC | Age: 89
End: 2024-04-09
Payer: MEDICARE

## 2024-04-09 DIAGNOSIS — F54 POLYDIPSIA: ICD-10-CM

## 2024-04-09 DIAGNOSIS — R63.1 POLYDIPSIA: ICD-10-CM

## 2024-04-09 DIAGNOSIS — M54.2 CERVICALGIA: ICD-10-CM

## 2024-04-09 DIAGNOSIS — I10 ESSENTIAL (PRIMARY) HYPERTENSION: ICD-10-CM

## 2024-04-09 DIAGNOSIS — R60.0 LOCALIZED EDEMA: ICD-10-CM

## 2024-04-09 DIAGNOSIS — E23.2 DIABETES INSIPIDUS: ICD-10-CM

## 2024-04-09 DIAGNOSIS — R21 RASH AND OTHER NONSPECIFIC SKIN ERUPTION: ICD-10-CM

## 2024-04-09 PROCEDURE — G2212 PROLONG OUTPT/OFFICE VIS: CPT | Mod: 93

## 2024-04-09 PROCEDURE — 99215 OFFICE O/P EST HI 40 MIN: CPT

## 2024-04-09 PROCEDURE — G2211 COMPLEX E/M VISIT ADD ON: CPT

## 2024-04-20 PROBLEM — R21 RASH: Status: ACTIVE | Noted: 2023-10-18

## 2024-04-20 PROBLEM — R63.1 PRIMARY POLYDIPSIA: Status: ACTIVE | Noted: 2023-09-27

## 2024-04-20 PROBLEM — E23.2 DIABETES INSIPIDUS: Status: ACTIVE | Noted: 2022-12-14

## 2024-04-20 PROBLEM — M54.2 NECK PAIN: Status: ACTIVE | Noted: 2022-09-10

## 2024-04-20 PROBLEM — I10 HYPERTENSION: Status: ACTIVE | Noted: 2022-12-14

## 2024-04-20 PROBLEM — R60.0 LOWER EXTREMITY EDEMA: Status: ACTIVE | Noted: 2021-07-12

## 2024-04-20 NOTE — HISTORY OF PRESENT ILLNESS
[Home] : at home, [unfilled] , at the time of the visit. [Medical Office: ([facility])___] : at the medical office located in  [Family Member] : family member [Formal Caregiver] : formal caregiver [Verbal consent obtained from patient] : the patient, [unfilled] [FreeTextEntry1] : Patient is a 89 yo F with dCHF, HTN, HLD, Severe AS s/p bioprosthetic AV c/b stenosis s/o valve in valve, hyponatremia 2/2 DDAVP use presenting for followup  Physical Exam:  General: NAD, well appearing, pleasant HEENT: Normal facies, MMM Chest - bilateral chest rise, symmetric Abdomen - Nondistended, Obese LE - no visible edema or vascular changes Skin - visible skin no rashes or wounds   Major issue is not exercising enough and getting protein intake, pain control. Discussed with patient, daughter, PCP at length, for now will increase CBD and topcial treatments and can change ssri in future if needed  Hyponatremia - resolved off of DDAVP  Urinary fz - Improved with gemtessa  Primary polydipsia - oral care with biotene discussed at length, continue lexapro at 20mg  Itching skin rash - improved with topical, titrating off gabapentin  HTN - at goal, continue as is  Hypovitaminosis D - on supplement  Insomnia - Improved with melatononin and lexapro, continue for now  Anxiety/depression - has weekly psychotherapy which is helping, lexapro as above - Find daily source of sam  dCHF - no edema, monitor for now with frequent visits and lab test  RTC in 2 months for repeat physical, call with issues

## 2024-05-07 ENCOUNTER — NON-APPOINTMENT (OUTPATIENT)
Age: 89
End: 2024-05-07

## 2024-05-07 ENCOUNTER — APPOINTMENT (OUTPATIENT)
Dept: OPHTHALMOLOGY | Facility: CLINIC | Age: 89
End: 2024-05-07
Payer: MEDICARE

## 2024-05-07 PROCEDURE — 92012 INTRM OPH EXAM EST PATIENT: CPT

## 2024-06-11 ENCOUNTER — OUTPATIENT (OUTPATIENT)
Dept: OUTPATIENT SERVICES | Facility: HOSPITAL | Age: 89
LOS: 1 days | End: 2024-06-11

## 2024-06-11 ENCOUNTER — APPOINTMENT (OUTPATIENT)
Dept: MRI IMAGING | Facility: CLINIC | Age: 89
End: 2024-06-11
Payer: MEDICARE

## 2024-06-11 DIAGNOSIS — Z96.649 PRESENCE OF UNSPECIFIED ARTIFICIAL HIP JOINT: Chronic | ICD-10-CM

## 2024-06-11 DIAGNOSIS — Z95.5 PRESENCE OF CORONARY ANGIOPLASTY IMPLANT AND GRAFT: Chronic | ICD-10-CM

## 2024-06-11 DIAGNOSIS — Z95.2 PRESENCE OF PROSTHETIC HEART VALVE: Chronic | ICD-10-CM

## 2024-06-11 DIAGNOSIS — Z98.890 OTHER SPECIFIED POSTPROCEDURAL STATES: Chronic | ICD-10-CM

## 2024-06-11 PROCEDURE — 72141 MRI NECK SPINE W/O DYE: CPT | Mod: 26,MH

## 2024-06-18 NOTE — PROGRESS NOTE ADULT - PROBLEM SELECTOR PROBLEM 6
Patient: Rebecca GARVINB: 1956       Chief Complaint   Patient presents with    Office Visit        Health Maintenance     Pneumococcal Vaccine 65+ (1 of 2 - PCV)  Never done    DTaP/Tdap/Td Vaccine (1 - Tdap)  Never done    Shingles Vaccine (1 of 2)  Never done    Hepatitis C Screening (Once)  Never done    Breast Cancer Screening (Every 2 Years)  Ordered on 3/1/2024    Osteoporosis Screening (Once)    COVID-19 Vaccine (2023- season)     Patient wishes to discuss with clinician: Hepatitis C Screening, Mammogram, Osteoporosis screening, COVID-19, Dtap/Tdap/Td, Pneumococcal, and Shingles    Medical Weight Management     Patient has been on Mounjaro for 3 months with no results. Wants to discuss an alternative.        HPI:    Abdominal pain  Started in the R lower quadrant  Moved around to the back  Improved a lot after having a large bowel movement  Better with massage from the jets of her hot tub  No fevers, nausea, vomiting  Constipated     Diabetes  Portions are down with GLP-1 agonist  Hasn't lost weight on mounjaro yet  Has been taking 5mg for the past month  Hasn't seen sugars go down yet    Past Medical History:   Diagnosis Date    Fallopian tube cancer, carcinoma  (CMD)     saw oncology in the past, then naturopath; had hysterectomy, partial chemo/gamma ray therapy    Hypertension     Type 2 diabetes mellitus  (CMD)        Current Outpatient Medications   Medication Sig Dispense Refill    tirzepatide (Mounjaro) 7.5 MG/0.5ML Solution Pen-injector Inject 7.5 mg into the skin every 7 days. Indications: Type 2 Diabetes 2 mL 1    irbesartan-hydrochlorothiazide (AVALIDE) 300-12.5 MG per tablet TAKE 1 TABLET BY MOUTH EVERY DAY 90 tablet 0    glipiZIDE (GLUCOTROL XL) 10 MG 24 hr tablet TAKE 1 TABLET BY MOUTH EVERY DAY 90 tablet 0    Loratadine 10 MG Cap Take  by mouth.      Glucosamine-Chondroitin--400-375 MG Tab Take by mouth daily.       No current facility-administered medications for this visit.      ALLERGIES:   Allergen Reactions    Atorvastatin Other (See Comments)     Other reaction(s): myalgia    Lisinopril-Hydrochlorothiazide Other (See Comments)     Other reaction(s): stomach upset / cough    Lovastatin Other (See Comments)     Other reaction(s): myalgia    Penicillins Other (See Comments) and RASH    Rosuvastatin Other (See Comments)     Other reaction(s): myalgia    Adhesive   (Environmental) RASH    Latex Other (See Comments)       Social History     Tobacco Use    Smoking status: Former     Types: Cigarettes    Smokeless tobacco: Never   Substance Use Topics    Alcohol use: Yes     Comment: social    Drug use: Never       Review of Systems   Constitutional:  Negative for activity change, appetite change, chills, diaphoresis, fatigue, fever and unexpected weight change.   Gastrointestinal:  Positive for abdominal pain and constipation. Negative for abdominal distention, anal bleeding, blood in stool, diarrhea, nausea, rectal pain and vomiting.        Physical Exam  Vitals reviewed.   Constitutional:       General: She is not in acute distress.     Appearance: Normal appearance. She is not ill-appearing or diaphoretic.   HENT:      Head: Normocephalic and atraumatic.      Mouth/Throat:      Mouth: Mucous membranes are moist.   Cardiovascular:      Rate and Rhythm: Normal rate.   Pulmonary:      Effort: Pulmonary effort is normal. No respiratory distress.   Skin:     Findings: No lesion or rash.   Neurological:      Mental Status: She is alert.          Assessment/Plan:    1. Type 2 diabetes mellitus with hyperglycemia, without long-term current use of insulin  (CMD)  No reported change in blood glucose levels and no weight loss. Tolerated GLP-1 agonist relatively well so would like to increase dose of this for now.   -discontinue mounjaro 5mg weekly  -start tirzepatide (Mounjaro) 7.5 MG/0.5ML Solution Pen-injector; Inject 7.5 mg into the skin every 7 days. Indications: Type 2 Diabetes  Dispense: 2  mL; Refill: 1  - Thyroid Stimulating Hormone Reflex    2. Generalized abdominal pain  Acute issue. Most consistent with adverse medication effect of GLP-1 agonist causing constipation. No red flag symptoms. Will treat conservatively for now. She would like to remain on GLP-1 agonist for now.  -daily metamucil or miralax        Srinath Saldivar MD     Chronic CHF HLD (hyperlipidemia)

## 2024-07-03 ENCOUNTER — APPOINTMENT (OUTPATIENT)
Dept: NEPHROLOGY | Facility: CLINIC | Age: 89
End: 2024-07-03
Payer: MEDICARE

## 2024-07-03 DIAGNOSIS — R63.1 POLYDIPSIA: ICD-10-CM

## 2024-07-03 DIAGNOSIS — I50.32 CHRONIC DIASTOLIC (CONGESTIVE) HEART FAILURE: ICD-10-CM

## 2024-07-03 DIAGNOSIS — R60.0 LOCALIZED EDEMA: ICD-10-CM

## 2024-07-03 DIAGNOSIS — I72.9 ANEURYSM OF UNSPECIFIED SITE: ICD-10-CM

## 2024-07-03 DIAGNOSIS — S12.110A ANTERIOR DISPLACED TYPE II DENS FRACTURE, INITIAL ENCOUNTER FOR CLOSED FRACTURE: ICD-10-CM

## 2024-07-03 DIAGNOSIS — F54 POLYDIPSIA: ICD-10-CM

## 2024-07-03 DIAGNOSIS — S12.9XXA FRACTURE OF NECK, UNSPECIFIED, INITIAL ENCOUNTER: ICD-10-CM

## 2024-07-03 DIAGNOSIS — I10 ESSENTIAL (PRIMARY) HYPERTENSION: ICD-10-CM

## 2024-07-03 PROCEDURE — G2212 PROLONG OUTPT/OFFICE VIS: CPT

## 2024-07-03 PROCEDURE — 99215 OFFICE O/P EST HI 40 MIN: CPT

## 2024-07-03 PROCEDURE — G2211 COMPLEX E/M VISIT ADD ON: CPT

## 2024-07-10 NOTE — PATIENT PROFILE ADULT - VISION (WITH CORRECTIVE LENSES IF THE PATIENT USUALLY WEARS THEM):
Refill requested   
Normal vision: sees adequately in most situations; can see medication labels, newsprint

## 2024-07-16 ENCOUNTER — APPOINTMENT (OUTPATIENT)
Dept: HEART AND VASCULAR | Facility: CLINIC | Age: 89
End: 2024-07-16

## 2024-07-17 ENCOUNTER — EMERGENCY (EMERGENCY)
Facility: HOSPITAL | Age: 89
LOS: 1 days | Discharge: HOME | End: 2024-07-17
Attending: EMERGENCY MEDICINE | Admitting: EMERGENCY MEDICINE
Payer: MEDICARE

## 2024-07-17 VITALS
DIASTOLIC BLOOD PRESSURE: 81 MMHG | WEIGHT: 139.99 LBS | RESPIRATION RATE: 18 BRPM | HEART RATE: 76 BPM | OXYGEN SATURATION: 93 % | SYSTOLIC BLOOD PRESSURE: 146 MMHG | TEMPERATURE: 98 F

## 2024-07-17 VITALS
OXYGEN SATURATION: 95 % | TEMPERATURE: 98 F | SYSTOLIC BLOOD PRESSURE: 156 MMHG | HEART RATE: 66 BPM | RESPIRATION RATE: 18 BRPM | DIASTOLIC BLOOD PRESSURE: 91 MMHG

## 2024-07-17 DIAGNOSIS — I50.9 HEART FAILURE, UNSPECIFIED: ICD-10-CM

## 2024-07-17 DIAGNOSIS — Z88.1 ALLERGY STATUS TO OTHER ANTIBIOTIC AGENTS: ICD-10-CM

## 2024-07-17 DIAGNOSIS — R29.898 OTHER SYMPTOMS AND SIGNS INVOLVING THE MUSCULOSKELETAL SYSTEM: ICD-10-CM

## 2024-07-17 DIAGNOSIS — Z95.2 PRESENCE OF PROSTHETIC HEART VALVE: Chronic | ICD-10-CM

## 2024-07-17 DIAGNOSIS — E78.5 HYPERLIPIDEMIA, UNSPECIFIED: ICD-10-CM

## 2024-07-17 DIAGNOSIS — Z98.890 OTHER SPECIFIED POSTPROCEDURAL STATES: Chronic | ICD-10-CM

## 2024-07-17 DIAGNOSIS — I11.0 HYPERTENSIVE HEART DISEASE WITH HEART FAILURE: ICD-10-CM

## 2024-07-17 DIAGNOSIS — Z88.0 ALLERGY STATUS TO PENICILLIN: ICD-10-CM

## 2024-07-17 DIAGNOSIS — Z95.5 PRESENCE OF CORONARY ANGIOPLASTY IMPLANT AND GRAFT: Chronic | ICD-10-CM

## 2024-07-17 DIAGNOSIS — Z96.649 PRESENCE OF UNSPECIFIED ARTIFICIAL HIP JOINT: Chronic | ICD-10-CM

## 2024-07-17 LAB
ALBUMIN SERPL ELPH-MCNC: 3.2 G/DL — LOW (ref 3.4–5)
ALP SERPL-CCNC: 66 U/L — SIGNIFICANT CHANGE UP (ref 40–120)
ALT FLD-CCNC: 14 U/L — SIGNIFICANT CHANGE UP (ref 12–42)
ANION GAP SERPL CALC-SCNC: 4 MMOL/L — LOW (ref 9–16)
AST SERPL-CCNC: 21 U/L — SIGNIFICANT CHANGE UP (ref 15–37)
BASOPHILS # BLD AUTO: 0.04 K/UL — SIGNIFICANT CHANGE UP (ref 0–0.2)
BASOPHILS NFR BLD AUTO: 0.6 % — SIGNIFICANT CHANGE UP (ref 0–2)
BILIRUB SERPL-MCNC: 0.4 MG/DL — SIGNIFICANT CHANGE UP (ref 0.2–1.2)
BUN SERPL-MCNC: 19 MG/DL — SIGNIFICANT CHANGE UP (ref 7–23)
CALCIUM SERPL-MCNC: 9.4 MG/DL — SIGNIFICANT CHANGE UP (ref 8.5–10.5)
CHLORIDE SERPL-SCNC: 103 MMOL/L — SIGNIFICANT CHANGE UP (ref 96–108)
CO2 SERPL-SCNC: 35 MMOL/L — HIGH (ref 22–31)
CREAT SERPL-MCNC: 0.9 MG/DL — SIGNIFICANT CHANGE UP (ref 0.5–1.3)
EGFR: 59 ML/MIN/1.73M2 — LOW
EOSINOPHIL # BLD AUTO: 0.08 K/UL — SIGNIFICANT CHANGE UP (ref 0–0.5)
EOSINOPHIL NFR BLD AUTO: 1.2 % — SIGNIFICANT CHANGE UP (ref 0–6)
GLUCOSE SERPL-MCNC: 104 MG/DL — HIGH (ref 70–99)
HCT VFR BLD CALC: 37.6 % — SIGNIFICANT CHANGE UP (ref 34.5–45)
HGB BLD-MCNC: 11.9 G/DL — SIGNIFICANT CHANGE UP (ref 11.5–15.5)
IMM GRANULOCYTES NFR BLD AUTO: 0.2 % — SIGNIFICANT CHANGE UP (ref 0–0.9)
LIDOCAIN IGE QN: 28 U/L — SIGNIFICANT CHANGE UP (ref 16–77)
LYMPHOCYTES # BLD AUTO: 1.22 K/UL — SIGNIFICANT CHANGE UP (ref 1–3.3)
LYMPHOCYTES # BLD AUTO: 18.9 % — SIGNIFICANT CHANGE UP (ref 13–44)
MCHC RBC-ENTMCNC: 30.6 PG — SIGNIFICANT CHANGE UP (ref 27–34)
MCHC RBC-ENTMCNC: 31.6 GM/DL — LOW (ref 32–36)
MCV RBC AUTO: 96.7 FL — SIGNIFICANT CHANGE UP (ref 80–100)
MONOCYTES # BLD AUTO: 0.72 K/UL — SIGNIFICANT CHANGE UP (ref 0–0.9)
MONOCYTES NFR BLD AUTO: 11.1 % — SIGNIFICANT CHANGE UP (ref 2–14)
NEUTROPHILS # BLD AUTO: 4.4 K/UL — SIGNIFICANT CHANGE UP (ref 1.8–7.4)
NEUTROPHILS NFR BLD AUTO: 68 % — SIGNIFICANT CHANGE UP (ref 43–77)
NRBC # BLD: 0 /100 WBCS — SIGNIFICANT CHANGE UP (ref 0–0)
NT-PROBNP SERPL-SCNC: 2002 PG/ML — HIGH
PLATELET # BLD AUTO: 391 K/UL — SIGNIFICANT CHANGE UP (ref 150–400)
POTASSIUM SERPL-MCNC: 4.6 MMOL/L — SIGNIFICANT CHANGE UP (ref 3.5–5.3)
POTASSIUM SERPL-SCNC: 4.6 MMOL/L — SIGNIFICANT CHANGE UP (ref 3.5–5.3)
PROT SERPL-MCNC: 7.8 G/DL — SIGNIFICANT CHANGE UP (ref 6.4–8.2)
RBC # BLD: 3.89 M/UL — SIGNIFICANT CHANGE UP (ref 3.8–5.2)
RBC # FLD: 14.5 % — SIGNIFICANT CHANGE UP (ref 10.3–14.5)
SODIUM SERPL-SCNC: 142 MMOL/L — SIGNIFICANT CHANGE UP (ref 132–145)
TROPONIN I, HIGH SENSITIVITY RESULT: 24 NG/L — SIGNIFICANT CHANGE UP
WBC # BLD: 6.47 K/UL — SIGNIFICANT CHANGE UP (ref 3.8–10.5)
WBC # FLD AUTO: 6.47 K/UL — SIGNIFICANT CHANGE UP (ref 3.8–10.5)

## 2024-07-17 PROCEDURE — 72125 CT NECK SPINE W/O DYE: CPT | Mod: 26,MC

## 2024-07-17 PROCEDURE — 70450 CT HEAD/BRAIN W/O DYE: CPT | Mod: 26,MC

## 2024-07-17 PROCEDURE — 71045 X-RAY EXAM CHEST 1 VIEW: CPT | Mod: 26

## 2024-07-17 PROCEDURE — 99285 EMERGENCY DEPT VISIT HI MDM: CPT

## 2024-07-17 RX ORDER — ACETAMINOPHEN 325 MG
650 TABLET ORAL ONCE
Refills: 0 | Status: COMPLETED | OUTPATIENT
Start: 2024-07-17 | End: 2024-07-17

## 2024-07-17 RX ORDER — ALPRAZOLAM 2 MG/1
0.25 TABLET ORAL ONCE
Refills: 0 | Status: DISCONTINUED | OUTPATIENT
Start: 2024-07-17 | End: 2024-07-17

## 2024-07-17 RX ADMIN — ALPRAZOLAM 0.25 MILLIGRAM(S): 2 TABLET ORAL at 14:12

## 2024-07-17 RX ADMIN — Medication 650 MILLIGRAM(S): at 12:06

## 2024-07-19 ENCOUNTER — INPATIENT (INPATIENT)
Facility: HOSPITAL | Age: 89
LOS: 13 days | Discharge: NURSING, REHAB OR LONG-TERM CARE | DRG: 29 | End: 2024-08-02
Attending: NEUROLOGICAL SURGERY | Admitting: NEUROLOGICAL SURGERY
Payer: MEDICARE

## 2024-07-19 VITALS
RESPIRATION RATE: 18 BRPM | WEIGHT: 145.06 LBS | OXYGEN SATURATION: 94 % | HEART RATE: 82 BPM | SYSTOLIC BLOOD PRESSURE: 126 MMHG | TEMPERATURE: 98 F | DIASTOLIC BLOOD PRESSURE: 68 MMHG

## 2024-07-19 DIAGNOSIS — E78.5 HYPERLIPIDEMIA, UNSPECIFIED: ICD-10-CM

## 2024-07-19 DIAGNOSIS — Z95.2 PRESENCE OF PROSTHETIC HEART VALVE: Chronic | ICD-10-CM

## 2024-07-19 DIAGNOSIS — Z98.890 OTHER SPECIFIED POSTPROCEDURAL STATES: Chronic | ICD-10-CM

## 2024-07-19 DIAGNOSIS — I10 ESSENTIAL (PRIMARY) HYPERTENSION: ICD-10-CM

## 2024-07-19 DIAGNOSIS — Z96.649 PRESENCE OF UNSPECIFIED ARTIFICIAL HIP JOINT: Chronic | ICD-10-CM

## 2024-07-19 DIAGNOSIS — S12.110S: ICD-10-CM

## 2024-07-19 DIAGNOSIS — Z86.79 PERSONAL HISTORY OF OTHER DISEASES OF THE CIRCULATORY SYSTEM: ICD-10-CM

## 2024-07-19 DIAGNOSIS — Z95.5 PRESENCE OF CORONARY ANGIOPLASTY IMPLANT AND GRAFT: Chronic | ICD-10-CM

## 2024-07-19 DIAGNOSIS — H40.9 UNSPECIFIED GLAUCOMA: ICD-10-CM

## 2024-07-19 LAB
A1C WITH ESTIMATED AVERAGE GLUCOSE RESULT: 5.3 % — SIGNIFICANT CHANGE UP (ref 4–5.6)
ADD ON TEST-SPECIMEN IN LAB: SIGNIFICANT CHANGE UP
ALBUMIN SERPL ELPH-MCNC: 3.6 G/DL — SIGNIFICANT CHANGE UP (ref 3.3–5)
ALP SERPL-CCNC: 63 U/L — SIGNIFICANT CHANGE UP (ref 40–120)
ALT FLD-CCNC: 7 U/L — LOW (ref 10–45)
ANION GAP SERPL CALC-SCNC: 11 MMOL/L — SIGNIFICANT CHANGE UP (ref 5–17)
APTT BLD: 32.5 SEC — SIGNIFICANT CHANGE UP (ref 24.5–35.6)
AST SERPL-CCNC: 19 U/L — SIGNIFICANT CHANGE UP (ref 10–40)
BASOPHILS # BLD AUTO: 0.04 K/UL — SIGNIFICANT CHANGE UP (ref 0–0.2)
BASOPHILS NFR BLD AUTO: 0.5 % — SIGNIFICANT CHANGE UP (ref 0–2)
BILIRUB SERPL-MCNC: 0.3 MG/DL — SIGNIFICANT CHANGE UP (ref 0.2–1.2)
BLD GP AB SCN SERPL QL: POSITIVE — SIGNIFICANT CHANGE UP
BUN SERPL-MCNC: 20 MG/DL — SIGNIFICANT CHANGE UP (ref 7–23)
CALCIUM SERPL-MCNC: 9.2 MG/DL — SIGNIFICANT CHANGE UP (ref 8.4–10.5)
CHLORIDE SERPL-SCNC: 100 MMOL/L — SIGNIFICANT CHANGE UP (ref 96–108)
CO2 SERPL-SCNC: 30 MMOL/L — SIGNIFICANT CHANGE UP (ref 22–31)
CREAT SERPL-MCNC: 0.91 MG/DL — SIGNIFICANT CHANGE UP (ref 0.5–1.3)
EGFR: 58 ML/MIN/1.73M2 — LOW
EOSINOPHIL # BLD AUTO: 0.1 K/UL — SIGNIFICANT CHANGE UP (ref 0–0.5)
EOSINOPHIL NFR BLD AUTO: 1.3 % — SIGNIFICANT CHANGE UP (ref 0–6)
ESTIMATED AVERAGE GLUCOSE: 105 MG/DL — SIGNIFICANT CHANGE UP (ref 68–114)
GLUCOSE SERPL-MCNC: 118 MG/DL — HIGH (ref 70–99)
HCT VFR BLD CALC: 37.8 % — SIGNIFICANT CHANGE UP (ref 34.5–45)
HGB BLD-MCNC: 11.8 G/DL — SIGNIFICANT CHANGE UP (ref 11.5–15.5)
IMM GRANULOCYTES NFR BLD AUTO: 0.1 % — SIGNIFICANT CHANGE UP (ref 0–0.9)
INR BLD: 0.97 — SIGNIFICANT CHANGE UP (ref 0.85–1.18)
LYMPHOCYTES # BLD AUTO: 1.78 K/UL — SIGNIFICANT CHANGE UP (ref 1–3.3)
LYMPHOCYTES # BLD AUTO: 24 % — SIGNIFICANT CHANGE UP (ref 13–44)
MCHC RBC-ENTMCNC: 31 PG — SIGNIFICANT CHANGE UP (ref 27–34)
MCHC RBC-ENTMCNC: 31.2 GM/DL — LOW (ref 32–36)
MCV RBC AUTO: 99.2 FL — SIGNIFICANT CHANGE UP (ref 80–100)
MONOCYTES # BLD AUTO: 1.13 K/UL — HIGH (ref 0–0.9)
MONOCYTES NFR BLD AUTO: 15.2 % — HIGH (ref 2–14)
NEUTROPHILS # BLD AUTO: 4.35 K/UL — SIGNIFICANT CHANGE UP (ref 1.8–7.4)
NEUTROPHILS NFR BLD AUTO: 58.9 % — SIGNIFICANT CHANGE UP (ref 43–77)
NRBC # BLD: 0 /100 WBCS — SIGNIFICANT CHANGE UP (ref 0–0)
PLATELET # BLD AUTO: 347 K/UL — SIGNIFICANT CHANGE UP (ref 150–400)
POTASSIUM SERPL-MCNC: 4.4 MMOL/L — SIGNIFICANT CHANGE UP (ref 3.5–5.3)
POTASSIUM SERPL-SCNC: 4.4 MMOL/L — SIGNIFICANT CHANGE UP (ref 3.5–5.3)
PROT SERPL-MCNC: 7.1 G/DL — SIGNIFICANT CHANGE UP (ref 6–8.3)
PROTHROM AB SERPL-ACNC: 11.1 SEC — SIGNIFICANT CHANGE UP (ref 9.5–13)
RBC # BLD: 3.81 M/UL — SIGNIFICANT CHANGE UP (ref 3.8–5.2)
RBC # FLD: 14.6 % — HIGH (ref 10.3–14.5)
RH IG SCN BLD-IMP: POSITIVE — SIGNIFICANT CHANGE UP
SODIUM SERPL-SCNC: 141 MMOL/L — SIGNIFICANT CHANGE UP (ref 135–145)
WBC # BLD: 7.41 K/UL — SIGNIFICANT CHANGE UP (ref 3.8–10.5)
WBC # FLD AUTO: 7.41 K/UL — SIGNIFICANT CHANGE UP (ref 3.8–10.5)

## 2024-07-19 PROCEDURE — 99285 EMERGENCY DEPT VISIT HI MDM: CPT

## 2024-07-19 PROCEDURE — 99292 CRITICAL CARE ADDL 30 MIN: CPT

## 2024-07-19 PROCEDURE — 99291 CRITICAL CARE FIRST HOUR: CPT

## 2024-07-19 PROCEDURE — 93010 ELECTROCARDIOGRAM REPORT: CPT

## 2024-07-19 PROCEDURE — 86077 PHYS BLOOD BANK SERV XMATCH: CPT

## 2024-07-19 PROCEDURE — 71045 X-RAY EXAM CHEST 1 VIEW: CPT | Mod: 26

## 2024-07-19 RX ORDER — DULOXETINE HCL 20 MG
1 CAPSULE,DELAYED RELEASE (ENTERIC COATED) ORAL
Refills: 0 | DISCHARGE

## 2024-07-19 RX ORDER — HYDROMORPHONE HCL IN 0.9% NACL 0.2 MG/ML
0.25 PLASTIC BAG, INJECTION (ML) INTRAVENOUS ONCE
Refills: 0 | Status: DISCONTINUED | OUTPATIENT
Start: 2024-07-19 | End: 2024-07-19

## 2024-07-19 RX ORDER — ACETAMINOPHEN 500 MG
650 TABLET ORAL EVERY 6 HOURS
Refills: 0 | Status: DISCONTINUED | OUTPATIENT
Start: 2024-07-19 | End: 2024-07-24

## 2024-07-19 RX ORDER — LATANOPROST 0.005 %
1 DROPS OPHTHALMIC (EYE) AT BEDTIME
Refills: 0 | Status: DISCONTINUED | OUTPATIENT
Start: 2024-07-19 | End: 2024-07-24

## 2024-07-19 RX ORDER — DULOXETINE HCL 20 MG
30 CAPSULE,DELAYED RELEASE (ENTERIC COATED) ORAL
Refills: 0 | Status: DISCONTINUED | OUTPATIENT
Start: 2024-07-19 | End: 2024-07-24

## 2024-07-19 RX ORDER — ACETAMINOPHEN 500 MG
1000 TABLET ORAL ONCE
Refills: 0 | Status: COMPLETED | OUTPATIENT
Start: 2024-07-19 | End: 2024-07-20

## 2024-07-19 RX ORDER — LORATADINE 10 MG
17 TABLET,DISINTEGRATING ORAL DAILY
Refills: 0 | Status: DISCONTINUED | OUTPATIENT
Start: 2024-07-19 | End: 2024-07-22

## 2024-07-19 RX ORDER — MELATONIN 3 MG
3 TABLET ORAL
Refills: 0 | DISCHARGE

## 2024-07-19 RX ORDER — HYDRALAZINE HYDROCHLORIDE 100 MG/1
10 TABLET ORAL ONCE
Refills: 0 | Status: COMPLETED | OUTPATIENT
Start: 2024-07-19 | End: 2024-07-19

## 2024-07-19 RX ORDER — ALPRAZOLAM 0.5 MG
0.12 TABLET ORAL ONCE
Refills: 0 | Status: DISCONTINUED | OUTPATIENT
Start: 2024-07-19 | End: 2024-07-19

## 2024-07-19 RX ORDER — MELATONIN 3 MG
9 TABLET ORAL AT BEDTIME
Refills: 0 | Status: DISCONTINUED | OUTPATIENT
Start: 2024-07-19 | End: 2024-07-24

## 2024-07-19 RX ORDER — ENOXAPARIN SODIUM 120 MG/.8ML
40 INJECTION SUBCUTANEOUS
Refills: 0 | Status: DISCONTINUED | OUTPATIENT
Start: 2024-07-19 | End: 2024-07-23

## 2024-07-19 RX ORDER — ALPRAZOLAM 0.5 MG
1 TABLET ORAL
Refills: 0 | DISCHARGE

## 2024-07-19 RX ORDER — ASPIRIN 500 MG
81 TABLET ORAL DAILY
Refills: 0 | Status: DISCONTINUED | OUTPATIENT
Start: 2024-07-19 | End: 2024-07-24

## 2024-07-19 RX ORDER — KETOROLAC TROMETHAMINE 10 MG
15 TABLET ORAL ONCE
Refills: 0 | Status: DISCONTINUED | OUTPATIENT
Start: 2024-07-19 | End: 2024-07-19

## 2024-07-19 RX ORDER — DEXMEDETOMIDINE HYDROCHLORIDE 4 UG/ML
0.2 INJECTION, SOLUTION INTRAVENOUS
Qty: 400 | Refills: 0 | Status: DISCONTINUED | OUTPATIENT
Start: 2024-07-19 | End: 2024-07-22

## 2024-07-19 RX ORDER — ALPRAZOLAM 0.5 MG
0.25 TABLET ORAL AT BEDTIME
Refills: 0 | Status: DISCONTINUED | OUTPATIENT
Start: 2024-07-19 | End: 2024-07-23

## 2024-07-19 RX ADMIN — Medication 0.25 MILLIGRAM(S): at 17:30

## 2024-07-19 RX ADMIN — ENOXAPARIN SODIUM 40 MILLIGRAM(S): 120 INJECTION SUBCUTANEOUS at 21:08

## 2024-07-19 RX ADMIN — Medication 0.12 MILLIGRAM(S): at 16:45

## 2024-07-19 RX ADMIN — Medication 1 DROP(S): at 21:08

## 2024-07-19 RX ADMIN — Medication 0.25 MILLIGRAM(S): at 21:09

## 2024-07-19 RX ADMIN — Medication 650 MILLIGRAM(S): at 16:00

## 2024-07-19 RX ADMIN — Medication 650 MILLIGRAM(S): at 16:30

## 2024-07-19 RX ADMIN — Medication 15 MILLIGRAM(S): at 16:45

## 2024-07-19 RX ADMIN — DEXMEDETOMIDINE HYDROCHLORIDE 3.29 MICROGRAM(S)/KG/HR: 4 INJECTION, SOLUTION INTRAVENOUS at 18:03

## 2024-07-19 RX ADMIN — Medication 0.25 MILLIGRAM(S): at 19:14

## 2024-07-19 RX ADMIN — Medication 0.25 MILLIGRAM(S): at 19:30

## 2024-07-19 RX ADMIN — Medication 30 MILLIGRAM(S): at 21:08

## 2024-07-19 RX ADMIN — Medication 0.25 MILLIGRAM(S): at 17:15

## 2024-07-19 RX ADMIN — Medication 15 MILLIGRAM(S): at 17:00

## 2024-07-19 RX ADMIN — HYDRALAZINE HYDROCHLORIDE 10 MILLIGRAM(S): 100 TABLET ORAL at 18:10

## 2024-07-19 NOTE — ED ADULT NURSE NOTE - NS ED NURSE LEVEL OF CONSCIOUSNESS SPEECH
OFFICE VISIT      Cady[place] Channel  1935    Date of Service: 11/4/2020    Chief Complaint: Follow-up for CAD, chest pain    Current Outpatient Medications   Medication Sig Dispense Refill    levothyroxine (SYNTHROID) 125 MCG tablet       atenolol (TENORMIN) 25 MG tablet TAKE ONE TABLET BY MOUTH NIGHTLY 90 tablet 3    clopidogrel (PLAVIX) 75 MG tablet TAKE ONE TABLET BY MOUTH EVERY DAY 90 tablet 3    isosorbide mononitrate (IMDUR) 60 MG extended release tablet Take 1 tablet by mouth 2 times daily 180 tablet 3    Cholecalciferol (VITAMIN D) 2000 units CAPS capsule Take by mouth      Multiple Vitamins-Minerals (PRESERVISION AREDS 2 PO) Take by mouth      allopurinol (ZYLOPRIM) 100 MG tablet Take 100 mg by mouth daily      aspirin 81 MG EC tablet Take 81 mg by mouth daily. 2 tabs daily      Mometasone Furo-Formoterol Fum (DULERA IN) Inhale into the lungs       No current facility-administered medications for this visit. No Known Allergies    SUBJECTIVE:  May 2013 cardiac cath at [facility] demonstrated 60% mid LAD and 70% D2 disease. At the time of 1/2016 office visit he complained of long-standing exertional dyspnea as well as intermittent chest pain --> mid-sternal, occurred at rest, episodes lasted 30 seconds-minutes, somewhat similar to pain he experienced prior to 2009 PCI --> he deferred ischemic work-up and imdur was added at that time. At the time of 8/2016 office visit he reported a definite clinical improvement (\"I went from having chest pain every day to having 3 episodes since the last time I saw you\" ~ 6 months ago). He's currently taking imdur 60 mg in the AM and 60 mg in the PM --> previously 60/30 (dose increased at prior office visit) --> \"it's helped a lot, one episode of chest pain over the past year\". SNOW and fatigue improved. +intermittent dizziness -- stable. Sedentary lifestyle.  Currently with no active cardiac complaints at rest.    Review of Systems:  Cardiac: As per HPI  General: No fever, chills  Pulmonary: As per HPI  HEENT: No visual disturbances, difficult swallowing  GI: No nausea, vomiting, abdominal pain  Endocrine: +hypothyroidism, no diabetes   Musculoskeletal: KRISHNA x 4, no focal motor deficits  Skin: Intact, no rashes  Neuro/Psych: No headache, dementia, seizures      PHYSICAL EXAM (OBJECTIVE):    Vitals:    11/04/20 1237   BP: 118/70   Pulse: 63   Resp: 16   Weight: 238 lb 4.8 oz (108.1 kg)   Height: 5' 9\" (1.753 m)     General: Awake, alert, no acute respiratory distress  HEENT: NC/AT, EOMI, no pharyngeal erythema  Neck: Supple, no elevated JVD, no carotid bruits  Cardiac: RRR, +S1S2, no murmurs apparent  Lungs: Clear to auscultation, no wheezes or rales. Abdomen: Obese, soft, nontender  Extremities: KRISHNA x 4, no LE edema  Skin: Intact, No significant rash    ELECTROCARDIOGRAM:   SR, rate 66, NSSTT changes    ASSESSMENT & PLANS:    . CAD S/P PTCA of the LCx with Dr. Lindy Combs in 9/09 with TAXUS stent  . Repeat cath in 5/11 which showed EF 60%, patent stent in the LCx and 50% lesion in the ostium of a small diagonal branch (medical treatment only)  . May 2013 cardiac cath at [facility] demonstrated May 2013 60% mid LAD and 70% D2 disease. . Symptomatic bradycardia s/p pacemaker placement ([employer]) in 2004, s/p generator change in 8/2013 -- previously followed by Dr. Davida Hutchinson. Device interrogated today (battery 3 years, no new events, normal functioning device, full results scanned in Epic) --> results reviewed with the patient. . Weight loss / exercise  . Follow-up results of echo from [facility] (May 2013)  . Follow-up thyroid studies   . Monitor renal function and electrolytes  . Continue antiplatelet therapy, statin, beta blocker, and imdur (imdur dose increased at 11/2018 office visit). Previously discussed option of repeat cardiac cath pending clinical course -- he opted for medical management in the past; +clinical improvement.     Rhea Jean MD  [facility]) Cardiology Speaking Coherently

## 2024-07-19 NOTE — H&P ADULT - NSHPPHYSICALEXAM_GEN_ALL_CORE
Constitutional: NAD, resting comfortably in bed. +soft collar on neck  HEENT: Pupils equal, round, reactive to light. EOMI. Face symmetric, tongue midline.  Respiratory: No respiratory distress, lungs clear to auscultation bilaterally.   Cardiovascular: RRR, S1, S2.   Gastrointestinal: Abdomen soft, non tender, nondistended.  Neurological:  AAOX3, confused to situation. Opens eyes. Follows commands. Speech clear.  Cranial Nerves: II-XII intact  Motor: Bilateral deltoids, bicep, tricep, hand  4+/5. Left hand intrinsics 3/5. Right hand intrinsics 4/5. Bilateral LEs 5/5  Sensation: intact to light touch in all extremities  Negative Guillory's bilaterally, no clonus.   Pronator Drift: LUE pronator drift.   Extremities: Warm, well perfused.  Wounds: small bruise to central forehead from fall on 7/6

## 2024-07-19 NOTE — H&P ADULT - ASSESSMENT
89 year old female, PMH HTN, HLD, CHF, AS, chronic C2 fracture, presenting to [facility] ED with 1 week of worsening weakness and difficulty ambulating at home. 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 Pt has been seen outpatient by Dr. Butler and Dr. Puckett. Pt's family called outpatient office and was advised to come to ED for admission for surgery this week.

## 2024-07-19 NOTE — H&P ADULT - HISTORY OF PRESENT ILLNESS
89 year old female, PMH HTN, HLD, CHF, severe AS, and chronic C-2 fracture x 2 years, presented to [facility] ED with worsening weakness and difficulty ambulating since 7/14. Per home health aide, pt had a fall with +head strike on July 6th which she did not receive medical attention for. On 7/14, HHA noted pt was having more difficulty ambulating and was dropping objects and not able to feed herself as before. Pt was seen on 7/17 at [facility] and had a CT head and CT c-spine. CT c-spine showed  "a type II odontoid fracture with similar degree of   anterior displacement of the superior fracture fragment, there is mass effect on the cervical medullary junction with severe stenosis". The provider at [facility] offered to pt, family, and pt's PCP, Dr. Doan who was at bedside, to call spine surgery at [facility] and all parties refused, preferring to take patient home. Pt was discharged home with her HHA. Since 7/17, pt's weakness has progressed, prompting them to come to the ED. She denies falls since 7/6. Pt came in with soft collar on, which was removed by HHA while in ED.  89 year old female, PMH HTN, HLD, CHF, severe AS, and chronic C-2 fracture x 2 years, presented to [facility] ED with worsening weakness and difficulty ambulating since 7/14. Per home health aide, pt had a fall with +head strike on July 6th which she did not receive medical attention for. On 7/14, HHA noted pt was having more difficulty ambulating and was dropping objects and not able to feed herself as before. Pt was seen on 7/17 at [facility] and had a CT head and CT c-spine. 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 The provider at [facility] offered to pt, family, and pt's PCP, Dr. Doan who was at bedside, to call spine surgery at [facility] and all parties refused, preferring to take patient home. Pt was discharged home with her HHA. Since 7/17, pt's weakness has progressed, prompting them to come to the ED. She denies falls since 7/6. Pt came in with soft collar on, which was removed by HHA while in ED.

## 2024-07-19 NOTE — ED ADULT NURSE NOTE - OBJECTIVE STATEMENT
Pt reports neck pain x 2 years with left upper extremity weakness. Fall x 12 days ago: seen @ [facility] and dx with cervical fracture. Denies loss of bowel or bladder. Denies dysuria. Distal PMS currently intact with hx of LUE weakness. Alert and oriented at time of assessment.

## 2024-07-19 NOTE — PROGRESS NOTE ADULT - ASSESSMENT
Assessment: 89F hx HTN, HLD, CHF, severe AS, chronic c2 fracture sent by outpatient provider for LUE weakness found to have worsening cervical stenosis and weakness pending OR Wednesday       NEURO:  anterior displacement of the proximal fracture fragment which is similar appearance to prior MRI cervical spine 6/11/2024 but has   progressed compared to CT cervical spine 7/11/2023.  mass effect with severe stenosis of the cervical medullary junction  -neuro check q1  -pain management w/ tylenol & oxycodon, PCA pump   -CT head in AM CTA head in AM MRI brain +/- contrast in AM CT lumbar thoracic cervical spine in AM    -Monitor Drain output   -Left / Right EVD @___ cmH2O, ICP goal <22; monitor output  -Pitutary precautions: i.e no straws, no bipap, no nasal swabs,   -PT/OT evaluation placed     PULMONARY:  saturating well on RA,   -continue to monitor on pulse o2   -incentive spirometry 10q/hr when awake     Currently requiring mechanical ventilation for air-way protection   Vent settings 450/15/5/40  Daily CXR  ET tube positioned __ @ lip    CARDIOVASCULAR:  monitor on telemetry   vitals q1  sbp goal 100-160  EKG: NSR QTC ___ PR__   TTE ordered and pending    GASTROENTEROLOGY:  bedside speech & swallow if pass can start advancing diet as tolerated.   ensure BMs w/ Miralax & senna  GI ppx : Protonix 40mg qd  Daily stool count, LBM prior to arrival    RENAL/:  -check BMP qd  -strict i/o's ; c/w Vega   -Na goal 135-145    ENDOCRINE:  Diabetes Mellitus  A1c- pending  Monitor FSG q6 hrs while NPO / w/ tube feeds   HISS & Lantus     Hypothyroidism   TSH/t3/t4- pending   c/w home Synthroid     HEME/ONC:  DVT ppx: will hold chemical dvt ppx in setting of recent operation.  b/l SCDs  Obtain b/l LE screening dopplers    INFECTIOUS:   Monitor for fevers   post operative antibiotic w/ ancef    Assessment: 89F hx HTN, HLD, anxiety, CHF, severe AS, chronic c2 fracture sent by outpatient provider for LUE weakness found to have worsening cervical stenosis and weakness pending OR Wednesday       NEURO:  anterior displacement of the proximal fracture fragment which is similar appearance to prior MRI cervical spine 6/11/2024 but has   progressed compared to CT cervical spine 7/11/2023.  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  -neuro check q1  -pain management w/ Tylenol & oxycodone  -agitation: patient at time not cooperating with requests such as laying flat which may lead to worsening of cervical spine symptoms & complete paralysis started on precedex RASS goal 0 to -1   -PT/OT evaluation    PULMONARY:  saturating well on RA,   -continue to monitor on pulse o2   -incentive spirometry 10q/hr when awake   -obtain cxr for pre-operative assessment     CARDIOVASCULAR:  monitor on telemetry   vitals q1  sbp goal 100-160  EKG: pending   TTE ordered and pending    GASTROENTEROLOGY:  regular diet   ensure BMs w/ Miralax & senna  GI ppx : Protonix 40mg qd  Daily stool count, LBM prior to arrival    RENAL/:  -check BMP qd  -strict i/o's  -Na goal 135-145    ENDOCRINE:  A1c- pending    HEME/ONC:  DVT ppx: SQL   b/l SCDs      INFECTIOUS:   Monitor for fevers

## 2024-07-19 NOTE — ED ADULT TRIAGE NOTE - CHIEF COMPLAINT QUOTE
Pt presents to ED BIBA from home with aide c/o neck pain x 2 years from a fall 2 years ago and had a recent fall 07/06 unwitnessed. Pt is wake and alert and conversive, bed bound per aide at present with neck brace. PMhx of HTN, CHF,

## 2024-07-19 NOTE — ED PROVIDER NOTE - OBJECTIVE STATEMENT
89F with PMHx of HTN, HLD, CHF, severe AS, chronic C2 fracture for the past 2yrs (was being managed non-surgically), recent visit to [facility] for worsening LUE weakness x 1 week after a fall 2 weeks ago, had CT head neg for ICH and CT cervical spine that showed progression of chronic C2 fracture with severe cervical spinal stenosis, pt was refusing surgery at that time but returns today after being referred to Dr. Puckett.. 89F with PMHx of HTN, HLD, CHF, severe AS, chronic C2 fracture for the past 2yrs (was being managed non-surgically), recent visit to [facility] for worsening LUE weakness x 1 week after a fall 2 weeks ago, had CT head neg for ICH and CT cervical spine that showed progression of chronic C2 fracture with severe cervical spinal stenosis, pt was refusing surgery at that time but returns today after being referred to ER by Dr. Puckett. Since 7/17, pt's weakness has progressed, prompting them to come to the ED. She denies falls since 7/6. Pt came in with soft collar on, which was removed by HHA while in ED.

## 2024-07-19 NOTE — PROGRESS NOTE ADULT - SUBJECTIVE AND OBJECTIVE BOX
INTERVAL HISTORY: HPI:  89 year old female, PMH HTN, HLD, CHF, severe AS, and chronic C-2 fracture x 2 years, presented to [facility] ED with worsening weakness and difficulty ambulating since 7/14. Per home health aide, pt had a fall with +head strike on July 6th which she did not receive medical attention for. On 7/14, HHA noted pt was having more difficulty ambulating and was dropping objects and not able to feed herself as before. Pt was seen on 7/17 at [facility] and had a CT head and CT c-spine. 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 The provider at [facility] offered to pt, family, and pt's PCP, Dr. Doan who was at bedside, to call spine surgery at [facility] and all parties refused, preferring to take patient home. Pt was discharged home with her HHA. Since 7/17, pt's weakness has progressed, prompting them to come to the ED. She denies falls since 7/6. Pt came in with soft collar on, which was removed by HHA while in ED.  (19 Jul 2024 16:16)      MEDICATIONS  (STANDING):  ALPRAZolam 0.25 milliGRAM(s) Oral at bedtime  aspirin  chewable 81 milliGRAM(s) Oral daily  dexMEDEtomidine Infusion 0.2 MICROgram(s)/kG/Hr (3.29 mL/Hr) IV Continuous <Continuous>  DULoxetine 30 milliGRAM(s) Oral two times a day  enoxaparin Injectable 40 milliGRAM(s) SubCutaneous <User Schedule>  latanoprost 0.005% Ophthalmic Solution 1 Drop(s) Both EYES at bedtime  polyethylene glycol 3350 17 Gram(s) Oral daily    MEDICATIONS  (PRN):  acetaminophen     Tablet .. 650 milliGRAM(s) Oral every 6 hours PRN Mild Pain (1 - 3)  melatonin 9 milliGRAM(s) Oral at bedtime PRN for insomnia      Drug Dosing Weight  Height (cm): 162.6 (19 Jul 2024 17:53)  Weight (kg): 65.8 (19 Jul 2024 12:04)  BMI (kg/m2): 24.9 (19 Jul 2024 17:53)  BSA (m2): 1.71 (19 Jul 2024 17:53)    PAST MEDICAL & SURGICAL HISTORY:  Hyperlipidemia, unspecified hyperlipidemia type      Diabetes insipidus      H/O aortic valve stenosis      Hypertension      Vertigo      Chronic diastolic congestive heart failure      Dyslipidemia      History of pseudoaneurysm      Glaucoma      Closed C2 fracture      S/P AVR  Bioprosthetic      H/O lumbosacral spine surgery      S/P hip replacement  B/L      S/P right coronary artery (RCA) stent placement          REVIEW OF SYSTEMS: [ ] Unable to Assess due to neurologic exam   [ ] All ROS addressed below are non-contributory, except:  Neuro: [ ] Headache [ ] Back pain [ ] Numbness [ ] Weakness [ ] Ataxia [ ] Dizziness [ ] Aphasia [ ] Dysarthria [ ] Visual disturbance  Resp: [ ] Shortness of breath/dyspnea, [ ] Orthopnea [ ] Cough  CV: [ ] Chest pain [ ] Palpitation [ ] Lightheadedness [ ] Syncope  Renal: [ ] Thirst [ ] Edema  GI: [ ] Nausea [ ] Emesis [ ] Abdominal pain [ ] Constipation [ ] Diarrhea  Hem: [ ] Hematemesis [ ] bright red blood per rectum  ID: [ ] Fever [ ] Chills [ ] Dysuria  ENT: [ ] Rhinorrhea    PHYSICAL EXAM:    Constitutional: No Acute Distress     Neurological: AOx3, Following Commands, Moving all Extremities     Motor exam:          Upper extremity                         Delt     Bicep     Tricep    HG                                                 R         5/5        5/5        5/5       5/5                                               L          4+/5        5/5        4+/5       4+/5          Lower extremity                        HF         KF        KE       DF         PF                                                  R        5/5        5/5        5/5       5/5         5/5                                               L         5/5        5/5       5/5       5/5          5/5                                                 Sensation: [] intact to light touch  [] decreased:     Pulmonary: Clear to Auscultation, No rales, No rhonchi, No wheezes     Cardiovascular: S1, S2, Regular rate and rhythm     Gastrointestinal: Soft, Non-tender, Non-distended     Extremities: No calf tenderness       ICU Vital Signs Last 24 Hrs  T(C): 36.6 (19 Jul 2024 12:04), Max: 36.6 (19 Jul 2024 12:04)  T(F): 97.9 (19 Jul 2024 12:04), Max: 97.9 (19 Jul 2024 12:04)  HR: 73 (19 Jul 2024 17:00) (73 - 82)  BP: 168/81 (19 Jul 2024 17:00) (126/68 - 168/81)  BP(mean): 116 (19 Jul 2024 17:00) (113 - 116)  RR: 19 (19 Jul 2024 17:00) (18 - 20)  SpO2: 95% (19 Jul 2024 17:00) (93% - 95%)    O2 Parameters below as of 19 Jul 2024 17:00  Patient On (Oxygen Delivery Method): room air            I&O's Detail    19 Jul 2024 07:01  -  19 Jul 2024 17:56  --------------------------------------------------------  IN:    Oral Fluid: 240 mL  Total IN: 240 mL    OUT:  Total OUT: 0 mL    Total NET: 240 mL              LABS:  CBC Full  -  ( 19 Jul 2024 12:50 )  WBC Count : 7.41 K/uL  RBC Count : 3.81 M/uL  Hemoglobin : 11.8 g/dL  Hematocrit : 37.8 %  Platelet Count - Automated : 347 K/uL  Mean Cell Volume : 99.2 fl  Mean Cell Hemoglobin : 31.0 pg  Mean Cell Hemoglobin Concentration : 31.2 gm/dL  Auto Neutrophil # : 4.35 K/uL  Auto Lymphocyte # : 1.78 K/uL  Auto Monocyte # : 1.13 K/uL  Auto Eosinophil # : 0.10 K/uL  Auto Basophil # : 0.04 K/uL  Auto Neutrophil % : 58.9 %  Auto Lymphocyte % : 24.0 %  Auto Monocyte % : 15.2 %  Auto Eosinophil % : 1.3 %  Auto Basophil % : 0.5 %    07-19    141  |  100  |  20  ----------------------------<  118<H>  4.4   |  30  |  0.91    Ca    9.2      19 Jul 2024 12:50    TPro  7.1  /  Alb  3.6  /  TBili  0.3  /  DBili  x   /  AST  19  /  ALT  7<L>  /  AlkPhos  63  07-19    PT/INR - ( 19 Jul 2024 12:50 )   PT: 11.1 sec;   INR: 0.97          PTT - ( 19 Jul 2024 12:50 )  PTT:32.5 sec  Urinalysis Basic - ( 19 Jul 2024 12:50 )    Color: x / Appearance: x / SG: x / pH: x  Gluc: 118 mg/dL / Ketone: x  / Bili: x / Urobili: x   Blood: x / Protein: x / Nitrite: x   Leuk Esterase: x / RBC: x / WBC x   Sq Epi: x / Non Sq Epi: x / Bacteria: x    RADIOLOGY & ADDITIONAL STUDIES:  < from: CT Cervical Spine No Cont (07.17.24 @ 12:57) >  Again demonstrated is a type II odontoid fracture with similar degree of anterior displacement of the superior fracture fragment compared to MRI cervical spine 6/11/2024. This has progressed compared to CT cervical   spine dated 7/11/2023. There is mass effect on the cervical medullary junction with severe stenosis. This has progressed from prior CT cervical spine 7/11/2023 but appears similar to MRI cervical spine 6/11/2024.    The remainder the vertebral body heights are maintained. Again demonstrated is anterolisthesis of C7-T1 and T2-T3 which is unchanged from prior exam.    There is no prevertebral edema.    Additional multilevel cervical spondylosis with multilevel neural foraminal narrowing is again noted and not significantly changed from prior examination.    IMPRESSION:  CT head: No acute intracranial hemorrhage or calvarial fracture. 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  There is anterior displacement of the proximal fracture fragment which is similar appearance to prior MRI cervical spine 6/11/2024 but has   progressed compared to CT cervical spine 7/11/2023. 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 Correlate clinically.    No acute fracture.    Findings were discussed with KEM Bang by Dr. Otis Pelletier on     < end of copied text >         INTERVAL HISTORY: HPI:  89 year old female, PMH HTN, HLD, CHF, severe AS, and chronic C-2 fracture x 2 years, presented to [facility] ED with worsening weakness and difficulty ambulating since 7/14. Per home health aide, pt had a fall with +head strike on July 6th which she did not receive medical attention for. On 7/14, HHA noted pt was having more difficulty ambulating and was dropping objects and not able to feed herself as before. Pt was seen on 7/17 at [facility] and had a CT head and CT c-spine. 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 The provider at [facility] offered to pt, family, and pt's PCP, Dr. Doan who was at bedside, to call spine surgery at [facility] and all parties refused, preferring to take patient home. Pt was discharged home with her HHA. Since 7/17, pt's weakness has progressed, prompting them to come to the ED. She denies falls since 7/6. Pt came in with soft collar on, which was removed by HHA while in ED.  (19 Jul 2024 16:16)      MEDICATIONS  (STANDING):  ALPRAZolam 0.25 milliGRAM(s) Oral at bedtime  aspirin  chewable 81 milliGRAM(s) Oral daily  dexMEDEtomidine Infusion 0.2 MICROgram(s)/kG/Hr (3.29 mL/Hr) IV Continuous <Continuous>  DULoxetine 30 milliGRAM(s) Oral two times a day  enoxaparin Injectable 40 milliGRAM(s) SubCutaneous <User Schedule>  latanoprost 0.005% Ophthalmic Solution 1 Drop(s) Both EYES at bedtime  polyethylene glycol 3350 17 Gram(s) Oral daily    MEDICATIONS  (PRN):  acetaminophen     Tablet .. 650 milliGRAM(s) Oral every 6 hours PRN Mild Pain (1 - 3)  melatonin 9 milliGRAM(s) Oral at bedtime PRN for insomnia      Drug Dosing Weight  Height (cm): 162.6 (19 Jul 2024 17:53)  Weight (kg): 65.8 (19 Jul 2024 12:04)  BMI (kg/m2): 24.9 (19 Jul 2024 17:53)  BSA (m2): 1.71 (19 Jul 2024 17:53)    PAST MEDICAL & SURGICAL HISTORY:  Hyperlipidemia, unspecified hyperlipidemia type  Diabetes insipidus  H/O aortic valve stenosis  Hypertension  Vertigo  Chronic diastolic congestive heart failure  Dyslipidemia  History of pseudoaneurysm  Glaucoma  Closed C2 fracture  S/P AVR  Bioprosthetic  H/O lumbosacral spine surgery  S/P hip replacement  B/L  S/P right coronary artery (RCA) stent placement    REVIEW OF SYSTEMS: [ ] Unable to Assess due to neurologic exam   [ ] All ROS addressed below are non-contributory, except:  Neuro: [ ] Headache [ ] Back pain [ ] Numbness [ ] Weakness [ ] Ataxia [ ] Dizziness [ ] Aphasia [ ] Dysarthria [ ] Visual disturbance  Resp: [ ] Shortness of breath/dyspnea, [ ] Orthopnea [ ] Cough  CV: [ ] Chest pain [ ] Palpitation [ ] Lightheadedness [ ] Syncope  Renal: [ ] Thirst [ ] Edema  GI: [ ] Nausea [ ] Emesis [ ] Abdominal pain [ ] Constipation [ ] Diarrhea  Hem: [ ] Hematemesis [ ] bright red blood per rectum  ID: [ ] Fever [ ] Chills [ ] Dysuria  ENT: [ ] Rhinorrhea    PHYSICAL EXAM:    Constitutional: No Acute Distress     Neurological: AOx3, Following Commands, Moving all Extremities     Motor exam:          Upper extremity                         Delt     Bicep     Tricep    HG                                                 R         5/5        5/5        5/5       5/5                                               L          4+/5        5/5        4+/5       4+/5          Lower extremity                        HF         KF        KE       DF         PF                                                  R        5/5        5/5        5/5       5/5         5/5                                               L         5/5        5/5       5/5       5/5          5/5                                                 Sensation: [] intact to light touch  [] decreased:     Pulmonary: Clear to Auscultation, No rales, No rhonchi, No wheezes     Cardiovascular: S1, S2, Regular rate and rhythm     Gastrointestinal: Soft, Non-tender, Non-distended     Extremities: No calf tenderness       ICU Vital Signs Last 24 Hrs  T(C): 36.6 (19 Jul 2024 12:04), Max: 36.6 (19 Jul 2024 12:04)  T(F): 97.9 (19 Jul 2024 12:04), Max: 97.9 (19 Jul 2024 12:04)  HR: 73 (19 Jul 2024 17:00) (73 - 82)  BP: 168/81 (19 Jul 2024 17:00) (126/68 - 168/81)  BP(mean): 116 (19 Jul 2024 17:00) (113 - 116)  RR: 19 (19 Jul 2024 17:00) (18 - 20)  SpO2: 95% (19 Jul 2024 17:00) (93% - 95%)    O2 Parameters below as of 19 Jul 2024 17:00  Patient On (Oxygen Delivery Method): room air            I&O's Detail    19 Jul 2024 07:01  -  19 Jul 2024 17:56  --------------------------------------------------------  IN:    Oral Fluid: 240 mL  Total IN: 240 mL    OUT:  Total OUT: 0 mL    Total NET: 240 mL              LABS:  CBC Full  -  ( 19 Jul 2024 12:50 )  WBC Count : 7.41 K/uL  RBC Count : 3.81 M/uL  Hemoglobin : 11.8 g/dL  Hematocrit : 37.8 %  Platelet Count - Automated : 347 K/uL  Mean Cell Volume : 99.2 fl  Mean Cell Hemoglobin : 31.0 pg  Mean Cell Hemoglobin Concentration : 31.2 gm/dL  Auto Neutrophil # : 4.35 K/uL  Auto Lymphocyte # : 1.78 K/uL  Auto Monocyte # : 1.13 K/uL  Auto Eosinophil # : 0.10 K/uL  Auto Basophil # : 0.04 K/uL  Auto Neutrophil % : 58.9 %  Auto Lymphocyte % : 24.0 %  Auto Monocyte % : 15.2 %  Auto Eosinophil % : 1.3 %  Auto Basophil % : 0.5 %    07-19    141  |  100  |  20  ----------------------------<  118<H>  4.4   |  30  |  0.91    Ca    9.2      19 Jul 2024 12:50    TPro  7.1  /  Alb  3.6  /  TBili  0.3  /  DBili  x   /  AST  19  /  ALT  7<L>  /  AlkPhos  63  07-19    PT/INR - ( 19 Jul 2024 12:50 )   PT: 11.1 sec;   INR: 0.97          PTT - ( 19 Jul 2024 12:50 )  PTT:32.5 sec  Urinalysis Basic - ( 19 Jul 2024 12:50 )    Color: x / Appearance: x / SG: x / pH: x  Gluc: 118 mg/dL / Ketone: x  / Bili: x / Urobili: x   Blood: x / Protein: x / Nitrite: x   Leuk Esterase: x / RBC: x / WBC x   Sq Epi: x / Non Sq Epi: x / Bacteria: x    RADIOLOGY & ADDITIONAL STUDIES:  < from: CT Cervical Spine No Cont (07.17.24 @ 12:57) >  Again demonstrated is a type II odontoid fracture with similar degree of anterior displacement of the superior fracture fragment compared to MRI cervical spine 6/11/2024. This has progressed compared to CT cervical   spine dated 7/11/2023. There is mass effect on the cervical medullary junction with severe stenosis. This has progressed from prior CT cervical spine 7/11/2023 but appears similar to MRI cervical spine 6/11/2024.    The remainder the vertebral body heights are maintained. Again demonstrated is anterolisthesis of C7-T1 and T2-T3 which is unchanged from prior exam.    There is no prevertebral edema.    Additional multilevel cervical spondylosis with multilevel neural foraminal narrowing is again noted and not significantly changed from prior examination.    IMPRESSION:  CT head: No acute intracranial hemorrhage or calvarial fracture. 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  There is anterior displacement of the proximal fracture fragment which is similar appearance to prior MRI cervical spine 6/11/2024 but has   progressed compared to CT cervical spine 7/11/2023. 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 Correlate clinically.    No acute fracture.    Findings were discussed with KEM Bang by Dr. Otis Pelletier on     < end of copied text >

## 2024-07-19 NOTE — H&P ADULT - PROBLEM/PLAN-5
Detail Level: Simple
Additional Notes: Will monitor and will see back into months for evaluation of lesion. Will do Mohs first. If pigment remains, will excise.
Additional Notes: Refer to Dr Angel for plastics
DISPLAY PLAN FREE TEXT

## 2024-07-19 NOTE — H&P ADULT - NSHPREVIEWOFSYSTEMS_GEN_ALL_CORE
General: no recent illnesses, no recent wt gain/loss, no chills  Skin/Breast: no rash, lumps, new moles, erythema, tenderness  Ophthalmologic: no change in vision, diplopia, pain, redness, tearing, dry eyes	  ENMT: no hearing loss, tinnitus, ear pain, vertigo, nasal congestion, epistaxis, sore throat  Respiratory and Thorax: no coughing, wheezing, recent URI, shortness of breath	  Cardiovascular: no chest pain, RIOS, leg swelling, irregular rhythm   Gastrointestinal: no nausea, vomiting, diarrhea, abd pain, bloody stool, heartburn  Genitourinary: no frequency, dysuria, hematuria  Musculoskeletal: no joint pain, no joint swelling, no tenderness  Neurological: see HPI  Psychiatric: no confusion, no anxiousness, no depression   Hematology/Lymphatics: no bruising, easy bleeding, LAD  Endocrine: no excess urination/thirst, heat/cold intolerance  Allergic/Immunologic: no urticaria, sneezing, recurrent infections

## 2024-07-19 NOTE — H&P ADULT - NSICDXPASTMEDICALHX_GEN_ALL_CORE_FT
PAST MEDICAL HISTORY:  Chronic diastolic congestive heart failure     Closed C2 fracture     Diabetes insipidus     Dyslipidemia     Glaucoma     H/O aortic valve stenosis     History of pseudoaneurysm     Hyperlipidemia, unspecified hyperlipidemia type     Hypertension     Vertigo

## 2024-07-19 NOTE — ED PROVIDER NOTE - PHYSICAL EXAMINATION
GEN: Elderly,, well developed, awake, alert, oriented to person, place, time/situation and in no apparent distress. NTAF  ENT: Airway patent, Nasal mucosa clear. Mouth with normal mucosa.  EYES: Clear bilaterally. PERRL, EOMI  RESPIRATORY: Breathing comfortably with normal RR. No W/C/R, no hypoxia or resp distress.  CARDIAC: Regular rate and rhythm, no M/R/G  ABDOMEN: Soft, nontender, +bowel sounds, no rebound, rigidity, or guarding.  MSK: Soft c-collar in place. Range of motion is not limited, no deformities noted.  NEURO: Alert and oriented x 3. Cn 2-12 intact. Strength 5/5 and sensation intact in all 4 extremities - please refer to NS note for detailed exam.   SKIN: Skin normal color for race, warm, dry and intact. No evidence of rash.  PSYCH: Alert and oriented to person, place, time/situation. normal mood and affect. no apparent risk to self or others.

## 2024-07-19 NOTE — H&P ADULT - PROBLEM SELECTOR PLAN 1
- admitted to NSICU  - keep neck neutral/ spinal precautions  - tylenol for pain   - continuing home medications for anxiety  - plan for OR Wednesday with Dr. Puckett

## 2024-07-19 NOTE — H&P ADULT - NSHPLABSRESULTS_GEN_ALL_CORE
< from: CT Cervical Spine No Cont (07.17.24 @ 12:57) >      IMPRESSION:  CT head: No acute intracranial hemorrhage or calvarial fracture.    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   There is anterior displacement of the proximal fracture fragmentwhich is   similar appearance to prior MRI cervical spine 6/11/2024 but has   progressed compared to CT cervical spine 7/11/2023. 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 Correlate   clinically.    No acute fracture.    Findings were discussed with KEM Bang by Dr. Otis Zamora on   7/17/2024 1:34 PM    --- End of Report ---      OTIS ZAMORA MD; Attending Radiologist  This document has been electronically signed. Jul 17 2024  1:35PM    < from: MR Cervical Spine No Cont (06.11.24 @ 18:24) >      IMPRESSION:  1.  Nonhealing C2 fracture at the base of the dens with interval anterior   displacement of the superior fragment.  2.  Multilevel degenerative changes of the cervical spine as described   above.    --- End of Report ---      ***Please see the addendum at the top of this report. It may contain   additional important information or changes.****      LEANNA GONGORA DO; Attending Radiologist   This document has been electronically signed. Jun 14 2024  4:56PM  1st Addendum: LEANNA GONGORA DO; Attending Radiologist  The first addendum was electronically signed on: Jul 17 2024  2:21PM.    < end of copied text >        < end of copied text >

## 2024-07-19 NOTE — PROGRESS NOTE ADULT - SUBJECTIVE AND OBJECTIVE BOX
NSCU ATTENDING -- ADDITIONAL PROGRESS NOTE    Nighttime rounds were performed       HPI: 89 year old female, PMH HTN, HLD, CHF, severe AS, and chronic C-2 fracture x 2 years, presented to [facility] ED with worsening weakness and difficulty ambulating since 7/14. Per home health aide, pt had a fall with +head strike on July 6th which she did not receive medical attention for. On 7/14, HHA noted pt was having more difficulty ambulating and was dropping objects and not able to feed herself as before. Pt was seen on 7/17 at [facility] and had a CT head and CT c-spine. 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 The provider at [facility] offered to pt, family, and pt's PCP, Dr. Doan who was at bedside, to call spine surgery at [facility] and all parties refused, preferring to take patient home. Pt was discharged home with her HHA. Since 7/17, pt's weakness has progressed, prompting them to come to the ED. She denies falls since 7/6. Pt came in with soft collar on, which was removed by HHA while in ED.  (19 Jul 2024 16:16)      ICU Vital Signs Last 24 Hrs  T(C): 36.3 (19 Jul 2024 17:58), Max: 36.6 (19 Jul 2024 12:04)  T(F): 97.3 (19 Jul 2024 17:58), Max: 97.9 (19 Jul 2024 12:04)  HR: 72 (19 Jul 2024 19:19) (72 - 82)  BP: 97/53 (19 Jul 2024 19:19) (97/53 - 174/88)  BP(mean): 97 (19 Jul 2024 18:15) (97 - 123)  RR: 18 (19 Jul 2024 19:19) (17 - 20)  SpO2: 92% (19 Jul 2024 19:19) (92% - 95%)      07-19-24 @ 07:01  -  07-19-24 @ 19:53  --------------------------------------------------------  IN: 249.9 mL / OUT: 0 mL / NET: 249.9 mL      PHYSICAL EXAM:  Constitutional: No Acute Distress   Neurological: AOx3, Following Commands, Moving all Extremities   Motor exam:          Upper extremity                         Delt     Bicep     Tricep    HG                                                 R        5/5        5/5        5/5       5/5                                               L         4+/5       5/5      4+/5     4+/5          Lower extremity                        HF         KF        KE       DF         PF                                                  R        5/5        5/5        5/5       5/5         5/5                                               L         5/5        5/5       5/5       5/5          5/5                                                 Sensation: [] intact to light touch  [] decreased:   Pulmonary: Clear to Auscultation, No rales, No rhonchi, No wheezes   Cardiovascular: S1, S2, Regular rate and rhythm   Gastrointestinal: Soft, Non-tender, Non-distended   Extremities: No calf tenderness         MEDICATIONS:   acetaminophen     Tablet .. 650 milliGRAM(s) Oral every 6 hours PRN  acetaminophen   IVPB .. 1000 milliGRAM(s) IV Intermittent once  ALPRAZolam 0.25 milliGRAM(s) Oral at bedtime  aspirin  chewable 81 milliGRAM(s) Oral daily  dexMEDEtomidine Infusion 0.2 MICROgram(s)/kG/Hr (3.29 mL/Hr) IV Continuous <Continuous>  DULoxetine 30 milliGRAM(s) Oral two times a day  enoxaparin Injectable 40 milliGRAM(s) SubCutaneous <User Schedule>  latanoprost 0.005% Ophthalmic Solution 1 Drop(s) Both EYES at bedtime  melatonin 9 milliGRAM(s) Oral at bedtime PRN  polyethylene glycol 3350 17 Gram(s) Oral daily    LABS:                     11.8   7.41  )-----------( 347      ( 19 Jul 2024 12:50 )             37.8     07-19    141  |  100  |  20  ----------------------------<  118<H>  4.4   |  30  |  0.91    Ca    9.2      19 Jul 2024 12:50    TPro  7.1  /  Alb  3.6  /  TBili  0.3  /  DBili  x   /  AST  19  /  ALT  7<L>  /  AlkPhos  63  07-19    LIVER FUNCTIONS - ( 19 Jul 2024 12:50 )  Alb: 3.6 g/dL / Pro: 7.1 g/dL / ALK PHOS: 63 U/L / ALT: 7 U/L / AST: 19 U/L / GGT: x             ASSESSMENT:   89F with chronic C2 fracture, LUE weakness found to have worsening cervical stenosis  HTN, HLD, anxiety, CHF, severe AS    PLAN:   NEURO:  Neurochecks Q1  Agitation: On precedex RASS goal 0 to -1   [place] J  On room air  Incentive spirometry   CXR  SBP goal 100-160  EKG: pending   TTE pending  Regular diet   Na goal 135-145  A1c:  DVT ppx: SQL. B/L SCDs  Monitor for fevers     Additional 45 minutes of critical care time.

## 2024-07-19 NOTE — ED ADULT NURSE NOTE - NSFALLRISKINTERV_ED_ALL_ED

## 2024-07-19 NOTE — ED PROVIDER NOTE - CLINICAL SUMMARY MEDICAL DECISION MAKING FREE TEXT BOX
89F with PMHx of HTN, HLD, CHF, severe AS, chronic C2 fracture for the past 2yrs (was being managed non-surgically), recent visit to [facility] for worsening LUE weakness x 1 week after a fall 2 weeks ago, had CT head neg for ICH and CT cervical spine that showed progression of chronic C2 fracture with severe cervical spinal stenosis, pt was refusing surgery at that time but returns today after being referred to ER by Dr. Puckett. Since 7/17, pt's weakness has progressed, prompting them to come to the ED. She denies falls since 7/6. Pt came in with soft collar on, which was removed by HHA while in ED.   VSS, exam as noted, preop labs sent and Neurosurgery was consult. They recommend admit NS ICU for further mgmt and likely OR on Wed with Dr. Puckett. Daughter informed by NS team.

## 2024-07-20 LAB
ANION GAP SERPL CALC-SCNC: 10 MMOL/L — SIGNIFICANT CHANGE UP (ref 5–17)
BUN SERPL-MCNC: 22 MG/DL — SIGNIFICANT CHANGE UP (ref 7–23)
CALCIUM SERPL-MCNC: 8.9 MG/DL — SIGNIFICANT CHANGE UP (ref 8.4–10.5)
CHLORIDE SERPL-SCNC: 98 MMOL/L — SIGNIFICANT CHANGE UP (ref 96–108)
CO2 SERPL-SCNC: 29 MMOL/L — SIGNIFICANT CHANGE UP (ref 22–31)
CREAT SERPL-MCNC: 1.07 MG/DL — SIGNIFICANT CHANGE UP (ref 0.5–1.3)
EGFR: 48 ML/MIN/1.73M2 — LOW
GLUCOSE SERPL-MCNC: 99 MG/DL — SIGNIFICANT CHANGE UP (ref 70–99)
HCT VFR BLD CALC: 35.4 % — SIGNIFICANT CHANGE UP (ref 34.5–45)
HGB BLD-MCNC: 11.2 G/DL — LOW (ref 11.5–15.5)
MAGNESIUM SERPL-MCNC: 2 MG/DL — SIGNIFICANT CHANGE UP (ref 1.6–2.6)
MCHC RBC-ENTMCNC: 30.7 PG — SIGNIFICANT CHANGE UP (ref 27–34)
MCHC RBC-ENTMCNC: 31.6 GM/DL — LOW (ref 32–36)
MCV RBC AUTO: 97 FL — SIGNIFICANT CHANGE UP (ref 80–100)
NRBC # BLD: 0 /100 WBCS — SIGNIFICANT CHANGE UP (ref 0–0)
PHOSPHATE SERPL-MCNC: 4.1 MG/DL — SIGNIFICANT CHANGE UP (ref 2.5–4.5)
PLATELET # BLD AUTO: 306 K/UL — SIGNIFICANT CHANGE UP (ref 150–400)
POTASSIUM SERPL-MCNC: 4.5 MMOL/L — SIGNIFICANT CHANGE UP (ref 3.5–5.3)
POTASSIUM SERPL-SCNC: 4.5 MMOL/L — SIGNIFICANT CHANGE UP (ref 3.5–5.3)
RBC # BLD: 3.65 M/UL — LOW (ref 3.8–5.2)
RBC # FLD: 14.6 % — HIGH (ref 10.3–14.5)
SODIUM SERPL-SCNC: 137 MMOL/L — SIGNIFICANT CHANGE UP (ref 135–145)
WBC # BLD: 7.24 K/UL — SIGNIFICANT CHANGE UP (ref 3.8–10.5)
WBC # FLD AUTO: 7.24 K/UL — SIGNIFICANT CHANGE UP (ref 3.8–10.5)

## 2024-07-20 PROCEDURE — 99291 CRITICAL CARE FIRST HOUR: CPT

## 2024-07-20 PROCEDURE — 71045 X-RAY EXAM CHEST 1 VIEW: CPT | Mod: 26

## 2024-07-20 PROCEDURE — 99292 CRITICAL CARE ADDL 30 MIN: CPT

## 2024-07-20 RX ORDER — SENNOSIDES 8.6 MG/1
2 TABLET ORAL AT BEDTIME
Refills: 0 | Status: DISCONTINUED | OUTPATIENT
Start: 2024-07-20 | End: 2024-07-21

## 2024-07-20 RX ADMIN — Medication 1000 MILLIGRAM(S): at 17:15

## 2024-07-20 RX ADMIN — Medication 400 MILLIGRAM(S): at 17:00

## 2024-07-20 RX ADMIN — DEXMEDETOMIDINE HYDROCHLORIDE 3.29 MICROGRAM(S)/KG/HR: 4 INJECTION, SOLUTION INTRAVENOUS at 17:32

## 2024-07-20 RX ADMIN — Medication 30 MILLIGRAM(S): at 05:44

## 2024-07-20 RX ADMIN — Medication 650 MILLIGRAM(S): at 14:00

## 2024-07-20 RX ADMIN — Medication 1 DROP(S): at 21:51

## 2024-07-20 RX ADMIN — Medication 650 MILLIGRAM(S): at 13:30

## 2024-07-20 RX ADMIN — ENOXAPARIN SODIUM 40 MILLIGRAM(S): 120 INJECTION SUBCUTANEOUS at 21:32

## 2024-07-20 RX ADMIN — Medication 0.25 MILLIGRAM(S): at 21:32

## 2024-07-20 RX ADMIN — Medication 30 MILLIGRAM(S): at 17:32

## 2024-07-20 RX ADMIN — Medication 81 MILLIGRAM(S): at 12:43

## 2024-07-20 NOTE — PROGRESS NOTE ADULT - SUBJECTIVE AND OBJECTIVE BOX
HPI:  89 year old female, PMH HTN, HLD, CHF, severe AS, and chronic C-2 fracture x 2 years, presented to [facility] ED with worsening weakness and difficulty ambulating since 7/14. Per home health aide, pt had a fall with +head strike on July 6th which she did not receive medical attention for. On 7/14, HHA noted pt was having more difficulty ambulating and was dropping objects and not able to feed herself as before. Pt was seen on 7/17 at [facility] and had a CT head and CT c-spine. CT c-spine showed "a type II odontoid fracture with similar degree of anterior displacement of the superior fracture fragment that has progressed, there is mass effect on the cervical medullary junction with severe stenosis". The provider at [facility] offered to pt, family, and pt's PCP, Dr. Doan who was at bedside, to call spine surgery at [facility] and all parties refused, preferring to take patient home. Pt was discharged home with her HHA. Since 7/17, pt's weakness has progressed, prompting them to come to the ED. She denies falls since 7/6. Pt came in with soft collar on, which was removed by HHA while in ED. (19 Jul 2024 16:16)    OVERNIGHT EVENTS: [place], remains on precedex    Hospital Course:  7/19: admitted to [facility] for surgery with Dr. Puckett, given 15mg torodol for pain, 0.25 dilaudid for pain, 0.125 xanax for anxiety, 10 hydral for high BP.  7/20: [place], remains on precedex.    Vital Signs Last 24 Hrs  T(C): 36.4 (19 Jul 2024 22:30), Max: 36.6 (19 Jul 2024 12:04)  T(F): 97.5 (19 Jul 2024 22:30), Max: 97.9 (19 Jul 2024 12:04)  HR: 76 (19 Jul 2024 23:00) (66 - 82)  BP: 106/67 (19 Jul 2024 23:00) (96/52 - 174/88)  BP(mean): 82 (19 Jul 2024 23:00) (68 - 123)  RR: 16 (19 Jul 2024 23:00) (16 - 20)  SpO2: 92% (19 Jul 2024 23:00) (92% - 95%)    Parameters below as of 19 Jul 2024 23:00  Patient On (Oxygen Delivery Method): room air        I&O's Summary    19 Jul 2024 07:01 - 19 Jul 2024 23:22  --------------------------------------------------------  IN: 254.8 mL / OUT: 0 mL / NET: 254.8 mL        PHYSICAL EXAM:  General: patient seen laying supine in bed in NAD  Neuro: Sleepy but arousable to light stimulation, awake but Ox0, moving all extremities spontaneously and at least anti-gravity   HEENT: PERRL  Neck: supple  Cardiac: RRR, S1S2  Pulmonary: chest rise symmetric  Abdomen: soft, nontender, nondistended  Ext: perfusing well  Skin: warm, dry        LABS:    11.8  7.41 )-----------( 347 ( 19 Jul 2024 12:50 )  37.8    07-19    141 | 100 | 20  ----------------------------< 118<H>  4.4 | 30 | 0.91    Ca 9.2 19 Jul 2024 12:50    TPro 7.1 / Alb 3.6 / TBili 0.3 / DBili x / AST 19 / ALT 7<L> / AlkPhos 63 07-19    PT/INR - ( 19 Jul 2024 12:50 ) PT: 11.1 sec; INR: 0.97      PTT - ( 19 Jul 2024 12:50 ) PTT:32.5 sec  Urinalysis Basic - ( 19 Jul 2024 12:50 )    Color: x / Appearance: x / SG: x / pH: x  Gluc: 118 mg/dL / Ketone: x / Bili: x / Urobili: x  Blood: x / Protein: x / Nitrite: x  Leuk Esterase: x / RBC: x / WBC x  Sq Epi: x / Non Sq Epi: x / Bacteria: x          CAPILLARY BLOOD GLUCOSE          Drug Levels: [] N/A    CSF Analysis: [] N/A      Allergies    penicillin (Unknown)  erythromycin (Unknown)  [This allergen will not trigger allergy alert] Acetic Ip-Szbqiaxtqr-Kbtrdjliu (Other)    Intolerances      MEDICATIONS:  Antibiotics:    Neuro:  acetaminophen Tablet .. 650 milliGRAM(s) Oral every 6 hours PRN  acetaminophen IVPB .. 1000 milliGRAM(s) IV Intermittent once  ALPRAZolam 0.25 milliGRAM(s) Oral at bedtime  dexMEDEtomidine Infusion 0.2 MICROgram(s)/kG/Hr IV Continuous <Continuous>  DULoxetine 30 milliGRAM(s) Oral two times a day  melatonin 9 milliGRAM(s) Oral at bedtime PRN    Anticoagulation:  aspirin chewable 81 milliGRAM(s) Oral daily  enoxaparin Injectable 40 milliGRAM(s) SubCutaneous <User Schedule>    OTHER:  latanoprost 0.005% Ophthalmic Solution 1 Drop(s) Both EYES at bedtime  polyethylene glycol 3350 17 Gram(s) Oral daily    IVF:    CULTURES:    RADIOLOGY & ADDITIONAL TESTS:      ASSESSMENT:  89 year old female, PMH HTN, HLD, CHF, AS, chronic C2 fracture, presenting to [facility] ED with 1 week of worsening weakness and difficulty ambulating at home. 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 Pt has been seen outpatient by Dr. Butler and Dr. Puckett. Pt's family called outpatient office and was advised to come to ED for admission for surgery.    C2 CERVICAL FRACTURE;NECK PAIN    Allergy status to other antibiotic agents    Allergy status to penicillin    Aneurysm of unspecified site    Anterior displaced type ii dens fracture, initial encounter for closed fracture    Atherosclerotic heart disease of native coronary artery without angina pectoris    Bilateral primary osteoarthritis of knee    CAP GLAUC PSX LENS LT EYE MOD STAGE    CAPSLR GLAUCOMA W/PSEUDXF LENS, BILATERAL, MODERATE STAGE    Cervicalgia    Chronic diastolic (congestive) heart failure    Dizziness and giddiness    Hyperlipidemia, unspecified    Hypertensive heart disease with heart failure    Iliotibial band syndrome, right leg    Long term (current) use of aspirin    Nonrheumatic aortic (valve) stenosis    Old myocardial infarction    Other specific arthropathies, not elsewhere classified, left shoulder    Pain in right leg    Pain in unspecified shoulder    Personal history of other diseases of the circulatory system    Personal history of other endocrine, nutritional and metabolic disease    Presence of other heart-valve replacement    Shortness of breath    Unspecified glaucoma    Vertigo of central origin    History of tobacco use    Sex Assigned At Birth    Sex Assigned At Birth    Alcohol intake    FH: lung cancer (Father)    Handoff    MEWS Score    Prior    Essential hypertension    Hyperlipidemia, unspecified hyperlipidemia type    Diabetes insipidus    H/O aortic valve stenosis    Hypertension    Vertigo    Chronic diastolic congestive heart failure    Dyslipidemia    History of pseudoaneurysm    Glaucoma    Closed C2 fracture    C2 cervical fracture    Anterior displaced type ii dens fracture, sequela    H/O aortic valve stenosis    Hyperlipidemia, unspecified hyperlipidemia type    Hypertension    Glaucoma    History of orthopedic surgery    S/P AVR    H/O lumbosacral spine surgery    S/P hip replacement, right    S/P hip replacement    S/P right coronary artery (RCA) stent placement    NECK PAIN    2    Room Service Assist    Neck pain    [email or web address]        PLAN:  Neuro:  - neuro/vitals q1h  - pain control  - preop OR 7/24  - spinal precautions, collar ordered  - anxiety: precedex gtt, home xanax 0.25mg qhs, cymbalta 30mg    Cards:  - normotensive sbp goal  - hx CHF- echo pending  - cont home ASA 81mg    Pulm:  - RA    GI:  - regular diet  - bowel regimen    Renal:  - IVL    Heme:  - h/h stable  - dvt ppx: SCDs/SQL    ID:  - afebrile    Endo:  - a1c = 5.3    Dispo: ICU, full code, dispo pending    D/w Dr Puckett, Dr Sheriff    Assessment: Present when checked

## 2024-07-20 NOTE — PROGRESS NOTE ADULT - ASSESSMENT
Assessment: 89F hx HTN, HLD, anxiety, CHF, severe AS, chronic c2 fracture sent by outpatient provider for LUE weakness found to have worsening cervical stenosis and weakness pending OR Wednesday       NEURO:  anterior displacement of the proximal fracture fragment which is similar appearance to prior MRI cervical spine 6/11/2024 but has   progressed compared to CT cervical spine 7/11/2023.  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  -neuro check q2  -pain management w/ Tylenol & oxycodone  -agitation: patient at time not cooperating with requests such as laying flat which may lead to worsening of cervical spine symptoms & complete paralysis started on Precedex RASS goal 0 to -1   -patient adamantly refusing to use cervical collar consistently ; explained risk of worsening neurological function    -bed rest     PULMONARY:  saturating well on RA,   -continue to monitor on pulse o2   -incentive spirometry 10q/hr when awake   -obtain cxr for pre-operative assessment     CARDIOVASCULAR:  ?hx aortic stenosis   monitor on telemetry   vitals q2  sbp goal 100-160  EKG: pending   TTE ordered and pending  cardiology consult for optimization prior to surgery     GASTROENTEROLOGY:  regular diet   ensure BMs w/ Miralax & senna  GI ppx : Protonix 40mg qd  Daily stool count, LBM prior to arrival    RENAL/:  -check BMP qd  -strict i/o's  -Na goal 135-145    ENDOCRINE:  A1c- 5.3%    HEME/ONC:  DVT ppx: SQL   b/l SCDs      INFECTIOUS:   Monitor for fevers

## 2024-07-20 NOTE — PROGRESS NOTE ADULT - SUBJECTIVE AND OBJECTIVE BOX
INTERVAL HISTORY: HPI:  89 year old female, PMH HTN, HLD, CHF, severe AS, and chronic C-2 fracture x 2 years, presented to [facility] ED with worsening weakness and difficulty ambulating since 7/14. Per home health aide, pt had a fall with +head strike on July 6th which she did not receive medical attention for. On 7/14, HHA noted pt was having more difficulty ambulating and was dropping objects and not able to feed herself as before. Pt was seen on 7/17 at [facility] and had a CT head and CT c-spine. 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 The provider at [facility] offered to pt, family, and pt's PCP, Dr. Doan who was at bedside, to call spine surgery at [facility] and all parties refused, preferring to take patient home. Pt was discharged home with her HHA. Since 7/17, pt's weakness has progressed, prompting them to come to the ED. She denies falls since 7/6. Pt came in with soft collar on, which was removed by HHA while in ED.  (19 Jul 2024 16:16)      MEDICATIONS  (STANDING):  ALPRAZolam 0.25 milliGRAM(s) Oral at bedtime  aspirin  chewable 81 milliGRAM(s) Oral daily  dexMEDEtomidine Infusion 0.2 MICROgram(s)/kG/Hr (3.29 mL/Hr) IV Continuous <Continuous>  DULoxetine 30 milliGRAM(s) Oral two times a day  enoxaparin Injectable 40 milliGRAM(s) SubCutaneous <User Schedule>  latanoprost 0.005% Ophthalmic Solution 1 Drop(s) Both EYES at bedtime  polyethylene glycol 3350 17 Gram(s) Oral daily    MEDICATIONS  (PRN):  acetaminophen     Tablet .. 650 milliGRAM(s) Oral every 6 hours PRN Mild Pain (1 - 3)  melatonin 9 milliGRAM(s) Oral at bedtime PRN for insomnia      Drug Dosing Weight  Height (cm): 162.6 (19 Jul 2024 17:53)  Weight (kg): 65.8 (19 Jul 2024 12:04)  BMI (kg/m2): 24.9 (19 Jul 2024 17:53)  BSA (m2): 1.71 (19 Jul 2024 17:53)    PAST MEDICAL & SURGICAL HISTORY:  Hyperlipidemia, unspecified hyperlipidemia type  Diabetes insipidus  H/O aortic valve stenosis  Hypertension  Vertigo  Chronic diastolic congestive heart failure  Dyslipidemia  History of pseudoaneurysm  Glaucoma  Closed C2 fracture  S/P AVR  Bioprosthetic  H/O lumbosacral spine surgery  S/P hip replacement  B/L  S/P right coronary artery (RCA) stent placement    REVIEW OF SYSTEMS: [ ] Unable to Assess due to neurologic exam   [ ] All ROS addressed below are non-contributory, except:  Neuro: [ ] Headache [ ] Back pain [ ] Numbness [ ] Weakness [ ] Ataxia [ ] Dizziness [ ] Aphasia [ ] Dysarthria [ ] Visual disturbance  Resp: [ ] Shortness of breath/dyspnea, [ ] Orthopnea [ ] Cough  CV: [ ] Chest pain [ ] Palpitation [ ] Lightheadedness [ ] Syncope  Renal: [ ] Thirst [ ] Edema  GI: [ ] Nausea [ ] Emesis [ ] Abdominal pain [ ] Constipation [ ] Diarrhea  Hem: [ ] Hematemesis [ ] bright red blood per rectum  ID: [ ] Fever [ ] Chills [ ] Dysuria  ENT: [ ] Rhinorrhea    PHYSICAL EXAM:    Constitutional: No Acute Distress     Neurological: AOx3, Following Commands, Moving all Extremities     Motor exam:          Upper extremity                         Delt     Bicep     Tricep    HG                                                 R         5/5        5/5        5/5       5/5                                               L          4-/5       4/5        4/5      4/5          Lower extremity                        HF         KF        KE       DF         PF                                                  R        5/5        5/5        5/5       5/5         5/5                                               L         5/5        5/5       5/5       5/5          5/5                                                 Sensation: [] intact to light touch  [] decreased:     Pulmonary: Clear to Auscultation, No rales, No rhonchi, No wheezes     Cardiovascular: S1, S2, Regular rate and rhythm     Gastrointestinal: Soft, Non-tender, Non-distended     Extremities: No calf tenderness           RADIOLOGY & ADDITIONAL STUDIES:  < from: CT Cervical Spine No Cont (07.17.24 @ 12:57) >  Again demonstrated is a type II odontoid fracture with similar degree of anterior displacement of the superior fracture fragment compared to MRI cervical spine 6/11/2024. This has progressed compared to CT cervical   spine dated 7/11/2023. There is mass effect on the cervical medullary junction with severe stenosis. This has progressed from prior CT cervical spine 7/11/2023 but appears similar to MRI cervical spine 6/11/2024.    The remainder the vertebral body heights are maintained. Again demonstrated is anterolisthesis of C7-T1 and T2-T3 which is unchanged from prior exam.    There is no prevertebral edema.    Additional multilevel cervical spondylosis with multilevel neural foraminal narrowing is again noted and not significantly changed from prior examination.    IMPRESSION:  CT head: No acute intracranial hemorrhage or calvarial fracture. 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  There is anterior displacement of the proximal fracture fragment which is similar appearance to prior MRI cervical spine 6/11/2024 but has   progressed compared to CT cervical spine 7/11/2023. 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 Correlate clinically.    No acute fracture.    Findings were discussed with KEM Bang by Dr. Otis Pelletier on     < end of copied text >

## 2024-07-20 NOTE — PROGRESS NOTE ADULT - SUBJECTIVE AND OBJECTIVE BOX
NSCU ATTENDING -- ADDITIONAL PROGRESS NOTE    Nighttime rounds were performed       HPI: 89 year old female, PMH HTN, HLD, CHF, severe AS, and chronic C-2 fracture x 2 years, presented to [facility] ED with worsening weakness and difficulty ambulating since 7/14. Per home health aide, pt had a fall with +head strike on July 6th which she did not receive medical attention for. On 7/14, HHA noted pt was having more difficulty ambulating and was dropping objects and not able to feed herself as before. Pt was seen on 7/17 at [facility] and had a CT head and CT c-spine. 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 The provider at [facility] offered to pt, family, and pt's PCP, Dr. Doan who was at bedside, to call spine surgery at [facility] and all parties refused, preferring to take patient home. Pt was discharged home with her HHA. Since 7/17, pt's weakness has progressed, prompting them to come to the ED. She denies falls since 7/6. Pt came in with soft collar on, which was removed by HHA while in ED.  (19 Jul 2024 16:16)      ICU Vital Signs Last 24 Hrs  T(C): 36.7 (20 Jul 2024 17:55), Max: 36.7 (20 Jul 2024 14:18)  T(F): 98.1 (20 Jul 2024 17:55), Max: 98.1 (20 Jul 2024 17:55)  HR: 65 (20 Jul 2024 19:00) (63 - 76)  BP: 136/64 (20 Jul 2024 19:00) (96/52 - 165/77)  BP(mean): 92 (20 Jul 2024 19:00) (68 - 114)  RR: 16 (20 Jul 2024 19:00) (16 - 18)  SpO2: 95% (20 Jul 2024 19:00) (91% - 97%)      07-19-24 @ 07:01  -  07-20-24 @ 07:00  --------------------------------------------------------  IN: 281.2 mL / OUT: 0 mL / NET: 281.2 mL    07-20-24 @ 07:01  -  07-20-24 @ 19:46  --------------------------------------------------------  IN: 282.9 mL / OUT: 150 mL / NET: 132.9 mL      PHYSICAL EXAM:  Constitutional: No Acute Distress   Neurological: AOx3, Following Commands, Moving all Extremities   Motor exam:          Upper extremity                         Delt     Bicep     Tricep    HG                                                 R        5/5        5/5        5/5       5/5                                               L         4+/5       5/5      4+/5     4+/5          Lower extremity                        HF         KF        KE       DF         PF                                                  R        5/5        5/5        5/5       5/5         5/5                                               L         5/5        5/5       5/5       5/5          5/5                                                 Sensation: [] intact to light touch  [] decreased:   Pulmonary: Clear to Auscultation, No rales, No rhonchi, No wheezes   Cardiovascular: S1, S2, Regular rate and rhythm   Gastrointestinal: Soft, Non-tender, Non-distended   Extremities: No calf tenderness       MEDICATIONS:   acetaminophen     Tablet .. 650 milliGRAM(s) Oral every 6 hours PRN  ALPRAZolam 0.25 milliGRAM(s) Oral at bedtime  aspirin  chewable 81 milliGRAM(s) Oral daily  dexMEDEtomidine Infusion 0.2 MICROgram(s)/kG/Hr (3.29 mL/Hr) IV Continuous <Continuous>  DULoxetine 30 milliGRAM(s) Oral two times a day  enoxaparin Injectable 40 milliGRAM(s) SubCutaneous <User Schedule>  latanoprost 0.005% Ophthalmic Solution 1 Drop(s) Both EYES at bedtime  melatonin 9 milliGRAM(s) Oral at bedtime PRN  polyethylene glycol 3350 17 Gram(s) Oral daily      LABS:                      11.2   7.24  )-----------( 306      ( 20 Jul 2024 05:30 )             35.4     07-20    137  |  98  |  22  ----------------------------<  99  4.5   |  29  |  1.07    Ca    8.9      20 Jul 2024 05:30  Phos  4.1     07-20  Mg     2.0     07-20    TPro  7.1  /  Alb  3.6  /  TBili  0.3  /  DBili  x   /  AST  19  /  ALT  7<L>  /  AlkPhos  63  07-19    LIVER FUNCTIONS - ( 19 Jul 2024 12:50 )  Alb: 3.6 g/dL / Pro: 7.1 g/dL / ALK PHOS: 63 U/L / ALT: 7 U/L / AST: 19 U/L / GGT: x               ASSESSMENT:   89F with chronic C2 fracture, LUE weakness found to have worsening cervical stenosis  HTN, HLD, anxiety, CHF, severe AS    PLAN:   NEURO:  Neurochecks Q1  Agitation: On precedex RASS goal 0 to -1   Hamlin J  On room air  Incentive spirometry   CXR  SBP goal 100-160  EKG: pending   TTE pending  Regular diet   Na goal 135-145  DVT ppx: SQL. B/L SCDs  Monitor for fevers     Additional 45 minutes of critical care time.

## 2024-07-21 LAB
ANION GAP SERPL CALC-SCNC: 9 MMOL/L — SIGNIFICANT CHANGE UP (ref 5–17)
BUN SERPL-MCNC: 20 MG/DL — SIGNIFICANT CHANGE UP (ref 7–23)
CALCIUM SERPL-MCNC: 9.3 MG/DL — SIGNIFICANT CHANGE UP (ref 8.4–10.5)
CHLORIDE SERPL-SCNC: 98 MMOL/L — SIGNIFICANT CHANGE UP (ref 96–108)
CO2 SERPL-SCNC: 30 MMOL/L — SIGNIFICANT CHANGE UP (ref 22–31)
CREAT SERPL-MCNC: 1 MG/DL — SIGNIFICANT CHANGE UP (ref 0.5–1.3)
EGFR: 52 ML/MIN/1.73M2 — LOW
GLUCOSE SERPL-MCNC: 87 MG/DL — SIGNIFICANT CHANGE UP (ref 70–99)
HCT VFR BLD CALC: 36 % — SIGNIFICANT CHANGE UP (ref 34.5–45)
HGB BLD-MCNC: 11.6 G/DL — SIGNIFICANT CHANGE UP (ref 11.5–15.5)
MAGNESIUM SERPL-MCNC: 2 MG/DL — SIGNIFICANT CHANGE UP (ref 1.6–2.6)
MCHC RBC-ENTMCNC: 31 PG — SIGNIFICANT CHANGE UP (ref 27–34)
MCHC RBC-ENTMCNC: 32.2 GM/DL — SIGNIFICANT CHANGE UP (ref 32–36)
MCV RBC AUTO: 96.3 FL — SIGNIFICANT CHANGE UP (ref 80–100)
NRBC # BLD: 0 /100 WBCS — SIGNIFICANT CHANGE UP (ref 0–0)
PHOSPHATE SERPL-MCNC: 3.4 MG/DL — SIGNIFICANT CHANGE UP (ref 2.5–4.5)
PLATELET # BLD AUTO: 342 K/UL — SIGNIFICANT CHANGE UP (ref 150–400)
POTASSIUM SERPL-MCNC: 4.4 MMOL/L — SIGNIFICANT CHANGE UP (ref 3.5–5.3)
POTASSIUM SERPL-SCNC: 4.4 MMOL/L — SIGNIFICANT CHANGE UP (ref 3.5–5.3)
RBC # BLD: 3.74 M/UL — LOW (ref 3.8–5.2)
RBC # FLD: 14.5 % — SIGNIFICANT CHANGE UP (ref 10.3–14.5)
SODIUM SERPL-SCNC: 137 MMOL/L — SIGNIFICANT CHANGE UP (ref 135–145)
WBC # BLD: 6.08 K/UL — SIGNIFICANT CHANGE UP (ref 3.8–10.5)
WBC # FLD AUTO: 6.08 K/UL — SIGNIFICANT CHANGE UP (ref 3.8–10.5)

## 2024-07-21 PROCEDURE — 99221 1ST HOSP IP/OBS SF/LOW 40: CPT

## 2024-07-21 PROCEDURE — 99292 CRITICAL CARE ADDL 30 MIN: CPT

## 2024-07-21 PROCEDURE — 99232 SBSQ HOSP IP/OBS MODERATE 35: CPT

## 2024-07-21 RX ORDER — TRAMADOL HCL 50 MG
25 TABLET ORAL EVERY 6 HOURS
Refills: 0 | Status: DISCONTINUED | OUTPATIENT
Start: 2024-07-21 | End: 2024-07-21

## 2024-07-21 RX ORDER — METHOCARBAMOL 500 MG
500 TABLET ORAL ONCE
Refills: 0 | Status: COMPLETED | OUTPATIENT
Start: 2024-07-21 | End: 2024-07-21

## 2024-07-21 RX ADMIN — Medication 650 MILLIGRAM(S): at 16:00

## 2024-07-21 RX ADMIN — Medication 650 MILLIGRAM(S): at 03:14

## 2024-07-21 RX ADMIN — Medication 650 MILLIGRAM(S): at 22:30

## 2024-07-21 RX ADMIN — Medication 81 MILLIGRAM(S): at 15:19

## 2024-07-21 RX ADMIN — Medication 0.25 MILLIGRAM(S): at 21:35

## 2024-07-21 RX ADMIN — DEXMEDETOMIDINE HYDROCHLORIDE 3.29 MICROGRAM(S)/KG/HR: 4 INJECTION, SOLUTION INTRAVENOUS at 18:18

## 2024-07-21 RX ADMIN — Medication 650 MILLIGRAM(S): at 15:19

## 2024-07-21 RX ADMIN — Medication 30 MILLIGRAM(S): at 06:06

## 2024-07-21 RX ADMIN — Medication 500 MILLIGRAM(S): at 07:55

## 2024-07-21 RX ADMIN — Medication 650 MILLIGRAM(S): at 21:34

## 2024-07-21 RX ADMIN — ENOXAPARIN SODIUM 40 MILLIGRAM(S): 120 INJECTION SUBCUTANEOUS at 21:35

## 2024-07-21 RX ADMIN — Medication 1 DROP(S): at 21:35

## 2024-07-21 RX ADMIN — Medication 30 MILLIGRAM(S): at 19:36

## 2024-07-21 RX ADMIN — Medication 650 MILLIGRAM(S): at 04:16

## 2024-07-21 NOTE — CONSULT NOTE ADULT - SUBJECTIVE AND OBJECTIVE BOX
HPI:  89 year old female, PMH HTN, HLD, CHF, severe AS, and chronic C-2 fracture x 2 years, presented to [facility] ED with worsening weakness and difficulty ambulating since 7/14. Per home health aide, pt had a fall with +head strike on July 6th which she did not receive medical attention for. On 7/14, HHA noted pt was having more difficulty ambulating and was dropping objects and not able to feed herself as before. Pt was seen on 7/17 at [facility] and had a CT head and CT c-spine. 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 The provider at [facility] offered to pt, family, and pt's PCP, Dr. Doan who was at bedside, to call spine surgery at [facility] and all parties refused, preferring to take patient home. Pt was discharged home with her HHA. Since 7/17, pt's weakness has progressed, prompting them to come to the ED. She denies falls since 7/6. Pt came in with soft collar on, which was removed by HHA while in ED.  (19 Jul 2024 16:16)      ROS: A 10-point review of systems was otherwise negative.    PAST MEDICAL & SURGICAL HISTORY:  Hyperlipidemia, unspecified hyperlipidemia type      Diabetes insipidus  H/O aortic valve stenosis  Hypertension  Vertigo  Chronic diastolic congestive heart failure  Dyslipidemi  History of pseudoaneurysm  Glaucoma  Closed C2 fracture  S/P AVR  Bioprosthetic  H/O lumbosacral spine surgery  S/P ip replacement  B/L  S/P right coronary artery (RCA) stent placement      SOCIAL HISTORY:  FAMILY HISTORY:  FH: lung cancer (Father)    ALLERGIES: 	  penicillin (Unknown)  erythromycin (Unknown)  [This allergen will not trigger allergy alert] Acetic Xd-Kdcusyqgbb-Wontaodqe (Other)      MEDICATIONS:  acetaminophen     Tablet .. 650 milliGRAM(s) Oral every 6 hours PRN  ALPRAZolam 0.25 milliGRAM(s) Oral at bedtime  aspirin  chewable 81 milliGRAM(s) Oral daily  dexMEDEtomidine Infusion 0.2 MICROgram(s)/kG/Hr IV Continuous <Continuous>  DULoxetine 30 milliGRAM(s) Oral two times a day  enoxaparin Injectable 40 milliGRAM(s) SubCutaneous <User Schedule>  latanoprost 0.005% Ophthalmic Solution 1 Drop(s) Both EYES at bedtime  melatonin 9 milliGRAM(s) Oral at bedtime PRN  polyethylene glycol 3350 17 Gram(s) Oral daily  senna 2 Tablet(s) Oral at bedtime      HOME MEDICATIONS:  ALPRAZolam 0.25 mg oral tablet: 1 tab(s) orally once a day (at bedtime)  brimonidine 0.1% ophthalmic solution: 1 drop(s) to each affected eye 2 times a day  cholecalciferol 125 mcg (5000 intl units) oral tablet: 2 tab(s) orally 2 times a day  Cymbalta 30 mg oral delayed release capsule: 1 cap(s) orally 2 times a day  latanoprost 0.005% ophthalmic solution: 1 drop(s) to each affected eye once a day (at bedtime)  melatonin 3 mg oral tablet: 3 tab(s) orally once a day (at bedtime) as needed for  insomnia      PHYSICAL EXAM:    I/O Summary 24H    IN: 281.2 mL / OUT: 0 mL / NET: 281.2 mL    T(F): 97.5 (07-21-24 @ 05:02), Max: 98.1 (07-20-24 @ 17:55)  HR: 66 (07-21-24 @ 05:00) (62 - 76)  BP: 157/72 (07-21-24 @ 05:00) (124/59 - 165/77)  BP(mean): 104 (07-21-24 @ 05:00) (85 - 114)  ABP: --  ABP(mean): --  RR: 15 (07-21-24 @ 05:00) (15 - 18)  SpO2: 94% (07-21-24 @ 05:00) (91% - 97%)  CVP(mm Hg): --    GEN: Awake, comfortable. NAD.   HEENT: NCAT, PERRL, EOMI. Mucosa moist. No JVD.   RESP: CTA b/l  CV: RRR, normal s1/s2. No m/r/g.  ABD: Soft, NTND. BS+  EXT: Warm. No edema, clubbing, or cyanosis.   NEURO: AAOx3. No focal deficits.    	  LABS:	 	    Cardiac Markers       CBC 07-21-24 @ 05:30                        11.6   6.08  )-----------( 342                   36.0     Hgb trend: 11.6 <-- , 11.2 <-- , 11.8 <--   WBC trend: 6.08 <-- , 7.24 <-- , 7.41 <--     CMP 07-21-24 @ 05:30    137  |  98  |  20  ----------------------------<  87  4.4   |  30  |  1.00    Ca    9.3      07-21-24 @ 05:30  Phos  3.4     07-21  Mg     2.0     07-21    TPro  7.1  /  Alb  3.6  /  TBili  0.3  /  DBili  x   /  AST  19  /  ALT  7<L>  /  AlkPhos  63     07-19    Serum Cr (eGFR) trend: 1.00 (52) <-- , 1.07 (48) <-- , 0.91 (58) <--     proBNP:   Lipid Profile:   HgA1c:   TSH:     TELEMETRY: 	    ECG:  	  RADIOLOGY:   ECHO:  STRESS:  CATH: HPI:  89 year old female, PMH HTN, HLD, CHF, AS, and chronic C-2 fracture x 2 years, presented to [facility] ED with worsening weakness and difficulty ambulating since 7/14. She is pending spinal surgery and cardiology was consulted for pre-operative evaluation and optimization prior to surgery on 7/24 given her history of aortic stenosis.       ROS: A 10-point review of systems was otherwise negative.    PAST MEDICAL & SURGICAL HISTORY:  Hyperlipidemia, unspecified hyperlipidemia type  Diabetes insipidus  H/O aortic valve stenosis  Hypertension  Vertigo  Chronic diastolic congestive heart failure  Dyslipidemi  History of pseudoaneurysm  Glaucoma  Closed C2 fracture  S/P AVR  Bioprosthetic  H/O lumbosacral spine surgery  S/P ip replacement  B/L  S/P right coronary artery (RCA) stent placement      SOCIAL HISTORY:  FAMILY HISTORY:  FH: lung cancer (Father)    ALLERGIES: 	  penicillin (Unknown)  erythromycin (Unknown)  [This allergen will not trigger allergy alert] Acetic Rg-Miolknvbnv-Svwpnapnp (Other)      MEDICATIONS:  acetaminophen     Tablet .. 650 milliGRAM(s) Oral every 6 hours PRN  ALPRAZolam 0.25 milliGRAM(s) Oral at bedtime  aspirin  chewable 81 milliGRAM(s) Oral daily  dexMEDEtomidine Infusion 0.2 MICROgram(s)/kG/Hr IV Continuous <Continuous>  DULoxetine 30 milliGRAM(s) Oral two times a day  enoxaparin Injectable 40 milliGRAM(s) SubCutaneous <User Schedule>  latanoprost 0.005% Ophthalmic Solution 1 Drop(s) Both EYES at bedtime  melatonin 9 milliGRAM(s) Oral at bedtime PRN  polyethylene glycol 3350 17 Gram(s) Oral daily  senna 2 Tablet(s) Oral at bedtime      HOME MEDICATIONS:  ALPRAZolam 0.25 mg oral tablet: 1 tab(s) orally once a day (at bedtime)  brimonidine 0.1% ophthalmic solution: 1 drop(s) to each affected eye 2 times a day  cholecalciferol 125 mcg (5000 intl units) oral tablet: 2 tab(s) orally 2 times a day  Cymbalta 30 mg oral delayed release capsule: 1 cap(s) orally 2 times a day  latanoprost 0.005% ophthalmic solution: 1 drop(s) to each affected eye once a day (at bedtime)  melatonin 3 mg oral tablet: 3 tab(s) orally once a day (at bedtime) as needed for  insomnia      PHYSICAL EXAM:    I/O Summary 24H    IN: 281.2 mL / OUT: 0 mL / NET: 281.2 mL    T(F): 97.5 (07-21-24 @ 05:02), Max: 98.1 (07-20-24 @ 17:55)  HR: 66 (07-21-24 @ 05:00) (62 - 76)  BP: 157/72 (07-21-24 @ 05:00) (124/59 - 165/77)  BP(mean): 104 (07-21-24 @ 05:00) (85 - 114)  ABP: --  ABP(mean): --  RR: 15 (07-21-24 @ 05:00) (15 - 18)  SpO2: 94% (07-21-24 @ 05:00) (91% - 97%)  CVP(mm Hg): --    GEN: Awake, comfortable. NAD.   HEENT: NCAT, PERRL, EOMI. Mucosa moist. No JVD.   RESP: CTA b/l  CV: RRR, normal s1/s2. No m/r/g.  ABD: Soft, NTND. BS+  EXT: Warm. No edema, clubbing, or cyanosis.   NEURO: AAOx3. No focal deficits.    	  LABS:	 	    Cardiac Markers       CBC 07-21-24 @ 05:30                        11.6   6.08  )-----------( 342                   36.0     Hgb trend: 11.6 <-- , 11.2 <-- , 11.8 <--   WBC trend: 6.08 <-- , 7.24 <-- , 7.41 <--     CMP 07-21-24 @ 05:30    137  |  98  |  20  ----------------------------<  87  4.4   |  30  |  1.00    Ca    9.3      07-21-24 @ 05:30  Phos  3.4     07-21  Mg     2.0     07-21    TPro  7.1  /  Alb  3.6  /  TBili  0.3  /  DBili  x   /  AST  19  /  ALT  7<L>  /  AlkPhos  63     07-19    Serum Cr (eGFR) trend: 1.00 (52) <-- , 1.07 (48) <-- , 0.91 (58) <--     proBNP:   Lipid Profile:   HgA1c:   TSH:     TELEMETRY: 	    ECG:  	  RADIOLOGY:   ECHO:     12/2022: CONCLUSIONS:   1. Normal left and right ventricular size and systolic function.   2. Moderate aortic stenosis.   3. Moderate mitral regurgitation.   4. Pulmonary artery systolic pressure is 35 mmHg.  STRESS:  CATH: HPI:  89 year old female, PMH HTN, HLD, CHF, AS, and chronic C-2 fracture x 2 years, presented to [facility] ED with worsening weakness and difficulty ambulating since 7/14. She is pending spinal surgery and cardiology was consulted for pre-operative evaluation and optimization prior to surgery on 7/24 given her history of aortic stenosis.       ROS: A 10-point review of systems was otherwise negative.    PAST MEDICAL & SURGICAL HISTORY:  Hyperlipidemia, unspecified hyperlipidemia type  Diabetes insipidus  H/O aortic valve stenosis  Hypertension  Vertigo  Chronic diastolic congestive heart failure  Dyslipidemi  History of pseudoaneurysm  Glaucoma  Closed C2 fracture  S/P AVR  Bioprosthetic  H/O lumbosacral spine surgery  S/P ip replacement  B/L  S/P right coronary artery (RCA) stent placement      SOCIAL HISTORY:  FAMILY HISTORY:  FH: lung cancer (Father)    ALLERGIES: 	  penicillin (Unknown)  erythromycin (Unknown)  [This allergen will not trigger allergy alert] Acetic Hj-Uyfvzzqffz-Bozlscmru (Other)      MEDICATIONS:  acetaminophen     Tablet .. 650 milliGRAM(s) Oral every 6 hours PRN  ALPRAZolam 0.25 milliGRAM(s) Oral at bedtime  aspirin  chewable 81 milliGRAM(s) Oral daily  dexMEDEtomidine Infusion 0.2 MICROgram(s)/kG/Hr IV Continuous <Continuous>  DULoxetine 30 milliGRAM(s) Oral two times a day  enoxaparin Injectable 40 milliGRAM(s) SubCutaneous <User Schedule>  latanoprost 0.005% Ophthalmic Solution 1 Drop(s) Both EYES at bedtime  melatonin 9 milliGRAM(s) Oral at bedtime PRN  polyethylene glycol 3350 17 Gram(s) Oral daily  senna 2 Tablet(s) Oral at bedtime      HOME MEDICATIONS:  ALPRAZolam 0.25 mg oral tablet: 1 tab(s) orally once a day (at bedtime)  brimonidine 0.1% ophthalmic solution: 1 drop(s) to each affected eye 2 times a day  cholecalciferol 125 mcg (5000 intl units) oral tablet: 2 tab(s) orally 2 times a day  Cymbalta 30 mg oral delayed release capsule: 1 cap(s) orally 2 times a day  latanoprost 0.005% ophthalmic solution: 1 drop(s) to each affected eye once a day (at bedtime)  melatonin 3 mg oral tablet: 3 tab(s) orally once a day (at bedtime) as needed for  insomnia      PHYSICAL EXAM:    I/O Summary 24H    IN: 281.2 mL / OUT: 0 mL / NET: 281.2 mL    T(F): 97.5 (07-21-24 @ 05:02), Max: 98.1 (07-20-24 @ 17:55)  HR: 66 (07-21-24 @ 05:00) (62 - 76)  BP: 157/72 (07-21-24 @ 05:00) (124/59 - 165/77)  BP(mean): 104 (07-21-24 @ 05:00) (85 - 114)  ABP: --  ABP(mean): --  RR: 15 (07-21-24 @ 05:00) (15 - 18)  SpO2: 94% (07-21-24 @ 05:00) (91% - 97%)  CVP(mm Hg): --    GEN: Awake, comfortable. NAD.   HEENT: NCAT, PERRL, EOMI. Mucosa moist. No JVD. Hard of hearing  RESP: CTA b/l  CV: RRR, normal s1/s2. +AS murmur at R/LUSB with radiation to carotids  ABD: Soft, NTND. BS+  EXT: Warm. No edema, clubbing, or cyanosis.   NEURO: AAOx3. No focal deficits.    	  LABS:	 	    Cardiac Markers       CBC 07-21-24 @ 05:30                        11.6   6.08  )-----------( 342                   36.0     Hgb trend: 11.6 <-- , 11.2 <-- , 11.8 <--   WBC trend: 6.08 <-- , 7.24 <-- , 7.41 <--     CMP 07-21-24 @ 05:30    137  |  98  |  20  ----------------------------<  87  4.4   |  30  |  1.00    Ca    9.3      07-21-24 @ 05:30  Phos  3.4     07-21  Mg     2.0     07-21    TPro  7.1  /  Alb  3.6  /  TBili  0.3  /  DBili  x   /  AST  19  /  ALT  7<L>  /  AlkPhos  63     07-19    Serum Cr (eGFR) trend: 1.00 (52) <-- , 1.07 (48) <-- , 0.91 (58) <--        HPI:  89 year old female, PMH HTN, HLD, CHF, AS, and chronic C-2 fracture x 2 years, presented to [facility] ED with worsening weakness and difficulty ambulating since 7/14. She is pending spinal surgery and cardiology was consulted for pre-operative evaluation and optimization prior to surgery on 7/24 given her history of aortic stenosis.     Pt is able to walk with walker at baseline, can walk up and down her hallway without any CP or SOB (per nurse at bedside).    ROS: A 10-point review of systems was otherwise negative.    PAST MEDICAL & SURGICAL HISTORY:  Hyperlipidemia, unspecified hyperlipidemia type  Diabetes insipidus  H/O aortic valve stenosis  Hypertension  Vertigo  Chronic diastolic congestive heart failure  Dyslipidemi  History of pseudoaneurysm  Glaucoma  Closed C2 fracture  S/P AVR  Bioprosthetic  H/O lumbosacral spine surgery  S/P ip replacement  B/L  S/P right coronary artery (RCA) stent placement      SOCIAL HISTORY:  FAMILY HISTORY:  FH: lung cancer (Father)    ALLERGIES: 	  penicillin (Unknown)  erythromycin (Unknown)  [This allergen will not trigger allergy alert] Acetic Fk-Xugyjijdam-Fsrffzqjm (Other)      MEDICATIONS:  acetaminophen     Tablet .. 650 milliGRAM(s) Oral every 6 hours PRN  ALPRAZolam 0.25 milliGRAM(s) Oral at bedtime  aspirin  chewable 81 milliGRAM(s) Oral daily  dexMEDEtomidine Infusion 0.2 MICROgram(s)/kG/Hr IV Continuous <Continuous>  DULoxetine 30 milliGRAM(s) Oral two times a day  enoxaparin Injectable 40 milliGRAM(s) SubCutaneous <User Schedule>  latanoprost 0.005% Ophthalmic Solution 1 Drop(s) Both EYES at bedtime  melatonin 9 milliGRAM(s) Oral at bedtime PRN  polyethylene glycol 3350 17 Gram(s) Oral daily  senna 2 Tablet(s) Oral at bedtime      HOME MEDICATIONS:  ALPRAZolam 0.25 mg oral tablet: 1 tab(s) orally once a day (at bedtime)  brimonidine 0.1% ophthalmic solution: 1 drop(s) to each affected eye 2 times a day  cholecalciferol 125 mcg (5000 intl units) oral tablet: 2 tab(s) orally 2 times a day  Cymbalta 30 mg oral delayed release capsule: 1 cap(s) orally 2 times a day  latanoprost 0.005% ophthalmic solution: 1 drop(s) to each affected eye once a day (at bedtime)  melatonin 3 mg oral tablet: 3 tab(s) orally once a day (at bedtime) as needed for  insomnia      PHYSICAL EXAM:    I/O Summary 24H    IN: 281.2 mL / OUT: 0 mL / NET: 281.2 mL    T(F): 97.5 (07-21-24 @ 05:02), Max: 98.1 (07-20-24 @ 17:55)  HR: 66 (07-21-24 @ 05:00) (62 - 76)  BP: 157/72 (07-21-24 @ 05:00) (124/59 - 165/77)  BP(mean): 104 (07-21-24 @ 05:00) (85 - 114)  ABP: --  ABP(mean): --  RR: 15 (07-21-24 @ 05:00) (15 - 18)  SpO2: 94% (07-21-24 @ 05:00) (91% - 97%)  CVP(mm Hg): --    GEN: Awake, comfortable. NAD.   HEENT: NCAT, PERRL, EOMI. Mucosa moist. No JVD. Hard of hearing  RESP: CTA b/l  CV: RRR, normal s1/s2. +AS murmur at R/LUSB with radiation to carotids  ABD: Soft, NTND. BS+  EXT: Warm. No edema, clubbing, or cyanosis.   NEURO: AAOx3. No focal deficits.    	  LABS:	 	    Cardiac Markers       CBC 07-21-24 @ 05:30                        11.6   6.08  )-----------( 342                   36.0     Hgb trend: 11.6 <-- , 11.2 <-- , 11.8 <--   WBC trend: 6.08 <-- , 7.24 <-- , 7.41 <--     CMP 07-21-24 @ 05:30    137  |  98  |  20  ----------------------------<  87  4.4   |  30  |  1.00    Ca    9.3      07-21-24 @ 05:30  Phos  3.4     07-21  Mg     2.0     07-21    TPro  7.1  /  Alb  3.6  /  TBili  0.3  /  DBili  x   /  AST  19  /  ALT  7<L>  /  AlkPhos  63     07-19    Serum Cr (eGFR) trend: 1.00 (52) <-- , 1.07 (48) <-- , 0.91 (58) <--

## 2024-07-21 NOTE — PROGRESS NOTE ADULT - SUBJECTIVE AND OBJECTIVE BOX
NSCU ATTENDING -- ADDITIONAL PROGRESS NOTE    Nighttime rounds were performed       HPI: 89 year old female, PMH HTN, HLD, CHF, severe AS, and chronic C-2 fracture x 2 years, presented to [facility] ED with worsening weakness and difficulty ambulating since 7/14. Per home health aide, pt had a fall with +head strike on July 6th which she did not receive medical attention for. On 7/14, HHA noted pt was having more difficulty ambulating and was dropping objects and not able to feed herself as before. Pt was seen on 7/17 at [facility] and had a CT head and CT c-spine. 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 The provider at [facility] offered to pt, family, and pt's PCP, Dr. Doan who was at bedside, to call spine surgery at [facility] and all parties refused, preferring to take patient home. Pt was discharged home with her HHA. Since 7/17, pt's weakness has progressed, prompting them to come to the ED. She denies falls since 7/6. Pt came in with soft collar on, which was removed by HHA while in ED.  (19 Jul 2024 16:16)      ICU Vital Signs Last 24 Hrs  T(C): 36.7 (21 Jul 2024 18:10), Max: 36.8 (21 Jul 2024 14:49)  T(F): 98.1 (21 Jul 2024 18:10), Max: 98.2 (21 Jul 2024 14:49)  HR: 67 (21 Jul 2024 20:00) (61 - 77)  BP: 129/58 (21 Jul 2024 20:00) (112/56 - 163/76)  BP(mean): 84 (21 Jul 2024 20:00) (81 - 110)  RR: 18 (21 Jul 2024 20:00) (15 - 18)  SpO2: 94% (21 Jul 2024 20:00) (92% - 96%)      07-20-24 @ 07:01  -  07-21-24 @ 07:00  --------------------------------------------------------  IN: 319.2 mL / OUT: 1500 mL / NET: -1180.8 mL    07-21-24 @ 07:01  -  07-21-24 @ 20:23  --------------------------------------------------------  IN: 58.9 mL / OUT: 800 mL / NET: -741.1 mL        PHYSICAL EXAM:  Constitutional: No Acute Distress   Neurological: AOx3, Following Commands, Moving all Extremities   Motor exam:          Upper extremity                         Delt     Bicep     Tricep    HG                                                 R        5/5        5/5        5/5       5/5                                               L         4+/5       5/5      4+/5     4+/5          Lower extremity                        HF         KF        KE       DF         PF                                                  R        5/5        5/5        5/5       5/5         5/5                                               L         5/5        5/5       5/5       5/5          5/5                                                 Sensation: [] intact to light touch  [] decreased:   Pulmonary: Clear to Auscultation, No rales, No rhonchi, No wheezes   Cardiovascular: S1, S2, Regular rate and rhythm   Gastrointestinal: Soft, Non-tender, Non-distended   Extremities: No calf tenderness         MEDICATIONS:   acetaminophen     Tablet .. 650 milliGRAM(s) Oral every 6 hours PRN  ALPRAZolam 0.25 milliGRAM(s) Oral at bedtime  aspirin  chewable 81 milliGRAM(s) Oral daily  dexMEDEtomidine Infusion 0.2 MICROgram(s)/kG/Hr (3.29 mL/Hr) IV Continuous <Continuous>  DULoxetine 30 milliGRAM(s) Oral two times a day  enoxaparin Injectable 40 milliGRAM(s) SubCutaneous <User Schedule>  latanoprost 0.005% Ophthalmic Solution 1 Drop(s) Both EYES at bedtime  melatonin 9 milliGRAM(s) Oral at bedtime PRN  polyethylene glycol 3350 17 Gram(s) Oral daily  senna 2 Tablet(s) Oral at bedtime      LABS:                    11.6   6.08  )-----------( 342      ( 21 Jul 2024 05:30 )             36.0     07-21    137  |  98  |  20  ----------------------------<  87  4.4   |  30  |  1.00    Ca    9.3      21 Jul 2024 05:30  Phos  3.4     07-21  Mg     2.0     07-21        ASSESSMENT:   89F with chronic C2 fracture, LUE weakness found to have worsening cervical stenosis  HTN, HLD, anxiety, CHF, severe AS    PLAN:   NEURO:  Neurochecks Q1  Agitation: On precedex RASS goal 0 to -1   Continue duloxetine and alprazolam  [employer] [place] J required  On room air. Incentive spirometry   CXR clear  SBP goal 100-160  TTE pending. Cardiology following for optimization prior to surgery  Regular diet   LBM:   Na goal 135-145  DVT ppx: SQL. B/L SCDs  Monitor for fevers     Additional 45 minutes of critical care time.         NSCU ATTENDING -- ADDITIONAL PROGRESS NOTE    Nighttime rounds were performed       HPI: 89 year old female, PMH HTN, HLD, CHF, severe AS, and chronic C-2 fracture x 2 years, presented to [facility] ED with worsening weakness and difficulty ambulating since 7/14. Per home health aide, pt had a fall with +head strike on July 6th which she did not receive medical attention for. On 7/14, HHA noted pt was having more difficulty ambulating and was dropping objects and not able to feed herself as before. Pt was seen on 7/17 at [facility] and had a CT head and CT c-spine. 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 The provider at [facility] offered to pt, family, and pt's PCP, Dr. Doan who was at bedside, to call spine surgery at [facility] and all parties refused, preferring to take patient home. Pt was discharged home with her HHA. Since 7/17, pt's weakness has progressed, prompting them to come to the ED. She denies falls since 7/6. Pt came in with soft collar on, which was removed by HHA while in ED.  (19 Jul 2024 16:16)      ICU Vital Signs Last 24 Hrs  T(C): 36.7 (21 Jul 2024 18:10), Max: 36.8 (21 Jul 2024 14:49)  T(F): 98.1 (21 Jul 2024 18:10), Max: 98.2 (21 Jul 2024 14:49)  HR: 67 (21 Jul 2024 20:00) (61 - 77)  BP: 129/58 (21 Jul 2024 20:00) (112/56 - 163/76)  BP(mean): 84 (21 Jul 2024 20:00) (81 - 110)  RR: 18 (21 Jul 2024 20:00) (15 - 18)  SpO2: 94% (21 Jul 2024 20:00) (92% - 96%)      07-20-24 @ 07:01  -  07-21-24 @ 07:00  --------------------------------------------------------  IN: 319.2 mL / OUT: 1500 mL / NET: -1180.8 mL    07-21-24 @ 07:01  -  07-21-24 @ 20:23  --------------------------------------------------------  IN: 58.9 mL / OUT: 800 mL / NET: -741.1 mL        PHYSICAL EXAM:  Constitutional: No Acute Distress   Neurological: AOx3, Following Commands, Moving all Extremities   Motor exam:          Upper extremity                         Delt     Bicep     Tricep    HG                                                 R        5/5        5/5        5/5       5/5                                               L         4+/5       5/5      4+/5     4+/5          Lower extremity                        HF         KF        KE       DF         PF                                                  R        5/5        5/5        5/5       5/5         5/5                                               L         5/5        5/5       5/5       5/5          5/5                                                 Sensation: [] intact to light touch  [] decreased:   Pulmonary: Clear to Auscultation, No rales, No rhonchi, No wheezes   Cardiovascular: S1, S2, Regular rate and rhythm   Gastrointestinal: Soft, Non-tender, Non-distended   Extremities: No calf tenderness         MEDICATIONS:   acetaminophen     Tablet .. 650 milliGRAM(s) Oral every 6 hours PRN  ALPRAZolam 0.25 milliGRAM(s) Oral at bedtime  aspirin  chewable 81 milliGRAM(s) Oral daily  dexMEDEtomidine Infusion 0.2 MICROgram(s)/kG/Hr (3.29 mL/Hr) IV Continuous <Continuous>  DULoxetine 30 milliGRAM(s) Oral two times a day  enoxaparin Injectable 40 milliGRAM(s) SubCutaneous <User Schedule>  latanoprost 0.005% Ophthalmic Solution 1 Drop(s) Both EYES at bedtime  melatonin 9 milliGRAM(s) Oral at bedtime PRN  polyethylene glycol 3350 17 Gram(s) Oral daily  senna 2 Tablet(s) Oral at bedtime      LABS:                    11.6   6.08  )-----------( 342      ( 21 Jul 2024 05:30 )             36.0     07-21    137  |  98  |  20  ----------------------------<  87  4.4   |  30  |  1.00    Ca    9.3      21 Jul 2024 05:30  Phos  3.4     07-21  Mg     2.0     07-21        ASSESSMENT:   89F with chronic C2 fracture, LUE weakness found to have worsening cervical stenosis  HTN, HLD, anxiety, CHF, severe AS    PLAN:   NEURO:  Neurochecks Q4  Agitation: On precedex RASS goal 0 to -1   Continue duloxetine and alprazolam  [place] J required  On room air. Incentive spirometry   CXR clear  SBP goal 100-160  TTE pending. Cardiology following for optimization prior to surgery  Regular diet   LBM: 7/21 liquid stool. Metamucil  Na goal 135-145  DVT ppx: SQL. B/L SCDs  Monitor for fevers     Additional 45 minutes of critical care time.

## 2024-07-21 NOTE — PROGRESS NOTE ADULT - ASSESSMENT
Assessment: 89F hx HTN, HLD, anxiety, CHF, severe AS, chronic c2 fracture sent by outpatient provider for LUE weakness found to have worsening cervical stenosis and weakness pending OR Wednesday       NEURO:  anterior displacement of the proximal fracture fragment which is similar appearance to prior MRI cervical spine 6/11/2024 but has   progressed compared to CT cervical spine 7/11/2023.  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  -neuro check q2  -pain management w/ Tylenol & oxycodone  -agitation: patient at time not cooperating with requests such as laying flat which may lead to worsening of cervical spine symptoms & complete paralysis started on Precedex RASS goal 0 to -1   -patient adamantly refusing to use cervical collar consistently ; explained risk of worsening neurological function    -bed rest     PULMONARY:  saturating well on RA,   -continue to monitor on pulse o2   -incentive spirometry 10q/hr when awake       CARDIOVASCULAR:  c/w aspirin 81mg   ?hx aortic stenosis   monitor on telemetry   vitals q2  sbp goal 100-160  TTE ordered and pending for pre-operative status   cardiology consult for optimization prior to surgery     GASTROENTEROLOGY:  regular diet   ensure BMs w/ Miralax & senna  GI ppx : Protonix 40mg qd  Daily stool count, LBM 7/21    RENAL/:  -check BMP qd  -strict i/o's  -Na goal 135-145    ENDOCRINE:  A1c- 5.3%    HEME/ONC:  DVT ppx: SQL   b/l SCDs      INFECTIOUS:   Monitor for fevers

## 2024-07-21 NOTE — PROGRESS NOTE ADULT - SUBJECTIVE AND OBJECTIVE BOX
HPI:  89 year old female, PMH HTN, HLD, CHF, severe AS, and chronic C-2 fracture x 2 years, presented to [facility] ED with worsening weakness and difficulty ambulating since 7/14. Per home health aide, pt had a fall with +head strike on July 6th which she did not receive medical attention for. On 7/14, HHA noted pt was having more difficulty ambulating and was dropping objects and not able to feed herself as before. Pt was seen on 7/17 at [facility] and had a CT head and CT c-spine. CT c-spine showed "a type II odontoid fracture with similar degree of anterior displacement of the superior fracture fragment that has progressed, there is mass effect on the cervical medullary junction with severe stenosis". The provider at [facility] offered to pt, family, and pt's PCP, Dr. Doan who was at bedside, to call spine surgery at [facility] and all parties refused, preferring to take patient home. Pt was discharged home with her HHA. Since 7/17, pt's weakness has progressed, prompting them to come to the ED. She denies falls since 7/6. Pt came in with soft collar on, which was removed by HHA while in ED. (19 Jul 2024 16:16)    OVERNIGHT EVENTS: [place], neuro stable on precedex    Hospital Course:  7/19: admitted to [facility] for surgery with Dr. Puckett, given 15mg torodol for pain, 0.25 dilaudid for pain, 0.125 xanax for anxiety, 10 hydral for high BP.  7/20: [place], remains on precedex. Cardiology consulted for preop clearance.  7/21: FEDERICA ovn.    Vital Signs Last 24 Hrs  T(C): 36.7 (20 Jul 2024 17:55), Max: 36.7 (20 Jul 2024 14:18)  T(F): 98.1 (20 Jul 2024 17:55), Max: 98.1 (20 Jul 2024 17:55)  HR: 64 (20 Jul 2024 21:00) (63 - 76)  BP: 136/75 (20 Jul 2024 21:00) (106/67 - 165/77)  BP(mean): 99 (20 Jul 2024 21:00) (78 - 114)  RR: 16 (20 Jul 2024 21:00) (16 - 18)  SpO2: 95% (20 Jul 2024 21:00) (91% - 97%)    Parameters below as of 20 Jul 2024 21:00  Patient On (Oxygen Delivery Method): room air        I&O's Summary    19 Jul 2024 07:01 - 20 Jul 2024 07:00  --------------------------------------------------------  IN: 281.2 mL / OUT: 0 mL / NET: 281.2 mL    20 Jul 2024 07:01 - 20 Jul 2024 22:37  --------------------------------------------------------  IN: 292.8 mL / OUT: 150 mL / NET: 142.8 mL        PHYSICAL EXAM:  General: patient seen laying supine in bed in NAD on precedex  Neuro: Sleepy but arousable to light stimulation, AOx2 (self and place), LUE delt 3/5 otherwise LUE 4/5, RUE and b/l LE 5/5  HEENT: PERRL  Neck: supple  Cardiac: RRR, S1S2  Pulmonary: chest rise symmetric  Abdomen: soft, nontender, nondistended  Ext: perfusing well  Skin: warm, dry      LABS:    11.2  7.24 )-----------( 306 ( 20 Jul 2024 05:30 )  35.4    07-20    137 | 98 | 22  ----------------------------< 99  4.5 | 29 | 1.07    Ca 8.9 20 Jul 2024 05:30  Phos 4.1 07-20  Mg 2.0 07-20    TPro 7.1 / Alb 3.6 / TBili 0.3 / DBili x / AST 19 / ALT 7<L> / AlkPhos 63 07-19    PT/INR - ( 19 Jul 2024 12:50 ) PT: 11.1 sec; INR: 0.97      PTT - ( 19 Jul 2024 12:50 ) PTT:32.5 sec  Urinalysis Basic - ( 20 Jul 2024 05:30 )    Color: x / Appearance: x / SG: x / pH: x  Gluc: 99 mg/dL / Ketone: x / Bili: x / Urobili: x  Blood: x / Protein: x / Nitrite: x  Leuk Esterase: x / RBC: x / WBC x  Sq Epi: x / Non Sq Epi: x / Bacteria: x          CAPILLARY BLOOD GLUCOSE          Drug Levels: [] N/A    CSF Analysis: [] N/A      Allergies    penicillin (Unknown)  erythromycin (Unknown)  [This allergen will not trigger allergy alert] Acetic [id number] (Other)    Intolerances      MEDICATIONS:  Antibiotics:    Neuro:  acetaminophen Tablet .. 650 milliGRAM(s) Oral every 6 hours PRN  ALPRAZolam 0.25 milliGRAM(s) Oral at bedtime  dexMEDEtomidine Infusion 0.2 MICROgram(s)/kG/Hr IV Continuous <Continuous>  DULoxetine 30 milliGRAM(s) Oral two times a day  melatonin 9 milliGRAM(s) Oral at bedtime PRN    Anticoagulation:  aspirin chewable 81 milliGRAM(s) Oral daily  enoxaparin Injectable 40 milliGRAM(s) SubCutaneous <User Schedule>    OTHER:  latanoprost 0.005% Ophthalmic Solution 1 Drop(s) Both EYES at bedtime  polyethylene glycol 3350 17 Gram(s) Oral daily  senna 2 Tablet(s) Oral at bedtime      ASSESSMENT:  89 year old female, PMH HTN, HLD, CHF, AS, chronic C2 fracture, presenting to [facility] ED with 1 week of worsening weakness and difficulty ambulating at home. Pt was seen at [facility] ED on 7/17, and had CT c-spine which showed progression of anteriorly displaced proximal fracture C2 and mass effect with severe stenosis of the cervical medullary junction. Pt has been seen outpatient by Dr. Butler and Dr. Puckett. Pt's family called outpatient office and was advised to come to ED for admission for surgery.    C2 CERVICAL FRACTURE;NECK PAIN    Allergy status to other antibiotic agents    Allergy status to penicillin    Aneurysm of unspecified site    Anterior displaced type ii dens fracture, initial encounter for closed fracture    Atherosclerotic heart disease of native coronary artery without angina pectoris    Bilateral primary osteoarthritis of knee    CAP GLAUC PSX LENS LT EYE MOD STAGE    CAPSLR GLAUCOMA W/PSEUDXF LENS, BILATERAL, MODERATE STAGE    Cervicalgia    Chronic diastolic (congestive) heart failure    Dizziness and giddiness    Hyperlipidemia, unspecified    Hypertensive heart disease with heart failure    Iliotibial band syndrome, right leg    Long term (current) use of aspirin    Nonrheumatic aortic (valve) stenosis    Old myocardial infarction    Other specific arthropathies, not elsewhere classified, left shoulder    Pain in right leg    Pain in unspecified shoulder    Personal history of other diseases of the circulatory system    Personal history of other endocrine, nutritional and metabolic disease    Presence of other heart-valve replacement    Shortness of breath    Unspecified glaucoma    Vertigo of central origin    History of tobacco use    Sex Assigned At Birth    Sex Assigned At Birth    Alcohol intake    FH: lung cancer (Father)    Handoff    MEWS Score    Prior    Essential hypertension    Hyperlipidemia, unspecified hyperlipidemia type    Diabetes insipidus    H/O aortic valve stenosis    Hypertension    Vertigo    Chronic diastolic congestive heart failure    Dyslipidemia    History of pseudoaneurysm    Glaucoma    Closed C2 fracture    C2 cervical fracture    Anterior displaced type ii dens fracture, sequela    H/O aortic valve stenosis    Hyperlipidemia, unspecified hyperlipidemia type    Hypertension    Glaucoma    History of orthopedic surgery    S/P AVR    H/O lumbosacral spine surgery    S/P hip replacement, right    S/P hip replacement    S/P right coronary artery (RCA) stent placement    NECK PAIN    2    Room Service Assist    Room Service Assist    Neck pain    [email or web address]        PLAN:  Neuro:  - neuro/vitals q2h  - pain control  - preop OR 7/24  - spinal precautions, collar ordered  - anxiety: precedex gtt, home xanax 0.25mg qhs, cymbalta 30mg    Cards:  - normotensive sbp goal  - hx CHF- echo pending  - cont home ASA 81mg (keep for surgery)  - cardiology consulted for preop clearance    Pulm:  - RA    GI:  - regular diet  - bowel regimen    Renal:  - IVL    Heme:  - h/h stable  - dvt ppx: SCDs/SQL    ID:  - afebrile    Endo:  - a1c = 5.3    Dispo: ICU, full code, dispo pending    D/w Dr Puckett, Dr Sheriff  Assessment: Present when checked

## 2024-07-21 NOTE — PROGRESS NOTE ADULT - SUBJECTIVE AND OBJECTIVE BOX
INTERVAL HISTORY: HPI:  89 year old female, PMH HTN, HLD, CHF, severe AS, and chronic C-2 fracture x 2 years, presented to [facility] ED with worsening weakness and difficulty ambulating since 7/14. Per home health aide, pt had a fall with +head strike on July 6th which she did not receive medical attention for. On 7/14, HHA noted pt was having more difficulty ambulating and was dropping objects and not able to feed herself as before. Pt was seen on 7/17 at [facility] and had a CT head and CT c-spine. 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 The provider at [facility] offered to pt, family, and pt's PCP, Dr. Doan who was at bedside, to call spine surgery at [facility] and all parties refused, preferring to take patient home. Pt was discharged home with her HHA. Since 7/17, pt's weakness has progressed, prompting them to come to the ED. She denies falls since 7/6. Pt came in with soft collar on, which was removed by HHA while in ED.  (19 Jul 2024 16:16)    Drug Dosing Weight  Height (cm): 162.6 (19 Jul 2024 17:53)  Weight (kg): 65.8 (19 Jul 2024 12:04)  BMI (kg/m2): 24.9 (19 Jul 2024 17:53)  BSA (m2): 1.71 (19 Jul 2024 17:53)    PAST MEDICAL & SURGICAL HISTORY:  Hyperlipidemia, unspecified hyperlipidemia type  Diabetes insipidus  H/O aortic valve stenosis  Hypertension  Vertigo  Chronic diastolic congestive heart failure  Dyslipidemia  History of pseudoaneurysm  Glaucoma  Closed C2 fracture  S/P AVR  Bioprosthetic  H/O lumbosacral spine surgery  S/P hip replacement  B/L  S/P right coronary artery (RCA) stent placement    REVIEW OF SYSTEMS: [ ] Unable to Assess due to neurologic exam   [ ] All ROS addressed below are non-contributory, except:  Neuro: [ ] Headache [ ] Back pain [ ] Numbness [ ] Weakness [ ] Ataxia [ ] Dizziness [ ] Aphasia [ ] Dysarthria [ ] Visual disturbance  Resp: [ ] Shortness of breath/dyspnea, [ ] Orthopnea [ ] Cough  CV: [ ] Chest pain [ ] Palpitation [ ] Lightheadedness [ ] Syncope  Renal: [ ] Thirst [ ] Edema  GI: [ ] Nausea [ ] Emesis [ ] Abdominal pain [ ] Constipation [ ] Diarrhea  Hem: [ ] Hematemesis [ ] bright red blood per rectum  ID: [ ] Fever [ ] Chills [ ] Dysuria  ENT: [ ] Rhinorrhea    PHYSICAL EXAM:    Constitutional: No Acute Distress     Neurological: AOx3, Following Commands, Moving all Extremities     Motor exam:          Upper extremity                         Delt     Bicep     Tricep    HG                                                 R         5/5        5/5        5/5       5/5                                               L          4-/5       4/5        4/5      4/5          Lower extremity                        HF         KF        KE       DF         PF                                                  R        5/5 5/5        5/5       5/5         5/5                                               L         5/5        5/5       5/5       5/5          5/5                                                 Sensation: [] intact to light touch  [] decreased:     Pulmonary: Clear to Auscultation, No rales, No rhonchi, No wheezes     Cardiovascular: S1, S2, Regular rate and rhythm     Gastrointestinal: Soft, Non-tender, Non-distended     Extremities: No calf tenderness     RADIOLOGY & ADDITIONAL STUDIES:  < from: CT Cervical Spine No Cont (07.17.24 @ 12:57) >  Again demonstrated is a type II odontoid fracture with similar degree of anterior displacement of the superior fracture fragment compared to MRI cervical spine 6/11/2024. This has progressed compared to CT cervical   spine dated 7/11/2023. There is mass effect on the cervical medullary junction with severe stenosis. This has progressed from prior CT cervical spine 7/11/2023 but appears similar to MRI cervical spine 6/11/2024.    The remainder the vertebral body heights are maintained. Again demonstrated is anterolisthesis of C7-T1 and T2-T3 which is unchanged from prior exam.    There is no prevertebral edema.    Additional multilevel cervical spondylosis with multilevel neural foraminal narrowing is again noted and not significantly changed from prior examination.    IMPRESSION:  CT head: No acute intracranial hemorrhage or calvarial fracture. 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  There is anterior displacement of the proximal fracture fragment which is similar appearance to prior MRI cervical spine 6/11/2024 but has   progressed compared to CT cervical spine 7/11/2023. 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 Correlate clinically.    No acute fracture.    Findings were discussed with KEM Bang by Dr. Otis Pelletier on     < end of copied text >

## 2024-07-21 NOTE — CONSULT NOTE ADULT - ASSESSMENT
89 yo female with PMH HTN, HLD, CHF , Aortic Stenosis and chronic C2 fracture. Cardiology consulted for pre-operative evaluation prior to surgery on 7/24.     # Pre-procedure evaluation  - EKG:   - Euvolemic on exam with** signs of active ACS or decompensated HF  - RCRI:   - METs:    # Severe Aortic Stenosis  - Pending TTE results         *** risk for a ** risk procedure   89 yo female with PMH HTN, HLD, CHF , Aortic Stenosis and chronic C2 fracture. Cardiology consulted for pre-operative evaluation prior to surgery on 7/24.     # Pre-procedure evaluation  - Needs formal TTE, last done 12/2022  - EKG:   - Euvolemic on exam with** signs of active ACS or decompensated HF  - RCRI:   - METs:    # Aortic Stenosis  - Pending TTE results         *** risk for a   8** risk procedure   89 yo female with PMH HTN, HLD, CHF , Aortic Stenosis and chronic C2 fracture. Cardiology consulted for pre-operative evaluation prior to surgery on 7/24.     # Pre-procedure evaluation  - Needs formal TTE, last done 12/2022  - EKG:   - Euvolemic on exam with no signs of active ACS or decompensated HF  - RCRI:   - METs:    # Aortic Stenosis  - Pending TTE results        89 yo female with PMH HTN, HLD, CHF , Aortic Stenosis and chronic C2 fracture. Cardiology consulted for pre-operative evaluation prior to surgery on 7/24.     Review of Studies:  TTE 12/2022: ECHO:     12/2022: CONCLUSIONS:   1. Normal left and right ventricular size and systolic function.   2. Moderate aortic stenosis.   3. Moderate mitral regurgitation.   4. Pulmonary artery systolic pressure is 35 mmHg.    # Pre-procedure evaluation  - Needs formal TTE, last done 12/2022  - EKG: NSR with IVCD, diffuse TWI  - Euvolemic on exam with no signs of active ACS or decompensated HF  - METs ~4 prior to weakness (able to walk on flat ground, 1 block with walker)  - pending results of TTE  - will likely need cardiac anesthesia    # Aortic Stenosis  - last TTE shows moderate AS  - currently well compensated  - obtain formal TTE        89 yo female with PMH HTN, HLD, CHF , Aortic Stenosis and chronic C2 fracture. Cardiology consulted for pre-operative evaluation prior to surgery on 7/24.     Review of Studies:  TTE 12/2022: ECHO:     12/2022: CONCLUSIONS:   1. Normal left and right ventricular size and systolic function.   2. Moderate aortic stenosis.   3. Moderate mitral regurgitation.   4. Pulmonary artery systolic pressure is 35 mmHg.    # Pre-procedure evaluation  - Needs formal TTE, last done 12/2022  - EKG: NSR with IVCD, diffuse TWI  - Euvolemic on exam with no signs of active ACS or decompensated HF  - METs ~4 prior to weakness (able to walk on flat ground, 1 block with walker)  - pending results of TTE  - will likely need cardiac anesthesia    # Aortic Stenosis  - last TTE shows moderate AS  - currently well compensated  - obtain formal TTE   - maintain SBP <160  - judicious use of fluids cesilia-operatively

## 2024-07-21 NOTE — CONSULT NOTE ADULT - ATTENDING COMMENTS
P tis a 89 y/o woman c HTN, HLP, CHF, AS, chronic C2 fracture and cards consulted for pre-op eval.    no acute or high risk findings  agree to get tte to eval AS, pt with mod AS per exam  pt likely to be mod risk for a mod risk procedure  TTE to guide perioperative mgmt

## 2024-07-22 ENCOUNTER — RESULT REVIEW (OUTPATIENT)
Age: 89
End: 2024-07-22

## 2024-07-22 LAB
ANION GAP SERPL CALC-SCNC: 7 MMOL/L — SIGNIFICANT CHANGE UP (ref 5–17)
BUN SERPL-MCNC: 18 MG/DL — SIGNIFICANT CHANGE UP (ref 7–23)
CALCIUM SERPL-MCNC: 9.2 MG/DL — SIGNIFICANT CHANGE UP (ref 8.4–10.5)
CHLORIDE SERPL-SCNC: 102 MMOL/L — SIGNIFICANT CHANGE UP (ref 96–108)
CO2 SERPL-SCNC: 32 MMOL/L — HIGH (ref 22–31)
CREAT SERPL-MCNC: 0.96 MG/DL — SIGNIFICANT CHANGE UP (ref 0.5–1.3)
EGFR: 54 ML/MIN/1.73M2 — LOW
GLUCOSE SERPL-MCNC: 118 MG/DL — HIGH (ref 70–99)
HCT VFR BLD CALC: 38 % — SIGNIFICANT CHANGE UP (ref 34.5–45)
HGB BLD-MCNC: 11.6 G/DL — SIGNIFICANT CHANGE UP (ref 11.5–15.5)
MAGNESIUM SERPL-MCNC: 2 MG/DL — SIGNIFICANT CHANGE UP (ref 1.6–2.6)
MCHC RBC-ENTMCNC: 30.5 GM/DL — LOW (ref 32–36)
MCHC RBC-ENTMCNC: 30.7 PG — SIGNIFICANT CHANGE UP (ref 27–34)
MCV RBC AUTO: 100.5 FL — HIGH (ref 80–100)
NRBC # BLD: 0 /100 WBCS — SIGNIFICANT CHANGE UP (ref 0–0)
PHOSPHATE SERPL-MCNC: 3.7 MG/DL — SIGNIFICANT CHANGE UP (ref 2.5–4.5)
PLATELET # BLD AUTO: 359 K/UL — SIGNIFICANT CHANGE UP (ref 150–400)
POTASSIUM SERPL-MCNC: 4.3 MMOL/L — SIGNIFICANT CHANGE UP (ref 3.5–5.3)
POTASSIUM SERPL-SCNC: 4.3 MMOL/L — SIGNIFICANT CHANGE UP (ref 3.5–5.3)
RBC # BLD: 3.78 M/UL — LOW (ref 3.8–5.2)
RBC # FLD: 14.4 % — SIGNIFICANT CHANGE UP (ref 10.3–14.5)
SODIUM SERPL-SCNC: 141 MMOL/L — SIGNIFICANT CHANGE UP (ref 135–145)
WBC # BLD: 5.81 K/UL — SIGNIFICANT CHANGE UP (ref 3.8–10.5)
WBC # FLD AUTO: 5.81 K/UL — SIGNIFICANT CHANGE UP (ref 3.8–10.5)

## 2024-07-22 PROCEDURE — 99292 CRITICAL CARE ADDL 30 MIN: CPT

## 2024-07-22 PROCEDURE — 99291 CRITICAL CARE FIRST HOUR: CPT

## 2024-07-22 PROCEDURE — 93306 TTE W/DOPPLER COMPLETE: CPT | Mod: 26

## 2024-07-22 PROCEDURE — 74018 RADEX ABDOMEN 1 VIEW: CPT | Mod: 26

## 2024-07-22 PROCEDURE — 99233 SBSQ HOSP IP/OBS HIGH 50: CPT

## 2024-07-22 RX ORDER — HYDROMORPHONE HCL IN 0.9% NACL 0.2 MG/ML
0.5 PLASTIC BAG, INJECTION (ML) INTRAVENOUS ONCE
Refills: 0 | Status: DISCONTINUED | OUTPATIENT
Start: 2024-07-22 | End: 2024-07-22

## 2024-07-22 RX ORDER — TRAMADOL HCL 50 MG
25 TABLET ORAL ONCE
Refills: 0 | Status: DISCONTINUED | OUTPATIENT
Start: 2024-07-22 | End: 2024-07-22

## 2024-07-22 RX ORDER — SIMETHICONE 125 MG/1
80 TABLET, CHEWABLE ORAL EVERY 8 HOURS
Refills: 0 | Status: DISCONTINUED | OUTPATIENT
Start: 2024-07-22 | End: 2024-07-24

## 2024-07-22 RX ORDER — HYDRALAZINE HYDROCHLORIDE 100 MG/1
10 TABLET ORAL ONCE
Refills: 0 | Status: COMPLETED | OUTPATIENT
Start: 2024-07-22 | End: 2024-07-22

## 2024-07-22 RX ORDER — HYDROMORPHONE HCL IN 0.9% NACL 0.2 MG/ML
0.25 PLASTIC BAG, INJECTION (ML) INTRAVENOUS ONCE
Refills: 0 | Status: DISCONTINUED | OUTPATIENT
Start: 2024-07-22 | End: 2024-07-22

## 2024-07-22 RX ORDER — DEXMEDETOMIDINE HYDROCHLORIDE 4 UG/ML
0.2 INJECTION, SOLUTION INTRAVENOUS
Qty: 400 | Refills: 0 | Status: DISCONTINUED | OUTPATIENT
Start: 2024-07-22 | End: 2024-07-22

## 2024-07-22 RX ADMIN — Medication 650 MILLIGRAM(S): at 06:00

## 2024-07-22 RX ADMIN — Medication 25 MILLIGRAM(S): at 09:56

## 2024-07-22 RX ADMIN — Medication 0.25 MILLIGRAM(S): at 19:40

## 2024-07-22 RX ADMIN — Medication 1 DROP(S): at 22:26

## 2024-07-22 RX ADMIN — Medication 0.25 MILLIGRAM(S): at 17:45

## 2024-07-22 RX ADMIN — Medication 0.5 MILLIGRAM(S): at 07:45

## 2024-07-22 RX ADMIN — Medication 650 MILLIGRAM(S): at 17:46

## 2024-07-22 RX ADMIN — ENOXAPARIN SODIUM 40 MILLIGRAM(S): 120 INJECTION SUBCUTANEOUS at 22:23

## 2024-07-22 RX ADMIN — Medication 0.25 MILLIGRAM(S): at 02:45

## 2024-07-22 RX ADMIN — Medication 0.25 MILLIGRAM(S): at 17:24

## 2024-07-22 RX ADMIN — Medication 30 MILLIGRAM(S): at 17:18

## 2024-07-22 RX ADMIN — Medication 0.25 MILLIGRAM(S): at 19:25

## 2024-07-22 RX ADMIN — Medication 9 MILLIGRAM(S): at 22:23

## 2024-07-22 RX ADMIN — Medication 0.25 MILLIGRAM(S): at 22:22

## 2024-07-22 RX ADMIN — DEXMEDETOMIDINE HYDROCHLORIDE 3.29 MICROGRAM(S)/KG/HR: 4 INJECTION, SOLUTION INTRAVENOUS at 11:25

## 2024-07-22 RX ADMIN — Medication 650 MILLIGRAM(S): at 18:15

## 2024-07-22 RX ADMIN — Medication 81 MILLIGRAM(S): at 11:15

## 2024-07-22 RX ADMIN — Medication 0.5 MILLIGRAM(S): at 08:00

## 2024-07-22 RX ADMIN — DEXMEDETOMIDINE HYDROCHLORIDE 3.29 MICROGRAM(S)/KG/HR: 4 INJECTION, SOLUTION INTRAVENOUS at 02:26

## 2024-07-22 RX ADMIN — Medication 650 MILLIGRAM(S): at 05:07

## 2024-07-22 RX ADMIN — HYDRALAZINE HYDROCHLORIDE 10 MILLIGRAM(S): 100 TABLET ORAL at 09:56

## 2024-07-22 RX ADMIN — Medication 0.25 MILLIGRAM(S): at 02:25

## 2024-07-22 RX ADMIN — Medication 1 PACKET(S): at 05:07

## 2024-07-22 RX ADMIN — Medication 30 MILLIGRAM(S): at 05:06

## 2024-07-22 RX ADMIN — SIMETHICONE 80 MILLIGRAM(S): 125 TABLET, CHEWABLE ORAL at 22:23

## 2024-07-22 RX ADMIN — Medication 25 MILLIGRAM(S): at 10:30

## 2024-07-22 NOTE — PROGRESS NOTE ADULT - ASSESSMENT
Assessment: 89F hx HTN, HLD, anxiety, CHF, severe AS, chronic c2 fracture sent by outpatient provider for LUE weakness found to have worsening cervical stenosis and weakness pending OR Wednesday     -Ncq4, VSq4  -PRN dilaudid .25mg for severe pain   -start simethicone 80mg q8 for abdominal gas LBM 7/22; bowel regimen held   -c/w pRN xanax for anxiety   -SBP goal

## 2024-07-22 NOTE — DIETITIAN INITIAL EVALUATION ADULT - OTHER CALCULATIONS
Pt is >100% ideal body weight in the ICU/critical care setting, thus ideal body weight used for all calculations. Needs adjusted for advanced age and clinical condition status, ICU/critical care.

## 2024-07-22 NOTE — DIETITIAN INITIAL EVALUATION ADULT - PERTINENT LABORATORY DATA
07-22    141  |  102  |  18  ----------------------------<  118<H>  4.3   |  32<H>  |  0.96    Ca    9.2      22 Jul 2024 05:30  Phos  3.7     07-22  Mg     2.0     07-22    A1C with Estimated Average Glucose Result: 5.3 % (07-19-24 @ 12:50)

## 2024-07-22 NOTE — PROGRESS NOTE ADULT - SUBJECTIVE AND OBJECTIVE BOX
S/Overnight events: Patient expressing neck pain but continues to refuse to wear soft/hard collar.      Hospital Course:   7/19: admitted to [facility] for surgery with Dr. Puckett, given 15mg torodol for pain, 0.25 dilaudid for pain, 0.125 xanax for anxiety, 10 hydral for high BP.   7/20: [facility], remains on precedex. Cardiology consulted for preop clearance.   7/21: [facility]ERICA ovn. cardiology clearance obtained. Liquid BMs x3, started on metamucil.       Vital Signs Last 24 Hrs  T(C): 36.3 (22 Jul 2024 00:55), Max: 36.8 (21 Jul 2024 14:49)  T(F): 97.4 (22 Jul 2024 00:55), Max: 98.2 (21 Jul 2024 14:49)  HR: 56 (22 Jul 2024 03:00) (56 - 77)  BP: 132/62 (22 Jul 2024 03:00) (112/56 - 163/76)  BP(mean): 88 (22 Jul 2024 03:00) (81 - 112)  RR: 14 (22 Jul 2024 03:00) (14 - 18)  SpO2: 100% (22 Jul 2024 03:00) (92% - 100%)    Parameters below as of 22 Jul 2024 03:00  Patient On (Oxygen Delivery Method): nasal cannula  O2 Flow (L/min): 2      I&O's Detail    20 Jul 2024 07:01  -  21 Jul 2024 07:00  --------------------------------------------------------  IN:    Dexmedetomidine: 79.2 mL    Oral Fluid: 240 mL  Total IN: 319.2 mL    OUT:    Intermittent Catheterization - Urethral (mL): 1350 mL    Voided (mL): 150 mL  Total OUT: 1500 mL    Total NET: -1180.8 mL      21 Jul 2024 07:01  -  22 Jul 2024 03:07  --------------------------------------------------------  IN:    Dexmedetomidine: 140.7 mL    Oral Fluid: 340 mL  Total IN: 480.7 mL    OUT:    Intermittent Catheterization - Urethral (mL): 1100 mL    Voided (mL): 100 mL  Total OUT: 1200 mL    Total NET: -719.3 mL        I&O's Summary    20 Jul 2024 07:01  -  21 Jul 2024 07:00  --------------------------------------------------------  IN: 319.2 mL / OUT: 1500 mL / NET: -1180.8 mL    21 Jul 2024 07:01  -  22 Jul 2024 03:07  --------------------------------------------------------  IN: 480.7 mL / OUT: 1200 mL / NET: -719.3 mL        PHYSICAL EXAM:  General: AAOx2, somnolent but arousable secondary to sedation.  HEENT: PERRL, EOMI. VF grossly intact.  Neck: supple, tender, ROM not assessed  Cardiac: RRR, S1S2  Pulmonary: chest rise symmetric  Abdomen: soft, NT/ND. +BS.  Ext: perfusing well. Pulses 2+ throughout.  Skin: warm, dry  Neuro: CNs II-XII grossly intact. LUE delt 3/5 otherwise LUE 4/5, RUE and b/l LE 5/5. Sensation to LT grossly intact. Following simple commands.    TUBES/LINES:  [] CVC  [] A-line  [] Lumbar Drain  [] Ventriculostomy  [] Vega  [] Other    DIET:  [] NPO  [x] Mechanical  [] Tube feeds    LABS:                        11.6   6.08  )-----------( 342      ( 21 Jul 2024 05:30 )             36.0     07-21    137  |  98  |  20  ----------------------------<  87  4.4   |  30  |  1.00    Ca    9.3      21 Jul 2024 05:30  Phos  3.4     07-21  Mg     2.0     07-21        Urinalysis Basic - ( 21 Jul 2024 05:30 )    Color: x / Appearance: x / SG: x / pH: x  Gluc: 87 mg/dL / Ketone: x  / Bili: x / Urobili: x   Blood: x / Protein: x / Nitrite: x   Leuk Esterase: x / RBC: x / WBC x   Sq Epi: x / Non Sq Epi: x / Bacteria: x          CAPILLARY BLOOD GLUCOSE          Drug Levels: [] N/A    CSF Analysis: [] N/A      Allergies    penicillin (Unknown)  erythromycin (Unknown)  [This allergen will not trigger allergy alert] Acetic Om-Elgrtwhhli-Ygjtfhcap (Other)    Intolerances      MEDICATIONS:  Antibiotics:    Neuro:  acetaminophen     Tablet .. 650 milliGRAM(s) Oral every 6 hours PRN  ALPRAZolam 0.25 milliGRAM(s) Oral at bedtime  dexMEDEtomidine Infusion 0.2 MICROgram(s)/kG/Hr IV Continuous <Continuous>  DULoxetine 30 milliGRAM(s) Oral two times a day  melatonin 9 milliGRAM(s) Oral at bedtime PRN    Anticoagulation:  aspirin  chewable 81 milliGRAM(s) Oral daily  enoxaparin Injectable 40 milliGRAM(s) SubCutaneous <User Schedule>    OTHER:  latanoprost 0.005% Ophthalmic Solution 1 Drop(s) Both EYES at bedtime  polyethylene glycol 3350 17 Gram(s) Oral daily  psyllium Powder 1 Packet(s) Oral two times a day    IVF:    CULTURES:    RADIOLOGY & ADDITIONAL TESTS:   CT head: No acute intracranial hemorrhage or calvarial fracture.    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   There is anterior displacement of the proximal fracture fragmentwhich is   similar appearance to prior MRI cervical spine 6/11/2024 but has   progressed compared to CT cervical spine 7/11/2023. 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 Correlate   clinically.    No acute fracture.      ASSESSMENT:  89 year old female, PMH HTN, HLD, CHF, AS, chronic C2 fracture, presenting to [facility] ED with 1 week of worsening weakness and difficulty ambulating at home. 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    Neuro:   - neuro/vitals q4h  - pain control   - preop OR 7/24  - spinal precautions, collar refused, and poorly fitting.  - anxiety: Precedex gtt, home xanax 0.25mg qhs, Cymbalta 30mg    Cards:   - normotensive sbp goal   - CHF- echo pending  - cont home ASA 81mg (keep for surgery)  - cardiology consulted for preop clearance - moderate risk pending TTE    Pulm:   - RA    GI:   - regular diet  - bowel regimen, last BM 7/21 x3, liquid, Metamucil started    Renal:   - IVL  - Straight cath PRN    Heme:   - h/h stable  - dvt ppx: SCDs/SQL    ID:   - afebrile    Endo:   - a1c = 5.3, no ISS at this time    Dispo:  - ICU, full code, prior MOLST rescinded  - D/w Dr Puckett.

## 2024-07-22 NOTE — DIETITIAN INITIAL EVALUATION ADULT - PERTINENT MEDS FT
MEDICATIONS  (STANDING):  ALPRAZolam 0.25 milliGRAM(s) Oral at bedtime  aspirin  chewable 81 milliGRAM(s) Oral daily  dexMEDEtomidine Infusion 0.2 MICROgram(s)/kG/Hr (3.29 mL/Hr) IV Continuous <Continuous>  DULoxetine 30 milliGRAM(s) Oral two times a day  enoxaparin Injectable 40 milliGRAM(s) SubCutaneous <User Schedule>  latanoprost 0.005% Ophthalmic Solution 1 Drop(s) Both EYES at bedtime    MEDICATIONS  (PRN):  acetaminophen     Tablet .. 650 milliGRAM(s) Oral every 6 hours PRN Mild Pain (1 - 3)  melatonin 9 milliGRAM(s) Oral at bedtime PRN for insomnia

## 2024-07-22 NOTE — PROGRESS NOTE ADULT - SUBJECTIVE AND OBJECTIVE BOX
NSCU Progress Note    Assessment/Hospital Course:        24 Hour Events/Subjective:  -       REVIEW OF SYSTEMS:  - negative except as above    VITALS:   - reviewed      IMAGING/DATA:   - Reviewed          PHYSICAL EXAM:    General: calm  CVS: RRR  Pulm: CTAB  GI: Soft, NTND  Extremities: No LE Edema  Neuro: AOx3, PERRL, EOMI, facial symmetrical, fluent speech, motor 5/5 throughout except LUE 4+/5, no PND, sensation in tact

## 2024-07-22 NOTE — DIETITIAN INITIAL EVALUATION ADULT - ADD RECOMMEND
1. Continue with current diet order (regular diet)  **provide additional nourishments and/or oral nutrition supplements per pt preference and if pt's PO intakes are consistently <50%**  2. Encourage pt to meet nutritional needs as able  3. Monitor PO intakes, trend weights (weekly), monitor skin integrity, monitor labs (electrolytes, CMP), monitor GI function  4. Encourage adherence to diet education (reinforce as able)  5. Pain and bowel regimen per team  6. Will continue to assess/honor preferences as able  7. Align nutrition interventions with goals of care at all times 1. Continue with current diet order (regular diet)  **provide additional nourishments and/or oral nutrition supplements per pt preference and if pt's PO intakes are consistently <50%**  2. Encourage pt to meet nutritional needs as able  3. Monitor PO intakes, trend weights (weekly), monitor skin integrity, monitor labs (electrolytes, CMP), monitor GI function  4. Encourage adherence to diet education (reinforce as able)  5. Pain and bowel regimen per team  **consider Banatrol to assist with fecal bulk, RD to follow up with medical team**  6. Will continue to assess/honor preferences as able  7. Align nutrition interventions with goals of care at all times

## 2024-07-22 NOTE — DIETITIAN INITIAL EVALUATION ADULT - PERSON TAUGHT/METHOD
Pt aide amenable to education; RD provided education in regards to the importance of adequate macro and micronutrients, as well as hydration to support ADLs, maintain energy levels and overall functional/nutritional status. General healthful education provided. Nutrient-dense foods promoted. Pt's diet reviewed. Small, frequent meals promoted when pt's appetite is low and/or fluctuating. Protein-rich foods/options highlighted in the setting of healing. Pt's home health aide was receptive and verbalized understanding./verbal instruction/patient instructed

## 2024-07-22 NOTE — PROGRESS NOTE ADULT - SUBJECTIVE AND OBJECTIVE BOX
INTERVAL HISTORY: HPI:  89 year old female, PMH HTN, HLD, CHF, severe AS, and chronic C-2 fracture x 2 years, presented to [facility] ED with worsening weakness and difficulty ambulating since 7/14. Per home health aide, pt had a fall with +head strike on July 6th which she did not receive medical attention for. On 7/14, HHA noted pt was having more difficulty ambulating and was dropping objects and not able to feed herself as before. Pt was seen on 7/17 at [facility] and had a CT head and CT c-spine. 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 The provider at [facility] offered to pt, family, and pt's PCP, Dr. Doan who was at bedside, to call spine surgery at [facility] and all parties refused, preferring to take patient home. Pt was discharged home with her HHA. Since 7/17, pt's weakness has progressed, prompting them to come to the ED. She denies falls since 7/6. Pt came in with soft collar on, which was removed by HHA while in ED.  (19 Jul 2024 16:16)    Drug Dosing Weight  Height (cm): 162.6 (19 Jul 2024 17:53)  Weight (kg): 65.8 (19 Jul 2024 12:04)  BMI (kg/m2): 24.9 (19 Jul 2024 17:53)  BSA (m2): 1.71 (19 Jul 2024 17:53)    PAST MEDICAL & SURGICAL HISTORY:  Hyperlipidemia, unspecified hyperlipidemia type  Diabetes insipidus  H/O aortic valve stenosis  Hypertension  Vertigo  Chronic diastolic congestive heart failure  Dyslipidemia  History of pseudoaneurysm  Glaucoma  Closed C2 fracture  S/P AVR  Bioprosthetic  H/O lumbosacral spine surgery  S/P hip replacement  B/L  S/P right coronary artery (RCA) stent placement    REVIEW OF SYSTEMS: [ ] Unable to Assess due to neurologic exam   [ ] All ROS addressed below are non-contributory, except:  Neuro: [ ] Headache [ ] Back pain [ ] Numbness [ ] Weakness [ ] Ataxia [ ] Dizziness [ ] Aphasia [ ] Dysarthria [ ] Visual disturbance  Resp: [ ] Shortness of breath/dyspnea, [ ] Orthopnea [ ] Cough  CV: [ ] Chest pain [ ] Palpitation [ ] Lightheadedness [ ] Syncope  Renal: [ ] Thirst [ ] Edema  GI: [ ] Nausea [ ] Emesis [ ] Abdominal pain [ ] Constipation [ ] Diarrhea  Hem: [ ] Hematemesis [ ] bright red blood per rectum  ID: [ ] Fever [ ] Chills [ ] Dysuria  ENT: [ ] Rhinorrhea    PHYSICAL EXAM:    Constitutional: No Acute Distress     Neurological: AOx3, Following Commands, Moving all Extremities     Motor exam:          Upper extremity                         Delt     Bicep     Tricep    HG                                                 R         5/5        5/5        5/5       5/5                                               L         3/5       4/5        4/5      4/5          Lower extremity                        HF         KF        KE       DF         PF                                                  R        5/5 5/5        5/5       5/5         5/5                                               L         5/5 5/5       5/5       5/5          5/5                                                 Sensation: [] intact to light touch  [] decreased:     Pulmonary: Clear to Auscultation, No rales, No rhonchi, No wheezes     Cardiovascular: S1, S2, Regular rate and rhythm     Gastrointestinal: Soft, Non-tender, Non-distended     Extremities: No calf tenderness     RADIOLOGY & ADDITIONAL STUDIES:  < from: CT Cervical Spine No Cont (07.17.24 @ 12:57) >  Again demonstrated is a type II odontoid fracture with similar degree of anterior displacement of the superior fracture fragment compared to MRI cervical spine 6/11/2024. This has progressed compared to CT cervical   spine dated 7/11/2023. There is mass effect on the cervical medullary junction with severe stenosis. This has progressed from prior CT cervical spine 7/11/2023 but appears similar to MRI cervical spine 6/11/2024.    The remainder the vertebral body heights are maintained. Again demonstrated is anterolisthesis of C7-T1 and T2-T3 which is unchanged from prior exam.    There is no prevertebral edema.    Additional multilevel cervical spondylosis with multilevel neural foraminal narrowing is again noted and not significantly changed from prior examination.    IMPRESSION:  CT head: No acute intracranial hemorrhage or calvarial fracture. 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  There is anterior displacement of the proximal fracture fragment which is similar appearance to prior MRI cervical spine 6/11/2024 but has   progressed compared to CT cervical spine 7/11/2023. 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 Correlate clinically.    No acute fracture.    Findings were discussed with KEM Bang by Dr. Otis Pelletier on     < end of copied text >

## 2024-07-22 NOTE — PROGRESS NOTE ADULT - ASSESSMENT
Assessment: 89F hx HTN, HLD, anxiety, CHF, severe AS, chronic c2 fracture sent by outpatient provider for LUE weakness found to have worsening cervical stenosis and weakness pending OR Wednesday       NEURO:  anterior displacement of the proximal fracture fragment which is similar appearance to prior MRI cervical spine 6/11/2024 but has   progressed compared to CT cervical spine 7/11/2023.  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  -neuro check q2  -pain management w/ Tylenol & oxycodone  -agitation: Precedex RASS goal 0 to -1   -patient adamantly refusing to use cervical collar consistently ; explained risk of worsening neurological function    -bed rest     PULMONARY:  saturating well on RA,   -continue to monitor on pulse o2   -incentive spirometry 10q/hr when awake       CARDIOVASCULAR:  c/w aspirin 81mg   ?hx aortic stenosis   monitor on telemetry   vitals q2  sbp goal 100-160        GASTROENTEROLOGY:  regular diet   ensure BMs w/ Miralax & senna  GI ppx : Protonix 40mg qd  Daily stool count, LBM 7/21    RENAL/:  -check BMP qd  -strict i/o's  -Na goal 135-145    ENDOCRINE:  A1c- 5.3%    HEME/ONC:  DVT ppx: SQL   b/l SCDs      INFECTIOUS:   Monitor for fevers

## 2024-07-22 NOTE — DIETITIAN INITIAL EVALUATION ADULT - OTHER INFO
This is a 89 year old female, with a past medical history of HTN, HLD, CHF, severe AS, and chronic C-2 fracture x 2 years, presented to [facility] ED with worsening weakness and difficulty ambulating since 7/14. Per home health aide, pt had a fall with +head strike on July 6th which she did not receive medical attention for. On 7/14, HHA noted pt was having more difficulty ambulating and was dropping objects and not able to feed herself as before. Pt was seen on 7/17 at [facility] and had a CT head and CT c-spine. 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 The provider at [facility] offered to pt, family, and pt's PCP, Dr. Doan who was at bedside, to call spine surgery at [facility] and all parties refused, preferring to take patient home. Pt was discharged home with her HHA. Since 7/17, pt's weakness has progressed, prompting them to come to the ED. She denies falls since 7/6. Pt came in with soft collar on, which was removed by HHA while in ED.    Pt seen in room for nutrition assessment. One of pt's home aides, Kristen, was present during assessment. Pt confused per nursing/medical team, RD spoke to nursing, medical team, pt aide, for assessment. Pt was on 2 liters of nasal cannula, now on room air, SpO2 % thus far 7/22/24. Pt was on precedex gtt, no other drips, no pressor support; MAPs ranging from  today thus far, MAP 75 when RD was in pt's room. Pt noted with a fair to good appetite PTA and during hospital stay. As per diet recall PTA: pt eats various foods at home, including sweet potatoes, grains, eggs, waffles, fruit, juice, chicken, fish, quinoa, etc. Pt eats several meals throughout the day. Currently on regular diet, tolerating fairly well, noted with ~50-75% PO intakes overall. No cultural, Advent, or ethnic food preferences noted. No known food allergies. No noted significant/major wt changes, reported with overall wt stability at current wt. Pt aide said pt's weight was ~148 pounds ~1 year ago, noted with some weight loss, and some weight gain thereafter. Dosing wt: ~145 pounds, Ideal body weight: ~120 pounds, pt is ~120.8% of ideal body weight. Denies nausea, vomiting, constipation, noted with liquid stool/bowel movements, last BM on 7/21/24. Noted with generalized, trace edema, left hand pitting 2+ edema noted. Skin: no pressure ulcers noted. Jeff: 15. No issues chewing or swallowing noted. Noted with some pain relief (level 7 to level 5). Labs reviewed: elevated serum Glucose (118), low eGFR (54); RD to continue to monitor trends. Nutritionally pertinent medications/supplements: psyllium, miralax. Observed pt with no overt signs of muscle or fat wasting. Based on ASPEN guidelines, pt does not meet criteria for malnutrition at this time. Pt aide amenable to education; RD provided education in regards to the importance of adequate macro and micronutrients, as well as hydration to support ADLs, maintain energy levels and overall functional/nutritional status. General healthful education provided. Nutrient-dense foods promoted. Pt's diet reviewed. Small, frequent meals promoted when pt's appetite is low and/or fluctuating. Protein-rich foods/options highlighted in the setting of healing. Pt's home health aide was receptive and verbalized understanding. No additional nutrition-related concerns. Will continue to follow. Additional nutrition recommendations below to follow.

## 2024-07-23 ENCOUNTER — TRANSCRIPTION ENCOUNTER (OUTPATIENT)
Age: 89
End: 2024-07-23

## 2024-07-23 PROBLEM — S12.100A UNSPECIFIED DISPLACED FRACTURE OF SECOND CERVICAL VERTEBRA, INITIAL ENCOUNTER FOR CLOSED FRACTURE: Chronic | Status: ACTIVE | Noted: 2024-07-19

## 2024-07-23 LAB
ANION GAP SERPL CALC-SCNC: 10 MMOL/L — SIGNIFICANT CHANGE UP (ref 5–17)
ANION GAP SERPL CALC-SCNC: 9 MMOL/L — SIGNIFICANT CHANGE UP (ref 5–17)
BUN SERPL-MCNC: 19 MG/DL — SIGNIFICANT CHANGE UP (ref 7–23)
BUN SERPL-MCNC: 20 MG/DL — SIGNIFICANT CHANGE UP (ref 7–23)
CALCIUM SERPL-MCNC: 9.2 MG/DL — SIGNIFICANT CHANGE UP (ref 8.4–10.5)
CALCIUM SERPL-MCNC: 9.5 MG/DL — SIGNIFICANT CHANGE UP (ref 8.4–10.5)
CHLORIDE SERPL-SCNC: 97 MMOL/L — SIGNIFICANT CHANGE UP (ref 96–108)
CHLORIDE SERPL-SCNC: 98 MMOL/L — SIGNIFICANT CHANGE UP (ref 96–108)
CO2 SERPL-SCNC: 24 MMOL/L — SIGNIFICANT CHANGE UP (ref 22–31)
CO2 SERPL-SCNC: 28 MMOL/L — SIGNIFICANT CHANGE UP (ref 22–31)
CREAT SERPL-MCNC: 0.89 MG/DL — SIGNIFICANT CHANGE UP (ref 0.5–1.3)
CREAT SERPL-MCNC: 0.95 MG/DL — SIGNIFICANT CHANGE UP (ref 0.5–1.3)
EGFR: 55 ML/MIN/1.73M2 — LOW
EGFR: 60 ML/MIN/1.73M2 — SIGNIFICANT CHANGE UP
GLUCOSE SERPL-MCNC: 90 MG/DL — SIGNIFICANT CHANGE UP (ref 70–99)
GLUCOSE SERPL-MCNC: 92 MG/DL — SIGNIFICANT CHANGE UP (ref 70–99)
HCT VFR BLD CALC: 34.6 % — SIGNIFICANT CHANGE UP (ref 34.5–45)
HGB BLD-MCNC: 11 G/DL — LOW (ref 11.5–15.5)
MAGNESIUM SERPL-MCNC: 1.8 MG/DL — SIGNIFICANT CHANGE UP (ref 1.6–2.6)
MCHC RBC-ENTMCNC: 30.9 PG — SIGNIFICANT CHANGE UP (ref 27–34)
MCHC RBC-ENTMCNC: 31.8 GM/DL — LOW (ref 32–36)
MCV RBC AUTO: 97.2 FL — SIGNIFICANT CHANGE UP (ref 80–100)
NRBC # BLD: 0 /100 WBCS — SIGNIFICANT CHANGE UP (ref 0–0)
PHOSPHATE SERPL-MCNC: 3.6 MG/DL — SIGNIFICANT CHANGE UP (ref 2.5–4.5)
PLATELET # BLD AUTO: 323 K/UL — SIGNIFICANT CHANGE UP (ref 150–400)
POTASSIUM SERPL-MCNC: 4.2 MMOL/L — SIGNIFICANT CHANGE UP (ref 3.5–5.3)
POTASSIUM SERPL-MCNC: 4.2 MMOL/L — SIGNIFICANT CHANGE UP (ref 3.5–5.3)
POTASSIUM SERPL-SCNC: 4.2 MMOL/L — SIGNIFICANT CHANGE UP (ref 3.5–5.3)
POTASSIUM SERPL-SCNC: 4.2 MMOL/L — SIGNIFICANT CHANGE UP (ref 3.5–5.3)
RBC # BLD: 3.56 M/UL — LOW (ref 3.8–5.2)
RBC # FLD: 14.3 % — SIGNIFICANT CHANGE UP (ref 10.3–14.5)
SODIUM SERPL-SCNC: 131 MMOL/L — LOW (ref 135–145)
SODIUM SERPL-SCNC: 135 MMOL/L — SIGNIFICANT CHANGE UP (ref 135–145)
WBC # BLD: 7.79 K/UL — SIGNIFICANT CHANGE UP (ref 3.8–10.5)
WBC # FLD AUTO: 7.79 K/UL — SIGNIFICANT CHANGE UP (ref 3.8–10.5)

## 2024-07-23 PROCEDURE — 99292 CRITICAL CARE ADDL 30 MIN: CPT

## 2024-07-23 PROCEDURE — 99221 1ST HOSP IP/OBS SF/LOW 40: CPT

## 2024-07-23 RX ORDER — MAGNESIUM SULFATE 500 MG/ML
2 VIAL (ML) INJECTION ONCE
Refills: 0 | Status: COMPLETED | OUTPATIENT
Start: 2024-07-23 | End: 2024-07-23

## 2024-07-23 RX ORDER — APREPITANT 40 MG
40 CAPSULE ORAL ONCE
Refills: 0 | Status: COMPLETED | OUTPATIENT
Start: 2024-07-24 | End: 2024-07-24

## 2024-07-23 RX ORDER — POVIDONE-IODINE 0.1 G/ML
1 SOLUTION TOPICAL ONCE
Refills: 0 | Status: COMPLETED | OUTPATIENT
Start: 2024-07-23 | End: 2024-07-24

## 2024-07-23 RX ORDER — BACTERIOSTATIC SODIUM CHLORIDE 0.9 %
500 VIAL (ML) INJECTION ONCE
Refills: 0 | Status: COMPLETED | OUTPATIENT
Start: 2024-07-23 | End: 2024-07-23

## 2024-07-23 RX ORDER — CHLORHEXIDINE GLUCONATE 500 MG/1
1 CLOTH TOPICAL EVERY 12 HOURS
Refills: 0 | Status: COMPLETED | OUTPATIENT
Start: 2024-07-23 | End: 2024-07-24

## 2024-07-23 RX ORDER — ALPRAZOLAM 0.5 MG
0.25 TABLET ORAL AT BEDTIME
Refills: 0 | Status: DISCONTINUED | OUTPATIENT
Start: 2024-07-23 | End: 2024-07-24

## 2024-07-23 RX ORDER — ACETAMINOPHEN 500 MG
1000 TABLET ORAL ONCE
Refills: 0 | Status: COMPLETED | OUTPATIENT
Start: 2024-07-24 | End: 2024-07-24

## 2024-07-23 RX ORDER — BACTERIOSTATIC SODIUM CHLORIDE 0.9 %
1000 VIAL (ML) INJECTION
Refills: 0 | Status: DISCONTINUED | OUTPATIENT
Start: 2024-07-23 | End: 2024-07-24

## 2024-07-23 RX ADMIN — Medication 650 MILLIGRAM(S): at 05:00

## 2024-07-23 RX ADMIN — Medication 0.25 MILLIGRAM(S): at 21:37

## 2024-07-23 RX ADMIN — SIMETHICONE 80 MILLIGRAM(S): 125 TABLET, CHEWABLE ORAL at 21:37

## 2024-07-23 RX ADMIN — Medication 30 MILLIGRAM(S): at 18:48

## 2024-07-23 RX ADMIN — Medication 81 MILLIGRAM(S): at 11:05

## 2024-07-23 RX ADMIN — Medication 650 MILLIGRAM(S): at 21:00

## 2024-07-23 RX ADMIN — SIMETHICONE 80 MILLIGRAM(S): 125 TABLET, CHEWABLE ORAL at 05:16

## 2024-07-23 RX ADMIN — Medication 65 MILLILITER(S): at 23:59

## 2024-07-23 RX ADMIN — Medication 650 MILLIGRAM(S): at 12:27

## 2024-07-23 RX ADMIN — Medication 30 MILLIGRAM(S): at 05:16

## 2024-07-23 RX ADMIN — SIMETHICONE 80 MILLIGRAM(S): 125 TABLET, CHEWABLE ORAL at 14:00

## 2024-07-23 RX ADMIN — CHLORHEXIDINE GLUCONATE 1 APPLICATION(S): 500 CLOTH TOPICAL at 18:51

## 2024-07-23 RX ADMIN — Medication 650 MILLIGRAM(S): at 04:11

## 2024-07-23 RX ADMIN — Medication 9 MILLIGRAM(S): at 23:07

## 2024-07-23 RX ADMIN — Medication 1 DROP(S): at 21:38

## 2024-07-23 RX ADMIN — Medication 500 MILLILITER(S): at 11:05

## 2024-07-23 RX ADMIN — Medication 650 MILLIGRAM(S): at 13:27

## 2024-07-23 RX ADMIN — Medication 25 GRAM(S): at 07:41

## 2024-07-23 RX ADMIN — Medication 650 MILLIGRAM(S): at 19:48

## 2024-07-23 NOTE — PROGRESS NOTE ADULT - ASSESSMENT
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 Cardiology consulted for pre-operative assessment prior to planned cervical spine surgery on 7/24.    Review of studies:  EKG 7/19/24: NSR, rate 72, left axis deviation, 1st degree AV block, IVCD  TTE 7/22/24: Normal left and right ventricular size and systolic function, EF 67%. Normal atria. A transcatheter aortic valve (TAVR) is noted in the aortic position. The peak transvalvular velocity is 3.21 m/s, the mean transvalvular gradient is 25.00 mmHg, and the LVOT/AV velocity ratio is 0.36. Moderate mitral regurgitation. Moderate mitral stenosis. Mild-to-moderate tricuspid regurgitation. Pulmonary hypertension present, pulmonary artery systolic pressure is 50 mmHg. No pericardial effusion. Compared to the previous TTE performed on 8/9/2021, TAVR valve measurements are similar. Compared to the previous TTE performed on 12/6/2022, PASP is higher in this study with evidence of mitral stenosis.  TTE 12/2022: Left ventricular systolic function is hyperdynamic with a calculated ejection fraction of >75%. Analysis of left ventricular diastolic function and filling pressure is made challenging by the presence of mitral annular calcification. The right ventricle is normal in size. Right ventricular systolic function is normal. The left atrium is mildly dilated. There is moderate aortic stenosis. The peak transvalvular velocity is 3.10 m/s, the mean transvalvular gradient is 25.00 mmHg, and the LVOT/AV velocity ratio is 0.44. The aortic valve area (estimated via the   continuity method) is 1.12 cm². The calculated aortic valve area indexed to body surface area is 0.72 cm²/m². There is severe mitral annular calcification. There is moderate mitral regurgitation. There is trace tricuspid regurgitation. There is mild pulmonary hypertension, pulmonary artery systolic pressure is 35 mmHg. No pericardial effusion.     Outpatient cardiologist: Dr. Al Fernandez   Home meds (cardiac): aspirin 81mg qd     Recommendations:    #Pre-operative assessment   #moderate MR/MS  #AS s/p TAVR  -EKG non-ischemic, largely unchanged from prior   -TTE this admission with normal biventricular function, moderate MR, moderate MS, mild-moderate TR, and TAVR with AV gradients unchanged from prior. Valvular disease stable from prior as well.  -patient denies active CP or SOB  -good functional status however METS limited by cervical fracture   -on exam patient warm/well-perfused and euvolemic   -patient is intermediate-high risk for intermediate-high risk procedure   -no further CV testing needed prior OR  -recommend cardiac anesthesia for procedure given multivalvular disease     Patient should follow up outpatient with her cardiologist Dr. Fernandez upon discharge from the hospital     Recommendations not final until attested by attending          89F PMH HTN, HLD, HFpEF, severe AS s/p bioprosthetic AV (8 years ago at OSH) c/b bioprosthetic AV stenosis s/p valve in valve maxime ultra 6/2021 at 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 Cardiology consulted for pre-operative assessment prior to planned cervical spine surgery on 7/24.    Review of studies:  EKG 7/19/24: NSR, rate 72, left axis deviation, 1st degree AV block, IVCD  TTE 7/22/24: Normal left and right ventricular size and systolic function, EF 67%. Normal atria. A transcatheter aortic valve (TAVR) is noted in the aortic position. The peak transvalvular velocity is 3.21 m/s, the mean transvalvular gradient is 25.00 mmHg, and the LVOT/AV velocity ratio is 0.36. Moderate mitral regurgitation. Moderate mitral stenosis. Mild-to-moderate tricuspid regurgitation. Pulmonary hypertension present, pulmonary artery systolic pressure is 50 mmHg. No pericardial effusion. Compared to the previous TTE performed on 8/9/2021, TAVR valve measurements are similar. Compared to the previous TTE performed on 12/6/2022, PASP is higher in this study with evidence of mitral stenosis.  TTE 12/2022: Left ventricular systolic function is hyperdynamic with a calculated ejection fraction of >75%. Analysis of left ventricular diastolic function and filling pressure is made challenging by the presence of mitral annular calcification. The right ventricle is normal in size. Right ventricular systolic function is normal. The left atrium is mildly dilated. There is moderate aortic stenosis. The peak transvalvular velocity is 3.10 m/s, the mean transvalvular gradient is 25.00 mmHg, and the LVOT/AV velocity ratio is 0.44. The aortic valve area (estimated via the   continuity method) is 1.12 cm². The calculated aortic valve area indexed to body surface area is 0.72 cm²/m². There is severe mitral annular calcification. There is moderate mitral regurgitation. There is trace tricuspid regurgitation. There is mild pulmonary hypertension, pulmonary artery systolic pressure is 35 mmHg. No pericardial effusion.     Outpatient cardiologist: Dr. Al Fernandez   Home meds (cardiac): aspirin 81mg qd     Recommendations:    #Pre-operative assessment   #moderate MR/MS  #AS s/p TAVR  -EKG non-ischemic, largely unchanged from prior   -TTE this admission with normal biventricular function, moderate MR, moderate MS, mild-moderate TR, and TAVR with AV gradients unchanged from prior. Valvular disease stable from prior as well.  -patient denies active CP or SOB  -limited functional status   -on exam patient warm/well-perfused and euvolemic   -patient is intermediate-high risk for intermediate-high risk procedure   -no further CV testing needed prior OR  -recommend cardiac anesthesia for procedure given multivalvular disease     Patient should follow up outpatient with her cardiologist Dr. Fernandez upon discharge from the hospital     Recommendations not final until attested by attending

## 2024-07-23 NOTE — PROCEDURE NOTE - NSTIMEOUT_GEN_A_CORE
Patient's first and last name, , procedure, and correct site confirmed prior to the start of procedure. Opioid Counseling: I discussed with the patient the potential side effects of opioids including but not limited to addiction, altered mental status, and depression. I stressed avoiding alcohol, benzodiazepines, muscle relaxants and sleep aids unless specifically okayed by a physician. The patient verbalized understanding of the proper use and possible adverse effects of opioids. All of the patient's questions and concerns were addressed. They were instructed to flush the remaining pills down the toilet if they did not need them for pain.

## 2024-07-23 NOTE — PROGRESS NOTE ADULT - SUBJECTIVE AND OBJECTIVE BOX
S/Overnight events:  FED[place]ICA overnight.     Hospital Course:   7/19: admitted to [facility] for surgery with Dr. Puckett, given 15mg torodol for pain, 0.25 dilaudid for pain, 0.125 xanax for anxiety, 10 hydral for high BP.   7/20: FEDERICA, remains on precedex. Cardiology consulted for preop clearance.   7/21: FEDERICA ovn. cardiology clearance obtained. Liquid BMs x3, started on metamucil.  7/22: TTE complete. DC precedex.     Vital Signs Last 24 Hrs  T(C): 36.8 (23 Jul 2024 01:00), Max: 36.8 (22 Jul 2024 17:00)  T(F): 98.3 (23 Jul 2024 01:00), Max: 98.3 (23 Jul 2024 01:00)  HR: 76 (22 Jul 2024 23:00) (56 - 76)  BP: 114/63 (22 Jul 2024 23:00) (94/55 - 170/77)  BP(mean): 86 (22 Jul 2024 23:00) (70 - 124)  RR: 16 (22 Jul 2024 23:00) (14 - 18)  SpO2: 98% (22 Jul 2024 23:00) (94% - 100%)    Parameters below as of 22 Jul 2024 23:00  Patient On (Oxygen Delivery Method): room air    I&O's Detail    21 Jul 2024 07:01  -  22 Jul 2024 07:00  --------------------------------------------------------  IN:    Dexmedetomidine: 167 mL    Oral Fluid: 340 mL  Total IN: 507 mL    OUT:    Intermittent Catheterization - Urethral (mL): 1550 mL    Voided (mL): 100 mL  Total OUT: 1650 mL    Total NET: -1143 mL      22 Jul 2024 07:01  -  23 Jul 2024 00:56  --------------------------------------------------------  IN:    Dexmedetomidine: 6.6 mL    Dexmedetomidine: 13.2 mL    Oral Fluid: 600 mL  Total IN: 619.8 mL    OUT:    Intermittent Catheterization - Urethral (mL): 500 mL    Voided (mL): 50 mL  Total OUT: 550 mL    Total NET: 69.8 mL        I&O's Summary    21 Jul 2024 07:01  -  22 Jul 2024 07:00  --------------------------------------------------------  IN: 507 mL / OUT: 1650 mL / NET: -1143 mL    22 Jul 2024 07:01  -  23 Jul 2024 00:56  --------------------------------------------------------  IN: 619.8 mL / OUT: 550 mL / NET: 69.8 mL    PHYSICAL EXAM:  General: AAOx3, awake, conversant   HEENT: PERRL, EOMI. VF grossly intact.  Neck: supple, tender, ROM not assessed  Cardiac: RRR, S1S2  Pulmonary: chest rise symmetric  Abdomen: soft, NT/ND. +BS.  Ext: perfusing well. Pulses 2+ throughout.  Skin: warm, dry  Neuro: CNs II-XII grossly intact. LUE delt 3/5 otherwise LUE 4/5, RUE 5/5, b/l LE 5/5. Sensation to LT grossly intact. Following simple commands.    DEVICE/DRAIN DRESSING: Soft C collar at bedside    DIET:  [] NPO  [X] Mechanical  [] Tube feeds    LABS:    Urinalysis Basic - ( 22 Jul 2024 05:30 )    Color: x / Appearance: x / SG: x / pH: x  Gluc: 118 mg/dL / Ketone: x  / Bili: x / Urobili: x   Blood: x / Protein: x / Nitrite: x   Leuk Esterase: x / RBC: x / WBC x   Sq Epi: x / Non Sq Epi: x / Bacteria: x    Allergies    penicillin (Unknown)  erythromycin (Unknown)  [This allergen will not trigger allergy alert] Acetic Zp-Ndbcnqdgnp-Fsgcncpjs (Other)    Intolerances      MEDICATIONS:  Antibiotics:    Neuro:  acetaminophen     Tablet .. 650 milliGRAM(s) Oral every 6 hours PRN  ALPRAZolam 0.25 milliGRAM(s) Oral at bedtime  DULoxetine 30 milliGRAM(s) Oral two times a day  melatonin 9 milliGRAM(s) Oral at bedtime PRN    Anticoagulation:  aspirin  chewable 81 milliGRAM(s) Oral daily  enoxaparin Injectable 40 milliGRAM(s) SubCutaneous <User Schedule>    OTHER:  latanoprost 0.005% Ophthalmic Solution 1 Drop(s) Both EYES at bedtime  simethicone 80 milliGRAM(s) Chew every 8 hours    ASSESSMENT:  89 year old female, PMH HTN, HLD, CHF, AS, chronic C2 fracture, presenting to [facility] ED with 1 week of worsening weakness and difficulty ambulating at home. 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 Pt has been seen outpatient by Dr. Butler and Dr. Puckett. Pt's family called outpatient office and was advised to come to ED for admission for surgery.     C2 CERVICAL FRACTURE;NECK PAIN    Allergy status to other antibiotic agents    Allergy status to penicillin    Aneurysm of unspecified site    Anterior displaced type ii dens fracture, initial encounter for closed fracture    Atherosclerotic heart disease of native coronary artery without angina pectoris    Bilateral primary osteoarthritis of knee    CAP GLAUC PSX LENS LT EYE MOD STAGE    CAPSLR GLAUCOMA W/PSEUDXF LENS, BILATERAL, MODERATE STAGE    Cervicalgia    Chronic diastolic (congestive) heart failure    Dizziness and giddiness    Hyperlipidemia, unspecified    Hypertensive heart disease with heart failure    Iliotibial band syndrome, right leg    Long term (current) use of aspirin    Nonrheumatic aortic (valve) stenosis    Old myocardial infarction    Other specific arthropathies, not elsewhere classified, left shoulder    Pain in right leg    Pain in unspecified shoulder    Personal history of other diseases of the circulatory system    Personal history of other endocrine, nutritional and metabolic disease    Presence of other heart-valve replacement    Shortness of breath    Unspecified glaucoma    Vertigo of central origin    History of tobacco use    Sex Assigned At Birth    Sex Assigned At Birth    Alcohol intake    FH: lung cancer (Father)    Handoff    MEWS Score    Prior    Essential hypertension    Hyperlipidemia, unspecified hyperlipidemia type    Diabetes insipidus    H/O aortic valve stenosis    Hypertension    Vertigo    Chronic diastolic congestive heart failure    Dyslipidemia    History of pseudoaneurysm    Glaucoma    Closed C2 fracture    C2 cervical fracture    Anterior displaced type ii dens fracture, sequela    H/O aortic valve stenosis    Hyperlipidemia, unspecified hyperlipidemia type    Hypertension    Glaucoma    History of orthopedic surgery    S/P AVR    H/O lumbosacral spine surgery    S/P hip replacement, right    S/P hip replacement    S/P right coronary artery (RCA) stent placement    NECK PAIN    2    Room Service Assist    Room Service Assist    Neck pain    [email or web address]    PLAN:  Neuro:   - neuro/vitals q4h  - pain control: Dilaudid 0.25 IVP prn    - preop OR 7/24  - spinal precautions, collar refused, and poorly fitting.  - anxiety: home xanax 0.25mg qhs, cymbalta 30mg    Cards:   - normotensive sbp goal   - hx CHF: TTE 7/22: EF 67%, TAVR noted, mod MR, mod mitral stenosis, mild-mod TR, pulm HTN   - Hx TAVR: cont home ASA 81mg (keep for surgery)  - cardiology consulted for preop clearance - moderate risk     Pulm:   - RA    GI:   - regular diet  - bowel regimen held for loose stool, LBM 7/22  - Abd XR 7/22: poss ileus, Simethicone 80mg q8    Renal:   - IVL  - Straight cath PRN    Endo:   - a1c = 5.3    Heme:   - h/h stable  - dvt ppx: SCDs/SQL    ID:   - afebrile    Dispo: ICU, full code, dispo pending    D/w Dr Puckett, Dr Sheriff

## 2024-07-23 NOTE — PROGRESS NOTE ADULT - SUBJECTIVE AND OBJECTIVE BOX
Surgery:   Consent: Signed by Daughter, Massiel Duval  NAME/NUMBER of HCP: Massiel Duval, [phone]    penicillin (Unknown)  erythromycin (Unknown)  [This allergen will not trigger allergy alert] Acetic Ik-Anealkoppk-Pidaxioei (Other)      OVERNIGHT EVENTS:    T(C): 36.8 (07-23-24 @ 14:23), Max: 36.9 (07-23-24 @ 04:55)  HR: 77 (07-23-24 @ 14:00) (69 - 78)  BP: 142/70 (07-23-24 @ 14:00) (105/57 - 157/101)  RR: 18 (07-23-24 @ 14:00) (14 - 20)  SpO2: 93% (07-23-24 @ 14:00) (93% - 98%)  Wt(kg): --    EXAM:   AOx3, PERRL, EOMI, facial symmetrical, fluent speech, motor 5/5 throughout except LUE 4-/5, sensation in tact    07-23    135  |  97  |  19  ----------------------------<  92  4.2   |  28  |  0.89    Ca    9.5      23 Jul 2024 09:27  Phos  3.6     07-23  Mg     1.8     07-23      CBC Full  -  ( 23 Jul 2024 06:49 )  WBC Count : 7.79 K/uL  RBC Count : 3.56 M/uL  Hemoglobin : 11.0 g/dL  Hematocrit : 34.6 %  Platelet Count - Automated : 323 K/uL  Mean Cell Volume : 97.2 fl  Mean Cell Hemoglobin : 30.9 pg  Mean Cell Hemoglobin Concentration : 31.8 gm/dL  Auto Neutrophil # : x  Auto Lymphocyte # : x  Auto Monocyte # : x  Auto Eosinophil # : x  Auto Basophil # : x  Auto Neutrophil % : x  Auto Lymphocyte % : x  Auto Monocyte % : x  Auto Eosinophil % : x  Auto Basophil % : x        Pregnancy test:  Type & Screen (in past 72hrs): N/A    CXR: 7/20 stable  EKG:   ECHO: 7/22 EF 67%, TAVR noted, Moderate mitral regurgitation. Moderate mitral stenosis. Mild-to-moderate tricuspid regurgitation.  Medical Clearances: Done, Anesthesia notified  Other Clearances:     Last dose of antiplatelet/anticoagulation drug: cont ASA 81mg, SQL ppx held 7/23    Implanted Devices (pacemaker, drug pump...etc):  []YES   [x] NO                  If yes --> EPS consulted to interrogate/adjust device:    3M nasal swab ordered?  YES  Cranial surgery: Order written for hair to be shampooed night before surgery  [] yes   [x]no                 Assessment:  89 year old female, PMH HTN, HLD, CHF, AS, chronic C2 fracture, presenting to [facility] ED with 1 week of worsening weakness and difficulty ambulating at home. 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 Pt has been seen outpatient by Dr. Butler and Dr. Puckett. Pt's family called outpatient office and was advised to come to ED for admission for surgery.     Plan:  Neuro:   - neuro/vitals q4h  - pain control: Dilaudid 0.25 IVP prn    - preop OR 7/24  - spinal precautions, collar refused, and poorly fitting, soft collar @ bedside  - anxiety: home xanax 0.25mg qhs, cymbalta 30mg    Cards:   - normotensive sbp goal   - hx CHF: TTE 7/22: EF 67%, TAVR noted, mod MR, mod mitral stenosis, mild-mod TR, pulm HTN   - Hx TAVR: cont home ASA 81mg (keep for surgery)  - cardiology consulted for preop clearance - moderate risk     Pulm:   - RA  - anesthia consulted- pending     GI:   - NPO after midnight  - bowel regimen held for loose stool, LBM 7/22  - Abd XR 7/22: poss ileus, Simethicone 80mg q8    Renal:   - NS @65 at midnight  - Straight cath PRN    Endo:   - a1c = 5.3    Heme:   - h/h stable  - dvt ppx: SCDs/ [SQL held for OR in AM     ID:   - afebrile    Dispo: ICU, full code, dispo pending    D/w Dr Puckett, Dr Mcduffie    Assessment:  Present when checked    [15]  GCS  E   V  M     Heart Failure: []Acute, [] acute on chronic , [x]chronic  Heart Failure:  [x] Diastolic (HFpEF), [] Systolic (HFrEF), []Combined (HFpEF and HFrEF), [] RHF, [] Pulm HTN, [] Other    [] KAYLYN, [] ATN, [] AIN, [] other  [] CKD1, [] CKD2, [] CKD 3, [] CKD 4, [] CKD 5, []ESRD    Encephalopathy: [] Metabolic, [] Hepatic, [] toxic, [] Neurological, [] Other    Abnormal Nurtitional Status: [] malnurtition (see nutrition note), [ ]underweight: BMI < 19, [] morbid obesity: BMI >40, [] Cachexia    [] Sepsis  [] hypovolemic shock,[] cardiogenic shock, [] hemorrhagic shock, [] neuogenic shock  [] Acute Respiratory Failure  []Cerebral edema, [] Brain compression/ herniation,   [] Functional quadriplegia  [] Acute blood loss anemia   Surgery:   Consent: Signed by Daughter, Massiel Duval  NAME/NUMBER of HCP: Massiel Duval, [phone]    penicillin (Unknown)  erythromycin (Unknown)  [This allergen will not trigger allergy alert] Acetic Ae-Naswxyxsvk-Usnlbvjod (Other)      OVERNIGHT EVENTS:    T(C): 36.8 (24 @ 14:23), Max: 36.9 (24 @ 04:55)  HR: 77 (24 @ 14:00) (69 - 78)  BP: 142/70 (24 @ 14:00) (105/57 - 157/101)  RR: 18 (24 @ 14:00) (14 - 20)  SpO2: 93% (24 @ 14:00) (93% - 98%)  Wt(kg): --    EXAM:   AOx3, PERRL, EOMI, facial symmetrical, fluent speech, motor 5/5 throughout except LUE 4-/5, sensation in tact        135  |  97  |  19  ----------------------------<  92  4.2   |  28  |  0.89    Ca    9.5      2024 09:27  Phos  3.6       Mg     1.8           CBC Full  -  ( 2024 06:49 )  WBC Count : 7.79 K/uL  RBC Count : 3.56 M/uL  Hemoglobin : 11.0 g/dL  Hematocrit : 34.6 %  Platelet Count - Automated : 323 K/uL  Mean Cell Volume : 97.2 fl  Mean Cell Hemoglobin : 30.9 pg  Mean Cell Hemoglobin Concentration : 31.8 gm/dL  Auto Neutrophil # : x  Auto Lymphocyte # : x  Auto Monocyte # : x  Auto Eosinophil # : x  Auto Basophil # : x  Auto Neutrophil % : x  Auto Lymphocyte % : x  Auto Monocyte % : x  Auto Eosinophil % : x  Auto Basophil % : x        Pregnancy test:  Type & Screen (in past 72hrs): N/A    CXR:  stable  EK/23 EKG stable  ECHO:  EF 67%, TAVR noted, Moderate mitral regurgitation. Moderate mitral stenosis. Mild-to-moderate tricuspid regurgitation.  Medical Clearances: Done, Anesthesia notified  Other Clearances:     Last dose of antiplatelet/anticoagulation drug: cont ASA 81mg, SQL ppx held     Implanted Devices (pacemaker, drug pump...etc):  []YES   [x] NO                  If yes --> EPS consulted to interrogate/adjust device:    3M nasal swab ordered?  YES  Cranial surgery: Order written for hair to be shampooed night before surgery  [] yes   [x]no                 Assessment:  89 year old female, PMH HTN, HLD, CHF, AS, chronic C2 fracture, presenting to [facility] ED with 1 week of worsening weakness and difficulty ambulating at home. 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 Pt has been seen outpatient by Dr. Butler and Dr. Puckett. Pt's family called outpatient office and was advised to come to ED for admission for surgery.     Plan:  Neuro:   - neuro/vitals q4h  - pain control: Dilaudid 0.25 IVP prn    - preop OR   - spinal precautions, collar refused, and poorly fitting, soft collar @ bedside  - anxiety: home xanax 0.25mg qhs, cymbalta 30mg    Cards:   - normotensive sbp goal   - hx CHF: TTE : EF 67%, TAVR noted, mod MR, mod mitral stenosis, mild-mod TR, pulm HTN   - Hx TAVR: cont home ASA 81mg (keep for surgery)  - cardiology consulted for preop clearance - moderate risk     Pulm:   - RA  - anesthia consulted- pending     GI:   - NPO after midnight  - bowel regimen held for loose stool, LBM   - Abd XR : poss ileus, Simethicone 80mg q8    Renal:   - NS @65 at midnight  - Straight cath PRN    Endo:   - a1c = 5.3    Heme:   - h/h stable  - dvt ppx: SCDs/ [SQL held for OR in AM     ID:   - afebrile    Dispo: ICU, full code, dispo pending    D/w Dr Puckett, Dr Mcduffie    Assessment:  Present when checked    [15]  GCS  E   V  M     Heart Failure: []Acute, [] acute on chronic , [x]chronic  Heart Failure:  [x] Diastolic (HFpEF), [] Systolic (HFrEF), []Combined (HFpEF and HFrEF), [] RHF, [] Pulm HTN, [] Other    [] KAYLYN, [] ATN, [] AIN, [] other  [] CKD1, [] CKD2, [] CKD 3, [] CKD 4, [] CKD 5, []ESRD    Encephalopathy: [] Metabolic, [] Hepatic, [] toxic, [] Neurological, [] Other    Abnormal Nurtitional Status: [] malnurtition (see nutrition note), [ ]underweight: BMI < 19, [] morbid obesity: BMI >40, [] Cachexia    [] Sepsis  [] hypovolemic shock,[] cardiogenic shock, [] hemorrhagic shock, [] neuogenic shock  [] Acute Respiratory Failure  []Cerebral edema, [] Brain compression/ herniation,   [] Functional quadriplegia  [] Acute blood loss anemia   Surgery: occipital to C5 fusion and instrumentation with C1 laminectomy     Consent: Signed by Daughter, Massiel Duval  NAME/NUMBER of HCP: Massiel Duval, [phone]    penicillin (Unknown)  erythromycin (Unknown)  [This allergen will not trigger allergy alert] Acetic If-Usikcesprc-Prmfgfcbk (Other)      OVERNIGHT EVENTS: [place] overnight. Seen and examined in NSCU. Denies HA, N/V, chest pain, shortness of breath, new weakness or numbness.     T(C): 36.8 (24 @ 14:23), Max: 36.9 (24 @ 04:55)  HR: 77 (24 @ 14:00) (69 - 78)  BP: 142/70 (24 @ 14:00) (105/57 - 157/101)  RR: 18 (24 @ 14:00) (14 - 20)  SpO2: 93% (24 @ 14:00) (93% - 98%)  Wt(kg): --    EXAM:   AOx3, PERRL, EOMI, facial symmetrical, fluent speech, motor 5/5 throughout except LUE 4-/5, sensation in tact        135  |  97  |  19  ----------------------------<  92  4.2   |  28  |  0.89    Ca    9.5      2024 09:27  Phos  3.6       Mg     1.8           CBC Full  -  ( 2024 06:49 )  WBC Count : 7.79 K/uL  RBC Count : 3.56 M/uL  Hemoglobin : 11.0 g/dL  Hematocrit : 34.6 %  Platelet Count - Automated : 323 K/uL  Mean Cell Volume : 97.2 fl  Mean Cell Hemoglobin : 30.9 pg  Mean Cell Hemoglobin Concentration : 31.8 gm/dL  Auto Neutrophil # : x  Auto Lymphocyte # : x  Auto Monocyte # : x  Auto Eosinophil # : x  Auto Basophil # : x  Auto Neutrophil % : x  Auto Lymphocyte % : x  Auto Monocyte % : x  Auto Eosinophil % : x  Auto Basophil % : x        Pregnancy test:  Type & Screen (in past 72hrs): N/A    CXR:  stable, EMR   EK/23 EKG stable, in chart   ECHO:  EF 67%, TAVR noted, Moderate mitral regurgitation. Moderate mitral stenosis. Mild-to-moderate tricuspid regurgitation.  Medical Clearances: complete  Other Clearances: anesthesia aware    Last dose of antiplatelet/anticoagulation drug: cont ASA 81mg, SQL ppx held     Implanted Devices (pacemaker, drug pump...etc):  []YES   [x] NO                  If yes --> EPS consulted to interrogate/adjust device:    3M nasal swab ordered?  YES  Cranial surgery: Order written for hair to be shampooed night before surgery  [] yes   [x]no                 Assessment:  89 year old female, PMH HTN, HLD, CHF, AS, chronic C2 fracture, presenting to [facility] ED with 1 week of worsening weakness and difficulty ambulating at home. 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 Pt has been seen outpatient by Dr. Butler and Dr. Puckett. Pt's family called outpatient office and was advised to come to ED for admission for surgery.     Plan:  Neuro:   - neuro/vitals q4h  - pain control: Dilaudid 0.25 IVP prn    - preop OR   - spinal precautions, collar refused, and poorly fitting, soft collar @ bedside  - anxiety: home xanax 0.25mg qhs, cymbalta 30mg    Cards:   - normotensive sbp goal   - hx CHF: TTE : EF 67%, TAVR noted, mod MR, mod mitral stenosis, mild-mod TR, pulm HTN   - Hx TAVR: cont home ASA 81mg (keep for surgery)  - cardiology consulted for preop clearance - moderate risk     Pulm:   - RA  - anesthia consulted- pending     GI:   - NPO after midnight  - bowel regimen held for loose stool, LBM   - Abd XR : poss ileus, Simethicone 80mg q8    Renal:   - NS @65 at midnight  - Straight cath PRN    Endo:   - a1c = 5.3    Heme:   - h/h stable  - dvt ppx: SCDs/ [SQL held for OR in AM     ID:   - afebrile    Dispo: ICU, full code, dispo pending    D/w Dr Dr Reta Puckett    Assessment:  Present when checked    [15]  GCS  E   V  M     Heart Failure: []Acute, [] acute on chronic , [x]chronic  Heart Failure:  [x] Diastolic (HFpEF), [] Systolic (HFrEF), []Combined (HFpEF and HFrEF), [] RHF, [] Pulm HTN, [] Other    [] KAYLYN, [] ATN, [] AIN, [] other  [] CKD1, [] CKD2, [] CKD 3, [] CKD 4, [] CKD 5, []ESRD    Encephalopathy: [] Metabolic, [] Hepatic, [] toxic, [] Neurological, [] Other    Abnormal Nurtitional Status: [] malnurtition (see nutrition note), [ ]underweight: BMI < 19, [] morbid obesity: BMI >40, [] Cachexia    [] Sepsis  [] hypovolemic shock,[] cardiogenic shock, [] hemorrhagic shock, [] neuogenic shock  [] Acute Respiratory Failure  []Cerebral edema, [] Brain compression/ herniation,   [] Functional quadriplegia  [] Acute blood loss anemia

## 2024-07-23 NOTE — PROGRESS NOTE ADULT - ASSESSMENT
Assessment: 89F hx HTN, HLD, anxiety, CHF, severe AS, chronic c2 fracture sent by outpatient provider for LUE weakness found to have worsening cervical stenosis and weakness pending OR Wednesday       NEURO:  anterior displacement of the proximal fracture fragment which is similar appearance to prior MRI cervical spine 6/11/2024 but has   progressed compared to CT cervical spine 7/11/2023.  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  -neuro check q2  -pain management w/ Tylenol & oxycodone  -agitation: Precedex RASS goal 0 to -1   -patient adamantly refusing to use cervical collar consistently ; explained risk of worsening neurological function    -bed rest     PULMONARY:  saturating well on RA,   -continue to monitor on pulse o2   -incentive spirometry 10q/hr when awake       CARDIOVASCULAR:  c/w aspirin 81mg   ?hx aortic stenosis   monitor on telemetry   vitals q2  sbp goal 100-160        GASTROENTEROLOGY:  regular diet ; NPO MN for OR  ensure BMs w/ Miralax & senna  GI ppx : Protonix 40mg qd  Daily stool count, LBM 7/22  abd distention    RENAL/:  -check BMP qd  -strict i/o's  -Na goal 135-145  - repeat BMP, U lytes, likely hypovolemic hyponatremia    ENDOCRINE:  A1c- 5.3%    HEME/ONC:  DVT ppx: SQL ; hold for OR  b/l SCDs      INFECTIOUS:   Monitor for fevers

## 2024-07-23 NOTE — PROGRESS NOTE ADULT - ASSESSMENT
Assessment: 89F hx HTN, HLD, anxiety, CHF, severe AS, chronic c2 fracture sent by outpatient provider for LUE weakness found to have worsening cervical stenosis and weakness pending OR Wednesday     -c/w aspirin 81mg   -NPO for OR tomorrow C1-C2 instrumentation & fusion; NS @ 65cc/hr , emend   -Ncq4, VSq4  -PRN dilaudid .25mg for severe pain   -c/w pRN xanax for anxiety   -SBP goal

## 2024-07-23 NOTE — PROGRESS NOTE ADULT - SUBJECTIVE AND OBJECTIVE BOX
SUBJECTIVE / INTERVAL HPI: Patient seen and examined at bedside. Reports she is feeling ok and denies any CP or SOB. Says she is very anxious about her upcoming surgery. Prior to her fall and cervical fracture she was ambulating with a walker up and down the hallways of her house every day without any CP or SOB. No longer taking diuretics at home.    PHYSICAL EXAM:    General: lying in bed in NAD   HEENT: NC/AT; anicteric sclera; MMM  Cardiovascular: +S1/S2, RRR, +systolic murmur   Respiratory: CTA anteriorly; no W/R/C   Extremities: WWP; mild b/l LE edema   Neurological: AAOx3    VITAL SIGNS:  Vital Signs Last 24 Hrs  T(C): 36.9 (23 Jul 2024 04:55), Max: 36.9 (23 Jul 2024 04:55)  T(F): 98.5 (23 Jul 2024 04:55), Max: 98.5 (23 Jul 2024 04:55)  HR: 71 (23 Jul 2024 11:00) (66 - 78)  BP: 105/57 (23 Jul 2024 11:00) (105/57 - 157/101)  BP(mean): 77 (23 Jul 2024 11:00) (77 - 124)  RR: 20 (23 Jul 2024 11:00) (14 - 20)  SpO2: 96% (23 Jul 2024 11:00) (93% - 98%)    Parameters below as of 23 Jul 2024 11:00  Patient On (Oxygen Delivery Method): room air          MEDICATIONS:  MEDICATIONS  (STANDING):  ALPRAZolam 0.25 milliGRAM(s) Oral at bedtime  aspirin  chewable 81 milliGRAM(s) Oral daily  chlorhexidine 2% Cloths 1 Application(s) Topical every 12 hours  DULoxetine 30 milliGRAM(s) Oral two times a day  latanoprost 0.005% Ophthalmic Solution 1 Drop(s) Both EYES at bedtime  povidone iodine 10% Nasal Swab 1 Application(s) Both Nostrils once  simethicone 80 milliGRAM(s) Chew every 8 hours  sodium chloride 0.9%. 1000 milliLiter(s) (65 mL/Hr) IV Continuous <Continuous>    MEDICATIONS  (PRN):  acetaminophen     Tablet .. 650 milliGRAM(s) Oral every 6 hours PRN Mild Pain (1 - 3)  melatonin 9 milliGRAM(s) Oral at bedtime PRN for insomnia      ALLERGIES:  Allergies    penicillin (Unknown)  erythromycin (Unknown)  [This allergen will not trigger allergy alert] Acetic Je-Feegdiniky-Fyukwtiji (Other)    Intolerances        LABS:                        11.0   7.79  )-----------( 323      ( 23 Jul 2024 06:49 )             34.6     07-23    135  |  97  |  19  ----------------------------<  92  4.2   |  28  |  0.89    Ca    9.5      23 Jul 2024 09:27  Phos  3.6     07-23  Mg     1.8     07-23        Urinalysis Basic - ( 23 Jul 2024 09:27 )    Color: x / Appearance: x / SG: x / pH: x  Gluc: 92 mg/dL / Ketone: x  / Bili: x / Urobili: x   Blood: x / Protein: x / Nitrite: x   Leuk Esterase: x / RBC: x / WBC x   Sq Epi: x / Non Sq Epi: x / Bacteria: x      CAPILLARY BLOOD GLUCOSE          RADIOLOGY & ADDITIONAL TESTS: Reviewed.

## 2024-07-23 NOTE — PROGRESS NOTE ADULT - SUBJECTIVE AND OBJECTIVE BOX
INTERVAL HISTORY: HPI:  89 year old female, PMH HTN, HLD, CHF, severe AS, and chronic C-2 fracture x 2 years, presented to [facility] ED with worsening weakness and difficulty ambulating since 7/14. Per home health aide, pt had a fall with +head strike on July 6th which she did not receive medical attention for. On 7/14, HHA noted pt was having more difficulty ambulating and was dropping objects and not able to feed herself as before. Pt was seen on 7/17 at [facility] and had a CT head and CT c-spine. 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 The provider at [facility] offered to pt, family, and pt's PCP, Dr. Doan who was at bedside, to call spine surgery at [facility] and all parties refused, preferring to take patient home. Pt was discharged home with her HHA. Since 7/17, pt's weakness has progressed, prompting them to come to the ED. She denies falls since 7/6. Pt came in with soft collar on, which was removed by HHA while in ED.  (19 Jul 2024 16:16)    Drug Dosing Weight  Height (cm): 162.6 (19 Jul 2024 17:53)  Weight (kg): 65.8 (19 Jul 2024 12:04)  BMI (kg/m2): 24.9 (19 Jul 2024 17:53)  BSA (m2): 1.71 (19 Jul 2024 17:53)    PAST MEDICAL & SURGICAL HISTORY:  Hyperlipidemia, unspecified hyperlipidemia type  Diabetes insipidus  H/O aortic valve stenosis  Hypertension  Vertigo  Chronic diastolic congestive heart failure  Dyslipidemia  History of pseudoaneurysm  Glaucoma  Closed C2 fracture  S/P AVR  Bioprosthetic  H/O lumbosacral spine surgery  S/P hip replacement  B/L  S/P right coronary artery (RCA) stent placement    REVIEW OF SYSTEMS: [ ] Unable to Assess due to neurologic exam   [ ] All ROS addressed below are non-contributory, except:  Neuro: [ ] Headache [ ] Back pain [ ] Numbness [ ] Weakness [ ] Ataxia [ ] Dizziness [ ] Aphasia [ ] Dysarthria [ ] Visual disturbance  Resp: [ ] Shortness of breath/dyspnea, [ ] Orthopnea [ ] Cough  CV: [ ] Chest pain [ ] Palpitation [ ] Lightheadedness [ ] Syncope  Renal: [ ] Thirst [ ] Edema  GI: [ ] Nausea [ ] Emesis [ ] Abdominal pain [ ] Constipation [ ] Diarrhea  Hem: [ ] Hematemesis [ ] bright red blood per rectum  ID: [ ] Fever [ ] Chills [ ] Dysuria  ENT: [ ] Rhinorrhea    PHYSICAL EXAM:    Constitutional: No Acute Distress     Neurological: AOx3, Following Commands, Moving all Extremities     Motor exam:          Upper extremity                         Delt     Bicep     Tricep    HG                                                 R         5/5        5/5        5/5       5/5                                               L         3/5       4/5        4/5      4/5          Lower extremity                        HF         KF        KE       DF         PF                                                  R        5/5 5/5        5/5       5/5         5/5                                               L         5/5 5/5       5/5       5/5          5/5                                                 Sensation: [] intact to light touch  [] decreased:     Pulmonary: Clear to Auscultation, No rales, No rhonchi, No wheezes     Cardiovascular: S1, S2, Regular rate and rhythm     Gastrointestinal: Soft, Non-tender, Non-distended     Extremities: No calf tenderness     RADIOLOGY & ADDITIONAL STUDIES:  < from: CT Cervical Spine No Cont (07.17.24 @ 12:57) >  Again demonstrated is a type II odontoid fracture with similar degree of anterior displacement of the superior fracture fragment compared to MRI cervical spine 6/11/2024. This has progressed compared to CT cervical   spine dated 7/11/2023. There is mass effect on the cervical medullary junction with severe stenosis. This has progressed from prior CT cervical spine 7/11/2023 but appears similar to MRI cervical spine 6/11/2024.    The remainder the vertebral body heights are maintained. Again demonstrated is anterolisthesis of C7-T1 and T2-T3 which is unchanged from prior exam.    There is no prevertebral edema.    Additional multilevel cervical spondylosis with multilevel neural foraminal narrowing is again noted and not significantly changed from prior examination.    IMPRESSION:  CT head: No acute intracranial hemorrhage or calvarial fracture. 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  There is anterior displacement of the proximal fracture fragment which is similar appearance to prior MRI cervical spine 6/11/2024 but has   progressed compared to CT cervical spine 7/11/2023. 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 Correlate clinically.    No acute fracture.    Findings were discussed with KEM Bang by Dr. Otis Pelletier on     < end of copied text >

## 2024-07-23 NOTE — PROGRESS NOTE ADULT - SUBJECTIVE AND OBJECTIVE BOX
NSCU Progress Note    Assessment/Hospital Course:        24 Hour Events/Subjective:  - sodium downtrending, decreased po intake        REVIEW OF SYSTEMS:  - negative except as above    VITALS:   - reviewed      IMAGING/DATA:   - Reviewed          PHYSICAL EXAM:    General: calm  CVS: RRR  Pulm: CTAB  GI: Soft, NTND  Extremities: No LE Edema  Neuro: AOx3, PERRL, EOMI, facial symmetrical, fluent speech, motor 5/5 throughout except LUE 4-/5, sensation in tact

## 2024-07-24 LAB
ALBUMIN SERPL ELPH-MCNC: 3.1 G/DL — LOW (ref 3.3–5)
ALP SERPL-CCNC: 61 U/L — SIGNIFICANT CHANGE UP (ref 40–120)
ALT FLD-CCNC: 11 U/L — SIGNIFICANT CHANGE UP (ref 10–45)
ANION GAP SERPL CALC-SCNC: 10 MMOL/L — SIGNIFICANT CHANGE UP (ref 5–17)
ANION GAP SERPL CALC-SCNC: 9 MMOL/L — SIGNIFICANT CHANGE UP (ref 5–17)
ANION GAP SERPL CALC-SCNC: 9 MMOL/L — SIGNIFICANT CHANGE UP (ref 5–17)
APTT BLD: 32.5 SEC — SIGNIFICANT CHANGE UP (ref 24.5–35.6)
APTT BLD: 33.1 SEC — SIGNIFICANT CHANGE UP (ref 24.5–35.6)
APTT BLD: 53.3 SEC — HIGH (ref 24.5–35.6)
AST SERPL-CCNC: 27 U/L — SIGNIFICANT CHANGE UP (ref 10–40)
BASE EXCESS BLDA CALC-SCNC: -0.4 MMOL/L — SIGNIFICANT CHANGE UP (ref -2–3)
BASE EXCESS BLDA CALC-SCNC: -0.8 MMOL/L — SIGNIFICANT CHANGE UP (ref -2–3)
BASE EXCESS BLDA CALC-SCNC: -1.6 MMOL/L — SIGNIFICANT CHANGE UP (ref -2–3)
BASE EXCESS BLDA CALC-SCNC: 3.1 MMOL/L — HIGH (ref -2–3)
BASE EXCESS BLDV CALC-SCNC: -0.6 MMOL/L — SIGNIFICANT CHANGE UP (ref -2–3)
BASOPHILS # BLD AUTO: 0.01 K/UL — SIGNIFICANT CHANGE UP (ref 0–0.2)
BASOPHILS # BLD AUTO: 0.02 K/UL — SIGNIFICANT CHANGE UP (ref 0–0.2)
BASOPHILS NFR BLD AUTO: 0.1 % — SIGNIFICANT CHANGE UP (ref 0–2)
BASOPHILS NFR BLD AUTO: 0.2 % — SIGNIFICANT CHANGE UP (ref 0–2)
BILIRUB SERPL-MCNC: 0.6 MG/DL — SIGNIFICANT CHANGE UP (ref 0.2–1.2)
BLD GP AB SCN SERPL QL: POSITIVE — SIGNIFICANT CHANGE UP
BUN SERPL-MCNC: 16 MG/DL — SIGNIFICANT CHANGE UP (ref 7–23)
BUN SERPL-MCNC: 16 MG/DL — SIGNIFICANT CHANGE UP (ref 7–23)
BUN SERPL-MCNC: 17 MG/DL — SIGNIFICANT CHANGE UP (ref 7–23)
CA-I BLDA-SCNC: 1.09 MMOL/L — LOW (ref 1.15–1.33)
CA-I BLDA-SCNC: 1.2 MMOL/L — SIGNIFICANT CHANGE UP (ref 1.15–1.33)
CA-I BLDA-SCNC: 1.34 MMOL/L — HIGH (ref 1.15–1.33)
CALCIUM SERPL-MCNC: 8.9 MG/DL — SIGNIFICANT CHANGE UP (ref 8.4–10.5)
CALCIUM SERPL-MCNC: 9.5 MG/DL — SIGNIFICANT CHANGE UP (ref 8.4–10.5)
CALCIUM SERPL-MCNC: 9.5 MG/DL — SIGNIFICANT CHANGE UP (ref 8.4–10.5)
CHLORIDE SERPL-SCNC: 104 MMOL/L — SIGNIFICANT CHANGE UP (ref 96–108)
CHLORIDE SERPL-SCNC: 107 MMOL/L — SIGNIFICANT CHANGE UP (ref 96–108)
CHLORIDE SERPL-SCNC: 108 MMOL/L — SIGNIFICANT CHANGE UP (ref 96–108)
CO2 BLDA-SCNC: 24 MMOL/L — SIGNIFICANT CHANGE UP (ref 19–24)
CO2 BLDA-SCNC: 25 MMOL/L — HIGH (ref 19–24)
CO2 BLDA-SCNC: 26 MMOL/L — HIGH (ref 19–24)
CO2 BLDA-SCNC: 29 MMOL/L — HIGH (ref 19–24)
CO2 BLDV-SCNC: 29.1 MMOL/L — HIGH (ref 22–26)
CO2 SERPL-SCNC: 24 MMOL/L — SIGNIFICANT CHANGE UP (ref 22–31)
CO2 SERPL-SCNC: 27 MMOL/L — SIGNIFICANT CHANGE UP (ref 22–31)
CO2 SERPL-SCNC: 29 MMOL/L — SIGNIFICANT CHANGE UP (ref 22–31)
COHGB MFR BLDA: 0.7 % — SIGNIFICANT CHANGE UP
COHGB MFR BLDA: 1 % — SIGNIFICANT CHANGE UP
COHGB MFR BLDA: 1.4 % — SIGNIFICANT CHANGE UP
CREAT SERPL-MCNC: 0.69 MG/DL — SIGNIFICANT CHANGE UP (ref 0.5–1.3)
CREAT SERPL-MCNC: 0.87 MG/DL — SIGNIFICANT CHANGE UP (ref 0.5–1.3)
CREAT SERPL-MCNC: 0.88 MG/DL — SIGNIFICANT CHANGE UP (ref 0.5–1.3)
EGFR: 60 ML/MIN/1.73M2 — SIGNIFICANT CHANGE UP
EGFR: 61 ML/MIN/1.73M2 — SIGNIFICANT CHANGE UP
EGFR: 80 ML/MIN/1.73M2 — SIGNIFICANT CHANGE UP
EOSINOPHIL # BLD AUTO: 0 K/UL — SIGNIFICANT CHANGE UP (ref 0–0.5)
EOSINOPHIL # BLD AUTO: 0 K/UL — SIGNIFICANT CHANGE UP (ref 0–0.5)
EOSINOPHIL NFR BLD AUTO: 0 % — SIGNIFICANT CHANGE UP (ref 0–6)
EOSINOPHIL NFR BLD AUTO: 0 % — SIGNIFICANT CHANGE UP (ref 0–6)
GAS PNL BLDA: SIGNIFICANT CHANGE UP
GAS PNL BLDA: SIGNIFICANT CHANGE UP
GLUCOSE BLDA-MCNC: 106 MG/DL — HIGH (ref 70–99)
GLUCOSE BLDA-MCNC: 110 MG/DL — HIGH (ref 70–99)
GLUCOSE BLDA-MCNC: 84 MG/DL — SIGNIFICANT CHANGE UP (ref 70–99)
GLUCOSE BLDC GLUCOMTR-MCNC: 107 MG/DL — HIGH (ref 70–99)
GLUCOSE SERPL-MCNC: 139 MG/DL — HIGH (ref 70–99)
GLUCOSE SERPL-MCNC: 146 MG/DL — HIGH (ref 70–99)
GLUCOSE SERPL-MCNC: 96 MG/DL — SIGNIFICANT CHANGE UP (ref 70–99)
HAV IGM SER-ACNC: SIGNIFICANT CHANGE UP
HAV IGM SER-ACNC: SIGNIFICANT CHANGE UP
HBV CORE AB SER-ACNC: SIGNIFICANT CHANGE UP
HBV CORE IGM SER-ACNC: SIGNIFICANT CHANGE UP
HBV SURFACE AB SER-ACNC: SIGNIFICANT CHANGE UP
HBV SURFACE AB SER-ACNC: SIGNIFICANT CHANGE UP
HBV SURFACE AG SER-ACNC: SIGNIFICANT CHANGE UP
HCO3 BLDA-SCNC: 23 MMOL/L — SIGNIFICANT CHANGE UP (ref 21–28)
HCO3 BLDA-SCNC: 24 MMOL/L — SIGNIFICANT CHANGE UP (ref 21–28)
HCO3 BLDA-SCNC: 24 MMOL/L — SIGNIFICANT CHANGE UP (ref 21–28)
HCO3 BLDA-SCNC: 28 MMOL/L — SIGNIFICANT CHANGE UP (ref 21–28)
HCO3 BLDV-SCNC: 27 MMOL/L — SIGNIFICANT CHANGE UP (ref 22–29)
HCT VFR BLD CALC: 31.6 % — LOW (ref 34.5–45)
HCT VFR BLD CALC: 38.4 % — SIGNIFICANT CHANGE UP (ref 34.5–45)
HCT VFR BLD CALC: 39.6 % — SIGNIFICANT CHANGE UP (ref 34.5–45)
HCV AB S/CO SERPL IA: 0.06 S/CO — SIGNIFICANT CHANGE UP
HCV AB SERPL-IMP: SIGNIFICANT CHANGE UP
HGB BLD-MCNC: 12.5 G/DL — SIGNIFICANT CHANGE UP (ref 11.5–15.5)
HGB BLD-MCNC: 13.2 G/DL — SIGNIFICANT CHANGE UP (ref 11.5–15.5)
HGB BLD-MCNC: 9.8 G/DL — LOW (ref 11.5–15.5)
HGB BLDA-MCNC: 11.2 G/DL — LOW (ref 11.7–16.1)
HGB BLDA-MCNC: 8.1 G/DL — LOW (ref 11.7–16.1)
HGB BLDA-MCNC: 9.3 G/DL — LOW (ref 11.7–16.1)
HIV 1+2 AB+HIV1 P24 AG SERPL QL IA: SIGNIFICANT CHANGE UP
HIV 1+2 AB+HIV1 P24 AG SERPL QL IA: SIGNIFICANT CHANGE UP
IMM GRANULOCYTES NFR BLD AUTO: 0.4 % — SIGNIFICANT CHANGE UP (ref 0–0.9)
IMM GRANULOCYTES NFR BLD AUTO: 0.6 % — SIGNIFICANT CHANGE UP (ref 0–0.9)
INR BLD: 0.93 — SIGNIFICANT CHANGE UP (ref 0.85–1.18)
INR BLD: 0.94 — SIGNIFICANT CHANGE UP (ref 0.85–1.18)
LACTATE SERPL-SCNC: 1.9 MMOL/L — SIGNIFICANT CHANGE UP (ref 0.5–2)
LYMPHOCYTES # BLD AUTO: 0.62 K/UL — LOW (ref 1–3.3)
LYMPHOCYTES # BLD AUTO: 0.63 K/UL — LOW (ref 1–3.3)
LYMPHOCYTES # BLD AUTO: 6.8 % — LOW (ref 13–44)
LYMPHOCYTES # BLD AUTO: 7.3 % — LOW (ref 13–44)
MAGNESIUM SERPL-MCNC: 1.8 MG/DL — SIGNIFICANT CHANGE UP (ref 1.6–2.6)
MAGNESIUM SERPL-MCNC: 2.1 MG/DL — SIGNIFICANT CHANGE UP (ref 1.6–2.6)
MCHC RBC-ENTMCNC: 29.7 PG — SIGNIFICANT CHANGE UP (ref 27–34)
MCHC RBC-ENTMCNC: 30.1 PG — SIGNIFICANT CHANGE UP (ref 27–34)
MCHC RBC-ENTMCNC: 30.2 PG — SIGNIFICANT CHANGE UP (ref 27–34)
MCHC RBC-ENTMCNC: 31 GM/DL — LOW (ref 32–36)
MCHC RBC-ENTMCNC: 32.6 GM/DL — SIGNIFICANT CHANGE UP (ref 32–36)
MCHC RBC-ENTMCNC: 33.3 GM/DL — SIGNIFICANT CHANGE UP (ref 32–36)
MCV RBC AUTO: 90.2 FL — SIGNIFICANT CHANGE UP (ref 80–100)
MCV RBC AUTO: 91.2 FL — SIGNIFICANT CHANGE UP (ref 80–100)
MCV RBC AUTO: 97.5 FL — SIGNIFICANT CHANGE UP (ref 80–100)
METHGB MFR BLDA: 0.2 % — SIGNIFICANT CHANGE UP
METHGB MFR BLDA: 0.2 % — SIGNIFICANT CHANGE UP
METHGB MFR BLDA: 0.6 % — SIGNIFICANT CHANGE UP
MONOCYTES # BLD AUTO: 0.16 K/UL — SIGNIFICANT CHANGE UP (ref 0–0.9)
MONOCYTES # BLD AUTO: 0.27 K/UL — SIGNIFICANT CHANGE UP (ref 0–0.9)
MONOCYTES NFR BLD AUTO: 1.9 % — LOW (ref 2–14)
MONOCYTES NFR BLD AUTO: 2.9 % — SIGNIFICANT CHANGE UP (ref 2–14)
NEUTROPHILS # BLD AUTO: 7.65 K/UL — HIGH (ref 1.8–7.4)
NEUTROPHILS # BLD AUTO: 8.32 K/UL — HIGH (ref 1.8–7.4)
NEUTROPHILS NFR BLD AUTO: 89.8 % — HIGH (ref 43–77)
NEUTROPHILS NFR BLD AUTO: 90 % — HIGH (ref 43–77)
NRBC # BLD: 0 /100 WBCS — SIGNIFICANT CHANGE UP (ref 0–0)
OXYHGB MFR BLDA: 97.5 % — HIGH (ref 90–95)
OXYHGB MFR BLDA: 98 % — HIGH (ref 90–95)
OXYHGB MFR BLDA: 98 % — HIGH (ref 90–95)
PCO2 BLDA: 37 MMHG — SIGNIFICANT CHANGE UP (ref 32–45)
PCO2 BLDA: 37 MMHG — SIGNIFICANT CHANGE UP (ref 32–45)
PCO2 BLDA: 41 MMHG — SIGNIFICANT CHANGE UP (ref 32–45)
PCO2 BLDA: 43 MMHG — SIGNIFICANT CHANGE UP (ref 32–45)
PCO2 BLDV: 58 MMHG — HIGH (ref 39–42)
PH BLDA: 7.38 — SIGNIFICANT CHANGE UP (ref 7.35–7.45)
PH BLDA: 7.4 — SIGNIFICANT CHANGE UP (ref 7.35–7.45)
PH BLDA: 7.42 — SIGNIFICANT CHANGE UP (ref 7.35–7.45)
PH BLDA: 7.42 — SIGNIFICANT CHANGE UP (ref 7.35–7.45)
PH BLDV: 7.28 — LOW (ref 7.32–7.43)
PHOSPHATE SERPL-MCNC: 2.9 MG/DL — SIGNIFICANT CHANGE UP (ref 2.5–4.5)
PHOSPHATE SERPL-MCNC: 3.5 MG/DL — SIGNIFICANT CHANGE UP (ref 2.5–4.5)
PLATELET # BLD AUTO: 231 K/UL — SIGNIFICANT CHANGE UP (ref 150–400)
PLATELET # BLD AUTO: 284 K/UL — SIGNIFICANT CHANGE UP (ref 150–400)
PLATELET # BLD AUTO: 325 K/UL — SIGNIFICANT CHANGE UP (ref 150–400)
PO2 BLDA: 173 MMHG — HIGH (ref 83–108)
PO2 BLDA: 325 MMHG — HIGH (ref 83–108)
PO2 BLDA: 386 MMHG — HIGH (ref 83–108)
PO2 BLDA: 397 MMHG — HIGH (ref 83–108)
PO2 BLDV: 37 MMHG — SIGNIFICANT CHANGE UP (ref 25–45)
POTASSIUM BLDA-SCNC: 3 MMOL/L — LOW (ref 3.5–5.1)
POTASSIUM BLDA-SCNC: 3.5 MMOL/L — SIGNIFICANT CHANGE UP (ref 3.5–5.1)
POTASSIUM BLDA-SCNC: 3.6 MMOL/L — SIGNIFICANT CHANGE UP (ref 3.5–5.1)
POTASSIUM SERPL-MCNC: 3.6 MMOL/L — SIGNIFICANT CHANGE UP (ref 3.5–5.3)
POTASSIUM SERPL-MCNC: 4.1 MMOL/L — SIGNIFICANT CHANGE UP (ref 3.5–5.3)
POTASSIUM SERPL-MCNC: 4.3 MMOL/L — SIGNIFICANT CHANGE UP (ref 3.5–5.3)
POTASSIUM SERPL-SCNC: 3.6 MMOL/L — SIGNIFICANT CHANGE UP (ref 3.5–5.3)
POTASSIUM SERPL-SCNC: 4.1 MMOL/L — SIGNIFICANT CHANGE UP (ref 3.5–5.3)
POTASSIUM SERPL-SCNC: 4.3 MMOL/L — SIGNIFICANT CHANGE UP (ref 3.5–5.3)
PROT SERPL-MCNC: 6.1 G/DL — SIGNIFICANT CHANGE UP (ref 6–8.3)
PROTHROM AB SERPL-ACNC: 10.6 SEC — SIGNIFICANT CHANGE UP (ref 9.5–13)
PROTHROM AB SERPL-ACNC: 10.7 SEC — SIGNIFICANT CHANGE UP (ref 9.5–13)
RBC # BLD: 3.24 M/UL — LOW (ref 3.8–5.2)
RBC # BLD: 4.21 M/UL — SIGNIFICANT CHANGE UP (ref 3.8–5.2)
RBC # BLD: 4.39 M/UL — SIGNIFICANT CHANGE UP (ref 3.8–5.2)
RBC # FLD: 14.6 % — HIGH (ref 10.3–14.5)
RBC # FLD: 16.9 % — HIGH (ref 10.3–14.5)
RBC # FLD: 17.2 % — HIGH (ref 10.3–14.5)
RH IG SCN BLD-IMP: POSITIVE — SIGNIFICANT CHANGE UP
SAO2 % BLDA: 98.7 % — HIGH (ref 94–98)
SAO2 % BLDA: 98.7 % — HIGH (ref 94–98)
SAO2 % BLDA: 99 % — HIGH (ref 94–98)
SAO2 % BLDA: 99 % — HIGH (ref 94–98)
SAO2 % BLDV: 63 % — LOW (ref 67–88)
SODIUM BLDA-SCNC: 137 MMOL/L — SIGNIFICANT CHANGE UP (ref 136–145)
SODIUM BLDA-SCNC: 140 MMOL/L — SIGNIFICANT CHANGE UP (ref 136–145)
SODIUM BLDA-SCNC: 140 MMOL/L — SIGNIFICANT CHANGE UP (ref 136–145)
SODIUM SERPL-SCNC: 142 MMOL/L — SIGNIFICANT CHANGE UP (ref 135–145)
SODIUM SERPL-SCNC: 142 MMOL/L — SIGNIFICANT CHANGE UP (ref 135–145)
SODIUM SERPL-SCNC: 143 MMOL/L — SIGNIFICANT CHANGE UP (ref 135–145)
WBC # BLD: 7.41 K/UL — SIGNIFICANT CHANGE UP (ref 3.8–10.5)
WBC # BLD: 8.5 K/UL — SIGNIFICANT CHANGE UP (ref 3.8–10.5)
WBC # BLD: 9.27 K/UL — SIGNIFICANT CHANGE UP (ref 3.8–10.5)
WBC # FLD AUTO: 7.41 K/UL — SIGNIFICANT CHANGE UP (ref 3.8–10.5)
WBC # FLD AUTO: 8.5 K/UL — SIGNIFICANT CHANGE UP (ref 3.8–10.5)
WBC # FLD AUTO: 9.27 K/UL — SIGNIFICANT CHANGE UP (ref 3.8–10.5)

## 2024-07-24 PROCEDURE — 22842 INSERT SPINE FIXATION DEVICE: CPT

## 2024-07-24 PROCEDURE — 93930 UPPER EXTREMITY STUDY: CPT | Mod: 26

## 2024-07-24 PROCEDURE — 22842 INSERT SPINE FIXATION DEVICE: CPT | Mod: 82

## 2024-07-24 PROCEDURE — 22614 ARTHRD PST TQ 1NTRSPC EA ADD: CPT

## 2024-07-24 PROCEDURE — 43752 NASAL/OROGASTRIC W/TUBE PLMT: CPT

## 2024-07-24 PROCEDURE — 93925 LOWER EXTREMITY STUDY: CPT | Mod: 26

## 2024-07-24 PROCEDURE — 22590 ARTHRD PST TQ CRANIOCERVICAL: CPT

## 2024-07-24 PROCEDURE — 63045 LAM FACETEC & FORAMOT CRV: CPT | Mod: 59

## 2024-07-24 PROCEDURE — 22326 TREAT NECK SPINE FRACTURE: CPT

## 2024-07-24 PROCEDURE — 22590 ARTHRD PST TQ CRANIOCERVICAL: CPT | Mod: 82

## 2024-07-24 PROCEDURE — 20936 SP BONE AGRFT LOCAL ADD-ON: CPT

## 2024-07-24 PROCEDURE — 22614 ARTHRD PST TQ 1NTRSPC EA ADD: CPT | Mod: 82

## 2024-07-24 PROCEDURE — 63045 LAM FACETEC & FORAMOT CRV: CPT | Mod: 82

## 2024-07-24 PROCEDURE — 22600 ARTHRD PST TQ 1NTRSPC CRV: CPT | Mod: 82

## 2024-07-24 PROCEDURE — 99292 CRITICAL CARE ADDL 30 MIN: CPT

## 2024-07-24 PROCEDURE — 99291 CRITICAL CARE FIRST HOUR: CPT

## 2024-07-24 PROCEDURE — 71045 X-RAY EXAM CHEST 1 VIEW: CPT | Mod: 26,76

## 2024-07-24 PROCEDURE — 99222 1ST HOSP IP/OBS MODERATE 55: CPT

## 2024-07-24 PROCEDURE — 20930 SP BONE ALGRFT MORSEL ADD-ON: CPT

## 2024-07-24 PROCEDURE — 22600 ARTHRD PST TQ 1NTRSPC CRV: CPT

## 2024-07-24 PROCEDURE — 74018 RADEX ABDOMEN 1 VIEW: CPT | Mod: 26

## 2024-07-24 RX ORDER — DEXTROSE 4 G
25 TABLET,CHEWABLE ORAL ONCE
Refills: 0 | Status: DISCONTINUED | OUTPATIENT
Start: 2024-07-24 | End: 2024-07-24

## 2024-07-24 RX ORDER — DEXTROSE 4 G
15 TABLET,CHEWABLE ORAL ONCE
Refills: 0 | Status: DISCONTINUED | OUTPATIENT
Start: 2024-07-24 | End: 2024-07-24

## 2024-07-24 RX ORDER — CHLORHEXIDINE GLUCONATE 500 MG/1
1 CLOTH TOPICAL
Refills: 0 | Status: DISCONTINUED | OUTPATIENT
Start: 2024-07-24 | End: 2024-08-02

## 2024-07-24 RX ORDER — DEXTROSE 4 G
12.5 TABLET,CHEWABLE ORAL ONCE
Refills: 0 | Status: DISCONTINUED | OUTPATIENT
Start: 2024-07-24 | End: 2024-07-24

## 2024-07-24 RX ORDER — ACETAMINOPHEN 500 MG
1000 TABLET ORAL ONCE
Refills: 0 | Status: COMPLETED | OUTPATIENT
Start: 2024-07-24 | End: 2024-07-24

## 2024-07-24 RX ORDER — POTASSIUM CHLORIDE 1500 MG/1
40 TABLET, EXTENDED RELEASE ORAL ONCE
Refills: 0 | Status: COMPLETED | OUTPATIENT
Start: 2024-07-24 | End: 2024-07-24

## 2024-07-24 RX ORDER — ACETAMINOPHEN 500 MG
1000 TABLET ORAL EVERY 8 HOURS
Refills: 0 | Status: DISCONTINUED | OUTPATIENT
Start: 2024-07-24 | End: 2024-07-26

## 2024-07-24 RX ORDER — OXYCODONE HYDROCHLORIDE 30 MG/1
5 TABLET ORAL EVERY 6 HOURS
Refills: 0 | Status: DISCONTINUED | OUTPATIENT
Start: 2024-07-24 | End: 2024-07-25

## 2024-07-24 RX ORDER — CYANOCOBALAMIN/FOLIC AC/VIT B6 2-2.5-25MG
1 TABLET ORAL DAILY
Refills: 0 | Status: DISCONTINUED | OUTPATIENT
Start: 2024-07-24 | End: 2024-07-24

## 2024-07-24 RX ORDER — LYSINE HCL 500 MG
500 TABLET ORAL DAILY
Refills: 0 | Status: DISCONTINUED | OUTPATIENT
Start: 2024-07-24 | End: 2024-07-26

## 2024-07-24 RX ORDER — CYANOCOBALAMIN/FOLIC AC/VIT B6 2-2.5-25MG
1 TABLET ORAL DAILY
Refills: 0 | Status: DISCONTINUED | OUTPATIENT
Start: 2024-07-24 | End: 2024-07-26

## 2024-07-24 RX ORDER — HEPARIN SODIUM 1000 [USP'U]/ML
2500 INJECTION, SOLUTION INTRAVENOUS; SUBCUTANEOUS ONCE
Refills: 0 | Status: COMPLETED | OUTPATIENT
Start: 2024-07-24 | End: 2024-07-24

## 2024-07-24 RX ORDER — BACTERIOSTATIC SODIUM CHLORIDE 0.9 %
500 VIAL (ML) INJECTION ONCE
Refills: 0 | Status: DISCONTINUED | OUTPATIENT
Start: 2024-07-24 | End: 2024-07-24

## 2024-07-24 RX ORDER — ONDANSETRON HCL/PF 4 MG/2 ML
4 VIAL (ML) INJECTION EVERY 6 HOURS
Refills: 0 | Status: DISCONTINUED | OUTPATIENT
Start: 2024-07-24 | End: 2024-07-26

## 2024-07-24 RX ORDER — CHLORHEXIDINE GLUCONATE 500 MG/1
15 CLOTH TOPICAL EVERY 12 HOURS
Refills: 0 | Status: DISCONTINUED | OUTPATIENT
Start: 2024-07-24 | End: 2024-07-25

## 2024-07-24 RX ORDER — HEPARIN SODIUM 1000 [USP'U]/ML
5000 INJECTION, SOLUTION INTRAVENOUS; SUBCUTANEOUS ONCE
Refills: 0 | Status: DISCONTINUED | OUTPATIENT
Start: 2024-07-24 | End: 2024-07-24

## 2024-07-24 RX ORDER — PHENYLEPHRINE HYDROCHLORIDE 10 MG/1
0.1 TABLET, FILM COATED ORAL
Qty: 40 | Refills: 0 | Status: DISCONTINUED | OUTPATIENT
Start: 2024-07-24 | End: 2024-07-26

## 2024-07-24 RX ORDER — FENTANYL CITRATE 1200 UG/1
25 LOZENGE ORAL; TRANSMUCOSAL
Refills: 0 | Status: DISCONTINUED | OUTPATIENT
Start: 2024-07-24 | End: 2024-07-24

## 2024-07-24 RX ORDER — BACTERIOSTATIC SODIUM CHLORIDE 0.9 %
500 VIAL (ML) INJECTION ONCE
Refills: 0 | Status: COMPLETED | OUTPATIENT
Start: 2024-07-24 | End: 2024-07-24

## 2024-07-24 RX ORDER — PROPOFOL 10 MG/ML
5 INJECTION, EMULSION INTRAVENOUS
Qty: 1000 | Refills: 0 | Status: DISCONTINUED | OUTPATIENT
Start: 2024-07-24 | End: 2024-07-25

## 2024-07-24 RX ORDER — ACETAMINOPHEN 500 MG
1000 TABLET ORAL EVERY 8 HOURS
Refills: 0 | Status: DISCONTINUED | OUTPATIENT
Start: 2024-07-24 | End: 2024-07-24

## 2024-07-24 RX ORDER — METHADONE HCL 10 MG
10 TABLET ORAL ONCE
Refills: 0 | Status: DISCONTINUED | OUTPATIENT
Start: 2024-07-24 | End: 2024-07-24

## 2024-07-24 RX ORDER — OXYCODONE HYDROCHLORIDE 30 MG/1
5 TABLET ORAL EVERY 4 HOURS
Refills: 0 | Status: DISCONTINUED | OUTPATIENT
Start: 2024-07-24 | End: 2024-07-24

## 2024-07-24 RX ORDER — DEXTROSE MONOHYDRATE, SODIUM CHLORIDE, SODIUM LACTATE, CALCIUM CHLORIDE, MAGNESIUM CHLORIDE 1.5; 538; 448; 18.4; 5.08 G/100ML; MG/100ML; MG/100ML; MG/100ML; MG/100ML
1000 SOLUTION INTRAPERITONEAL
Refills: 0 | Status: DISCONTINUED | OUTPATIENT
Start: 2024-07-24 | End: 2024-07-24

## 2024-07-24 RX ORDER — INSULIN LISPRO 100/ML
VIAL (ML) SUBCUTANEOUS
Refills: 0 | Status: DISCONTINUED | OUTPATIENT
Start: 2024-07-24 | End: 2024-07-26

## 2024-07-24 RX ORDER — DULOXETINE HCL 20 MG
30 CAPSULE,DELAYED RELEASE (ENTERIC COATED) ORAL
Refills: 0 | Status: DISCONTINUED | OUTPATIENT
Start: 2024-07-24 | End: 2024-08-02

## 2024-07-24 RX ORDER — METHOCARBAMOL 500 MG
500 TABLET ORAL EVERY 8 HOURS
Refills: 0 | Status: DISCONTINUED | OUTPATIENT
Start: 2024-07-24 | End: 2024-07-26

## 2024-07-24 RX ORDER — HEPARIN SODIUM 1000 [USP'U]/ML
5200 INJECTION, SOLUTION INTRAVENOUS; SUBCUTANEOUS ONCE
Refills: 0 | Status: DISCONTINUED | OUTPATIENT
Start: 2024-07-24 | End: 2024-07-24

## 2024-07-24 RX ORDER — SODIUM PHOSPHATE,MONO-DIBASIC
1 SOLUTION, ORAL ORAL ONCE
Refills: 0 | Status: COMPLETED | OUTPATIENT
Start: 2024-07-24 | End: 2024-07-25

## 2024-07-24 RX ORDER — HYDROMORPHONE HCL IN 0.9% NACL 0.2 MG/ML
0.5 PLASTIC BAG, INJECTION (ML) INTRAVENOUS
Refills: 0 | Status: DISCONTINUED | OUTPATIENT
Start: 2024-07-24 | End: 2024-07-24

## 2024-07-24 RX ORDER — ASPIRIN 500 MG
81 TABLET ORAL DAILY
Refills: 0 | Status: DISCONTINUED | OUTPATIENT
Start: 2024-07-24 | End: 2024-07-24

## 2024-07-24 RX ORDER — FENTANYL CITRATE 1200 UG/1
12.5 LOZENGE ORAL; TRANSMUCOSAL
Refills: 0 | Status: DISCONTINUED | OUTPATIENT
Start: 2024-07-24 | End: 2024-07-25

## 2024-07-24 RX ORDER — HEPARIN SODIUM 1000 [USP'U]/ML
1200 INJECTION, SOLUTION INTRAVENOUS; SUBCUTANEOUS
Qty: 25000 | Refills: 0 | Status: DISCONTINUED | OUTPATIENT
Start: 2024-07-24 | End: 2024-07-25

## 2024-07-24 RX ORDER — ASPIRIN 500 MG
81 TABLET ORAL DAILY
Refills: 0 | Status: DISCONTINUED | OUTPATIENT
Start: 2024-07-24 | End: 2024-07-26

## 2024-07-24 RX ORDER — MAGNESIUM SULFATE 500 MG/ML
2 VIAL (ML) INJECTION ONCE
Refills: 0 | Status: COMPLETED | OUTPATIENT
Start: 2024-07-24 | End: 2024-07-24

## 2024-07-24 RX ORDER — CEFAZOLIN SODIUM 10 G
2000 VIAL (EA) INJECTION EVERY 8 HOURS
Refills: 0 | Status: COMPLETED | OUTPATIENT
Start: 2024-07-24 | End: 2024-07-25

## 2024-07-24 RX ORDER — NITROGLYCERIN 400 UG/1
1 AEROSOL, METERED SUBLINGUAL DAILY
Refills: 0 | Status: DISCONTINUED | OUTPATIENT
Start: 2024-07-24 | End: 2024-07-28

## 2024-07-24 RX ORDER — GLUCAGON INJECTION, SOLUTION 0.5 MG/.1ML
1 INJECTION, SOLUTION SUBCUTANEOUS ONCE
Refills: 0 | Status: DISCONTINUED | OUTPATIENT
Start: 2024-07-24 | End: 2024-07-24

## 2024-07-24 RX ORDER — LATANOPROST 0.005 %
1 DROPS OPHTHALMIC (EYE) AT BEDTIME
Refills: 0 | Status: DISCONTINUED | OUTPATIENT
Start: 2024-07-24 | End: 2024-08-02

## 2024-07-24 RX ORDER — ALBUMIN HUMAN 25 %
250 INTRAVENOUS SOLUTION INTRAVENOUS ONCE
Refills: 0 | Status: COMPLETED | OUTPATIENT
Start: 2024-07-24 | End: 2024-07-24

## 2024-07-24 RX ORDER — SOD PHOS DI, MONO/K PHOS MONO 250 MG
1 TABLET ORAL ONCE
Refills: 0 | Status: COMPLETED | OUTPATIENT
Start: 2024-07-24 | End: 2024-07-24

## 2024-07-24 RX ORDER — NITROGLYCERIN 400 UG/1
1 AEROSOL, METERED SUBLINGUAL
Refills: 0 | Status: DISCONTINUED | OUTPATIENT
Start: 2024-07-24 | End: 2024-07-24

## 2024-07-24 RX ORDER — LYSINE HCL 500 MG
500 TABLET ORAL DAILY
Refills: 0 | Status: DISCONTINUED | OUTPATIENT
Start: 2024-07-24 | End: 2024-07-24

## 2024-07-24 RX ORDER — LABETALOL HCL 200 MG
5 TABLET ORAL ONCE
Refills: 0 | Status: COMPLETED | OUTPATIENT
Start: 2024-07-24 | End: 2024-07-24

## 2024-07-24 RX ORDER — BACTERIOSTATIC SODIUM CHLORIDE 0.9 %
1000 VIAL (ML) INJECTION
Refills: 0 | Status: DISCONTINUED | OUTPATIENT
Start: 2024-07-24 | End: 2024-07-25

## 2024-07-24 RX ORDER — HEPARIN SODIUM 1000 [USP'U]/ML
18 INJECTION, SOLUTION INTRAVENOUS; SUBCUTANEOUS
Qty: 25000 | Refills: 0 | Status: DISCONTINUED | OUTPATIENT
Start: 2024-07-24 | End: 2024-07-24

## 2024-07-24 RX ORDER — PANTOPRAZOLE SODIUM 20 MG/1
40 TABLET, DELAYED RELEASE ORAL DAILY
Refills: 0 | Status: DISCONTINUED | OUTPATIENT
Start: 2024-07-24 | End: 2024-07-25

## 2024-07-24 RX ORDER — METHOCARBAMOL 500 MG
500 TABLET ORAL EVERY 8 HOURS
Refills: 0 | Status: DISCONTINUED | OUTPATIENT
Start: 2024-07-24 | End: 2024-07-24

## 2024-07-24 RX ADMIN — FENTANYL CITRATE 25 MICROGRAM(S): 1200 LOZENGE ORAL; TRANSMUCOSAL at 19:27

## 2024-07-24 RX ADMIN — Medication 65 MILLILITER(S): at 16:56

## 2024-07-24 RX ADMIN — Medication 125 MILLILITER(S): at 16:54

## 2024-07-24 RX ADMIN — PROPOFOL 1.97 MICROGRAM(S)/KG/MIN: 10 INJECTION, EMULSION INTRAVENOUS at 19:27

## 2024-07-24 RX ADMIN — Medication 25 GRAM(S): at 17:21

## 2024-07-24 RX ADMIN — PROPOFOL 1.97 MICROGRAM(S)/KG/MIN: 10 INJECTION, EMULSION INTRAVENOUS at 16:53

## 2024-07-24 RX ADMIN — Medication 40 MILLIGRAM(S): at 06:25

## 2024-07-24 RX ADMIN — PANTOPRAZOLE SODIUM 40 MILLIGRAM(S): 20 TABLET, DELAYED RELEASE ORAL at 17:21

## 2024-07-24 RX ADMIN — HEPARIN SODIUM 2500 UNIT(S): 1000 INJECTION, SOLUTION INTRAVENOUS; SUBCUTANEOUS at 16:50

## 2024-07-24 RX ADMIN — Medication 1000 MILLIGRAM(S): at 23:43

## 2024-07-24 RX ADMIN — Medication 500 MILLIGRAM(S): at 23:44

## 2024-07-24 RX ADMIN — CHLORHEXIDINE GLUCONATE 1 APPLICATION(S): 500 CLOTH TOPICAL at 06:26

## 2024-07-24 RX ADMIN — Medication 1000 MILLIGRAM(S): at 06:25

## 2024-07-24 RX ADMIN — Medication 5 MILLIGRAM(S): at 04:46

## 2024-07-24 RX ADMIN — FENTANYL CITRATE 25 MICROGRAM(S): 1200 LOZENGE ORAL; TRANSMUCOSAL at 15:26

## 2024-07-24 RX ADMIN — PHENYLEPHRINE HYDROCHLORIDE 2.47 MICROGRAM(S)/KG/MIN: 10 TABLET, FILM COATED ORAL at 16:56

## 2024-07-24 RX ADMIN — FENTANYL CITRATE 25 MICROGRAM(S): 1200 LOZENGE ORAL; TRANSMUCOSAL at 15:30

## 2024-07-24 RX ADMIN — SIMETHICONE 80 MILLIGRAM(S): 125 TABLET, CHEWABLE ORAL at 06:25

## 2024-07-24 RX ADMIN — Medication 1 DROP(S): at 22:37

## 2024-07-24 RX ADMIN — OXYCODONE HYDROCHLORIDE 5 MILLIGRAM(S): 30 TABLET ORAL at 23:45

## 2024-07-24 RX ADMIN — Medication 1000 MILLILITER(S): at 16:57

## 2024-07-24 RX ADMIN — POVIDONE-IODINE 1 APPLICATION(S): 0.1 SOLUTION TOPICAL at 07:00

## 2024-07-24 RX ADMIN — NITROGLYCERIN 1 INCH(S): 400 AEROSOL, METERED SUBLINGUAL at 17:20

## 2024-07-24 RX ADMIN — FENTANYL CITRATE 25 MICROGRAM(S): 1200 LOZENGE ORAL; TRANSMUCOSAL at 20:00

## 2024-07-24 RX ADMIN — Medication 65 MILLILITER(S): at 15:27

## 2024-07-24 RX ADMIN — Medication 30 MILLIGRAM(S): at 06:25

## 2024-07-24 RX ADMIN — HEPARIN SODIUM 12 UNIT(S)/HR: 1000 INJECTION, SOLUTION INTRAVENOUS; SUBCUTANEOUS at 20:12

## 2024-07-24 RX ADMIN — Medication 400 MILLIGRAM(S): at 15:26

## 2024-07-24 RX ADMIN — Medication 100 MILLIGRAM(S): at 17:20

## 2024-07-24 RX ADMIN — Medication 1000 MILLIGRAM(S): at 15:45

## 2024-07-24 RX ADMIN — Medication 1000 MILLIGRAM(S): at 07:00

## 2024-07-24 NOTE — PRE-ANESTHESIA EVALUATION ADULT - BP NONINVASIVE DIASTOLIC (MM HG)
History


First contact with patient:  21:18


Chief Complaint:  SHORTNESS OF BREATH


Stated Complaint:  OXYGEN LEVEL LOW





History of Present Illness


The patient is a 79 year old male who presents to the Emergency Room for 

evaluation of shortness of breath.  Patient with history of lung disease, afib 

and previous pneumonia.  He and wife have been living in their camper over the 

last week with worsening of breathing.  Notes last 24 hours with fevers, 

weakness, fatigue, cough, and chills.  He admits bloody sputum earlier without 

large clots.  Denies syncope, cp, leg swelling, abdominal pain, back pain, nor 

other symptoms.  Uses Oxygen at home with O2 sats in 70s and thus increased NC 

O2 prior to coming in. No history of heart failure.  Notes history of left 

lower lung resection along with multiple rounds pneumonia in past.  On Coumadin 

for Afib.  History of COPD.  No recent antibiotics.  Took no medications prior 

to arrival.  No sick contacts.





Review of Systems


See HPI for pertinent positives & negatives. A total of 10 systems reviewed and 

were otherwise negative.





Past Medical/Surgical History


Medical Problems:


(1) ANTICOAGULANTS,LT,CURRENT USE


(2) ATRIAL FIBRILLATION


(3) Bacterial pneumonia


(4) Bacterial pneumonia, unspecified


(5) Benign essential hypertension


(6) CHRONIC KIDNEY DISEASE, STAGE III (MODERATE)


(7) COPD (chronic obstructive pulmonary disease)


(8) Shortness of breath








Family History





Diabetes mellitus


FHx: kidney disease


Heart disease


Hypertension


Kidney stone





Social History


Smoking Status:  Never Smoker


Alcohol Use:  none


Drug Use:  none


Marital Status:  


Housing Status:  lives with family


Occupation Status:  retired





Current/Historical Medications


Scheduled


Bethanechol Chloride (Bethanechol Chloride), 50 MG PO BID


Cholecalciferol (Vitamin D3), 2,000 UNITS PO DAILY


Docusate Sodium (Stool Softener), 100 MG PO DAILY


Gabapentin (Gabapentin), 600 MG PO TID


Levalbuterol (Levalbuterol HCl), 1 DOSE INFIL Q8


Loteprednol Etabonate-Tobramyc (Zylet), 1 DROPS OPB QAM


Melatonin (Melatonin Maximum Strengt), 1 TAB PO HS


Metoprolol Tartrate (Metoprolol Tartrate), 100 MG PO BID


Mometasone Furoate-Formoterol (Dulera 200/5 Mcg), 2 PUFFS INH HS


Tamsulosin Hcl (Flomax), 0.4 MG PO DAILY


Warfarin Sodium (Warfarin Sodium), 2 MG PO HELD TODAY





Scheduled PRN


Acetaminophen (Tylenol Arthritis Ext Rel), 1,300 MG PO DAILY PRN for Pain


Guaifenesin Ext Rel (Mucinex Ext Rel), 600 MG PO Q12 PRN for CONGESTION


Ipratropium-Albuterol (Combivent Respimat), 2 PUFFS INH QID PRN for SOB/Wheezing


Trazodone Hcl (Trazodone), 50 MG PO HS PRN for Sleep





Physical Exam


Vital Signs











  Date Time  Temp Pulse Resp B/P (MAP) Pulse Ox O2 Delivery O2 Flow Rate FiO2


 


5/13/18 02:23  86 20 172/105 98 Nasal Cannula 4.0 


 


5/13/18 01:12  90 20 150/96 97 Nasal Cannula 4.0 


 


5/12/18 23:37  97 22 177/95 99 Nasal Cannula 4.0 


 


5/12/18 22:26     97 Nasal Cannula 4.0 


 


5/12/18 22:25  89 22 166/90 97 Nasal Cannula 4.0 


 


5/12/18 21:46  106      


 


5/12/18 21:34      Room Air  


 


5/12/18 21:13 36.6 102 22 155/84 95 Nasal Cannula 3.5 











Physical Exam


GENERAL: Patient is chronically unwell appearing and in mild distress.


EYES: No scleral icterus, unremarkable pupils.


ENT: Mucous membranes moist, no nasal congestion.


NECK: No masses appreciated, no meningismus, trachea is midline.


RESPIRATORY: Dyspnea/Tachypnea, no wheezing, crackles bilateral, decreased LLL.


CARDIAC:  Afib.  No murmurs, rubs, gallops appreciated.


GASTROINTESTINAL: Abdomen soft, nontender, no peritonitis.  Bowel sounds 

positive.  No masses appreciated.


BACK: No midline tenderness, no CVA tenderness


EXTREMITIES: Normal motion all extremities, no cyanosis, no edema.


NEUROLOGIC: Alert and oriented, no acute motor or sensory deficits, no focal 

weakness, cranial nerves grossly intact.


SKIN: No rash, no jaundice, no diaphoresis.





Medical Decision & Procedures


Laboratory Results











Test


  5/12/18


21:33 5/12/18


21:40


 


Immature Granulocyte % (Auto) 0.3 %  


 


White Blood Count


  12.42 K/uL


(4.8-10.8) 


 


 


Red Blood Count


  3.74 M/uL


(4.7-6.1) 


 


 


Hemoglobin


  11.4 g/dL


(14.0-18.0) 


 


 


Hematocrit 34.0 % (42-52)  


 


Mean Corpuscular Volume


  90.9 fL


() 


 


 


Mean Corpuscular Hemoglobin


  30.5 pg


(25-34) 


 


 


Mean Corpuscular Hemoglobin


Concent 33.5 g/dl


(32-36) 


 


 


Platelet Count


  125 K/uL


(130-400) 


 


 


Mean Platelet Volume


  10.3 fL


(7.4-10.4) 


 


 


Neutrophils (%) (Auto) 84.8 %  


 


Lymphocytes (%) (Auto) 8.8 %  


 


Monocytes (%) (Auto) 5.6 %  


 


Eosinophils (%) (Auto) 0.2 %  


 


Basophils (%) (Auto) 0.3 %  


 


Neutrophils # (Auto)


  10.53 K/uL


(1.4-6.5) 


 


 


Lymphocytes # (Auto)


  1.09 K/uL


(1.2-3.4) 


 


 


Monocytes # (Auto)


  0.70 K/uL


(0.11-0.59) 


 


 


Eosinophils # (Auto)


  0.02 K/uL


(0-0.5) 


 


 


Basophils # (Auto)


  0.04 K/uL


(0-0.2) 


 


 


Immature Granulocyte # (Auto)


  0.04 K/uL


(0.00-0.02) 


 


 


Activated Partial


Thromboplast Time 37.1 SECONDS


(21.0-31.0) 


 


 


Partial Thromboplastin Ratio 1.4  


 


Total Creatine Kinase


  83 U/L


() 


 


 


Creatine Kinase MB


  1.6 ng/ml


(0.5-3.6) 


 


 


Creatine Kinase MB Ratio 1.9 (0-3.0)  


 


Troponin I


  < 0.015 ng/ml


(0-0.045) 


 


 


Pro-B-Type Natriuretic Peptide


  28697 pg/ml


(0-1800) 


 


 


Procalcitonin


  0.21 ng/ml


(0-0.5) 


 


 


Bedside Lactic Acid Venous


  


  1.30 mmol/L


(0.90-1.70)











Medications Administered











 Medications


  (Trade)  Dose


 Ordered  Sig/Marielle


 Route  Start Time


 Stop Time Status Last Admin


Dose Admin


 


 Furosemide


  (Lasix Inj)  20 mg  NOW  STAT


 IV  5/12/18 22:47


 5/12/18 22:48 DC 5/12/18 23:19


20 MG


 


 Albuterol Sulfate


  (Ventolin 0.083%


 2.5MG/3ML Neb)  2.5 mg  NOW  STAT


 INH  5/12/18 23:33


 5/12/18 23:56 DC 5/13/18 00:33


2.5 MG











ECG Per My Interpretation


Indication:  SOB/dyspnea


Rate (beats per minute):  96


Rhythm:  atrial fibrillation


Findings:  ST depression (Lateral)


Comparison ECG Date:  


Change:  no significant change





Medical Decision


79 yr old male arrives from home for evaluation of difficulty breathing.   

Patient with increased oxygen requirement.  He appears in CHF, has CXR 

consistent with this, and has BNP that is significantly higher than previous.  

He has no fever, only mild WBC elevation, no clear evidence infiltrate, normal 

LA and patient stable.  Requiring increased NC O2, and thus I feel likely needs 

to come in. I did obtain blood cultures given his history though will hold on 

abx at this time.  Will give small dose lasix IV to see how that responds.  Cr 

is right around his baseline level as it is.  INR therapeutic thus I do not 

feel that requires CT PE.  May be some CHF that in cough is bloody given his 

Coumadin use though obviously this will need to be monitored closely.  Patient 

stable on recheck and [place] Hospitalist Dr Munoz consulted to evaluate patient.





Medication Reconcilliation


Current Medication List:  was personally reviewed by me





Blood Pressure Screening


Patient's blood pressure:  Elevated blood pressure


Will be managed by Hospitalist.





Impression





 Primary Impression:  


 Congestive heart failure


 Additional Impression:  


 Hypoxia





Departure Information


Referrals


Roshan Chavez M.D. (PCP)





Patient Instructions


[facility]





Problem Qualifiers
78

## 2024-07-24 NOTE — PROGRESS NOTE ADULT - ASSESSMENT
ASSESSMENT:   89 y/o F with chronic C2 fracture, LUE weakness found to have worsening cervical stenosis  (Anterior displacement of the proximal fracture fragment which is similar appearance to prior MRI cervical spine 6/11/2024 but has progressed compared to CT cervical spine 7/11/2023.    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  HTN, HLD, anxiety, CHF, severe AS    PLAN:   NEURO:  Neurochecks Q4  Preop 7/24  Agitation: Continue duloxetine and alprazolam  [place] J required (patient adamantly refusing)  Analgesia prn (pain management post op)    Activity: Bedrest for now    PULM:  On room air. Incentive spirometry   CXR clear    CARDS:  SBP goal 100-160  TTE: 7/22/24: Normal left and right ventricular size and systolic function, EF 67%. Normal atria.  A transcatheter aortic valve (TAVR) is noted in the aortic position.   Moderate mitral regurgitation. Moderate mitral stenosis. Mild-to-moderate tricuspid regurgitation.   Continue ASA 81mg  Cardiology following     GI:  Regular diet   GI ppx: Not indicated  Abdominal distension: AXR 7/1 air- filled colon; concern for ileus  LBM: 7/21 **    RENAL:  Na goal 135-145  Monitor strict Is/Os    HEME:  DVT ppx: B/L SCDs. Hold lovenox as day of surgery    ID:   Monitor for fevers     MISC:    SOCIAL/FAMILY:  [] awaiting [x] updated at bedside [] family meeting    CODE STATUS:  [x] Full Code [] DNR [] DNI [] Palliative/Comfort Care    DISPOSITION:  [x] ICU [] Stroke Unit [] Floor [] EMU [] RCU [] PCU      Time spent: 60 critical care minutes         ASSESSMENT:   89 y/o F with chronic C2 fracture, LUE weakness found to have worsening cervical stenosis  (Anterior displacement of the proximal fracture fragment which is similar appearance to prior MRI cervical spine 6/11/2024 but has progressed compared to CT cervical spine 7/11/2023.    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  HTN, HLD, anxiety, CHF, severe AS    PLAN:   NEURO:  Neurochecks Q4  Preop 7/24  Agitation: Continue duloxetine and alprazolam  [place] J required (patient adamantly refusing)  Analgesia prn (pain management post op)    Activity: Bedrest for now    PULM:  On room air. Incentive spirometry   CXR clear    CARDS:  SBP goal 100-160  TTE: 7/22/24: Normal left and right ventricular size and systolic function, EF 67%. Normal atria.  A transcatheter aortic valve (TAVR) is noted in the aortic position.   Moderate mitral regurgitation. Moderate mitral stenosis. Mild-to-moderate tricuspid regurgitation.   Continue ASA 81mg  Cardiology following     GI:  Regular diet   GI ppx: Not indicated  Abdominal distension: AXR 7/1 air- filled colon; concern for ileus  LBM: 7/21 **    RENAL: Hyponatremia; likely hypovolemic  Na goal 135-145  Monitor strict Is/Os  Monitor BMPs closely    HEME:  DVT ppx: B/L SCDs. Hold lovenox as day of surgery    ID:   Monitor for fevers     MISC:    SOCIAL/FAMILY:  [] awaiting [x] updated at bedside [] family meeting    CODE STATUS:  [x] Full Code [] DNR [] DNI [] Palliative/Comfort Care    DISPOSITION:  [x] ICU [] Stroke Unit [] Floor [] EMU [] RCU [] PCU      Time spent: 60 critical care minutes
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
## 2024-07-24 NOTE — PROGRESS NOTE ADULT - ASSESSMENT
Assessment: 89F hx HTN, HLD, anxiety, CHF, severe AS, chronic c2 fracture sent by outpatient provider for LUE weakness found to have worsening cervical stenosis and weakness now s/p occiput to c5 fusion, C1 laminectomy     -Nc q1, VSq1   -intubated on MV for air-way protection (critical airway); retract ET tube 2cm repeat cxr for confirmation   -PTT goal  60-80 s/p bolus, trend & adjust per protocol ; start heparin ggt at 1200U  -sedation w/ propofol ggt RASS goal -2 to -3 ; consider transitioning to Precedex early in AM   -phenylephrine ggt to maintain MAP >65   -NGT placement  -obtain abdominal xray given gas seen on chest xray; c/w simethicone   -c/w espinoza  -post-operative Ancef        Assessment: 89F hx HTN, HLD, anxiety, CHF, severe AS, chronic c2 fracture sent by outpatient provider for LUE weakness found to have worsening cervical stenosis and weakness now s/p occiput to c5 fusion, C1 laminectomy     -Nc q1, VSq1   -intubated on MV for air-way protection (critical airway); retract ET tube 2cm repeat cxr for confirmation   -PTT goal  60-80 s/p bolus, trend & adjust per protocol ; start heparin ggt at 1200U  -sedation w/ propofol ggt RASS goal -2 to -3 ; consider transitioning to Precedex early in AM   -phenylephrine ggt to maintain MAP >65   -NGT placement  -obtain abdominal xray given gas seen on chest xray; c/w simethicone   -c/w espinoza, monitor drain output   -post-operative Ancef   -pain management w/ Robaxin 500mg q8, 5mg oxycodone q6 hr for 48hrs  -vascular following for poor distal arterial pulses; b/l UE complete pending offical read, LE pending scan; pulses identifiable w/ doppler; c/w nitroglycerin paste to fingers & toes

## 2024-07-24 NOTE — PROGRESS NOTE ADULT - SUBJECTIVE AND OBJECTIVE BOX
NEUROSURGERY POST OP NOTE:    POD# 0 S/P occicput to C5 fusion with C1 laminectomy     S: Patient seen and examined at bedside. Patient appears comfortable.     T(C): 36.6 (07-24-24 @ 07:42), Max: 36.7 (07-24-24 @ 01:11)  HR: 51 (07-24-24 @ 17:00) (51 - 82)  BP: 123/57 (07-24-24 @ 17:00) (81/54 - 170/82)  RR: 12 (07-24-24 @ 17:00) (12 - 20)  SpO2: 100% (07-24-24 @ 17:00) (86% - 100%)      07-23-24 @ 07:01  -  07-24-24 @ 07:00  --------------------------------------------------------  IN: 1270 mL / OUT: 1350 mL / NET: -80 mL    07-24-24 @ 07:01  -  07-24-24 @ 18:11  --------------------------------------------------------  IN: 1141.2 mL / OUT: 202 mL / NET: 939.2 mL        acetaminophen     Tablet .. 1000 milliGRAM(s) Oral every 8 hours  ascorbic acid 500 milliGRAM(s) Oral daily  aspirin  chewable 81 milliGRAM(s) Oral daily  ceFAZolin   IVPB 2000 milliGRAM(s) IV Intermittent every 8 hours  chlorhexidine 2% Cloths 1 Application(s) Topical <User Schedule>  dextrose 5%. 1000 milliLiter(s) IV Continuous <Continuous>  dextrose 5%. 1000 milliLiter(s) IV Continuous <Continuous>  dextrose 50% Injectable 25 Gram(s) IV Push once  dextrose 50% Injectable 12.5 Gram(s) IV Push once  dextrose 50% Injectable 25 Gram(s) IV Push once  dextrose Oral Gel 15 Gram(s) Oral once PRN  DULoxetine 30 milliGRAM(s) Oral two times a day  fentaNYL    Injectable 25 MICROGram(s) IV Push every 2 hours PRN  glucagon  Injectable 1 milliGRAM(s) IntraMuscular once  heparin   Injectable 2500 Unit(s) IV Push once  heparin  Infusion 1200 Unit(s)/Hr IV Continuous <Continuous>  insulin lispro (ADMELOG) corrective regimen sliding scale   SubCutaneous three times a day before meals  latanoprost 0.005% Ophthalmic Solution 1 Drop(s) Both EYES at bedtime  methocarbamol 500 milliGRAM(s) Oral every 8 hours  multivitamin 1 Tablet(s) Oral daily  nitroglycerin    2% Ointment 1 Inch(s) Transdermal daily  ondansetron   Disintegrating Tablet 4 milliGRAM(s) Oral every 6 hours  oxyCODONE    IR 5 milliGRAM(s) Oral every 4 hours PRN  pantoprazole  Injectable 40 milliGRAM(s) IV Push daily  phenylephrine    Infusion 0.1 MICROgram(s)/kG/Min IV Continuous <Continuous>  propofol Infusion 5 MICROgram(s)/kG/Min IV Continuous <Continuous>  sodium chloride 0.9%. 1000 milliLiter(s) IV Continuous <Continuous>      RADIOLOGY:       PHYSICAL EXAM:  Constitutional: awake, alert, sitting up in bed. NAD.   Respiratory: +intubated on CPAP, non-labored breathing. Normal chest rise.   Cardiovascular: Regular rate and rhythm  Gastrointestinal:  Soft, nontender, nondistended.  .  Vascular: Extremities warm, no ulcers, no discoloration of skin.   Neurological: Gen: AA&O x 3, OES, FC.       CN II-XII grossly intact.    Motor: PISANO x 4 symmetrically antigravity.     Sens: Sensation intact to light touch throughout.    Extremities: DPPT pulses dopplerable   Wound/incision: posterior cervical incision c/d/i with dressing in place. HMV x 1.   WOUND/DRAINS:    DEVICES:       Assessment: 89 year old female, PMH HTN, HLD, CHF, AS, chronic C2 fracture, presenting to [facility] ED with 1 week of worsening weakness and difficulty ambulating at home. 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 Pt has been seen outpatient by Dr. Butler and Dr. Puckett. Pt's family called outpatient office and was advised to come to ED for admission for surgery. Now s/p occipt -C5 fusion with C1 laminectomy 7/24 potop c/b hypercoaguable state.       Plan:    Neuro:   - neuro/vitals q1h  - pain control: tylenol, fent 25 q2 prn   - sedation: propofol gtt RASS -2 to -3   - post op collar not at bedside, andres aware   - anxiety: home xanax 0.25mg qhs, cymbalta 30mg  - 1 deep HMV, monitor output   - pending post op xrays     Cards:   - normotensive sbp goal, yifan gtt   - hx CHF: TTE 7/22: EF 67%, TAVR noted, mod MR, mod mitral stenosis, mild-mod TR, pulm HTN   - Hx TAVR: cont home ASA 81mg (keep for surgery)  - cardiology consulted for preop clearance - moderate risk     Pulm:   - intubated to AC/VC     GI:   - NPO  - bowel regimen held for loose stool, LBM 7/22  - Abd XR 7/22: poss ileus, holding Simethicone 80mg q8 post op     Renal:   - NS @65  - (+) espinoza     Endo:   - ISS while NPO   - a1c = 5.3    Heme:   - h/h stable  - s/p hep bolus, hep gtt for PTT goal 60-80,  - vacular consulted f/u recs: nitroglycerin ointment to fingers/toes daily   - dvt ppx: SCDs/ SQL held for OR in AM   - pending arterial duplex x4 extremities (7/24)     ID:   - afebrile  - post op ancef     Dispo: ICU, full code, dispo pending    D/w Dr Puckett, Dr Sheriff    Assessment:  Present when checked    []  GCS  E   V  M     Heart Failure: []Acute, [] acute on chronic , []chronic  Heart Failure:  [] Diastolic (HFpEF), [] Systolic (HFrEF), []Combined (HFpEF and HFrEF), [] RHF, [] Pulm HTN, [] Other    [] KAYLYN, [] ATN, [] AIN, [] other  [] CKD1, [] CKD2, [] CKD 3, [] CKD 4, [] CKD 5, []ESRD    Encephalopathy: [] Metabolic, [] Hepatic, [] toxic, [] Neurological, [] Other    Abnormal Nurtitional Status: [] malnurtition (see nutrition note), [ ]underweight: BMI < 19, [] morbid obesity: BMI >40, [] Cachexia    [] Sepsis  [] hypovolemic shock,[] cardiogenic shock, [] hemorrhagic shock, [] neuogenic shock  [] Acute Respiratory Failure  []Cerebral edema, [] Brain compression/ herniation,   [] Functional quadriplegia  [] Acute blood loss anemia
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
## 2024-07-24 NOTE — BRIEF OPERATIVE NOTE - NSICDXBRIEFPROCEDURE_GEN_ALL_CORE_FT
PROCEDURES:  Fusion, occipital bone with C1 and C2 vertebrae, posterior approach 24-Jul-2024 14:41:51  Andrey Guardado

## 2024-07-24 NOTE — PROGRESS NOTE ADULT - SUBJECTIVE AND OBJECTIVE BOX
INTERVAL HISTORY: HPI:  89 year old female, PMH HTN, HLD, CHF, severe AS, and chronic C-2 fracture x 2 years, presented to [facility] ED with worsening weakness and difficulty ambulating since 7/14. Per home health aide, pt had a fall with +head strike on July 6th which she did not receive medical attention for. On 7/14, HHA noted pt was having more difficulty ambulating and was dropping objects and not able to feed herself as before. Pt was seen on 7/17 at [facility] and had a CT head and CT c-spine. 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 The provider at [facility] offered to pt, family, and pt's PCP, Dr. Doan who was at bedside, to call spine surgery at [facility] and all parties refused, preferring to take patient home. Pt was discharged home with her HHA. Since 7/17, pt's weakness has progressed, prompting them to come to the ED. She denies falls since 7/6. Pt came in with soft collar on, which was removed by HHA while in ED.  (19 Jul 2024 16:16)    Drug Dosing Weight  Height (cm): 162.6 (19 Jul 2024 17:53)  Weight (kg): 65.8 (19 Jul 2024 12:04)  BMI (kg/m2): 24.9 (19 Jul 2024 17:53)  BSA (m2): 1.71 (19 Jul 2024 17:53)    PAST MEDICAL & SURGICAL HISTORY:  Hyperlipidemia, unspecified hyperlipidemia type  Diabetes insipidus  H/O aortic valve stenosis  Hypertension  Vertigo  Chronic diastolic congestive heart failure  Dyslipidemia  History of pseudoaneurysm  Glaucoma  Closed C2 fracture  S/P AVR  Bioprosthetic  H/O lumbosacral spine surgery  S/P hip replacement  B/L  S/P right coronary artery (RCA) stent placement    REVIEW OF SYSTEMS: [ ] Unable to Assess due to neurologic exam   [ ] All ROS addressed below are non-contributory, except:  Neuro: [ ] Headache [ ] Back pain [ ] Numbness [ ] Weakness [ ] Ataxia [ ] Dizziness [ ] Aphasia [ ] Dysarthria [ ] Visual disturbance  Resp: [ ] Shortness of breath/dyspnea, [ ] Orthopnea [ ] Cough  CV: [ ] Chest pain [ ] Palpitation [ ] Lightheadedness [ ] Syncope  Renal: [ ] Thirst [ ] Edema  GI: [ ] Nausea [ ] Emesis [ ] Abdominal pain [ ] Constipation [ ] Diarrhea  Hem: [ ] Hematemesis [ ] bright red blood per rectum  ID: [ ] Fever [ ] Chills [ ] Dysuria  ENT: [ ] Rhinorrhea              RADIOLOGY & ADDITIONAL STUDIES:  Again demonstrated is a type II odontoid fracture with similar degree of anterior displacement of the superior fracture fragment compared to MRI cervical spine 6/11/2024. This has progressed compared to CT cervical   spine dated 7/11/2023. There is mass effect on the cervical medullary junction with severe stenosis. This has progressed from prior CT cervical spine 7/11/2023 but appears similar to MRI cervical spine 6/11/2024. The remainder the vertebral body heights are maintained. Again demonstrated is anterolisthesis of C7-T1 and T2-T3 which is unchanged from prior exam.There is no prevertebral edema. Additional multilevel cervical spondylosis with multilevel neural foraminal narrowing is again noted and not significantly changed from prior examination.    IMPRESSION:  CT head: No acute intracranial hemorrhage or calvarial fracture. 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  There is anterior displacement of the proximal fracture fragment which is similar appearance to prior MRI cervical spine 6/11/2024 but has   progressed compared to CT cervical spine 7/11/2023. 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 Correlate clinically. No acute fracture< end of copied text > INTERVAL HISTORY: HPI:  89 year old female, PMH HTN, HLD, CHF, severe AS, and chronic C-2 fracture x 2 years, presented to [facility] ED with worsening weakness and difficulty ambulating since 7/14. Per home health aide, pt had a fall with +head strike on July 6th which she did not receive medical attention for. On 7/14, HHA noted pt was having more difficulty ambulating and was dropping objects and not able to feed herself as before. Pt was seen on 7/17 at [facility] and had a CT head and CT c-spine. 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 The provider at [facility] offered to pt, family, and pt's PCP, Dr. Doan who was at bedside, to call spine surgery at [facility] and all parties refused, preferring to take patient home. Pt was discharged home with her HHA. Since 7/17, pt's weakness has progressed, prompting them to come to the ED. She denies falls since 7/6. Pt came in with soft collar on, which was removed by HHA while in ED.  (19 Jul 2024 16:16)    Drug Dosing Weight  Height (cm): 162.6 (19 Jul 2024 17:53)  Weight (kg): 65.8 (19 Jul 2024 12:04)  BMI (kg/m2): 24.9 (19 Jul 2024 17:53)  BSA (m2): 1.71 (19 Jul 2024 17:53)    PAST MEDICAL & SURGICAL HISTORY:  Hyperlipidemia, unspecified hyperlipidemia type  Diabetes insipidus  H/O aortic valve stenosis  Hypertension  Vertigo  Chronic diastolic congestive heart failure  Dyslipidemia  History of pseudoaneurysm  Glaucoma  Closed C2 fracture  S/P AVR  Bioprosthetic  H/O lumbosacral spine surgery  S/P hip replacement  B/L  S/P right coronary artery (RCA) stent placement    REVIEW OF SYSTEMS: [ ] Unable to Assess due to neurologic exam   [ ] All ROS addressed below are non-contributory, except:  Neuro: [ ] Headache [ ] Back pain [ ] Numbness [ ] Weakness [ ] Ataxia [ ] Dizziness [ ] Aphasia [ ] Dysarthria [ ] Visual disturbance  Resp: [ ] Shortness of breath/dyspnea, [ ] Orthopnea [ ] Cough  CV: [ ] Chest pain [ ] Palpitation [ ] Lightheadedness [ ] Syncope  Renal: [ ] Thirst [ ] Edema  GI: [ ] Nausea [ ] Emesis [ ] Abdominal pain [ ] Constipation [ ] Diarrhea  Hem: [ ] Hematemesis [ ] bright red blood per rectum  ID: [ ] Fever [ ] Chills [ ] Dysuria  ENT: [ ] Rhinorrhea      RADIOLOGY & ADDITIONAL STUDIES:  Again demonstrated is a type II odontoid fracture with similar degree of anterior displacement of the superior fracture fragment compared to MRI cervical spine 6/11/2024. This has progressed compared to CT cervical   spine dated 7/11/2023. There is mass effect on the cervical medullary junction with severe stenosis. This has progressed from prior CT cervical spine 7/11/2023 but appears similar to MRI cervical spine 6/11/2024. The remainder the vertebral body heights are maintained. Again demonstrated is anterolisthesis of C7-T1 and T2-T3 which is unchanged from prior exam.There is no prevertebral edema. Additional multilevel cervical spondylosis with multilevel neural foraminal narrowing is again noted and not significantly changed from prior examination.    IMPRESSION:  CT head: No acute intracranial hemorrhage or calvarial fracture. 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  There is anterior displacement of the proximal fracture fragment which is similar appearance to prior MRI cervical spine 6/11/2024 but has   progressed compared to CT cervical spine 7/11/2023. 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 Correlate clinically. No acute fracture< end of copied text > INTERVAL HISTORY: HPI:  89 year old female, PMH HTN, HLD, CHF, severe AS, and chronic C-2 fracture x 2 years, presented to [facility] ED with worsening weakness and difficulty ambulating since 7/14. Per home health aide, pt had a fall with +head strike on July 6th which she did not receive medical attention for. On 7/14, HHA noted pt was having more difficulty ambulating and was dropping objects and not able to feed herself as before. Pt was seen on 7/17 at [facility] and had a CT head and CT c-spine. 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 The provider at [facility] offered to pt, family, and pt's PCP, Dr. Doan who was at bedside, to call spine surgery at [facility] and all parties refused, preferring to take patient home. Pt was discharged home with her HHA. Since 7/17, pt's weakness has progressed, prompting them to come to the ED. She denies falls since 7/6. Pt came in with soft collar on, which was removed by HHA while in ED.  (19 Jul 2024 16:16)    Drug Dosing Weight  Height (cm): 162.6 (19 Jul 2024 17:53)  Weight (kg): 65.8 (19 Jul 2024 12:04)  BMI (kg/m2): 24.9 (19 Jul 2024 17:53)  BSA (m2): 1.71 (19 Jul 2024 17:53)    PAST MEDICAL & SURGICAL HISTORY:  Hyperlipidemia, unspecified hyperlipidemia type  Diabetes insipidus  H/O aortic valve stenosis  Hypertension  Vertigo  Chronic diastolic congestive heart failure  Dyslipidemia  History of pseudoaneurysm  Glaucoma  Closed C2 fracture  S/P AVR  Bioprosthetic  H/O lumbosacral spine surgery  S/P hip replacement  B/L  S/P right coronary artery (RCA) stent placement    REVIEW OF SYSTEMS: [ ] Unable to Assess due to neurologic exam   [ ] All ROS addressed below are non-contributory, except:  Neuro: [ ] Headache [ ] Back pain [ ] Numbness [ ] Weakness [ ] Ataxia [ ] Dizziness [ ] Aphasia [ ] Dysarthria [ ] Visual disturbance  Resp: [ ] Shortness of breath/dyspnea, [ ] Orthopnea [ ] Cough  CV: [ ] Chest pain [ ] Palpitation [ ] Lightheadedness [ ] Syncope  Renal: [ ] Thirst [ ] Edema  GI: [ ] Nausea [ ] Emesis [ ] Abdominal pain [ ] Constipation [ ] Diarrhea  Hem: [ ] Hematemesis [ ] bright red blood per rectum  ID: [ ] Fever [ ] Chills [ ] Dysuria  ENT: [ ] Rhinorrhea    LOS: 5d    VITALS:   T(C): 36.3 (07-24-24 @ 21:55), Max: 36.7 (07-24-24 @ 01:11)  HR: 64 (07-24-24 @ 22:00) (49 - 82)  BP: 120/59 (07-24-24 @ 21:00) (81/54 - 170/82)  RR: 12 (07-24-24 @ 22:00) (12 - 19)  SpO2: 99% (07-24-24 @ 22:00) (86% - 100%)    GENERAL: NAD, intubated, sedated  EYES: PERRLA,   NERVOUS SYSTEM:  sedated w/ propofol, pupils 3mm reactive b/l, grimice to nox, RUE WD to nox > LUE, b/l LE WD to nox   ENT: Moist mucous membranes  CHEST/LUNG: Clear to auscultation bilaterally; No rales, rhonchi, wheezing, or rubs. Unlabored respirations  HEART: Regular rate and rhythm; No murmurs, rubs, or gallops  ABDOMEN: BSx4; Soft, distended   EXTREMITIES: b/l LE dopplerable pulses, warm not mottled   INCISION: surgical dressing clean/dry, hemovac in place       RADIOLOGY & ADDITIONAL STUDIES:  Again demonstrated is a type II odontoid fracture with similar degree of anterior displacement of the superior fracture fragment compared to MRI cervical spine 6/11/2024. This has progressed compared to CT cervical   spine dated 7/11/2023. There is mass effect on the cervical medullary junction with severe stenosis. This has progressed from prior CT cervical spine 7/11/2023 but appears similar to MRI cervical spine 6/11/2024. The remainder the vertebral body heights are maintained. Again demonstrated is anterolisthesis of C7-T1 and T2-T3 which is unchanged from prior exam.There is no prevertebral edema. Additional multilevel cervical spondylosis with multilevel neural foraminal narrowing is again noted and not significantly changed from prior examination.    IMPRESSION:  CT head: No acute intracranial hemorrhage or calvarial fracture. 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  There is anterior displacement of the proximal fracture fragment which is similar appearance to prior MRI cervical spine 6/11/2024 but has   progressed compared to CT cervical spine 7/11/2023. 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 Correlate clinically. No acute fracture< end of copied text >

## 2024-07-25 LAB
ANION GAP SERPL CALC-SCNC: 10 MMOL/L — SIGNIFICANT CHANGE UP (ref 5–17)
APTT BLD: 110.5 SEC — HIGH (ref 24.5–35.6)
APTT BLD: 152.3 SEC — CRITICAL HIGH (ref 24.5–35.6)
APTT BLD: 72.6 SEC — HIGH (ref 24.5–35.6)
APTT BLD: 73.4 SEC — HIGH (ref 24.5–35.6)
APTT BLD: 75.6 SEC — HIGH (ref 24.5–35.6)
APTT BLD: >200 SEC — CRITICAL HIGH (ref 24.5–35.6)
APTT BLD: >200 SEC — CRITICAL HIGH (ref 24.5–35.6)
BUN SERPL-MCNC: 17 MG/DL — SIGNIFICANT CHANGE UP (ref 7–23)
CALCIUM SERPL-MCNC: 8.6 MG/DL — SIGNIFICANT CHANGE UP (ref 8.4–10.5)
CHLORIDE SERPL-SCNC: 110 MMOL/L — HIGH (ref 96–108)
CO2 SERPL-SCNC: 25 MMOL/L — SIGNIFICANT CHANGE UP (ref 22–31)
CREAT SERPL-MCNC: 0.76 MG/DL — SIGNIFICANT CHANGE UP (ref 0.5–1.3)
EGFR: 72 ML/MIN/1.73M2 — SIGNIFICANT CHANGE UP
GLUCOSE BLDC GLUCOMTR-MCNC: 111 MG/DL — HIGH (ref 70–99)
GLUCOSE BLDC GLUCOMTR-MCNC: 114 MG/DL — HIGH (ref 70–99)
GLUCOSE BLDC GLUCOMTR-MCNC: 87 MG/DL — SIGNIFICANT CHANGE UP (ref 70–99)
GLUCOSE SERPL-MCNC: 111 MG/DL — HIGH (ref 70–99)
HCT VFR BLD CALC: 30.4 % — LOW (ref 34.5–45)
HGB BLD-MCNC: 10.1 G/DL — LOW (ref 11.5–15.5)
MAGNESIUM SERPL-MCNC: 2.2 MG/DL — SIGNIFICANT CHANGE UP (ref 1.6–2.6)
MCHC RBC-ENTMCNC: 30.3 PG — SIGNIFICANT CHANGE UP (ref 27–34)
MCHC RBC-ENTMCNC: 33.2 GM/DL — SIGNIFICANT CHANGE UP (ref 32–36)
MCV RBC AUTO: 91.3 FL — SIGNIFICANT CHANGE UP (ref 80–100)
NRBC # BLD: 0 /100 WBCS — SIGNIFICANT CHANGE UP (ref 0–0)
PHOSPHATE SERPL-MCNC: 3.4 MG/DL — SIGNIFICANT CHANGE UP (ref 2.5–4.5)
PLATELET # BLD AUTO: 265 K/UL — SIGNIFICANT CHANGE UP (ref 150–400)
POTASSIUM SERPL-MCNC: 4.4 MMOL/L — SIGNIFICANT CHANGE UP (ref 3.5–5.3)
POTASSIUM SERPL-SCNC: 4.4 MMOL/L — SIGNIFICANT CHANGE UP (ref 3.5–5.3)
RBC # BLD: 3.33 M/UL — LOW (ref 3.8–5.2)
RBC # FLD: 17.6 % — HIGH (ref 10.3–14.5)
SODIUM SERPL-SCNC: 145 MMOL/L — SIGNIFICANT CHANGE UP (ref 135–145)
WBC # BLD: 12.98 K/UL — HIGH (ref 3.8–10.5)
WBC # FLD AUTO: 12.98 K/UL — HIGH (ref 3.8–10.5)

## 2024-07-25 PROCEDURE — 99233 SBSQ HOSP IP/OBS HIGH 50: CPT | Mod: 25

## 2024-07-25 PROCEDURE — 99232 SBSQ HOSP IP/OBS MODERATE 35: CPT

## 2024-07-25 PROCEDURE — 99291 CRITICAL CARE FIRST HOUR: CPT

## 2024-07-25 RX ORDER — OXYCODONE HYDROCHLORIDE 30 MG/1
5 TABLET ORAL EVERY 6 HOURS
Refills: 0 | Status: DISCONTINUED | OUTPATIENT
Start: 2024-07-25 | End: 2024-07-26

## 2024-07-25 RX ORDER — HEPARIN SODIUM 1000 [USP'U]/ML
1000 INJECTION, SOLUTION INTRAVENOUS; SUBCUTANEOUS
Qty: 25000 | Refills: 0 | Status: DISCONTINUED | OUTPATIENT
Start: 2024-07-25 | End: 2024-07-25

## 2024-07-25 RX ORDER — ASPIRIN 325 MG
10 TABLET ORAL ONCE
Refills: 0 | Status: COMPLETED | OUTPATIENT
Start: 2024-07-25 | End: 2024-07-25

## 2024-07-25 RX ORDER — BACTERIOSTATIC SODIUM CHLORIDE 0.9 %
1000 VIAL (ML) INJECTION
Refills: 0 | Status: DISCONTINUED | OUTPATIENT
Start: 2024-07-25 | End: 2024-07-25

## 2024-07-25 RX ORDER — DEXMEDETOMIDINE HYDROCHLORIDE 4 UG/ML
0.2 INJECTION, SOLUTION INTRAVENOUS
Qty: 400 | Refills: 0 | Status: DISCONTINUED | OUTPATIENT
Start: 2024-07-25 | End: 2024-07-26

## 2024-07-25 RX ORDER — PROPOFOL 10 MG/ML
4.99 INJECTION, EMULSION INTRAVENOUS
Qty: 1000 | Refills: 0 | Status: DISCONTINUED | OUTPATIENT
Start: 2024-07-25 | End: 2024-07-25

## 2024-07-25 RX ORDER — MIDODRINE HYDROCHLORIDE 2.5 MG/1
5 TABLET ORAL EVERY 8 HOURS
Refills: 0 | Status: DISCONTINUED | OUTPATIENT
Start: 2024-07-25 | End: 2024-07-27

## 2024-07-25 RX ORDER — SIMETHICONE 125 MG/1
80 TABLET, CHEWABLE ORAL EVERY 8 HOURS
Refills: 0 | Status: DISCONTINUED | OUTPATIENT
Start: 2024-07-25 | End: 2024-08-02

## 2024-07-25 RX ORDER — HEPARIN SODIUM 1000 [USP'U]/ML
800 INJECTION, SOLUTION INTRAVENOUS; SUBCUTANEOUS
Qty: 25000 | Refills: 0 | Status: DISCONTINUED | OUTPATIENT
Start: 2024-07-25 | End: 2024-07-25

## 2024-07-25 RX ORDER — HEPARIN SODIUM 1000 [USP'U]/ML
500 INJECTION, SOLUTION INTRAVENOUS; SUBCUTANEOUS
Qty: 25000 | Refills: 0 | Status: DISCONTINUED | OUTPATIENT
Start: 2024-07-25 | End: 2024-07-26

## 2024-07-25 RX ORDER — DEXMEDETOMIDINE HYDROCHLORIDE 4 UG/ML
0.2 INJECTION, SOLUTION INTRAVENOUS
Qty: 400 | Refills: 0 | Status: DISCONTINUED | OUTPATIENT
Start: 2024-07-25 | End: 2024-07-25

## 2024-07-25 RX ADMIN — Medication 1000 MILLIGRAM(S): at 23:00

## 2024-07-25 RX ADMIN — Medication 100 MILLIGRAM(S): at 01:40

## 2024-07-25 RX ADMIN — PANTOPRAZOLE SODIUM 40 MILLIGRAM(S): 20 TABLET, DELAYED RELEASE ORAL at 12:09

## 2024-07-25 RX ADMIN — Medication 1000 MILLIGRAM(S): at 22:08

## 2024-07-25 RX ADMIN — OXYCODONE HYDROCHLORIDE 5 MILLIGRAM(S): 30 TABLET ORAL at 00:45

## 2024-07-25 RX ADMIN — Medication 500 MILLIGRAM(S): at 06:29

## 2024-07-25 RX ADMIN — Medication 500 MILLIGRAM(S): at 13:42

## 2024-07-25 RX ADMIN — Medication 1000 MILLIGRAM(S): at 13:43

## 2024-07-25 RX ADMIN — NITROGLYCERIN 1 INCH(S): 400 AEROSOL, METERED SUBLINGUAL at 23:48

## 2024-07-25 RX ADMIN — NITROGLYCERIN 1 INCH(S): 400 AEROSOL, METERED SUBLINGUAL at 12:09

## 2024-07-25 RX ADMIN — Medication 1000 MILLIGRAM(S): at 14:00

## 2024-07-25 RX ADMIN — SIMETHICONE 80 MILLIGRAM(S): 125 TABLET, CHEWABLE ORAL at 22:07

## 2024-07-25 RX ADMIN — SIMETHICONE 80 MILLIGRAM(S): 125 TABLET, CHEWABLE ORAL at 13:43

## 2024-07-25 RX ADMIN — PROPOFOL 1.97 MICROGRAM(S)/KG/MIN: 10 INJECTION, EMULSION INTRAVENOUS at 03:58

## 2024-07-25 RX ADMIN — Medication 500 MILLIGRAM(S): at 12:09

## 2024-07-25 RX ADMIN — Medication 10 MILLIGRAM(S): at 13:43

## 2024-07-25 RX ADMIN — Medication 500 MILLIGRAM(S): at 22:07

## 2024-07-25 RX ADMIN — Medication 1 DROP(S): at 22:07

## 2024-07-25 RX ADMIN — Medication 1 TABLET(S): at 12:09

## 2024-07-25 RX ADMIN — Medication 1 ENEMA: at 05:21

## 2024-07-25 RX ADMIN — OXYCODONE HYDROCHLORIDE 5 MILLIGRAM(S): 30 TABLET ORAL at 07:30

## 2024-07-25 RX ADMIN — DEXMEDETOMIDINE HYDROCHLORIDE 3.29 MICROGRAM(S)/KG/HR: 4 INJECTION, SOLUTION INTRAVENOUS at 06:28

## 2024-07-25 RX ADMIN — Medication 81 MILLIGRAM(S): at 12:09

## 2024-07-25 RX ADMIN — DEXMEDETOMIDINE HYDROCHLORIDE 3.29 MICROGRAM(S)/KG/HR: 4 INJECTION, SOLUTION INTRAVENOUS at 18:49

## 2024-07-25 RX ADMIN — Medication 1000 MILLIGRAM(S): at 07:30

## 2024-07-25 RX ADMIN — MIDODRINE HYDROCHLORIDE 5 MILLIGRAM(S): 2.5 TABLET ORAL at 22:08

## 2024-07-25 RX ADMIN — CHLORHEXIDINE GLUCONATE 1 APPLICATION(S): 500 CLOTH TOPICAL at 06:32

## 2024-07-25 RX ADMIN — Medication 1000 MILLIGRAM(S): at 06:29

## 2024-07-25 RX ADMIN — MIDODRINE HYDROCHLORIDE 5 MILLIGRAM(S): 2.5 TABLET ORAL at 12:09

## 2024-07-25 RX ADMIN — Medication 1000 MILLIGRAM(S): at 00:45

## 2024-07-25 RX ADMIN — Medication 4 MILLIGRAM(S): at 12:10

## 2024-07-25 RX ADMIN — CHLORHEXIDINE GLUCONATE 15 MILLILITER(S): 500 CLOTH TOPICAL at 06:29

## 2024-07-25 RX ADMIN — OXYCODONE HYDROCHLORIDE 5 MILLIGRAM(S): 30 TABLET ORAL at 06:29

## 2024-07-25 RX ADMIN — HEPARIN SODIUM 10 UNIT(S)/HR: 1000 INJECTION, SOLUTION INTRAVENOUS; SUBCUTANEOUS at 07:25

## 2024-07-25 RX ADMIN — NITROGLYCERIN 1 INCH(S): 400 AEROSOL, METERED SUBLINGUAL at 05:29

## 2024-07-25 RX ADMIN — HEPARIN SODIUM 5 UNIT(S)/HR: 1000 INJECTION, SOLUTION INTRAVENOUS; SUBCUTANEOUS at 23:19

## 2024-07-25 NOTE — PROGRESS NOTE ADULT - SUBJECTIVE AND OBJECTIVE BOX
HPI:  89 year old female, PMH HTN, HLD, CHF, severe AS, and chronic C-2 fracture x 2 years, presented to [facility] ED with worsening weakness and difficulty ambulating since 7/14. Per home health aide, pt had a fall with +head strike on July 6th which she did not receive medical attention for. On 7/14, HHA noted pt was having more difficulty ambulating and was dropping objects and not able to feed herself as before. Pt was seen on 7/17 at [facility] and had a CT head and CT c-spine. 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 The provider at [facility] offered to pt, family, and pt's PCP, Dr. Doan who was at bedside, to call spine surgery at [facility] and all parties refused, preferring to take patient home. Pt was discharged home with her HHA. Since 7/17, pt's weakness has progressed, prompting them to come to the ED. She denies falls since 7/6. Pt came in with soft collar on, which was removed by HHA while in ED.  (19 Jul 2024 16:16)    OVERNIGHT EVENTS: [facility]    Hospital Course:   7/19: admitted to [facility] for surgery with Dr. Puckett, given 15mg torodol for pain, 0.25 dilaudid for pain, 0.125 xanax for anxiety, 10 hydral for high BP.   7/20: FEDER[facility], remains on precedex. Cardiology consulted for preop clearance.   7/21: [facility]ERICA ovn. cardiology clearance obtained. Liquid BMs x3, started on metamucil.  7/22: TTE complete. DC precedex.   7/23: [facility]ER[facility] overnight. Na+ 131 from 141. rpt 135, 500cc bolus given.   7/24: [facility]ER[facility] overnight. OR today. POD0 from O-C5 fusion, c1 lami. Post-op pulses not palpable, vasc consulted. PT and AT pulses dopplerable b/l, no DP. RUE pulses not dopplerable. Hep gtt + bolus started per vascular. duplex: R radial occlusion. SBP 80s-90s, given 500cc bolus NS and 250cc albumin. NGT placed  7/25: POD1.     Vital Signs Last 24 Hrs  T(C): 36.3 (24 Jul 2024 21:55), Max: 36.7 (24 Jul 2024 01:11)  T(F): 97.3 (24 Jul 2024 21:55), Max: 98 (24 Jul 2024 01:11)  HR: 59 (24 Jul 2024 23:00) (49 - 82)  BP: 120/59 (24 Jul 2024 21:00) (81/54 - 170/82)  BP(mean): 84 (24 Jul 2024 21:00) (63 - 117)  RR: 12 (24 Jul 2024 23:00) (12 - 19)  SpO2: 100% (24 Jul 2024 23:00) (86% - 100%)    Parameters below as of 24 Jul 2024 23:00  Patient On (Oxygen Delivery Method): ventilator    O2 Concentration (%): 40    I&O's Summary    23 Jul 2024 07:01  -  24 Jul 2024 07:00  --------------------------------------------------------  IN: 1270 mL / OUT: 1350 mL / NET: -80 mL    24 Jul 2024 07:01  -  25 Jul 2024 00:27  --------------------------------------------------------  IN: 1847 mL / OUT: 597 mL / NET: 1250 mL        PHYSICAL EXAM:  GEN: sedated on propofol   NEURO: not alert. does not  FC, OE to noxious, face symmetric. PERRL. w/d x4, less on LUE  CV: RRR +S1/S2  PULM: CTAB  GI: Abd soft, NT/ND  EXT: RUE w/ ecchymosis. extremities x4 warm w/ +pulses on doppler  WOUND: cervical incision c/d/i    TUBES/LINES:  [x] Espinoza  [] Lumbar Drain  [x] Wound Drains: HMV  [] Others      DIET:  [x] NPO  [] Mechanical  [] Tube feeds    LABS:                        12.5   9.27  )-----------( 284      ( 24 Jul 2024 15:42 )             38.4     07-24    143  |  107  |  16  ----------------------------<  139<H>  4.3   |  27  |  0.87    Ca    9.5      24 Jul 2024 15:42  Phos  3.5     07-24  Mg     1.8     07-24    TPro  6.1  /  Alb  3.1<L>  /  TBili  0.6  /  DBili  x   /  AST  27  /  ALT  11  /  AlkPhos  61  07-24    PT/INR - ( 24 Jul 2024 15:42 )   PT: 10.7 sec;   INR: 0.94          PTT - ( 24 Jul 2024 19:28 )  PTT:53.3 sec  Urinalysis Basic - ( 24 Jul 2024 15:42 )    Color: x / Appearance: x / SG: x / pH: x  Gluc: 139 mg/dL / Ketone: x  / Bili: x / Urobili: x   Blood: x / Protein: x / Nitrite: x   Leuk Esterase: x / RBC: x / WBC x   Sq Epi: x / Non Sq Epi: x / Bacteria: x          CAPILLARY BLOOD GLUCOSE      POCT Blood Glucose.: 107 mg/dL (24 Jul 2024 21:19)      Drug Levels: [] N/A    CSF Analysis: [] N/A      Allergies    penicillin (Unknown)  erythromycin (Unknown)  [This allergen will not trigger allergy alert] Acetic Ci-Tiozscuxxl-Nzgvehthm (Other)    Intolerances      MEDICATIONS:  Antibiotics:  ceFAZolin   IVPB 2000 milliGRAM(s) IV Intermittent every 8 hours    Neuro:  acetaminophen     Tablet .. 1000 milliGRAM(s) Oral every 8 hours  DULoxetine 30 milliGRAM(s) Oral two times a day  fentaNYL    Injectable 12.5 MICROGram(s) IV Push every 2 hours PRN  methocarbamol 500 milliGRAM(s) Oral every 8 hours  ondansetron   Disintegrating Tablet 4 milliGRAM(s) Oral every 6 hours  oxyCODONE    IR 5 milliGRAM(s) Oral every 6 hours  propofol Infusion 5 MICROgram(s)/kG/Min IV Continuous <Continuous>    Anticoagulation:  aspirin  chewable 81 milliGRAM(s) Oral daily  heparin  Infusion 1200 Unit(s)/Hr IV Continuous <Continuous>    OTHER:  chlorhexidine 0.12% Liquid 15 milliLiter(s) Oral Mucosa every 12 hours  chlorhexidine 2% Cloths 1 Application(s) Topical <User Schedule>  insulin lispro (ADMELOG) corrective regimen sliding scale   SubCutaneous three times a day before meals  latanoprost 0.005% Ophthalmic Solution 1 Drop(s) Both EYES at bedtime  nitroglycerin    2% Ointment 1 Inch(s) Transdermal daily  pantoprazole  Injectable 40 milliGRAM(s) IV Push daily  phenylephrine    Infusion 0.1 MICROgram(s)/kG/Min IV Continuous <Continuous>  saline laxative (FLEET) Rectal Enema 1 Enema Rectal once    IVF:  ascorbic acid 500 milliGRAM(s) Oral daily  multivitamin 1 Tablet(s) Oral daily  sodium chloride 0.9%. 1000 milliLiter(s) IV Continuous <Continuous>    CULTURES:    RADIOLOGY & ADDITIONAL TESTS:      ASSESSMENT:  89 year old female, PMH HTN, HLD, CHF, AS, chronic C2 fracture, presenting to [facility] ED with 1 week of worsening weakness and difficulty ambulating at home. Pt was seen at [facility] ED on 7/17, and had CT c-spine which showed progression of anteriorly displaced proximal fracture C2 and mass effect with severe stenosis of the cervical medullary junction. Pt has been seen outpatient by Dr. Butler and Dr. Puckett. Pt's family called outpatient office and was advised to come to ED for admission for surgery. Now s/p occipt -C5 fusion with C1 laminectomy 7/24 potop c/b hypercoaguable state.     C2 CERVICAL FRACTURE;NECK PAIN    Allergy status to other antibiotic agents    Allergy status to penicillin    Aneurysm of unspecified site    Anterior displaced type ii dens fracture, initial encounter for closed fracture    Atherosclerotic heart disease of native coronary artery without angina pectoris    Bilateral primary osteoarthritis of knee    CAP GLAUC PSX LENS LT EYE MOD STAGE    CAPSLR GLAUCOMA W/PSEUDXF LENS, BILATERAL, MODERATE STAGE    Cervicalgia    Chronic diastolic (congestive) heart failure    Dizziness and giddiness    Hyperlipidemia, unspecified    Hypertensive heart disease with heart failure    Iliotibial band syndrome, right leg    Long term (current) use of aspirin    Nonrheumatic aortic (valve) stenosis    Old myocardial infarction    Other specific arthropathies, not elsewhere classified, left shoulder    Pain in right leg    Pain in unspecified shoulder    Personal history of other diseases of the circulatory system    Personal history of other endocrine, nutritional and metabolic disease    Presence of other heart-valve replacement    Shortness of breath    Unspecified glaucoma    Vertigo of central origin    History of tobacco use    Sex Assigned At Birth    Sex Assigned At Birth    Alcohol intake    FH: lung cancer (Father)    Handoff    MEWS Score    Prior    Essential hypertension    Hyperlipidemia, unspecified hyperlipidemia type    Diabetes insipidus    H/O aortic valve stenosis    Hypertension    Vertigo    Chronic diastolic congestive heart failure    Dyslipidemia    History of pseudoaneurysm    Glaucoma    Closed C2 fracture    Open dens fracture    Dens fracture    Dens fracture    C2 cervical fracture    Anterior displaced type ii dens fracture, sequela    H/O aortic valve stenosis    Hyperlipidemia, unspecified hyperlipidemia type    Hypertension    Glaucoma    Fusion, occipital bone with C1 and C2 vertebrae, posterior approach    History of orthopedic surgery    S/P AVR    H/O lumbosacral spine surgery    S/P hip replacement, right    S/P hip replacement    S/P right coronary artery (RCA) stent placement    NECK PAIN    2    Room Service Assist    Room Service Assist    Neck pain    [email or web address]        PLAN:  Neuro:   - neuro/vitals q1h  - pain control: tylenol, oxy 5q6 standing x 24 hrs, fent 12.5 q2 prn   - sedation: propofol gtt RASS -2 to -3   - post op collar not at bedside, andres aware   - anxiety: home xanax 0.25mg qhs, cymbalta 30mg  - 1 deep HMV, monitor output   - pending post op xrays     Cards:   - normotensive sbp goal, yifan gtt   - hx CHF: TTE 7/22: EF 67%, TAVR , mod MR, mod mitral stenosis, mild-mod TR, pulm HTN   - Hx TAVR: cont home ASA 81mg (keep for surgery)  - cardiology consulted for preop clearance - moderate risk     Pulm:   - intubated to AC/VC     GI:   - NPO  - bowel regimen held for loose stool, LBM 7/24  - Abd XR 7/22: poss ileus, holding Simethicone 80mg q8 post op     Renal:   - NS @65  - (+) espinoza     Endo:   - ISS while NPO   - a1c = 5.3    Heme:   - h/h stable  - s/p hep bolus, hep gtt for PTT goal 60-80,  - vacular consulted f/u recs: nitroglycerin ointment to fingers/toes daily   - dvt ppx: SDCs held/SQL held for OR   - arterial dopplers 7/24: R mid-radial occlusion    ID:   - afebrile  - post op ancef     Dispo: ICU, full code, dispo pending    D/w Dr Puckett, Dr Sheriff    Assessment:  Present when checked    []  GCS  E   V  M     Heart Failure: []Acute, [] acute on chronic , []chronic  Heart Failure:  [] Diastolic (HFpEF), [] Systolic (HFrEF), []Combined (HFpEF and HFrEF), [] RHF, [] Pulm HTN, [] Other    [] KAYLYN, [] ATN, [] AIN, [] other  [] CKD1, [] CKD2, [] CKD 3, [] CKD 4, [] CKD 5, []ESRD    Encephalopathy: [] Metabolic, [] Hepatic, [] toxic, [] Neurological, [] Other    Abnormal Nurtitional Status: [] malnurtition (see nutrition note), [ ]underweight: BMI < 19, [] morbid obesity: BMI >40, [] Cachexia    [] Sepsis  [] hypovolemic shock,[] cardiogenic shock, [] hemorrhagic shock, [] neuogenic shock  [] Acute Respiratory Failure  []Cerebral edema, [] Brain compression/ herniation,   [] Functional quadriplegia  [] Acute blood loss anemia

## 2024-07-25 NOTE — PHYSICAL THERAPY INITIAL EVALUATION ADULT - GENERAL OBSERVATIONS, REHAB EVAL
Pt received semi supine in bed with +prima fit, +EKG, +IV, +NC~2L, +rectal probe, +[place] cervical collar, +hemo vac x 1, NAD, aide present. OT Geovani present. Pt left as found, NAD, call bell in reach, line and drain intact, aide present, ZOILA parra. Pt received semi supine in bed with +prima fit, +NGT, +EKG, +IV, +NC~2L, +rectal probe, +[place] J cervical collar, +hemo vac x 1, NAD, aide present. OT Geovani present. Pt left as found, NAD, call bell in reach, line and drain intact, aide present, ZOILA parra.

## 2024-07-25 NOTE — PHYSICAL THERAPY INITIAL EVALUATION ADULT - GAIT DEVIATIONS NOTED, PT EVAL
+retropulsion, unsteady gait, no lose of balance./increased time in double stance/decreased weight-shifting ability

## 2024-07-25 NOTE — PROGRESS NOTE ADULT - ASSESSMENT
ASSESSMENT:   89 y/o F with chronic C2 fracture, LUE weakness found to have worsening cervical stenosis  POD 1 status post O to C5 fusion. C1 laminectomy  Post-op cyanosis of extremities; r/o acute limb ischemia.  HTN, HLD, anxiety, CHF, severe AS    PLAN:   NEURO:  Neurochecks Q2  Post op Xrays  Drains: Deep HMV; monitor output  Agitation: On precedex (RASS 0- neg 1)  Continue duloxetine and alprazolam  [place] J post op  Analgesia prn (pain management post op)  Activity: Bedrest for now    PULM: Intubated (critical airway)  CPAP with goal to extubate  CXR, ABG    CARDS:  SBP goal 100-160  TTE: 7/22/24: Normal left and right ventricular size and systolic function, EF 67%. Normal atria.  A transcatheter aortic valve (TAVR) is noted in the aortic position.   Moderate mitral regurgitation. Moderate mitral stenosis. Mild-to-moderate tricuspid regurgitation.   On ASA 81mg  Cardiology following     VASC: Post-op cyanosis of extremities; r/o acute limb ischemia.  On heparin gtt. Monitor PTTs Q6 (Goal PTT 60-80)  Lactate wnl  Continue Nitropaste to all fingers and toes  Arterial duplex:  Vascular surgery following    GI:  NPO for extubation  GI ppx: PPI while intubated  Abdominal distension: AXR   Enema**  LBM: **    RENAL:   Na goal 135-145  Fluids: NS at 40cc/hr  Monitor strict Is/Os; maintain Vega catheter  Monitor BMPs closely    HEME:  On heparin gtt. Monitor PTTs Q6 (Goal PTT 60-80)  Arterial duplex of all extremities  Monitor H/H  VTE ppx: Heparin gtt.     ID:   Monitor for fevers     MISC:    SOCIAL/FAMILY:  [] awaiting [x] updated at bedside [] family meeting    CODE STATUS:  [x] Full Code [] DNR [] DNI [] Palliative/Comfort Care    DISPOSITION:  [x] ICU [] Stroke Unit [] Floor [] EMU [] RCU [] PCU    Time spent: 60 critical care minutes         ASSESSMENT:   89 y/o F with chronic C2 fracture, LUE weakness found to have worsening cervical stenosis  POD 1 status post O to C5 fusion. C1 laminectomy  Post-op cyanosis of extremities; r/o acute limb ischemia.  HTN, HLD, anxiety, CHF, severe AS    PLAN:   NEURO:  Neurochecks Q2  Post op Xrays  Drains: Deep HMV; monitor output  Agitation: On precedex (RASS 0- neg 1)  Continue duloxetine and alprazolam  [place] J post op  Analgesia prn (pain management post op)  Activity: Bedrest for now    PULM: Intubated (critical airway)  CPAP with goal to extubate  CXR, ABG    CARDS:  SBP goal 100-160  TTE: 7/22/24: Normal left and right ventricular size and systolic function, EF 67%. Normal atria.  A transcatheter aortic valve (TAVR) is noted in the aortic position.   Moderate mitral regurgitation. Moderate mitral stenosis. Mild-to-moderate tricuspid regurgitation.   On ASA 81mg  Cardiology following     VASC: Post-op cyanosis of extremities; r/o acute limb ischemia.  On heparin gtt. Monitor PTTs Q6 (Goal PTT 60-80)  Lactate wnl  Continue Nitropaste to all fingers and toes  Arterial duplex:  Vascular surgery following    GI:  NPO for extubation  GI ppx: PPI while intubated  Abdominal distension: AXR   Enema and suppository  LBM: 7/24 though liquid.    RENAL:   Na goal 135-145  Fluids: NS at 40cc/hr  Monitor strict Is/Os; maintain Vega catheter  Monitor BMPs closely    HEME:  On heparin gtt. Monitor PTTs Q6 (Goal PTT 60-80)  Arterial duplex of all extremities  Monitor H/H  VTE ppx: Heparin gtt.     ID:   Monitor for fevers     MISC:    SOCIAL/FAMILY:  [] awaiting [x] updated at bedside [] family meeting    CODE STATUS:  [x] Full Code [] DNR [] DNI [] Palliative/Comfort Care    DISPOSITION:  [x] ICU [] Stroke Unit [] Floor [] EMU [] RCU [] PCU    Time spent: 60 critical care minutes         ASSESSMENT:   89 y/o F with chronic C2 fracture, LUE weakness found to have worsening cervical stenosis  POD 1 status post O to C5 fusion. C1 laminectomy  Post-op cyanosis of extremities; r/o acute limb ischemia.  HTN, HLD, anxiety, CHF, severe AS    PLAN:   NEURO:  Neurochecks Q2  Post op Xrays  Drains: Deep HMV; monitor output  Agitation: On precedex (RASS 0- neg 1)  Continue duloxetine and alprazolam  [place] J post op  Analgesia prn (pain management post op)  Activity: Bedrest for now    PULM: Intubated (critical airway)  CPAP with goal to extubate  CXR, ABG    CARDS:  SBP goal 100-160  TTE: 7/22/24: Normal left and right ventricular size and systolic function, EF 67%. Normal atria.  A transcatheter aortic valve (TAVR) is noted in the aortic position.   Moderate mitral regurgitation. Moderate mitral stenosis. Mild-to-moderate tricuspid regurgitation.   On ASA 81mg  Cardiology following     VASC: Post-op cyanosis of extremities; r/o acute limb ischemia.  On heparin gtt. Monitor PTTs Q6 (Goal PTT 60-80)  Lactate wnl  Continue Nitropaste to all fingers and toes  Arterial duplex:  Vascular surgery following    GI:  NPO for extubation  GI ppx: PPI while intubated  Abdominal distension: AXR   Enema and suppository  LBM: 7/24 though liquid.    RENAL:   Na goal 135-145  Fluids: NS at 40cc/hr  Monitor strict Is/Os; maintain Vega catheter until extubation  Monitor BMPs closely    HEME:  On heparin gtt. Monitor PTTs Q6 (Goal PTT 60-80)  Arterial duplex of all extremities  Monitor H/H  VTE ppx: Heparin gtt.     ID:   Monitor for fevers     MISC:    SOCIAL/FAMILY:  [] awaiting [x] updated at bedside [] family meeting    CODE STATUS:  [x] Full Code [] DNR [] DNI [] Palliative/Comfort Care    DISPOSITION:  [x] ICU [] Stroke Unit [] Floor [] EMU [] RCU [] PCU    Time spent: 60 critical care minutes

## 2024-07-25 NOTE — PHYSICAL THERAPY INITIAL EVALUATION ADULT - MANUAL MUSCLE TESTING RESULTS, REHAB EVAL
b/l ~3+/5, R elbow flex/ext~3-/5, R shoulder flexion~2+/5, L shoulder flexion~2/5, L elbow flexi/ext~3-/5, b/l LEs~5/5 t/o except b/l hip flexion~4+/5 .

## 2024-07-25 NOTE — PROGRESS NOTE ADULT - ASSESSMENT
ASSESSMENT  - Difficult a-line placement by anesthesia with concern for cyanosis in several toes and fingers --> arterial duplex showed mid R radial occlusion, but patent ulnar and retrograde flow of the distal R radial. All other arteries seemed fine on US. Furthermore, her exam has significantly improved with heparin and nitropaste. Motor and sensation intact.       PLAN & RECOMMENDATIONS  - C/w therapeutic IV heparin drip if OK with neurosurgery team (eventually PO AC after a few days of stability)  - Apply nitropaste to all fingers and toes  - Serial exams    Thank you,    Ottoniel Caraballo MD   of Vascular Surgery  [facility] at [address] [place] [address]  Office: [phone]; Fax: [phone]  [email or web address]

## 2024-07-25 NOTE — OCCUPATIONAL THERAPY INITIAL EVALUATION ADULT - DIAGNOSIS, OT EVAL
Pt p/w impaired strength, coordination, postural control, balance, and functional activity tolerance, impacting ability to perform functional mobility/ADLs.

## 2024-07-25 NOTE — PROGRESS NOTE ADULT - SUBJECTIVE AND OBJECTIVE BOX
Ms. Michelle Duval is a 89F w/ HTN, HLD, CHF, severe AS s/p TAVR, and chronic C-2 fracture now admitted for headstrike, weakness and difficulty ambulating, now s/p occipital bone to C1-C2 fusion by neurosurgery. Reportedly, anesthesia team had great difficulty in placing arterial lines for the case (R radial a-line "clotted" then attempted R brachial a-line, then L radial, L brachial, R femoral then L femoral. Postoperatively there was some concern for some cyanosis of her feet, toes and L fingers. Vascular surgery was consulted. On our evaluation, she had dopplerable radial, ulnar, AT and PT signals bilaterally. Palpable brachial, femoral, and popliteal pulses. She was intubated, but did not appear to be in any pain. She was on some phenylephrine. bedside informal ultrasound appears to show flow in the distal radial, ulnar bilaterally. There may be an occlusion of mid R radial artery.       Overnight, arterial duplex showed mid R radial occlusion, but patent ulnar and retrograde flow of the distal R radial. All other arteries seemed fine on US. Furthermore, her exam has significantly improved with heparin and nitropaste. Color of toes and fingers improved. The R hand where the mid r radial occlusion was found, remains stable on exam. Motor and sensation grossly intact. Today she now has dopplerable DP/PT signals bilaterally as well.  Ms. Michelle Duval is a 89F w/ HTN, HLD, CHF, severe AS s/p TAVR, and chronic C-2 fracture now admitted for headstrike, weakness and difficulty ambulating, now s/p occipital bone to C1-C2 fusion by neurosurgery. Reportedly, anesthesia team had great difficulty in placing arterial lines for the case (R radial a-line "clotted" then attempted R brachial a-line, then L radial, L brachial, R femoral then L femoral. Postoperatively there was some concern for some cyanosis of her feet, toes and L fingers. Vascular surgery was consulted. On our evaluation, she had dopplerable radial, ulnar, palmar arch, AT and PT signals bilaterally. Palpable brachial, femoral, and popliteal pulses. She was intubated, but did not appear to be in any pain. She was on some phenylephrine. bedside informal ultrasound appears to show flow in the distal radial, ulnar bilaterally. There may be an occlusion of mid R radial artery.       Overnight, arterial duplex showed mid R radial occlusion, but patent ulnar and retrograde flow of the distal R radial. All other arteries seemed fine on US. Furthermore, her exam has significantly improved with heparin and nitropaste. Color of toes and fingers improved. The R hand where the mid r radial occlusion was found, remains stable on exam. Motor and sensation grossly intact. Today she now has dopplerable DP/PT signals bilaterally as well.

## 2024-07-25 NOTE — PHYSICAL THERAPY INITIAL EVALUATION ADULT - NSPTDISCHREC_GEN_A_CORE
In-Patient Rehab. If pt is going home, pt will be benefit from Home PT with assist of 2 for functional mobility and ADLs.

## 2024-07-25 NOTE — OCCUPATIONAL THERAPY INITIAL EVALUATION ADULT - ADDITIONAL COMMENTS
Prior to admission, pt required assist for ADLs/mobility from [facility] (24/7 assist). Pt ambulates with use of rolling walker for short distances, and wheelchair for longer/community distances. Pt has bedside commode, and elevated toilet seat with grab bars. Pt has shower chair and grab bars inside tub-shower, and is showered 2x per week with HHA.

## 2024-07-25 NOTE — PROGRESS NOTE ADULT - ASSESSMENT
89F PMH HTN, HLD, HFpEF, severe AS s/p bioprosthetic AV (8 years ago at OSH) c/b bioprosthetic AV stenosis s/p valve in valve maxime ultra 6/2021 at [facility], chronic C2 fracture presenting with weakness and difficulty ambulating, CT c-spine done showing progression of anteriorly displaced proximal fracture C2 and mass effect with severe stenosis of the cervical medullary junction, admitted to neurosurgery service with plans for surgical repair. Cardiology consulted for pre-operative assessment prior to planned cervical spine surgery on 7/24. Patient now s/p cervical-occipital bone fusion 7/24.    Review of studies:  EKG 7/19/24: NSR, rate 72, left axis deviation, 1st degree AV block, IVCD  TTE 7/22/24: Normal left and right ventricular size and systolic function, EF 67%. Normal atria. A transcatheter aortic valve (TAVR) is noted in the aortic position. The peak transvalvular velocity is 3.21 m/s, the mean transvalvular gradient is 25.00 mmHg, and the LVOT/AV velocity ratio is 0.36. Moderate mitral regurgitation. Moderate mitral stenosis. Mild-to-moderate tricuspid regurgitation. Pulmonary hypertension present, pulmonary artery systolic pressure is 50 mmHg. No pericardial effusion. Compared to the previous TTE performed on 8/9/2021, TAVR valve measurements are similar. Compared to the previous TTE performed on 12/6/2022, PASP is higher in this study with evidence of mitral stenosis.  TTE 12/2022: Left ventricular systolic function is hyperdynamic with a calculated ejection fraction of >75%. Analysis of left ventricular diastolic function and filling pressure is made challenging by the presence of mitral annular calcification. The right ventricle is normal in size. Right ventricular systolic function is normal. The left atrium is mildly dilated. There is moderate aortic stenosis. The peak transvalvular velocity is 3.10 m/s, the mean transvalvular gradient is 25.00 mmHg, and the LVOT/AV velocity ratio is 0.44. The aortic valve area (estimated via the   continuity method) is 1.12 cm². The calculated aortic valve area indexed to body surface area is 0.72 cm²/m². There is severe mitral annular calcification. There is moderate mitral regurgitation. There is trace tricuspid regurgitation. There is mild pulmonary hypertension, pulmonary artery systolic pressure is 35 mmHg. No pericardial effusion.   CCTA 6/3/21: calcium score is severe at 768 Agatston units, non obstructive CAD, dLAD is not well visualized but is probably non obstructive, shallow myocardial bridge, bioprosthetic aortic valve noted with nml leaflet thickness and excursion (motion artifactnoted), severe mitral annular/aortic calcification    Outpatient cardiologist: Dr. Al Fernandez   Home meds (cardiac): aspirin 81mg qd     Recommendations:    #Post-operative assessment   #moderate MR/MS  #AS s/p TAVR  -patient now s/p successful cervical-occipital fusion 7/24 for C2 fracture   -EKG non-ischemic, largely unchanged from prior   -TTE this admission with normal biventricular function, moderate MR, moderate MS, mild-moderate TR, and TAVR with AV gradients unchanged from prior. Valvular disease stable from prior as well.  -patient denies active CP or SOB  -on exam patient warm/well-perfused and euvolemic   -c/w aspirin 81mg qd   -would be judicious with IVF given valvular disease and HFpEF   -wean phenyl as tolerated   -Patient should follow up outpatient with her cardiologist Dr. Fernandez upon discharge from the hospital     Recommendations not final until attested by attending

## 2024-07-25 NOTE — OCCUPATIONAL THERAPY INITIAL EVALUATION ADULT - GENERAL OBSERVATIONS, REHAB EVAL
OT IE completed. Orders received, chart reviewed, pt cleared for OT by ZOILA Peng. Pt received semi supine in bed, NAD, +IV, +tele, +O2 2L NC, +Mi[place]-J cervical collar, +NG tube (disconnected), +hemovac, +primafit, +rectal probe. Pt A&Ox1, agreeable to OT, and tolerated session fairly well.

## 2024-07-25 NOTE — PHYSICAL THERAPY INITIAL EVALUATION ADULT - IMPAIRMENTS CONTRIBUTING TO GAIT DEVIATIONS, PT EVAL
Pt reports unable to ambulate further distance after taking 4 side steps./impaired balance/impaired postural control/decreased strength

## 2024-07-25 NOTE — PHYSICAL THERAPY INITIAL EVALUATION ADULT - PERTINENT HX OF CURRENT PROBLEM, REHAB EVAL
Notified dr Bianca Cortez of hr and that no note from cardiology was seen. Pt is a 89 yo female with chronic C2 fracture, LUE weakness found to have worsening cervical stenosis s/p Occipital-C5 fusion, c1 lami on 7/24/24.

## 2024-07-25 NOTE — OCCUPATIONAL THERAPY INITIAL EVALUATION ADULT - NSOTDISCHREC_GEN_A_CORE
Inpatient Rehab; If pt were to return home, would benefit from 2 person assist for all OOB mobility/ADLs.

## 2024-07-25 NOTE — PROGRESS NOTE ADULT - SUBJECTIVE AND OBJECTIVE BOX
SUBJECTIVE / INTERVAL HPI: Patient seen and examined at bedside. S/p C1-C2 to occipital bone fusion yesterday 7/24, returned to NSICU intubated, now extubated this morning. Tele review showing NSR and sinus bradycardia with first degree AV block.    PHYSICAL EXAM:    General: lying in bed in NAD, cervical collar in place , +NG tube   HEENT: NC/AT; anicteric sclera; MMM  Cardiovascular: +S1/S2, RRR, +systolic murmur   Respiratory: CTA anteriorly; no W/R/C   Extremities: WWP; no LE edema   Neurological: AAOx2    VITAL SIGNS:  Vital Signs Last 24 Hrs  T(C): 36.7 (25 Jul 2024 14:11), Max: 36.9 (25 Jul 2024 04:48)  T(F): 98.1 (25 Jul 2024 14:11), Max: 98.4 (25 Jul 2024 04:48)  HR: 67 (25 Jul 2024 19:00) (52 - 75)  BP: 98/51 (25 Jul 2024 19:00) (95/54 - 120/59)  BP(mean): 71 (25 Jul 2024 19:00) (71 - 84)  RR: 17 (25 Jul 2024 19:00) (12 - 17)  SpO2: 97% (25 Jul 2024 19:00) (94% - 100%)    Parameters below as of 25 Jul 2024 19:00  Patient On (Oxygen Delivery Method): nasal cannula  O2 Flow (L/min): 2        MEDICATIONS:  MEDICATIONS  (STANDING):  acetaminophen     Tablet .. 1000 milliGRAM(s) Oral every 8 hours  ascorbic acid 500 milliGRAM(s) Oral daily  aspirin  chewable 81 milliGRAM(s) Oral daily  chlorhexidine 2% Cloths 1 Application(s) Topical <User Schedule>  dexMEDEtomidine Infusion 0.2 MICROgram(s)/kG/Hr (3.29 mL/Hr) IV Continuous <Continuous>  DULoxetine 30 milliGRAM(s) Oral two times a day  heparin  Infusion 800 Unit(s)/Hr (8 mL/Hr) IV Continuous <Continuous>  insulin lispro (ADMELOG) corrective regimen sliding scale   SubCutaneous three times a day before meals  latanoprost 0.005% Ophthalmic Solution 1 Drop(s) Both EYES at bedtime  methocarbamol 500 milliGRAM(s) Oral every 8 hours  midodrine 5 milliGRAM(s) Oral every 8 hours  multivitamin 1 Tablet(s) Oral daily  nitroglycerin    2% Ointment 1 Inch(s) Transdermal daily  ondansetron   Disintegrating Tablet 4 milliGRAM(s) Oral every 6 hours  phenylephrine    Infusion 0.1 MICROgram(s)/kG/Min (2.47 mL/Hr) IV Continuous <Continuous>  simethicone 80 milliGRAM(s) Chew every 8 hours    MEDICATIONS  (PRN):  oxyCODONE    IR 5 milliGRAM(s) Oral every 6 hours PRN Moderate Pain (4 - 6)      ALLERGIES:  Allergies    penicillin (Unknown)  erythromycin (Unknown)  [This allergen will not trigger allergy alert] Acetic Rw-Xvetnneygc-Hbaviummi (Other)    Intolerances        LABS:                        10.1   12.98 )-----------( 265      ( 25 Jul 2024 03:15 )             30.4     07-25    145  |  110<H>  |  17  ----------------------------<  111<H>  4.4   |  25  |  0.76    Ca    8.6      25 Jul 2024 03:15  Phos  3.4     07-25  Mg     2.2     07-25    TPro  6.1  /  Alb  3.1<L>  /  TBili  0.6  /  DBili  x   /  AST  27  /  ALT  11  /  AlkPhos  61  07-24    PT/INR - ( 24 Jul 2024 15:42 )   PT: 10.7 sec;   INR: 0.94          PTT - ( 25 Jul 2024 13:28 )  PTT:75.6 sec  Urinalysis Basic - ( 25 Jul 2024 03:15 )    Color: x / Appearance: x / SG: x / pH: x  Gluc: 111 mg/dL / Ketone: x  / Bili: x / Urobili: x   Blood: x / Protein: x / Nitrite: x   Leuk Esterase: x / RBC: x / WBC x   Sq Epi: x / Non Sq Epi: x / Bacteria: x      CAPILLARY BLOOD GLUCOSE      POCT Blood Glucose.: 87 mg/dL (25 Jul 2024 16:41)      RADIOLOGY & ADDITIONAL TESTS: Reviewed.

## 2024-07-25 NOTE — OCCUPATIONAL THERAPY INITIAL EVALUATION ADULT - MODALITIES TREATMENT COMMENTS
Pt able to perform bed mobility, requiring Max Ax2 2/2 impaired postural control and decreased BUE strength. Pt required Mod-Max Ax1 to maintain balance while sitting EOB. Pt following 100% single step commands, however required frequent re-direction and repeated instruction 2/2 impaired attention and poor STM. Pt performed UB dressing while sitting EOB, requiring Max Ax1 2/2 impaired BUE strength and coordination, as well as impaired postural control and decreased sequencing due to cognitive impairment. Pt able to perform sit<->stand with Mod Ax2 using RW, and demo retropulsion while standing. Pt performed 4 L side steps with Mod Ax2 using RW, demo decreased step length and impaired ability to weight shift. Pt unable to tolerate any further activity, returned to bed and left as found, +all lines, +call bell, NAD, MELISSA, ZOILA Peng made aware.

## 2024-07-25 NOTE — OCCUPATIONAL THERAPY INITIAL EVALUATION ADULT - PERTINENT HX OF CURRENT PROBLEM, REHAB EVAL
89 year old female, PMH HTN, HLD, CHF, AS, chronic C2 fracture, presenting to [facility] ED with 1 week of worsening weakness and difficulty ambulating at home. Pt was seen at [facility] ED on 7/17, and had CT c-spine which showed progression of anteriorly displaced proximal fracture C2 and mass effect with severe stenosis of the cervical medullary junction. Pt has been seen outpatient by Dr. Butler and Dr. Puckett. Pt's family called outpatient office and was advised to come to ED for admission for surgery. Now s/p occipt -C5 fusion with C1 laminectomy 7/24 potop c/b hypercoaguable state.

## 2024-07-25 NOTE — PHYSICAL THERAPY INITIAL EVALUATION ADULT - ADDITIONAL COMMENTS
Pt lives alone in an apt with elevator. Pt has an aide for~24 hours x 7 dyas. Pt ambulated with rolling walker with assist. Pt also owns a rollator and wheelchair, shower chair, elevated seat.

## 2024-07-25 NOTE — PROGRESS NOTE ADULT - SUBJECTIVE AND OBJECTIVE BOX
=========================  NSICU ATTENDING PROGRESS NOTE  =========================    HPI: 89 year old female, PMH HTN, HLD, CHF, severe AS, and chronic C-2 fracture x 2 years, presented to [facility] ED with worsening weakness and difficulty ambulating since 7/14. Per home health aide, pt had a fall with +head strike on July 6th which she did not receive medical attention for. On 7/14, HHA noted pt was having more difficulty ambulating and was dropping objects and not able to feed herself as before. Pt was seen on 7/17 at [facility] and had a CT head and CT c-spine. 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 The provider at [facility] offered to pt, family, and pt's PCP, Dr. Doan who was at bedside, to call spine surgery at [facility] and all parties refused, preferring to take patient home. Pt was discharged home with her HHA. Since 7/17, pt's weakness has progressed, prompting them to come to the ED. She denies falls since 7/6. Pt came in with soft collar on, which was removed by HHA while in ED.  (19 Jul 2024 16:16)        ICU Vital Signs Last 24 Hrs  T(C): 36.9 (25 Jul 2024 04:48), Max: 36.9 (25 Jul 2024 04:48)  T(F): 98.4 (25 Jul 2024 04:48), Max: 98.4 (25 Jul 2024 04:48)  HR: 52 (25 Jul 2024 08:00) (49 - 75)  BP: 120/59 (24 Jul 2024 21:00) (81/54 - 123/57)  BP(mean): 84 (24 Jul 2024 21:00) (63 - 84)  ABP: 110/47 (25 Jul 2024 08:00) (94/55 - 152/80)  ABP(mean): 69 (25 Jul 2024 08:00) (69 - 101)  RR: 12 (25 Jul 2024 08:00) (12 - 12)  SpO2: 100% (25 Jul 2024 08:00) (86% - 100%)      07-24-24 @ 07:01  -  07-25-24 @ 07:00  --------------------------------------------------------  IN: 2588.8 mL / OUT: 852 mL / NET: 1736.8 mL    07-25-24 @ 07:01  -  07-25-24 @ 08:09  --------------------------------------------------------  IN: 102.1 mL / OUT: 25 mL / NET: 77.1 mL      Mode: AC/ CMV (Assist Control/ Continuous Mandatory Ventilation), RR (machine): 12, TV (machine): 350, FiO2: 40, PEEP: 5, ITime: 1, MAP: 7, PIP: 18      PHYSICAL EXAM:  Gen: On precedex. Calm  HEENT: Intubated. ETT, NGT  Neurological: AOx3 by nodding, Following Commands,  Motor exam: Moving all   Sensation: Intact  Pulmonary: Diminished  Cardiovascular: S1/S2  GI: Abdomen distended  Extremities: Dopplerable pulses. Warm and perfused    MEDICATIONS:   acetaminophen     Tablet .. 1000 milliGRAM(s) Oral every 8 hours  ascorbic acid 500 milliGRAM(s) Oral daily  aspirin  chewable 81 milliGRAM(s) Oral daily  chlorhexidine 0.12% Liquid 15 milliLiter(s) Oral Mucosa every 12 hours  chlorhexidine 2% Cloths 1 Application(s) Topical <User Schedule>  dexMEDEtomidine Infusion 0.2 MICROgram(s)/kG/Hr (3.29 mL/Hr) IV Continuous <Continuous>  DULoxetine 30 milliGRAM(s) Oral two times a day  fentaNYL    Injectable 12.5 MICROGram(s) IV Push every 2 hours PRN  heparin  Infusion 1000 Unit(s)/Hr (10 mL/Hr) IV Continuous <Continuous>  insulin lispro (ADMELOG) corrective regimen sliding scale   SubCutaneous three times a day before meals  latanoprost 0.005% Ophthalmic Solution 1 Drop(s) Both EYES at bedtime  methocarbamol 500 milliGRAM(s) Oral every 8 hours  multivitamin 1 Tablet(s) Oral daily  nitroglycerin    2% Ointment 1 Inch(s) Transdermal daily  ondansetron   Disintegrating Tablet 4 milliGRAM(s) Oral every 6 hours  oxyCODONE    IR 5 milliGRAM(s) Oral every 6 hours  pantoprazole  Injectable 40 milliGRAM(s) IV Push daily  phenylephrine    Infusion 0.1 MICROgram(s)/kG/Min (2.47 mL/Hr) IV Continuous <Continuous>  propofol Infusion 4.99 MICROgram(s)/kG/Min (1.97 mL/Hr) IV Continuous <Continuous>  sodium chloride 0.9%. 1000 milliLiter(s) (65 mL/Hr) IV Continuous <Continuous>      LABS:                      10.1   12.98 )-----------( 265      ( 25 Jul 2024 03:15 )             30.4     07-25    145  |  110<H>  |  17  ----------------------------<  111<H>  4.4   |  25  |  0.76    Ca    8.6      25 Jul 2024 03:15  Phos  3.4     07-25  Mg     2.2     07-25    TPro  6.1  /  Alb  3.1<L>  /  TBili  0.6  /  DBili  x   /  AST  27  /  ALT  11  /  AlkPhos  61  07-24    LIVER FUNCTIONS - ( 24 Jul 2024 15:42 )  Alb: 3.1 g/dL / Pro: 6.1 g/dL / ALK PHOS: 61 U/L / ALT: 11 U/L / AST: 27 U/L / GGT: x           ABG - ( 24 Jul 2024 15:10 )  pH, Arterial: 7.38  pH, Blood: x     /  pCO2: 41    /  pO2: 173   / HCO3: 24    / Base Excess: -0.8  /  SaO2: 98.7                     =========================  NSICU ATTENDING PROGRESS NOTE  =========================    HPI: 89 year old female, PMH HTN, HLD, CHF, severe AS, and chronic C-2 fracture x 2 years, presented to [facility] ED with worsening weakness and difficulty ambulating since 7/14. Per home health aide, pt had a fall with +head strike on July 6th which she did not receive medical attention for. On 7/14, HHA noted pt was having more difficulty ambulating and was dropping objects and not able to feed herself as before. Pt was seen on 7/17 at [facility] and had a CT head and CT c-spine. 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 The provider at [facility] offered to pt, family, and pt's PCP, Dr. Doan who was at bedside, to call spine surgery at [facility] and all parties refused, preferring to take patient home. Pt was discharged home with her HHA. Since 7/17, pt's weakness has progressed, prompting them to come to the ED. She denies falls since 7/6. Pt came in with soft collar on, which was removed by HHA while in ED.  (19 Jul 2024 16:16)        ICU Vital Signs Last 24 Hrs  T(C): 36.9 (25 Jul 2024 04:48), Max: 36.9 (25 Jul 2024 04:48)  T(F): 98.4 (25 Jul 2024 04:48), Max: 98.4 (25 Jul 2024 04:48)  HR: 52 (25 Jul 2024 08:00) (49 - 75)  BP: 120/59 (24 Jul 2024 21:00) (81/54 - 123/57)  BP(mean): 84 (24 Jul 2024 21:00) (63 - 84)  ABP: 110/47 (25 Jul 2024 08:00) (94/55 - 152/80)  ABP(mean): 69 (25 Jul 2024 08:00) (69 - 101)  RR: 12 (25 Jul 2024 08:00) (12 - 12)  SpO2: 100% (25 Jul 2024 08:00) (86% - 100%)      07-24-24 @ 07:01  -  07-25-24 @ 07:00  --------------------------------------------------------  IN: 2588.8 mL / OUT: 852 mL / NET: 1736.8 mL    07-25-24 @ 07:01  -  07-25-24 @ 08:09  --------------------------------------------------------  IN: 102.1 mL / OUT: 25 mL / NET: 77.1 mL      Mode: AC/ CMV (Assist Control/ Continuous Mandatory Ventilation), RR (machine): 12, TV (machine): 350, FiO2: 40, PEEP: 5, ITime: 1, MAP: 7, PIP: 18      PHYSICAL EXAM:  Gen: On precedex. Calm  HEENT: Intubated. ETT, NGT  Neurological: AOx3 by nodding, Following commands  Motor exam: Moving all extremities AG with limited cooperation  Sensation: Intact  Pulmonary: Diminished  Cardiovascular: S1/S2  GI: Abdomen distended  Extremities: Dopplerable pulses. Warm and perfused    MEDICATIONS:   acetaminophen     Tablet .. 1000 milliGRAM(s) Oral every 8 hours  ascorbic acid 500 milliGRAM(s) Oral daily  aspirin  chewable 81 milliGRAM(s) Oral daily  chlorhexidine 0.12% Liquid 15 milliLiter(s) Oral Mucosa every 12 hours  chlorhexidine 2% Cloths 1 Application(s) Topical <User Schedule>  dexMEDEtomidine Infusion 0.2 MICROgram(s)/kG/Hr (3.29 mL/Hr) IV Continuous <Continuous>  DULoxetine 30 milliGRAM(s) Oral two times a day  fentaNYL    Injectable 12.5 MICROGram(s) IV Push every 2 hours PRN  heparin  Infusion 1000 Unit(s)/Hr (10 mL/Hr) IV Continuous <Continuous>  insulin lispro (ADMELOG) corrective regimen sliding scale   SubCutaneous three times a day before meals  latanoprost 0.005% Ophthalmic Solution 1 Drop(s) Both EYES at bedtime  methocarbamol 500 milliGRAM(s) Oral every 8 hours  multivitamin 1 Tablet(s) Oral daily  nitroglycerin    2% Ointment 1 Inch(s) Transdermal daily  ondansetron   Disintegrating Tablet 4 milliGRAM(s) Oral every 6 hours  oxyCODONE    IR 5 milliGRAM(s) Oral every 6 hours  pantoprazole  Injectable 40 milliGRAM(s) IV Push daily  phenylephrine    Infusion 0.1 MICROgram(s)/kG/Min (2.47 mL/Hr) IV Continuous <Continuous>  propofol Infusion 4.99 MICROgram(s)/kG/Min (1.97 mL/Hr) IV Continuous <Continuous>  sodium chloride 0.9%. 1000 milliLiter(s) (65 mL/Hr) IV Continuous <Continuous>      LABS:                      10.1   12.98 )-----------( 265      ( 25 Jul 2024 03:15 )             30.4     07-25    145  |  110<H>  |  17  ----------------------------<  111<H>  4.4   |  25  |  0.76    Ca    8.6      25 Jul 2024 03:15  Phos  3.4     07-25  Mg     2.2     07-25    TPro  6.1  /  Alb  3.1<L>  /  TBili  0.6  /  DBili  x   /  AST  27  /  ALT  11  /  AlkPhos  61  07-24    LIVER FUNCTIONS - ( 24 Jul 2024 15:42 )  Alb: 3.1 g/dL / Pro: 6.1 g/dL / ALK PHOS: 61 U/L / ALT: 11 U/L / AST: 27 U/L / GGT: x           ABG - ( 24 Jul 2024 15:10 )  pH, Arterial: 7.38  pH, Blood: x     /  pCO2: 41    /  pO2: 173   / HCO3: 24    / Base Excess: -0.8  /  SaO2: 98.7

## 2024-07-26 LAB
ANION GAP SERPL CALC-SCNC: 13 MMOL/L — SIGNIFICANT CHANGE UP (ref 5–17)
APTT BLD: 23.6 SEC — LOW (ref 24.5–35.6)
APTT BLD: 68.1 SEC — HIGH (ref 24.5–35.6)
BUN SERPL-MCNC: 21 MG/DL — SIGNIFICANT CHANGE UP (ref 7–23)
CALCIUM SERPL-MCNC: 8.5 MG/DL — SIGNIFICANT CHANGE UP (ref 8.4–10.5)
CHLORIDE SERPL-SCNC: 107 MMOL/L — SIGNIFICANT CHANGE UP (ref 96–108)
CO2 SERPL-SCNC: 23 MMOL/L — SIGNIFICANT CHANGE UP (ref 22–31)
CREAT SERPL-MCNC: 0.97 MG/DL — SIGNIFICANT CHANGE UP (ref 0.5–1.3)
EGFR: 54 ML/MIN/1.73M2 — LOW
GLUCOSE BLDC GLUCOMTR-MCNC: 100 MG/DL — HIGH (ref 70–99)
GLUCOSE SERPL-MCNC: 83 MG/DL — SIGNIFICANT CHANGE UP (ref 70–99)
HCT VFR BLD CALC: 27.8 % — LOW (ref 34.5–45)
HCT VFR BLD CALC: 30 % — LOW (ref 34.5–45)
HGB BLD-MCNC: 8.9 G/DL — LOW (ref 11.5–15.5)
HGB BLD-MCNC: 9.5 G/DL — LOW (ref 11.5–15.5)
INR BLD: 0.95 — SIGNIFICANT CHANGE UP (ref 0.85–1.18)
MAGNESIUM SERPL-MCNC: 2.2 MG/DL — SIGNIFICANT CHANGE UP (ref 1.6–2.6)
MCHC RBC-ENTMCNC: 29.9 PG — SIGNIFICANT CHANGE UP (ref 27–34)
MCHC RBC-ENTMCNC: 30.2 PG — SIGNIFICANT CHANGE UP (ref 27–34)
MCHC RBC-ENTMCNC: 31.7 GM/DL — LOW (ref 32–36)
MCHC RBC-ENTMCNC: 32 GM/DL — SIGNIFICANT CHANGE UP (ref 32–36)
MCV RBC AUTO: 94.2 FL — SIGNIFICANT CHANGE UP (ref 80–100)
MCV RBC AUTO: 94.3 FL — SIGNIFICANT CHANGE UP (ref 80–100)
NRBC # BLD: 0 /100 WBCS — SIGNIFICANT CHANGE UP (ref 0–0)
NRBC # BLD: 0 /100 WBCS — SIGNIFICANT CHANGE UP (ref 0–0)
PHOSPHATE SERPL-MCNC: 4.3 MG/DL — SIGNIFICANT CHANGE UP (ref 2.5–4.5)
PLATELET # BLD AUTO: 223 K/UL — SIGNIFICANT CHANGE UP (ref 150–400)
PLATELET # BLD AUTO: 258 K/UL — SIGNIFICANT CHANGE UP (ref 150–400)
POTASSIUM SERPL-MCNC: 3.8 MMOL/L — SIGNIFICANT CHANGE UP (ref 3.5–5.3)
POTASSIUM SERPL-SCNC: 3.8 MMOL/L — SIGNIFICANT CHANGE UP (ref 3.5–5.3)
PROTHROM AB SERPL-ACNC: 10.9 SEC — SIGNIFICANT CHANGE UP (ref 9.5–13)
RBC # BLD: 2.95 M/UL — LOW (ref 3.8–5.2)
RBC # BLD: 3.18 M/UL — LOW (ref 3.8–5.2)
RBC # FLD: 17.4 % — HIGH (ref 10.3–14.5)
RBC # FLD: 17.6 % — HIGH (ref 10.3–14.5)
SODIUM SERPL-SCNC: 143 MMOL/L — SIGNIFICANT CHANGE UP (ref 135–145)
WBC # BLD: 11.16 K/UL — HIGH (ref 3.8–10.5)
WBC # BLD: 13.42 K/UL — HIGH (ref 3.8–10.5)
WBC # FLD AUTO: 11.16 K/UL — HIGH (ref 3.8–10.5)
WBC # FLD AUTO: 13.42 K/UL — HIGH (ref 3.8–10.5)

## 2024-07-26 PROCEDURE — 72040 X-RAY EXAM NECK SPINE 2-3 VW: CPT | Mod: 26

## 2024-07-26 PROCEDURE — 99291 CRITICAL CARE FIRST HOUR: CPT

## 2024-07-26 PROCEDURE — 74176 CT ABD & PELVIS W/O CONTRAST: CPT | Mod: 26

## 2024-07-26 PROCEDURE — 99233 SBSQ HOSP IP/OBS HIGH 50: CPT | Mod: GC,25

## 2024-07-26 PROCEDURE — 72125 CT NECK SPINE W/O DYE: CPT | Mod: 26

## 2024-07-26 RX ORDER — CYANOCOBALAMIN/FOLIC AC/VIT B6 2-2.5-25MG
1 TABLET ORAL DAILY
Refills: 0 | Status: DISCONTINUED | OUTPATIENT
Start: 2024-07-26 | End: 2024-08-02

## 2024-07-26 RX ORDER — METHOCARBAMOL 500 MG
500 TABLET ORAL EVERY 8 HOURS
Refills: 0 | Status: DISCONTINUED | OUTPATIENT
Start: 2024-07-26 | End: 2024-07-28

## 2024-07-26 RX ORDER — ASPIRIN 500 MG
81 TABLET ORAL DAILY
Refills: 0 | Status: DISCONTINUED | OUTPATIENT
Start: 2024-07-26 | End: 2024-07-26

## 2024-07-26 RX ORDER — HEPARIN SODIUM 1000 [USP'U]/ML
5000 INJECTION, SOLUTION INTRAVENOUS; SUBCUTANEOUS EVERY 12 HOURS
Refills: 0 | Status: DISCONTINUED | OUTPATIENT
Start: 2024-07-26 | End: 2024-08-02

## 2024-07-26 RX ORDER — ASPIRIN 500 MG
81 TABLET ORAL DAILY
Refills: 0 | Status: DISCONTINUED | OUTPATIENT
Start: 2024-07-27 | End: 2024-08-02

## 2024-07-26 RX ORDER — ENOXAPARIN SODIUM 120 MG/.8ML
40 INJECTION SUBCUTANEOUS EVERY 24 HOURS
Refills: 0 | Status: DISCONTINUED | OUTPATIENT
Start: 2024-07-26 | End: 2024-07-26

## 2024-07-26 RX ORDER — LYSINE HCL 500 MG
500 TABLET ORAL DAILY
Refills: 0 | Status: DISCONTINUED | OUTPATIENT
Start: 2024-07-26 | End: 2024-08-02

## 2024-07-26 RX ORDER — ONDANSETRON HCL/PF 4 MG/2 ML
4 VIAL (ML) INJECTION EVERY 6 HOURS
Refills: 0 | Status: DISCONTINUED | OUTPATIENT
Start: 2024-07-26 | End: 2024-08-02

## 2024-07-26 RX ORDER — POTASSIUM CHLORIDE 1500 MG/1
20 TABLET, EXTENDED RELEASE ORAL ONCE
Refills: 0 | Status: COMPLETED | OUTPATIENT
Start: 2024-07-26 | End: 2024-07-26

## 2024-07-26 RX ORDER — ACETAMINOPHEN 500 MG
650 TABLET ORAL EVERY 6 HOURS
Refills: 0 | Status: DISCONTINUED | OUTPATIENT
Start: 2024-07-26 | End: 2024-08-02

## 2024-07-26 RX ADMIN — Medication 1000 MILLIGRAM(S): at 07:35

## 2024-07-26 RX ADMIN — Medication 500 MILLIGRAM(S): at 16:10

## 2024-07-26 RX ADMIN — MIDODRINE HYDROCHLORIDE 5 MILLIGRAM(S): 2.5 TABLET ORAL at 16:10

## 2024-07-26 RX ADMIN — Medication 30 MILLIGRAM(S): at 17:03

## 2024-07-26 RX ADMIN — MIDODRINE HYDROCHLORIDE 5 MILLIGRAM(S): 2.5 TABLET ORAL at 21:37

## 2024-07-26 RX ADMIN — POTASSIUM CHLORIDE 20 MILLIEQUIVALENT(S): 1500 TABLET, EXTENDED RELEASE ORAL at 09:54

## 2024-07-26 RX ADMIN — NITROGLYCERIN 1 INCH(S): 400 AEROSOL, METERED SUBLINGUAL at 11:59

## 2024-07-26 RX ADMIN — Medication 500 MILLIGRAM(S): at 21:37

## 2024-07-26 RX ADMIN — Medication 1 TABLET(S): at 12:00

## 2024-07-26 RX ADMIN — SIMETHICONE 80 MILLIGRAM(S): 125 TABLET, CHEWABLE ORAL at 21:37

## 2024-07-26 RX ADMIN — Medication 81 MILLIGRAM(S): at 12:00

## 2024-07-26 RX ADMIN — Medication 1000 MILLIGRAM(S): at 06:47

## 2024-07-26 RX ADMIN — NITROGLYCERIN 1 INCH(S): 400 AEROSOL, METERED SUBLINGUAL at 23:01

## 2024-07-26 RX ADMIN — SIMETHICONE 80 MILLIGRAM(S): 125 TABLET, CHEWABLE ORAL at 16:10

## 2024-07-26 RX ADMIN — CHLORHEXIDINE GLUCONATE 1 APPLICATION(S): 500 CLOTH TOPICAL at 06:42

## 2024-07-26 RX ADMIN — Medication 30 MILLIGRAM(S): at 06:46

## 2024-07-26 RX ADMIN — HEPARIN SODIUM 5000 UNIT(S): 1000 INJECTION, SOLUTION INTRAVENOUS; SUBCUTANEOUS at 21:37

## 2024-07-26 RX ADMIN — Medication 1 DROP(S): at 21:37

## 2024-07-26 RX ADMIN — Medication 500 MILLIGRAM(S): at 06:51

## 2024-07-26 RX ADMIN — Medication 500 MILLIGRAM(S): at 11:59

## 2024-07-26 RX ADMIN — SIMETHICONE 80 MILLIGRAM(S): 125 TABLET, CHEWABLE ORAL at 06:53

## 2024-07-26 NOTE — PROGRESS NOTE ADULT - SUBJECTIVE AND OBJECTIVE BOX
=========================  NSICU ATTENDING PROGRESS NOTE  =========================    HPI: 89 year old female, PMH HTN, HLD, CHF, severe AS, and chronic C-2 fracture x 2 years, presented to [facility] ED with worsening weakness and difficulty ambulating since 7/14. Per home health aide, pt had a fall with +head strike on July 6th which she did not receive medical attention for. On 7/14, HHA noted pt was having more difficulty ambulating and was dropping objects and not able to feed herself as before. Pt was seen on 7/17 at [facility] and had a CT head and CT c-spine. 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 The provider at [facility] offered to pt, family, and pt's PCP, Dr. Doan who was at bedside, to call spine surgery at [facility] and all parties refused, preferring to take patient home. Pt was discharged home with her HHA. Since 7/17, pt's weakness has progressed, prompting them to come to the ED. She denies falls since 7/6. Pt came in with soft collar on, which was removed by HHA while in ED.  (19 Jul 2024 16:16)      ICU Vital Signs Last 24 Hrs  T(C): 36.8 (26 Jul 2024 05:04), Max: 36.9 (25 Jul 2024 18:10)  T(F): 98.2 (26 Jul 2024 05:04), Max: 98.4 (25 Jul 2024 18:10)  HR: 74 (26 Jul 2024 07:00) (54 - 88)  BP: 149/69 (26 Jul 2024 07:00) (95/54 - 149/69)  BP(mean): 99 (26 Jul 2024 07:00) (71 - 121)  ABP: 111/49 (25 Jul 2024 14:00) (111/49 - 133/58)  ABP(mean): 71 (25 Jul 2024 14:00) (71 - 85)  RR: 16 (26 Jul 2024 07:00) (14 - 17)  SpO2: 100% (26 Jul 2024 07:00) (94% - 100%)      07-25-24 @ 07:01  -  07-26-24 @ 07:00  --------------------------------------------------------  IN: 1541.6 mL / OUT: 1540 mL / NET: 1.6 mL    07-26-24 @ 07:01  -  07-26-24 @ 08:19  --------------------------------------------------------  IN: 0 mL / OUT: 0 mL / NET: 0 mL      PHYSICAL EXAM:  Gen: Calm  HEENT: Karlene GRANT in place  Neurological: AOx3 by   Motor exam: Moving all extremities  Power  Sensation: Intact  Pulmonary: Diminished  Cardiovascular: S1/S2  GI: Abdomen distended  Extremities: Dopplerable pulses. Warm and perfused      MEDICATIONS:   acetaminophen     Tablet .. 1000 milliGRAM(s) Oral every 8 hours  ascorbic acid 500 milliGRAM(s) Oral daily  aspirin  chewable 81 milliGRAM(s) Oral daily  chlorhexidine 2% Cloths 1 Application(s) Topical <User Schedule>  dexMEDEtomidine Infusion 0.2 MICROgram(s)/kG/Hr (3.29 mL/Hr) IV Continuous <Continuous>  DULoxetine 30 milliGRAM(s) Oral two times a day  insulin lispro (ADMELOG) corrective regimen sliding scale   SubCutaneous three times a day before meals  latanoprost 0.005% Ophthalmic Solution 1 Drop(s) Both EYES at bedtime  methocarbamol 500 milliGRAM(s) Oral every 8 hours  midodrine 5 milliGRAM(s) Oral every 8 hours  multivitamin 1 Tablet(s) Oral daily  nitroglycerin    2% Ointment 1 Inch(s) Transdermal daily  ondansetron   Disintegrating Tablet 4 milliGRAM(s) Oral every 6 hours  oxyCODONE    IR 5 milliGRAM(s) Oral every 6 hours PRN  potassium chloride    Tablet ER 20 milliEquivalent(s) Oral once  simethicone 80 milliGRAM(s) Chew every 8 hours      LABS:                      9.5    13.42 )-----------( 258      ( 26 Jul 2024 04:43 )             30.0     07-26    143  |  107  |  21  ----------------------------<  83  3.8   |  23  |  0.97    Ca    8.5      26 Jul 2024 04:43  Phos  4.3     07-26  Mg     2.2     07-26    TPro  6.1  /  Alb  3.1<L>  /  TBili  0.6  /  DBili  x   /  AST  27  /  ALT  11  /  AlkPhos  61  07-24    LIVER FUNCTIONS - ( 24 Jul 2024 15:42 )  Alb: 3.1 g/dL / Pro: 6.1 g/dL / ALK PHOS: 61 U/L / ALT: 11 U/L / AST: 27 U/L / GGT: x           ABG - ( 24 Jul 2024 15:10 )  pH, Arterial: 7.38  pH, Blood: x     /  pCO2: 41    /  pO2: 173   / HCO3: 24    / Base Excess: -0.8  /  SaO2: 98.7                             =========================  NSICU ATTENDING PROGRESS NOTE  =========================    HPI: 89 year old female, PMH HTN, HLD, CHF, severe AS, and chronic C-2 fracture x 2 years, presented to [facility] ED with worsening weakness and difficulty ambulating since 7/14. Per home health aide, pt had a fall with +head strike on July 6th which she did not receive medical attention for. On 7/14, HHA noted pt was having more difficulty ambulating and was dropping objects and not able to feed herself as before. Pt was seen on 7/17 at [facility] and had a CT head and CT c-spine. 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 The provider at [facility] offered to pt, family, and pt's PCP, Dr. Doan who was at bedside, to call spine surgery at [facility] and all parties refused, preferring to take patient home. Pt was discharged home with her HHA. Since 7/17, pt's weakness has progressed, prompting them to come to the ED. She denies falls since 7/6. Pt came in with soft collar on, which was removed by HHA while in ED.  (19 Jul 2024 16:16)      ICU Vital Signs Last 24 Hrs  T(C): 36.8 (26 Jul 2024 05:04), Max: 36.9 (25 Jul 2024 18:10)  T(F): 98.2 (26 Jul 2024 05:04), Max: 98.4 (25 Jul 2024 18:10)  HR: 74 (26 Jul 2024 07:00) (54 - 88)  BP: 149/69 (26 Jul 2024 07:00) (95/54 - 149/69)  BP(mean): 99 (26 Jul 2024 07:00) (71 - 121)  ABP: 111/49 (25 Jul 2024 14:00) (111/49 - 133/58)  ABP(mean): 71 (25 Jul 2024 14:00) (71 - 85)  RR: 16 (26 Jul 2024 07:00) (14 - 17)  SpO2: 100% (26 Jul 2024 07:00) (94% - 100%)      07-25-24 @ 07:01  -  07-26-24 @ 07:00  --------------------------------------------------------  IN: 1541.6 mL / OUT: 1540 mL / NET: 1.6 mL    07-26-24 @ 07:01  -  07-26-24 @ 08:19  --------------------------------------------------------  IN: 0 mL / OUT: 0 mL / NET: 0 mL      PHYSICAL EXAM:  Gen: Calm  HEENT: Karlene GRANT in place  Neurological: AOx3 by   Motor exam: Moving all extremities  Power:   Sensation: Intact  Pulmonary: Diminished  Cardiovascular: S1/S2  GI: Abdomen distended  Extremities: Dopplerable pulses. Warm and perfused      MEDICATIONS:   acetaminophen     Tablet .. 1000 milliGRAM(s) Oral every 8 hours  ascorbic acid 500 milliGRAM(s) Oral daily  aspirin  chewable 81 milliGRAM(s) Oral daily  chlorhexidine 2% Cloths 1 Application(s) Topical <User Schedule>  dexMEDEtomidine Infusion 0.2 MICROgram(s)/kG/Hr (3.29 mL/Hr) IV Continuous <Continuous>  DULoxetine 30 milliGRAM(s) Oral two times a day  insulin lispro (ADMELOG) corrective regimen sliding scale   SubCutaneous three times a day before meals  latanoprost 0.005% Ophthalmic Solution 1 Drop(s) Both EYES at bedtime  methocarbamol 500 milliGRAM(s) Oral every 8 hours  midodrine 5 milliGRAM(s) Oral every 8 hours  multivitamin 1 Tablet(s) Oral daily  nitroglycerin    2% Ointment 1 Inch(s) Transdermal daily  ondansetron   Disintegrating Tablet 4 milliGRAM(s) Oral every 6 hours  oxyCODONE    IR 5 milliGRAM(s) Oral every 6 hours PRN  potassium chloride    Tablet ER 20 milliEquivalent(s) Oral once  simethicone 80 milliGRAM(s) Chew every 8 hours      LABS:                      9.5    13.42 )-----------( 258      ( 26 Jul 2024 04:43 )             30.0     07-26    143  |  107  |  21  ----------------------------<  83  3.8   |  23  |  0.97    Ca    8.5      26 Jul 2024 04:43  Phos  4.3     07-26  Mg     2.2     07-26    TPro  6.1  /  Alb  3.1<L>  /  TBili  0.6  /  DBili  x   /  AST  27  /  ALT  11  /  AlkPhos  61  07-24    LIVER FUNCTIONS - ( 24 Jul 2024 15:42 )  Alb: 3.1 g/dL / Pro: 6.1 g/dL / ALK PHOS: 61 U/L / ALT: 11 U/L / AST: 27 U/L / GGT: x           ABG - ( 24 Jul 2024 15:10 )  pH, Arterial: 7.38  pH, Blood: x     /  pCO2: 41    /  pO2: 173   / HCO3: 24    / Base Excess: -0.8  /  SaO2: 98.7                             =========================  NSICU ATTENDING PROGRESS NOTE  =========================    HPI: 89 year old female, PMH HTN, HLD, CHF, severe AS, and chronic C-2 fracture x 2 years, presented to [facility] ED with worsening weakness and difficulty ambulating since 7/14. Per home health aide, pt had a fall with +head strike on July 6th which she did not receive medical attention for. On 7/14, HHA noted pt was having more difficulty ambulating and was dropping objects and not able to feed herself as before. Pt was seen on 7/17 at [facility] and had a CT head and CT c-spine. 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 The provider at [facility] offered to pt, family, and pt's PCP, Dr. Doan who was at bedside, to call spine surgery at [facility] and all parties refused, preferring to take patient home. Pt was discharged home with her HHA. Since 7/17, pt's weakness has progressed, prompting them to come to the ED. She denies falls since 7/6. Pt came in with soft collar on, which was removed by HHA while in ED.  (19 Jul 2024 16:16)      ICU Vital Signs Last 24 Hrs  T(C): 36.8 (26 Jul 2024 05:04), Max: 36.9 (25 Jul 2024 18:10)  T(F): 98.2 (26 Jul 2024 05:04), Max: 98.4 (25 Jul 2024 18:10)  HR: 74 (26 Jul 2024 07:00) (54 - 88)  BP: 149/69 (26 Jul 2024 07:00) (95/54 - 149/69)  BP(mean): 99 (26 Jul 2024 07:00) (71 - 121)  ABP: 111/49 (25 Jul 2024 14:00) (111/49 - 133/58)  ABP(mean): 71 (25 Jul 2024 14:00) (71 - 85)  RR: 16 (26 Jul 2024 07:00) (14 - 17)  SpO2: 100% (26 Jul 2024 07:00) (94% - 100%)      07-25-24 @ 07:01  -  07-26-24 @ 07:00  --------------------------------------------------------  IN: 1541.6 mL / OUT: 1540 mL / NET: 1.6 mL    07-26-24 @ 07:01  -  07-26-24 @ 08:19  --------------------------------------------------------  IN: 0 mL / OUT: 0 mL / NET: 0 mL      PHYSICAL EXAM:  Gen: Calm  HEENT: Karlene GRANT in place  Neurological: Awake, alert, oriented x 3. Pupils 3mm and reactive, EOMI, face symmetric  Motor exam: Moving all extremities  Power: LUE 3/5 delt, 4+/5 distally, LLE 4+/5  RUE and RLE 4+/5  Sensation: Intact  Pulmonary: Diminished at bases, clear anteriorly  Cardiovascular: S1/S2  GI: Abdomen distended  Extremities: Dopplerable pulses. Warm and perfused      MEDICATIONS:   acetaminophen     Tablet .. 1000 milliGRAM(s) Oral every 8 hours  ascorbic acid 500 milliGRAM(s) Oral daily  aspirin  chewable 81 milliGRAM(s) Oral daily  chlorhexidine 2% Cloths 1 Application(s) Topical <User Schedule>  dexMEDEtomidine Infusion 0.2 MICROgram(s)/kG/Hr (3.29 mL/Hr) IV Continuous <Continuous>  DULoxetine 30 milliGRAM(s) Oral two times a day  insulin lispro (ADMELOG) corrective regimen sliding scale   SubCutaneous three times a day before meals  latanoprost 0.005% Ophthalmic Solution 1 Drop(s) Both EYES at bedtime  methocarbamol 500 milliGRAM(s) Oral every 8 hours  midodrine 5 milliGRAM(s) Oral every 8 hours  multivitamin 1 Tablet(s) Oral daily  nitroglycerin    2% Ointment 1 Inch(s) Transdermal daily  ondansetron   Disintegrating Tablet 4 milliGRAM(s) Oral every 6 hours  oxyCODONE    IR 5 milliGRAM(s) Oral every 6 hours PRN  potassium chloride    Tablet ER 20 milliEquivalent(s) Oral once  simethicone 80 milliGRAM(s) Chew every 8 hours      LABS:                      9.5    13.42 )-----------( 258      ( 26 Jul 2024 04:43 )             30.0     07-26    143  |  107  |  21  ----------------------------<  83  3.8   |  23  |  0.97    Ca    8.5      26 Jul 2024 04:43  Phos  4.3     07-26  Mg     2.2     07-26    TPro  6.1  /  Alb  3.1<L>  /  TBili  0.6  /  DBili  x   /  AST  27  /  ALT  11  /  AlkPhos  61  07-24    LIVER FUNCTIONS - ( 24 Jul 2024 15:42 )  Alb: 3.1 g/dL / Pro: 6.1 g/dL / ALK PHOS: 61 U/L / ALT: 11 U/L / AST: 27 U/L / GGT: x           ABG - ( 24 Jul 2024 15:10 )  pH, Arterial: 7.38  pH, Blood: x     /  pCO2: 41    /  pO2: 173   / HCO3: 24    / Base Excess: -0.8  /  SaO2: 98.7
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
## 2024-07-26 NOTE — PROGRESS NOTE ADULT - ASSESSMENT
Assessment: 89F hx HTN, HLD, anxiety, CHF, severe AS, chronic c2 fracture sent by outpatient provider for LUE weakness found to have worsening cervical stenosis and weakness now s/p occiput to c5 fusion, C1 laminectomy     -Nc q1, VSq1   -heparin ggt stopped 2/2 superficial wound oozing, CT w/o hematoma ; heparin SQ 5000U q12 stated   -c/w simethicone   -pain management w/ Robaxin 500mg q8  -midodrine 5mg TID
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
## 2024-07-26 NOTE — PROGRESS NOTE ADULT - ASSESSMENT
Assessment: 89F hx HTN, HLD, anxiety, CHF, severe AS, chronic c2 fracture sent by outpatient provider for LUE weakness found to have worsening cervical stenosis and weakness now s/p occiput to c5 fusion, C1 laminectomy     -Nc q1, VSq1   -PTT goal  60-80 s/p bolus, trend & adjust per protocol ; start heparin ggt at 500U  -c/w simethicone   -pain management w/ Robaxin 500mg q8  -midodrine 5mg TID

## 2024-07-26 NOTE — PROGRESS NOTE ADULT - ASSESSMENT
ASSESSMENT:   89 y/o F with chronic C2 fracture, LUE weakness found to have worsening cervical stenosis  POD 2 status post O to C5 fusion. C1 laminectomy  Post-op cyanosis of extremities; r/o acute limb ischemia.  HTN, HLD, anxiety, CHF, severe AS    PLAN:   NEURO:  Neurochecks Q2  Post op Xrays. Needs CT C spine  Drains: Deep HMV; monitor output  Agitation: Continue duloxetine and alprazolam  [place] J in place  Analgesia prn  Activity: PT/OT/OOB    PULM:   Wean to room air. Encourage incentive spirometry    CARDS:  SBP goal 100-160; off pressors  TTE: 7/22/24: Normal left and right ventricular size and systolic function, EF 67%. Normal atria.  A transcatheter aortic valve (TAVR) is noted in the aortic position.   Moderate mitral regurgitation. Moderate mitral stenosis. Mild-to-moderate tricuspid regurgitation.   On ASA 81mg; ARU **  Cardiology following     VASC: Post-op cyanosis of extremities; r/o acute limb ischemia.  On heparin gtt. Monitor PTTs Q6 (Goal PTT 60-80); held 7/26  Continue Nitropaste to all fingers and toes  Arterial duplex:  Vascular surgery following    GI:  Speech and swallow eval  GI ppx: None indicated  Abdominal distension: AXR   Enema and suppository  LBM: 7/24 though liquid.    RENAL:   Na goal 135-145  Fluids: IVL  Vega catheter Dced; primafit and straight cath prn  Monitor BMPs closely    HEME:  S/P heparin gtt. Held for surgical site bleeding  Monitor H/H  Arterial duplex   VTE ppx: Heparin gtt held    ID: Leukocytosis; likely reactive  Monitor for fevers     MISC:    SOCIAL/FAMILY:  [] awaiting [x] updated at bedside [] family meeting    CODE STATUS:  [x] Full Code [] DNR [] DNI [] Palliative/Comfort Care    DISPOSITION:  [x] ICU [] Stroke Unit [] Floor [] EMU [] RCU [] PCU    Time spent: 60 critical care minutes         ASSESSMENT:   89 y/o F with chronic C2 fracture, LUE weakness found to have worsening cervical stenosis  POD 2 status post O to C5 fusion. C1 laminectomy  Post-op cyanosis of extremities; r/o acute limb ischemia.  HTN, HLD, anxiety, CHF, severe AS    PLAN:   NEURO:  Neurochecks Q2  Post op Xrays. Needs CT C spine  Drains: Deep HMV; monitor output  Agitation: Off dexmedetomidine   Continue duloxetine and alprazolam.   [place] J in place  Analgesia prn  Activity: PT/OT/OOB    PULM:   Wean to room air.   Encourage incentive spirometry    CARDS:  SBP goal 100-160; off pressors  TTE: 7/22/24: Normal left and right ventricular size and systolic function, EF 67%. Normal atria.  A transcatheter aortic valve (TAVR) is noted in the aortic position.   Moderate mitral regurgitation. Moderate mitral stenosis. Mild-to-moderate tricuspid regurgitation.   On ASA 81mg; ARU.   Cardiology following     VASC: Post-op cyanosis of extremities; r/o acute limb ischemia.  On heparin gtt. Monitor PTTs Q6 (Goal PTT 60-80); held 7/26.  Continue Nitropaste to all fingers and toes  Arterial duplex: R mid-radial occlusion, no other occlusions in LUE/ b/l legs  Vascular surgery following    GI:  Regular diet  GI ppx: None indicated  Abdominal distension: AXR reviewed. Likely fecal impaction. Rule out ileus  Repeat enema and suppository. May need disimpaction  LBM: 7/25 though liquid.    RENAL:   Na goal 135-145  Fluids: IVL  Vega catheter Dced; primafit and straight cath prn  Monitor BMPs closely    HEME:  S/P heparin gtt. Held for surgical site bleeding  Monitor H/H, PTT.  Arterial duplex: Arterial duplex: R mid-radial occlusion, no other occlusions in LUE/ b/l legs  VTE ppx: Heparin gtt held    ID: Leukocytosis; likely reactive  Monitor for fevers     MISC:    SOCIAL/FAMILY:  [] awaiting [x] updated at bedside [] family meeting    CODE STATUS:  [x] Full Code [] DNR [] DNI [] Palliative/Comfort Care    DISPOSITION:  [x] ICU [] Stroke Unit [] Floor [] EMU [] RCU [] PCU    Time spent: 60 critical care minutes
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
## 2024-07-26 NOTE — PROGRESS NOTE ADULT - SUBJECTIVE AND OBJECTIVE BOX
INTERVAL HISTORY: HPI:  89 year old female, PMH HTN, HLD, CHF, severe AS, and chronic C-2 fracture x 2 years, presented to [facility] ED with worsening weakness and difficulty ambulating since 7/14. Per home health aide, pt had a fall with +head strike on July 6th which she did not receive medical attention for. On 7/14, HHA noted pt was having more difficulty ambulating and was dropping objects and not able to feed herself as before. Pt was seen on 7/17 at [facility] and had a CT head and CT c-spine. 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 The provider at [facility] offered to pt, family, and pt's PCP, Dr. Doan who was at bedside, to call spine surgery at [facility] and all parties refused, preferring to take patient home. Pt was discharged home with her HHA. Since 7/17, pt's weakness has progressed, prompting them to come to the ED. She denies falls since 7/6. Pt came in with soft collar on, which was removed by HHA while in ED.  (19 Jul 2024 16:16)    Drug Dosing Weight  Height (cm): 162.6 (19 Jul 2024 17:53)  Weight (kg): 65.8 (19 Jul 2024 12:04)  BMI (kg/m2): 24.9 (19 Jul 2024 17:53)  BSA (m2): 1.71 (19 Jul 2024 17:53)    PAST MEDICAL & SURGICAL HISTORY:  Hyperlipidemia, unspecified hyperlipidemia type  Diabetes insipidus  H/O aortic valve stenosis  Hypertension  Vertigo  Chronic diastolic congestive heart failure  Dyslipidemia  History of pseudoaneurysm  Glaucoma  Closed C2 fracture  S/P AVR  Bioprosthetic  H/O lumbosacral spine surgery  S/P hip replacement  B/L  S/P right coronary artery (RCA) stent placement    REVIEW OF SYSTEMS: [ ] Unable to Assess due to neurologic exam   [ ] All ROS addressed below are non-contributory, except:  Neuro: [ ] Headache [ ] Back pain [ ] Numbness [ ] Weakness [ ] Ataxia [ ] Dizziness [ ] Aphasia [ ] Dysarthria [ ] Visual disturbance  Resp: [ ] Shortness of breath/dyspnea, [ ] Orthopnea [ ] Cough  CV: [ ] Chest pain [ ] Palpitation [ ] Lightheadedness [ ] Syncope  Renal: [ ] Thirst [ ] Edema  GI: [ ] Nausea [ ] Emesis [ ] Abdominal pain [ ] Constipation [ ] Diarrhea  Hem: [ ] Hematemesis [ ] bright red blood per rectum  ID: [ ] Fever [ ] Chills [ ] Dysuria  ENT: [ ] Rhinorrhea    LOS: 5d    GENERAL: NAD, uncooperative w/ exam   EYES: PERRLA,   NERVOUS SYSTEM: aox3, intermittently follows commands b/l LE 5/5, RUE 5/5 no drift, LUE deltoid 3/5, bicep tricep 4/5   ENT: Moist mucous membranes  CHEST/LUNG: Clear to auscultation bilaterally; No rales, rhonchi, wheezing, or rubs. Unlabored respirations  HEART: Regular rate and rhythm; No murmurs, rubs, or gallops  ABDOMEN: BSx4; Soft, distended   EXTREMITIES: b/l LE dopplerable pulses, warm not mottled   INCISION: surgical dressing clean/dry, soft non-tender, hemovac in place       RADIOLOGY & ADDITIONAL STUDIES:  Again demonstrated is a type II odontoid fracture with similar degree of anterior displacement of the superior fracture fragment compared to MRI cervical spine 6/11/2024. This has progressed compared to CT cervical   spine dated 7/11/2023. There is mass effect on the cervical medullary junction with severe stenosis. This has progressed from prior CT cervical spine 7/11/2023 but appears similar to MRI cervical spine 6/11/2024. The remainder the vertebral body heights are maintained. Again demonstrated is anterolisthesis of C7-T1 and T2-T3 which is unchanged from prior exam.There is no prevertebral edema. Additional multilevel cervical spondylosis with multilevel neural foraminal narrowing is again noted and not significantly changed from prior examination.    IMPRESSION:  CT head: No acute intracranial hemorrhage or calvarial fracture. 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  There is anterior displacement of the proximal fracture fragment which is similar appearance to prior MRI cervical spine 6/11/2024 but has   progressed compared to CT cervical spine 7/11/2023. 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 Correlate clinically. No acute fracture< end of copied text >

## 2024-07-26 NOTE — PROVIDER CONTACT NOTE (OTHER) - BACKGROUND
Pt s/p occipital -C5 fusion with C1 laminectomy 7/24
Pt presented w/ chronic C2 fracture & worsening L arm weakness

## 2024-07-27 LAB
ANION GAP SERPL CALC-SCNC: 10 MMOL/L — SIGNIFICANT CHANGE UP (ref 5–17)
BUN SERPL-MCNC: 23 MG/DL — SIGNIFICANT CHANGE UP (ref 7–23)
CALCIUM SERPL-MCNC: 8.4 MG/DL — SIGNIFICANT CHANGE UP (ref 8.4–10.5)
CHLORIDE SERPL-SCNC: 103 MMOL/L — SIGNIFICANT CHANGE UP (ref 96–108)
CO2 SERPL-SCNC: 24 MMOL/L — SIGNIFICANT CHANGE UP (ref 22–31)
CREAT SERPL-MCNC: 0.91 MG/DL — SIGNIFICANT CHANGE UP (ref 0.5–1.3)
EGFR: 58 ML/MIN/1.73M2 — LOW
GLUCOSE SERPL-MCNC: 92 MG/DL — SIGNIFICANT CHANGE UP (ref 70–99)
HCT VFR BLD CALC: 25.9 % — LOW (ref 34.5–45)
HGB BLD-MCNC: 8.3 G/DL — LOW (ref 11.5–15.5)
MAGNESIUM SERPL-MCNC: 2 MG/DL — SIGNIFICANT CHANGE UP (ref 1.6–2.6)
MCHC RBC-ENTMCNC: 30.2 PG — SIGNIFICANT CHANGE UP (ref 27–34)
MCHC RBC-ENTMCNC: 32 GM/DL — SIGNIFICANT CHANGE UP (ref 32–36)
MCV RBC AUTO: 94.2 FL — SIGNIFICANT CHANGE UP (ref 80–100)
NRBC # BLD: 0 /100 WBCS — SIGNIFICANT CHANGE UP (ref 0–0)
PHOSPHATE SERPL-MCNC: 2.9 MG/DL — SIGNIFICANT CHANGE UP (ref 2.5–4.5)
PLATELET # BLD AUTO: 214 K/UL — SIGNIFICANT CHANGE UP (ref 150–400)
PLATELET RESPONSE ASPIRIN RESULT: 483 ARU — SIGNIFICANT CHANGE UP
POTASSIUM SERPL-MCNC: 4.1 MMOL/L — SIGNIFICANT CHANGE UP (ref 3.5–5.3)
POTASSIUM SERPL-SCNC: 4.1 MMOL/L — SIGNIFICANT CHANGE UP (ref 3.5–5.3)
RBC # BLD: 2.75 M/UL — LOW (ref 3.8–5.2)
RBC # FLD: 17.1 % — HIGH (ref 10.3–14.5)
SODIUM SERPL-SCNC: 137 MMOL/L — SIGNIFICANT CHANGE UP (ref 135–145)
WBC # BLD: 11.35 K/UL — HIGH (ref 3.8–10.5)
WBC # FLD AUTO: 11.35 K/UL — HIGH (ref 3.8–10.5)

## 2024-07-27 PROCEDURE — 99232 SBSQ HOSP IP/OBS MODERATE 35: CPT

## 2024-07-27 RX ORDER — MIDODRINE HYDROCHLORIDE 2.5 MG/1
2.5 TABLET ORAL EVERY 8 HOURS
Refills: 0 | Status: DISCONTINUED | OUTPATIENT
Start: 2024-07-27 | End: 2024-07-28

## 2024-07-27 RX ORDER — ALPRAZOLAM 0.5 MG
0.25 TABLET ORAL AT BEDTIME
Refills: 0 | Status: DISCONTINUED | OUTPATIENT
Start: 2024-07-27 | End: 2024-07-28

## 2024-07-27 RX ADMIN — Medication 81 MILLIGRAM(S): at 11:12

## 2024-07-27 RX ADMIN — Medication 650 MILLIGRAM(S): at 21:59

## 2024-07-27 RX ADMIN — Medication 30 MILLIGRAM(S): at 05:32

## 2024-07-27 RX ADMIN — SIMETHICONE 80 MILLIGRAM(S): 125 TABLET, CHEWABLE ORAL at 21:58

## 2024-07-27 RX ADMIN — HEPARIN SODIUM 5000 UNIT(S): 1000 INJECTION, SOLUTION INTRAVENOUS; SUBCUTANEOUS at 10:41

## 2024-07-27 RX ADMIN — Medication 500 MILLIGRAM(S): at 05:32

## 2024-07-27 RX ADMIN — NITROGLYCERIN 1 INCH(S): 400 AEROSOL, METERED SUBLINGUAL at 11:12

## 2024-07-27 RX ADMIN — MIDODRINE HYDROCHLORIDE 2.5 MILLIGRAM(S): 2.5 TABLET ORAL at 22:01

## 2024-07-27 RX ADMIN — Medication 500 MILLIGRAM(S): at 22:01

## 2024-07-27 RX ADMIN — CHLORHEXIDINE GLUCONATE 1 APPLICATION(S): 500 CLOTH TOPICAL at 05:51

## 2024-07-27 RX ADMIN — Medication 1 DROP(S): at 22:02

## 2024-07-27 RX ADMIN — Medication 1 TABLET(S): at 11:13

## 2024-07-27 RX ADMIN — Medication 500 MILLIGRAM(S): at 16:00

## 2024-07-27 RX ADMIN — MIDODRINE HYDROCHLORIDE 2.5 MILLIGRAM(S): 2.5 TABLET ORAL at 16:00

## 2024-07-27 RX ADMIN — Medication 0.25 MILLIGRAM(S): at 22:02

## 2024-07-27 RX ADMIN — Medication 500 MILLIGRAM(S): at 11:13

## 2024-07-27 RX ADMIN — Medication 30 MILLIGRAM(S): at 18:01

## 2024-07-27 RX ADMIN — HEPARIN SODIUM 5000 UNIT(S): 1000 INJECTION, SOLUTION INTRAVENOUS; SUBCUTANEOUS at 21:58

## 2024-07-27 RX ADMIN — MIDODRINE HYDROCHLORIDE 5 MILLIGRAM(S): 2.5 TABLET ORAL at 05:37

## 2024-07-27 RX ADMIN — NITROGLYCERIN 1 INCH(S): 400 AEROSOL, METERED SUBLINGUAL at 22:22

## 2024-07-27 RX ADMIN — SIMETHICONE 80 MILLIGRAM(S): 125 TABLET, CHEWABLE ORAL at 05:33

## 2024-07-27 RX ADMIN — SIMETHICONE 80 MILLIGRAM(S): 125 TABLET, CHEWABLE ORAL at 17:59

## 2024-07-27 NOTE — PROGRESS NOTE ADULT - ASSESSMENT
ASSESSMENT:   89 y/o F with chronic C2 fracture, LUE weakness found to have worsening cervical stenosis  POD 3 status post O to C5 fusion. C1 laminectomy  Post-op cyanosis of extremities; r/o acute limb ischemia.  HTN, HLD, anxiety, CHF, severe AS    PLAN:   NEURO:  Neurochecks Q2  Post op Xrays and CT C spine reviewed. Good alignment. No hematoma  Drains: Deep HMV; monitor output  Continue duloxetine and alprazolam.   [place] J in place  Analgesia prn  Activity: PT/OT/OOB    PULM:   Wean to room air.   Encourage incentive spirometry    CARDS:  SBP goal 100-160  TTE: 7/22/24: Normal left and right ventricular size and systolic function, EF 67%. Normal atria.  A transcatheter aortic valve (TAVR) is noted in the aortic position.   Moderate mitral regurgitation. Moderate mitral stenosis. Mild-to-moderate tricuspid regurgitation.   On ASA 81mg; ARU.   Cardiology following     VASC: Post-op cyanosis of extremities; r/o acute limb ischemia. Improved  S/P heparin gtt  Continue Nitropaste and heat packs to all fingers and toes  Arterial duplex: R mid-radial occlusion, no other occlusions in LUE/ b/l legs  Vascular surgery following    GI:  Regular diet  GI ppx: None indicated  Abdominal distension: AXR reviewed. Likely fecal impaction. Rule out ileus  Repeat enema and suppository. May need disimpaction  LBM: 7/26 though liquid.  CT abdomen: No ileus or impaction    RENAL:   Na goal 135-145  Fluids: IVL  Vega catheter Dced; primafit and straight cath prn  Monitor BMPs closely    HEME:  S/P heparin gtt. Held for surgical site bleeding  Monitor H/H, PTT.  Arterial duplex: Arterial duplex: R mid-radial occlusion, no other occlusions in LUE/ b/l legs  VTE ppx: Heparin SQ    ID: Leukocytosis; likely reactive  Monitor for fevers     MISC:    SOCIAL/FAMILY:  [] awaiting [x] updated at bedside [] family meeting    CODE STATUS:  [x] Full Code [] DNR [] DNI [] Palliative/Comfort Care    DISPOSITION:  [] ICU [] Stroke Unit [] Floor [] EMU [] RCU [] PCU             ASSESSMENT:   89 y/o F with chronic C2 fracture, LUE weakness found to have worsening cervical stenosis  POD 3 status post O to C5 fusion. C1 laminectomy  Post-op cyanosis of extremities; r/o acute limb ischemia.  HTN, HLD, anxiety, CHF, severe AS    PLAN:   NEURO:  Neurochecks Q4  Post op Xrays and CT C spine reviewed. Good alignment. No hematoma  Drains: Deep HMV; monitor output- minimal.  Continue duloxetine and alprazolam.   [place] J in place  Analgesia prn  Activity: PT/OT/OOB    PULM:   Weaned to room air as of 7/27  Encourage incentive spirometry    CARDS:  SBP goal 100-160  TTE: 7/22/24: Normal left and right ventricular size and systolic function, EF 67%. Normal atria.  A transcatheter aortic valve (TAVR) is noted in the aortic position.   Moderate mitral regurgitation. Moderate mitral stenosis. Mild-to-moderate tricuspid regurgitation.   On ASA 81mg; ARU.   Cardiology following     VASC: Post-op cyanosis of extremities; r/o acute limb ischemia. Improved  S/P heparin gtt  Continue Nitropaste and heat packs to all fingers and toes  Arterial duplex: R mid-radial occlusion, no other occlusions in LUE/ b/l legs  Vascular surgery following    GI:  Regular diet  GI ppx: None indicated  Abdominal distension: AXR reviewed. Likely fecal impaction. Rule out ileus  Repeat enema and suppository. May need disimpaction  LBM: 7/26 though liquid.  CT abdomen: No ileus or impaction    RENAL:   Na goal 135-145  Fluids: IVL  Vega catheter Dced; primafit and straight cath prn  Monitor BMPs closely    HEME:  S/P heparin gtt. Held for surgical site bleeding  Monitor H/H, PTT.  Arterial duplex: Arterial duplex: R mid-radial occlusion, no other occlusions in LUE/ b/l legs  VTE ppx: Heparin SQ    ID: Leukocytosis; likely reactive  Monitor for fevers     MISC:    SOCIAL/FAMILY:  [] awaiting [x] updated at bedside [] family meeting    CODE STATUS:  [x] Full Code [] DNR [] DNI [] Palliative/Comfort Care    DISPOSITION:  [] ICU [] Stroke Unit [] Floor [] EMU [] RCU [] PCU             ASSESSMENT:   89 y/o F with chronic C2 fracture, LUE weakness found to have worsening cervical stenosis  POD 3 status post O to C5 fusion. C1 laminectomy  Post-op cyanosis of extremities; r/o acute limb ischemia.  HTN, HLD, anxiety, CHF, severe AS    PLAN:   NEURO:  Neurochecks Q4  Post op Xrays and CT C spine reviewed. Good alignment. No hematoma  Drains: Deep HMV; monitor output- minimal.  Continue duloxetine and alprazolam.   [place] J in place  Analgesia prn  Activity: PT/OT/OOB    PULM:   Weaned to room air as of 7/27  Encourage incentive spirometry    CARDS:  SBP goal 100-160  TTE: 7/22/24: Normal left and right ventricular size and systolic function, EF 67%. Normal atria.  A transcatheter aortic valve (TAVR) is noted in the aortic position.   Moderate mitral regurgitation. Moderate mitral stenosis. Mild-to-moderate tricuspid regurgitation.   On ASA 81mg; ARU.   Cardiology following     VASC: Post-op cyanosis of extremities; r/o acute limb ischemia. Improved  S/P heparin gtt  Continue Nitropaste and heat packs to all fingers and toes  Arterial duplex: R mid-radial occlusion, no other occlusions in LUE/ b/l legs  Vascular surgery following    GI: Abdominal distension; improving  Regular diet  GI ppx: None indicated  Abdominal distension: AXR reviewed with concern for ileus. CT abdomen: No ileus or impaction   LBM: 7/26- 7/27 though liquid.  Monitor.     RENAL:   Na goal 135-145  Primafit and straight cath prn.  Monitor Is/Os  Monitor BMPs closely    HEME:  S/P heparin gtt.   Monitor H/H (mild downtrend noted)  Arterial duplex: Arterial duplex: R mid-radial occlusion, no other occlusions in LUE/ b/l legs  VTE ppx: Heparin SQ    ID: Mild leukocytosis; likely reactive  Monitor for fevers     MISC:    SOCIAL/FAMILY:  [] awaiting [x] updated at bedside [] family meeting    CODE STATUS:  [x] Full Code [] DNR [] DNI [] Palliative/Comfort Care    DISPOSITION:  [] ICU [] Stroke Unit [] Floor [] EMU [] RCU [] PCU  Not critically ill

## 2024-07-27 NOTE — PROGRESS NOTE ADULT - SUBJECTIVE AND OBJECTIVE BOX
=========================  NSICU ATTENDING PROGRESS NOTE  =========================    HPI: 89 year old female, PMH HTN, HLD, CHF, severe AS, and chronic C-2 fracture x 2 years, presented to [facility] ED with worsening weakness and difficulty ambulating since 7/14. Per home health aide, pt had a fall with +head strike on July 6th which she did not receive medical attention for. On 7/14, HHA noted pt was having more difficulty ambulating and was dropping objects and not able to feed herself as before. Pt was seen on 7/17 at [facility] and had a CT head and CT c-spine. 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 The provider at [facility] offered to pt, family, and pt's PCP, Dr. Doan who was at bedside, to call spine surgery at [facility] and all parties refused, preferring to take patient home. Pt was discharged home with her HHA. Since 7/17, pt's weakness has progressed, prompting them to come to the ED. She denies falls since 7/6. Pt came in with soft collar on, which was removed by HHA while in ED.  (19 Jul 2024 16:16)        ICU Vital Signs Last 24 Hrs  T(C): 36.4 (27 Jul 2024 07:50), Max: 36.9 (26 Jul 2024 14:45)  T(F): 97.6 (27 Jul 2024 07:50), Max: 98.5 (27 Jul 2024 00:27)  HR: 84 (27 Jul 2024 07:00) (65 - 84)  BP: 109/81 (27 Jul 2024 07:00) (109/81 - 163/76)  BP(mean): 92 (27 Jul 2024 07:00) (80 - 109)  RR: 18 (27 Jul 2024 07:00) (15 - 19)  SpO2: 94% (27 Jul 2024 07:00) (94% - 100%)      07-26-24 @ 07:01  -  07-27-24 @ 07:00  --------------------------------------------------------  IN: 0 mL / OUT: 912.5 mL / NET: -912.5 mL        PHYSICAL EXAM:  Gen: Calm  HEENT: Karlene GRANT in place  Neurological: Awake, alert, oriented x 3. Pupils 3mm and reactive, EOMI, face symmetric  Motor exam: Moving all extremities  Power: LUE 3/5 delt, 4+/5 distally, LLE 4+/5  RUE and RLE 4+/5  Sensation: Intact  Pulmonary: Diminished at bases, clear anteriorly  Cardiovascular: S1/S2  GI: Abdomen distended  Extremities: Dopplerable pulses. Warm and perfused      MEDICATIONS:   acetaminophen     Tablet .. 650 milliGRAM(s) Oral every 6 hours PRN  ascorbic acid 500 milliGRAM(s) Oral daily  aspirin enteric coated 81 milliGRAM(s) Oral daily  chlorhexidine 2% Cloths 1 Application(s) Topical <User Schedule>  DULoxetine 30 milliGRAM(s) Oral two times a day  heparin   Injectable 5000 Unit(s) SubCutaneous every 12 hours  latanoprost 0.005% Ophthalmic Solution 1 Drop(s) Both EYES at bedtime  methocarbamol 500 milliGRAM(s) Oral every 8 hours  midodrine 5 milliGRAM(s) Oral every 8 hours  multivitamin 1 Tablet(s) Oral daily  nitroglycerin    2% Ointment 1 Inch(s) Transdermal daily  ondansetron Injectable 4 milliGRAM(s) IV Push every 6 hours PRN  simethicone 80 milliGRAM(s) Chew every 8 hours    LABS:                    8.3    11.35 )-----------( 214      ( 27 Jul 2024 05:30 )             25.9     07-27    137  |  103  |  23  ----------------------------<  92  4.1   |  24  |  0.91    Ca    8.4      27 Jul 2024 05:30  Phos  2.9     07-27  Mg     2.0     07-27                           =========================  NSICU ATTENDING PROGRESS NOTE  =========================    HPI: 89 year old female, PMH HTN, HLD, CHF, severe AS, and chronic C-2 fracture x 2 years, presented to [facility] ED with worsening weakness and difficulty ambulating since 7/14. Per home health aide, pt had a fall with +head strike on July 6th which she did not receive medical attention for. On 7/14, HHA noted pt was having more difficulty ambulating and was dropping objects and not able to feed herself as before. Pt was seen on 7/17 at [facility] and had a CT head and CT c-spine. 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 The provider at [facility] offered to pt, family, and pt's PCP, Dr. Doan who was at bedside, to call spine surgery at [facility] and all parties refused, preferring to take patient home. Pt was discharged home with her HHA. Since 7/17, pt's weakness has progressed, prompting them to come to the ED. She denies falls since 7/6. Pt came in with soft collar on, which was removed by HHA while in ED.  (19 Jul 2024 16:16)      ICU Vital Signs Last 24 Hrs  T(C): 36.4 (27 Jul 2024 07:50), Max: 36.9 (26 Jul 2024 14:45)  T(F): 97.6 (27 Jul 2024 07:50), Max: 98.5 (27 Jul 2024 00:27)  HR: 84 (27 Jul 2024 07:00) (65 - 84)  BP: 109/81 (27 Jul 2024 07:00) (109/81 - 163/76)  BP(mean): 92 (27 Jul 2024 07:00) (80 - 109)  RR: 18 (27 Jul 2024 07:00) (15 - 19)  SpO2: 94% (27 Jul 2024 07:00) (94% - 100%)      07-26-24 @ 07:01  -  07-27-24 @ 07:00  --------------------------------------------------------  IN: 0 mL / OUT: 912.5 mL / NET: -912.5 mL      PHYSICAL EXAM:  Gen: Calm  HEENT: Karlene GRANT in place  Neurological: Awake, alert, oriented x 3. Pupils 3mm and reactive, EOMI, face symmetric  Motor exam: Moving all extremities  Power: LUE 3/5 delt, 4+/5 distally, LLE 4+/5  RUE and RLE 4+/5  Sensation: Intact  Pulmonary: Diminished at bases, clear anteriorly  Cardiovascular: S1/S2  GI: Abdomen distended  Extremities: Dopplerable pulses. Warm and perfused      MEDICATIONS:   acetaminophen     Tablet .. 650 milliGRAM(s) Oral every 6 hours PRN  ascorbic acid 500 milliGRAM(s) Oral daily  aspirin enteric coated 81 milliGRAM(s) Oral daily  chlorhexidine 2% Cloths 1 Application(s) Topical <User Schedule>  DULoxetine 30 milliGRAM(s) Oral two times a day  heparin   Injectable 5000 Unit(s) SubCutaneous every 12 hours  latanoprost 0.005% Ophthalmic Solution 1 Drop(s) Both EYES at bedtime  methocarbamol 500 milliGRAM(s) Oral every 8 hours  midodrine 5 milliGRAM(s) Oral every 8 hours  multivitamin 1 Tablet(s) Oral daily  nitroglycerin    2% Ointment 1 Inch(s) Transdermal daily  ondansetron Injectable 4 milliGRAM(s) IV Push every 6 hours PRN  simethicone 80 milliGRAM(s) Chew every 8 hours      LABS:                    8.3    11.35 )-----------( 214      ( 27 Jul 2024 05:30 )             25.9     07-27    137  |  103  |  23  ----------------------------<  92  4.1   |  24  |  0.91    Ca    8.4      27 Jul 2024 05:30  Phos  2.9     07-27  Mg     2.0     07-27

## 2024-07-27 NOTE — PROGRESS NOTE ADULT - SUBJECTIVE AND OBJECTIVE BOX
S/Overnight events:  POD 3 occiput-C5 fusion. FEDERICA overnight.     Hospital Course:   7/19: admitted to [facility] for surgery with Dr. Puckett, given 15mg torodol for pain, 0.25 dilaudid for pain, 0.125 xanax for anxiety, 10 hydral for high BP.   7/20: FEDERICA, remains on precedex. Cardiology consulted for preop clearance.   7/21: FEDERICA ovn. cardiology clearance obtained. Liquid BMs x3, started on metamucil.  7/22: TTE complete. DC precedex.   7/23: FEDERICA overnight. Na+ 131 from 141. rpt 135, 500cc bolus given.   7/24: FEDERICA overnight. OR today. POD0 from O-C5 fusion, c1 lami. Post-op pulses not palpable, vasc consulted. PT and AT pulses dopplerable b/l, no DP. RUE pulses not dopplerable. Hep gtt + bolus started per vascular. duplex: R radial occlusion. SBP 80s-90s, given 500cc bolus NS and 250cc albumin. NGT placed  7/25: POD1. arterial dopplers performed, +R radial occlusion. PTT o/n >200, heparin gtt held x 4 hrs until ptt in goal. heparin gtt resumed 1000u/hr. transition propofol to precedex. (+) cuff leak. Extubated to NC by anesthesia. PTT >200, hep gtt paused. Bowel reg inc. PTT 75.6. Hep gtt restarted @ 800u.   7/26: POD2. SC o/n. Xrays complete. CT c spine complete. SQH tonight.     Vital Signs Last 24 Hrs  T(C): 36.9 (27 Jul 2024 00:27), Max: 36.9 (26 Jul 2024 14:45)  T(F): 98.5 (27 Jul 2024 00:27), Max: 98.5 (27 Jul 2024 00:27)  HR: 81 (26 Jul 2024 23:00) (65 - 88)  BP: 118/56 (26 Jul 2024 23:00) (113/56 - 163/76)  BP(mean): 81 (26 Jul 2024 23:00) (79 - 121)  RR: 16 (26 Jul 2024 23:00) (15 - 19)  SpO2: 95% (26 Jul 2024 23:00) (94% - 100%)    Parameters below as of 26 Jul 2024 23:00  Patient On (Oxygen Delivery Method): nasal cannula  O2 Flow (L/min): 2    I&O's Detail    25 Jul 2024 07:01  -  26 Jul 2024 07:00  --------------------------------------------------------  IN:    Dexmedetomidine: 37.7 mL    Dexmedetomidine: 44 mL    Heparin: 38 mL    Heparin: 40 mL    Heparin: 40 mL    Oral Fluid: 1000 mL    Phenylephrine: 36.9 mL    sodium chloride 0.9%: 65 mL    sodium chloride 0.9%: 240 mL  Total IN: 1541.6 mL    OUT:    Accordian (mL): 10 mL    Indwelling Catheter - Urethral (mL): 280 mL    Intermittent Catheterization - Urethral (mL): 1150 mL    Propofol: 0 mL    Voided (mL): 100 mL  Total OUT: 1540 mL    Total NET: 1.6 mL      26 Jul 2024 07:01  -  27 Jul 2024 00:38  --------------------------------------------------------  IN:  Total IN: 0 mL    OUT:    Accordian (mL): 7.5 mL    Dexmedetomidine: 0 mL  Total OUT: 7.5 mL    Total NET: -7.5 mL      I&O's Summary    25 Jul 2024 07:01  -  26 Jul 2024 07:00  --------------------------------------------------------  IN: 1541.6 mL / OUT: 1540 mL / NET: 1.6 mL    26 Jul 2024 07:01  -  27 Jul 2024 00:38  --------------------------------------------------------  IN: 0 mL / OUT: 7.5 mL / NET: -7.5 mL    PHYSICAL EXAM:  General: Pt is sitting up comfortably in bed, in NAD, on 2L NC  HEENT: CN II-XII grossly intact, PERRL 3mm, EOMI B/L, face symmetric, + hard C collar in place  Cardiovascular: RRR, normal S1 and S2   Respiratory: non-labored breathing, symmetric chest rise   GI: abd soft, NTND   Neuro: A&O x 3, no aphasia, speech clear  Strength LUE deltoid 3/5, distally 4/3; LLE 4/5 throughout   RUE and RLE 5/5  Sensation intact to light touch throughout   Vascular: Distal pulses 2+ x4, no calf edema or erythema  Skin: + RUE edema, + areas of ecchymosis throughout BL UE 2/2 difficult arterial line placement   Wounds: posterior cervical wound with aquacel in place     DEVICE/DRAIN DRESSING: HMV drain x 1    DIET:  [] NPO  [X] Mechanical  [] Tube feeds    LABS:  PT/INR - ( 26 Jul 2024 16:42 )   PT: 10.9 sec;   INR: 0.95          PTT - ( 26 Jul 2024 16:42 )  PTT:23.6 sec  Urinalysis Basic - ( 26 Jul 2024 04:43 )    Color: x / Appearance: x / SG: x / pH: x  Gluc: 83 mg/dL / Ketone: x  / Bili: x / Urobili: x   Blood: x / Protein: x / Nitrite: x   Leuk Esterase: x / RBC: x / WBC x   Sq Epi: x / Non Sq Epi: x / Bacteria: x      CAPILLARY BLOOD GLUCOSE      POCT Blood Glucose.: 100 mg/dL (26 Jul 2024 06:05)    Allergies    penicillin (Unknown)  erythromycin (Unknown)  [This allergen will not trigger allergy alert] Acetic Wb-Cfyvcpxtus-Taxhkwoku (Other)    Intolerances      MEDICATIONS:  Antibiotics:    Neuro:  acetaminophen     Tablet .. 650 milliGRAM(s) Oral every 6 hours PRN  DULoxetine 30 milliGRAM(s) Oral two times a day  methocarbamol 500 milliGRAM(s) Oral every 8 hours  ondansetron Injectable 4 milliGRAM(s) IV Push every 6 hours PRN    Anticoagulation:  aspirin enteric coated 81 milliGRAM(s) Oral daily  heparin   Injectable 5000 Unit(s) SubCutaneous every 12 hours    OTHER:  chlorhexidine 2% Cloths 1 Application(s) Topical <User Schedule>  latanoprost 0.005% Ophthalmic Solution 1 Drop(s) Both EYES at bedtime  midodrine 5 milliGRAM(s) Oral every 8 hours  nitroglycerin    2% Ointment 1 Inch(s) Transdermal daily  simethicone 80 milliGRAM(s) Chew every 8 hours    IVF:  ascorbic acid 500 milliGRAM(s) Oral daily  multivitamin 1 Tablet(s) Oral daily    ASSESSMENT:  89 year old female, PMH HTN, HLD, CHF, AS, chronic C2 fracture, presenting to [facility] ED with 1 week of worsening weakness and difficulty ambulating at home. 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 Pt has been seen outpatient by Dr. Butler and Dr. Puckett. Pt's family called outpatient office and was advised to come to ED for admission for surgery. Now s/p occipital -C5 fusion with C1 laminectomy (7/24).      C2 CERVICAL FRACTURE;NECK PAIN    Allergy status to other antibiotic agents    Allergy status to penicillin    Aneurysm of unspecified site    Anterior displaced type ii dens fracture, initial encounter for closed fracture    Atherosclerotic heart disease of native coronary artery without angina pectoris    Bilateral primary osteoarthritis of knee    CAP GLAUC PSX LENS LT EYE MOD STAGE    CAPSLR GLAUCOMA W/PSEUDXF LENS, BILATERAL, MODERATE STAGE    Cervicalgia    Chronic diastolic (congestive) heart failure    Dizziness and giddiness    Hyperlipidemia, unspecified    Hypertensive heart disease with heart failure    Iliotibial band syndrome, right leg    Long term (current) use of aspirin    Nonrheumatic aortic (valve) stenosis    Old myocardial infarction    Other specific arthropathies, not elsewhere classified, left shoulder    Pain in right leg    Pain in unspecified shoulder    Personal history of other diseases of the circulatory system    Personal history of other endocrine, nutritional and metabolic disease    Presence of other heart-valve replacement    Shortness of breath    Unspecified glaucoma    Vertigo of central origin    History of tobacco use    Sex Assigned At Birth    Sex Assigned At Birth    Alcohol intake    FH: lung cancer (Father)    Handoff    MEWS Score    Prior    Essential hypertension    Hyperlipidemia, unspecified hyperlipidemia type    Diabetes insipidus    H/O aortic valve stenosis    Hypertension    Vertigo    Chronic diastolic congestive heart failure    Dyslipidemia    History of pseudoaneurysm    Glaucoma    Closed C2 fracture    Open dens fracture    Dens fracture    Dens fracture    C2 cervical fracture    Anterior displaced type ii dens fracture, sequela    H/O aortic valve stenosis    Hyperlipidemia, unspecified hyperlipidemia type    Hypertension    Glaucoma    Fusion, occipital bone with C1 and C2 vertebrae, posterior approach    History of orthopedic surgery    S/P AVR    H/O lumbosacral spine surgery    S/P hip replacement, right    S/P hip replacement    S/P right coronary artery (RCA) stent placement    NECK PAIN    2    Room Service Assist    Room Service Assist    Neck pain    [email or web address]    PLAN:  Neuro:   - neuro q4/vitals q2h  - Protected sleep time: 10pm-5am   - Pain control: Tylenol prn  - Hard collar at all times   - Anxiety: home xanax 0.25mg qhs, cymbalta 30mg  - 1 deep HMV, monitor output   - Post op xrays complete   - CT C-spine complete     Cards:   - SBP <160, midodrine 5q8  - hx CHF: TTE 7/22: EF 67%, TAVR , mod MR, mod mitral stenosis, mild-mod TR, pulm HTN   - Hx TAVR: cont home ASA 81mg (keep for surgery)  - cardiology consulted for preop clearance    Pulm:   - NC 2L    GI:   - Regular diet   - bowel reg, prn dulcolax, LBM 7/26  - Abd XR 7/22: poss ileus, Simethicone 80mg q8   - CT abdomen 7/26 w/o c/f ileus     Renal:   - IVL  - SC prn    Endo:   - ISS   - A1c = 5.3    Heme:   - DVT ppx: SCDs, SQH 5000u q12  - s/p hep bolus, s/p hep gtt (dc'd 7/26)  - Vacular consulted f/u recs: nitroglycerin ointment to fingers/toes daily   - arterial dopplers 7/24: R mid-radial occlusion, no other occlusions in LUE/ b/l legs  - pending b/l UE duplex for swelling     ID:   - afebrile    Dispo: ICU, full code, PT rec AR     D/w Dr Puckett, Dr Sheriff
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
## 2024-07-28 PROCEDURE — 99233 SBSQ HOSP IP/OBS HIGH 50: CPT

## 2024-07-28 PROCEDURE — 93970 EXTREMITY STUDY: CPT | Mod: 26

## 2024-07-28 RX ORDER — METHOCARBAMOL 500 MG
500 TABLET ORAL EVERY 8 HOURS
Refills: 0 | Status: DISCONTINUED | OUTPATIENT
Start: 2024-07-28 | End: 2024-08-02

## 2024-07-28 RX ORDER — ALPRAZOLAM 0.5 MG
0.25 TABLET ORAL AT BEDTIME
Refills: 0 | Status: DISCONTINUED | OUTPATIENT
Start: 2024-07-28 | End: 2024-08-02

## 2024-07-28 RX ADMIN — Medication 81 MILLIGRAM(S): at 12:35

## 2024-07-28 RX ADMIN — Medication 30 MILLIGRAM(S): at 17:40

## 2024-07-28 RX ADMIN — Medication 650 MILLIGRAM(S): at 23:21

## 2024-07-28 RX ADMIN — Medication 30 MILLIGRAM(S): at 06:33

## 2024-07-28 RX ADMIN — Medication 500 MILLIGRAM(S): at 12:35

## 2024-07-28 RX ADMIN — NITROGLYCERIN 1 INCH(S): 400 AEROSOL, METERED SUBLINGUAL at 12:34

## 2024-07-28 RX ADMIN — HEPARIN SODIUM 5000 UNIT(S): 1000 INJECTION, SOLUTION INTRAVENOUS; SUBCUTANEOUS at 10:53

## 2024-07-28 RX ADMIN — Medication 500 MILLIGRAM(S): at 06:33

## 2024-07-28 RX ADMIN — SIMETHICONE 80 MILLIGRAM(S): 125 TABLET, CHEWABLE ORAL at 21:15

## 2024-07-28 RX ADMIN — CHLORHEXIDINE GLUCONATE 1 APPLICATION(S): 500 CLOTH TOPICAL at 07:16

## 2024-07-28 RX ADMIN — Medication 500 MILLIGRAM(S): at 21:15

## 2024-07-28 RX ADMIN — HEPARIN SODIUM 5000 UNIT(S): 1000 INJECTION, SOLUTION INTRAVENOUS; SUBCUTANEOUS at 21:15

## 2024-07-28 RX ADMIN — Medication 0.25 MILLIGRAM(S): at 21:15

## 2024-07-28 RX ADMIN — Medication 1 DROP(S): at 21:16

## 2024-07-28 RX ADMIN — SIMETHICONE 80 MILLIGRAM(S): 125 TABLET, CHEWABLE ORAL at 06:33

## 2024-07-28 RX ADMIN — MIDODRINE HYDROCHLORIDE 2.5 MILLIGRAM(S): 2.5 TABLET ORAL at 06:32

## 2024-07-28 RX ADMIN — Medication 1 TABLET(S): at 12:35

## 2024-07-28 RX ADMIN — SIMETHICONE 80 MILLIGRAM(S): 125 TABLET, CHEWABLE ORAL at 14:15

## 2024-07-28 NOTE — PROGRESS NOTE ADULT - SUBJECTIVE AND OBJECTIVE BOX
=================================  NEUROCRITICAL CARE ATTENDING NOTE  =================================    COLLETTE OROZCO   MRN-[id number]  Summary:  89y/F  with HTN, HLD, CHF, severe AS, and chronic C-2 fracture x 2 years, presented to [facility] ED with worsening weakness and difficulty ambulating since . Per home health aide, pt had a fall with +head strike on  which she did not receive medical attention for. On , HHA noted pt was having more difficulty ambulating and was dropping objects and not able to feed herself as before. Pt was seen on  at [facility] and had a CT head and CT c-spine. 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 The provider at [facility] offered to pt, family, and pt's PCP, Dr. Doan who was at bedside, to call spine surgery at [facility] and all parties refused, preferring to take patient home. Pt was discharged home with her HHA. Since , pt's weakness has progressed, prompting them to come to the ED. She denies falls since . Pt came in with soft collar on, which was removed by HHA while in ED.  (2024 16:16)    COURSE IN THE HOSPITAL:  : admitted to [facility] for surgery with Dr. Puckett, given 15mg torodol for pain, 0.25 dilaudid for pain, 0.125 xanax for anxiety, 10 hydral for high BP.   : FEDERICA, remains on precedex. Cardiology consulted for preop clearance.   : FEDERICA ovn. cardiology clearance obtained. Liquid BMs x3, started on metamucil.  : TTE complete. DC precedex.   : FEDERICA overnight. Na+ 131 from 141. rpt 135, 500cc bolus given.   : FEDERICA overnight. OR today. POD0 from O-C5 fusion, c1 lami. Post-op pulses not palpable, vasc consulted. PT and AT pulses dopplerable b/l, no DP. RUE pulses not dopplerable. Hep gtt + bolus started per vascular. duplex: R radial occlusion. SBP 80s-90s, given 500cc bolus NS and 250cc albumin. NGT placed  : POD1. arterial dopplers performed, +R radial occlusion. PTT o/n >200, heparin gtt held x 4 hrs until ptt in goal. heparin gtt resumed 1000u/hr. transition propofol to precedex. (+) cuff leak. Extubated to NC by anesthesia. PTT >200, hep gtt paused. Bowel reg inc. PTT 75.6. Hep gtt restarted @ 800u.   : POD2. SC o/n. Xrays complete. CT c spine complete. SQH tonight.   : POD 3 occiput-C5 fusion. FEDERICA overnight. Midodrine decreased to 2.5q8.  : POD 4 occiput- C5 fusion. FEDERICA overnight, neuro stable.     Past Medical History: Essential hypertension Hyperlipidemia, unspecified hyperlipidemia type Diabetes insipidus H/O aortic valve stenosis Hypertension Vertigo Chronic diastolic congestive heart failure Dyslipidemia History of pseudoaneurysm Glaucoma Closed C2 fracture  Allergies:  penicillin (Unknown) erythromycin (Unknown) [This allergen will not trigger allergy alert] Acetic Jg-Kkijcjnsbj-Hjijcdomj (Other)  Home meds:   ·	ALPRAZolam 0.25 mg oral tablet: 1 tab(s) orally once a day (at bedtime)  ·	aspirin 81 mg oral tablet, chewable: 1 tab(s) orally once a day  ·	brimonidine 0.1% ophthalmic solution: 1 drop(s) to each affected eye 2 times a day  ·	cholecalciferol 125 mcg (5000 intl units) oral tablet: 2 tab(s) orally 2 times a day  ·	Cymbalta 30 mg oral delayed release capsule: 1 cap(s) orally 2 times a day  ·	latanoprost 0.005% ophthalmic solution: 1 drop(s) to each affected eye once a day (at bedtime)  ·	melatonin 3 mg oral tablet: 3 tab(s) orally once a day (at bedtime) as needed for  insomnia  ·	polyethylene glycol 3350 oral powder for reconstitution: 17 gram(s) orally once a day    PHYSICAL EXAMINATION  T(C): 36.5 ( @ 05:25), Max: 36.8 ( @ 17:53) HR: 79 ( @ 07:10) (79 - 91) BP: 153/67 ( @ 07:10) (98/51 - 153/67) RR: 12 ( @ 07:10) (12 - 18) SpO2: 94% ( @ 07:10) (66% - 97%)  NEUROLOGIC EXAMINATION:  Patient is    GENERAL: not intubated, not in cardiorespiratory distress  EENT:  anicteric  CARDIOVASCULAR: (+) S1 S2, normal rate and regular rhythm  PULMONARY: clear to auscultation bilaterally  ABDOMEN: soft, nontender with normoactive bowel sounds  EXTREMITIES: no edema  SKIN: no rash    LABS:              8.3    11.35 )-----------( 214      ( 2024 05:30 )             25.9     137  |  103  |  23  ----------------------------<  92  4.1   |  24  |  0.91    Ca    8.4      2024 05:30  Phos  2.9       Mg     2.0      @ 07:01  -   @ 07:00  --------------------------------------------------------  IN: 2320 mL / OUT: 650 mL / NET: 1670 mL    Bacteriology:  CSF studies:  EEG:  [id number]	CT Cervical Spine	Interval C1 laminectomy, occiput to C5 fusion. Improved alignment of nonhealing odontoid fx.  2024	XR C Spine AP LAT 2-3V	Unchanged craniocervical fusion hardware. Slightly anteriorly offset odontoid fx.  2024	US Duplex LE Arts	No significant hemodynamic stenosis in visualized vessels.  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 No occlusion in left upper extremity.  2024	CT Brain            	No acute intracranial hemorrhage or calvarial fx.  2024	CT Cervical Spine	Chronic odontoid fx with anterior displacement. 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  2024	MRI Cervical Spine	Nonhealing C2 fx with anterior displacement. Multilevel degenerative changes.  2023	MRI Cervical Spine	Ventral displacement of nonhealed dens fx. No cord compression.  2023	CT Cervical Spine	Further anterior displacement of nonhealing type II dens fx. Multilevel degenerative changes.    Other imaging:    MEDICATIONS:     ·	aspirin enteric coated 81 Oral daily  ·	heparin   Injectable 5000 SubCutaneous every 12 hours  ·	ALPRAZolam 0.25 Oral at bedtime  ·	DULoxetine 30 Oral two times a day  ·	methocarbamol 500 Oral every 8 hours  ·	midodrine 2.5 milliGRAM(s) Oral every 8 hours  ·	nitroglycerin    2% Ointment 1 Inch(s) Transdermal daily  ·	simethicone 80 Chew every 8 hours  ·	ascorbic acid 500 Oral daily  ·	latanoprost 0.005% Ophthalmic Solution 1 Both EYES at bedtime  ·	multivitamin 1 Oral daily  ·	acetaminophen     Tablet .. 650 Oral every 6 hours PRN  ·	artificial  tears Solution 1 Both EYES four times a day PRN  ·	ondansetron Injectable 4 IV Push every 6 hours PRN    IV FLUIDS:  DRIPS:  DIET:  Lines:  Drains:      Accordian (mL): 0 mL  Wounds:    CODE STATUS:  Full Code                       GOALS OF CARE:  aggressive                      DISPOSITION:  ICU =================================  NEUROCRITICAL CARE ATTENDING NOTE  =================================    COLLETTE OROZCO   MRN-[id number]  Summary:  89y/F  with HTN, HLD, CHF, severe AS, and chronic C-2 fracture x 2 years, presented to [facility] ED with worsening weakness and difficulty ambulating since . Per home health aide, pt had a fall with +head strike on  which she did not receive medical attention for. On , HHA noted pt was having more difficulty ambulating and was dropping objects and not able to feed herself as before. Pt was seen on  at [facility] and had a CT head and CT c-spine. 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 The provider at [facility] offered to pt, family, and pt's PCP, Dr. Doan who was at bedside, to call spine surgery at [facility] and all parties refused, preferring to take patient home. Pt was discharged home with her HHA. Since , pt's weakness has progressed, prompting them to come to the ED. She denies falls since . Pt came in with soft collar on, which was removed by HHA while in ED.  (2024 16:16)    COURSE IN THE HOSPITAL:  : admitted to [facility] for surgery with Dr. Puckett, given 15mg torodol for pain, 0.25 dilaudid for pain, 0.125 xanax for anxiety, 10 hydral for high BP.   : FEDERICA, remains on precedex. Cardiology consulted for preop clearance.   : FEDERICA ovn. cardiology clearance obtained. Liquid BMs x3, started on metamucil.  : TTE complete. DC precedex.   : FEDERICA overnight. Na+ 131 from 141. rpt 135, 500cc bolus given.   : FEDERICA overnight. OR today. POD0 from O-C5 fusion, c1 lami. Post-op pulses not palpable, vasc consulted. PT and AT pulses dopplerable b/l, no DP. RUE pulses not dopplerable. Hep gtt + bolus started per vascular. duplex: R radial occlusion. SBP 80s-90s, given 500cc bolus NS and 250cc albumin. NGT placed  : POD1. arterial dopplers performed, +R radial occlusion. PTT o/n >200, heparin gtt held x 4 hrs until ptt in goal. heparin gtt resumed 1000u/hr. transition propofol to precedex. (+) cuff leak. Extubated to NC by anesthesia. PTT >200, hep gtt paused. Bowel reg inc. PTT 75.6. Hep gtt restarted @ 800u.   : POD2. SC o/n. Xrays complete. CT c spine complete. SQH tonight.   : POD 3 occiput-C5 fusion. FEDERICA overnight. Midodrine decreased to 2.5q8.  : POD 4 occiput- C5 fusion. FEDERICA overnight, neuro stable.     Past Medical History: Essential hypertension Hyperlipidemia, unspecified hyperlipidemia type Diabetes insipidus H/O aortic valve stenosis Hypertension Vertigo Chronic diastolic congestive heart failure Dyslipidemia History of pseudoaneurysm Glaucoma Closed C2 fracture  Allergies:  penicillin (Unknown) erythromycin (Unknown) [This allergen will not trigger allergy alert] Acetic Qw-Qzwophpvlc-Fkrtqpatw (Other)  Home meds:   ·	ALPRAZolam 0.25 mg oral tablet: 1 tab(s) orally once a day (at bedtime)  ·	aspirin 81 mg oral tablet, chewable: 1 tab(s) orally once a day  ·	brimonidine 0.1% ophthalmic solution: 1 drop(s) to each affected eye 2 times a day  ·	cholecalciferol 125 mcg (5000 intl units) oral tablet: 2 tab(s) orally 2 times a day  ·	Cymbalta 30 mg oral delayed release capsule: 1 cap(s) orally 2 times a day  ·	latanoprost 0.005% ophthalmic solution: 1 drop(s) to each affected eye once a day (at bedtime)  ·	melatonin 3 mg oral tablet: 3 tab(s) orally once a day (at bedtime) as needed for  insomnia  ·	polyethylene glycol 3350 oral powder for reconstitution: 17 gram(s) orally once a day    PHYSICAL EXAMINATION  T(C): 36.5 ( @ 05:25), Max: 36.8 ( @ 17:53) HR: 79 ( @ 07:10) (79 - 91) BP: 153/67 ( @ 07:10) (98/51 - 153/67) RR: 12 ( @ 07:10) (12 - 18) SpO2: 94% ( @ 07:10) (96% - 97%)  NEUROLOGIC EXAMINATION:  Patient is  awake alert oriented x 2-3 FC PAIGE, moving all 4s, at least 4/5 L UE R UE 4/5  L UE 2-3/5  GENERAL: not intubated, not in cardiorespiratory distress  EENT:  anicteric  CARDIOVASCULAR: (+) S1 S2, normal rate and regular rhythm  PULMONARY: clear to auscultation bilaterally  ABDOMEN: soft, nontender with normoactive bowel sounds  EXTREMITIES: no edema, good distal pulses (palpable in B LE DP/PT and B UE radial)  SKIN: no rash    LABS:                         8.3    11.35 )-----------( 214      ( 2024 05:30 )             25.9     137  |  103  |  23  ----------------------------<  92  4.1   |  24  |  0.91    Ca    8.4      2024 05:30  Phos  2.9       Mg     2.0      @ 07:01  -   @ 07:00  --------------------------------------------------------  IN: 2320 mL / OUT: 650 mL / NET: 1670 mL    Bacteriology:  CSF studies:  EEG:  [email or web address]	CT Cervical Spine	Interval C1 laminectomy, occiput to C5 fusion. Improved alignment of nonhealing odontoid fx.  2024	XR C Spine AP LAT 2-3V	Unchanged craniocervical fusion hardware. Slightly anteriorly offset odontoid fx.  2024	US Duplex LE Arts	No significant hemodynamic stenosis in visualized vessels.  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 No occlusion in left upper extremity.  2024	CT Brain            	No acute intracranial hemorrhage or calvarial fx.  2024	CT Cervical Spine	Chronic odontoid fx with anterior displacement. 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  2024	MRI Cervical Spine	Nonhealing C2 fx with anterior displacement. Multilevel degenerative changes.  2023	MRI Cervical Spine	Ventral displacement of nonhealed dens fx. No cord compression.  2023	CT Cervical Spine	Further anterior displacement of nonhealing type II dens fx. Multilevel degenerative changes.    Other imaging:    MEDICATIONS:     ·	aspirin enteric coated 81 Oral daily  ·	heparin   Injectable 5000 SubCutaneous every 12 hours  ·	ALPRAZolam 0.25 Oral at bedtime  ·	DULoxetine 30 Oral two times a day  ·	methocarbamol 500 Oral every 8 hours  ·	midodrine 2.5 milliGRAM(s) Oral every 8 hours  ·	nitroglycerin    2% Ointment 1 Inch(s) Transdermal daily  ·	simethicone 80 Chew every 8 hours  ·	ascorbic acid 500 Oral daily  ·	latanoprost 0.005% Ophthalmic Solution 1 Both EYES at bedtime  ·	multivitamin 1 Oral daily  ·	acetaminophen     Tablet .. 650 Oral every 6 hours PRN  ·	artificial  tears Solution 1 Both EYES four times a day PRN  ·	ondansetron Injectable 4 IV Push every 6 hours PRN    IV FLUIDS:IVL  DRIPS:  DIET: regular   Lines:  Drains:    ·	HMV  Accordian (mL): 0 mL  Wounds:    CODE STATUS:  Full Code                       GOALS OF CARE:  aggressive                      DISPOSITION:  ICU =================================  NEUROCRITICAL CARE ATTENDING NOTE  =================================    COLLETTE OROZCO   MRN-[id number]  Summary:  89y/F  with HTN, HLD, CHF, severe AS, and chronic C-2 fracture x 2 years, presented to [facility] ED with worsening weakness and difficulty ambulating since . Per home health aide, pt had a fall with +head strike on  which she did not receive medical attention for. On , HHA noted pt was having more difficulty ambulating and was dropping objects and not able to feed herself as before. Pt was seen on  at [facility] and had a CT head and CT c-spine. 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 The provider at [facility] offered to pt, family, and pt's PCP, Dr. Doan who was at bedside, to call spine surgery at [facility] and all parties refused, preferring to take patient home. Pt was discharged home with her HHA. Since , pt's weakness has progressed, prompting them to come to the ED. She denies falls since . Pt came in with soft collar on, which was removed by HHA while in ED.  (2024 16:16)    COURSE IN THE HOSPITAL:  : admitted to [facility] for surgery with Dr. Puckett, given 15mg torodol for pain, 0.25 dilaudid for pain, 0.125 xanax for anxiety, 10 hydral for high BP.   : FEDERICA, remains on precedex. Cardiology consulted for preop clearance.   : FEDERICA ovn. cardiology clearance obtained. Liquid BMs x3, started on metamucil.  : TTE complete. DC precedex.   : FEDERICA overnight. Na+ 131 from 141. rpt 135, 500cc bolus given.   : FEDERICA overnight. OR today. POD0 from O-C5 fusion, c1 lami. Post-op pulses not palpable, vasc consulted. PT and AT pulses dopplerable b/l, no DP. RUE pulses not dopplerable. Hep gtt + bolus started per vascular. duplex: R radial occlusion. SBP 80s-90s, given 500cc bolus NS and 250cc albumin. NGT placed  : POD1. arterial dopplers performed, +R radial occlusion. PTT o/n >200, heparin gtt held x 4 hrs until ptt in goal. heparin gtt resumed 1000u/hr. transition propofol to precedex. (+) cuff leak. Extubated to NC by anesthesia. PTT >200, hep gtt paused. Bowel reg inc. PTT 75.6. Hep gtt restarted @ 800u.   : POD2. SC o/n. Xrays complete. CT c spine complete. SQH tonight.   : POD 3 occiput-C5 fusion. FEDERICA overnight. Midodrine decreased to 2.5q8.  : POD 4 occiput- C5 fusion. FEDERICA overnight, neuro stable.     Past Medical History: Essential hypertension Hyperlipidemia, unspecified hyperlipidemia type Diabetes insipidus H/O aortic valve stenosis Hypertension Vertigo Chronic diastolic congestive heart failure Dyslipidemia History of pseudoaneurysm Glaucoma Closed C2 fracture  Allergies:  penicillin (Unknown) erythromycin (Unknown) [This allergen will not trigger allergy alert] Acetic Th-Alvppvfiff-Thtlkrjlk (Other)  Home meds:   ·	ALPRAZolam 0.25 mg oral tablet: 1 tab(s) orally once a day (at bedtime)  ·	aspirin 81 mg oral tablet, chewable: 1 tab(s) orally once a day  ·	brimonidine 0.1% ophthalmic solution: 1 drop(s) to each affected eye 2 times a day  ·	cholecalciferol 125 mcg (5000 intl units) oral tablet: 2 tab(s) orally 2 times a day  ·	Cymbalta 30 mg oral delayed release capsule: 1 cap(s) orally 2 times a day  ·	latanoprost 0.005% ophthalmic solution: 1 drop(s) to each affected eye once a day (at bedtime)  ·	melatonin 3 mg oral tablet: 3 tab(s) orally once a day (at bedtime) as needed for  insomnia  ·	polyethylene glycol 3350 oral powder for reconstitution: 17 gram(s) orally once a day    PHYSICAL EXAMINATION  T(C): 36.5 ( @ 05:25), Max: 36.8 ( @ 17:53) HR: 79 ( @ 07:10) (79 - 91) BP: 153/67 ( @ 07:10) (98/51 - 153/67) RR: 12 ( @ 07:10) (12 - 18) SpO2: 94% ( @ 07:10) (96% - 97%)  NEUROLOGIC EXAMINATION:  Patient is  awake alert oriented x 2-3 FC PAIGE, moving all 4s, at least 4/5 L UE R UE 4/5  L UE 2-3/5  GENERAL: not intubated, not in cardiorespiratory distress  EENT:  anicteric  CARDIOVASCULAR: (+) S1 S2, normal rate and regular rhythm  PULMONARY: clear to auscultation bilaterally  ABDOMEN: soft, nontender with normoactive bowel sounds  EXTREMITIES: no edema, good distal pulses (palpable in B LE DP/PT and B UE radial)  SKIN: no rash    LABS:                         8.3    11.35 )-----------( 214      ( 2024 05:30 )             25.9     137  |  103  |  23  ----------------------------<  92  4.1   |  24  |  0.91    Ca    8.4      2024 05:30  Phos  2.9       Mg     2.0      @ 07:01  -   @ 07:00  --------------------------------------------------------  IN: 2320 mL / OUT: 650 mL / NET: 1670 mL    Bacteriology:  CSF studies:  EEG:  [email or web address]	CT Cervical Spine	Interval C1 laminectomy, occiput to C5 fusion. Improved alignment of nonhealing odontoid fx.  2024	XR C Spine AP LAT 2-3V	Unchanged craniocervical fusion hardware. Slightly anteriorly offset odontoid fx.  2024	US Duplex LE Arts	No significant hemodynamic stenosis in visualized vessels.  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 No occlusion in left upper extremity.  2024	CT Brain            	No acute intracranial hemorrhage or calvarial fx.  2024	CT Cervical Spine	Chronic odontoid fx with anterior displacement. 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  2024	MRI Cervical Spine	Nonhealing C2 fx with anterior displacement. Multilevel degenerative changes.  2023	MRI Cervical Spine	Ventral displacement of nonhealed dens fx. No cord compression.  2023	CT Cervical Spine	Further anterior displacement of nonhealing type II dens fx. Multilevel degenerative changes.    Other imaging:    MEDICATIONS:     ·	aspirin enteric coated 81 Oral daily  ·	heparin   Injectable 5000 SubCutaneous every 12 hours  ·	ALPRAZolam 0.25 Oral at bedtime  ·	DULoxetine 30 Oral two times a day  ·	methocarbamol 500 Oral every 8 hours  ·	midodrine 2.5 milliGRAM(s) Oral every 8 hours  ·	nitroglycerin    2% Ointment 1 Inch(s) Transdermal daily  ·	simethicone 80 Chew every 8 hours  ·	ascorbic acid 500 Oral daily  ·	latanoprost 0.005% Ophthalmic Solution 1 Both EYES at bedtime  ·	multivitamin 1 Oral daily  ·	acetaminophen     Tablet .. 650 Oral every 6 hours PRN  ·	artificial  tears Solution 1 Both EYES four times a day PRN  ·	ondansetron Injectable 4 IV Push every 6 hours PRN    IV FLUIDS: IVL  DRIPS:  DIET: regular   Lines:  Drains:    ·	HMV  Accordion (mL): 0 mL  Wounds:    CODE STATUS:  Full Code                       GOALS OF CARE:  aggressive                      DISPOSITION:  ICU

## 2024-07-28 NOTE — PROGRESS NOTE ADULT - SUBJECTIVE AND OBJECTIVE BOX
S/Overnight events:  POD 4 occiput- C5 fusion. FEDERICA overnight, neuro stable.     Hospital Course:   7/19: admitted to [facility] for surgery with Dr. Puckett, given 15mg torodol for pain, 0.25 dilaudid for pain, 0.125 xanax for anxiety, 10 hydral for high BP.   7/20: FEDERICA, remains on precedex. Cardiology consulted for preop clearance.   7/21: FEDERICA ovn. cardiology clearance obtained. Liquid BMs x3, started on metamucil.  7/22: TTE complete. DC precedex.   7/23: FEDERICA overnight. Na+ 131 from 141. rpt 135, 500cc bolus given.   7/24: FEDERICA overnight. OR today. POD0 from O-C5 fusion, c1 lami. Post-op pulses not palpable, vasc consulted. PT and AT pulses dopplerable b/l, no DP. RUE pulses not dopplerable. Hep gtt + bolus started per vascular. duplex: R radial occlusion. SBP 80s-90s, given 500cc bolus NS and 250cc albumin. NGT placed  7/25: POD1. arterial dopplers performed, +R radial occlusion. PTT o/n >200, heparin gtt held x 4 hrs until ptt in goal. heparin gtt resumed 1000u/hr. transition propofol to precedex. (+) cuff leak. Extubated to NC by anesthesia. PTT >200, hep gtt paused. Bowel reg inc. PTT 75.6. Hep gtt restarted @ 800u.   7/26: POD2. SC o/n. Xrays complete. CT c spine complete. SQH tonight.   7/27: POD 3 occiput-C5 fusion. FEDERICA overnight. Midodrine decreased to 2.5q8.    Vital Signs Last 24 Hrs  T(C): 36.6 (28 Jul 2024 01:18), Max: 36.9 (27 Jul 2024 04:25)  T(F): 97.9 (28 Jul 2024 01:18), Max: 98.5 (27 Jul 2024 04:25)  HR: 81 (27 Jul 2024 23:00) (77 - 91)  BP: 129/79 (27 Jul 2024 23:00) (98/51 - 129/79)  BP(mean): 97 (27 Jul 2024 23:00) (69 - 97)  RR: 15 (27 Jul 2024 23:00) (15 - 18)  SpO2: 95% (27 Jul 2024 23:00) (66% - 100%)    Parameters below as of 27 Jul 2024 23:00  Patient On (Oxygen Delivery Method): room air    I&O's Detail    26 Jul 2024 07:01  -  27 Jul 2024 07:00  --------------------------------------------------------  IN:    Oral Fluid: 1000 mL  Total IN: 1000 mL    OUT:    Accordian (mL): 12.5 mL    Dexmedetomidine: 0 mL    Intermittent Catheterization - Urethral (mL): 850 mL    Voided (mL): 400 mL  Total OUT: 1262.5 mL    Total NET: -262.5 mL      27 Jul 2024 07:01  -  28 Jul 2024 02:05  --------------------------------------------------------  IN:    Oral Fluid: 1480 mL  Total IN: 1480 mL    OUT:    Intermittent Catheterization - Urethral (mL): 650 mL  Total OUT: 650 mL    Total NET: 830 mL    I&O's Summary    26 Jul 2024 07:01  -  27 Jul 2024 07:00  --------------------------------------------------------  IN: 1000 mL / OUT: 1262.5 mL / NET: -262.5 mL    27 Jul 2024 07:01  -  28 Jul 2024 02:05  --------------------------------------------------------  IN: 1480 mL / OUT: 650 mL / NET: 830 mL    PHYSICAL EXAM:  General: Pt is sitting up comfortably in bed, in NAD, on 2L NC  HEENT: CN II-XII grossly intact, PERRL 3mm, EOMI B/L, face symmetric, + hard C collar in place  Cardiovascular: RRR, normal S1 and S2   Respiratory: non-labored breathing, symmetric chest rise   GI: abd soft, NTND   Neuro: A&O x 3, no aphasia, speech clear  Strength LUE deltoid 3/5, distally 4/3; LLE 4/5 throughout   RUE and RLE 5/5  Sensation intact to light touch throughout   Vascular: Distal pulses 2+ x4, no calf edema or erythema  Skin: + RUE edema, + areas of ecchymosis throughout BL UE 2/2 difficult arterial line placement   Wounds: posterior cervical wound with aquacel in place     DEVICE/DRAIN DRESSING: HMV drain x 1    DIET:  [] NPO  [X] Mechanical  [] Tube feeds    LABS:  PT/INR - ( 26 Jul 2024 16:42 )   PT: 10.9 sec;   INR: 0.95          PTT - ( 26 Jul 2024 16:42 )  PTT:23.6 sec  Urinalysis Basic - ( 27 Jul 2024 05:30 )    Color: x / Appearance: x / SG: x / pH: x  Gluc: 92 mg/dL / Ketone: x  / Bili: x / Urobili: x   Blood: x / Protein: x / Nitrite: x   Leuk Esterase: x / RBC: x / WBC x   Sq Epi: x / Non Sq Epi: x / Bacteria: x    Allergies    penicillin (Unknown)  erythromycin (Unknown)  [This allergen will not trigger allergy alert] Acetic Cj-Wdggjyxdul-Ywushdyrf (Other)    Intolerances      MEDICATIONS:  Antibiotics:    Neuro:  acetaminophen     Tablet .. 650 milliGRAM(s) Oral every 6 hours PRN  ALPRAZolam 0.25 milliGRAM(s) Oral at bedtime  DULoxetine 30 milliGRAM(s) Oral two times a day  methocarbamol 500 milliGRAM(s) Oral every 8 hours  ondansetron Injectable 4 milliGRAM(s) IV Push every 6 hours PRN    Anticoagulation:  aspirin enteric coated 81 milliGRAM(s) Oral daily  heparin   Injectable 5000 Unit(s) SubCutaneous every 12 hours    OTHER:  artificial  tears Solution 1 Drop(s) Both EYES four times a day PRN  chlorhexidine 2% Cloths 1 Application(s) Topical <User Schedule>  latanoprost 0.005% Ophthalmic Solution 1 Drop(s) Both EYES at bedtime  midodrine 2.5 milliGRAM(s) Oral every 8 hours  nitroglycerin    2% Ointment 1 Inch(s) Transdermal daily  simethicone 80 milliGRAM(s) Chew every 8 hours    IVF:  ascorbic acid 500 milliGRAM(s) Oral daily  multivitamin 1 Tablet(s) Oral daily      ASSESSMENT:  89 year old female, PMH HTN, HLD, CHF, AS, chronic C2 fracture, presenting to [facility] ED with 1 week of worsening weakness and difficulty ambulating at home. 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 Pt has been seen outpatient by Dr. Butler and Dr. Puckett. Pt's family called outpatient office and was advised to come to ED for admission for surgery. Now s/p occipital -C5 fusion with C1 laminectomy (7/24).    C2 CERVICAL FRACTURE;NECK PAIN    Allergy status to other antibiotic agents    Allergy status to penicillin    Aneurysm of unspecified site    Anterior displaced type ii dens fracture, initial encounter for closed fracture    Atherosclerotic heart disease of native coronary artery without angina pectoris    Bilateral primary osteoarthritis of knee    CAP GLAUC PSX LENS LT EYE MOD STAGE    CAPSLR GLAUCOMA W/PSEUDXF LENS, BILATERAL, MODERATE STAGE    Cervicalgia    Chronic diastolic (congestive) heart failure    Dizziness and giddiness    Hyperlipidemia, unspecified    Hypertensive heart disease with heart failure    Iliotibial band syndrome, right leg    Long term (current) use of aspirin    Nonrheumatic aortic (valve) stenosis    Old myocardial infarction    Other specific arthropathies, not elsewhere classified, left shoulder    Pain in right leg    Pain in unspecified shoulder    Personal history of other diseases of the circulatory system    Personal history of other endocrine, nutritional and metabolic disease    Presence of other heart-valve replacement    Shortness of breath    Unspecified glaucoma    Vertigo of central origin    History of tobacco use    Sex Assigned At Birth    Sex Assigned At Birth    Alcohol intake    FH: lung cancer (Father)    Handoff    MEWS Score    Prior    Essential hypertension    Hyperlipidemia, unspecified hyperlipidemia type    Diabetes insipidus    H/O aortic valve stenosis    Hypertension    Vertigo    Chronic diastolic congestive heart failure    Dyslipidemia    History of pseudoaneurysm    Glaucoma    Closed C2 fracture    Open dens fracture    Dens fracture    Dens fracture    C2 cervical fracture    Anterior displaced type ii dens fracture, sequela    H/O aortic valve stenosis    Hyperlipidemia, unspecified hyperlipidemia type    Hypertension    Glaucoma    Fusion, occipital bone with C1 and C2 vertebrae, posterior approach    History of orthopedic surgery    S/P AVR    H/O lumbosacral spine surgery    S/P hip replacement, right    S/P hip replacement    S/P right coronary artery (RCA) stent placement    NECK PAIN    2    Room Service Assist    Room Service Assist    Room Service Assist    Neck pain    [email or web address]    PLAN:  Neuro:   - neuro q4/vitals q4h  - Protected sleep time: 10pm-5am   - Pain control: Tylenol prn  - Hard collar at all times   - Anxiety: home xanax 0.25mg qhs, cymbalta 30mg  - 1 deep HMV, monitor output   - Post op xrays complete   - CT C-spine complete     Cards:   - SBP <160, midodrine 2.5q8  - hx CHF: TTE 7/22: EF 67%, TAVR , mod MR, mod mitral stenosis, mild-mod TR, pulm HTN   - Hx TAVR: cont home ASA 81mg (keep for surgery)  - cardiology consulted for preop clearance    Pulm:   - NC 2L    GI:   - Regular diet   - bowel reg, prn dulcolax, LBM 7/26  - Abd XR 7/22: poss ileus, Simethicone 80mg q8   - CT abdomen 7/26 w/o c/f ileus     Renal:   - IVL  - Voiding, SC prn    Endo:   - ISS   - A1c 5.3    Heme:   - DVT ppx: SCDs, SQH 5000u q12  - s/p hep bolus, s/p hep gtt (dc'd 7/26)  - Vacular consulted f/u recs: nitroglycerin ointment to fingers/toes daily   - arterial dopplers 7/24: R mid-radial occlusion, no other occlusions in LUE/ b/l legs  - pending b/l UE duplex for swelling     ID:   - afebrile    Dispo: ICU, full code, PT rec AR     D/w Dr Puckett, Dr Sheriff

## 2024-07-28 NOTE — PROCEDURE NOTE - GENERAL PROCEDURE DETAILS
Site prepped and cleaned. Anchoring suture removed. Drain taken off suction. HMV removed without resistance, tip of catheter visualized. Steri strips placed over drain exit site.

## 2024-07-28 NOTE — PROGRESS NOTE ADULT - ASSESSMENT
89y/F with  Essential hypertension    Hyperlipidemia, unspecified hyperlipidemia type    Diabetes insipidus    H/O aortic valve stenosis    Hypertension    Vertigo    Chronic diastolic congestive heart failure    Dyslipidemia    History of pseudoaneurysm    Glaucoma    Closed C2 fracture  Neurological	Chronic C2 fracture with anterior displacement, severe stenosis of cervical medullary junction  Neurological	Postoperative status following occipital-C5 fusion with C1 laminectomy  Neurological	Worsening weakness and difficulty ambulating  Neurological	Anxiety  Cardiac	Hypertension (HTN)  Cardiac	Congestive heart failure (CHF)  Cardiac	Aortic stenosis (AS)  Cardiac	Moderate mitral regurgitation (MR)  Cardiac	Moderate mitral stenosis  Cardiac	Mild-moderate tricuspid regurgitation (TR)  Cardiac	Pulmonary hypertension (pulm HTN)  Cardiac	History of transcatheter aortic valve replacement (TAVR)  Pulmonary	Supplemental oxygen (NC 2L)  GI	Possible ileus  Renal	No significant renal issues noted  Endocrine	No significant endocrine issues noted  Hematologic	Deep vein thrombosis (DVT) prophylaxis  Hematologic	Status post heparin bolus and drip (discontinued)  Hematologic	Right mid-radial occlusion (arterial doppler)  Infectious	Afebrile, no active infection  Other	Hyperlipidemia (HLD)  Other	History of head strike (fall on July 6th)        PLAN:   NEURO:  REHAB:  physical therapy evaluation and management    EARLY MOB:      PULM:  Room air, incentive spirometry  CARDIO:  SBP goal 100-150mm Hg  ENDO:  Blood sugar goals 140-180 mg/dL, continue insulin sliding scale  GI:  PPI for GI prophylaxis  DIET:  RENAL:    HEM/ONC:  VTE Prophylaxis:     ID: afebrile, no leukocytosis  ====================   T(F): , Max: 98.3 (07-27-24 @ 17:53)    WBC Count: 11.35 (07-27)  WBC Count: 11.16 (07-26)    ====================  Social: will update family    Active issues:  What's keeping patient in the ICU?  What is this patient's dispo plan?    ATTENDING ATTESTATION:  I was physically present for the key portions of the evaluation and management (E/M) service provided.  I agree with the above history, physical and plan, which I have reviewed and edited where appropriate.    Patient at high risk for neurological deterioration or death due to:  ICU delirium, aspiration PNA, DVT / PE.  Critical care time:  I have personally provided 60 minutes of critical care time, excluding time spent on separate procedures.      Plan discussed with RN, house staff. 89y/F with  dyslipidemia Hypertension  h/o DI  h/o Ao valve stenosis  vertigo      Chronic diastolic congestive heart failure          History of pseudoaneurysm    Glaucoma    Closed C2 fracture  Neurological	Chronic C2 fracture with anterior displacement, severe stenosis of cervical medullary junction  Neurological	Postoperative status following occipital-C5 fusion with C1 laminectomy  Neurological	Worsening weakness and difficulty ambulating  Neurological	Anxiety  Cardiac	Hypertension (HTN)  Cardiac	Congestive heart failure (CHF)  Cardiac	Aortic stenosis (AS)  Cardiac	Moderate mitral regurgitation (MR)  Cardiac	Moderate mitral stenosis  Cardiac	Mild-moderate tricuspid regurgitation (TR)  Cardiac	Pulmonary hypertension (pulm HTN)  Cardiac	History of transcatheter aortic valve replacement (TAVR)  Pulmonary	Supplemental oxygen (NC 2L)  GI	Possible ileus  Renal	No significant renal issues noted  Endocrine	No significant endocrine issues noted  Hematologic	Deep vein thrombosis (DVT) prophylaxis  Hematologic	Status post heparin bolus and drip (discontinued)  Hematologic	Right mid-radial occlusion (arterial doppler)  Infectious	Afebrile, no active infection  Other	Hyperlipidemia (HLD)  Other	History of head strike (fall on July 6th)        PLAN:   NEURO: neurochecks q4h, PRN pain meds with acetaminophen  switch alprazolam to PRN  d/c HMV if ok with neurosurgery  REHAB:  physical therapy evaluation and management    EARLY MOB:  OOB to chair, ambulate with fall precautions    PULM:  Room air, incentive spirometry  CARDIO:  SBP goal 100-160mm Hg, d/c midodrine; h/o TAVR, CHF, cardiology on board  ENDO:  Blood sugar goals 140-180 mg/dL, continue insulin sliding scale  GI:  simethicone  DIET: regular diet  RENAL:  IVL  HEM/ONC: Hb stable  VTE Prophylaxis: SCDs, SQH  ID: afebrile, no leukocytosis  Social: will update family  MISC: monitor pulses, vascular on board nitroglycerin    Active issues:  What's keeping patient in the ICU? nothing  What is this patient's dispo plan? stepdown    ATTENDING ATTESTATION:  I was physically present for the key portions of the evaluation and management (E/M) service provided.  I agree with the above history, physical and plan which I have reviewed and edited where appropriate.     Patient not at high risk for neurologic deterioration / death.  Time spent on this noncritically ill patient: 55 minutes spent on total encounter, more than 50% of the visit was spent counseling and/or coordinating care by the attending physician.      Plan discussed with RN, house staff.    REVIEW OF SYSTEMS:  No headaches, no nausea or vomiting; 14 -point review of systems otherwise unremarkable.     89y/F with  closed C2 fracture; chronic C2 fracture with anterior displacement, severe stenosis of cervico-medullary junction, s/p occipital-C5 fusion with C1 laminectomy  anxiety  dyslipidemia Hypertension  h/o DI  h/o Ao valve stenosis s/p TAVR, chronic dCHF  vertigo  h/o pseudoaneurysm  glaucoma     PLAN:   NEURO: neurochecks q4h, PRN pain meds with acetaminophen  switch alprazolam to PRN  d/c HMV if ok with neurosurgery  REHAB:  physical therapy evaluation and management    EARLY MOB:  OOB to chair, ambulate with fall precautions    PULM:  Room air, incentive spirometry  CARDIO:  SBP goal 100-160mm Hg, d/c midodrine; h/o TAVR, CHF, cardiology on board  ENDO:  Blood sugar goals 140-180 mg/dL, continue insulin sliding scale  GI:  simethicone  DIET: regular diet  RENAL:  IVL  HEM/ONC: Hb stable  VTE Prophylaxis: SCDs, SQH  ID: afebrile, no leukocytosis  Social: will update family  MISC: monitor pulses, vascular on board nitroglycerin    Active issues:  What's keeping patient in the ICU? nothing  What is this patient's dispo plan? stepdown    ATTENDING ATTESTATION:  I was physically present for the key portions of the evaluation and management (E/M) service provided.  I agree with the above history, physical and plan which I have reviewed and edited where appropriate.     Patient not at high risk for neurologic deterioration / death.  Time spent on this noncritically ill patient: 55 minutes spent on total encounter, more than 50% of the visit was spent counseling and/or coordinating care by the attending physician.      Plan discussed with RN, house staff.    REVIEW OF SYSTEMS:  No headaches, no nausea or vomiting; 14 -point review of systems otherwise unremarkable.    ICU stepdown Checklist:    Completed: 07-28 @ 13:12    [X] hemodynamically stable – VS WNL and stable x 24hours, UO adequate  [n/a ] if  previously on HDA - off pressors x 24h with stable neuro exam    [X] no new symptoms x 24h (i.e. new fever, new-onset nausea/vomiting)  [X] stable labs: (i.e. WBC not rising, sodium not dropping)  [X] patient not at high risk for aspiration, if high risk then:                  [ ] should have definitive plans for trach/PEG (alternative option is to discharge from ICU to facilty)                  [ ] stepdown to bed close to nurse’s station  [n/a] low suctioning requirements (i.e. q4h or less)  [X] sign-off from primary RN*  [X] drains do not require ICU level of care  [X] if patient previously agitated or with behavioral issues – controlled   [X] pain controlled

## 2024-07-29 ENCOUNTER — TRANSCRIPTION ENCOUNTER (OUTPATIENT)
Age: 89
End: 2024-07-29

## 2024-07-29 LAB
BLD GP AB SCN SERPL QL: POSITIVE — SIGNIFICANT CHANGE UP
HCT VFR BLD CALC: 25.4 % — LOW (ref 34.5–45)
HGB BLD-MCNC: 8.1 G/DL — LOW (ref 11.5–15.5)
MCHC RBC-ENTMCNC: 30.8 PG — SIGNIFICANT CHANGE UP (ref 27–34)
MCHC RBC-ENTMCNC: 31.9 GM/DL — LOW (ref 32–36)
MCV RBC AUTO: 96.6 FL — SIGNIFICANT CHANGE UP (ref 80–100)
NRBC # BLD: 0 /100 WBCS — SIGNIFICANT CHANGE UP (ref 0–0)
PLATELET # BLD AUTO: 305 K/UL — SIGNIFICANT CHANGE UP (ref 150–400)
RBC # BLD: 2.63 M/UL — LOW (ref 3.8–5.2)
RBC # FLD: 16.1 % — HIGH (ref 10.3–14.5)
RH IG SCN BLD-IMP: POSITIVE — SIGNIFICANT CHANGE UP
WBC # BLD: 10.04 K/UL — SIGNIFICANT CHANGE UP (ref 3.8–10.5)
WBC # FLD AUTO: 10.04 K/UL — SIGNIFICANT CHANGE UP (ref 3.8–10.5)

## 2024-07-29 PROCEDURE — 99233 SBSQ HOSP IP/OBS HIGH 50: CPT | Mod: GC,25

## 2024-07-29 PROCEDURE — 99233 SBSQ HOSP IP/OBS HIGH 50: CPT

## 2024-07-29 RX ORDER — SENNOSIDES 8.6 MG/1
2 TABLET ORAL AT BEDTIME
Refills: 0 | Status: DISCONTINUED | OUTPATIENT
Start: 2024-07-29 | End: 2024-08-02

## 2024-07-29 RX ORDER — LORATADINE 10 MG
17 TABLET,DISINTEGRATING ORAL DAILY
Refills: 0 | Status: DISCONTINUED | OUTPATIENT
Start: 2024-07-29 | End: 2024-07-29

## 2024-07-29 RX ORDER — LORATADINE 10 MG
17 TABLET,DISINTEGRATING ORAL DAILY
Refills: 0 | Status: DISCONTINUED | OUTPATIENT
Start: 2024-07-29 | End: 2024-08-01

## 2024-07-29 RX ORDER — SENNOSIDES 8.6 MG/1
2 TABLET ORAL AT BEDTIME
Refills: 0 | Status: DISCONTINUED | OUTPATIENT
Start: 2024-07-29 | End: 2024-07-29

## 2024-07-29 RX ADMIN — Medication 0.25 MILLIGRAM(S): at 21:57

## 2024-07-29 RX ADMIN — SIMETHICONE 80 MILLIGRAM(S): 125 TABLET, CHEWABLE ORAL at 21:57

## 2024-07-29 RX ADMIN — SENNOSIDES 2 TABLET(S): 8.6 TABLET ORAL at 21:58

## 2024-07-29 RX ADMIN — Medication 81 MILLIGRAM(S): at 11:56

## 2024-07-29 RX ADMIN — NITROGLYCERIN 1 INCH(S): 400 AEROSOL, METERED SUBLINGUAL at 00:18

## 2024-07-29 RX ADMIN — Medication 1 DROP(S): at 21:57

## 2024-07-29 RX ADMIN — HEPARIN SODIUM 5000 UNIT(S): 1000 INJECTION, SOLUTION INTRAVENOUS; SUBCUTANEOUS at 10:25

## 2024-07-29 RX ADMIN — Medication 650 MILLIGRAM(S): at 22:56

## 2024-07-29 RX ADMIN — Medication 1 TABLET(S): at 11:57

## 2024-07-29 RX ADMIN — Medication 650 MILLIGRAM(S): at 00:19

## 2024-07-29 RX ADMIN — SIMETHICONE 80 MILLIGRAM(S): 125 TABLET, CHEWABLE ORAL at 06:32

## 2024-07-29 RX ADMIN — CHLORHEXIDINE GLUCONATE 1 APPLICATION(S): 500 CLOTH TOPICAL at 06:32

## 2024-07-29 RX ADMIN — Medication 500 MILLIGRAM(S): at 19:39

## 2024-07-29 RX ADMIN — Medication 30 MILLIGRAM(S): at 06:32

## 2024-07-29 RX ADMIN — Medication 500 MILLIGRAM(S): at 11:57

## 2024-07-29 RX ADMIN — Medication 650 MILLIGRAM(S): at 21:58

## 2024-07-29 RX ADMIN — Medication 30 MILLIGRAM(S): at 17:08

## 2024-07-29 RX ADMIN — HEPARIN SODIUM 5000 UNIT(S): 1000 INJECTION, SOLUTION INTRAVENOUS; SUBCUTANEOUS at 21:58

## 2024-07-29 NOTE — PROGRESS NOTE ADULT - SUBJECTIVE AND OBJECTIVE BOX
S/Overnight events: POD 5 occiput-C5 fusion. FEDERICA overnight. SD status.      Hospital Course:   7/19: admitted to [facility] for surgery with Dr. Puckett, given 15mg torodol for pain, 0.25 dilaudid for pain, 0.125 xanax for anxiety, 10 hydral for high BP.   7/20: FEDERICA, remains on precedex. Cardiology consulted for preop clearance.   7/21: FEDERICA ovn. cardiology clearance obtained. Liquid BMs x3, started on metamucil.  7/22: TTE complete. DC precedex.   7/23: FEDERICA overnight. Na+ 131 from 141. rpt 135, 500cc bolus given.   7/24: FEDERICA overnight. OR today. POD0 from O-C5 fusion, c1 lami. Post-op pulses not palpable, vasc consulted. PT and AT pulses dopplerable b/l, no DP. RUE pulses not dopplerable. Hep gtt + bolus started per vascular. duplex: R radial occlusion. SBP 80s-90s, given 500cc bolus NS and 250cc albumin. NGT placed  7/25: POD1. arterial dopplers performed, +R radial occlusion. PTT o/n >200, heparin gtt held x 4 hrs until ptt in goal. heparin gtt resumed 1000u/hr. transition propofol to precedex. (+) cuff leak. Extubated to NC by anesthesia. PTT >200, hep gtt paused. Bowel reg inc. PTT 75.6. Hep gtt restarted @ 800u.   7/26: POD2. SC o/n. Xrays complete. CT c spine complete. SQH tonight.   7/27: POD 3 occiput-C5 fusion. FEDERICA overnight. Midodrine decreased to 2.5q8.  7/28: POD 4 occiput- C5 fusion. FEDERICA overnight, neuro stable. HMV drain removed; nitro paste d/c'd. Midodrine dc'd.    Vital Signs Last 24 Hrs  T(C): 36.6 (29 Jul 2024 00:56), Max: 36.8 (28 Jul 2024 14:20)  T(F): 97.8 (29 Jul 2024 00:56), Max: 98.3 (28 Jul 2024 14:20)  HR: 90 (28 Jul 2024 23:00) (79 - 96)  BP: 119/60 (28 Jul 2024 23:00) (111/58 - 153/67)  BP(mean): 82 (28 Jul 2024 23:00) (78 - 110)  RR: 16 (28 Jul 2024 23:00) (12 - 16)  SpO2: 93% (28 Jul 2024 23:00) (93% - 97%)    Parameters below as of 28 Jul 2024 23:00  Patient On (Oxygen Delivery Method): room air    I&O's Detail    27 Jul 2024 07:01  -  28 Jul 2024 07:00  --------------------------------------------------------  IN:    Oral Fluid: 2320 mL  Total IN: 2320 mL    OUT:    Accordian (mL): 0 mL    Intermittent Catheterization - Urethral (mL): 650 mL  Total OUT: 650 mL    Total NET: 1670 mL      28 Jul 2024 07:01  -  29 Jul 2024 01:41  --------------------------------------------------------  IN:    Oral Fluid: 1680 mL  Total IN: 1680 mL    OUT:    Voided (mL): 1600 mL  Total OUT: 1600 mL    Total NET: 80 mL    I&O's Summary    27 Jul 2024 07:01  -  28 Jul 2024 07:00  --------------------------------------------------------  IN: 2320 mL / OUT: 650 mL / NET: 1670 mL    28 Jul 2024 07:01  -  29 Jul 2024 01:41  --------------------------------------------------------  IN: 1680 mL / OUT: 1600 mL / NET: 80 mL    PHYSICAL EXAM:  General: Pt is sitting up comfortably in bed, in NAD, on RA  HEENT: CN II-XII grossly intact, PERRL 3mm, EOMI B/L, face symmetric, + hard C collar in place  Cardiovascular: RRR, normal S1 and S2   Respiratory: non-labored breathing, symmetric chest rise   GI: abd soft, NTND   Neuro: A&O x 3, no aphasia, speech clear  Strength LUE deltoid 3/5, distally 4/3; LLE 4/5 throughout   RUE and RLE 5/5  Sensation intact to light touch throughout   Vascular: Distal pulses 2+ x4, no calf edema or erythema  Skin: + RUE edema, + areas of ecchymosis throughout BL UE 2/2 difficult arterial line placement   Wounds: posterior cervical wound with aquacel in place     DIET:  [] NPO  [X] Mechanical  [] Tube feeds    LABS:    Urinalysis Basic - ( 27 Jul 2024 05:30 )    Color: x / Appearance: x / SG: x / pH: x  Gluc: 92 mg/dL / Ketone: x  / Bili: x / Urobili: x   Blood: x / Protein: x / Nitrite: x   Leuk Esterase: x / RBC: x / WBC x   Sq Epi: x / Non Sq Epi: x / Bacteria: x    Allergies    penicillin (Unknown)  erythromycin (Unknown)  [This allergen will not trigger allergy alert] Acetic Fx-Dnkoggmddi-Anrimmjls (Other)    Intolerances      MEDICATIONS:  Antibiotics:    Neuro:  acetaminophen     Tablet .. 650 milliGRAM(s) Oral every 6 hours PRN  ALPRAZolam 0.25 milliGRAM(s) Oral at bedtime PRN  DULoxetine 30 milliGRAM(s) Oral two times a day  methocarbamol 500 milliGRAM(s) Oral every 8 hours PRN  ondansetron Injectable 4 milliGRAM(s) IV Push every 6 hours PRN    Anticoagulation:  aspirin enteric coated 81 milliGRAM(s) Oral daily  heparin   Injectable 5000 Unit(s) SubCutaneous every 12 hours    OTHER:  artificial  tears Solution 1 Drop(s) Both EYES four times a day PRN  chlorhexidine 2% Cloths 1 Application(s) Topical <User Schedule>  latanoprost 0.005% Ophthalmic Solution 1 Drop(s) Both EYES at bedtime  simethicone 80 milliGRAM(s) Chew every 8 hours    IVF:  ascorbic acid 500 milliGRAM(s) Oral daily  multivitamin 1 Tablet(s) Oral daily    ASSESSMENT:  89 year old female, PMH HTN, HLD, CHF, AS, chronic C2 fracture, presenting to [facility] ED with 1 week of worsening weakness and difficulty ambulating at home. 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 Pt has been seen outpatient by Dr. Butler and Dr. Puckett. Pt's family called outpatient office and was advised to come to ED for admission for surgery. Now s/p occipital -C5 fusion with C1 laminectomy (7/24).    C2 CERVICAL FRACTURE;NECK PAIN    Allergy status to other antibiotic agents    Allergy status to penicillin    Aneurysm of unspecified site    Anterior displaced type ii dens fracture, initial encounter for closed fracture    Atherosclerotic heart disease of native coronary artery without angina pectoris    Bilateral primary osteoarthritis of knee    CAP GLAUC PSX LENS LT EYE MOD STAGE    CAPSLR GLAUCOMA W/PSEUDXF LENS, BILATERAL, MODERATE STAGE    Cervicalgia    Chronic diastolic (congestive) heart failure    Dizziness and giddiness    Hyperlipidemia, unspecified    Hypertensive heart disease with heart failure    Iliotibial band syndrome, right leg    Long term (current) use of aspirin    Nonrheumatic aortic (valve) stenosis    Old myocardial infarction    Other specific arthropathies, not elsewhere classified, left shoulder    Pain in right leg    Pain in unspecified shoulder    Personal history of other diseases of the circulatory system    Personal history of other endocrine, nutritional and metabolic disease    Presence of other heart-valve replacement    Shortness of breath    Unspecified glaucoma    Vertigo of central origin    History of tobacco use    Sex Assigned At Birth    Sex Assigned At Birth    Alcohol intake    FH: lung cancer (Father)    Handoff    MEWS Score    Prior    Essential hypertension    Hyperlipidemia, unspecified hyperlipidemia type    Diabetes insipidus    H/O aortic valve stenosis    Hypertension    Vertigo    Chronic diastolic congestive heart failure    Dyslipidemia    History of pseudoaneurysm    Glaucoma    Closed C2 fracture    Open dens fracture    Dens fracture    Dens fracture    C2 cervical fracture    Anterior displaced type ii dens fracture, sequela    H/O aortic valve stenosis    Hyperlipidemia, unspecified hyperlipidemia type    Hypertension    Glaucoma    Fusion, occipital bone with C1 and C2 vertebrae, posterior approach    History of orthopedic surgery    S/P AVR    H/O lumbosacral spine surgery    S/P hip replacement, right    S/P hip replacement    S/P right coronary artery (RCA) stent placement    NECK PAIN    2    Room Service Assist    Room Service Assist    Room Service Assist    Neck pain    [email or web address]    PLAN:  Neuro:   - neuro q4/vitals q4h  - Protected sleep time: 10pm-5am   - Pain control: Tylenol prn  - Hard collar at all times   - Anxiety: home xanax 0.25mg qhs prn, cymbalta 30mg  - 1 deep HMV, monitor output- dc'd 7/28  - Post op xrays complete   - CT C-spine complete     Cards:   - SBP <160, midodrine dc'd  - hx CHF: TTE 7/22: EF 67%, TAVR , mod MR, mod mitral stenosis, mild-mod TR, pulm HTN   - Hx TAVR: cont home ASA 81mg (keep for surgery)  - cardiology consulted for preop clearance    Pulm:   - RA    GI:   - Regular diet   - bowel reg, prn dulcolax, LBM 7/26  - Abd XR 7/22: poss ileus, Simethicone 80mg q8   - CT abdomen 7/26 w/o c/f ileus     Renal:   - IVL  - Voiding, SC prn    Endo:   - ISS   - A1c 5.3    Heme:   - DVT ppx: SCDs, SQH 5000u q12  - s/p hep bolus, s/p hep gtt (dc'd 7/26)  - Vacular consulted f/u recs: nitroglycerin ointment to fingers/toes daily- dc'd 7/28  - arterial dopplers 7/24: R mid-radial occlusion, no other occlusions in LUE/ b/l legs  - b/l UE duplex for swelling- negative 7/28    ID:   - afebrile    Dispo: ICU, full code, PT rec AR     D/w Dr Puckett, Dr Sheriff

## 2024-07-29 NOTE — DISCHARGE NOTE PROVIDER - NSDCCPCAREPLAN_GEN_ALL_CORE_FT
PRINCIPAL DISCHARGE DIAGNOSIS  Diagnosis: Open dens fracture  Assessment and Plan of Treatment: s/p occipital -C5 fusion with C1 laminectomy (7/24)      SECONDARY DISCHARGE DIAGNOSES  Diagnosis: H/O aortic valve stenosis  Assessment and Plan of Treatment:     Diagnosis: Neck pain  Assessment and Plan of Treatment:     Diagnosis: S/P hip replacement  Assessment and Plan of Treatment:     Diagnosis: Glaucoma  Assessment and Plan of Treatment:     Diagnosis: Hypertension  Assessment and Plan of Treatment:     Diagnosis: Diabetes insipidus  Assessment and Plan of Treatment:     Diagnosis: S/P AVR  Assessment and Plan of Treatment:

## 2024-07-29 NOTE — DISCHARGE NOTE PROVIDER - NSDCMRMEDTOKEN_GEN_ALL_CORE_FT
ALPRAZolam 0.25 mg oral tablet: 1 tab(s) orally once a day (at bedtime)  aspirin 81 mg oral tablet, chewable: 1 tab(s) orally once a day  brimonidine 0.1% ophthalmic solution: 1 drop(s) to each affected eye 2 times a day  cholecalciferol 125 mcg (5000 intl units) oral tablet: 2 tab(s) orally 2 times a day  Cymbalta 30 mg oral delayed release capsule: 1 cap(s) orally 2 times a day  latanoprost 0.005% ophthalmic solution: 1 drop(s) to each affected eye once a day (at bedtime)  melatonin 3 mg oral tablet: 3 tab(s) orally once a day (at bedtime) as needed for  insomnia  polyethylene glycol 3350 oral powder for reconstitution: 17 gram(s) orally once a day   acetaminophen 325 mg oral tablet: 2 tab(s) orally every 6 hours As needed Temp greater or equal to 38C (100.4F), Mild Pain (1 - 3)  ALPRAZolam 0.25 mg oral tablet: 1 tab(s) orally once a day (at bedtime)  aspirin 81 mg oral tablet, chewable: 1 tab(s) orally once a day  brimonidine 0.1% ophthalmic solution: 1 drop(s) to each affected eye 2 times a day  cholecalciferol 125 mcg (5000 intl units) oral tablet: 2 tab(s) orally 2 times a day  Cymbalta 30 mg oral delayed release capsule: 1 cap(s) orally 2 times a day  heparin: 5,000 unit(s) subcutaneous every 8 hours  latanoprost 0.005% ophthalmic solution: 1 drop(s) to each affected eye once a day (at bedtime)  melatonin 3 mg oral tablet: 3 tab(s) orally once a day (at bedtime) as needed for  insomnia  methocarbamol 500 mg oral tablet: 1 tab(s) orally every 8 hours As needed Muscle Spasm  senna leaf extract oral tablet: 2 tab(s) orally once a day (at bedtime) as needed for  constipation  simethicone 80 mg oral tablet, chewable: 1 tab(s) orally every 8 hours   acetaminophen 325 mg oral tablet: 2 tab(s) orally every 6 hours As needed Temp greater or equal to 38C (100.4F), Mild Pain (1 - 3)  ALPRAZolam 0.25 mg oral tablet: 1 tab(s) orally once a day (at bedtime)  ascorbic acid 500 mg oral tablet: 1 tab(s) orally once a day  aspirin 81 mg oral tablet, chewable: 1 tab(s) orally once a day  brimonidine 0.1% ophthalmic solution: 1 drop(s) to each affected eye 2 times a day  calamine topical lotion: 1 Apply topically to affected area every 6 hours As needed Rash and/or Itching  cholecalciferol 125 mcg (5000 intl units) oral tablet: 2 tab(s) orally 2 times a day  Cymbalta 30 mg oral delayed release capsule: 1 cap(s) orally 2 times a day  heparin: 5,000 unit(s) subcutaneous every 8 hours  latanoprost 0.005% ophthalmic solution: 1 drop(s) to each affected eye once a day (at bedtime)  melatonin 3 mg oral tablet: 3 tab(s) orally once a day (at bedtime) as needed for  insomnia  menthol-benzocaine: 1 lozenge orally 4 times a day as needed for dry mouth  methocarbamol 500 mg oral tablet: 1 tab(s) orally every 8 hours As needed Muscle Spasm  Multiple Vitamins oral tablet: 1 tab(s) orally once a day  psyllium 3.4 g/7 g oral powder for reconstitution: 1 application orally once a day as needed for  constipation  senna leaf extract oral tablet: 2 tab(s) orally once a day (at bedtime) as needed for  constipation  simethicone 80 mg oral tablet, chewable: 1 tab(s) orally every 8 hours

## 2024-07-29 NOTE — DISCHARGE NOTE PROVIDER - NSDCCPTREATMENT_GEN_ALL_CORE_FT
PRINCIPAL PROCEDURE  Procedure: Fusion, occipital bone with C1 and C2 vertebrae, posterior approach  Findings and Treatment: s/p occipital -C5 fusion with C1 laminectomy (7/24)

## 2024-07-29 NOTE — DISCHARGE NOTE PROVIDER - NSDCFUSCHEDAPPT_GEN_ALL_CORE_FT
Juancho Pandya  [facility]  OPHTHALM [address]  Scheduled Appointment: 10/18/2024     Rodrigo Bowling  [facility]  NEPHRO [address]  Scheduled Appointment: 09/11/2024    Juancho Pandya  [facility]  OPHTHALM [address]  Scheduled Appointment: 10/18/2024     Unique Nunez  [facility]  NEUROSURG [address]  Scheduled Appointment: 08/07/2024    Unique Nunez  [facility]  NEUROSURG [address]  Scheduled Appointment: 08/13/2024    Rodrigo Bowling  [facility]  NEPHRO [address]  Scheduled Appointment: 09/11/2024    Juancho Pandya  [facility]  OPHTHALM [address]  Scheduled Appointment: 10/18/2024

## 2024-07-29 NOTE — PROGRESS NOTE ADULT - ASSESSMENT
89y/F with  closed C2 fracture; chronic C2 fracture with anterior displacement, severe stenosis of cervico-medullary junction, s/p occipital-C5 fusion with C1 laminectomy  anxiety  dyslipidemia Hypertension  h/o DI  h/o Ao valve stenosis s/p TAVR, chronic dCHF  vertigo  h/o pseudoaneurysm  glaucoma     PLAN:   NEURO: neurochecks q4h, PRN pain meds with acetaminophen  switch alprazolam to PRN  d/c HMV if ok with neurosurgery  REHAB:  physical therapy evaluation and management    EARLY MOB:  OOB to chair, ambulate with fall precautions    PULM:  Room air, incentive spirometry  CARDIO:  SBP goal 100-160mm Hg, d/c midodrine; h/o TAVR, CHF, cardiology on board  ENDO:  Blood sugar goals 140-180 mg/dL, continue insulin sliding scale  GI:  simethicone  DIET: regular diet  RENAL:  IVL  HEM/ONC: Hb stable  VTE Prophylaxis: SCDs, SQH  ID: afebrile, no leukocytosis  Social: will update family  MISC: monitor pulses, vascular on board nitroglycerin    Active issues:  What's keeping patient in the ICU? nothing  What is this patient's dispo plan? stepdown    ATTENDING ATTESTATION:  I was physically present for the key portions of the evaluation and management (E/M) service provided.  I agree with the above history, physical and plan which I have reviewed and edited where appropriate.     Patient not at high risk for neurologic deterioration / death.  Time spent on this noncritically ill patient: 55 minutes spent on total encounter, more than 50% of the visit was spent counseling and/or coordinating care by the attending physician.      Plan discussed with RN, house staff.    REVIEW OF SYSTEMS:  No headaches, no nausea or vomiting; 14 -point review of systems otherwise unremarkable.    ICU stepdown Checklist:    Completed: 07-28 @ 13:12    [X] hemodynamically stable – VS WNL and stable x 24hours, UO adequate  [n/a ] if  previously on HDA - off pressors x 24h with stable neuro exam    [X] no new symptoms x 24h (i.e. new fever, new-onset nausea/vomiting)  [X] stable labs: (i.e. WBC not rising, sodium not dropping)  [X] patient not at high risk for aspiration, if high risk then:                  [ ] should have definitive plans for trach/PEG (alternative option is to discharge from ICU to facilty)                  [ ] stepdown to bed close to nurse’s station  [n/a] low suctioning requirements (i.e. q4h or less)  [X] sign-off from primary RN*  [X] drains do not require ICU level of care  [X] if patient previously agitated or with behavioral issues – controlled   [X] pain controlled   89y/F with  closed C2 fracture; chronic C2 fracture with anterior displacement, severe stenosis of cervico-medullary junction, s/p occipital-C5 fusion with C1 laminectomy  anxiety  dyslipidemia Hypertension  h/o DI  h/o Ao valve stenosis s/p TAVR, chronic dCHF  vertigo  h/o pseudoaneurysm  glaucoma     PLAN:   NEURO: neurochecks q4h, PRN pain meds with acetaminophen  alprazolam PRN  d/c HMV if ok with neurosurgery  duloxetine  REHAB:  physical therapy evaluation and management    EARLY MOB:  OOB to chair, ambulate with fall precautions    PULM:  Room air, incentive spirometry  CARDIO:  SBP goal 100-160mm Hg, off midodrine; h/o TAVR, CHF, cardiology on board, continue ASA  ENDO:  Blood sugar goals 140-180 mg/dL, continue insulin sliding scale  GI:  simethicone, start bowel regimen  DIET: regular diet  RENAL:  IVL  HEM/ONC: Hb stable  VTE Prophylaxis: SCDs, SQH  ID: afebrile, no leukocytosis  Social: will update family  MISC: monitor pulses, vascular on board, off nitroglycerin    Active issues:  What's keeping patient in the ICU? nothing  What is this patient's dispo plan? stepdown    ATTENDING ATTESTATION:  I was physically present for the key portions of the evaluation and management (E/M) service provided.  I agree with the above history, physical and plan which I have reviewed and edited where appropriate.     Patient not at high risk for neurologic deterioration / death.  Time spent on this noncritically ill patient: 55 minutes spent on total encounter, more than 50% of the visit was spent counseling and/or coordinating care by the attending physician.      Plan discussed with RN, house staff.    REVIEW OF SYSTEMS:  No headaches, no nausea or vomiting; 14 -point review of systems otherwise unremarkable.    ICU stepdown Checklist:    Completed: 07-29 @ 13:12    [X] hemodynamically stable – VS WNL and stable x 24hours, UO adequate  [n/a ] if  previously on HDA - off pressors x 24h with stable neuro exam    [X] no new symptoms x 24h (i.e. new fever, new-onset nausea/vomiting)  [X] stable labs: (i.e. WBC not rising, sodium not dropping) - lab holiday  [X] patient not at high risk for aspiration, if high risk then:                  [ ] should have definitive plans for trach/PEG (alternative option is to discharge from ICU to facilty)                  [ ] stepdown to bed close to nurse’s station  [n/a] low suctioning requirements (i.e. q4h or less)  [X] sign-off from primary RN* Adilene  [X] drains do not require ICU level of care  [X] if patient previously agitated or with behavioral issues – controlled   [X] pain controlled

## 2024-07-29 NOTE — DISCHARGE NOTE PROVIDER - HOSPITAL COURSE
HPI:  89 year old female, PMH HTN, HLD, CHF, AS, chronic C2 fracture, presenting to [facility] ED with 1 week of worsening weakness and difficulty ambulating at home. 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 Pt has been seen outpatient by Dr. Butler and Dr. Puckett. Pt's family called outpatient office and was advised to come to ED for admission for surgery. Now s/p occipital -C5 fusion with C1 laminectomy (7/24).      Hospital Course:  7/19: admitted to [facility] for surgery with Dr. Puckett, given 15mg torodol for pain, 0.25 dilaudid for pain, 0.125 xanax for anxiety, 10 hydral for high BP.   7/20: FEDERICA, remains on precedex. Cardiology consulted for preop clearance.   7/21: FEDERICA ovn. cardiology clearance obtained. Liquid BMs x3, started on metamucil.  7/22: TTE complete. DC precedex.   7/23: FEDERICA overnight. Na+ 131 from 141. rpt 135, 500cc bolus given.   7/24: FEDERICA overnight. OR today. POD0 from O-C5 fusion, c1 lami. Post-op pulses not palpable, vasc consulted. PT and AT pulses dopplerable b/l, no DP. RUE pulses not dopplerable. Hep gtt + bolus started per vascular. duplex: R radial occlusion. SBP 80s-90s, given 500cc bolus NS and 250cc albumin. NGT placed  7/25: POD1. arterial dopplers performed, +R radial occlusion. PTT o/n >200, heparin gtt held x 4 hrs until ptt in goal. heparin gtt resumed 1000u/hr. transition propofol to precedex. (+) cuff leak. Extubated to NC by anesthesia. PTT >200, hep gtt paused. Bowel reg inc. PTT 75.6. Hep gtt restarted @ 800u.   7/26: POD2. SC o/n. Xrays complete. CT c spine complete. SQH tonight.   7/27: POD 3 occiput-C5 fusion. FEDERICA overnight. Midodrine decreased to 2.5q8.  7/28: POD 4 occiput- C5 fusion. FEDERICA overnight, neuro stable. HMV drain removed; nitro paste d/c'd. Midodrine dc'd.  7/29: POD 5 occiput-C5 fusion. FEDERICA overnight. SD status.     Patient evaluated by PT/OT who recommended: Acute Rehab   Patient is going _____________    Hospital course c/b:     Exam on day of discharge:  General: Pt is sitting up comfortably in bed, in NAD, on RA  HEENT: CN II-XII grossly intact, PERRL 3mm, EOMI B/L, face symmetric, + hard C collar in place  Cardiovascular: RRR, normal S1 and S2   Respiratory: non-labored breathing, symmetric chest rise   GI: abd soft, NTND   Neuro: A&O x 3, no aphasia, speech clear  Strength LUE deltoid 3/5, distally 4/3; LLE 4/5 throughout   RUE and RLE 5/5  Sensation intact to light touch throughout   Vascular: Distal pulses 2+ x4, no calf edema or erythema  Skin: + RUE edema, + areas of ecchymosis throughout BL UE 2/2 difficult arterial line placement   Wounds: posterior cervical wound C/D/I       Checklist:   - Patient is neurologically and hemodynamically stable for discharge, pain controlled, vitals stable, afebrile.    HPI:  89 year old female, PMH HTN, HLD, CHF, AS, chronic C2 fracture, presenting to [facility] ED with 1 week of worsening weakness and difficulty ambulating at home. 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 Pt has been seen outpatient by Dr. Butler and Dr. Puckett. Pt's family called outpatient office and was advised to come to ED for admission for surgery. Now s/p occipital -C5 fusion with C1 laminectomy (7/24).      Hospital Course:  7/19: admitted to [facility] for surgery with Dr. Puckett, given 15mg torodol for pain, 0.25 dilaudid for pain, 0.125 xanax for anxiety, 10 hydral for high BP.   7/20: FEDERICA, remains on precedex. Cardiology consulted for preop clearance.   7/21: FEDERICA ovn. cardiology clearance obtained. Liquid BMs x3, started on metamucil.  7/22: TTE complete. DC precedex.   7/23: FEDERICA overnight. Na+ 131 from 141. rpt 135, 500cc bolus given.   7/24: FEDERICA overnight. OR today. POD0 from O-C5 fusion, c1 lami. Post-op pulses not palpable, vasc consulted. PT and AT pulses dopplerable b/l, no DP. RUE pulses not dopplerable. Hep gtt + bolus started per vascular. duplex: R radial occlusion. SBP 80s-90s, given 500cc bolus NS and 250cc albumin. NGT placed  7/25: POD1. arterial dopplers performed, +R radial occlusion. PTT o/n >200, heparin gtt held x 4 hrs until ptt in goal. heparin gtt resumed 1000u/hr. transition propofol to precedex. (+) cuff leak. Extubated to NC by anesthesia. PTT >200, hep gtt paused. Bowel reg inc. PTT 75.6. Hep gtt restarted @ 800u.   7/26: POD2. SC o/n. Xrays complete. CT c spine complete. SQH tonight.   7/27: POD 3 occiput-C5 fusion. FEDERICA overnight. Midodrine decreased to 2.5q8.  7/28: POD 4 occiput- C5 fusion. FEDERICA overnight, neuro stable. HMV drain removed; nitro paste d/c'd. Midodrine dc'd.  7/29: POD 5 occiput-C5 fusion. FEDERICA overnight. SD status.     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 Bowling) outpatient. Sodium has been relatively stable throughout admission with acute drop to 131 on 7/31.), R mid-radial occlusion 7/24 dopplers)    Exam on day of discharge:  General: Pt is sitting up comfortably in bed, in NAD, on RA  HEENT: CN II-XII grossly intact, PERRL 3mm, EOMI B/L, face symmetric, + hard C collar in place  Cardiovascular: RRR, normal S1 and S2   Respiratory: non-labored breathing, symmetric chest rise   GI: abd soft, NTND   Neuro: A&O x 3, no aphasia, speech clear  Strength LUE deltoid 3/5, distally 4/3; LLE 4/5 throughout   RUE and RLE 5/5  Sensation intact to light touch throughout   Vascular: Distal pulses 2+ x4, no calf edema or erythema  Skin: + RUE edema, + areas of ecchymosis throughout BL UE 2/2 difficult arterial line placement   Wounds: posterior cervical wound C/D/I       Checklist:   - Patient is neurologically and hemodynamically stable for discharge, pain controlled, vitals stable, afebrile.    HPI:  89 year old female, PMH HTN, HLD, CHF, AS, chronic C2 fracture, presenting to [facility] ED with 1 week of worsening weakness and difficulty ambulating at home. 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 Pt has been seen outpatient by Dr. Butler and Dr. Puckett. Pt's family called outpatient office and was advised to come to ED for admission for surgery. Now s/p occipital -C5 fusion with C1 laminectomy (7/24).      Hospital Course:  7/19: admitted to [facility] for surgery with Dr. Puckett, given 15mg torodol for pain, 0.25 dilaudid for pain, 0.125 xanax for anxiety, 10 hydral for high BP.   7/20: FEDERICA, remains on precedex. Cardiology consulted for preop clearance.   7/21: FEDERICA ovn. cardiology clearance obtained. Liquid BMs x3, started on metamucil.  7/22: TTE complete. DC precedex.   7/23: FEDERICA overnight. Na+ 131 from 141. rpt 135, 500cc bolus given.   7/24: FEDERICA overnight. OR today. POD0 from O-C5 fusion, c1 lami. Post-op pulses not palpable, vasc consulted. PT and AT pulses dopplerable b/l, no DP. RUE pulses not dopplerable. Hep gtt + bolus started per vascular. duplex: R radial occlusion. SBP 80s-90s, given 500cc bolus NS and 250cc albumin. NGT placed  7/25: POD1. arterial dopplers performed, +R radial occlusion. PTT o/n >200, heparin gtt held x 4 hrs until ptt in goal. heparin gtt resumed 1000u/hr. transition propofol to precedex. (+) cuff leak. Extubated to NC by anesthesia. PTT >200, hep gtt paused. Bowel reg inc. PTT 75.6. Hep gtt restarted @ 800u.   7/26: POD2. SC o/n. Xrays complete. CT c spine complete. SQH tonight.   7/27: POD 3 occiput-C5 fusion. FEDERICA overnight. Midodrine decreased to 2.5q8.  7/28: POD 4 occiput- C5 fusion. FEDERICA overnight, neuro stable. HMV drain removed; nitro paste d/c'd. Midodrine dc'd.  7/29: POD 5 occiput-C5 fusion. FEDERICA overnight. SD status.   7/31: POD7, FEDERICA overnight. IV removed from LUE, arm to be elevated, +ace wrap. Added metamucil for loose stools. Pending urine studies for low Na. Ordered lokelma 5mg x 1 for hyperkalemia. Urine osm 172.   8/1: POD8, FEDERICA overnight. 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 Bowling) outpatient. Sodium has been relatively stable throughout admission with acute drop to 131 on 7/31. fluid restriction as tolerated, takes Xylimelts losenges at home for dry mouth to prevent overthirst), b/l UE edema (R mid-radial occlusion 7/24 dopplers, resolved s/p heparin gt  nitroglycerin paste per vascular), hyperkalemia (K 5.4 on 7/30, s/p lokelma)    Exam on day of discharge:  General: Pt is sitting up comfortably in bed, in NAD, on RA  HEENT: CN II-XII grossly intact, PERRL 3mm, EOMI B/L, face symmetric, + hard C collar in place  Cardiovascular: RRR, normal S1 and S2   Respiratory: non-labored breathing, symmetric chest rise   GI: abd soft, NTND   Neuro: A&O x 3, no aphasia, speech clear  Strength LUE deltoid 3/5, distally 4/3; LLE 4/5 throughout   RUE and RLE 5/5  Sensation intact to light touch throughout   Vascular: Distal pulses 2+ x4, no calf edema or erythema  Skin: + RUE edema, + areas of ecchymosis throughout BL UE 2/2 difficult arterial line placement   Wounds: posterior cervical wound C/D/I + collar in place       Checklist:   - Patient is neurologically and hemodynamically stable for discharge, pain controlled, vitals stable, afebrile.    HPI:  89 year old female, PMH HTN, HLD, CHF, AS, chronic C2 fracture, presenting to [facility] ED with 1 week of worsening weakness and difficulty ambulating at home. Pt was seen at 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 Pt has been seen outpatient by Dr. Butler and Dr. Puckett. Pt's family called outpatient office and was advised to come to ED for admission for surgery. Now s/p occipital -C5 fusion with C1 laminectomy (7/24).    Hospital Course:  7/19: admitted to [facility] for surgery with Dr. Puckett, given 15mg torodol for pain, 0.25 dilaudid for pain, 0.125 xanax for anxiety, 10 hydral for high BP.   7/20: FEDERICA, remains on precedex. Cardiology consulted for preop clearance.   7/21: FEDERICA ovn. cardiology clearance obtained. Liquid BMs x3, started on metamucil.  7/22: TTE complete. DC precedex.   7/23: FEDERICA overnight. Na+ 131 from 141. rpt 135, 500cc bolus given.   7/24: FEDERICA overnight. OR today. POD0 from O-C5 fusion, c1 lami. Post-op pulses not palpable, vasc consulted. PT and AT pulses dopplerable b/l, no DP. RUE pulses not dopplerable. Hep gtt + bolus started per vascular. duplex: R radial occlusion. SBP 80s-90s, given 500cc bolus NS and 250cc albumin. NGT placed  7/25: POD1. arterial dopplers performed, +R radial occlusion. PTT o/n >200, heparin gtt held x 4 hrs until ptt in goal. heparin gtt resumed 1000u/hr. transition propofol to precedex. (+) cuff leak. Extubated to NC by anesthesia. PTT >200, hep gtt paused. Bowel reg inc. PTT 75.6. Hep gtt restarted @ 800u.   7/26: POD2. SC o/n. Xrays complete. CT c spine complete. SQH tonight.   7/27: POD 3 occiput-C5 fusion. FEDERICA overnight. Midodrine decreased to 2.5q8.  7/28: POD 4 occiput- C5 fusion. FEDERICA overnight, neuro stable. HMV drain removed; nitro paste d/c'd. Midodrine dc'd.  7/29: POD 5 occiput-C5 fusion. FEDERICA overnight. SD status.   7/31: POD7, FEDERICA overnight. IV removed from LUE, arm to be elevated, +ace wrap. Added metamucil for loose stools. Pending urine studies for low Na. Ordered lokelma 5mg x 1 for hyperkalemia. Urine osm 172.   8/1: POD8, FEDERICA overnight. 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 Bowling) outpatient. Sodium has been relatively stable throughout admission with acute drop to 131 on 7/31. fluid restriction as tolerated, takes Xylimelts losenges at home for dry mouth to prevent overthirst), b/l UE edema (R mid-radial occlusion 7/24 dopplers, resolved s/p heparin gt  nitroglycerin paste per vascular), hyperkalemia (K 5.4 on 7/30, s/p lokelma)    Exam on day of discharge:  General: Pt is sitting up comfortably in bed, in NAD, on RA  HEENT: CN II-XII grossly intact, PERRL 3mm, EOMI B/L, face symmetric, + hard C collar in place  Cardiovascular: RRR, normal S1 and S2   Respiratory: non-labored breathing, symmetric chest rise   GI: abd soft, NTND   Neuro: A&O x 3, no aphasia, speech clear  Strength LUE deltoid 3/5, distally 4/3; LLE 4/5 throughout   RUE and RLE 5/5  Sensation intact to light touch throughout   Vascular: Distal pulses 2+ x4, no calf edema or erythema  Skin: + RUE edema, + areas of ecchymosis throughout BL UE 2/2 difficult arterial line placement   Wounds: posterior cervical wound C/D/I + collar in place       Checklist:   - Patient is neurologically and hemodynamically stable for discharge, pain controlled, vitals stable, afebrile.    HPI:  89 year old female, PMH HTN, HLD, CHF, AS, chronic C2 fracture, presenting to [facility] ED with 1 week of worsening weakness and difficulty ambulating at home. 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 Pt has been seen outpatient by Dr. Butler and Dr. Puckett. Pt's family called outpatient office and was advised to come to ED for admission for surgery. Now s/p occipital -C5 fusion with C1 laminectomy (7/24).    Hospital Course:  7/19: admitted to [facility] for surgery with Dr. Puckett, given 15mg torodol for pain, 0.25 dilaudid for pain, 0.125 xanax for anxiety, 10 hydral for high BP.   7/20: FEDERICA, remains on precedex. Cardiology consulted for preop clearance.   7/21: FEDERICA ovn. cardiology clearance obtained. Liquid BMs x3, started on metamucil.  7/22: TTE complete. DC precedex.   7/23: FEDERICA overnight. Na+ 131 from 141. rpt 135, 500cc bolus given.   7/24: FEDERICA overnight. OR today. POD0 from O-C5 fusion, c1 lami. Post-op pulses not palpable, vasc consulted. PT and AT pulses dopplerable b/l, no DP. RUE pulses not dopplerable. Hep gtt + bolus started per vascular. duplex: R radial occlusion. SBP 80s-90s, given 500cc bolus NS and 250cc albumin. NGT placed  7/25: POD1. arterial dopplers performed, +R radial occlusion. PTT o/n >200, heparin gtt held x 4 hrs until ptt in goal. heparin gtt resumed 1000u/hr. transition propofol to precedex. (+) cuff leak. Extubated to NC by anesthesia. PTT >200, hep gtt paused. Bowel reg inc. PTT 75.6. Hep gtt restarted @ 800u.   7/26: POD2. SC o/n. Xrays complete. CT c spine complete. SQH tonight.   7/27: POD 3 occiput-C5 fusion. FEDERICA overnight. Midodrine decreased to 2.5q8.  7/28: POD 4 occiput- C5 fusion. FEDERICA overnight, neuro stable. HMV drain removed; nitro paste d/c'd. Midodrine dc'd.  7/29: POD 5 occiput-C5 fusion. FEDERICA overnight. SD status.   7/31: POD7, FEDERICA overnight. IV removed from LUE, arm to be elevated, +ace wrap. Added metamucil for loose stools. Pending urine studies for low Na. Ordered lokelma 5mg x 1 for hyperkalemia. Urine osm 172.   8/1: POD8, FEDERICA overnight. 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 Bowling) outpatient. Sodium has been relatively stable throughout admission with acute drop to 131 on 7/31. fluid restriction as tolerated, takes Xylimelts losenges at home for dry mouth to prevent overthirst), b/l UE edema (R mid-radial occlusion 7/24 dopplers, resolved s/p heparin gt  nitroglycerin paste per vascular), hyperkalemia (K 5.4 on 7/30, s/p lokelma), leukocytosis (No Left shift on differential, no localizing signs of infection, has been afebrile, 99 temp oral this AM, rectal also afebrile at 99.3.  Has not been on NSAIDs, last Tylenol was 7/31 at 4am, likely reactive, no signs of infection or indication for antibiotics at this time).     Exam on day of discharge:  General: Pt is sitting up comfortably in bed, in NAD, on RA  HEENT: CN II-XII grossly intact, PERRL 3mm, EOMI B/L, face symmetric, + hard C collar in place  Cardiovascular: RRR, normal S1 and S2   Respiratory: non-labored breathing, symmetric chest rise   GI: abd soft, NTND   Neuro: A&O x 3, no aphasia, speech clear  Strength LUE deltoid 3/5, distally 4/3; LLE 4/5 throughout   RUE and RLE 5/5  Sensation intact to light touch throughout   Vascular: Distal pulses 2+ x4, no calf edema or erythema  Skin: + RUE edema, + areas of ecchymosis throughout BL UE 2/2 difficult arterial line placement   Wounds: posterior cervical wound C/D/I + collar in place       Checklist:   - Patient is neurologically and hemodynamically stable for discharge, pain controlled, vitals stable, afebrile.    HPI:  89 year old female, PMH HTN, HLD, CHF, AS, chronic C2 fracture, presenting to [facility] ED with 1 week of worsening weakness and difficulty ambulating at home. 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 Pt has been seen outpatient by Dr. Butler and Dr. Puckett. Pt's family called outpatient office and was advised to come to ED for admission for surgery. Now s/p occipital -C5 fusion with C1 laminectomy (7/24).    Hospital Course:  7/19: admitted to [facility] for surgery with Dr. Puckett, given 15mg torodol for pain, 0.25 dilaudid for pain, 0.125 xanax for anxiety, 10 hydral for high BP.   7/20: FEDERICA, remains on precedex. Cardiology consulted for preop clearance.   7/21: FEDERICA ovn. cardiology clearance obtained. Liquid BMs x3, started on metamucil.  7/22: TTE complete. DC precedex.   7/23: FEDERICA overnight. Na+ 131 from 141. rpt 135, 500cc bolus given.   7/24: FEDERICA overnight. OR today. POD0 from O-C5 fusion, c1 lami. Post-op pulses not palpable, vasc consulted. PT and AT pulses dopplerable b/l, no DP. RUE pulses not dopplerable. Hep gtt + bolus started per vascular. duplex: R radial occlusion. SBP 80s-90s, given 500cc bolus NS and 250cc albumin. NGT placed  7/25: POD1. arterial dopplers performed, +R radial occlusion. PTT o/n >200, heparin gtt held x 4 hrs until ptt in goal. heparin gtt resumed 1000u/hr. transition propofol to precedex. (+) cuff leak. Extubated to NC by anesthesia. PTT >200, hep gtt paused. Bowel reg inc. PTT 75.6. Hep gtt restarted @ 800u.   7/26: POD2. SC o/n. Xrays complete. CT c spine complete. SQH tonight.   7/27: POD 3 occiput-C5 fusion. FEDERICA overnight. Midodrine decreased to 2.5q8.  7/28: POD 4 occiput- C5 fusion. FEDERICA overnight, neuro stable. HMV drain removed; nitro paste d/c'd. Midodrine dc'd.  7/29: POD 5 occiput-C5 fusion. FEDERICA overnight. SD status.   7/31: POD7, FEDERICA overnight. IV removed from LUE, arm to be elevated, +ace wrap. Added metamucil for loose stools. Pending urine studies for low Na. Ordered lokelma 5mg x 1 for hyperkalemia. Urine osm 172.   8/1: POD8, FEDERICA overnight. 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 Bowling) outpatient. Sodium has been relatively stable throughout admission with acute drop to 131 on 7/31. fluid restriction as tolerated, takes Xylimelts losenges at home for dry mouth to prevent overthirst), b/l UE edema (R mid-radial occlusion 7/24 dopplers, resolved s/p heparin gt  nitroglycerin paste per vascular), hyperkalemia (K 5.4 on 7/30, s/p lokelma), leukocytosis (No Left shift on differential, no localizing signs of infection, has been afebrile, 99 temp oral this AM, rectal also afebrile at 99.3.  Has not been on NSAIDs, last Tylenol was 7/31 at 4am, likely reactive, no signs of infection or indication for antibiotics at this time).     Exam on day of discharge:  General: Pt is sitting up comfortably in bed, in NAD, on RA  HEENT: CN II-XII grossly intact, PERRL 3mm, EOMI B/L, face symmetric, + hard C collar in place  Cardiovascular: RRR, normal S1 and S2   Respiratory: non-labored breathing, symmetric chest rise   GI: abd soft, NTND   Neuro: A&O x 3, no aphasia, speech clear  Strength LUE deltoid 3/5, distally 4/3; LLE 4/5 throughout   RUE and RLE 5/5  Sensation intact to light touch throughout   Vascular: Distal pulses 2+ x4, no calf edema or erythema  Skin: + RUE edema, + areas of ecchymosis throughout BL UE 2/2 difficult arterial line placement   Wounds: posterior cervical wound C/D/I + collar in place       Checklist:   - Patient is neurologically and hemodynamically stable for discharge, pain controlled, vitals stable, afebrile. Case and plan discussed w/ Dr. Puckett and hospitalist MD.    HPI:  89 year old female, PMH HTN, HLD, CHF, AS, chronic C2 fracture, presenting to [facility] ED with 1 week of worsening weakness and difficulty ambulating at home. 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 Pt has been seen outpatient by Dr. Butler and Dr. Puckett. Pt's family called outpatient office and was advised to come to ED for admission for surgery. Now s/p occipital -C5 fusion with C1 laminectomy (7/24).    Hospital Course:  7/19: admitted to [facility] for surgery with Dr. Puckett, given 15mg torodol for pain, 0.25 dilaudid for pain, 0.125 xanax for anxiety, 10 hydral for high BP.   7/20: FEDERICA, remains on precedex. Cardiology consulted for preop clearance.   7/21: FEDERICA ovn. cardiology clearance obtained. Liquid BMs x3, started on metamucil.  7/22: TTE complete. DC precedex.   7/23: FEDERICA overnight. Na+ 131 from 141. rpt 135, 500cc bolus given.   7/24: FEDERICA overnight. OR today. POD0 from O-C5 fusion, c1 lami. Post-op pulses not palpable, vasc consulted. PT and AT pulses dopplerable b/l, no DP. RUE pulses not dopplerable. Hep gtt + bolus started per vascular. duplex: R radial occlusion. SBP 80s-90s, given 500cc bolus NS and 250cc albumin. NGT placed  7/25: POD1. arterial dopplers performed, +R radial occlusion. PTT o/n >200, heparin gtt held x 4 hrs until ptt in goal. heparin gtt resumed 1000u/hr. transition propofol to precedex. (+) cuff leak. Extubated to NC by anesthesia. PTT >200, hep gtt paused. Bowel reg inc. PTT 75.6. Hep gtt restarted @ 800u.   7/26: POD2. SC o/n. Xrays complete. CT c spine complete. SQH tonight.   7/27: POD 3 occiput-C5 fusion. FEDERICA overnight. Midodrine decreased to 2.5q8.  7/28: POD 4 occiput- C5 fusion. FEDERICA overnight, neuro stable. HMV drain removed; nitro paste d/c'd. Midodrine dc'd.  7/29: POD 5 occiput-C5 fusion. FEDERICA overnight. SD status.   7/31: POD7, FEDERICA overnight. IV removed from LUE, arm to be elevated, +ace wrap. Added metamucil for loose stools. Pending urine studies for low Na. Ordered lokelma 5mg x 1 for hyperkalemia. Urine osm 172.   8/1: POD8, FEDERICA overnight. 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 Bowling) outpatient. Sodium has been relatively stable throughout admission with acute drop to 131 on 7/31. fluid restriction as tolerated, takes Xylimelts losenges at home for dry mouth to prevent overthirst), b/l UE edema (R mid-radial occlusion 7/24 dopplers, resolved s/p heparin gtt, nitroglycerin paste per vascular), hyperkalemia (K 5.4 on 7/30, s/p lokelma), leukocytosis (No Left shift on differential, no localizing signs of infection, has been afebrile, 99 temp oral this AM, rectal also afebrile at 99.3.  Has not been on NSAIDs, last Tylenol was 7/31 at 4am, likely reactive, no signs of infection or indication for antibiotics at this time).     Exam on day of discharge:  General: Pt is sitting up comfortably in bed, in NAD, on RA  HEENT: CN II-XII grossly intact, PERRL 3mm, EOMI B/L, face symmetric, + hard C collar in place  Cardiovascular: RRR, normal S1 and S2   Respiratory: non-labored breathing, symmetric chest rise   GI: abd soft, NTND   Neuro: A&O x 3, no aphasia, speech clear  Strength LUE deltoid 3/5, distally 4/3; LLE 4/5 throughout   RUE and RLE 5/5  Sensation intact to light touch throughout   Vascular: Distal pulses 2+ x4, no calf edema or erythema  Skin: + RUE edema, + areas of ecchymosis throughout BL UE 2/2 difficult arterial line placement   Wounds: posterior cervical wound C/D/I + collar in place       Checklist:   - Patient is neurologically and hemodynamically stable for discharge, pain controlled, vitals stable, afebrile. Case and plan discussed w/ Dr. Puckett and hospitalist MD.

## 2024-07-29 NOTE — DISCHARGE NOTE PROVIDER - CARE PROVIDERS DIRECT ADDRESSES
,[email or web address]_Unknown

## 2024-07-29 NOTE — PROVIDER CONTACT NOTE (OTHER) - ASSESSMENT
Pt complained of lower sternal pain. Pain level 3/10. Pt stated pain started "after surgery" and stated "yes" to pain improving.
Pt complains of dry mouth. Pt's private aide stated "she takes Xylimelts at bedtime".
Pt neuro exam remains at baseline.
Dressing saturated, appears to be oozing blood
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
## 2024-07-29 NOTE — DISCHARGE NOTE PROVIDER - CARE PROVIDER_API CALL
Wally Puckett  Neurosurgery  [address], [address] [place] [address]  Phone: [phone]  Fax: [phone]  Follow Up Time:     Ottoniel Caraballo  Vascular Surgery  [address] [address]  Phone: [phone]  Fax: [phone]  Follow Up Time:     Matt Mendoza  Cardiovascular Disease  [address] [address]  Phone: [phone]  Fax: [phone]  Follow Up Time:

## 2024-07-29 NOTE — DISCHARGE NOTE PROVIDER - NSDCFUADDINST_GEN_ALL_CORE_FT
Neurosurgery follow up appointment date/time:  - You have _____at your incision site ............  - please call the office to confirm appointment: [phone].     Wound Care:  - You may shower daily starting today   -Use gentle shampoo to wash the incision, use clean towel to pat the incision dry   -leave incision open to air   -no creams or ointments   - no picking at incision    Devices:  - does patient need collar or brace or helmet?   - does collar/brace need to be worn at all times or just when OOB?  - RW or cane for ambulation?    Drains/Lines:  - PICC in place? ID follow up? (Paper Rx for: antibiotics, heparin flush, weekly lab draws)  - ANGELINA in place? Management?  - espinoza in place? Management/Urology follow up?  - Tracheostomy?  - PEG tube?      Activity:  - fatigue is common after surgery, rest if you feel tired   - no bending, lifting, twisting or heavy lifting   - walking is recommended, ambulate as tolerated  - you may shower when you get home, keep your incision dry  - no soaking in a tub/pool/hot tub   - no driving within 24 hours of anesthesia or while taking prescription pain medications   - keep hydrated, drink plenty of water    Inpatient consults:  - Cardiology  -Vascular Surgery     Please also follow up with your primary care doctor.     Pain Expectations:  - pain after surgery is expected  - please take pain meds as prescribed     Medications:  - Continue the following medications:   -Pain Medications:   - when can antiplatelets or anticoagulants be restarted?  - were adverse affects of meds discussed with patients?   - pain medications can cause constipation, you should eat a high fiber diet and may take a stool softener while on pain meds   - Avoid taking Advil (ibuprofen), Motrin (naproxen), or Aspirin for pain as they can cause bleeding     Call the office or come to ED if:  - wound has drainage or bleeding, increased redness or pain at incision site, neurological change, fever (>101), chills, night sweats, syncope, nausea/vomiting, chest pain, shortness of breath      Playback:  - Your discharge instructions are on [facility] for your convenience.    WITHIN 24 HOURS OF DISCHARGE, PLEASE CONTACT NEURO PA  WITH ANY QUESTIONS OR CONCERNS: [phone]   OTHERWISE, PLEASE CALL THE OFFICE WITH ANY QUESTIONS OR CONCERNS: [phone]. Neurosurgery follow up appointment date/time:  - You have _____at your incision site ............  - please call the office to confirm appointment: [phone].     Wound Care:  - You may shower daily starting today   -Use gentle shampoo to wash the incision, use clean towel to pat the incision dry   -leave incision open to air  -no creams or ointments     Activity:  - fatigue is common after surgery, rest if you feel tired   - no bending, lifting, twisting or heavy lifting   - walking is recommended, ambulate as tolerated  - you may shower when you get home, keep your incision dry  - no soaking in a tub/pool/hot tub   - no driving within 24 hours of anesthesia or while taking prescription pain medications   - keep hydrated, drink plenty of water    Inpatient consults:  - Cardiology  -Vascular Surgery     Please also follow up with your primary care doctor.     Pain Expectations:  - pain after surgery is expected  - please take pain meds as prescribed     Medications: Duloxetine (cymbalta) 30mg twice daily, Alprazolam (xanax)0.25mg  at bedtime as needed for anxiety, Latanoprost eye drops, simethicone 80mg every 8 hours, home multivitamins , aspirin 81mg once dauy   - Continue the following medications:   -Pain Medications:   -Tylenol 650mg every 6 hours as needed for mild pain, do not exceed maximum daily dose 4,000mg.   -Robaxin 500mg every 8 hours as needed for muscle spasm, You are on robaxin.  Robaxin is a muscle relaxtant. Side effects of robaxin: tiredness, drowyness   - when can antiplatelets or anticoagulants be restarted?  - were adverse affects of meds discussed with patients?   - pain medications can cause constipation, you should eat a high fiber diet and may take a stool softener while on pain meds   - Avoid taking Advil (ibuprofen), Motrin (naproxen), or Aspirin for pain as they can cause bleeding     Call the office or come to ED if:  - wound has drainage or bleeding, increased redness or pain at incision site, neurological change, fever (>101), chills, night sweats, syncope, nausea/vomiting, chest pain, shortness of breath      Playback:  - Your discharge instructions are on [facility] [employer] for your convenience.    WITHIN 24 HOURS OF DISCHARGE, PLEASE CONTACT NEURO PA  WITH ANY QUESTIONS OR CONCERNS: [phone]   OTHERWISE, PLEASE CALL THE OFFICE WITH ANY QUESTIONS OR CONCERNS: [phone]. Neurosurgery follow up appointment date/time:  - You have absorbable sutures at your incision site.   - please call the office to confirm appointment: [phone].     Wound Care:  - You may shower daily starting today   -Use gentle shampoo to wash the incision, use clean towel to pat the incision dry   -leave incision open to air  -no creams or ointments     Activity:  -wear collar at all times   - fatigue is common after surgery, rest if you feel tired   - no bending, lifting, twisting or heavy lifting   - walking is recommended, ambulate as tolerated  - you may shower when you get home, keep your incision dry  - no soaking in a tub/pool/hot tub   - no driving within 24 hours of anesthesia or while taking prescription pain medications   - keep hydrated, drink plenty of water    Inpatient consults:  - Cardiology: for preoperative clearance  -Vascular Surgery : for bilateral arm swelling,  and right mid-radial occlusion on 7/24 dopplers, resolved with heparin drip/ nitroglycerin paste per vascular    Please also follow up with your primary care doctor.     Pain Expectations:  - pain after surgery is expected  - please take pain meds as prescribed     -Medications:  Continue the following medications as prescribed: Duloxetine (cymbalta) 30mg twice daily, Alprazolam (xanax)0.25mg  at bedtime as needed for anxiety, Latanoprost eye drops, simethicone 80mg every 8 hours, home multivitamins , aspirin 81mg once day  -Heparin 5,000 units every 8 hours to prevent blood clots while at rehab.    -Pain Medications:   -Tylenol 650mg every 6 hours as needed for mild pain, do not exceed maximum daily dose 4,000mg.   -Robaxin 500mg every 8 hours as needed for muscle spasm, You are on robaxin.  Robaxin is a muscle relaxtant. Side effects of robaxin: tiredness, drowsiness   - pain medications can cause constipation, you should eat a high fiber diet and may take a stool softener while on pain meds   - Avoid taking Advil (ibuprofen), Motrin (naproxen), or Aspirin for pain as they can cause bleeding     Call the office or come to ED if:  - wound has drainage or bleeding, increased redness or pain at incision site, neurological change, fever (>101), chills, night sweats, syncope, nausea/vomiting, chest pain, shortness of breath      Playback:  - Your discharge instructions are on [facility] for your convenience.    WITHIN 24 HOURS OF DISCHARGE, PLEASE CONTACT NEURO PA  WITH ANY QUESTIONS OR CONCERNS: [phone]   OTHERWISE, PLEASE CALL THE OFFICE WITH ANY QUESTIONS OR CONCERNS: [phone]. Neurosurgery follow up appointment date/time:  - You have absorbable sutures at your incision site.   - please call the office to confirm appointment: [phone].     Wound Care:  - You may shower daily starting today   -Use gentle shampoo to wash the incision, use clean towel to pat the incision dry   -leave incision open to air  -no creams or ointments     Activity:  -wear collar at all times   - fatigue is common after surgery, rest if you feel tired   - no bending, lifting, twisting or heavy lifting   - walking is recommended, ambulate as tolerated  - you may shower when you get home, keep your incision dry  - no soaking in a tub/pool/hot tub   - no driving within 24 hours of anesthesia or while taking prescription pain medications   - keep hydrated, drink plenty of water    Inpatient consults:  - Cardiology: for preoperative clearance  -Vascular Surgery : for bilateral arm swelling, and right mid-radial occlusion on 7/24 dopplers, resolved with heparin drip/ nitroglycerin paste per vascular    Please also follow up with your primary care doctor.     Pain Expectations:  - pain after surgery is expected  - please take pain meds as prescribed     -Medications:  Continue the following medications as prescribed: Duloxetine (cymbalta) 30mg twice daily, Alprazolam (xanax)0.25mg  at bedtime as needed for anxiety, Latanoprost eye drops, simethicone 80mg every 8 hours, home multivitamins , aspirin 81mg once day  -Heparin 5,000 units every 8 hours to prevent blood clots while at rehab.    -Pain Medications:   -Tylenol 650mg every 6 hours as needed for mild pain, do not exceed maximum daily dose 4,000mg.   -Robaxin 500mg every 8 hours as needed for muscle spasm, You are on robaxin.  Robaxin is a muscle relaxtant. Side effects of robaxin: tiredness, drowsiness   - pain medications can cause constipation, you should eat a high fiber diet and may take a stool softener while on pain meds   - Avoid taking Advil (ibuprofen), Motrin (naproxen), or Aspirin for pain as they can cause bleeding     Call the office or come to ED if:  - wound has drainage or bleeding, increased redness or pain at incision site, neurological change, fever (>101), chills, night sweats, syncope, nausea/vomiting, chest pain, shortness of breath      Playback:  - Your discharge instructions are on [facility] for your convenience.    WITHIN 24 HOURS OF DISCHARGE, PLEASE CONTACT NEURO PA  WITH ANY QUESTIONS OR CONCERNS: [phone]   OTHERWISE, PLEASE CALL THE OFFICE WITH ANY QUESTIONS OR CONCERNS: [phone].
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
## 2024-07-29 NOTE — DISCHARGE NOTE PROVIDER - NSDCFUADDAPPT_GEN_ALL_CORE_FT
Please follow up with Dr. Puckett in the outpatient office. Call [phone] to confirm your appointment date and time.     Please also follow up with your Primary Care Doctor.     Please also follow up with Vascular Surgery.  Please follow up with Dr. Puckett in the outpatient office. Call [phone] to confirm your appointment date and time.     Please also follow up with your Primary Care Doctor.     Please follow up with your Cardiologist, .     Please also follow up with Vascular Surgery.  Please follow up with Dr. Puckett in the outpatient office. Call [phone] to confirm your appointment date and time.     Please also follow up with your Primary Care Doctor.     Please follow up with your Cardiologist, .    Please follow up with Dr. Puckett in the outpatient office. Call [phone] to confirm your appointment date and time. 8/7 telemedicine at 10:30AM with ANGEL Nunez. 8/13 at 11:30AM with ANGEL Nunez.     Please also follow up with your Primary Care Doctor within 1-2 weeks of discharge.     Please follow up with your Cardiologist,  and Nephrologist Dr. Bowling.   Please follow up with Dr. Puckett in the outpatient office. Call [phone] to confirm your appointment date and time. 8/7 telemedicine at 10:30AM with ANGEL Nunez. 8/13 at 11:30AM with ANGEL Nunez.     Please also follow up with your Primary Care Doctor within 1-2 weeks of discharge.     Please follow up with your Cardiologist, Dr. Fernandez and Nephrologist Dr. Bowling.

## 2024-07-29 NOTE — DISCHARGE NOTE PROVIDER - PROVIDER TOKENS
PROVIDER:[TOKEN:[[id number]]],PROVIDER:[TOKEN:[[id number]]],PROVIDER:[TOKEN:[[id number]]]

## 2024-07-29 NOTE — PROGRESS NOTE ADULT - SUBJECTIVE AND OBJECTIVE BOX
=================================  NEUROCRITICAL CARE ATTENDING NOTE  =================================    COLLETTE OROZCO   MRN-[id number]  Summary:  89y/F  with HTN, HLD, CHF, severe AS, and chronic C-2 fracture x 2 years, presented to [facility] ED with worsening weakness and difficulty ambulating since . Per home health aide, pt had a fall with +head strike on  which she did not receive medical attention for. On , HHA noted pt was having more difficulty ambulating and was dropping objects and not able to feed herself as before. Pt was seen on  at [facility] and had a CT head and CT c-spine. 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 The provider at [facility] offered to pt, family, and pt's PCP, Dr. Doan who was at bedside, to call spine surgery at [facility] and all parties refused, preferring to take patient home. Pt was discharged home with her HHA. Since , pt's weakness has progressed, prompting them to come to the ED. She denies falls since . Pt came in with soft collar on, which was removed by HHA while in ED.  (2024 16:16)    COURSE IN THE HOSPITAL:  : admitted to [facility] for surgery with Dr. Puckett, given 15mg torodol for pain, 0.25 dilaudid for pain, 0.125 xanax for anxiety, 10 hydral for high BP.   : FEDERICA, remains on precedex. Cardiology consulted for preop clearance.   : FEDERICA ovn. cardiology clearance obtained. Liquid BMs x3, started on metamucil.  : TTE complete. DC precedex.   : FEDERICA overnight. Na+ 131 from 141. rpt 135, 500cc bolus given.   : FEDERICA overnight. OR today. POD0 from O-C5 fusion, c1 lami. Post-op pulses not palpable, vasc consulted. PT and AT pulses dopplerable b/l, no DP. RUE pulses not dopplerable. Hep gtt + bolus started per vascular. duplex: R radial occlusion. SBP 80s-90s, given 500cc bolus NS and 250cc albumin. NGT placed  : POD1. arterial dopplers performed, +R radial occlusion. PTT o/n >200, heparin gtt held x 4 hrs until ptt in goal. heparin gtt resumed 1000u/hr. transition propofol to precedex. (+) cuff leak. Extubated to NC by anesthesia. PTT >200, hep gtt paused. Bowel reg inc. PTT 75.6. Hep gtt restarted @ 800u.   : POD2. SC o/n. Xrays complete. CT c spine complete. SQH tonight.   : POD 3 occiput-C5 fusion. FEDERICA overnight. Midodrine decreased to 2.5q8.  : POD 4 occiput- C5 fusion. FEDERICA overnight, neuro stable.    POD5 No significant events overnight.     Past Medical History: Essential hypertension Hyperlipidemia, unspecified hyperlipidemia type Diabetes insipidus H/O aortic valve stenosis Hypertension Vertigo Chronic diastolic congestive heart failure Dyslipidemia History of pseudoaneurysm Glaucoma Closed C2 fracture  Allergies:  penicillin (Unknown) erythromycin (Unknown) [This allergen will not trigger allergy alert] Acetic Oa-Pwfqwfrqfo-Knceokwbf (Other)  Home meds:   ·	ALPRAZolam 0.25 mg oral tablet: 1 tab(s) orally once a day (at bedtime)  ·	aspirin 81 mg oral tablet, chewable: 1 tab(s) orally once a day  ·	brimonidine 0.1% ophthalmic solution: 1 drop(s) to each affected eye 2 times a day  ·	cholecalciferol 125 mcg (5000 intl units) oral tablet: 2 tab(s) orally 2 times a day  ·	Cymbalta 30 mg oral delayed release capsule: 1 cap(s) orally 2 times a day  ·	latanoprost 0.005% ophthalmic solution: 1 drop(s) to each affected eye once a day (at bedtime)  ·	melatonin 3 mg oral tablet: 3 tab(s) orally once a day (at bedtime) as needed for  insomnia  ·	polyethylene glycol 3350 oral powder for reconstitution: 17 gram(s) orally once a day    PHYSICAL EXAMINATION  T(C): 36.1 (24 @ 05:23), Max: 36.8 (24 @ 14:20) HR: 95 (24 @ 03:00) (86 - 96) BP: 116/57 (24 @ 03:00) (111/58 - 143/92) RR: 14 (24 @ 03:00) (14 - 16) SpO2: 93% (24 @ 03:00) (93% - 97%)  NEUROLOGIC EXAMINATION:  Patient is  awake alert oriented x 2-3 FC PAIGE, moving all 4s, at least 4/5 L UE R UE 4/5  L UE 2-3/5  GENERAL: not intubated, not in cardiorespiratory distress  EENT:  anicteric  CARDIOVASCULAR: (+) S1 S2, normal rate and regular rhythm  PULMONARY: clear to auscultation bilaterally  ABDOMEN: soft, nontender with normoactive bowel sounds  EXTREMITIES: no edema, good distal pulses (palpable in B LE DP/PT and B UE radial)  SKIN: no rash    LABS:       LAB HOLIDAY      @ 07:01  -   @ 07:00  --------------------------------------------------------  IN: 1920 mL / OUT: 1800 mL / NET: 120 mL    Bacteriology:  CSF studies:  EEG:  [id number]	CT Cervical Spine	Interval C1 laminectomy, occiput to C5 fusion. Improved alignment of nonhealing odontoid fx.  2024	XR C Spine AP LAT 2-3V	Unchanged craniocervical fusion hardware. Slightly anteriorly offset odontoid fx.  2024	US Duplex LE Arts	No significant hemodynamic stenosis in visualized vessels.  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 No occlusion in left upper extremity.  2024	CT Brain            	No acute intracranial hemorrhage or calvarial fx.  2024	CT Cervical Spine	Chronic odontoid fx with anterior displacement. 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  2024	MRI Cervical Spine	Nonhealing C2 fx with anterior displacement. Multilevel degenerative changes.  2023	MRI Cervical Spine	Ventral displacement of nonhealed dens fx. No cord compression.  2023	CT Cervical Spine	Further anterior displacement of nonhealing type II dens fx. Multilevel degenerative changes.    Other imaging:    MEDICATIONS:     ·	aspirin enteric coated 81 Oral daily  ·	heparin   Injectable 5000 SubCutaneous every 12 hours  ·	DULoxetine 30 Oral two times a day  ·	simethicone 80 Chew every 8 hours  ·	ascorbic acid 500 Oral daily  ·	latanoprost 0.005% Ophthalmic Solution 1 Both EYES at bedtime  ·	multivitamin 1 Oral daily  ·	acetaminophen     Tablet .. 650 Oral every 6 hours PRN  ·	ALPRAZolam 0.25 Oral at bedtime PRN  ·	artificial  tears Solution 1 Both EYES four times a day PRN  ·	methocarbamol 500 Oral every 8 hours PRN  ·	ondansetron Injectable 4 IV Push every 6 hours PRN     IV FLUIDS: IVL  DRIPS:  DIET: regular   Lines:  Drains:    ·	HMV  Accordion (mL): 0 mL  Wounds:    CODE STATUS:  Full Code                       GOALS OF CARE:  aggressive                      DISPOSITION:  ICU =================================  NEUROCRITICAL CARE ATTENDING NOTE  =================================    COLLETTE OROZCO   MRN-[id number]  Summary:  89y/F  with HTN, HLD, CHF, severe AS, and chronic C-2 fracture x 2 years, presented to [facility] ED with worsening weakness and difficulty ambulating since . Per home health aide, pt had a fall with +head strike on  which she did not receive medical attention for. On , HHA noted pt was having more difficulty ambulating and was dropping objects and not able to feed herself as before. Pt was seen on  at [facility] and had a CT head and CT c-spine. 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 The provider at [facility] offered to pt, family, and pt's PCP, Dr. oDan who was at bedside, to call spine surgery at [facility] and all parties refused, preferring to take patient home. Pt was discharged home with her HHA. Since , pt's weakness has progressed, prompting them to come to the ED. She denies falls since . Pt came in with soft collar on, which was removed by HHA while in ED.  (2024 16:16)    COURSE IN THE HOSPITAL:  : admitted to [facility] for surgery with Dr. Puckett, given 15mg torodol for pain, 0.25 dilaudid for pain, 0.125 xanax for anxiety, 10 hydral for high BP.   : FEDERICA, remains on precedex. Cardiology consulted for preop clearance.   : FEDERICA ovn. cardiology clearance obtained. Liquid BMs x3, started on metamucil.  : TTE complete. DC precedex.   : FEDERICA overnight. Na+ 131 from 141. rpt 135, 500cc bolus given.   : FEDERICA overnight. OR today. POD0 from O-C5 fusion, c1 lami. Post-op pulses not palpable, vasc consulted. PT and AT pulses dopplerable b/l, no DP. RUE pulses not dopplerable. Hep gtt + bolus started per vascular. duplex: R radial occlusion. SBP 80s-90s, given 500cc bolus NS and 250cc albumin. NGT placed  : POD1. arterial dopplers performed, +R radial occlusion. PTT o/n >200, heparin gtt held x 4 hrs until ptt in goal. heparin gtt resumed 1000u/hr. transition propofol to precedex. (+) cuff leak. Extubated to NC by anesthesia. PTT >200, hep gtt paused. Bowel reg inc. PTT 75.6. Hep gtt restarted @ 800u.   : POD2. SC o/n. Xrays complete. CT c spine complete. SQH tonight.   : POD 3 occiput-C5 fusion. FEDERICA overnight. Midodrine decreased to 2.5q8.  : POD 4 occiput- C5 fusion. FEDERICA overnight, neuro stable.    POD5 No significant events overnight.     Past Medical History: Essential hypertension Hyperlipidemia, unspecified hyperlipidemia type Diabetes insipidus H/O aortic valve stenosis Hypertension Vertigo Chronic diastolic congestive heart failure Dyslipidemia History of pseudoaneurysm Glaucoma Closed C2 fracture  Allergies:  penicillin (Unknown) erythromycin (Unknown) [This allergen will not trigger allergy alert] Acetic Br-Jjqclhxoet-Jkvsomwgn (Other)  Home meds:   ·	ALPRAZolam 0.25 mg oral tablet: 1 tab(s) orally once a day (at bedtime)  ·	aspirin 81 mg oral tablet, chewable: 1 tab(s) orally once a day  ·	brimonidine 0.1% ophthalmic solution: 1 drop(s) to each affected eye 2 times a day  ·	cholecalciferol 125 mcg (5000 intl units) oral tablet: 2 tab(s) orally 2 times a day  ·	Cymbalta 30 mg oral delayed release capsule: 1 cap(s) orally 2 times a day  ·	latanoprost 0.005% ophthalmic solution: 1 drop(s) to each affected eye once a day (at bedtime)  ·	melatonin 3 mg oral tablet: 3 tab(s) orally once a day (at bedtime) as needed for  insomnia  ·	polyethylene glycol 3350 oral powder for reconstitution: 17 gram(s) orally once a day    PHYSICAL EXAMINATION  T(C): 36.1 (24 @ 05:23), Max: 36.8 (24 @ 14:20) HR: 95 (24 @ 03:00) (86 - 96) BP: 116/57 (24 @ 03:00) (111/58 - 143/92) RR: 14 (24 @ 03:00) (14 - 16) SpO2: 93% (24 @ 03:00) (93% - 97%)  NEUROLOGIC EXAMINATION:  Patient is  awake alert oriented x 2-3 FC PAIGE, moving all 4s, at least 4/5 L UE R UE 4/5  L UE 3/5  GENERAL: not intubated, not in cardiorespiratory distress  EENT:  anicteric  CARDIOVASCULAR: (+) S1 S2, normal rate and regular rhythm  PULMONARY: clear to auscultation bilaterally  ABDOMEN: soft, nontender with normoactive bowel sounds  EXTREMITIES: no edema, good distal pulses (palpable in B LE DP/PT and B UE radial)  SKIN: no rash    LABS:       LAB HOLIDAY      @ 07:01  -   @ 07:00  --------------------------------------------------------  IN: 1920 mL / OUT: 1800 mL / NET: 120 mL    Bacteriology:  CSF studies:  EEG:  [id number]	CT Cervical Spine	Interval C1 laminectomy, occiput to C5 fusion. Improved alignment of nonhealing odontoid fx.  2024	XR C Spine AP LAT 2-3V	Unchanged craniocervical fusion hardware. Slightly anteriorly offset odontoid fx.  2024	US Duplex LE Arts	No significant hemodynamic stenosis in visualized vessels.  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 No occlusion in left upper extremity.  2024	CT Brain            	No acute intracranial hemorrhage or calvarial fx.  2024	CT Cervical Spine	Chronic odontoid fx with anterior displacement. 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  2024	MRI Cervical Spine	Nonhealing C2 fx with anterior displacement. Multilevel degenerative changes.  2023	MRI Cervical Spine	Ventral displacement of nonhealed dens fx. No cord compression.  2023	CT Cervical Spine	Further anterior displacement of nonhealing type II dens fx. Multilevel degenerative changes.    Other imaging:    MEDICATIONS:     ·	aspirin enteric coated 81 Oral daily  ·	heparin   Injectable 5000 SubCutaneous every 12 hours  ·	DULoxetine 30 Oral two times a day  ·	simethicone 80 Chew every 8 hours  ·	ascorbic acid 500 Oral daily  ·	latanoprost 0.005% Ophthalmic Solution 1 Both EYES at bedtime  ·	multivitamin 1 Oral daily  ·	acetaminophen     Tablet .. 650 Oral every 6 hours PRN  ·	ALPRAZolam 0.25 Oral at bedtime PRN  ·	artificial  tears Solution 1 Both EYES four times a day PRN  ·	methocarbamol 500 Oral every 8 hours PRN  ·	ondansetron Injectable 4 IV Push every 6 hours PRN     IV FLUIDS: IVL  DRIPS:  DIET: regular   Lines:  Drains:    Wounds:    CODE STATUS:  Full Code                       GOALS OF CARE:  aggressive                      DISPOSITION:  ICU

## 2024-07-30 DIAGNOSIS — M48.02 SPINAL STENOSIS, CERVICAL REGION: ICD-10-CM

## 2024-07-30 DIAGNOSIS — F41.9 ANXIETY DISORDER, UNSPECIFIED: ICD-10-CM

## 2024-07-30 DIAGNOSIS — E87.5 HYPERKALEMIA: ICD-10-CM

## 2024-07-30 DIAGNOSIS — I50.9 HEART FAILURE, UNSPECIFIED: ICD-10-CM

## 2024-07-30 LAB
ANION GAP SERPL CALC-SCNC: 4 MMOL/L — LOW (ref 5–17)
ANION GAP SERPL CALC-SCNC: 6 MMOL/L — SIGNIFICANT CHANGE UP (ref 5–17)
BUN SERPL-MCNC: 15 MG/DL — SIGNIFICANT CHANGE UP (ref 7–23)
BUN SERPL-MCNC: 15 MG/DL — SIGNIFICANT CHANGE UP (ref 7–23)
CALCIUM SERPL-MCNC: 8.5 MG/DL — SIGNIFICANT CHANGE UP (ref 8.4–10.5)
CALCIUM SERPL-MCNC: 8.9 MG/DL — SIGNIFICANT CHANGE UP (ref 8.4–10.5)
CHLORIDE SERPL-SCNC: 102 MMOL/L — SIGNIFICANT CHANGE UP (ref 96–108)
CHLORIDE SERPL-SCNC: 97 MMOL/L — SIGNIFICANT CHANGE UP (ref 96–108)
CO2 SERPL-SCNC: 31 MMOL/L — SIGNIFICANT CHANGE UP (ref 22–31)
CO2 SERPL-SCNC: 34 MMOL/L — HIGH (ref 22–31)
CREAT SERPL-MCNC: 0.76 MG/DL — SIGNIFICANT CHANGE UP (ref 0.5–1.3)
CREAT SERPL-MCNC: 0.91 MG/DL — SIGNIFICANT CHANGE UP (ref 0.5–1.3)
EGFR: 58 ML/MIN/1.73M2 — LOW
EGFR: 72 ML/MIN/1.73M2 — SIGNIFICANT CHANGE UP
GLUCOSE SERPL-MCNC: 102 MG/DL — HIGH (ref 70–99)
GLUCOSE SERPL-MCNC: 125 MG/DL — HIGH (ref 70–99)
HCT VFR BLD CALC: 26.6 % — LOW (ref 34.5–45)
HGB BLD-MCNC: 8.3 G/DL — LOW (ref 11.5–15.5)
MAGNESIUM SERPL-MCNC: 2 MG/DL — SIGNIFICANT CHANGE UP (ref 1.6–2.6)
MCHC RBC-ENTMCNC: 30.2 PG — SIGNIFICANT CHANGE UP (ref 27–34)
MCHC RBC-ENTMCNC: 31.2 GM/DL — LOW (ref 32–36)
MCV RBC AUTO: 96.7 FL — SIGNIFICANT CHANGE UP (ref 80–100)
NRBC # BLD: 0 /100 WBCS — SIGNIFICANT CHANGE UP (ref 0–0)
PHOSPHATE SERPL-MCNC: 2.5 MG/DL — SIGNIFICANT CHANGE UP (ref 2.5–4.5)
PLATELET # BLD AUTO: 323 K/UL — SIGNIFICANT CHANGE UP (ref 150–400)
POTASSIUM SERPL-MCNC: 4.9 MMOL/L — SIGNIFICANT CHANGE UP (ref 3.5–5.3)
POTASSIUM SERPL-MCNC: 5.4 MMOL/L — HIGH (ref 3.5–5.3)
POTASSIUM SERPL-SCNC: 4.9 MMOL/L — SIGNIFICANT CHANGE UP (ref 3.5–5.3)
POTASSIUM SERPL-SCNC: 5.4 MMOL/L — HIGH (ref 3.5–5.3)
RBC # BLD: 2.75 M/UL — LOW (ref 3.8–5.2)
RBC # FLD: 16.2 % — HIGH (ref 10.3–14.5)
SODIUM SERPL-SCNC: 134 MMOL/L — LOW (ref 135–145)
SODIUM SERPL-SCNC: 140 MMOL/L — SIGNIFICANT CHANGE UP (ref 135–145)
WBC # BLD: 9.84 K/UL — SIGNIFICANT CHANGE UP (ref 3.8–10.5)
WBC # FLD AUTO: 9.84 K/UL — SIGNIFICANT CHANGE UP (ref 3.8–10.5)

## 2024-07-30 PROCEDURE — 99223 1ST HOSP IP/OBS HIGH 75: CPT

## 2024-07-30 RX ORDER — SODIUM ZIRCONIUM CYCLOSILICATE 10 G/10G
5 POWDER, FOR SUSPENSION ORAL ONCE
Refills: 0 | Status: COMPLETED | OUTPATIENT
Start: 2024-07-30 | End: 2024-07-30

## 2024-07-30 RX ADMIN — Medication 650 MILLIGRAM(S): at 06:30

## 2024-07-30 RX ADMIN — Medication 17 GRAM(S): at 11:49

## 2024-07-30 RX ADMIN — Medication 650 MILLIGRAM(S): at 21:09

## 2024-07-30 RX ADMIN — Medication 1 DROP(S): at 21:39

## 2024-07-30 RX ADMIN — HEPARIN SODIUM 5000 UNIT(S): 1000 INJECTION, SOLUTION INTRAVENOUS; SUBCUTANEOUS at 11:50

## 2024-07-30 RX ADMIN — Medication 500 MILLIGRAM(S): at 11:49

## 2024-07-30 RX ADMIN — SIMETHICONE 80 MILLIGRAM(S): 125 TABLET, CHEWABLE ORAL at 14:52

## 2024-07-30 RX ADMIN — Medication 5 MILLILITER(S): at 00:08

## 2024-07-30 RX ADMIN — HEPARIN SODIUM 5000 UNIT(S): 1000 INJECTION, SOLUTION INTRAVENOUS; SUBCUTANEOUS at 21:38

## 2024-07-30 RX ADMIN — Medication 500 MILLIGRAM(S): at 15:03

## 2024-07-30 RX ADMIN — SODIUM ZIRCONIUM CYCLOSILICATE 5 GRAM(S): 10 POWDER, FOR SUSPENSION ORAL at 14:52

## 2024-07-30 RX ADMIN — Medication 81 MILLIGRAM(S): at 11:49

## 2024-07-30 RX ADMIN — Medication 650 MILLIGRAM(S): at 05:36

## 2024-07-30 RX ADMIN — SIMETHICONE 80 MILLIGRAM(S): 125 TABLET, CHEWABLE ORAL at 05:36

## 2024-07-30 RX ADMIN — Medication 650 MILLIGRAM(S): at 20:09

## 2024-07-30 RX ADMIN — Medication 500 MILLIGRAM(S): at 05:36

## 2024-07-30 RX ADMIN — Medication 30 MILLIGRAM(S): at 18:41

## 2024-07-30 RX ADMIN — Medication 1 TABLET(S): at 11:49

## 2024-07-30 RX ADMIN — Medication 30 MILLIGRAM(S): at 05:36

## 2024-07-30 RX ADMIN — SIMETHICONE 80 MILLIGRAM(S): 125 TABLET, CHEWABLE ORAL at 21:37

## 2024-07-30 RX ADMIN — Medication 0.25 MILLIGRAM(S): at 21:37

## 2024-07-30 NOTE — CONSULT NOTE ADULT - PROBLEM SELECTOR RECOMMENDATION 2
s/p TAVR. TTE this admission with normal biventricular function, moderate MR, moderate MS, mild-moderate TR, and TAVR with AV gradients unchanged from prior. Valvular disease stable from prior as well. Cardiology following    Plan:  - no further intervention, follow up outpatient  - c/w Aspirin 81 mg qd

## 2024-07-30 NOTE — CONSULT NOTE ADULT - PROBLEM SELECTOR RECOMMENDATION 5
Patient reports blood pressures have been on the lower end as of late. Previously on HF meds which have been discontinued. Blood pressures wnl this admission    Plan:  - continue to monitor

## 2024-07-30 NOTE — CONSULT NOTE ADULT - PROBLEM SELECTOR RECOMMENDATION 6
K 5.4 today, dry appearing on exam  - give Lokelma 5mg  - repeat BMP this afternoon  - allow to drink to thirst

## 2024-07-30 NOTE — PROGRESS NOTE ADULT - SUBJECTIVE AND OBJECTIVE BOX
HPI:  89 year old female, PMH HTN, HLD, CHF, severe AS, and chronic C-2 fracture x 2 years, presented to [facility] ED with worsening weakness and difficulty ambulating since 7/14. Per home health aide, pt had a fall with +head strike on July 6th which she did not receive medical attention for. On 7/14, HHA noted pt was having more difficulty ambulating and was dropping objects and not able to feed herself as before. Pt was seen on 7/17 at [facility] and had a CT head and CT c-spine. 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 The provider at [facility] offered to pt, family, and pt's PCP, Dr. Doan who was at bedside, to call spine surgery at [facility] and all parties refused, preferring to take patient home. Pt was discharged home with her HHA. Since 7/17, pt's weakness has progressed, prompting them to come to the ED. She denies falls since 7/6. Pt came in with soft collar on, which was removed by HHA while in ED.  (19 Jul 2024 16:16)    OVERNIGHT EVENTS: Seen and examined in 8WO. Denies HA, N/V, chest pain, shortness of breath, new weakness or numbness.     HOSPITAL COURSE:  7/19: admitted to [facility] for surgery with Dr. Puckett, given 15mg torodol for pain, 0.25 dilaudid for pain, 0.125 xanax for anxiety, 10 hydral for high BP.   7/20: FEDERICA, remains on precedex. Cardiology consulted for preop clearance.   7/21: FEDERICA ovn. cardiology clearance obtained. Liquid BMs x3, started on metamucil.  7/22: TTE complete. DC precedex.   7/23: FEDERICA overnight. Na+ 131 from 141. rpt 135, 500cc bolus given.   7/24: FEDERICA overnight. OR today. POD0 from O-C5 fusion, c1 lami. Post-op pulses not palpable, vasc consulted. PT and AT pulses dopplerable b/l, no DP. RUE pulses not dopplerable. Hep gtt + bolus started per vascular. duplex: R radial occlusion. SBP 80s-90s, given 500cc bolus NS and 250cc albumin. NGT placed  7/25: POD1. arterial dopplers performed, +R radial occlusion. PTT o/n >200, heparin gtt held x 4 hrs until ptt in goal. heparin gtt resumed 1000u/hr. transition propofol to precedex. (+) cuff leak. Extubated to NC by anesthesia. PTT >200, hep gtt paused. Bowel reg inc. PTT 75.6. Hep gtt restarted @ 800u.   7/26: POD2. SC o/n. Xrays complete. CT c spine complete. SQH tonight.   7/27: POD 3 occiput-C5 fusion. FEDERICA overnight. Midodrine decreased to 2.5q8.  7/28: POD 4 occiput- C5 fusion. FEDERICA overnight, neuro stable. HMV drain removed; nitro paste d/c'd. Midodrine dc'd.  7/29: POD 5 occiput-C5 fusion. FEDERICA overnight. SD status. Downtrending hgb, trend w AM labs. Biotene swish/swallow for dry mouth.   7/30: POD 6. FEDERICA overnight, neuro stable.     Vital Signs Last 24 Hrs  T(C): 36.9 (30 Jul 2024 00:14), Max: 37 (29 Jul 2024 20:47)  T(F): 98.5 (30 Jul 2024 00:14), Max: 98.6 (29 Jul 2024 20:47)  HR: 87 (30 Jul 2024 00:14) (87 - 95)  BP: 134/83 (30 Jul 2024 00:14) (114/57 - 146/70)  BP(mean): 100 (29 Jul 2024 09:00) (80 - 100)  RR: 18 (30 Jul 2024 00:14) (14 - 19)  SpO2: 95% (30 Jul 2024 00:14) (93% - 97%)    Parameters below as of 30 Jul 2024 00:14  Patient On (Oxygen Delivery Method): room air        I&O's Summary    28 Jul 2024 07:01  -  29 Jul 2024 07:00  --------------------------------------------------------  IN: 2400 mL / OUT: 2500 mL / NET: -100 mL    29 Jul 2024 07:01  -  30 Jul 2024 00:36  --------------------------------------------------------  IN: 480 mL / OUT: 2050 mL / NET: -1570 mL        PHYSICAL EXAM:  General: NAD, pt is comfortably sitting up in bed, (+) cervical collar, on room air  HEENT: PERRL 3mm briskly reactive, EOMI b/l, face symmetric, tongue midline, neck FROM  Cardiovascular: RRR, S1, S2  Respiratory: breathing non-labored on RA, chest rise symmetric  GI: abd soft, NTND   Neuro: A&Ox3, No aphasia, speech clear, no dysmetria, no pronator drift. Follows commands.  PISANO x4 spontaneously, RUE 5/5, LUE 4+/5, b/l LE 5/5. Sensation intact  Extremities: distal pulses 2+ x4  Wound/incision: cervical incision with aquacel, c/d/i   Drain: n/a    TUBES/LINES:  [] Vega  [] Wound Drains  [] Others      DIET:  [] NPO  [x] Mechanical  [] Tube feeds    LABS:                        8.1    10.04 )-----------( 305      ( 29 Jul 2024 14:44 )             25.4                   CAPILLARY BLOOD GLUCOSE          Drug Levels: [] N/A    CSF Analysis: [] N/A      Allergies    penicillin (Unknown)  erythromycin (Unknown)  [This allergen will not trigger allergy alert] Acetic Tz-Pmygzwwebw-Ngtavikpi (Other)    Intolerances      MEDICATIONS:  Antibiotics:    Neuro:  acetaminophen     Tablet .. 650 milliGRAM(s) Oral every 6 hours PRN  ALPRAZolam 0.25 milliGRAM(s) Oral at bedtime PRN  DULoxetine 30 milliGRAM(s) Oral two times a day  methocarbamol 500 milliGRAM(s) Oral every 8 hours PRN  ondansetron Injectable 4 milliGRAM(s) IV Push every 6 hours PRN    Anticoagulation:  aspirin enteric coated 81 milliGRAM(s) Oral daily  heparin   Injectable 5000 Unit(s) SubCutaneous every 12 hours    OTHER:  artificial  tears Solution 1 Drop(s) Both EYES four times a day PRN  chlorhexidine 2% Cloths 1 Application(s) Topical <User Schedule>  latanoprost 0.005% Ophthalmic Solution 1 Drop(s) Both EYES at bedtime  polyethylene glycol 3350 17 Gram(s) Oral daily  senna 2 Tablet(s) Oral at bedtime  simethicone 80 milliGRAM(s) Chew every 8 hours    IVF:  ascorbic acid 500 milliGRAM(s) Oral daily  multivitamin 1 Tablet(s) Oral daily    CULTURES:    RADIOLOGY & ADDITIONAL TESTS:      ASSESSMENT:  89 year old female, PMH HTN, HLD, CHF, AS, chronic C2 fracture, presenting to [facility] ED with 1 week of worsening weakness and difficulty ambulating at home. 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 Pt has been seen outpatient by Dr. Butler and Dr. Puckett. Pt's family called outpatient office and was advised to come to ED for admission for surgery. Now s/p occipital -C5 fusion with C1 laminectomy (7/24).    C2 CERVICAL FRACTURE;NECK PAIN    Allergy status to other antibiotic agents    Allergy status to penicillin    Aneurysm of unspecified site    Anterior displaced type ii dens fracture, initial encounter for closed fracture    Atherosclerotic heart disease of native coronary artery without angina pectoris    Bilateral primary osteoarthritis of knee    CAP GLAUC PSX LENS LT EYE MOD STAGE    CAPSLR GLAUCOMA W/PSEUDXF LENS, BILATERAL, MODERATE STAGE    Cervicalgia    Chronic diastolic (congestive) heart failure    Dizziness and giddiness    Hyperlipidemia, unspecified    Hypertensive heart disease with heart failure    Iliotibial band syndrome, right leg    Long term (current) use of aspirin    Nonrheumatic aortic (valve) stenosis    Old myocardial infarction    Other specific arthropathies, not elsewhere classified, left shoulder    Pain in right leg    Pain in unspecified shoulder    Personal history of other diseases of the circulatory system    Personal history of other endocrine, nutritional and metabolic disease    Presence of other heart-valve replacement    Shortness of breath    Unspecified glaucoma    Vertigo of central origin    History of tobacco use    Sex Assigned At Birth    Sex Assigned At Birth    Alcohol intake    FH: lung cancer (Father)    Handoff    MEWS Score    Prior    Essential hypertension    Hyperlipidemia, unspecified hyperlipidemia type    Diabetes insipidus    H/O aortic valve stenosis    Hypertension    Vertigo    Chronic diastolic congestive heart failure    Dyslipidemia    History of pseudoaneurysm    Glaucoma    Closed C2 fracture    Open dens fracture    Dens fracture    Dens fracture    Fusion, occipital bone with C1 and C2 vertebrae, posterior approach    C2 cervical fracture    Open dens fracture    Anterior displaced type ii dens fracture, sequela    H/O aortic valve stenosis    Hyperlipidemia, unspecified hyperlipidemia type    Hypertension    Glaucoma    Fusion, occipital bone with C1 and C2 vertebrae, posterior approach    History of orthopedic surgery    S/P AVR    H/O lumbosacral spine surgery    S/P hip replacement, right    S/P hip replacement    S/P right coronary artery (RCA) stent placement    NECK PAIN    2    Room Service Assist    Room Service Assist    Room Service Assist    Neck pain    H/O aortic valve stenosis    S/P hip replacement    Glaucoma    Hypertension    Diabetes insipidus    S/P AVR    [email or web address]        PLAN:  Neuro:   - neuro/vitals q4  - Protected sleep time: 10pm-5am   - Pain control: Tylenol prn  - Hard collar at all times   - Anxiety: home xanax 0.25mg qhs prn, cymbalta 30mg  - 1 deep HMV - dc'd 7/28  - Post op xrays complete   - CT C-spine complete     Cards:   - SBP <160, midodrine dc'd  - hx CHF: TTE 7/22: EF 67%, TAVR , mod MR, mod mitral stenosis, mild-mod TR, pulm HTN   - Hx TAVR: cont home ASA 81mg (keep for surgery)  - cardiology consulted for preop clearance    Pulm:   - RA    GI:   - Regular diet   - bowel reg, prn dulcolax, LBM 7/29  - Abd XR 7/22: poss ileus, Simethicone 80mg q8   - CT abdomen 7/26 w/o c/f ileus     Renal:   - IVL  - Voiding, SC prn    Endo:   - ISS   - A1c 5.3    Heme:   - DVT ppx: SCDs, SQH 5000u q12  - s/p hep bolus, s/p hep gtt (dc'd 7/26)  - Vacular consulted f/u recs: nitroglycerin ointment to fingers/toes daily- dc'd 7/28  - arterial dopplers 7/24: R mid-radial occlusion, no other occlusions in LUE/ b/l legs  - b/l UE duplex for swelling- negative 7/28    ID:   - afebrile    Dispo: ICU, full code, PT rec AR     D/w Dr Puckett  Assessment:  Present when checked    []  GCS  E   V  M     Heart Failure: []Acute, [] acute on chronic , []chronic  Heart Failure:  [] Diastolic (HFpEF), [] Systolic (HFrEF), []Combined (HFpEF and HFrEF), [] RHF, [] Pulm HTN, [] Other    [] KAYLYN, [] ATN, [] AIN, [] other  [] CKD1, [] CKD2, [] CKD 3, [] CKD 4, [] CKD 5, []ESRD    Encephalopathy: [] Metabolic, [] Hepatic, [] toxic, [] Neurological, [] Other    Abnormal Nurtitional Status: [] malnurtition (see nutrition note), [ ]underweight: BMI < 19, [] morbid obesity: BMI >40, [] Cachexia    [] Sepsis  [] hypovolemic shock,[] cardiogenic shock, [] hemorrhagic shock, [] neuogenic shock  [] Acute Respiratory Failure  []Cerebral edema, [] Brain compression/ herniation,   [] Functional quadriplegia  [] Acute blood loss anemia

## 2024-07-30 NOTE — CONSULT NOTE ADULT - ATTENDING COMMENTS
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  - Agree with plan as above with edits made where necessary  - for AR vs home if improves past 2 person assist

## 2024-07-30 NOTE — CONSULT NOTE ADULT - TIME BILLING
89 yo female with PMH HTN, HLD, CHF , Aortic Stenosis and chronic C2 fracture. Cardiology consulted for pre-operative evaluation prior to surgery on 7/24.
More than 50 percent of which was spent on counseling and coordination of care.
Review of hospital course, labs, vitals, medical records.   Bedside exam and interview    Discussed plan of care with primary team   Documenting the encounter

## 2024-07-30 NOTE — CONSULT NOTE ADULT - ASSESSMENT
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

## 2024-07-30 NOTE — CONSULT NOTE ADULT - SUBJECTIVE AND OBJECTIVE BOX
COLLETTE OROZCO, 89y, Female  MRN-[id number]  Patient is a 89y old  Female who presents with a chief complaint of Chronic C2 fracture (30 Jul 2024 11:42)    INTERNAL MEDICINE INITIAL CONSULT NOTE    HPI:  89 year old female, PMH HTN, HLD, CHF, severe AS, and chronic C-2 fracture x 2 years, presented to [facility] ED with worsening weakness and difficulty ambulating since 7/14. Per home health aide, pt had a fall with +head strike on July 6th which she did not receive medical attention for. On 7/14, HHA noted pt was having more difficulty ambulating and was dropping objects and not able to feed herself as before. Pt was seen on 7/17 at [facility] and had a CT head and CT c-spine. 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 The provider at [facility] offered to pt, family, and pt's PCP, Dr. Doan who was at bedside, to call spine surgery at [facility] and all parties refused, preferring to take patient home. Pt was discharged home with her HHA. Since 7/17, pt's weakness has progressed, prompting them to come to the ED. She denies falls since 7/6. Pt came in with soft collar on, which was removed by HHA while in ED.  (19 Jul 2024 16:16)    SUBJECTIVE: Patient assessed at bedside, states she feels "bleh". Denies any acute pain.     PAST MEDICAL & SURGICAL HISTORY:  Hyperlipidemia, unspecified hyperlipidemia type      Diabetes insipidus      H/O aortic valve stenosis      Hypertension      Vertigo      Chronic diastolic congestive heart failure      Dyslipidemia      History of pseudoaneurysm      Glaucoma      Closed C2 fracture      S/P AVR  Bioprosthetic      H/O lumbosacral spine surgery      S/P hip replacement  B/L      S/P right coronary artery (RCA) stent placement            Review of Systems:   Constitutional, eyes, ENT, cardiovascular, respiratory, gastrointestinal, genitourinary, integumentary, neurological, psychiatric and heme/lymph are otherwise negative other than noted above       ANTIBIOTICS:  MEDICATIONS  (STANDING):  ascorbic acid 500 milliGRAM(s) Oral daily  aspirin enteric coated 81 milliGRAM(s) Oral daily  chlorhexidine 2% Cloths 1 Application(s) Topical <User Schedule>  DULoxetine 30 milliGRAM(s) Oral two times a day  heparin   Injectable 5000 Unit(s) SubCutaneous every 12 hours  latanoprost 0.005% Ophthalmic Solution 1 Drop(s) Both EYES at bedtime  multivitamin 1 Tablet(s) Oral daily  polyethylene glycol 3350 17 Gram(s) Oral daily  senna 2 Tablet(s) Oral at bedtime  simethicone 80 milliGRAM(s) Chew every 8 hours  sodium zirconium cyclosilicate 5 Gram(s) Oral once    MEDICATIONS  (PRN):  acetaminophen     Tablet .. 650 milliGRAM(s) Oral every 6 hours PRN Temp greater or equal to 38C (100.4F), Mild Pain (1 - 3)  ALPRAZolam 0.25 milliGRAM(s) Oral at bedtime PRN anxiety  artificial  tears Solution 1 Drop(s) Both EYES four times a day PRN Dry Eyes  methocarbamol 500 milliGRAM(s) Oral every 8 hours PRN Muscle Spasm  ondansetron Injectable 4 milliGRAM(s) IV Push every 6 hours PRN Nausea and/or Vomiting      Allergies    penicillin (Unknown)  erythromycin (Unknown)  [This allergen will not trigger allergy alert] Acetic Aa-Qwlbqavgsg-Stlcrskrw (Other)    Intolerances        SOCIAL HISTORY:    FAMILY HISTORY:  FH: lung cancer (Father)     no FH leading to current infection    Vital Signs Last 24 Hrs  T(C): 36.8 (30 Jul 2024 05:35), Max: 37 (29 Jul 2024 20:47)  T(F): 98.2 (30 Jul 2024 05:35), Max: 98.6 (29 Jul 2024 20:47)  HR: 75 (30 Jul 2024 05:35) (75 - 91)  BP: 127/81 (30 Jul 2024 05:35) (120/95 - 145/72)  BP(mean): --  RR: 20 (30 Jul 2024 05:35) (18 - 20)  SpO2: 96% (30 Jul 2024 05:35) (94% - 97%)    Parameters below as of 30 Jul 2024 05:35  Patient On (Oxygen Delivery Method): room air        07-29-24 @ 07:01  -  07-30-24 @ 07:00  --------------------------------------------------------  IN: 480 mL / OUT: 2450 mL / NET: -1970 mL        PHYSICAL EXAM:  Constitutional: alert, NAD  Eyes: the sclera and conjunctiva were normal.   ENT: the ears and nose were normal in appearance.   Neck: cervical collar in place  Pulmonary: no respiratory distress and lungs were clear to auscultation bilaterally.   Heart: heart rate was normal with occasional premature beats; systolic murmurs  Vascular: mild edema of L UE w/ diffuse ecchymosis   Abdomen: normal bowel sounds, soft, non-tender  Neurological: no focal deficits.   Psychiatric: the affect was normal      LABS:                        8.3    9.84  )-----------( 323      ( 30 Jul 2024 05:30 )             26.6     07-30    140  |  102  |  15  ----------------------------<  102<H>  5.4<H>   |  34<H>  |  0.91    Ca    8.9      30 Jul 2024 05:30  Phos  2.5     07-30  Mg     2.0     07-30        Urinalysis Basic - ( 30 Jul 2024 05:30 )    Color: x / Appearance: x / SG: x / pH: x  Gluc: 102 mg/dL / Ketone: x  / Bili: x / Urobili: x   Blood: x / Protein: x / Nitrite: x   Leuk Esterase: x / RBC: x / WBC x   Sq Epi: x / Non Sq Epi: x / Bacteria: x        MICROBIOLOGY:    RADIOLOGY & ADDITIONAL STUDIES:   COLLETTE OROZCO, 89y, Female  MRN-[id number]  Patient is a 89y old  Female who presents with a chief complaint of Chronic C2 fracture (30 Jul 2024 11:42)    INTERNAL MEDICINE INITIAL CONSULT NOTE    HPI:  89 year old female, PMH HTN, HLD, CHF, severe AS, and chronic C-2 fracture x 2 years, presented to [facility] ED with worsening weakness and difficulty ambulating since 7/14. Per home health aide, pt had a fall with +head strike on July 6th which she did not receive medical attention for. On 7/14, HHA noted pt was having more difficulty ambulating and was dropping objects and not able to feed herself as before. Pt was seen on 7/17 at [facility] and had a CT head and CT c-spine. 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 The provider at [facility] offered to pt, family, and pt's PCP, Dr. Doan who was at bedside, to call spine surgery at [facility] and all parties refused, preferring to take patient home. Pt was discharged home with her HHA. Since 7/17, pt's weakness has progressed, prompting them to come to the ED. She denies falls since 7/6. Pt came in with soft collar on, which was removed by HHA while in ED.  (19 Jul 2024 16:16)    SUBJECTIVE: Patient assessed at bedside, states she feels "bleh". Denies any acute pain.     PAST MEDICAL & SURGICAL HISTORY:  Hyperlipidemia, unspecified hyperlipidemia type      Diabetes insipidus      H/O aortic valve stenosis      Hypertension      Vertigo      Chronic diastolic congestive heart failure      Dyslipidemia      History of pseudoaneurysm      Glaucoma      Closed C2 fracture      S/P AVR  Bioprosthetic      H/O lumbosacral spine surgery      S/P hip replacement  B/L      S/P right coronary artery (RCA) stent placement            Review of Systems:   Constitutional, eyes, ENT, cardiovascular, respiratory, gastrointestinal, genitourinary, integumentary, neurological, psychiatric and heme/lymph are otherwise negative other than noted above       ANTIBIOTICS:  MEDICATIONS  (STANDING):  ascorbic acid 500 milliGRAM(s) Oral daily  aspirin enteric coated 81 milliGRAM(s) Oral daily  chlorhexidine 2% Cloths 1 Application(s) Topical <User Schedule>  DULoxetine 30 milliGRAM(s) Oral two times a day  heparin   Injectable 5000 Unit(s) SubCutaneous every 12 hours  latanoprost 0.005% Ophthalmic Solution 1 Drop(s) Both EYES at bedtime  multivitamin 1 Tablet(s) Oral daily  polyethylene glycol 3350 17 Gram(s) Oral daily  senna 2 Tablet(s) Oral at bedtime  simethicone 80 milliGRAM(s) Chew every 8 hours  sodium zirconium cyclosilicate 5 Gram(s) Oral once    MEDICATIONS  (PRN):  acetaminophen     Tablet .. 650 milliGRAM(s) Oral every 6 hours PRN Temp greater or equal to 38C (100.4F), Mild Pain (1 - 3)  ALPRAZolam 0.25 milliGRAM(s) Oral at bedtime PRN anxiety  artificial  tears Solution 1 Drop(s) Both EYES four times a day PRN Dry Eyes  methocarbamol 500 milliGRAM(s) Oral every 8 hours PRN Muscle Spasm  ondansetron Injectable 4 milliGRAM(s) IV Push every 6 hours PRN Nausea and/or Vomiting      Allergies    penicillin (Unknown)  erythromycin (Unknown)  [This allergen will not trigger allergy alert] Acetic Bs-Gjdiuwbpov-Znmeqvlht (Other)    Intolerances        SOCIAL HISTORY:    FAMILY HISTORY:  FH: lung cancer (Father)      Vital Signs Last 24 Hrs  T(C): 36.8 (30 Jul 2024 05:35), Max: 37 (29 Jul 2024 20:47)  T(F): 98.2 (30 Jul 2024 05:35), Max: 98.6 (29 Jul 2024 20:47)  HR: 75 (30 Jul 2024 05:35) (75 - 91)  BP: 127/81 (30 Jul 2024 05:35) (120/95 - 145/72)  BP(mean): --  RR: 20 (30 Jul 2024 05:35) (18 - 20)  SpO2: 96% (30 Jul 2024 05:35) (94% - 97%)    Parameters below as of 30 Jul 2024 05:35  Patient On (Oxygen Delivery Method): room air        07-29-24 @ 07:01  -  07-30-24 @ 07:00  --------------------------------------------------------  IN: 480 mL / OUT: 2450 mL / NET: -1970 mL        PHYSICAL EXAM:  Constitutional: alert, NAD  Eyes: the sclera and conjunctiva were normal.   ENT: the ears and nose were normal in appearance.   Neck: cervical collar in place  Pulmonary: no respiratory distress and lungs were clear to auscultation bilaterally.   Heart: heart rate was normal with occasional premature beats; systolic murmurs  Vascular: mild edema of L UE w/ diffuse ecchymosis   Abdomen: normal bowel sounds, soft, non-tender  Neurological: no focal deficits.   Psychiatric: the affect was normal      LABS:                        8.3    9.84  )-----------( 323      ( 30 Jul 2024 05:30 )             26.6     07-30    140  |  102  |  15  ----------------------------<  102<H>  5.4<H>   |  34<H>  |  0.91    Ca    8.9      30 Jul 2024 05:30  Phos  2.5     07-30  Mg     2.0     07-30        Urinalysis Basic - ( 30 Jul 2024 05:30 )    Color: x / Appearance: x / SG: x / pH: x  Gluc: 102 mg/dL / Ketone: x  / Bili: x / Urobili: x   Blood: x / Protein: x / Nitrite: x   Leuk Esterase: x / RBC: x / WBC x   Sq Epi: x / Non Sq Epi: x / Bacteria: x        MICROBIOLOGY:    RADIOLOGY & ADDITIONAL STUDIES:

## 2024-07-30 NOTE — CONSULT NOTE ADULT - PROBLEM SELECTOR RECOMMENDATION 3
Hx of HFpEF, previously on lasix and elizabeth but have since been discontinued due to hx of hyponatremia. TTE done this admission with EF of 65% and likely diastolic dysfunction consistent with previous diagnosis.     Plan:  - f/u cardiology Hx of HFpEF, previously on lasix and elizabeth but have since been discontinued due to hx of hyponatremia. TTE done this admission with EF of 65% and likely diastolic dysfunction consistent with previous diagnosis.     Plan:  - f/u cardiology as outpatient, follows with Dr Fernandez

## 2024-07-30 NOTE — CONSULT NOTE ADULT - PROBLEM SELECTOR RECOMMENDATION 9
I called the patient who handed the phone to her daughter Beverly Alarcon and was informed. She will try to have completed this week. 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  Vascular signed off no intervention  - consider UE elevation and ACE wraps for residual edema

## 2024-07-31 DIAGNOSIS — E87.1 HYPO-OSMOLALITY AND HYPONATREMIA: ICD-10-CM

## 2024-07-31 LAB
ANION GAP SERPL CALC-SCNC: 10 MMOL/L — SIGNIFICANT CHANGE UP (ref 5–17)
BUN SERPL-MCNC: 13 MG/DL — SIGNIFICANT CHANGE UP (ref 7–23)
CALCIUM SERPL-MCNC: 8.8 MG/DL — SIGNIFICANT CHANGE UP (ref 8.4–10.5)
CHLORIDE SERPL-SCNC: 96 MMOL/L — SIGNIFICANT CHANGE UP (ref 96–108)
CO2 SERPL-SCNC: 25 MMOL/L — SIGNIFICANT CHANGE UP (ref 22–31)
CREAT SERPL-MCNC: 0.77 MG/DL — SIGNIFICANT CHANGE UP (ref 0.5–1.3)
EGFR: 71 ML/MIN/1.73M2 — SIGNIFICANT CHANGE UP
GLUCOSE SERPL-MCNC: 95 MG/DL — SIGNIFICANT CHANGE UP (ref 70–99)
MAGNESIUM SERPL-MCNC: 1.9 MG/DL — SIGNIFICANT CHANGE UP (ref 1.6–2.6)
OSMOLALITY UR: 172 MOSM/KG — LOW (ref 300–900)
PHOSPHATE SERPL-MCNC: 2.7 MG/DL — SIGNIFICANT CHANGE UP (ref 2.5–4.5)
POTASSIUM SERPL-MCNC: 5.4 MMOL/L — HIGH (ref 3.5–5.3)
POTASSIUM SERPL-SCNC: 5.4 MMOL/L — HIGH (ref 3.5–5.3)
SODIUM SERPL-SCNC: 131 MMOL/L — LOW (ref 135–145)
SODIUM UR-SCNC: 34 MMOL/L — SIGNIFICANT CHANGE UP

## 2024-07-31 PROCEDURE — 99233 SBSQ HOSP IP/OBS HIGH 50: CPT

## 2024-07-31 RX ORDER — TRAMADOL HCL 50 MG
25 TABLET ORAL ONCE
Refills: 0 | Status: DISCONTINUED | OUTPATIENT
Start: 2024-07-31 | End: 2024-07-31

## 2024-07-31 RX ORDER — SODIUM ZIRCONIUM CYCLOSILICATE 10 G/10G
5 POWDER, FOR SUSPENSION ORAL ONCE
Refills: 0 | Status: COMPLETED | OUTPATIENT
Start: 2024-07-31 | End: 2024-07-31

## 2024-07-31 RX ADMIN — HEPARIN SODIUM 5000 UNIT(S): 1000 INJECTION, SOLUTION INTRAVENOUS; SUBCUTANEOUS at 21:30

## 2024-07-31 RX ADMIN — Medication 500 MILLIGRAM(S): at 00:28

## 2024-07-31 RX ADMIN — Medication 650 MILLIGRAM(S): at 05:37

## 2024-07-31 RX ADMIN — HEPARIN SODIUM 5000 UNIT(S): 1000 INJECTION, SOLUTION INTRAVENOUS; SUBCUTANEOUS at 11:42

## 2024-07-31 RX ADMIN — SIMETHICONE 80 MILLIGRAM(S): 125 TABLET, CHEWABLE ORAL at 15:13

## 2024-07-31 RX ADMIN — SODIUM ZIRCONIUM CYCLOSILICATE 5 GRAM(S): 10 POWDER, FOR SUSPENSION ORAL at 19:12

## 2024-07-31 RX ADMIN — Medication 1 TABLET(S): at 12:55

## 2024-07-31 RX ADMIN — Medication 650 MILLIGRAM(S): at 04:27

## 2024-07-31 RX ADMIN — Medication 30 MILLIGRAM(S): at 05:57

## 2024-07-31 RX ADMIN — SENNOSIDES 2 TABLET(S): 8.6 TABLET ORAL at 21:30

## 2024-07-31 RX ADMIN — Medication 25 MILLIGRAM(S): at 05:37

## 2024-07-31 RX ADMIN — SIMETHICONE 80 MILLIGRAM(S): 125 TABLET, CHEWABLE ORAL at 21:30

## 2024-07-31 RX ADMIN — SIMETHICONE 80 MILLIGRAM(S): 125 TABLET, CHEWABLE ORAL at 05:57

## 2024-07-31 RX ADMIN — Medication 1 PACKET(S): at 15:12

## 2024-07-31 RX ADMIN — Medication 30 MILLIGRAM(S): at 19:12

## 2024-07-31 RX ADMIN — Medication 1 DROP(S): at 01:00

## 2024-07-31 RX ADMIN — Medication 1 DROP(S): at 21:46

## 2024-07-31 RX ADMIN — Medication 25 MILLIGRAM(S): at 04:37

## 2024-07-31 RX ADMIN — Medication 500 MILLIGRAM(S): at 12:55

## 2024-07-31 RX ADMIN — Medication 81 MILLIGRAM(S): at 12:55

## 2024-07-31 RX ADMIN — Medication 17 GRAM(S): at 12:55

## 2024-07-31 NOTE — PROGRESS NOTE ADULT - PROBLEM SELECTOR PLAN 1
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  Vascular signed off no intervention  - consider LUE elevation and ACE wraps for residual edema

## 2024-07-31 NOTE — PROGRESS NOTE ADULT - SUBJECTIVE AND OBJECTIVE BOX
HPI:  89 year old female, PMH HTN, HLD, CHF, severe AS, and chronic C-2 fracture x 2 years, presented to [facility] ED with worsening weakness and difficulty ambulating since 7/14. Per home health aide, pt had a fall with +head strike on July 6th which she did not receive medical attention for. On 7/14, HHA noted pt was having more difficulty ambulating and was dropping objects and not able to feed herself as before. Pt was seen on 7/17 at [facility] and had a CT head and CT c-spine. 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 The provider at [facility] offered to pt, family, and pt's PCP, Dr. Doan who was at bedside, to call spine surgery at [facility] and all parties refused, preferring to take patient home. Pt was discharged home with her HHA. Since 7/17, pt's weakness has progressed, prompting them to come to the ED. She denies falls since 7/6. Pt came in with soft collar on, which was removed by HHA while in ED.  (19 Jul 2024 16:16)    INTERVAL EVENTS:      HOSPITAL COURSE:    Vital Signs Last 24 Hrs  T(C): 36.9 (31 Jul 2024 05:55), Max: 37.2 (30 Jul 2024 14:39)  T(F): 98.4 (31 Jul 2024 05:55), Max: 99 (30 Jul 2024 14:39)  HR: 86 (31 Jul 2024 05:55) (82 - 91)  BP: 134/78 (31 Jul 2024 05:55) (123/69 - 152/71)  BP(mean): --  RR: 18 (31 Jul 2024 05:55) (17 - 99)  SpO2: 95% (31 Jul 2024 05:55) (93% - 96%)    Parameters below as of 31 Jul 2024 05:55  Patient On (Oxygen Delivery Method): room air        I&O's Summary    29 Jul 2024 07:01  -  30 Jul 2024 07:00  --------------------------------------------------------  IN: 480 mL / OUT: 2450 mL / NET: -1970 mL        PHYSICAL EXAM:        TUBES/LINES:  [] Vega  [] Trach  [] NGT  [] PEG  [] Wound Drains  [] Others    DIET:  [] NPO  [] Mechanical  [] Tube feeds    LABS:                        8.3    9.84  )-----------( 323      ( 30 Jul 2024 05:30 )             26.6     07-30    134<L>  |  97  |  15  ----------------------------<  125<H>  4.9   |  31  |  0.76    Ca    8.5      30 Jul 2024 16:40  Phos  2.5     07-30  Mg     2.0     07-30        Urinalysis Basic - ( 30 Jul 2024 16:40 )    Color: x / Appearance: x / SG: x / pH: x  Gluc: 125 mg/dL / Ketone: x  / Bili: x / Urobili: x   Blood: x / Protein: x / Nitrite: x   Leuk Esterase: x / RBC: x / WBC x   Sq Epi: x / Non Sq Epi: x / Bacteria: x          CAPILLARY BLOOD GLUCOSE          Drug Levels: [] N/A    CSF Analysis: [] N/A      Allergies    penicillin (Unknown)  erythromycin (Unknown)  [This allergen will not trigger allergy alert] Acetic Er-Avmxhoescm-Qixjzbyvi (Other)    Intolerances      MEDICATIONS:  Antibiotics:    Neuro:  acetaminophen     Tablet .. 650 milliGRAM(s) Oral every 6 hours PRN  ALPRAZolam 0.25 milliGRAM(s) Oral at bedtime PRN  DULoxetine 30 milliGRAM(s) Oral two times a day  methocarbamol 500 milliGRAM(s) Oral every 8 hours PRN  ondansetron Injectable 4 milliGRAM(s) IV Push every 6 hours PRN    Anticoagulation:  aspirin enteric coated 81 milliGRAM(s) Oral daily  heparin   Injectable 5000 Unit(s) SubCutaneous every 12 hours    OTHER:  artificial  tears Solution 1 Drop(s) Both EYES four times a day PRN  chlorhexidine 2% Cloths 1 Application(s) Topical <User Schedule>  latanoprost 0.005% Ophthalmic Solution 1 Drop(s) Both EYES at bedtime  polyethylene glycol 3350 17 Gram(s) Oral daily  senna 2 Tablet(s) Oral at bedtime  simethicone 80 milliGRAM(s) Chew every 8 hours    IVF:  ascorbic acid 500 milliGRAM(s) Oral daily  multivitamin 1 Tablet(s) Oral daily    CULTURES:    RADIOLOGY & ADDITIONAL TESTS:      ASSESSMENT:  89y Female s/p    C2 CERVICAL FRACTURE;NECK PAIN    Allergy status to other antibiotic agents    Allergy status to penicillin    Aneurysm of unspecified site    Anterior displaced type ii dens fracture, initial encounter for closed fracture    Atherosclerotic heart disease of native coronary artery without angina pectoris    Bilateral primary osteoarthritis of knee    CAP GLAUC PSX LENS LT EYE MOD STAGE    CAPSLR GLAUCOMA W/PSEUDXF LENS, BILATERAL, MODERATE STAGE    Cervicalgia    Chronic diastolic (congestive) heart failure    Dizziness and giddiness    Hyperlipidemia, unspecified    Hypertensive heart disease with heart failure    Iliotibial band syndrome, right leg    Long term (current) use of aspirin    Nonrheumatic aortic (valve) stenosis    Old myocardial infarction    Other specific arthropathies, not elsewhere classified, left shoulder    Pain in right leg    Pain in unspecified shoulder    Personal history of other diseases of the circulatory system    Personal history of other endocrine, nutritional and metabolic disease    Presence of other heart-valve replacement    Shortness of breath    Unspecified glaucoma    Vertigo of central origin    History of tobacco use    Sex Assigned At Birth    Sex Assigned At Birth    Alcohol intake    FH: lung cancer (Father)    Handoff    MEWS Score    Prior    Essential hypertension    Hyperlipidemia, unspecified hyperlipidemia type    Diabetes insipidus    H/O aortic valve stenosis    Hypertension    Vertigo    Chronic diastolic congestive heart failure    Dyslipidemia    History of pseudoaneurysm    Glaucoma    Closed C2 fracture    Open dens fracture    Dens fracture    Dens fracture    Fusion, occipital bone with C1 and C2 vertebrae, posterior approach    C2 cervical fracture    Open dens fracture    Anterior displaced type ii dens fracture, sequela    H/O aortic valve stenosis    Hyperlipidemia, unspecified hyperlipidemia type    Hypertension    Glaucoma    Cervical spinal stenosis    Anxiety    CHF, acute    Chronic CHF    Hyperkalemia    Fusion, occipital bone with C1 and C2 vertebrae, posterior approach    History of orthopedic surgery    S/P AVR    H/O lumbosacral spine surgery    S/P hip replacement, right    S/P hip replacement    S/P right coronary artery (RCA) stent placement    NECK PAIN    2    Room Service Assist    Room Service Assist    Room Service Assist    Neck pain    H/O aortic valve stenosis    S/P hip replacement    Glaucoma    Hypertension    Diabetes insipidus    S/P AVR    [email or web address]        PLAN:  NEURO:    CARDIOVASCULAR:    PULMONARY:    RENAL:    GI:    HEME:    ID:    ENDO:    DVT PROPHYLAXIS:  [] Venodynes                                [] Heparin/Lovenox    DISPOSITION:    Assessment:  Present when checked    []  GCS  E   V  M     Heart Failure: []Acute, [] acute on chronic , []chronic  Heart Failure:  [] Diastolic (HFpEF), [] Systolic (HFrEF), []Combined (HFpEF and HFrEF), [] RHF, [] Pulm HTN, [] Other    [] KAYLYN, [] ATN, [] AIN, [] other  [] CKD1, [] CKD2, [] CKD 3, [] CKD 4, [] CKD 5, []ESRD    Encephalopathy: [] Metabolic, [] Hepatic, [] toxic, [] Neurological, [] Other    Abnormal Nurtitional Status: [] malnurtition (see nutrition note), [ ]underweight: BMI < 19, [] morbid obesity: BMI >40, [] Cachexia    [] Sepsis  [] hypovolemic shock,[] cardiogenic shock, [] hemorrhagic shock, [] neuogenic shock  [] Acute Respiratory Failure  []Cerebral edema, [] Brain compression/ herniation,   [] Functional quadriplegia  [] Acute blood loss anemia   HPI:  89 year old female, PMH HTN, HLD, CHF, severe AS, and chronic C-2 fracture x 2 years, presented to [facility] ED with worsening weakness and difficulty ambulating since 7/14. Per home health aide, pt had a fall with +head strike on July 6th which she did not receive medical attention for. On 7/14, HHA noted pt was having more difficulty ambulating and was dropping objects and not able to feed herself as before. Pt was seen on 7/17 at [facility] and had a CT head and CT c-spine. 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 The provider at [facility] offered to pt, family, and pt's PCP, Dr. Doan who was at bedside, to call spine surgery at [facility] and all parties refused, preferring to take patient home. Pt was discharged home with her HHA. Since 7/17, pt's weakness has progressed, prompting them to come to the ED. She denies falls since 7/6. Pt came in with soft collar on, which was removed by HHA while in ED.  (19 Jul 2024 16:16)    INTERVAL EVENTS:      HOSPITAL COURSE:     Vital Signs Last 24 Hrs  T(C): 36.9 (31 Jul 2024 05:55), Max: 37.2 (30 Jul 2024 14:39)  T(F): 98.4 (31 Jul 2024 05:55), Max: 99 (30 Jul 2024 14:39)  HR: 86 (31 Jul 2024 05:55) (82 - 91)  BP: 134/78 (31 Jul 2024 05:55) (123/69 - 152/71)  BP(mean): --  RR: 18 (31 Jul 2024 05:55) (17 - 99)  SpO2: 95% (31 Jul 2024 05:55) (93% - 96%)    Parameters below as of 31 Jul 2024 05:55  Patient On (Oxygen Delivery Method): room air        I&O's Summary    29 Jul 2024 07:01  -  30 Jul 2024 07:00  --------------------------------------------------------  IN: 480 mL / OUT: 2450 mL / NET: -1970 mL        PHYSICAL EXAM:        TUBES/LINES:  [] Vega  [] Trach  [] NGT  [] PEG  [] Wound Drains  [] Others    DIET:  [] NPO  [] Mechanical  [] Tube feeds    LABS:                        8.3    9.84  )-----------( 323      ( 30 Jul 2024 05:30 )             26.6     07-30    134<L>  |  97  |  15  ----------------------------<  125<H>  4.9   |  31  |  0.76    Ca    8.5      30 Jul 2024 16:40  Phos  2.5     07-30  Mg     2.0     07-30        Urinalysis Basic - ( 30 Jul 2024 16:40 )    Color: x / Appearance: x / SG: x / pH: x  Gluc: 125 mg/dL / Ketone: x  / Bili: x / Urobili: x   Blood: x / Protein: x / Nitrite: x   Leuk Esterase: x / RBC: x / WBC x   Sq Epi: x / Non Sq Epi: x / Bacteria: x          CAPILLARY BLOOD GLUCOSE          Drug Levels: [] N/A    CSF Analysis: [] N/A      Allergies    penicillin (Unknown)  erythromycin (Unknown)  [This allergen will not trigger allergy alert] Acetic Ji-Uftvcqfrfp-Qutsuaxmv (Other)    Intolerances      MEDICATIONS:  Antibiotics:    Neuro:  acetaminophen     Tablet .. 650 milliGRAM(s) Oral every 6 hours PRN  ALPRAZolam 0.25 milliGRAM(s) Oral at bedtime PRN  DULoxetine 30 milliGRAM(s) Oral two times a day  methocarbamol 500 milliGRAM(s) Oral every 8 hours PRN  ondansetron Injectable 4 milliGRAM(s) IV Push every 6 hours PRN    Anticoagulation:  aspirin enteric coated 81 milliGRAM(s) Oral daily  heparin   Injectable 5000 Unit(s) SubCutaneous every 12 hours    OTHER:  artificial  tears Solution 1 Drop(s) Both EYES four times a day PRN  chlorhexidine 2% Cloths 1 Application(s) Topical <User Schedule>  latanoprost 0.005% Ophthalmic Solution 1 Drop(s) Both EYES at bedtime  polyethylene glycol 3350 17 Gram(s) Oral daily  senna 2 Tablet(s) Oral at bedtime  simethicone 80 milliGRAM(s) Chew every 8 hours    IVF:  ascorbic acid 500 milliGRAM(s) Oral daily  multivitamin 1 Tablet(s) Oral daily    CULTURES:    RADIOLOGY & ADDITIONAL TESTS:      ASSESSMENT:  89y Female s/p    C2 CERVICAL FRACTURE;NECK PAIN    Allergy status to other antibiotic agents    Allergy status to penicillin    Aneurysm of unspecified site    Anterior displaced type ii dens fracture, initial encounter for closed fracture    Atherosclerotic heart disease of native coronary artery without angina pectoris    Bilateral primary osteoarthritis of knee    CAP GLAUC PSX LENS LT EYE MOD STAGE    CAPSLR GLAUCOMA W/PSEUDXF LENS, BILATERAL, MODERATE STAGE    Cervicalgia    Chronic diastolic (congestive) heart failure    Dizziness and giddiness    Hyperlipidemia, unspecified    Hypertensive heart disease with heart failure    Iliotibial band syndrome, right leg    Long term (current) use of aspirin    Nonrheumatic aortic (valve) stenosis    Old myocardial infarction    Other specific arthropathies, not elsewhere classified, left shoulder    Pain in right leg    Pain in unspecified shoulder    Personal history of other diseases of the circulatory system    Personal history of other endocrine, nutritional and metabolic disease    Presence of other heart-valve replacement    Shortness of breath    Unspecified glaucoma    Vertigo of central origin    History of tobacco use    Sex Assigned At Birth    Sex Assigned At Birth    Alcohol intake    FH: lung cancer (Father)    Handoff    MEWS Score    Prior    Essential hypertension    Hyperlipidemia, unspecified hyperlipidemia type    Diabetes insipidus    H/O aortic valve stenosis    Hypertension    Vertigo    Chronic diastolic congestive heart failure    Dyslipidemia    History of pseudoaneurysm    Glaucoma    Closed C2 fracture    Open dens fracture    Dens fracture    Dens fracture    Fusion, occipital bone with C1 and C2 vertebrae, posterior approach    C2 cervical fracture    Open dens fracture    Anterior displaced type ii dens fracture, sequela    H/O aortic valve stenosis    Hyperlipidemia, unspecified hyperlipidemia type    Hypertension    Glaucoma    Cervical spinal stenosis    Anxiety    CHF, acute    Chronic CHF    Hyperkalemia    Fusion, occipital bone with C1 and C2 vertebrae, posterior approach    History of orthopedic surgery    S/P AVR    H/O lumbosacral spine surgery    S/P hip replacement, right    S/P hip replacement    S/P right coronary artery (RCA) stent placement    NECK PAIN    2    Room Service Assist    Room Service Assist    Room Service Assist    Neck pain    H/O aortic valve stenosis    S/P hip replacement    Glaucoma    Hypertension    Diabetes insipidus    S/P AVR    [email or web address]        PLAN:  NEURO:    CARDIOVASCULAR:    PULMONARY:    RENAL:    GI:    HEME:    ID:    ENDO:    DVT PROPHYLAXIS:  [] Venodynes                                [] Heparin/Lovenox    DISPOSITION:    Assessment:  Present when checked    []  GCS  E   V  M     Heart Failure: []Acute, [] acute on chronic , []chronic  Heart Failure:  [] Diastolic (HFpEF), [] Systolic (HFrEF), []Combined (HFpEF and HFrEF), [] RHF, [] Pulm HTN, [] Other    [] KAYLYN, [] ATN, [] AIN, [] other  [] CKD1, [] CKD2, [] CKD 3, [] CKD 4, [] CKD 5, []ESRD    Encephalopathy: [] Metabolic, [] Hepatic, [] toxic, [] Neurological, [] Other    Abnormal Nurtitional Status: [] malnurtition (see nutrition note), [ ]underweight: BMI < 19, [] morbid obesity: BMI >40, [] Cachexia    [] Sepsis  [] hypovolemic shock,[] cardiogenic shock, [] hemorrhagic shock, [] neuogenic shock  [] Acute Respiratory Failure  []Cerebral edema, [] Brain compression/ herniation,   [] Functional quadriplegia  [] Acute blood loss anemia   HPI:  89 year old female, PMH HTN, HLD, CHF, severe AS, and chronic C-2 fracture x 2 years, presented to [facility] ED with worsening weakness and difficulty ambulating since 7/14. Per home health aide, pt had a fall with +head strike on July 6th which she did not receive medical attention for. On 7/14, HHA noted pt was having more difficulty ambulating and was dropping objects and not able to feed herself as before. Pt was seen on 7/17 at [facility] and had a CT head and CT c-spine. 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 The provider at [facility] offered to pt, family, and pt's PCP, Dr. Doan who was at bedside, to call spine surgery at [facility] and all parties refused, preferring to take patient home. Pt was discharged home with her HHA. Since 7/17, pt's weakness has progressed, prompting them to come to the ED. She denies falls since 7/6. Pt came in with soft collar on, which was removed by HHA while in ED.  (19 Jul 2024 16:16)    INTERVAL EVENTS: Pt had trouble sleeping overnight due to discomfort in neck brace. Given 1x tramadol 25mg. Otherwise no acute changes.       HOSPITAL COURSE:   7/19: admitted to [facility] for surgery with Dr. Puckett, given 15mg torodol for pain, 0.25 dilaudid for pain, 0.125 xanax for anxiety, 10 hydral for high BP.   7/20: FEDERICA, remains on precedex. Cardiology consulted for preop clearance.   7/21: FEDERICA ovn. cardiology clearance obtained. Liquid BMs x3, started on metamucil.  7/22: TTE complete. DC precedex.   7/23: FEDERICA overnight. Na+ 131 from 141. rpt 135, 500cc bolus given.   7/24: FEDERICA overnight. OR today. POD0 from O-C5 fusion, c1 lami. Post-op pulses not palpable, vasc consulted. PT and AT pulses dopplerable b/l, no DP. RUE pulses not dopplerable. Hep gtt + bolus started per vascular. duplex: R radial occlusion. SBP 80s-90s, given 500cc bolus NS and 250cc albumin. NGT placed  7/25: POD1. arterial dopplers performed, +R radial occlusion. PTT o/n >200, heparin gtt held x 4 hrs until ptt in goal. heparin gtt resumed 1000u/hr. transition propofol to precedex. (+) cuff leak. Extubated to NC by anesthesia. PTT >200, hep gtt paused. Bowel reg inc. PTT 75.6. Hep gtt restarted @ 800u.   7/26: POD2. SC o/n. Xrays complete. CT c spine complete. SQH tonight.   7/27: POD 3 occiput-C5 fusion. FEDERICA overnight. Midodrine decreased to 2.5q8.  7/28: POD 4 occiput- C5 fusion. FEDERICA overnight, neuro stable. HMV drain removed; nitro paste d/c'd. Midodrine dc'd.  7/29: POD 5 occiput-C5 fusion. FEDERICA overnight. SD status. Downtrending hgb, trend w AM labs. Biotene swish/swallow for dry mouth.   7/30: POD 6. FEDERICA overnight, neuro stable. x1 Lokelma 5 mg po given for hyperkalemia (K : 5.4)   7/31: POD7, FEDERICA overnight    Vital Signs Last 24 Hrs  T(C): 36.9 (31 Jul 2024 05:55), Max: 37.2 (30 Jul 2024 14:39)  T(F): 98.4 (31 Jul 2024 05:55), Max: 99 (30 Jul 2024 14:39)  HR: 86 (31 Jul 2024 05:55) (82 - 91)  BP: 134/78 (31 Jul 2024 05:55) (123/69 - 152/71)  BP(mean): --  RR: 18 (31 Jul 2024 05:55) (17 - 99)  SpO2: 95% (31 Jul 2024 05:55) (93% - 96%)    Parameters below as of 31 Jul 2024 05:55  Patient On (Oxygen Delivery Method): room air        I&O's Summary    29 Jul 2024 07:01  -  30 Jul 2024 07:00  --------------------------------------------------------  IN: 480 mL / OUT: 2450 mL / NET: -1970 mL        PHYSICAL EXAM:        TUBES/LINES:  [] Vega  [] Trach  [] NGT  [] PEG  [] Wound Drains  [] Others    DIET:  [] NPO  [X] Mechanical  [] Tube feeds    LABS:                        8.3    9.84  )-----------( 323      ( 30 Jul 2024 05:30 )             26.6     07-30    134<L>  |  97  |  15  ----------------------------<  125<H>  4.9   |  31  |  0.76    Ca    8.5      30 Jul 2024 16:40  Phos  2.5     07-30  Mg     2.0     07-30        Urinalysis Basic - ( 30 Jul 2024 16:40 )    Color: x / Appearance: x / SG: x / pH: x  Gluc: 125 mg/dL / Ketone: x  / Bili: x / Urobili: x   Blood: x / Protein: x / Nitrite: x   Leuk Esterase: x / RBC: x / WBC x   Sq Epi: x / Non Sq Epi: x / Bacteria: x          CAPILLARY BLOOD GLUCOSE          Drug Levels: [] N/A    CSF Analysis: [] N/A      Allergies    penicillin (Unknown)  erythromycin (Unknown)  [This allergen will not trigger allergy alert] Acetic Xk-Nahllzqeye-Yiqzcnzfn (Other)    Intolerances      MEDICATIONS:  Antibiotics:    Neuro:  acetaminophen     Tablet .. 650 milliGRAM(s) Oral every 6 hours PRN  ALPRAZolam 0.25 milliGRAM(s) Oral at bedtime PRN  DULoxetine 30 milliGRAM(s) Oral two times a day  methocarbamol 500 milliGRAM(s) Oral every 8 hours PRN  ondansetron Injectable 4 milliGRAM(s) IV Push every 6 hours PRN    Anticoagulation:  aspirin enteric coated 81 milliGRAM(s) Oral daily  heparin   Injectable 5000 Unit(s) SubCutaneous every 12 hours    OTHER:  artificial  tears Solution 1 Drop(s) Both EYES four times a day PRN  chlorhexidine 2% Cloths 1 Application(s) Topical <User Schedule>  latanoprost 0.005% Ophthalmic Solution 1 Drop(s) Both EYES at bedtime  polyethylene glycol 3350 17 Gram(s) Oral daily  senna 2 Tablet(s) Oral at bedtime  simethicone 80 milliGRAM(s) Chew every 8 hours    IVF:  ascorbic acid 500 milliGRAM(s) Oral daily  multivitamin 1 Tablet(s) Oral daily    CULTURES:    RADIOLOGY & ADDITIONAL TESTS:      ASSESSMENT:  89y Female s/p    C2 CERVICAL FRACTURE;NECK PAIN    Allergy status to other antibiotic agents    Allergy status to penicillin    Aneurysm of unspecified site    Anterior displaced type ii dens fracture, initial encounter for closed fracture    Atherosclerotic heart disease of native coronary artery without angina pectoris    Bilateral primary osteoarthritis of knee    CAP GLAUC PSX LENS LT EYE MOD STAGE    CAPSLR GLAUCOMA W/PSEUDXF LENS, BILATERAL, MODERATE STAGE    Cervicalgia    Chronic diastolic (congestive) heart failure    Dizziness and giddiness    Hyperlipidemia, unspecified    Hypertensive heart disease with heart failure    Iliotibial band syndrome, right leg    Long term (current) use of aspirin    Nonrheumatic aortic (valve) stenosis    Old myocardial infarction    Other specific arthropathies, not elsewhere classified, left shoulder    Pain in right leg    Pain in unspecified shoulder    Personal history of other diseases of the circulatory system    Personal history of other endocrine, nutritional and metabolic disease    Presence of other heart-valve replacement    Shortness of breath    Unspecified glaucoma    Vertigo of central origin    History of tobacco use    Sex Assigned At Birth    Sex Assigned At Birth    Alcohol intake    FH: lung cancer (Father)    Handoff    MEWS Score    Prior    Essential hypertension    Hyperlipidemia, unspecified hyperlipidemia type    Diabetes insipidus    H/O aortic valve stenosis    Hypertension    Vertigo    Chronic diastolic congestive heart failure    Dyslipidemia    History of pseudoaneurysm    Glaucoma    Closed C2 fracture    Open dens fracture    Dens fracture    Dens fracture    Fusion, occipital bone with C1 and C2 vertebrae, posterior approach    C2 cervical fracture    Open dens fracture    Anterior displaced type ii dens fracture, sequela    H/O aortic valve stenosis    Hyperlipidemia, unspecified hyperlipidemia type    Hypertension    Glaucoma    Cervical spinal stenosis    Anxiety    CHF, acute    Chronic CHF    Hyperkalemia    Fusion, occipital bone with C1 and C2 vertebrae, posterior approach    History of orthopedic surgery    S/P AVR    H/O lumbosacral spine surgery    S/P hip replacement, right    S/P hip replacement    S/P right coronary artery (RCA) stent placement    NECK PAIN    2    Room Service Assist    Room Service Assist    Room Service Assist    Neck pain    H/O aortic valve stenosis    S/P hip replacement    Glaucoma    Hypertension    Diabetes insipidus    S/P AVR    [email or web address]        PLAN:  NEURO:  -neuro/vitals q4  -protected sleep time [id number]  -hard collar at all times  -pain control: tylenol PRN   Anxiety: home Xanax 0/25mg qhs prn, cymbalta 30mg  - deep HMV dc'd 7/28   - postop XR and CT C-spine complete    CARDIOVASCULAR:  - SBP <160  - hx CHF: TTE 7/22 EF 67% TAVR, mod MR, mod MS, mild mod TR, pulm HTN  - home ASA 81mg     PULMONARY:  - RA    RENAL:    GI:  - regular diet  - bowel reg, mirilax daily, senna at bedtime pm, simethicone q8, metamucil  - LBM 7/30     HEME:  - DVT ppx: SCDs, SQH 5000u q12  - arterial dopplers 7/24: R mid-radial occlusion, no other occlusions in LUE b/l legs  - b/l UE duplex for swelling negative 7/28    -bruising on L arm- ace wrap, elevate, ice packs, remove IV    ID:  - afebrile    ENDO:  - K+ 5.4 (7/31), 5mg lokelma   - Na 131 (7/31), hx of DI, send Urine sodium and osmolality to rule out DI  - A1c 5.3, ISS      DVT PROPHYLAXIS:  [] Venodynes                                [] Heparin/Lovenox    DISPOSITION: SDU, full code, PT rec AR if still two person assist    Assessment:  Present when checked    []  GCS  E   V  M     Heart Failure: []Acute, [] acute on chronic , []chronic  Heart Failure:  [] Diastolic (HFpEF), [] Systolic (HFrEF), []Combined (HFpEF and HFrEF), [] RHF, [] Pulm HTN, [] Other    [] KAYLYN, [] ATN, [] AIN, [] other  [] CKD1, [] CKD2, [] CKD 3, [] CKD 4, [] CKD 5, []ESRD    Encephalopathy: [] Metabolic, [] Hepatic, [] toxic, [] Neurological, [] Other    Abnormal Nurtitional Status: [] malnurtition (see nutrition note), [ ]underweight: BMI < 19, [] morbid obesity: BMI >40, [] Cachexia    [] Sepsis  [] hypovolemic shock,[] cardiogenic shock, [] hemorrhagic shock, [] neuogenic shock  [] Acute Respiratory Failure  []Cerebral edema, [] Brain compression/ herniation,   [] Functional quadriplegia  [] Acute blood loss anemia   HPI:  89 year old female, PMH HTN, HLD, CHF, severe AS, and chronic C-2 fracture x 2 years, presented to [facility] ED with worsening weakness and difficulty ambulating since 7/14. Per home health aide, pt had a fall with +head strike on July 6th which she did not receive medical attention for. On 7/14, HHA noted pt was having more difficulty ambulating and was dropping objects and not able to feed herself as before. Pt was seen on 7/17 at [facility] and had a CT head and CT c-spine. 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 The provider at [facility] offered to pt, family, and pt's PCP, Dr. Doan who was at bedside, to call spine surgery at [facility] and all parties refused, preferring to take patient home. Pt was discharged home with her HHA. Since 7/17, pt's weakness has progressed, prompting them to come to the ED. She denies falls since 7/6. Pt came in with soft collar on, which was removed by HHA while in ED.  (19 Jul 2024 16:16)    INTERVAL EVENTS: Pt had trouble sleeping overnight due to discomfort in neck brace. Given 1x tramadol 25mg. Otherwise no acute changes.       HOSPITAL COURSE:   7/19: admitted to [facility] for surgery with Dr. Puckett, given 15mg torodol for pain, 0.25 dilaudid for pain, 0.125 xanax for anxiety, 10 hydral for high BP.   7/20: FEDERICA, remains on precedex. Cardiology consulted for preop clearance.   7/21: FEDERICA ovn. cardiology clearance obtained. Liquid BMs x3, started on metamucil.  7/22: TTE complete. DC precedex.   7/23: FEDERICA overnight. Na+ 131 from 141. rpt 135, 500cc bolus given.   7/24: FEDERICA overnight. OR today. POD0 from O-C5 fusion, c1 lami. Post-op pulses not palpable, vasc consulted. PT and AT pulses dopplerable b/l, no DP. RUE pulses not dopplerable. Hep gtt + bolus started per vascular. duplex: R radial occlusion. SBP 80s-90s, given 500cc bolus NS and 250cc albumin. NGT placed  7/25: POD1. arterial dopplers performed, +R radial occlusion. PTT o/n >200, heparin gtt held x 4 hrs until ptt in goal. heparin gtt resumed 1000u/hr. transition propofol to precedex. (+) cuff leak. Extubated to NC by anesthesia. PTT >200, hep gtt paused. Bowel reg inc. PTT 75.6. Hep gtt restarted @ 800u.   7/26: POD2. SC o/n. Xrays complete. CT c spine complete. SQH tonight.   7/27: POD 3 occiput-C5 fusion. FEDERICA overnight. Midodrine decreased to 2.5q8.  7/28: POD 4 occiput- C5 fusion. FEDERICA overnight, neuro stable. HMV drain removed; nitro paste d/c'd. Midodrine dc'd.  7/29: POD 5 occiput-C5 fusion. FEDERICA overnight. SD status. Downtrending hgb, trend w AM labs. Biotene swish/swallow for dry mouth.   7/30: POD 6. FEDERICA overnight, neuro stable. x1 Lokelma 5 mg po given for hyperkalemia (K : 5.4)   7/31: POD7, FEDERICA overnight    Vital Signs Last 24 Hrs  T(C): 36.9 (31 Jul 2024 05:55), Max: 37.2 (30 Jul 2024 14:39)  T(F): 98.4 (31 Jul 2024 05:55), Max: 99 (30 Jul 2024 14:39)  HR: 86 (31 Jul 2024 05:55) (82 - 91)  BP: 134/78 (31 Jul 2024 05:55) (123/69 - 152/71)  BP(mean): --  RR: 18 (31 Jul 2024 05:55) (17 - 99)  SpO2: 95% (31 Jul 2024 05:55) (93% - 96%)    Parameters below as of 31 Jul 2024 05:55  Patient On (Oxygen Delivery Method): room air        I&O's Summary    29 Jul 2024 07:01  -  30 Jul 2024 07:00  --------------------------------------------------------  IN: 480 mL / OUT: 2450 mL / NET: -1970 mL        PHYSICAL EXAM:  General: NAD, pt is comfortably sitting up in bed, (+) cervical collar, on room air  HEENT: EOMI b/l, face symmetric  Cardiovascular: RRR  Respiratory: breathing non-labored on RA  GI: abd soft, NTND   Neuro: A&Ox3, No aphasia, speech clear, no dysmetria, no pronator drift. Follows commands.  PISANO x4 spontaneously, RUE 5/5, LUE 4+/5, b/l LE 5/5. Sensation intact  Extremities: LUE swelling   Wound/incision: cervical incision with aquacel, c/d/i   Drain: n/a      TUBES/LINES:  [] Vega  [] Trach  [] NGT  [] PEG  [] Wound Drains  [] Others    DIET:  [] NPO  [X] Mechanical  [] Tube feeds    LABS:                        8.3    9.84  )-----------( 323      ( 30 Jul 2024 05:30 )             26.6     07-30    134<L>  |  97  |  15  ----------------------------<  125<H>  4.9   |  31  |  0.76    Ca    8.5      30 Jul 2024 16:40  Phos  2.5     07-30  Mg     2.0     07-30        Urinalysis Basic - ( 30 Jul 2024 16:40 )    Color: x / Appearance: x / SG: x / pH: x  Gluc: 125 mg/dL / Ketone: x  / Bili: x / Urobili: x   Blood: x / Protein: x / Nitrite: x   Leuk Esterase: x / RBC: x / WBC x   Sq Epi: x / Non Sq Epi: x / Bacteria: x          CAPILLARY BLOOD GLUCOSE          Drug Levels: [] N/A    CSF Analysis: [] N/A      Allergies    penicillin (Unknown)  erythromycin (Unknown)  [This allergen will not trigger allergy alert] Acetic Bc-Qstvhofyrc-Yrcgeyvlm (Other)    Intolerances      MEDICATIONS:  Antibiotics:    Neuro:  acetaminophen     Tablet .. 650 milliGRAM(s) Oral every 6 hours PRN  ALPRAZolam 0.25 milliGRAM(s) Oral at bedtime PRN  DULoxetine 30 milliGRAM(s) Oral two times a day  methocarbamol 500 milliGRAM(s) Oral every 8 hours PRN  ondansetron Injectable 4 milliGRAM(s) IV Push every 6 hours PRN    Anticoagulation:  aspirin enteric coated 81 milliGRAM(s) Oral daily  heparin   Injectable 5000 Unit(s) SubCutaneous every 12 hours    OTHER:  artificial  tears Solution 1 Drop(s) Both EYES four times a day PRN  chlorhexidine 2% Cloths 1 Application(s) Topical <User Schedule>  latanoprost 0.005% Ophthalmic Solution 1 Drop(s) Both EYES at bedtime  polyethylene glycol 3350 17 Gram(s) Oral daily  senna 2 Tablet(s) Oral at bedtime  simethicone 80 milliGRAM(s) Chew every 8 hours    IVF:  ascorbic acid 500 milliGRAM(s) Oral daily  multivitamin 1 Tablet(s) Oral daily    CULTURES:    RADIOLOGY & ADDITIONAL TESTS:      ASSESSMENT:  89y Female s/p    C2 CERVICAL FRACTURE;NECK PAIN    Allergy status to other antibiotic agents    Allergy status to penicillin    Aneurysm of unspecified site    Anterior displaced type ii dens fracture, initial encounter for closed fracture    Atherosclerotic heart disease of native coronary artery without angina pectoris    Bilateral primary osteoarthritis of knee    CAP GLAUC PSX LENS LT EYE MOD STAGE    CAPSLR GLAUCOMA W/PSEUDXF LENS, BILATERAL, MODERATE STAGE    Cervicalgia    Chronic diastolic (congestive) heart failure    Dizziness and giddiness    Hyperlipidemia, unspecified    Hypertensive heart disease with heart failure    Iliotibial band syndrome, right leg    Long term (current) use of aspirin    Nonrheumatic aortic (valve) stenosis    Old myocardial infarction    Other specific arthropathies, not elsewhere classified, left shoulder    Pain in right leg    Pain in unspecified shoulder    Personal history of other diseases of the circulatory system    Personal history of other endocrine, nutritional and metabolic disease    Presence of other heart-valve replacement    Shortness of breath    Unspecified glaucoma    Vertigo of central origin    History of tobacco use    Sex Assigned At Birth    Sex Assigned At Birth    Alcohol intake    FH: lung cancer (Father)    Handoff    MEWS Score    Prior    Essential hypertension    Hyperlipidemia, unspecified hyperlipidemia type    Diabetes insipidus    H/O aortic valve stenosis    Hypertension    Vertigo    Chronic diastolic congestive heart failure    Dyslipidemia    History of pseudoaneurysm    Glaucoma    Closed C2 fracture    Open dens fracture    Dens fracture    Dens fracture    Fusion, occipital bone with C1 and C2 vertebrae, posterior approach    C2 cervical fracture    Open dens fracture    Anterior displaced type ii dens fracture, sequela    H/O aortic valve stenosis    Hyperlipidemia, unspecified hyperlipidemia type    Hypertension    Glaucoma    Cervical spinal stenosis    Anxiety    CHF, acute    Chronic CHF    Hyperkalemia    Fusion, occipital bone with C1 and C2 vertebrae, posterior approach    History of orthopedic surgery    S/P AVR    H/O lumbosacral spine surgery    S/P hip replacement, right    S/P hip replacement    S/P right coronary artery (RCA) stent placement    NECK PAIN    2    Room Service Assist    Room Service Assist    Room Service Assist    Neck pain    H/O aortic valve stenosis    S/P hip replacement    Glaucoma    Hypertension    Diabetes insipidus    S/P AVR    [email or web address]        PLAN:  NEURO:  -neuro/vitals q4  -protected sleep time [id number]  -hard collar at all times  -pain control: tylenol PRN   Anxiety: home Xanax 0/25mg qhs prn, cymbalta 30mg  - deep HMV dc'd 7/28   - postop XR and CT C-spine complete    CARDIOVASCULAR:  - SBP <160  - hx CHF: TTE 7/22 EF 67% TAVR, mod MR, mod MS, mild mod TR, pulm HTN  - home ASA 81mg     PULMONARY:  - RA    RENAL:  - voiding, SC prn  - K+ 5.4 (7/31), 5mg lokelma   - Na 131 (7/31), hx of DI, send Urine sodium and osmolality to rule out DI    GI:  - regular diet  - bowel reg, mirilax daily, senna at bedtime pm, simethicone q8, metamucil  - LBM 7/30     HEME:  - DVT ppx: SCDs, SQH 5000u q12  - arterial dopplers 7/24: R mid-radial occlusion, no other occlusions in LUE b/l legs  - b/l UE duplex for swelling negative 7/28    -bruising on L arm- ace wrap, elevate, ice packs, remove IV    ID:  - afebrile    ENDO:  - A1c 5.3, ISS      DVT PROPHYLAXIS:  [] Venodynes                                [] Heparin/Lovenox    DISPOSITION: SDU, full code, PT rec AR if still two person assist    Assessment:  Present when checked    []  GCS  E   V  M     Heart Failure: []Acute, [] acute on chronic , []chronic  Heart Failure:  [] Diastolic (HFpEF), [] Systolic (HFrEF), []Combined (HFpEF and HFrEF), [] RHF, [] Pulm HTN, [] Other    [] KAYLYN, [] ATN, [] AIN, [] other  [] CKD1, [] CKD2, [] CKD 3, [] CKD 4, [] CKD 5, []ESRD    Encephalopathy: [] Metabolic, [] Hepatic, [] toxic, [] Neurological, [] Other    Abnormal Nurtitional Status: [] malnurtition (see nutrition note), [ ]underweight: BMI < 19, [] morbid obesity: BMI >40, [] Cachexia    [] Sepsis  [] hypovolemic shock,[] cardiogenic shock, [] hemorrhagic shock, [] neuogenic shock  [] Acute Respiratory Failure  []Cerebral edema, [] Brain compression/ herniation,   [] Functional quadriplegia  [] Acute blood loss anemia

## 2024-07-31 NOTE — PROGRESS NOTE ADULT - PROBLEM SELECTOR PLAN 7
K of 5.4 on 07/30 that responded to lokelma 5mg - dropped to 4.9 in evening. K back up to 5.4 on 7/31.    Plan:  - give Lokelma 5mg today

## 2024-07-31 NOTE — PROGRESS NOTE ADULT - PROBLEM SELECTOR PLAN 2
s/p TAVR. TTE this admission with normal biventricular function, moderate MR, moderate MS, mild-moderate TR, and TAVR with AV gradients unchanged from prior. Valvular disease stable from prior as well. Cardiology following    Plan:  - no further intervention, follow up outpatient  - c/w Aspirin 81 mg qd.

## 2024-07-31 NOTE — PROGRESS NOTE ADULT - ASSESSMENT
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

## 2024-07-31 NOTE — PROGRESS NOTE ADULT - PROBLEM SELECTOR PLAN 6
Patient w/ hx of hyponatremia, follows with nephrology (Dr. Bowling) outpatient. Sodium has been relatively stable throughout admission with acute drop to 131 in AM today. No neurological changes    Plan:  - f/u urine lytes  - strict I/O's Patient w/ hx of hyponatremia, follows with nephrology (Dr. Bowling) outpatient. Sodium has been relatively stable throughout admission with acute drop to 131 in AM today. No neurological changes    Plan:  - f/u urine lytes  - encourage PO intake  - strict I/O's  - trend BMP daily

## 2024-07-31 NOTE — PROGRESS NOTE ADULT - SUBJECTIVE AND OBJECTIVE BOX
COLLETTE OROZCO, 89y, Female  MRN-[id number]  Patient is a 89y old  Female who presents with a chief complaint of Chronic C2 fracture (31 Jul 2024 06:41)      OVERNIGHT EVENTS: FEDERICA    SUBJECTIVE: Patient assessed at bedside, denies any acute complaints. Continues to have swelling in LUE without pain. Denies chest pain, SOB, abdominal pain, dysuria, constipation.     12 Point ROS Negative unless noted otherwise above.  -------------------------------------------------------------------------------  VITAL SIGNS:  Vital Signs Last 24 Hrs  T(C): 37.1 (31 Jul 2024 09:30), Max: 37.2 (30 Jul 2024 14:39)  T(F): 98.7 (31 Jul 2024 09:30), Max: 99 (30 Jul 2024 14:39)  HR: 88 (31 Jul 2024 09:30) (82 - 91)  BP: 132/88 (31 Jul 2024 09:30) (123/69 - 152/71)  BP(mean): --  RR: 18 (31 Jul 2024 09:30) (17 - 99)  SpO2: 93% (31 Jul 2024 09:30) (93% - 96%)    Parameters below as of 31 Jul 2024 09:30  Patient On (Oxygen Delivery Method): room air      I&O's Summary    30 Jul 2024 07:01  -  31 Jul 2024 07:00  --------------------------------------------------------  IN: 0 mL / OUT: 1700 mL / NET: -1700 mL        PHYSICAL EXAM:    Constitutional: alert, NAD  Eyes: the sclera and conjunctiva were normal.   ENT: the ears and nose were normal in appearance.   Neck: cervical collar in place  Pulmonary: no respiratory distress and lungs were clear to auscultation bilaterally.   Heart: normal S1/S2; systolic murmurs  Vascular: edema of LUE w/ diffuse ecchymosis   Abdomen: normal bowel sounds, soft, non-tender  Neurological: no focal deficits.   Psychiatric: the affect was normal    ALLERGIES:  Allergies    penicillin (Unknown)  erythromycin (Unknown)  [This allergen will not trigger allergy alert] Acetic Qj-Oycbonowsl-Frqyslkug (Other)    Intolerances        MEDICATIONS:  MEDICATIONS  (STANDING):  ascorbic acid 500 milliGRAM(s) Oral daily  aspirin enteric coated 81 milliGRAM(s) Oral daily  chlorhexidine 2% Cloths 1 Application(s) Topical <User Schedule>  DULoxetine 30 milliGRAM(s) Oral two times a day  heparin   Injectable 5000 Unit(s) SubCutaneous every 12 hours  latanoprost 0.005% Ophthalmic Solution 1 Drop(s) Both EYES at bedtime  multivitamin 1 Tablet(s) Oral daily  polyethylene glycol 3350 17 Gram(s) Oral daily  psyllium Powder 1 Packet(s) Oral daily  senna 2 Tablet(s) Oral at bedtime  simethicone 80 milliGRAM(s) Chew every 8 hours  sodium zirconium cyclosilicate 5 Gram(s) Oral once    MEDICATIONS  (PRN):  acetaminophen     Tablet .. 650 milliGRAM(s) Oral every 6 hours PRN Temp greater or equal to 38C (100.4F), Mild Pain (1 - 3)  ALPRAZolam 0.25 milliGRAM(s) Oral at bedtime PRN anxiety  artificial  tears Solution 1 Drop(s) Both EYES four times a day PRN Dry Eyes  methocarbamol 500 milliGRAM(s) Oral every 8 hours PRN Muscle Spasm  ondansetron Injectable 4 milliGRAM(s) IV Push every 6 hours PRN Nausea and/or Vomiting      -------------------------------------------------------------------------------  LABS:                        8.3    9.84  )-----------( 323      ( 30 Jul 2024 05:30 )             26.6     07-31    131<L>  |  96  |  13  ----------------------------<  95  5.4<H>   |  25  |  0.77    Ca    8.8      31 Jul 2024 05:30  Phos  2.7     07-31  Mg     1.9     07-31          Urinalysis Basic - ( 31 Jul 2024 05:30 )    Color: x / Appearance: x / SG: x / pH: x  Gluc: 95 mg/dL / Ketone: x  / Bili: x / Urobili: x   Blood: x / Protein: x / Nitrite: x   Leuk Esterase: x / RBC: x / WBC x   Sq Epi: x / Non Sq Epi: x / Bacteria: x      CAPILLARY BLOOD GLUCOSE              RADIOLOGY & ADDITIONAL TESTS: Reviewed.

## 2024-08-01 LAB
ANION GAP SERPL CALC-SCNC: 6 MMOL/L — SIGNIFICANT CHANGE UP (ref 5–17)
BUN SERPL-MCNC: 15 MG/DL — SIGNIFICANT CHANGE UP (ref 7–23)
CALCIUM SERPL-MCNC: 8.9 MG/DL — SIGNIFICANT CHANGE UP (ref 8.4–10.5)
CHLORIDE SERPL-SCNC: 94 MMOL/L — LOW (ref 96–108)
CO2 SERPL-SCNC: 34 MMOL/L — HIGH (ref 22–31)
CREAT SERPL-MCNC: 0.83 MG/DL — SIGNIFICANT CHANGE UP (ref 0.5–1.3)
EGFR: 65 ML/MIN/1.73M2 — SIGNIFICANT CHANGE UP
GLUCOSE SERPL-MCNC: 116 MG/DL — HIGH (ref 70–99)
HCT VFR BLD CALC: 25.6 % — LOW (ref 34.5–45)
HCT VFR BLD CALC: 26.1 % — LOW (ref 34.5–45)
HGB BLD-MCNC: 7.8 G/DL — LOW (ref 11.5–15.5)
HGB BLD-MCNC: 8 G/DL — LOW (ref 11.5–15.5)
MAGNESIUM SERPL-MCNC: 1.9 MG/DL — SIGNIFICANT CHANGE UP (ref 1.6–2.6)
MCHC RBC-ENTMCNC: 29.3 PG — SIGNIFICANT CHANGE UP (ref 27–34)
MCHC RBC-ENTMCNC: 29.9 GM/DL — LOW (ref 32–36)
MCHC RBC-ENTMCNC: 31 PG — SIGNIFICANT CHANGE UP (ref 27–34)
MCHC RBC-ENTMCNC: 31.3 GM/DL — LOW (ref 32–36)
MCV RBC AUTO: 98.1 FL — SIGNIFICANT CHANGE UP (ref 80–100)
MCV RBC AUTO: 99.2 FL — SIGNIFICANT CHANGE UP (ref 80–100)
NRBC # BLD: 0 /100 WBCS — SIGNIFICANT CHANGE UP (ref 0–0)
NRBC # BLD: 0 /100 WBCS — SIGNIFICANT CHANGE UP (ref 0–0)
PHOSPHATE SERPL-MCNC: 3 MG/DL — SIGNIFICANT CHANGE UP (ref 2.5–4.5)
PLATELET # BLD AUTO: 265 K/UL — SIGNIFICANT CHANGE UP (ref 150–400)
PLATELET # BLD AUTO: 419 K/UL — HIGH (ref 150–400)
POTASSIUM SERPL-MCNC: 4.4 MMOL/L — SIGNIFICANT CHANGE UP (ref 3.5–5.3)
POTASSIUM SERPL-SCNC: 4.4 MMOL/L — SIGNIFICANT CHANGE UP (ref 3.5–5.3)
RBC # BLD: 2.58 M/UL — LOW (ref 3.8–5.2)
RBC # BLD: 2.66 M/UL — LOW (ref 3.8–5.2)
RBC # FLD: 15.8 % — HIGH (ref 10.3–14.5)
RBC # FLD: 15.9 % — HIGH (ref 10.3–14.5)
SARS-COV-2 RNA SPEC QL NAA+PROBE: SIGNIFICANT CHANGE UP
SODIUM SERPL-SCNC: 134 MMOL/L — LOW (ref 135–145)
WBC # BLD: 11.56 K/UL — HIGH (ref 3.8–10.5)
WBC # BLD: 11.69 K/UL — HIGH (ref 3.8–10.5)
WBC # FLD AUTO: 11.56 K/UL — HIGH (ref 3.8–10.5)
WBC # FLD AUTO: 11.69 K/UL — HIGH (ref 3.8–10.5)

## 2024-08-01 PROCEDURE — 99233 SBSQ HOSP IP/OBS HIGH 50: CPT

## 2024-08-01 RX ORDER — ACETAMINOPHEN 500 MG
2 TABLET ORAL
Qty: 0 | Refills: 0 | DISCHARGE
Start: 2024-08-01

## 2024-08-01 RX ORDER — SIMETHICONE 125 MG/1
1 TABLET, CHEWABLE ORAL
Qty: 0 | Refills: 0 | DISCHARGE
Start: 2024-08-01

## 2024-08-01 RX ORDER — METHOCARBAMOL 500 MG
1 TABLET ORAL
Qty: 0 | Refills: 0 | DISCHARGE
Start: 2024-08-01

## 2024-08-01 RX ORDER — LORATADINE 10 MG
17 TABLET,DISINTEGRATING ORAL DAILY
Refills: 0 | Status: DISCONTINUED | OUTPATIENT
Start: 2024-08-02 | End: 2024-08-02

## 2024-08-01 RX ORDER — BENZOCAINE AND MENTHOL 5; 1 G/100ML; G/100ML
1 LIQUID ORAL
Refills: 0 | Status: DISCONTINUED | OUTPATIENT
Start: 2024-08-01 | End: 2024-08-02

## 2024-08-01 RX ORDER — SENNOSIDES 8.6 MG/1
2 TABLET ORAL
Qty: 0 | Refills: 0 | DISCHARGE
Start: 2024-08-01

## 2024-08-01 RX ORDER — LORATADINE 10 MG
17 TABLET,DISINTEGRATING ORAL
Qty: 238 | Refills: 0
Start: 2024-08-01 | End: 2024-08-14

## 2024-08-01 RX ORDER — CALAMINE 8% AND ZINC OXIDE 8% 160 MG/ML
1 LOTION TOPICAL EVERY 6 HOURS
Refills: 0 | Status: DISCONTINUED | OUTPATIENT
Start: 2024-08-01 | End: 2024-08-02

## 2024-08-01 RX ORDER — HEPARIN SODIUM 1000 [USP'U]/ML
5000 INJECTION, SOLUTION INTRAVENOUS; SUBCUTANEOUS
Qty: 0 | Refills: 0 | DISCHARGE
Start: 2024-08-01

## 2024-08-01 RX ADMIN — HEPARIN SODIUM 5000 UNIT(S): 1000 INJECTION, SOLUTION INTRAVENOUS; SUBCUTANEOUS at 21:51

## 2024-08-01 RX ADMIN — SIMETHICONE 80 MILLIGRAM(S): 125 TABLET, CHEWABLE ORAL at 15:11

## 2024-08-01 RX ADMIN — Medication 1 PACKET(S): at 11:05

## 2024-08-01 RX ADMIN — Medication 500 MILLIGRAM(S): at 11:02

## 2024-08-01 RX ADMIN — Medication 1 DROP(S): at 21:52

## 2024-08-01 RX ADMIN — CHLORHEXIDINE GLUCONATE 1 APPLICATION(S): 500 CLOTH TOPICAL at 05:11

## 2024-08-01 RX ADMIN — Medication 81 MILLIGRAM(S): at 11:03

## 2024-08-01 RX ADMIN — Medication 17 GRAM(S): at 15:12

## 2024-08-01 RX ADMIN — Medication 30 MILLIGRAM(S): at 05:11

## 2024-08-01 RX ADMIN — SIMETHICONE 80 MILLIGRAM(S): 125 TABLET, CHEWABLE ORAL at 21:50

## 2024-08-01 RX ADMIN — Medication 1 TABLET(S): at 11:03

## 2024-08-01 RX ADMIN — CALAMINE 8% AND ZINC OXIDE 8% 1 APPLICATION(S): 160 LOTION TOPICAL at 23:44

## 2024-08-01 RX ADMIN — HEPARIN SODIUM 5000 UNIT(S): 1000 INJECTION, SOLUTION INTRAVENOUS; SUBCUTANEOUS at 11:04

## 2024-08-01 RX ADMIN — Medication 0.25 MILLIGRAM(S): at 02:21

## 2024-08-01 RX ADMIN — SIMETHICONE 80 MILLIGRAM(S): 125 TABLET, CHEWABLE ORAL at 05:11

## 2024-08-01 RX ADMIN — Medication 30 MILLIGRAM(S): at 17:07

## 2024-08-01 NOTE — PROGRESS NOTE ADULT - PROBLEM SELECTOR PLAN 1
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  Vascular signed off no intervention  - c/w LUE elevation and ACE wraps for residual edema

## 2024-08-01 NOTE — PROGRESS NOTE ADULT - PROBLEM SELECTOR PLAN 6
Patient reports blood pressures have been on the lower end as of late. Previously on HF meds which have been discontinued. Blood pressures wnl this admission    Plan:  - continue to monitor.

## 2024-08-01 NOTE — PROGRESS NOTE ADULT - SUBJECTIVE AND OBJECTIVE BOX
COLLETTE OROZCO, 89y, Female  MRN-[id number]  Patient is a 89y old  Female who presents with a chief complaint of Chronic C2 fracture (31 Jul 2024 10:55)      OVERNIGHT EVENTS: FEDERICA    SUBJECTIVE: Patient assessed at bedside, denies any new complaints. Improvement in LUE swelling post ACE wrapping. Denies fevers, chills. chest pain, dyspnea, abdominal pain.    12 Point ROS Negative unless noted otherwise above.  -------------------------------------------------------------------------------  VITAL SIGNS:  Vital Signs Last 24 Hrs  T(C): 36.9 (01 Aug 2024 04:26), Max: 37.1 (31 Jul 2024 13:30)  T(F): 98.5 (01 Aug 2024 04:26), Max: 98.8 (31 Jul 2024 13:30)  HR: 84 (01 Aug 2024 08:35) (83 - 94)  BP: 144/78 (01 Aug 2024 08:35) (121/68 - 151/80)  BP(mean): 104 (01 Aug 2024 08:35) (104 - 104)  RR: 16 (01 Aug 2024 08:35) (16 - 20)  SpO2: 98% (01 Aug 2024 08:35) (94% - 98%)    Parameters below as of 01 Aug 2024 08:35  Patient On (Oxygen Delivery Method): room air      I&O's Summary    31 Jul 2024 07:01  -  01 Aug 2024 07:00  --------------------------------------------------------  IN: 0 mL / OUT: 2750 mL / NET: -2750 mL        PHYSICAL EXAM:    Constitutional: alert, NAD  Eyes: the sclera and conjunctiva were normal.   ENT: the ears and nose were normal in appearance.   Neck: cervical collar in place  Pulmonary: no respiratory distress and lungs were clear to auscultation bilaterally.   Heart: normal S1/S2; systolic murmurs  Vascular: diffuse ecchymosis BL UE w/ improved edema in LUE  Abdomen: normal bowel sounds, soft, non-tender  Neurological: no focal deficits.   Psychiatric: the affect was normal    ALLERGIES:  Allergies    penicillin (Unknown)  erythromycin (Unknown)  [This allergen will not trigger allergy alert] Acetic Ik-Hbnmrljeeb-Dgkerkriq (Other)    Intolerances        MEDICATIONS:  MEDICATIONS  (STANDING):  ascorbic acid 500 milliGRAM(s) Oral daily  aspirin enteric coated 81 milliGRAM(s) Oral daily  chlorhexidine 2% Cloths 1 Application(s) Topical <User Schedule>  DULoxetine 30 milliGRAM(s) Oral two times a day  heparin   Injectable 5000 Unit(s) SubCutaneous every 12 hours  latanoprost 0.005% Ophthalmic Solution 1 Drop(s) Both EYES at bedtime  multivitamin 1 Tablet(s) Oral daily  polyethylene glycol 3350 17 Gram(s) Oral daily  psyllium Powder 1 Packet(s) Oral daily  senna 2 Tablet(s) Oral at bedtime  simethicone 80 milliGRAM(s) Chew every 8 hours    MEDICATIONS  (PRN):  acetaminophen     Tablet .. 650 milliGRAM(s) Oral every 6 hours PRN Temp greater or equal to 38C (100.4F), Mild Pain (1 - 3)  ALPRAZolam 0.25 milliGRAM(s) Oral at bedtime PRN anxiety  artificial  tears Solution 1 Drop(s) Both EYES four times a day PRN Dry Eyes  methocarbamol 500 milliGRAM(s) Oral every 8 hours PRN Muscle Spasm  ondansetron Injectable 4 milliGRAM(s) IV Push every 6 hours PRN Nausea and/or Vomiting      -------------------------------------------------------------------------------  LABS:    07-31    131<L>  |  96  |  13  ----------------------------<  95  5.4<H>   |  25  |  0.77    Ca    8.8      31 Jul 2024 05:30  Phos  2.7     07-31  Mg     1.9     07-31          Urinalysis Basic - ( 31 Jul 2024 05:30 )    Color: x / Appearance: x / SG: x / pH: x  Gluc: 95 mg/dL / Ketone: x  / Bili: x / Urobili: x   Blood: x / Protein: x / Nitrite: x   Leuk Esterase: x / RBC: x / WBC x   Sq Epi: x / Non Sq Epi: x / Bacteria: x      CAPILLARY BLOOD GLUCOSE              RADIOLOGY & ADDITIONAL TESTS: Reviewed.

## 2024-08-01 NOTE — PROGRESS NOTE ADULT - PROBLEM SELECTOR PLAN 7
K of 5.4 on 07/30 that responded to lokelma 5mg - dropped to 4.9 in evening. K back up to 5.4 on 7/31.    Plan:  - give Lokelma 5mg today K of 5.4 on 07/30 that responded to lokelma 5mg - dropped to 4.9 in evening. K back up to 5.4 on 7/31.    Plan:  - improved after Lokelma

## 2024-08-01 NOTE — PROGRESS NOTE ADULT - PROBLEM SELECTOR PLAN 2
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 Bowling) outpatient. Sodium has been relatively stable throughout admission with acute drop to 131 on 7/31. No neurological changes. 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 Bowling) outpatient. Sodium has been relatively stable throughout admission with acute drop to 131 on 7/31. No neurological changes. Urine Na 34, Urine Osm 174, consistent with polydipsia picture    Plan:  - strict I/O's w/ fluid restriction as tolerated  - trend BMP daily  - improving, avoid allowing over drinking.  Consider family brining in her Xylimelts lozenges for dry mouth to prevent over thirst

## 2024-08-02 ENCOUNTER — TRANSCRIPTION ENCOUNTER (OUTPATIENT)
Age: 89
End: 2024-08-02

## 2024-08-02 VITALS — TEMPERATURE: 99 F

## 2024-08-02 DIAGNOSIS — D72.829 ELEVATED WHITE BLOOD CELL COUNT, UNSPECIFIED: ICD-10-CM

## 2024-08-02 DIAGNOSIS — D64.9 ANEMIA, UNSPECIFIED: ICD-10-CM

## 2024-08-02 LAB
ADD ON TEST-SPECIMEN IN LAB: SIGNIFICANT CHANGE UP
ANION GAP SERPL CALC-SCNC: 10 MMOL/L — SIGNIFICANT CHANGE UP (ref 5–17)
BASOPHILS # BLD AUTO: 0.02 K/UL — SIGNIFICANT CHANGE UP (ref 0–0.2)
BASOPHILS NFR BLD AUTO: 0.2 % — SIGNIFICANT CHANGE UP (ref 0–2)
BUN SERPL-MCNC: 14 MG/DL — SIGNIFICANT CHANGE UP (ref 7–23)
CALCIUM SERPL-MCNC: 8.7 MG/DL — SIGNIFICANT CHANGE UP (ref 8.4–10.5)
CHLORIDE SERPL-SCNC: 95 MMOL/L — LOW (ref 96–108)
CO2 SERPL-SCNC: 31 MMOL/L — SIGNIFICANT CHANGE UP (ref 22–31)
CREAT SERPL-MCNC: 0.87 MG/DL — SIGNIFICANT CHANGE UP (ref 0.5–1.3)
EGFR: 61 ML/MIN/1.73M2 — SIGNIFICANT CHANGE UP
EOSINOPHIL # BLD AUTO: 0.19 K/UL — SIGNIFICANT CHANGE UP (ref 0–0.5)
EOSINOPHIL NFR BLD AUTO: 1.6 % — SIGNIFICANT CHANGE UP (ref 0–6)
GLUCOSE SERPL-MCNC: 89 MG/DL — SIGNIFICANT CHANGE UP (ref 70–99)
HCT VFR BLD CALC: 25.6 % — LOW (ref 34.5–45)
HGB BLD-MCNC: 7.5 G/DL — LOW (ref 11.5–15.5)
IMM GRANULOCYTES NFR BLD AUTO: 1.5 % — HIGH (ref 0–0.9)
LYMPHOCYTES # BLD AUTO: 1.74 K/UL — SIGNIFICANT CHANGE UP (ref 1–3.3)
LYMPHOCYTES # BLD AUTO: 14.9 % — SIGNIFICANT CHANGE UP (ref 13–44)
MAGNESIUM SERPL-MCNC: 1.8 MG/DL — SIGNIFICANT CHANGE UP (ref 1.6–2.6)
MCHC RBC-ENTMCNC: 29.2 PG — SIGNIFICANT CHANGE UP (ref 27–34)
MCHC RBC-ENTMCNC: 29.3 GM/DL — LOW (ref 32–36)
MCV RBC AUTO: 99.6 FL — SIGNIFICANT CHANGE UP (ref 80–100)
MONOCYTES # BLD AUTO: 1.16 K/UL — HIGH (ref 0–0.9)
MONOCYTES NFR BLD AUTO: 10 % — SIGNIFICANT CHANGE UP (ref 2–14)
NEUTROPHILS # BLD AUTO: 8.35 K/UL — HIGH (ref 1.8–7.4)
NEUTROPHILS NFR BLD AUTO: 71.8 % — SIGNIFICANT CHANGE UP (ref 43–77)
NRBC # BLD: 0 /100 WBCS — SIGNIFICANT CHANGE UP (ref 0–0)
PHOSPHATE SERPL-MCNC: 2.7 MG/DL — SIGNIFICANT CHANGE UP (ref 2.5–4.5)
PLATELET # BLD AUTO: 422 K/UL — HIGH (ref 150–400)
POTASSIUM SERPL-MCNC: 4.3 MMOL/L — SIGNIFICANT CHANGE UP (ref 3.5–5.3)
POTASSIUM SERPL-SCNC: 4.3 MMOL/L — SIGNIFICANT CHANGE UP (ref 3.5–5.3)
RBC # BLD: 2.57 M/UL — LOW (ref 3.8–5.2)
RBC # FLD: 15.6 % — HIGH (ref 10.3–14.5)
SODIUM SERPL-SCNC: 136 MMOL/L — SIGNIFICANT CHANGE UP (ref 135–145)
WBC # BLD: 11.87 K/UL — HIGH (ref 3.8–10.5)
WBC # FLD AUTO: 11.87 K/UL — HIGH (ref 3.8–10.5)

## 2024-08-02 PROCEDURE — 87389 HIV-1 AG W/HIV-1&-2 AB AG IA: CPT

## 2024-08-02 PROCEDURE — 83605 ASSAY OF LACTIC ACID: CPT

## 2024-08-02 PROCEDURE — 97530 THERAPEUTIC ACTIVITIES: CPT

## 2024-08-02 PROCEDURE — 99233 SBSQ HOSP IP/OBS HIGH 50: CPT

## 2024-08-02 PROCEDURE — 72040 X-RAY EXAM NECK SPINE 2-3 VW: CPT

## 2024-08-02 PROCEDURE — 84300 ASSAY OF URINE SODIUM: CPT

## 2024-08-02 PROCEDURE — 86850 RBC ANTIBODY SCREEN: CPT

## 2024-08-02 PROCEDURE — 85025 COMPLETE CBC W/AUTO DIFF WBC: CPT

## 2024-08-02 PROCEDURE — 86704 HEP B CORE ANTIBODY TOTAL: CPT

## 2024-08-02 PROCEDURE — 80048 BASIC METABOLIC PNL TOTAL CA: CPT

## 2024-08-02 PROCEDURE — 86709 HEPATITIS A IGM ANTIBODY: CPT

## 2024-08-02 PROCEDURE — 84100 ASSAY OF PHOSPHORUS: CPT

## 2024-08-02 PROCEDURE — 97165 OT EVAL LOW COMPLEX 30 MIN: CPT

## 2024-08-02 PROCEDURE — 80053 COMPREHEN METABOLIC PANEL: CPT

## 2024-08-02 PROCEDURE — 84132 ASSAY OF SERUM POTASSIUM: CPT

## 2024-08-02 PROCEDURE — 82805 BLOOD GASES W/O2 SATURATION: CPT

## 2024-08-02 PROCEDURE — 83690 ASSAY OF LIPASE: CPT

## 2024-08-02 PROCEDURE — 94002 VENT MGMT INPAT INIT DAY: CPT

## 2024-08-02 PROCEDURE — C1713: CPT

## 2024-08-02 PROCEDURE — 85730 THROMBOPLASTIN TIME PARTIAL: CPT

## 2024-08-02 PROCEDURE — 85576 BLOOD PLATELET AGGREGATION: CPT

## 2024-08-02 PROCEDURE — 71045 X-RAY EXAM CHEST 1 VIEW: CPT

## 2024-08-02 PROCEDURE — P9016: CPT

## 2024-08-02 PROCEDURE — 93930 UPPER EXTREMITY STUDY: CPT

## 2024-08-02 PROCEDURE — 87635 SARS-COV-2 COVID-19 AMP PRB: CPT

## 2024-08-02 PROCEDURE — 83036 HEMOGLOBIN GLYCOSYLATED A1C: CPT

## 2024-08-02 PROCEDURE — 97535 SELF CARE MNGMENT TRAINING: CPT

## 2024-08-02 PROCEDURE — 36430 TRANSFUSION BLD/BLD COMPNT: CPT

## 2024-08-02 PROCEDURE — 93005 ELECTROCARDIOGRAM TRACING: CPT

## 2024-08-02 PROCEDURE — 82947 ASSAY GLUCOSE BLOOD QUANT: CPT

## 2024-08-02 PROCEDURE — 87340 HEPATITIS B SURFACE AG IA: CPT

## 2024-08-02 PROCEDURE — 74018 RADEX ABDOMEN 1 VIEW: CPT

## 2024-08-02 PROCEDURE — 36415 COLL VENOUS BLD VENIPUNCTURE: CPT

## 2024-08-02 PROCEDURE — 85027 COMPLETE CBC AUTOMATED: CPT

## 2024-08-02 PROCEDURE — P9045: CPT

## 2024-08-02 PROCEDURE — 93970 EXTREMITY STUDY: CPT

## 2024-08-02 PROCEDURE — 86705 HEP B CORE ANTIBODY IGM: CPT

## 2024-08-02 PROCEDURE — 86870 RBC ANTIBODY IDENTIFICATION: CPT

## 2024-08-02 PROCEDURE — 82803 BLOOD GASES ANY COMBINATION: CPT

## 2024-08-02 PROCEDURE — 85610 PROTHROMBIN TIME: CPT

## 2024-08-02 PROCEDURE — 70450 CT HEAD/BRAIN W/O DYE: CPT | Mod: MC

## 2024-08-02 PROCEDURE — 74176 CT ABD & PELVIS W/O CONTRAST: CPT | Mod: MC

## 2024-08-02 PROCEDURE — 86900 BLOOD TYPING SEROLOGIC ABO: CPT

## 2024-08-02 PROCEDURE — 97140 MANUAL THERAPY 1/> REGIONS: CPT

## 2024-08-02 PROCEDURE — 83935 ASSAY OF URINE OSMOLALITY: CPT

## 2024-08-02 PROCEDURE — C1889: CPT

## 2024-08-02 PROCEDURE — 82962 GLUCOSE BLOOD TEST: CPT

## 2024-08-02 PROCEDURE — 76000 FLUOROSCOPY <1 HR PHYS/QHP: CPT

## 2024-08-02 PROCEDURE — 86901 BLOOD TYPING SEROLOGIC RH(D): CPT

## 2024-08-02 PROCEDURE — 97163 PT EVAL HIGH COMPLEX 45 MIN: CPT

## 2024-08-02 PROCEDURE — 86706 HEP B SURFACE ANTIBODY: CPT

## 2024-08-02 PROCEDURE — 84484 ASSAY OF TROPONIN QUANT: CPT

## 2024-08-02 PROCEDURE — 72125 CT NECK SPINE W/O DYE: CPT | Mod: MC

## 2024-08-02 PROCEDURE — 97116 GAIT TRAINING THERAPY: CPT

## 2024-08-02 PROCEDURE — 86902 BLOOD TYPE ANTIGEN DONOR EA: CPT

## 2024-08-02 PROCEDURE — 86880 COOMBS TEST DIRECT: CPT

## 2024-08-02 PROCEDURE — 84295 ASSAY OF SERUM SODIUM: CPT

## 2024-08-02 PROCEDURE — 93925 LOWER EXTREMITY STUDY: CPT

## 2024-08-02 PROCEDURE — 93306 TTE W/DOPPLER COMPLETE: CPT

## 2024-08-02 PROCEDURE — 83735 ASSAY OF MAGNESIUM: CPT

## 2024-08-02 PROCEDURE — 97110 THERAPEUTIC EXERCISES: CPT

## 2024-08-02 PROCEDURE — 86803 HEPATITIS C AB TEST: CPT

## 2024-08-02 PROCEDURE — 99285 EMERGENCY DEPT VISIT HI MDM: CPT

## 2024-08-02 PROCEDURE — 83880 ASSAY OF NATRIURETIC PEPTIDE: CPT

## 2024-08-02 PROCEDURE — 86922 COMPATIBILITY TEST ANTIGLOB: CPT

## 2024-08-02 PROCEDURE — 82330 ASSAY OF CALCIUM: CPT

## 2024-08-02 RX ORDER — SOD PHOS DI, MONO/K PHOS MONO 250 MG
1 TABLET ORAL ONCE
Refills: 0 | Status: COMPLETED | OUTPATIENT
Start: 2024-08-02 | End: 2024-08-02

## 2024-08-02 RX ORDER — MAGNESIUM OXIDE 253 MG/1
400 TABLET ORAL ONCE
Refills: 0 | Status: COMPLETED | OUTPATIENT
Start: 2024-08-02 | End: 2024-08-02

## 2024-08-02 RX ORDER — CYANOCOBALAMIN/FOLIC AC/VIT B6 2-2.5-25MG
1 TABLET ORAL
Qty: 0 | Refills: 0 | DISCHARGE
Start: 2024-08-02

## 2024-08-02 RX ORDER — LYSINE HCL 500 MG
1 TABLET ORAL
Qty: 0 | Refills: 0 | DISCHARGE
Start: 2024-08-02

## 2024-08-02 RX ORDER — CALAMINE 8% AND ZINC OXIDE 8% 160 MG/ML
1 LOTION TOPICAL
Qty: 0 | Refills: 0 | DISCHARGE
Start: 2024-08-02

## 2024-08-02 RX ADMIN — Medication 30 MILLIGRAM(S): at 06:21

## 2024-08-02 RX ADMIN — Medication 81 MILLIGRAM(S): at 12:58

## 2024-08-02 RX ADMIN — CALAMINE 8% AND ZINC OXIDE 8% 1 APPLICATION(S): 160 LOTION TOPICAL at 06:21

## 2024-08-02 RX ADMIN — Medication 1 PACKET(S): at 12:58

## 2024-08-02 RX ADMIN — Medication 1 TABLET(S): at 12:59

## 2024-08-02 RX ADMIN — Medication 500 MILLIGRAM(S): at 12:59

## 2024-08-02 RX ADMIN — SIMETHICONE 80 MILLIGRAM(S): 125 TABLET, CHEWABLE ORAL at 06:21

## 2024-08-02 RX ADMIN — Medication 0.25 MILLIGRAM(S): at 01:09

## 2024-08-02 RX ADMIN — MAGNESIUM OXIDE 400 MILLIGRAM(S): 253 TABLET ORAL at 09:13

## 2024-08-02 RX ADMIN — Medication 1 PACKET(S): at 12:59

## 2024-08-02 RX ADMIN — HEPARIN SODIUM 5000 UNIT(S): 1000 INJECTION, SOLUTION INTRAVENOUS; SUBCUTANEOUS at 12:59

## 2024-08-02 NOTE — PROGRESS NOTE ADULT - PROBLEM SELECTOR PLAN 1
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  Vascular signed off no intervention  - c/w LUE elevation and ACE wraps for residual edema, improving signifcantly

## 2024-08-02 NOTE — PROGRESS NOTE ADULT - PROBLEM SELECTOR PLAN 4
Home meds of xanax 0.25mg qhs and cymbalta 30mg.
Hx of HFpEF, previously on lasix and elizabeth but have since been discontinued due to hx of hyponatremia. TTE done this admission with EF of 65% and likely diastolic dysfunction consistent with previous diagnosis.     Plan:  - f/u cardiology as outpatient, follows with Dr Fernandez.
Hgb 7.5-8 from 10-12 preop  - likely a component of surgical blood loss followed by inflammatory anemia  - no tachycardia, hypotension or signs of acute bleeding  - transfuse for < 7

## 2024-08-02 NOTE — PROGRESS NOTE ADULT - SUBJECTIVE AND OBJECTIVE BOX
Patient is a 89y old  Female who presents with a chief complaint of Chronic C2 fracture (02 Aug 2024 06:53)      SUBJECTIVE:  Patient seen and examined at bedside. Reports poor sleep feels exhausted.  Some itching on R neck due to Hard collar, improved with calamine lotion.  No pain    ROS:  Denies fevers, chills, headache, vision change, cough, SOB, chest pain, Abdominal pain, N/V, dysuria.  All other ROS negative except as above    Vital Signs Last 12 Hrs  T(F): 99 (08-02-24 @ 06:16), Max: 99 (08-02-24 @ 06:16)  HR: 77 (08-02-24 @ 06:16) (77 - 89)  BP: 126/65 (08-02-24 @ 06:16) (126/58 - 126/65)  BP(mean): --  RR: 18 (08-02-24 @ 06:16) (18 - 18)  SpO2: 99% (08-02-24 @ 06:16) (98% - 99%)  I&O's Summary    01 Aug 2024 07:01  -  02 Aug 2024 07:00  --------------------------------------------------------  IN: 0 mL / OUT: 1500 mL / NET: -1500 mL        PHYSICAL EXAM:  Constitutional: NAD, elderly female, comfortable in bed.  HEENT: PERRLA, EOMI, MMM  Neck: In hard collar, Ecchymosis on R neck/shoulder, no rash  Respiratory: CTA B/L. No w/r/r.   Cardiovascular: RRR, normal S1 and S2, no m/r/g.   Gastrointestinal: +BS, soft NTND   Extremities: wwp; no cyanosis or clubbing.  LUE edema significantly improving with ACE wrap  Neurological: AAOx3, strength and sensation intact throughout.   Skin: large b/l UE ecchymoses        LABS:                        7.5    11.87 )-----------( 422      ( 02 Aug 2024 07:29 )             25.6     08-02    136  |  95<L>  |  14  ----------------------------<  89  4.3   |  31  |  0.87    Ca    8.7      02 Aug 2024 07:29  Phos  2.7     08-02  Mg     1.8     08-02        Urinalysis Basic - ( 02 Aug 2024 07:29 )    Color: x / Appearance: x / SG: x / pH: x  Gluc: 89 mg/dL / Ketone: x  / Bili: x / Urobili: x   Blood: x / Protein: x / Nitrite: x   Leuk Esterase: x / RBC: x / WBC x   Sq Epi: x / Non Sq Epi: x / Bacteria: x          RADIOLOGY & ADDITIONAL TESTS:  No new imaging    MEDICATIONS  (STANDING):  ascorbic acid 500 milliGRAM(s) Oral daily  aspirin enteric coated 81 milliGRAM(s) Oral daily  chlorhexidine 2% Cloths 1 Application(s) Topical <User Schedule>  DULoxetine 30 milliGRAM(s) Oral two times a day  heparin   Injectable 5000 Unit(s) SubCutaneous every 12 hours  latanoprost 0.005% Ophthalmic Solution 1 Drop(s) Both EYES at bedtime  multivitamin 1 Tablet(s) Oral daily  polyethylene glycol 3350 17 Gram(s) Oral daily  potassium phosphate / sodium phosphate Powder (PHOS-NaK) 1 Packet(s) Oral once  psyllium Powder 1 Packet(s) Oral daily  senna 2 Tablet(s) Oral at bedtime  simethicone 80 milliGRAM(s) Chew every 8 hours    MEDICATIONS  (PRN):  acetaminophen     Tablet .. 650 milliGRAM(s) Oral every 6 hours PRN Temp greater or equal to 38C (100.4F), Mild Pain (1 - 3)  ALPRAZolam 0.25 milliGRAM(s) Oral at bedtime PRN anxiety  artificial  tears Solution 1 Drop(s) Both EYES four times a day PRN Dry Eyes  benzocaine/menthol Lozenge 1 Lozenge Oral four times a day PRN Mouth Sores  calamine/zinc oxide Lotion 1 Application(s) Topical every 6 hours PRN Rash and/or Itching  methocarbamol 500 milliGRAM(s) Oral every 8 hours PRN Muscle Spasm  ondansetron Injectable 4 milliGRAM(s) IV Push every 6 hours PRN Nausea and/or Vomiting

## 2024-08-02 NOTE — DISCHARGE NOTE NURSING/CASE MANAGEMENT/SOCIAL WORK - PATIENT PORTAL LINK FT
You can access the FollowMyHealth Patient Portal offered by [facility] by registering at the following website: [email or web address]. By joining [employer]’s FollowMyHealth portal, you will also be able to view your health information using other applications (apps) compatible with our system.

## 2024-08-02 NOTE — DISCHARGE NOTE NURSING/CASE MANAGEMENT/SOCIAL WORK - NSDCVIVACCINE_GEN_ALL_CORE_FT
Tdap; 13-Feb-2018 19:16; Lit Romero); [employer]; [id number]; IntraMuscular; Deltoid Right.; 0.5 milliLiter(s); VIS (VIS Published: 09-May-2013, VIS Presented: 13-Feb-2018);

## 2024-08-02 NOTE — PROGRESS NOTE ADULT - SUBJECTIVE AND OBJECTIVE BOX
HPI:  89 year old female, PMH HTN, HLD, CHF, severe AS, and chronic C-2 fracture x 2 years, presented to [facility] ED with worsening weakness and difficulty ambulating since 7/14. Per home health aide, pt had a fall with +head strike on July 6th which she did not receive medical attention for. On 7/14, HHA noted pt was having more difficulty ambulating and was dropping objects and not able to feed herself as before. Pt was seen on 7/17 at [facility] and had a CT head and CT c-spine. 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 The provider at [facility] offered to pt, family, and pt's PCP, Dr. Doan who was at bedside, to call spine surgery at [facility] and all parties refused, preferring to take patient home. Pt was discharged home with her HHA. Since 7/17, pt's weakness has progressed, prompting them to come to the ED. She denies falls since 7/6. Pt came in with soft collar on, which was removed by HHA while in ED.  (19 Jul 2024 16:16)    SUBJECTIVE:      HOSPITAL COURSE:  7/19: admitted to [facility] for surgery with Dr. Puckett, given 15mg torodol for pain, 0.25 dilaudid for pain, 0.125 xanax for anxiety, 10 hydral for high BP.   7/20: FEDERICA, remains on precedex. Cardiology consulted for preop clearance.   7/21: FEDERICA ovn. cardiology clearance obtained. Liquid BMs x3, started on metamucil.  7/22: TTE complete. DC precedex.   7/23: FEDERICA overnight. Na+ 131 from 141. rpt 135, 500cc bolus given.   7/24: [facility]ERICA overnight. OR today. POD0 from O-C5 fusion, c1 lami. Post-op pulses not palpable, vasc consulted. PT and AT pulses dopplerable b/l, no DP. RUE pulses not dopplerable. Hep gtt + bolus started per vascular. duplex: R radial occlusion. SBP 80s-90s, given 500cc bolus NS and 250cc albumin. NGT placed  7/25: POD1. arterial dopplers performed, +R radial occlusion. PTT o/n >200, heparin gtt held x 4 hrs until ptt in goal. heparin gtt resumed 1000u/hr. transition propofol to precedex. (+) cuff leak. Extubated to NC by anesthesia. PTT >200, hep gtt paused. Bowel reg inc. PTT 75.6. Hep gtt restarted @ 800u.   7/26: POD2. SC o/n. Xrays complete. CT c spine complete. SQH tonight.   7/27: POD 3 occiput-C5 fusion. FEDERICA overnight. Midodrine decreased to 2.5q8.  7/28: POD 4 occiput- C5 fusion. FEDERICA overnight, neuro stable. HMV drain removed; nitro paste d/c'd. Midodrine dc'd.  7/29: POD 5 occiput-C5 fusion. FEDERICA overnight. SD status. Downtrending hgb, trend w AM labs. Biotene swish/swallow for dry mouth.   7/30: POD 6. FEDERICA overnight, neuro stable. x1 Lokelma 5 mg po given for hyperkalemia (K : 5.4)   7/31: POD7, FEDERICA overnight. IV removed from LUE, arm to be elevated, +ace wrap. Added metamucil for loose stools. Pending urine studies for low Na. Ordered lokelma 5mg x 1 for hyperkalemia. Urine osm 172.   8/1: POD8, FEDERICA overnight. hgb 7.8 from 8.3, repeat hgb increased to 8.0, aquacel removed and ABD pad with paper tape applied, lozenges ordered for dry mouth  8/2: POD9, FEDERICA overnight, calamine lotion ordered prn for right neck itchiness      Vital Signs Last 24 Hrs  T(C): 36.5 (02 Aug 2024 00:57), Max: 36.9 (01 Aug 2024 20:47)  T(F): 97.7 (02 Aug 2024 00:57), Max: 98.4 (01 Aug 2024 20:47)  HR: 89 (02 Aug 2024 00:57) (82 - 97)  BP: 126/58 (02 Aug 2024 00:57) (98/53 - 144/78)  BP(mean): 70 (01 Aug 2024 16:00) (70 - 104)  RR: 18 (02 Aug 2024 00:57) (16 - 18)  SpO2: 98% (02 Aug 2024 00:57) (94% - 98%)    Parameters below as of 02 Aug 2024 00:57  Patient On (Oxygen Delivery Method): room air        I&O's Summary    31 Jul 2024 07:01  -  01 Aug 2024 07:00  --------------------------------------------------------  IN: 0 mL / OUT: 2750 mL / NET: -2750 mL    01 Aug 2024 07:01  -  02 Aug 2024 06:54  --------------------------------------------------------  IN: 0 mL / OUT: 1500 mL / NET: -1500 mL        PHYSICAL EXAM:  GENERAL:  HEENT:  CARDIO:  PULM:  GI:  NEURO:  MENTAL STATUS: AA&Ox3, memory intact  LANG/SPEECH: Naming and repetition intact, fluent, follows commands  CRANIAL NERVES:  II: Pupils equal and reactive,  no visual field deficits,   III, IV, VI: EOM intact, no gaze preference or deviation, no nystagmus.  V: normal sensation in V1, V2, and V3 segments bilaterally  VII: symmetric facial expression, blinking, opening eyes, raising eyebrows, smiling, puffing out cheecks, pursing of lips  VIII: normal hearing to speech  IX, X: normal palatal elevation, no uvular deviation  XI: 5/5 head turn and 5/5 shoulder shrug bilaterally  XII: midline tongue protrusion  MOTOR:  5/5 muscle power in R shoulder abductors/adductors, elbow flexors/extensors, wrist flexors/extensors, finger abductors/adductors.  5/5 in Rt hipflexors/extensors, knee flexors/extensors, ankle dorsiflexors and planter flexors.    5/5 muscle power in L shoulder abductors/adductors, elbow flexors/extensors, wrist flexors/extensors, finger abductors/adductors.  5/5 in Lt hipflexors/extensors, knee flexors/extensors, ankle dorsiflexors and planter flexors.    REFLEXES: 2/4 throughout, bilateral flexor plantar response, no Guillory's, no clonus  SENSORY:  Normal to touch, pinprick, vibration, temp all limbs  No hemineglect, no extinction to double sided stimulation (visual & tactile)  Romberg absent  COORD: Normal finger to nose and heel to shin, no tremor, no dysmetria  STATION: normal stance, no truncal ataxia  GAIT: Normal; patient able to tip-toe, heel-walk.    WOUND/DRESSING:    TUBES/LINES:  [] Vega  [] Trach  [] NGT  [] PEG  [] Wound Drains  [] Others    DIET:  [] NPO  [] Mechanical  [] Tube feeds    LABS:                        8.0    11.69 )-----------( 265      ( 01 Aug 2024 17:29 )             25.6     08-01    134<L>  |  94<L>  |  15  ----------------------------<  116<H>  4.4   |  34<H>  |  0.83    Ca    8.9      01 Aug 2024 12:00  Phos  3.0     08-01  Mg     1.9     08-01        Urinalysis Basic - ( 01 Aug 2024 12:00 )    Color: x / Appearance: x / SG: x / pH: x  Gluc: 116 mg/dL / Ketone: x  / Bili: x / Urobili: x   Blood: x / Protein: x / Nitrite: x   Leuk Esterase: x / RBC: x / WBC x   Sq Epi: x / Non Sq Epi: x / Bacteria: x          CAPILLARY BLOOD GLUCOSE          Drug Levels: [] N/A    CSF Analysis: [] N/A      Allergies    penicillin (Unknown)  erythromycin (Unknown)  [This allergen will not trigger allergy alert] Acetic Ta-Yrxqzgyzpy-Hdukucchi (Other)    Intolerances      MEDICATIONS:  Antibiotics:    Neuro:  acetaminophen     Tablet .. 650 milliGRAM(s) Oral every 6 hours PRN  ALPRAZolam 0.25 milliGRAM(s) Oral at bedtime PRN  DULoxetine 30 milliGRAM(s) Oral two times a day  methocarbamol 500 milliGRAM(s) Oral every 8 hours PRN  ondansetron Injectable 4 milliGRAM(s) IV Push every 6 hours PRN    Anticoagulation:  aspirin enteric coated 81 milliGRAM(s) Oral daily  heparin   Injectable 5000 Unit(s) SubCutaneous every 12 hours    OTHER:  artificial  tears Solution 1 Drop(s) Both EYES four times a day PRN  benzocaine/menthol Lozenge 1 Lozenge Oral four times a day PRN  calamine/zinc oxide Lotion 1 Application(s) Topical every 6 hours PRN  chlorhexidine 2% Cloths 1 Application(s) Topical <User Schedule>  latanoprost 0.005% Ophthalmic Solution 1 Drop(s) Both EYES at bedtime  polyethylene glycol 3350 17 Gram(s) Oral daily  psyllium Powder 1 Packet(s) Oral daily  senna 2 Tablet(s) Oral at bedtime  simethicone 80 milliGRAM(s) Chew every 8 hours    IVF:  ascorbic acid 500 milliGRAM(s) Oral daily  multivitamin 1 Tablet(s) Oral daily    CULTURES:    RADIOLOGY & ADDITIONAL TESTS:      ASSESSMENT:  89y Female with PMHx HTN, HLD, CHF, AS, chronic C2 fractures, now post-op day 9 Occipital-C5 fusion with C1 laminectomy. Post-op course complicated by swelling in bilateral upper extremities L>R, clots ruled out, currently awaiting discharge to acute rehab.     C2 CERVICAL FRACTURE;NECK PAIN    Allergy status to other antibiotic agents    Allergy status to penicillin    Aneurysm of unspecified site    Anterior displaced type ii dens fracture, initial encounter for closed fracture    Atherosclerotic heart disease of native coronary artery without angina pectoris    Bilateral primary osteoarthritis of knee    CAP GLAUC PSX LENS LT EYE MOD STAGE    CAPSLR GLAUCOMA W/PSEUDXF LENS, BILATERAL, MODERATE STAGE    Cervicalgia    Chronic diastolic (congestive) heart failure    Dizziness and giddiness    Hyperlipidemia, unspecified    Hypertensive heart disease with heart failure    Iliotibial band syndrome, right leg    Long term (current) use of aspirin    Nonrheumatic aortic (valve) stenosis    Old myocardial infarction    Other specific arthropathies, not elsewhere classified, left shoulder    Pain in right leg    Pain in unspecified shoulder    Personal history of other diseases of the circulatory system    Personal history of other endocrine, nutritional and metabolic disease    Presence of other heart-valve replacement    Shortness of breath    Unspecified glaucoma    Vertigo of central origin    History of tobacco use    Sex Assigned At Birth    Sex Assigned At Birth    Alcohol intake    FH: lung cancer (Father)    Handoff    MEWS Score    Prior    Essential hypertension    Hyperlipidemia, unspecified hyperlipidemia type    Diabetes insipidus    H/O aortic valve stenosis    Hypertension    Vertigo    Chronic diastolic congestive heart failure    Dyslipidemia    History of pseudoaneurysm    Glaucoma    Closed C2 fracture    Open dens fracture    Dens fracture    Dens fracture    Fusion, occipital bone with C1 and C2 vertebrae, posterior approach    C2 cervical fracture    Open dens fracture    Anterior displaced type ii dens fracture, sequela    H/O aortic valve stenosis    Hyperlipidemia, unspecified hyperlipidemia type    Hypertension    Glaucoma    Cervical spinal stenosis    Anxiety    CHF, acute    Chronic CHF    Hyperkalemia    Hyponatremia    Fusion, occipital bone with C1 and C2 vertebrae, posterior approach    History of orthopedic surgery    S/P AVR    H/O lumbosacral spine surgery    S/P hip replacement, right    S/P hip replacement    S/P right coronary artery (RCA) stent placement    NECK PAIN    2    Room Service Assist    Room Service Assist    Room Service Assist    Room Service Assist    Neck pain    H/O aortic valve stenosis    S/P hip replacement    Glaucoma    Hypertension    Diabetes insipidus    S/P AVR      PLAN:  NEURO      CARDIOVASCULAR:    PULMONARY:    RENAL:    GI:    HEME:    ID:    ENDO:    DVT PROPHYLAXIS:  [] Venodynes                                [] Heparin/Lovenox    DISPOSITION:    Assessment:  Present when checked    []  GCS  E   V  M     Heart Failure: []Acute, [] acute on chronic , []chronic  Heart Failure:  [] Diastolic (HFpEF), [] Systolic (HFrEF), []Combined (HFpEF and HFrEF), [] RHF, [] Pulm HTN, [] Other    [] KAYLYN, [] ATN, [] AIN, [] other  [] CKD1, [] CKD2, [] CKD 3, [] CKD 4, [] CKD 5, []ESRD    Encephalopathy: [] Metabolic, [] Hepatic, [] toxic, [] Neurological, [] Other    Abnormal Nurtitional Status: [] malnurtition (see nutrition note), [ ]underweight: BMI < 19, [] morbid obesity: BMI >40, [] Cachexia    [] Sepsis  [] hypovolemic shock,[] cardiogenic shock, [] hemorrhagic shock, [] neuogenic shock  [] Acute Respiratory Failure  []Cerebral edema, [] Brain compression/ herniation,   [] Functional quadriplegia  [] Acute blood loss anemia   HPI:  89 year old female, PMH HTN, HLD, CHF, severe AS, and chronic C-2 fracture x 2 years, presented to [facility] ED with worsening weakness and difficulty ambulating since 7/14. Per home health aide, pt had a fall with +head strike on July 6th which she did not receive medical attention for. On 7/14, HHA noted pt was having more difficulty ambulating and was dropping objects and not able to feed herself as before. Pt was seen on 7/17 at [facility] and had a CT head and CT c-spine. 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 The provider at [facility] offered to pt, family, and pt's PCP, Dr. Doan who was at bedside, to call spine surgery at [facility] and all parties refused, preferring to take patient home. Pt was discharged home with her HHA. Since 7/17, pt's weakness has progressed, prompting them to come to the ED. She denies falls since 7/6. Pt came in with soft collar on, which was removed by HHA while in ED.  (19 Jul 2024 16:16)    SUBJECTIVE: Pt reports she has no pain this morning but is still uncomfortable in the hard collar and having some itching on the right side of her neck close to the edge of her dressing, better since it initially started last night.       HOSPITAL COURSE:  7/19: admitted to [facility] for surgery with Dr. Puckett, given 15mg torodol for pain, 0.25 dilaudid for pain, 0.125 xanax for anxiety, 10 hydral for high BP.   7/20: FEDERICA, remains on precedex. Cardiology consulted for preop clearance.   7/21: FEDERICA ovn. cardiology clearance obtained. Liquid BMs x3, started on metamucil.  7/22: TTE complete. DC precedex.   7/23: [facility]ERICA overnight. Na+ 131 from 141. rpt 135, 500cc bolus given.   7/24: [facility]ICA overnight. OR today. POD0 from O-C5 fusion, c1 lami. Post-op pulses not palpable, vasc consulted. PT and AT pulses dopplerable b/l, no DP. RUE pulses not dopplerable. Hep gtt + bolus started per vascular. duplex: R radial occlusion. SBP 80s-90s, given 500cc bolus NS and 250cc albumin. NGT placed  7/25: POD1. arterial dopplers performed, +R radial occlusion. PTT o/n >200, heparin gtt held x 4 hrs until ptt in goal. heparin gtt resumed 1000u/hr. transition propofol to precedex. (+) cuff leak. Extubated to NC by anesthesia. PTT >200, hep gtt paused. Bowel reg inc. PTT 75.6. Hep gtt restarted @ 800u.   7/26: POD2. SC o/n. Xrays complete. CT c spine complete. SQH tonight.   7/27: POD 3 occiput-C5 fusion. FEDERICA overnight. Midodrine decreased to 2.5q8.  7/28: POD 4 occiput- C5 fusion. FEDERICA overnight, neuro stable. HMV drain removed; nitro paste d/c'd. Midodrine dc'd.  7/29: POD 5 occiput-C5 fusion. FEDERICA overnight. SD status. Downtrending hgb, trend w AM labs. Biotene swish/swallow for dry mouth.   7/30: POD 6. FEDERICA overnight, neuro stable. x1 Lokelma 5 mg po given for hyperkalemia (K : 5.4)   7/31: POD7, FEDERICA overnight. IV removed from LUE, arm to be elevated, +ace wrap. Added metamucil for loose stools. Pending urine studies for low Na. Ordered lokelma 5mg x 1 for hyperkalemia. Urine osm 172.   8/1: POD8, FEDERICA overnight. hgb 7.8 from 8.3, repeat hgb increased to 8.0, aquacel removed and ABD pad with paper tape applied, lozenges ordered for dry mouth  8/2: POD9, FEDERICA overnight, calamine lotion ordered prn for right neck itchiness      Vital Signs Last 24 Hrs  T(C): 36.5 (02 Aug 2024 00:57), Max: 36.9 (01 Aug 2024 20:47)  T(F): 97.7 (02 Aug 2024 00:57), Max: 98.4 (01 Aug 2024 20:47)  HR: 89 (02 Aug 2024 00:57) (82 - 97)  BP: 126/58 (02 Aug 2024 00:57) (98/53 - 144/78)  BP(mean): 70 (01 Aug 2024 16:00) (70 - 104)  RR: 18 (02 Aug 2024 00:57) (16 - 18)  SpO2: 98% (02 Aug 2024 00:57) (94% - 98%)    Parameters below as of 02 Aug 2024 00:57  Patient On (Oxygen Delivery Method): room air        I&O's Summary    31 Jul 2024 07:01  -  01 Aug 2024 07:00  --------------------------------------------------------  IN: 0 mL / OUT: 2750 mL / NET: -2750 mL    01 Aug 2024 07:01  -  02 Aug 2024 06:54  --------------------------------------------------------  IN: 0 mL / OUT: 1500 mL / NET: -1500 mL        PHYSICAL EXAM:  GENERAL: NAD, AA&Ox4  HEENT: PERRL, EOMI, neck incision covered with ABD Pad and paper tape, hard collar in place, bruising on posterior neck surrounding incision  CARDIO: RRR  PULM: normal respiratory effort, clear to auscultation bilaterally   GI: +BS, nontender, no megaly or masses appreciated   NEURO:  MENTAL STATUS: AA&Ox4, memory intact, occasionally needing reminders of plan  LANG/SPEECH: fluent, follows commands  CRANIAL NERVES:  II: Pupils equal and reactive  III, IV, VI: EOM intact, no gaze preference or deviation  V: normal sensation in upper and lower extremities bilaterally   VII: symmetric facial expression, blinking, opening eyes, raising eyebrows, smiling,   VIII: hearing to speech decreased appropriate for age  IX, X: did not examine  XI: did not examine shoulder shrug or neck movement due to hard collar  XII: midline tongue protrusion  MOTOR:  RUE: 5/5   RLE: 5/5  LUE: 4/5 proximally, 5/5 distally   LLE: 5/5     REFLEXES: did not examine  SENSORY:  Normal and equal to touch in all extremities  COORD:  no tremor, no dysmetria  STATION: normal stance, no truncal ataxia  GAIT: Normal; patient able to tip-toe, heel-walk.    WOUND/DRESSING:    TUBES/LINES:  [] Vega  [] Trach  [] NGT  [] PEG  [] Wound Drains  [] Others    DIET:  [] NPO  [] Mechanical  [] Tube feeds    LABS:                        8.0    11.69 )-----------( 265      ( 01 Aug 2024 17:29 )             25.6     08-01    134<L>  |  94<L>  |  15  ----------------------------<  116<H>  4.4   |  34<H>  |  0.83    Ca    8.9      01 Aug 2024 12:00  Phos  3.0     08-01  Mg     1.9     08-01        Urinalysis Basic - ( 01 Aug 2024 12:00 )    Color: x / Appearance: x / SG: x / pH: x  Gluc: 116 mg/dL / Ketone: x  / Bili: x / Urobili: x   Blood: x / Protein: x / Nitrite: x   Leuk Esterase: x / RBC: x / WBC x   Sq Epi: x / Non Sq Epi: x / Bacteria: x          CAPILLARY BLOOD GLUCOSE          Drug Levels: [] N/A    CSF Analysis: [] N/A      Allergies    penicillin (Unknown)  erythromycin (Unknown)  [This allergen will not trigger allergy alert] Acetic Cw-Zcxaucekdx-Zdueqzrht (Other)    Intolerances      MEDICATIONS:  Antibiotics:    Neuro:  acetaminophen     Tablet .. 650 milliGRAM(s) Oral every 6 hours PRN  ALPRAZolam 0.25 milliGRAM(s) Oral at bedtime PRN  DULoxetine 30 milliGRAM(s) Oral two times a day  methocarbamol 500 milliGRAM(s) Oral every 8 hours PRN  ondansetron Injectable 4 milliGRAM(s) IV Push every 6 hours PRN    Anticoagulation:  aspirin enteric coated 81 milliGRAM(s) Oral daily  heparin   Injectable 5000 Unit(s) SubCutaneous every 12 hours    OTHER:  artificial  tears Solution 1 Drop(s) Both EYES four times a day PRN  benzocaine/menthol Lozenge 1 Lozenge Oral four times a day PRN  calamine/zinc oxide Lotion 1 Application(s) Topical every 6 hours PRN  chlorhexidine 2% Cloths 1 Application(s) Topical <User Schedule>  latanoprost 0.005% Ophthalmic Solution 1 Drop(s) Both EYES at bedtime  polyethylene glycol 3350 17 Gram(s) Oral daily  psyllium Powder 1 Packet(s) Oral daily  senna 2 Tablet(s) Oral at bedtime  simethicone 80 milliGRAM(s) Chew every 8 hours    IVF:  ascorbic acid 500 milliGRAM(s) Oral daily  multivitamin 1 Tablet(s) Oral daily    CULTURES:    RADIOLOGY & ADDITIONAL TESTS:      ASSESSMENT:  89y Female with PMHx HTN, HLD, CHF, AS, chronic C2 fractures, now post-op day 9 Occipital-C5 fusion with C1 laminectomy. Post-op course complicated by swelling in bilateral upper extremities L>R, clots ruled out, currently awaiting discharge to acute rehab.     C2 CERVICAL FRACTURE;NECK PAIN    Allergy status to other antibiotic agents    Allergy status to penicillin    Aneurysm of unspecified site    Anterior displaced type ii dens fracture, initial encounter for closed fracture    Atherosclerotic heart disease of native coronary artery without angina pectoris    Bilateral primary osteoarthritis of knee    CAP GLAUC PSX LENS LT EYE MOD STAGE    CAPSLR GLAUCOMA W/PSEUDXF LENS, BILATERAL, MODERATE STAGE    Cervicalgia    Chronic diastolic (congestive) heart failure    Dizziness and giddiness    Hyperlipidemia, unspecified    Hypertensive heart disease with heart failure    Iliotibial band syndrome, right leg    Long term (current) use of aspirin    Nonrheumatic aortic (valve) stenosis    Old myocardial infarction    Other specific arthropathies, not elsewhere classified, left shoulder    Pain in right leg    Pain in unspecified shoulder    Personal history of other diseases of the circulatory system    Personal history of other endocrine, nutritional and metabolic disease    Presence of other heart-valve replacement    Shortness of breath    Unspecified glaucoma    Vertigo of central origin    History of tobacco use    Sex Assigned At Birth    Sex Assigned At Birth    Alcohol intake    FH: lung cancer (Father)    Handoff    MEWS Score    Prior    Essential hypertension    Hyperlipidemia, unspecified hyperlipidemia type    Diabetes insipidus    H/O aortic valve stenosis    Hypertension    Vertigo    Chronic diastolic congestive heart failure    Dyslipidemia    History of pseudoaneurysm    Glaucoma    Closed C2 fracture    Open dens fracture    Dens fracture    Dens fracture    Fusion, occipital bone with C1 and C2 vertebrae, posterior approach    C2 cervical fracture    Open dens fracture    Anterior displaced type ii dens fracture, sequela    H/O aortic valve stenosis    Hyperlipidemia, unspecified hyperlipidemia type    Hypertension    Glaucoma    Cervical spinal stenosis    Anxiety    CHF, acute    Chronic CHF    Hyperkalemia    Hyponatremia    Fusion, occipital bone with C1 and C2 vertebrae, posterior approach    History of orthopedic surgery    S/P AVR    H/O lumbosacral spine surgery    S/P hip replacement, right    S/P hip replacement    S/P right coronary artery (RCA) stent placement    NECK PAIN    2    Room Service Assist    Room Service Assist    Room Service Assist    Room Service Assist    Neck pain    H/O aortic valve stenosis    S/P hip replacement    Glaucoma    Hypertension    Diabetes insipidus    S/P AVR      PLAN:  NEURO      CARDIOVASCULAR:    PULMONARY:    RENAL:    GI:    HEME:    ID:    ENDO:    DVT PROPHYLAXIS:  [] Venodynes                                [] Heparin/Lovenox    DISPOSITION:    Assessment:  Present when checked    []  GCS  E   V  M     Heart Failure: []Acute, [] acute on chronic , []chronic  Heart Failure:  [] Diastolic (HFpEF), [] Systolic (HFrEF), []Combined (HFpEF and HFrEF), [] RHF, [] Pulm HTN, [] Other    [] KAYLYN, [] ATN, [] AIN, [] other  [] CKD1, [] CKD2, [] CKD 3, [] CKD 4, [] CKD 5, []ESRD    Encephalopathy: [] Metabolic, [] Hepatic, [] toxic, [] Neurological, [] Other    Abnormal Nurtitional Status: [] malnurtition (see nutrition note), [ ]underweight: BMI < 19, [] morbid obesity: BMI >40, [] Cachexia    [] Sepsis  [] hypovolemic shock,[] cardiogenic shock, [] hemorrhagic shock, [] neuogenic shock  [] Acute Respiratory Failure  []Cerebral edema, [] Brain compression/ herniation,   [] Functional quadriplegia  [] Acute blood loss anemia   HPI:  89 year old female, PMH HTN, HLD, CHF, severe AS, and chronic C-2 fracture x 2 years, presented to [facility] ED with worsening weakness and difficulty ambulating since 7/14. Per home health aide, pt had a fall with +head strike on July 6th which she did not receive medical attention for. On 7/14, HHA noted pt was having more difficulty ambulating and was dropping objects and not able to feed herself as before. Pt was seen on 7/17 at [facility] and had a CT head and CT c-spine. 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 The provider at [facility] offered to pt, family, and pt's PCP, Dr. Doan who was at bedside, to call spine surgery at [facility] and all parties refused, preferring to take patient home. Pt was discharged home with her HHA. Since 7/17, pt's weakness has progressed, prompting them to come to the ED. She denies falls since 7/6. Pt came in with soft collar on, which was removed by HHA while in ED.  (19 Jul 2024 16:16)    SUBJECTIVE: Pt reports she has no pain this morning but is still uncomfortable in the hard collar and having some itching on the right side of her neck close to the edge of her dressing, better since it initially started last night.       HOSPITAL COURSE:  7/19: admitted to [facility] for surgery with Dr. Puckett, given 15mg torodol for pain, 0.25 dilaudid for pain, 0.125 xanax for anxiety, 10 hydral for high BP.   7/20: FEDERICA, remains on precedex. Cardiology consulted for preop clearance.   7/21: FEDERICA ovn. cardiology clearance obtained. Liquid BMs x3, started on metamucil.  7/22: TTE complete. DC precedex.   7/23: FEDERICA overnight. Na+ 131 from 141. rpt 135, 500cc bolus given.   7/24: FEDERICA overnight. OR today. POD0 from O-C5 fusion, c1 lami. Post-op pulses not palpable, vasc consulted. PT and AT pulses dopplerable b/l, no DP. RUE pulses not dopplerable. Hep gtt + bolus started per vascular. duplex: R radial occlusion. SBP 80s-90s, given 500cc bolus NS and 250cc albumin. NGT placed  7/25: POD1. arterial dopplers performed, +R radial occlusion. PTT o/n >200, heparin gtt held x 4 hrs until ptt in goal. heparin gtt resumed 1000u/hr. transition propofol to precedex. (+) cuff leak. Extubated to NC by anesthesia. PTT >200, hep gtt paused. Bowel reg inc. PTT 75.6. Hep gtt restarted @ 800u.   7/26: POD2. SC o/n. Xrays complete. CT c spine complete. SQH tonight.   7/27: POD 3 occiput-C5 fusion. FEDERICA overnight. Midodrine decreased to 2.5q8.  7/28: POD 4 occiput- C5 fusion. FEDERICA overnight, neuro stable. HMV drain removed; nitro paste d/c'd. Midodrine dc'd.  7/29: POD 5 occiput-C5 fusion. FEDERICA overnight. SD status. Downtrending hgb, trend w AM labs. Biotene swish/swallow for dry mouth.   7/30: POD 6. FEDERICA overnight, neuro stable. x1 Lokelma 5 mg po given for hyperkalemia (K : 5.4)   7/31: POD7, FEDERICA overnight. IV removed from LUE, arm to be elevated, +ace wrap. Added metamucil for loose stools. Pending urine studies for low Na. Ordered lokelma 5mg x 1 for hyperkalemia. Urine osm 172.   8/1: POD8, FEDERICA overnight. hgb 7.8 from 8.3, repeat hgb increased to 8.0, aquacel removed and ABD pad with paper tape applied, lozenges ordered for dry mouth  8/2: POD9, FEDERICA overnight, calamine lotion ordered prn for right neck itchiness      Vital Signs Last 24 Hrs  T(C): 36.5 (02 Aug 2024 00:57), Max: 36.9 (01 Aug 2024 20:47)  T(F): 97.7 (02 Aug 2024 00:57), Max: 98.4 (01 Aug 2024 20:47)  HR: 89 (02 Aug 2024 00:57) (82 - 97)  BP: 126/58 (02 Aug 2024 00:57) (98/53 - 144/78)  BP(mean): 70 (01 Aug 2024 16:00) (70 - 104)  RR: 18 (02 Aug 2024 00:57) (16 - 18)  SpO2: 98% (02 Aug 2024 00:57) (94% - 98%)    Parameters below as of 02 Aug 2024 00:57  Patient On (Oxygen Delivery Method): room air        I&O's Summary    31 Jul 2024 07:01  -  01 Aug 2024 07:00  --------------------------------------------------------  IN: 0 mL / OUT: 2750 mL / NET: -2750 mL    01 Aug 2024 07:01  -  02 Aug 2024 06:54  --------------------------------------------------------  IN: 0 mL / OUT: 1500 mL / NET: -1500 mL        PHYSICAL EXAM:  GENERAL: NAD, AA&Ox4  HEENT: PERRL, EOMI, neck incision covered with ABD Pad and paper tape, hard collar in place, bruising on posterior neck surrounding incision  CARDIO: RRR  PULM: normal respiratory effort, clear to auscultation bilaterally   GI: +BS, nontender, no megaly or masses appreciated   NEURO:  MENTAL STATUS: AA&Ox4, memory intact, occasionally needing reminders of plan  LANG/SPEECH: fluent, follows commands  CRANIAL NERVES:  II: Pupils equal and reactive  III, IV, VI: EOM intact, no gaze preference or deviation  V: normal sensation in upper and lower extremities bilaterally   VII: symmetric facial expression, blinking, opening eyes, raising eyebrows, smiling,   VIII: hearing to speech decreased appropriate for age  IX, X: did not examine  XI: did not examine shoulder shrug or neck movement due to hard collar  XII: midline tongue protrusion  MOTOR:  RUE: 5/5   RLE: 5/5  LUE: 4/5 proximally, 5/5 distally   LLE: 5/5     REFLEXES: did not examine  SENSORY:  Normal and equal to touch in all extremities  COORD:  no tremor, no dysmetria  STATION: normal stance, no truncal ataxia  GAIT: Normal; patient able to tip-toe, heel-walk.    WOUND/DRESSING:    TUBES/LINES:  [] Vega  [] Trach  [] NGT  [] PEG  [] Wound Drains  [] Others    DIET:  [] NPO  [] Mechanical  [] Tube feeds    LABS:                        8.0    11.69 )-----------( 265      ( 01 Aug 2024 17:29 )             25.6     08-01    134<L>  |  94<L>  |  15  ----------------------------<  116<H>  4.4   |  34<H>  |  0.83    Ca    8.9      01 Aug 2024 12:00  Phos  3.0     08-01  Mg     1.9     08-01        Urinalysis Basic - ( 01 Aug 2024 12:00 )    Color: x / Appearance: x / SG: x / pH: x  Gluc: 116 mg/dL / Ketone: x  / Bili: x / Urobili: x   Blood: x / Protein: x / Nitrite: x   Leuk Esterase: x / RBC: x / WBC x   Sq Epi: x / Non Sq Epi: x / Bacteria: x          Allergies    penicillin (Unknown)  erythromycin (Unknown)  [This allergen will not trigger allergy alert] Acetic Zr-Rvolmpnmcy-Azinwbjjs (Other)    Intolerances      MEDICATIONS:  Antibiotics:    Neuro:  acetaminophen     Tablet .. 650 milliGRAM(s) Oral every 6 hours PRN  ALPRAZolam 0.25 milliGRAM(s) Oral at bedtime PRN  DULoxetine 30 milliGRAM(s) Oral two times a day  methocarbamol 500 milliGRAM(s) Oral every 8 hours PRN  ondansetron Injectable 4 milliGRAM(s) IV Push every 6 hours PRN    Anticoagulation:  aspirin enteric coated 81 milliGRAM(s) Oral daily  heparin   Injectable 5000 Unit(s) SubCutaneous every 12 hours    OTHER:  artificial  tears Solution 1 Drop(s) Both EYES four times a day PRN  benzocaine/menthol Lozenge 1 Lozenge Oral four times a day PRN  calamine/zinc oxide Lotion 1 Application(s) Topical every 6 hours PRN  chlorhexidine 2% Cloths 1 Application(s) Topical <User Schedule>  latanoprost 0.005% Ophthalmic Solution 1 Drop(s) Both EYES at bedtime  polyethylene glycol 3350 17 Gram(s) Oral daily  psyllium Powder 1 Packet(s) Oral daily  senna 2 Tablet(s) Oral at bedtime  simethicone 80 milliGRAM(s) Chew every 8 hours    IVF:  ascorbic acid 500 milliGRAM(s) Oral daily  multivitamin 1 Tablet(s) Oral daily      ASSESSMENT:  89y Female with PMHx HTN, HLD, CHF, AS, chronic C2 fractures, now post-op day 9 Occipital-C5 fusion with C1 laminectomy. Post-op course complicated by swelling in bilateral upper extremities L>R, clots ruled out, currently awaiting acceptance and transfer to subacute acute rehab.     C2 CERVICAL FRACTURE;NECK PAIN  Allergy status to other antibiotic agents  Allergy status to penicillin  Aneurysm of unspecified site  Anterior displaced type ii dens fracture, initial encounter for closed fracture  Atherosclerotic heart disease of native coronary artery without angina pectoris  Bilateral primary osteoarthritis of knee  CAP GLAUC PSX LENS LT EYE MOD STAGE  CAPSLR GLAUCOMA W/PSEUDXF LENS, BILATERAL, MODERATE STAGE  Cervicalgia  Chronic diastolic (congestive) heart failure  Dizziness and giddiness  Hyperlipidemia, unspecified  Hypertensive heart disease with heart failure  Iliotibial band syndrome, right leg  Long term (current) use of aspirin  Nonrheumatic aortic (valve) stenosis  Old myocardial infarction  Other specific arthropathies, not elsewhere classified, left shoulder  Pain in right leg  Pain in unspecified shoulder  Personal history of other diseases of the circulatory system  Personal history of other endocrine, nutritional and metabolic disease  Presence of other heart-valve replacement  Shortness of breath  Unspecified glaucoma  Vertigo of central origin  History of tobacco use  Sex Assigned At Birth  Sex Assigned At Birth  Alcohol intake  FH: lung cancer (Father)  Handoff  MEWS Score  Prior  Essential hypertension  Hyperlipidemia, unspecified hyperlipidemia type  Diabetes insipidus  H/O aortic valve stenosis  Hypertension  Vertigo  Chronic diastolic congestive heart failure  Dyslipidemia  History of pseudoaneurysm  Glaucoma  Closed C2 fracture  Open dens fracture  Dens fracture  Dens fracture  Fusion, occipital bone with C1 and C2 vertebrae, posterior approach  C2 cervical fracture  Open dens fracture  Anterior displaced type ii dens fracture, sequela  H/O aortic valve stenosis  Hyperlipidemia, unspecified hyperlipidemia type  Hypertension  Glaucoma  Cervical spinal stenosis  Anxiety  CHF, acute  Chronic CHF  Hyperkalemia  Hyponatremia  Fusion, occipital bone with C1 and C2 vertebrae, posterior approach  History of orthopedic surgery  S/P AVR  H/O lumbosacral spine surgery  S/P hip replacement, right  S/P hip replacement  S/P right coronary artery (RCA) stent placement  NECK PAIN  Neck pain  H/O aortic valve stenosis  S/P hip replacement  Glaucoma  Hypertension  Diabetes insipidus  S/P AVR    PLAN:  NEURO:  -neuro/vitals q4  -protected sleep time 95rs2-6cu  -hard collar at all times  -wound care: ABD pad & paper tape (8/1) replaced aquacel, itching around bandage on R ride of neck, treated with calamine lotion  -pain control: tylenol PRN   -deep HMV dc'd 7/28   -postop XR (7/26) and CT C-spine (7/26) complete    CARDIOVASCULAR:  - SBP <160  - hx CHF: TTE 7/22 EF 67% TAVR, mod MR, mod MS, mild mod TR, pulm HTN  - home ASA 81mg     PULMONARY:  - RA    RENAL:  - voiding, SC prn  - K+ 5.4 (7/31), 5mg lokelma  - Na 131 (7/31), hx of DI, urine osmolality 172 (7/31), unlikely DI due to hyponatremia, discussed with medicine and pt's outpatient nephrologist who agree more likely psychogenic, encourage losanges to keep mouth moist, decrease water intake    GI:  - regular diet  - bowel reg, mirilax daily, senna at bedtime pm, simethicone q8, metamucil  - LBM 7/30     HEME:  - DVT ppx: SCDs, SQH 5000u q12  - arterial dopplers 7/24: R mid-radial occlusion, no other occlusions in LUE b/l legs  - b/l UE duplex for swelling negative 7/28    -L arm bruising/swelling- ace wrap, elevate, ice packs, IV removed 7/31  - Hgb 8 (8/1), recheck CBC with diff and d/c blood draws     PSYCH:  Anxiety: home Xanax 0/25mg qhs prn, cymbalta 30mg      Dispo: SDU, full code, PT/OT    d/w Dr. Puckett       HPI:  89 year old female, PMH HTN, HLD, CHF, severe AS, and chronic C-2 fracture x 2 years, presented to [facility] ED with worsening weakness and difficulty ambulating since 7/14. Per home health aide, pt had a fall with +head strike on July 6th which she did not receive medical attention for. On 7/14, HHA noted pt was having more difficulty ambulating and was dropping objects and not able to feed herself as before. Pt was seen on 7/17 at [facility] and had a CT head and CT c-spine. 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 The provider at [facility] offered to pt, family, and pt's PCP, Dr. Doan who was at bedside, to call spine surgery at [facility] and all parties refused, preferring to take patient home. Pt was discharged home with her HHA. Since 7/17, pt's weakness has progressed, prompting them to come to the ED. She denies falls since 7/6. Pt came in with soft collar on, which was removed by HHA while in ED.  (19 Jul 2024 16:16)    SUBJECTIVE: Pt reports she has no pain this morning but is still uncomfortable in the hard collar and having some itching on the right side of her neck close to the edge of her dressing, better since it initially started last night.       HOSPITAL COURSE:  7/19: admitted to [facility] for surgery with Dr. Puckett, given 15mg torodol for pain, 0.25 dilaudid for pain, 0.125 xanax for anxiety, 10 hydral for high BP.   7/20: FEDERICA, remains on precedex. Cardiology consulted for preop clearance.   7/21: FEDERICA ovn. cardiology clearance obtained. Liquid BMs x3, started on metamucil.  7/22: TTE complete. DC precedex.   7/23: FEDERICA overnight. Na+ 131 from 141. rpt 135, 500cc bolus given.   7/24: FEDERICA overnight. OR today. POD0 from O-C5 fusion, c1 lami. Post-op pulses not palpable, vasc consulted. PT and AT pulses dopplerable b/l, no DP. RUE pulses not dopplerable. Hep gtt + bolus started per vascular. duplex: R radial occlusion. SBP 80s-90s, given 500cc bolus NS and 250cc albumin. NGT placed  7/25: POD1. arterial dopplers performed, +R radial occlusion. PTT o/n >200, heparin gtt held x 4 hrs until ptt in goal. heparin gtt resumed 1000u/hr. transition propofol to precedex. (+) cuff leak. Extubated to NC by anesthesia. PTT >200, hep gtt paused. Bowel reg inc. PTT 75.6. Hep gtt restarted @ 800u.   7/26: POD2. SC o/n. Xrays complete. CT c spine complete. SQH tonight.   7/27: POD 3 occiput-C5 fusion. FEDERICA overnight. Midodrine decreased to 2.5q8.  7/28: POD 4 occiput- C5 fusion. FEDERICA overnight, neuro stable. HMV drain removed; nitro paste d/c'd. Midodrine dc'd.  7/29: POD 5 occiput-C5 fusion. FEDERICA overnight. SD status. Downtrending hgb, trend w AM labs. Biotene swish/swallow for dry mouth.   7/30: POD 6. FEDERICA overnight, neuro stable. x1 Lokelma 5 mg po given for hyperkalemia (K : 5.4)   7/31: POD7, FEDERICA overnight. IV removed from LUE, arm to be elevated, +ace wrap. Added metamucil for loose stools. Pending urine studies for low Na. Ordered lokelma 5mg x 1 for hyperkalemia. Urine osm 172.   8/1: POD8, FEDERICA overnight. hgb 7.8 from 8.3, repeat hgb increased to 8.0, aquacel removed and ABD pad with paper tape applied, lozenges ordered for dry mouth  8/2: POD9, FEDERICA overnight, calamine lotion ordered prn for right neck itchiness      Vital Signs Last 24 Hrs  T(C): 36.5 (02 Aug 2024 00:57), Max: 36.9 (01 Aug 2024 20:47)  T(F): 97.7 (02 Aug 2024 00:57), Max: 98.4 (01 Aug 2024 20:47)  HR: 89 (02 Aug 2024 00:57) (82 - 97)  BP: 126/58 (02 Aug 2024 00:57) (98/53 - 144/78)  BP(mean): 70 (01 Aug 2024 16:00) (70 - 104)  RR: 18 (02 Aug 2024 00:57) (16 - 18)  SpO2: 98% (02 Aug 2024 00:57) (94% - 98%)    Parameters below as of 02 Aug 2024 00:57  Patient On (Oxygen Delivery Method): room air        I&O's Summary    31 Jul 2024 07:01  -  01 Aug 2024 07:00  --------------------------------------------------------  IN: 0 mL / OUT: 2750 mL / NET: -2750 mL    01 Aug 2024 07:01  -  02 Aug 2024 06:54  --------------------------------------------------------  IN: 0 mL / OUT: 1500 mL / NET: -1500 mL        PHYSICAL EXAM:  GENERAL: NAD, AA&Ox4  HEENT: PERRL, EOMI, ecchymosis posterior neck surrounding incision, no evidence of hematoma   CARDIO: RRR  PULM: normal respiratory effort, clear to auscultation bilaterally   GI: +BS to auscultation, nontender, no masses to palpation   NEURO:  MENTAL STATUS: AA&Ox4, memory intact, occasionally needing reminders of plan  LANG/SPEECH: fluent, follows commands  CRANIAL NERVES:  II: Pupils equal and reactive  III, IV, VI: EOM intact, no gaze preference or deviation  V: normal sensation in upper and lower extremities bilaterally   VII: symmetric facial expression, blinking, opening eyes, raising eyebrows, smiling,   VIII: auditory acuity decreased (appropriate for age)   IX, X: did not examine  XI: did not examine shoulder shrug or neck movement due to hard collar  XII: midline tongue protrusion  MOTOR:  RUE: 5/5   RLE: 5/5  LUE: 4/5 proximally, 5/5 distally   LLE: 5/5   REFLEXES: did not examine  SENSORY:  Normal and equal to touch in all extremities  COORD:  no tremor, no dysmetria  STATION: normal stance, no truncal ataxia  GAIT: Normal; patient able to tip-toe, heel-walk.    WOUND/DRESSING: posterior neck incision covered with ABD pad and paper tape, [place] J collar in-place     DIET:  [] NPO  [X] Mechanical  [] Tube feeds    LABS:                        8.0    11.69 )-----------( 265      ( 01 Aug 2024 17:29 )             25.6     08-01    134<L>  |  94<L>  |  15  ----------------------------<  116<H>  4.4   |  34<H>  |  0.83    Ca    8.9      01 Aug 2024 12:00  Phos  3.0     08-01  Mg     1.9     08-01        Urinalysis Basic - ( 01 Aug 2024 12:00 )    Color: x / Appearance: x / SG: x / pH: x  Gluc: 116 mg/dL / Ketone: x  / Bili: x / Urobili: x   Blood: x / Protein: x / Nitrite: x   Leuk Esterase: x / RBC: x / WBC x   Sq Epi: x / Non Sq Epi: x / Bacteria: x          Allergies    penicillin (Unknown)  erythromycin (Unknown)  [This allergen will not trigger allergy alert] Acetic Hw-[id number]-Mhndxuihw (Other)    Intolerances      MEDICATIONS:  Antibiotics:    Neuro:  acetaminophen     Tablet .. 650 milliGRAM(s) Oral every 6 hours PRN  ALPRAZolam 0.25 milliGRAM(s) Oral at bedtime PRN  DULoxetine 30 milliGRAM(s) Oral two times a day  methocarbamol 500 milliGRAM(s) Oral every 8 hours PRN  ondansetron Injectable 4 milliGRAM(s) IV Push every 6 hours PRN    Anticoagulation:  aspirin enteric coated 81 milliGRAM(s) Oral daily  heparin   Injectable 5000 Unit(s) SubCutaneous every 12 hours    OTHER:  artificial  tears Solution 1 Drop(s) Both EYES four times a day PRN  benzocaine/menthol Lozenge 1 Lozenge Oral four times a day PRN  calamine/zinc oxide Lotion 1 Application(s) Topical every 6 hours PRN  chlorhexidine 2% Cloths 1 Application(s) Topical <User Schedule>  latanoprost 0.005% Ophthalmic Solution 1 Drop(s) Both EYES at bedtime  polyethylene glycol 3350 17 Gram(s) Oral daily  psyllium Powder 1 Packet(s) Oral daily  senna 2 Tablet(s) Oral at bedtime  simethicone 80 milliGRAM(s) Chew every 8 hours    IVF:  ascorbic acid 500 milliGRAM(s) Oral daily  multivitamin 1 Tablet(s) Oral daily      ASSESSMENT:  89y Female with PMHx HTN, HLD, CHF, AS, chronic C2 fractures, now post-op day 9 Occipital-C5 fusion with C1 laminectomy. Post-op course complicated by swelling in bilateral upper extremities L>R, clots ruled out, currently awaiting acceptance and transfer to subacute acute rehab.     C2 CERVICAL FRACTURE;NECK PAIN  Allergy status to other antibiotic agents  Allergy status to penicillin  Aneurysm of unspecified site  Anterior displaced type ii dens fracture, initial encounter for closed fracture  Atherosclerotic heart disease of native coronary artery without angina pectoris  Bilateral primary osteoarthritis of knee  CAP GLAUC PSX LENS LT EYE MOD STAGE  CAPSLR GLAUCOMA W/PSEUDXF LENS, BILATERAL, MODERATE STAGE  Cervicalgia  Chronic diastolic (congestive) heart failure  Dizziness and giddiness  Hyperlipidemia, unspecified  Hypertensive heart disease with heart failure  Iliotibial band syndrome, right leg  Long term (current) use of aspirin  Nonrheumatic aortic (valve) stenosis  Old myocardial infarction  Other specific arthropathies, not elsewhere classified, left shoulder  Pain in right leg  Pain in unspecified shoulder  Personal history of other diseases of the circulatory system  Personal history of other endocrine, nutritional and metabolic disease  Presence of other heart-valve replacement  Shortness of breath  Unspecified glaucoma  Vertigo of central origin  History of tobacco use  Sex Assigned At Birth  Sex Assigned At Birth  Alcohol intake  FH: lung cancer (Father)  Handoff  MEWS Score  Prior  Essential hypertension  Hyperlipidemia, unspecified hyperlipidemia type  Diabetes insipidus  H/O aortic valve stenosis  Hypertension  Vertigo  Chronic diastolic congestive heart failure  Dyslipidemia  History of pseudoaneurysm  Glaucoma  Closed C2 fracture  Open dens fracture  Dens fracture  Dens fracture  Fusion, occipital bone with C1 and C2 vertebrae, posterior approach  C2 cervical fracture  Open dens fracture  Anterior displaced type ii dens fracture, sequela  H/O aortic valve stenosis  Hyperlipidemia, unspecified hyperlipidemia type  Hypertension  Glaucoma  Cervical spinal stenosis  Anxiety  CHF, acute  Chronic CHF  Hyperkalemia  Hyponatremia  Fusion, occipital bone with C1 and C2 vertebrae, posterior approach  History of orthopedic surgery  S/P AVR  H/O lumbosacral spine surgery  S/P hip replacement, right  S/P hip replacement  S/P right coronary artery (RCA) stent placement  NECK PAIN  Neck pain  H/O aortic valve stenosis  S/P hip replacement  Glaucoma  Hypertension  Diabetes insipidus  S/P AVR    PLAN:  NEURO:  -neuro/vitals q4  -protected sleep time 10pm-5am  -hard collar at all times (Karlene GRANT)   -wound care: ABD pad & paper tape (8/1) replaced aquacel, itching around bandage on R ride of neck, treated with calamine lotion  -pain control: tylenol PRN   -deep HMV dc'd 7/28   -postop XR (7/26) and CT C-spine (7/26) complete    CARDIOVASCULAR:  - SBP <160  - hx CHF: TTE 7/22 EF 67% TAVR, mod MR, mod MS, mild mod TR, pulm HTN  - home ASA 81mg     PULMONARY:  - RA    RENAL:  - voiding, SC prn  - K+ 5.4 (7/31), 5mg lokelma  - Na 131 (7/31), now 136 (8/2) hx of DI, urine osmolality 172 (7/31), unlikely DI due to hyponatremia, discussed with medicine and pt's outpatient nephrologist who agree more likely psychogenic, encourage losanges to keep mouth moist, decrease water intake    GI:  - regular diet  - bowel reg, Miralax daily, senna at bedtime pm, simethicone q8, metamucil  - LBM 8/2    HEME:  - DVT ppx: SCDs, SQH 5000u q12  - arterial dopplers 7/24: R mid-radial occlusion, no occlusion LUE or b/l LE   - b/l UE duplex for swelling negative 7/28   - L arm bruising/swelling- ace wrap, elevate, ice packs, IV removed 7/31  - Hgb downtrending likely d/t increased PO fluid intake, stable      PSYCH:  Anxiety: home Xanax 0/25mg qhs prn, Cymbalta 30mg    ID:   - afebrile, rectal temp 99.3F, no tylenol over the past 48hrs   - WBC stable, no neutrophil predominance in diff   - COVID swab done for rehab - negative    Dispo: SDU, full code, PT rec AR pend STEVEN     D/w Dr Puckett

## 2024-08-02 NOTE — PROGRESS NOTE ADULT - PROBLEM SELECTOR PLAN 9
K of 5.4 on 07/30 that responded to lokelma 5mg - dropped to 4.9 in evening. K back up to 5.4 on 7/31.    Plan:  - improved after Lokelma  Resolved

## 2024-08-02 NOTE — PROGRESS NOTE ADULT - PROBLEM SELECTOR PLAN 3
Hx of HFpEF, previously on lasix and elizabeth but have since been discontinued due to hx of hyponatremia. TTE done this admission with EF of 65% and likely diastolic dysfunction consistent with previous diagnosis.     Plan:  - f/u cardiology as outpatient, follows with Dr Fernandez.
s/p TAVR. TTE this admission with normal biventricular function, moderate MR, moderate MS, mild-moderate TR, and TAVR with AV gradients unchanged from prior. Valvular disease stable from prior as well. Cardiology following    Plan:  - no further intervention, follow up outpatient  - c/w Aspirin 81 mg qd.
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 Bowling) outpatient. Sodium has been relatively stable throughout admission with acute drop to 131 on 7/31. No neurological changes. Urine Na 34, Urine Osm 174, consistent with polydipsia picture  - Normalized today    Plan:  - strict I/O's w/ fluid restriction as tolerated  - improving, avoid allowing over drinking.  Consider family brining in her Xylimelts lozenges for dry mouth to prevent over thirst.

## 2024-08-02 NOTE — PROGRESS NOTE ADULT - TIME BILLING
More than 50 percent of which was spent on counseling and coordination of care.
As above
As above
Review of hospital course, labs, vitals, medical records.   Bedside exam and interview    Discussed plan of care with primary team   Documenting the encounter

## 2024-08-02 NOTE — PROGRESS NOTE ADULT - PROVIDER SPECIALTY LIST ADULT
Cardiology
NSICU
Neurosurgery
Vascular Surgery
Cardiology
Cardiology
NSICU
Neurosurgery
Vascular Surgery
NSICU
Neurosurgery
Vascular Surgery
Vascular Surgery
NSICU
NSICU
Neurosurgery
NSICU
Hospitalist
Internal Medicine
Internal Medicine

## 2024-08-02 NOTE — PROGRESS NOTE ADULT - PROBLEM SELECTOR PLAN 2
WBC 11.8 has been stable in Mid 11s, briefly was 9-10  - No Left shift on differential, no localizing signs of infection  - has been afebrile, 99 temp oral this AM, rectal ***.  Has not been on NSAIDs, last Tylenol was 7/31 at 4am  - likely reactive, no signs of infection or indication for antibiotics at this time WBC 11.8 has been stable in Mid 11s, briefly was 9-10  - No Left shift on differential, no localizing signs of infection  - has been afebrile, 99 temp oral this AM, rectal also afebrile at 99.3.  Has not been on NSAIDs, last Tylenol was 7/31 at 4am  - likely reactive, no signs of infection or indication for antibiotics at this time

## 2024-08-02 NOTE — PROGRESS NOTE ADULT - PROBLEM SELECTOR PLAN 5
s/p TAVR. TTE this admission with normal biventricular function, moderate MR, moderate MS, mild-moderate TR, and TAVR with AV gradients unchanged from prior. Valvular disease stable from prior as well. Cardiology following    Plan:  - no further intervention, follow up outpatient  - c/w Aspirin 81 mg qd.
Patient reports blood pressures have been on the lower end as of late. Previously on HF meds which have been discontinued. Blood pressures wnl this admission    Plan:  - continue to monitor.
Home meds of xanax 0.25mg qhs and cymbalta 30mg.

## 2024-08-02 NOTE — DISCHARGE NOTE NURSING/CASE MANAGEMENT/SOCIAL WORK - NSDCFUADDAPPT_GEN_ALL_CORE_FT
Please follow up with Dr. Puckett in the outpatient office. Call [phone] to confirm your appointment date and time. 8/7 telemedicine at 10:30AM with ANGEL Nunez. 8/13 at 11:30AM with ANGEL Nunez.     Please also follow up with your Primary Care Doctor within 1-2 weeks of discharge.     Please follow up with your Cardiologist,  and Nephrologist Dr. Bowling.

## 2024-08-02 NOTE — PROGRESS NOTE ADULT - THIS PATIENT HAS THE FOLLOWING CONDITION(S)/DIAGNOSES ON THIS ADMISSION:
None

## 2024-08-02 NOTE — PROGRESS NOTE ADULT - PROBLEM SELECTOR PLAN 6
Hx of HFpEF, previously on lasix and elizabeth but have since been discontinued due to hx of hyponatremia. TTE done this admission with EF of 65% and likely diastolic dysfunction consistent with previous diagnosis.     Plan:  - f/u cardiology as outpatient, follows with Dr Fernandez.

## 2024-08-02 NOTE — PROGRESS NOTE ADULT - REASON FOR ADMISSION
Chronic C2 fracture

## 2024-08-02 NOTE — PROGRESS NOTE ADULT - ATTENDING COMMENTS
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 Cardiology originally consulted for Pre-operative CV assessment     Review of Studies  - EKG 7/19/24: NSR, rate 72, left axis deviation, 1st degree AV block, IVCD  - TTE 7/22/24: Normal left and right ventricular size and systolic function, EF 67%. Normal atria. A transcatheter aortic valve (TAVR) is noted in the aortic position. The peak transvalvular velocity is 3.21 m/s (previously 3.10 m/s,) the mean transvalvular gradient is 25.00 mmHg, and the LVOT/AV velocity ratio is 0.36. Moderate mitral regurgitation. Moderate mitral stenosis. Mild-to-moderate tricuspid regurgitation. Pulmonary hypertension present, pulmonary artery systolic pressure is 50 mmHg. No pericardial effusion.   - TTE  12/06/2022: Normal left and right ventricular size and systolic function. Moderate aortic stenosis. Moderate mitral regurgitation. Pulmonary artery systolic pressure is 35 mmHg.  -CCTA 6/3/21 revealing calcium score is severe at 768 Agatston units, non obstructive CAD, dLAD is not well visualized but is probably non obstructive, shallow myocardial bridge, bioprosthetic aortic valve noted with nml leaflet thickness and excursion (motion artifactnoted), severe mitral annular/aortic calcification      # Post -Operative CV Risk Assessment  # HFpEF  # Severe AS s/p Bio AVR, s/p ROSLYN Sheri Ultra  # Moderate MS; Moderate MR  - Patient has HFpEF and Multivalvular heart disease. She underwent BioAVR and subsequent ROSLYN in 2021. She also has MS, MR and TR  - She has no known ASCVD; She underwent CCTA in 2021 which revealed NOCAD and Shallow Myocardial Bridge  - She was previously on [place] and Lasix, which have since been discontinued due to Hyponatremia.  - Functional Status is limited due to pain since mechanical fall, but she Is able to walk back and forth down the [place] of her apartment with her Walker. She is able to perform her ADLS without exertional symptoms  - ROS is negative for chest pain, palpitations SOB or RIOS.   - Echo this hospitalization obtained due to high risk Valvular anatomy. Reviewed Echo showing Normal BIV function, with grossly Stable Valvular heart disease from 2022. Patient has moderate gradients across the aortic valve (peak transvalvular velocity is 3.21 m/s (previously 3.10 m/s). She has Severe MAC with Moderate MR and Moderate MS (Previously mild)  - Patient underwent successful Fusion of occipital bone with C1 and C2 vertebrae  - Clinically she is euvolemic, without evidence of pulmonary edema. She is warm and well compensated.  - Will reasses volume status on the daily  - Cont with ASA 81 mg po daily  - Cont with Heparin Gtt for management of Mid R Radial occlusion  - Cont to wean off Phenylephrine as tolerate  - Cardiology will cont to follow with you, please call with any questions .
Ms. Michelle Duval is a 89F w/ HTN, HLD, CHF, severe AS s/p TAVR, and chronic C-2 fracture now admitted for headstrike, weakness and difficulty ambulating, now s/p occipital bone to C1-C2 fusion by neurosurgery. Reportedly, anesthesia team had great difficulty in placing arterial lines for the case (R radial a-line "clotted" then attempted R brachial a-line, then L radial, L brachial, R femoral then L femoral. Postoperatively there was some concern for some cyanosis of her feet, toes and L fingers. Vascular surgery was consulted. On our evaluation, she had dopplerable radial, ulnar, palmar arch, AT and PT signals bilaterally. Palpable brachial, femoral, and popliteal pulses. She was intubated, but did not appear to be in any pain. She was on some phenylephrine. bedside informal ultrasound appears to show flow in the distal radial, ulnar bilaterally. There may be an occlusion of mid R radial artery.       Overnight, arterial duplex showed mid R radial occlusion, but patent ulnar and retrograde flow of the distal R radial. All other arteries seemed fine on US. Furthermore, her exam has significantly improved with heparin and nitropaste. Color of toes and fingers improved. The R hand where the mid r radial occlusion was found, remains stable on exam. Motor and sensation grossly intact.       Today (7/26/24), she is extubated, feeling well with no complaints in her hands or feet. She stable exam of her extremities. Dopplerable distal radial, ulnar, palmar arch and dopplerable DP/PT signals bilaterally as well. Motor and sensation intact. However informed there may have been carola surgical site bleeding on heparin, so nsgy requesting to possibly hold AC.        ASSESSMENT  - Difficult a-line placement by anesthesia with concern for cyanosis in several toes and fingers --> arterial duplex showed mid R radial occlusion, but patent ulnar and retrograde flow of the distal R radial. All other arteries seemed fine on US. Furthermore, her exam has significantly improved with heparin and nitropaste. Motor and sensation intact.       PLAN & RECOMMENDATIONS  - Can hold AC if primary team concerned about bleeding from surgical site, but otherwise would ideally continue IV heparin drip  - Apply nitropaste to all fingers and toes  - Serial exams      Thank you,    Ottoniel Caraballo MD   of Vascular Surgery  [facility] at [address], [address] [place] [address]  Office: [phone]; Fax: [phone]  [email or web address]
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 Cardiology is consulted for Pre-operative CV assessment     Review of Studies  - EKG 7/19/24: NSR, rate 72, left axis deviation, 1st degree AV block, IVCD  - TTE 7/22/24: Normal left and right ventricular size and systolic function, EF 67%. Normal atria. A transcatheter aortic valve (TAVR) is noted in the aortic position. The peak transvalvular velocity is 3.21 m/s (previously 3.10 m/s,) the mean transvalvular gradient is 25.00 mmHg, and the LVOT/AV velocity ratio is 0.36. Moderate mitral regurgitation. Moderate mitral stenosis. Mild-to-moderate tricuspid regurgitation. Pulmonary hypertension present, pulmonary artery systolic pressure is 50 mmHg. No pericardial effusion.   - TTE  12/06/2022: Normal left and right ventricular size and systolic function. Moderate aortic stenosis. Moderate mitral regurgitation. Pulmonary artery systolic pressure is 35 mmHg.  -CCTA 6/3/21 revealing calcium score is severe at 768 Agatston units, non obstructive CAD, dLAD is not well visualized but is probably non obstructive, shallow myocardial bridge, bioprosthetic aortic valve noted with nml leaflet thickness and excursion (motion artifactnoted), severe mitral annular/aortic calcification      # Pre-Operative CV Risk Assessment  # HFpEF  # Severe AS s/p Bio AVR, s/p ROSLYN Sheri Ultra  # Moderate MS; Moderate MR  - Patient has HFpEF and Multivalvular heart disease. She underwent BioAVR and subsequent ROSLYN in 2021. She also has MS, MR and TR  - She has no known ASCVD; She underwent CCTA in 2021 which revealed NOCAD and Shallow Myocardial Bridge  - She was previously on [place] and Lasix, which have since been discontinued due to Hyponatremia.  - Functional Status is limited due to pain since mechanical fall, but she Is able to walk back and forth down the [place] of her apartment with her Walker. She is able to perform her ADLS without exertional symptoms  - ROS is negative for chest pain, palpitations SOB or RIOS.   - Echo this hospitalization obtained due to high risk Valvular anatomy. Reviewed Echo showing Normal BIV function, with grossly Stable Valvular heart disease from 2022. Patient has moderate gradients across the aortic valve (peak transvalvular velocity is 3.21 m/s (previously 3.10 m/s). She has Severe MAC with Moderate MR and Moderate MS (Previously mild)  - Clinically she is euvolemic with mildly elevated JVP, without evidence of pulmonary edema. She is warm and well compensated  - At this time, there are no active CV contraindication to proceeding with non elective surgical C2 fracture repair/Replacement  - Patient is Considered at intermediate-high risk for non elective procedure and would benefit from support from Cardiac Anesthesia in the OR  - Cont with ASA 81 mg po daily  - Cardiology will cont to follow with you, please call with any questions
Ms. Michelle Duval is a 89F w/ HTN, HLD, CHF, severe AS s/p TAVR, and chronic C-2 fracture now admitted for headstrike, weakness and difficulty ambulating, now s/p occipital bone to C1-C2 fusion by neurosurgery. Reportedly, anesthesia team had great difficulty in placing arterial lines for the case (R radial a-line "clotted" then attempted R brachial a-line, then L radial, L brachial, R femoral then L femoral. Postoperatively there was some concern for some cyanosis of her feet, toes and L fingers. Vascular surgery was consulted. On our evaluation, she had dopplerable radial, ulnar, palmar arch, AT and PT signals bilaterally. Palpable brachial, femoral, and popliteal pulses. She was intubated, but did not appear to be in any pain. She was on some phenylephrine. bedside informal ultrasound appears to show flow in the distal radial, ulnar bilaterally. There may be an occlusion of mid R radial artery.       Overnight, arterial duplex showed mid R radial occlusion, but patent ulnar and retrograde flow of the distal R radial. All other arteries seemed fine on US. Furthermore, her exam has significantly improved with heparin and nitropaste. Color of toes and fingers improved. The R hand where the mid r radial occlusion was found, remains stable on exam. Motor and sensation grossly intact. She was extubated, feeling well with no complaints in her hands or feet. She stable exam of her extremities. Dopplerable distal radial, ulnar, palmar arch and dopplerable DP/PT signals bilaterally as well. Motor and sensation intact. However informed there may have been carola surgical site bleeding on heparin, so nsgy requesting to hold AC.    Over the weekend, her extremities remained stable off of AC and nitropaste. Today 7/29/24, her signals and pulses of all extremities remain intact. No pain. Motor and sensation intact.         ASSESSMENT  - Difficult a-line placement by anesthesia with concern for cyanosis in several toes and fingers --> arterial duplex showed mid R radial occlusion, but patent ulnar and retrograde flow of the distal R radial. All other arteries seemed fine on US. Furthermore, her exam has significantly improved with heparin and nitropaste and have been stable off them for days now. Motor and sensation intact.       PLAN & RECOMMENDATIONS  - C/w serial extremitie exams by ICU/primary team  - No acute vascular surgery intervention; reconsult vascular PRN if any new concerning changes      Thank you,    Ottoniel Caraballo MD   of Vascular Surgery  [facility] at [address] [place] [address]  Office: [phone]; Fax: [phone]  [email or web address]
I have personally reviewed the above clinical note and all of its components and:  [ ] agree with the above assessment and plan without modifications.  [X] agree with the above with modifications made to the assessment and/or plan of care.  [ ] disagree with the above with significant modifications as listed below:
I have personally reviewed the above clinical note and all of its components and:  [ ] agree with the above assessment and plan without modifications.  [X] agree with the above with modifications made to the assessment and/or plan of care.  [ ] disagree with the above with significant modifications as listed below:
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  - Agree with plan as above with edits made where necessary  - for AR vs home if improves past 2 person assist
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  - Agree with plan as above with edits made where necessary  - hypoNa improving, appears to be from polydipsia  - chronic soft stools, resumed metamucil for bulking  - mild rise in WBC, appears slightly hemoconcentrated

## 2024-08-02 NOTE — DISCHARGE NOTE NURSING/CASE MANAGEMENT/SOCIAL WORK - NSDPFAC_GEN_ALL_CORE
[facility]/ [address] [address]/ Phone: [phone]

## 2024-08-07 ENCOUNTER — APPOINTMENT (OUTPATIENT)
Dept: NEUROSURGERY | Facility: CLINIC | Age: 89
End: 2024-08-07

## 2024-08-07 DIAGNOSIS — H40.1432 CAPSULAR GLAUCOMA WITH PSEUDOEXFOLIATION OF LENS, BILATERAL, MODERATE STAGE: ICD-10-CM

## 2024-08-07 DIAGNOSIS — I25.2 OLD MYOCARDIAL INFARCTION: ICD-10-CM

## 2024-08-07 DIAGNOSIS — F41.9 ANXIETY DISORDER, UNSPECIFIED: ICD-10-CM

## 2024-08-07 DIAGNOSIS — K56.7 ILEUS, UNSPECIFIED: ICD-10-CM

## 2024-08-07 DIAGNOSIS — S14.102A UNSPECIFIED INJURY AT C2 LEVEL OF CERVICAL SPINAL CORD, INITIAL ENCOUNTER: ICD-10-CM
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
## 2024-08-07 DIAGNOSIS — Z88.0 ALLERGY STATUS TO PENICILLIN: ICD-10-CM

## 2024-08-07 DIAGNOSIS — Z79.82 LONG TERM (CURRENT) USE OF ASPIRIN: ICD-10-CM

## 2024-08-07 DIAGNOSIS — Z95.5 PRESENCE OF CORONARY ANGIOPLASTY IMPLANT AND GRAFT: ICD-10-CM

## 2024-08-07 DIAGNOSIS — S12.110A ANTERIOR DISPLACED TYPE II DENS FRACTURE, INITIAL ENCOUNTER FOR CLOSED FRACTURE: ICD-10-CM

## 2024-08-07 DIAGNOSIS — E87.1 HYPO-OSMOLALITY AND HYPONATREMIA: ICD-10-CM

## 2024-08-07 DIAGNOSIS — Z96.643 PRESENCE OF ARTIFICIAL HIP JOINT, BILATERAL: ICD-10-CM

## 2024-08-07 DIAGNOSIS — E86.1 HYPOVOLEMIA: ICD-10-CM

## 2024-08-07 DIAGNOSIS — I25.10 ATHEROSCLEROTIC HEART DISEASE OF NATIVE CORONARY ARTERY WITHOUT ANGINA PECTORIS: ICD-10-CM

## 2024-08-07 DIAGNOSIS — I27.20 PULMONARY HYPERTENSION, UNSPECIFIED: ICD-10-CM
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
## 2024-08-07 DIAGNOSIS — G99.2 MYELOPATHY IN DISEASES CLASSIFIED ELSEWHERE: ICD-10-CM

## 2024-08-07 DIAGNOSIS — Q24.5 MALFORMATION OF CORONARY VESSELS: ICD-10-CM

## 2024-08-07 DIAGNOSIS — E87.5 HYPERKALEMIA: ICD-10-CM

## 2024-08-07 DIAGNOSIS — I11.0 HYPERTENSIVE HEART DISEASE WITH HEART FAILURE: ICD-10-CM

## 2024-08-07 DIAGNOSIS — I08.1 RHEUMATIC DISORDERS OF BOTH MITRAL AND TRICUSPID VALVES: ICD-10-CM

## 2024-08-07 DIAGNOSIS — E78.5 HYPERLIPIDEMIA, UNSPECIFIED: ICD-10-CM

## 2024-08-07 DIAGNOSIS — Z78.1 PHYSICAL RESTRAINT STATUS: ICD-10-CM

## 2024-08-07 DIAGNOSIS — Z88.1 ALLERGY STATUS TO OTHER ANTIBIOTIC AGENTS: ICD-10-CM

## 2024-08-07 DIAGNOSIS — I77.1 STRICTURE OF ARTERY: ICD-10-CM

## 2024-08-07 DIAGNOSIS — D62 ACUTE POSTHEMORRHAGIC ANEMIA: ICD-10-CM

## 2024-08-07 DIAGNOSIS — M48.02 SPINAL STENOSIS, CERVICAL REGION: ICD-10-CM

## 2024-08-07 DIAGNOSIS — D68.59 OTHER PRIMARY THROMBOPHILIA: ICD-10-CM

## 2024-08-07 DIAGNOSIS — I50.32 CHRONIC DIASTOLIC (CONGESTIVE) HEART FAILURE: ICD-10-CM

## 2024-08-07 DIAGNOSIS — Z95.2 PRESENCE OF PROSTHETIC HEART VALVE: ICD-10-CM

## 2024-08-07 DIAGNOSIS — M54.2 CERVICALGIA: ICD-10-CM

## 2024-08-07 PROBLEM — Z98.890 POST-OPERATIVE STATE: Status: ACTIVE | Noted: 2024-08-07

## 2024-08-07 PROCEDURE — 99212 OFFICE O/P EST SF 10 MIN: CPT

## 2024-08-07 NOTE — ASSESSMENT
[FreeTextEntry1] : The patient is currently at [place] re[facility]. She will f/u with Dr. Puckett in a months time. Nurse will call to make that appt.

## 2024-08-07 NOTE — REASON FOR VISIT
[Other Location: e.g. School (Enter Location, City,State)___] : at [unfilled], at the time of the visit. [Verbal consent obtained from patient] : the patient, [unfilled] [Formal Caregiver] : formal caregiver [de-identified] : Occiput to C5 posterior spinal fusion.  Posterior segmental instrumentation, occiput to C5.  Use of local autograft bone and autograft bone to facilitate fusion.  Open reduction and internal fixation of C1-C2 Fracture [de-identified] : 7/24/24

## 2024-08-07 NOTE — HISTORY OF PRESENT ILLNESS
[FreeTextEntry1] : The patient is a 89-year-old woman who presented to the emergency department with hemiplegia.  MRI and CT of her cervical spine showed a fracture subluxation at C2 causing severe stenosis and compression  of the medulla and upper spinal cord.  Given the patient's symptoms and imaging findings, it was felt she would benefit from procedure to decompress and stabilize her cervical spine via NOC fusion.    TODAY: 8/7/24 TTM: Pt currently at [place] re[facility]. Pt Nurse with the pt during the call. They report she is having manageable pain. She is participating is all rehab appointments at [place]. She is moving her left arm against gravity and is standing with a walker and walking short distances. Her left arm swelling has significantly reduced since discharge per the nurse. She had one episode of phlegm getting stuck in her throat, when trying to cough she reported chest pain and an EKG is pending. She is eating, drinking, urinating and having BM without issues per the nurse and pt. She is wearing her collar as instructed. Nurse denies drainage from incision.

## 2024-08-08 ENCOUNTER — RX RENEWAL (OUTPATIENT)
Age: 89
End: 2024-08-08

## 2024-08-13 ENCOUNTER — APPOINTMENT (OUTPATIENT)
Dept: NEUROSURGERY | Facility: CLINIC | Age: 89
End: 2024-08-13

## 2024-08-20 ENCOUNTER — APPOINTMENT (OUTPATIENT)
Dept: NEUROSURGERY | Facility: CLINIC | Age: 89
End: 2024-08-20

## 2024-08-20 ENCOUNTER — APPOINTMENT (OUTPATIENT)
Dept: NEUROSURGERY | Facility: CLINIC | Age: 89
End: 2024-08-20
Payer: MEDICARE

## 2024-08-20 DIAGNOSIS — S12.110A ANTERIOR DISPLACED TYPE II DENS FRACTURE, INITIAL ENCOUNTER FOR CLOSED FRACTURE: ICD-10-CM

## 2024-08-20 PROCEDURE — 99442: CPT | Mod: 93

## 2024-08-20 RX ORDER — CHLORHEXIDINE GLUCONATE 4 %
LIQUID (ML) TOPICAL
Refills: 0 | Status: ACTIVE | COMMUNITY

## 2024-08-20 RX ORDER — SENNOSIDES 8.6 MG/1
TABLET ORAL
Refills: 0 | Status: ACTIVE | COMMUNITY

## 2024-08-20 RX ORDER — DULOXETINE HYDROCHLORIDE 30 MG/1
30 CAPSULE, DELAYED RELEASE ORAL
Refills: 0 | Status: ACTIVE | COMMUNITY

## 2024-08-20 RX ORDER — ALPRAZOLAM 2 MG/1
TABLET ORAL
Refills: 0 | Status: ACTIVE | COMMUNITY

## 2024-08-20 RX ORDER — VITAMIN D3/FOLIC ACID 95 MCG-1MG
TABLET ORAL
Refills: 0 | Status: ACTIVE | COMMUNITY

## 2024-08-20 NOTE — REASON FOR VISIT
[Home] : at home, [unfilled] , at the time of the visit. [Medical Office: ([facility])___] : at the medical office located in  [Formal Caregiver] : formal caregiver [Other:____] : [unfilled] [Verbal consent obtained from patient] : the patient, [unfilled] [de-identified] :  Occiput to C5 posterior spinal fusion.  Posterior segmental instrumentation, occiput to C5.  Use of local autograft bone and autograft bone to facilitate fusion.  Open reduction and internal fixation of C1-C2 Fracture [de-identified] : 7/24/24

## 2024-08-20 NOTE — HISTORY OF PRESENT ILLNESS
[FreeTextEntry1] : The patient is a 89-year-old woman who presented to the emergency department with hemiplegia.  MRI and CT of her cervical spine showed a fracture subluxation at C2 causing severe stenosis and compression  of the medulla and upper spinal cord.  Given the patient's symptoms and imaging findings, it was felt she would benefit from procedure to decompress and stabilize her cervical spine via NOC fusion.    8/7/24 TTM: Pt currently at [place] [facility]. Pt Nurse with the pt during the call. They report she is having manageable pain. She is participating is all rehab appointments at [place]. She is moving her left arm against gravity and is standing with a walker and walking short distances. Her left arm swelling has significantly reduced since discharge per the nurse. She had one episode of phlegm getting stuck in her throat, when trying to cough she reported chest pain and an EKG is pending. She is eating, drinking, urinating and having BM without issues per the nurse and pt. She is wearing her collar as instructed. Nurse denies drainage from incision.  TODAY 8/20/24: Pt now at home with daily caregivers. On the call today was her nurse Ofelia and daughter Massiel. Patient reports she has minimal pain. She is eating, drinking without any issues. Having BM and urinating without difficulty. She is wearing her cervical collar all the time, though she doesn't want to. The nurses are washing her incision daily with soap and water. The nurse states there is no drainage, bleeding, red, swelling or pain to her incision. Pt and nurse state her left arm and leg strength is better every day. She is antigravity with her left arm and walking with the use of a walker. Her left arm swelling continues to resolve. She is scheduled for CT cervical on 8/29

## 2024-08-20 NOTE — ASSESSMENT
[FreeTextEntry1] : The patient is nopw how with home nursing. PT and OT are scheduled to start working with her at home this week. Pt without complaints aside from wishing to remove her collar. She will have CT cervical in 9 days and follow up with Dr. Puckett to assess fusion

## 2024-08-27 ENCOUNTER — APPOINTMENT (OUTPATIENT)
Dept: NEUROSURGERY | Facility: CLINIC | Age: 89
End: 2024-08-27

## 2024-08-28 PROBLEM — Z98.1 S/P CERVICAL SPINAL FUSION: Status: ACTIVE | Noted: 2024-08-28

## 2024-08-29 ENCOUNTER — APPOINTMENT (OUTPATIENT)
Dept: CT IMAGING | Facility: HOSPITAL | Age: 89
End: 2024-08-29

## 2024-08-29 ENCOUNTER — APPOINTMENT (OUTPATIENT)
Dept: NEUROSURGERY | Facility: CLINIC | Age: 89
End: 2024-08-29
Payer: MEDICARE

## 2024-08-29 ENCOUNTER — OUTPATIENT (OUTPATIENT)
Dept: OUTPATIENT SERVICES | Facility: HOSPITAL | Age: 89
LOS: 1 days | End: 2024-08-29

## 2024-08-29 VITALS
SYSTOLIC BLOOD PRESSURE: 136 MMHG | BODY MASS INDEX: 24.35 KG/M2 | WEIGHT: 124 LBS | HEIGHT: 60 IN | DIASTOLIC BLOOD PRESSURE: 86 MMHG | HEART RATE: 80 BPM | OXYGEN SATURATION: 96 % | TEMPERATURE: 97.5 F | RESPIRATION RATE: 18 BRPM

## 2024-08-29 DIAGNOSIS — Z98.1 ARTHRODESIS STATUS: ICD-10-CM

## 2024-08-29 DIAGNOSIS — M54.2 CERVICALGIA: ICD-10-CM

## 2024-08-29 DIAGNOSIS — S12.9XXA FRACTURE OF NECK, UNSPECIFIED, INITIAL ENCOUNTER: ICD-10-CM

## 2024-08-29 DIAGNOSIS — Z95.5 PRESENCE OF CORONARY ANGIOPLASTY IMPLANT AND GRAFT: Chronic | ICD-10-CM

## 2024-08-29 DIAGNOSIS — Z96.649 PRESENCE OF UNSPECIFIED ARTIFICIAL HIP JOINT: Chronic | ICD-10-CM

## 2024-08-29 DIAGNOSIS — Z98.890 OTHER SPECIFIED POSTPROCEDURAL STATES: ICD-10-CM

## 2024-08-29 DIAGNOSIS — Z98.890 OTHER SPECIFIED POSTPROCEDURAL STATES: Chronic | ICD-10-CM

## 2024-08-29 DIAGNOSIS — Z95.2 PRESENCE OF PROSTHETIC HEART VALVE: Chronic | ICD-10-CM

## 2024-08-29 PROCEDURE — 99024 POSTOP FOLLOW-UP VISIT: CPT

## 2024-08-29 PROCEDURE — 72125 CT NECK SPINE W/O DYE: CPT | Mod: 26,MH

## 2024-08-29 PROCEDURE — 72125 CT NECK SPINE W/O DYE: CPT

## 2024-08-29 RX ORDER — VIBEGRON 75 MG/1
TABLET, FILM COATED ORAL
Refills: 0 | Status: ACTIVE | COMMUNITY

## 2024-08-30 NOTE — ASSESSMENT
[FreeTextEntry1] : I, Dr. Puckett, personally performed the evaluation and management (E/M) services for this established patient who presents today for her POA. That E/M includes conducting the examination, assessing all new/exacerbated conditions, and establishing a new plan of care. Today, my ACP, Unique Nunez, was here to observe my evaluation and management services for this new problem/exacerbated condition to be followed going forward.  PLAN: -RTC in 4 weeks with a new CT Cspine - ok to remove collar for meals

## 2024-08-30 NOTE — ADDENDUM
[FreeTextEntry1] : Patient Name: COLLETTE OROZCO  Chief Complaint (CC): - Post-surgical follow-up and strength and pain assessment on the left side and neck.  History of Present Illness: - The patient, a woman, presented with improved left side strength and alleviated neck pain. She has been compliant with wearing her provided collar, including during sleep. In the period following her surgery, the patient's progress has been excellent, with no notable limitations encountered.  Past Medical History (PMH): - The patient recently underwent neck surgery.  Family History (FH): -  Social History (SH): -  Medications: -  Allergies: -  Review of Systems (ROS): -  Physical Examination (PE): - Her left-side strength has shown notable improvement and neck pain has lessened. The incision was also examined and did not present any immediate cause for concern.  Laboratory/Data Review: - Results of the patient's CT scan show an excellent prognosis. Solid screw placement is observed, with no breakage. The maya is in alignment with screws and plate; there is evidence of the fusion graft, and the fusion process is underway along the spine. The patient's brain stem is no longer under pressure, a positive sign.  Assessment: - The patient's early post-surgical recovery appears positive. Strength improvement and pain alleviation indicate that healing is underway successfully.  Plan: - The patient should continue adhering to a nutrient-rich diet and maintain good sleep hygiene for overall wellbeing. She must continue using the collar for another four weeks. The patient should revisit in four weeks during which another CAT scan will be performed. If results are good, she may stop wearing the collar permanently. However, neck massages should be avoided for now.  Preventive Health: -

## 2024-08-30 NOTE — HISTORY OF PRESENT ILLNESS
[FreeTextEntry1] : The patient is a 89-year-old woman who presented to the emergency department with hemiplegia. MRI and CT of her cervical spine showed a fracture subluxation at C2 causing severe stenosis and compression of the medulla and upper spinal cord. Given the patient's symptoms and imaging findings, it was felt she would benefit from procedure to decompress and stabilize her cervical spine via NOC fusion.  8/7/24 TTM: Pt currently at [facility]. Pt Nurse with the pt during the call. They report she is having manageable pain. She is participating is all rehab appointments at [facility]. She is moving her left arm against gravity and is standing with a walker and walking short distances. Her left arm swelling has significantly reduced since discharge per the nurse. She had one episode of phlegm getting stuck in her throat, when trying to cough she reported chest pain and an EKG is pending. She is eating, drinking, urinating and having BM without issues per the nurse and pt. She is wearing her collar as instructed. Nurse denies drainage from incision.  8/20/24: Pt now at home with daily caregivers. On the call today was her nurse Ofelia and daughter Massiel. Patient reports she has minimal pain. She is eating, drinking without any issues. Having BM and urinating without difficulty. She is wearing her cervical collar all the time, though she doesn't want to. The nurses are washing her incision daily with soap and water. The nurse states there is no drainage, bleeding, red, swelling or pain to her incision. Pt and nurse state her left arm and leg strength is better every day. She is antigravity with her left arm and walking with the use of a walker. Her left arm swelling continues to resolve. She is scheduled for CT cervical on 8/29  TODAY: 8/29/24: presents with her aid. in a wheelchair. Had CT cervical prior to appt.

## 2024-08-30 NOTE — HISTORY OF PRESENT ILLNESS
[FreeTextEntry1] : The patient is a 89-year-old woman who presented to the emergency department with hemiplegia. MRI and CT of her cervical spine showed a fracture subluxation at C2 causing severe stenosis and compression of the medulla and upper spinal cord. Given the patient's symptoms and imaging findings, it was felt she would benefit from procedure to decompress and stabilize her cervical spine via NOC fusion.  8/7/24 TTM: Pt currently at [facility]. Pt Nurse with the pt during the call. They report she is having manageable pain. She is participating is all rehab appointments at [facility]. She is moving her left arm against gravity and is standing with a walker and walking short distances. Her left arm swelling has significantly reduced since discharge per the nurse. She had one episode of phlegm getting stuck in her throat, when trying to cough she reported chest pain and an EKG is pending. She is eating, drinking, urinating and having BM without issues per the nurse and pt. She is wearing her collar as instructed. Nurse denies drainage from incision.  8/20/24: Pt now at home with daily caregivers. On the call today was her nurse Ofelia and daughter Massiel. Patient reports she has minimal pain. She is eating, drinking without any issues. Having BM and urinating without difficulty. She is wearing her cervical collar all the time, though she doesn't want to. The nurses are washing her incision daily with soap and water. The nurse states there is no drainage, bleeding, red, swelling or pain to her incision. Pt and nurse state her left arm and leg strength is better every day. She is antigravity with her left arm and walking with the use of a walker. Her left arm swelling continues to resolve. She is scheduled for CT cervical on 8/29  TODAY: 8/29/24: presents with her aid. in a wheelchair. Had CT cervical prior to appt.

## 2024-08-30 NOTE — REASON FOR VISIT
[Formal Caregiver] : formal caregiver [de-identified] : status post Occiput to C5 posterior spinal fusion. Posterior segmental instrumentation, occiput to C5. Use of local autograft bone and autograft bone to facilitate fusion.  Open reduction and internal fixation of C1-C2 Fracture [de-identified] : 7/24/24

## 2024-08-30 NOTE — REASON FOR VISIT
[Formal Caregiver] : formal caregiver [de-identified] : status post Occiput to C5 posterior spinal fusion. Posterior segmental instrumentation, occiput to C5. Use of local autograft bone and autograft bone to facilitate fusion.  Open reduction and internal fixation of C1-C2 Fracture [de-identified] : 7/24/24

## 2024-08-30 NOTE — PHYSICAL EXAM
[General Appearance - Alert] : alert [General Appearance - In No Acute Distress] : in no acute distress [Clean] : clean [Dry] : dry [Healing Well] : healing well [Intact] : intact [No Drainage] : without drainage [Oriented To Time, Place, And Person] : oriented to person, place, and time [Affect] : the affect was normal [Motor Tone] : muscle tone was normal in all four extremities [Motor Strength] : muscle strength was normal in all four extremities [Involuntary Movements] : no involuntary movements were seen [No Muscle Atrophy] : normal bulk in all four extremities [5] : C8 finger flexors 5/5 [4] : S1 toe walking 4/5 [1+] : Patella left 1+ [Erythema] : not erythematous [Tender] : not tender [Warm] : not warm [Indurated] : not indurated

## 2024-08-30 NOTE — HISTORY OF PRESENT ILLNESS
[FreeTextEntry1] : The patient is a 89-year-old woman who presented to the emergency department with hemiplegia. MRI and CT of her cervical spine showed a fracture subluxation at C2 causing severe stenosis and compression of the medulla and upper spinal cord. Given the patient's symptoms and imaging findings, it was felt she would benefit from procedure to decompress and stabilize her cervical spine via NOC fusion.  8/7/24 TTM: Pt currently at [facility]. Pt Nurse with the pt during the call. They report she is having manageable pain. She is participating is all rehab appointments at [facility]. She is moving her left arm against gravity and is standing with a walker and walking short distances. Her left arm swelling has significantly reduced since discharge per the nurse. She had one episode of phlegm getting stuck in her throat, when trying to cough she reported chest pain and an EKG is pending. She is eating, drinking, urinating and having BM without issues per the nurse and pt. She is wearing her collar as instructed. Nurse denies drainage from incision.  8/20/24: Pt now at home with daily caregivers. On the call today was her nurse Ofelia and daughter Massiel. Patient reports she has minimal pain. She is eating, drinking without any issues. Having BM and urinating without difficulty. She is wearing her cervical collar all the time, though she doesn't want to. The nurses are washing her incision daily with soap and water. The nurse states there is no drainage, bleeding, red, swelling or pain to her incision. Pt and nurse state her left arm and leg strength is better every day. She is antigravity with her left arm and walking with the use of a walker. Her left arm swelling continues to resolve. She is scheduled for CT cervical on 8/29  TODAY: 8/29/24: presents with her aid. in a wheelchair. Had CT cervical prior to appt.

## 2024-08-30 NOTE — REASON FOR VISIT
[Formal Caregiver] : formal caregiver [de-identified] : status post Occiput to C5 posterior spinal fusion. Posterior segmental instrumentation, occiput to C5. Use of local autograft bone and autograft bone to facilitate fusion.  Open reduction and internal fixation of C1-C2 Fracture [de-identified] : 7/24/24

## 2024-09-11 ENCOUNTER — APPOINTMENT (OUTPATIENT)
Dept: NEPHROLOGY | Facility: CLINIC | Age: 89
End: 2024-09-11
Payer: MEDICARE

## 2024-09-11 DIAGNOSIS — I50.32 CHRONIC DIASTOLIC (CONGESTIVE) HEART FAILURE: ICD-10-CM

## 2024-09-11 DIAGNOSIS — R63.1 POLYDIPSIA: ICD-10-CM

## 2024-09-11 DIAGNOSIS — I72.9 ANEURYSM OF UNSPECIFIED SITE: ICD-10-CM

## 2024-09-11 DIAGNOSIS — F54 POLYDIPSIA: ICD-10-CM

## 2024-09-11 DIAGNOSIS — R60.0 LOCALIZED EDEMA: ICD-10-CM

## 2024-09-11 DIAGNOSIS — I10 ESSENTIAL (PRIMARY) HYPERTENSION: ICD-10-CM

## 2024-09-11 PROCEDURE — G2212 PROLONG OUTPT/OFFICE VIS: CPT

## 2024-09-11 PROCEDURE — 99215 OFFICE O/P EST HI 40 MIN: CPT

## 2024-09-11 PROCEDURE — G2211 COMPLEX E/M VISIT ADD ON: CPT

## 2024-09-13 NOTE — HISTORY OF PRESENT ILLNESS
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 Still depressed  Physical Exam:  General: NAD, well appearing, pleasant HEENT: Normal facies, MMM Chest - bilateral chest rise, symmetric Abdomen - Nondistended, soft LE - no visible edema or vascular changes Skin - visible skin no rashes or wounds   Major issue is depression. discussed sources of sam  Hyponatremia - resolved off of DDAVP  Urinary fz - Improved, continue gemtessa  Diarrhea - suggested probiotic and fiber supplements  Primary polydipsia - oral care with biotene discussed at length, continue lexapro at 20mg  Itching skin rash - improved with topical, titrating off gabapentin  HTN - at goal, continue as is  Hypovitaminosis D - on supplement  Insomnia - Improved with melatononin and lexapro, continue for now  Anxiety/depression - has weekly psychotherapy which is helping, lexapro as above - Find daily source of sam. 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

## 2024-09-21 NOTE — PATIENT PROFILE ADULT - NSPRESCRALCFREQ_GEN_A_NUR
Goal Outcome Evaluation:  Plan of Care Reviewed With: patient        Progress: no change     Patient had feedings started, getting iv antibiotics, hard to understand, but constantly request water, advised of situation however he does not comprehend.  Remote sitter in room to keep patient from pulling on peg tube, and iv lines.                              Never

## 2024-10-04 NOTE — ASU PATIENT PROFILE, ADULT - SURGICAL SITE INCISION
1 month refill sent into pharmacy. Patient is scheduled with Dr Lugo next week and will address further refills at that time  
Pharmacy: calling for medication refill.  Medication(s) set up as pending orders from medication list.  Preferred pharmacy set up and verified.    Patient told to check with pharmacy after 48 hours.  Patient was advised they would be notified only if there is a problem concerning the refill.    drospirenone-ethinyl estradiol     
no

## 2024-10-10 ENCOUNTER — APPOINTMENT (OUTPATIENT)
Dept: CT IMAGING | Facility: HOSPITAL | Age: 89
End: 2024-10-10

## 2024-10-10 ENCOUNTER — OUTPATIENT (OUTPATIENT)
Dept: OUTPATIENT SERVICES | Facility: HOSPITAL | Age: 89
LOS: 1 days | End: 2024-10-10
Payer: MEDICARE

## 2024-10-10 ENCOUNTER — APPOINTMENT (OUTPATIENT)
Dept: NEUROSURGERY | Facility: CLINIC | Age: 89
End: 2024-10-10
Payer: MEDICARE

## 2024-10-10 VITALS
WEIGHT: 124 LBS | RESPIRATION RATE: 18 BRPM | HEART RATE: 71 BPM | BODY MASS INDEX: 24.35 KG/M2 | DIASTOLIC BLOOD PRESSURE: 84 MMHG | SYSTOLIC BLOOD PRESSURE: 153 MMHG | OXYGEN SATURATION: 96 % | HEIGHT: 60 IN

## 2024-10-10 DIAGNOSIS — S12.9XXA FRACTURE OF NECK, UNSPECIFIED, INITIAL ENCOUNTER: ICD-10-CM

## 2024-10-10 DIAGNOSIS — S12.110A ANTERIOR DISPLACED TYPE II DENS FRACTURE, INITIAL ENCOUNTER FOR CLOSED FRACTURE: ICD-10-CM

## 2024-10-10 DIAGNOSIS — Z80.1 FAMILY HISTORY OF MALIGNANT NEOPLASM OF TRACHEA, BRONCHUS AND LUNG: ICD-10-CM

## 2024-10-10 DIAGNOSIS — Z96.649 PRESENCE OF UNSPECIFIED ARTIFICIAL HIP JOINT: Chronic | ICD-10-CM

## 2024-10-10 DIAGNOSIS — Z95.2 PRESENCE OF PROSTHETIC HEART VALVE: ICD-10-CM

## 2024-10-10 DIAGNOSIS — Z87.898 PERSONAL HISTORY OF OTHER SPECIFIED CONDITIONS: ICD-10-CM

## 2024-10-10 DIAGNOSIS — Z98.890 OTHER SPECIFIED POSTPROCEDURAL STATES: Chronic | ICD-10-CM

## 2024-10-10 DIAGNOSIS — Z95.2 PRESENCE OF PROSTHETIC HEART VALVE: Chronic | ICD-10-CM

## 2024-10-10 DIAGNOSIS — Z86.39 PERSONAL HISTORY OF OTHER ENDOCRINE, NUTRITIONAL AND METABOLIC DISEASE: ICD-10-CM

## 2024-10-10 DIAGNOSIS — Z95.5 PRESENCE OF CORONARY ANGIOPLASTY IMPLANT AND GRAFT: Chronic | ICD-10-CM

## 2024-10-10 DIAGNOSIS — M54.2 CERVICALGIA: ICD-10-CM

## 2024-10-10 DIAGNOSIS — H81.4 VERTIGO OF CENTRAL ORIGIN: ICD-10-CM

## 2024-10-10 DIAGNOSIS — Z98.1 ARTHRODESIS STATUS: ICD-10-CM

## 2024-10-10 PROCEDURE — 99214 OFFICE O/P EST MOD 30 MIN: CPT | Mod: 24

## 2024-10-10 PROCEDURE — 72125 CT NECK SPINE W/O DYE: CPT | Mod: 26,MH

## 2024-10-18 ENCOUNTER — APPOINTMENT (OUTPATIENT)
Dept: OPHTHALMOLOGY | Facility: CLINIC | Age: 89
End: 2024-10-18
Payer: MEDICARE

## 2024-10-18 ENCOUNTER — NON-APPOINTMENT (OUTPATIENT)
Age: 89
End: 2024-10-18

## 2024-10-18 PROCEDURE — 92012 INTRM OPH EXAM EST PATIENT: CPT

## 2024-11-20 PROCEDURE — 72125 CT NECK SPINE W/O DYE: CPT

## 2024-11-21 ENCOUNTER — APPOINTMENT (OUTPATIENT)
Dept: NEUROSURGERY | Facility: CLINIC | Age: 89
End: 2024-11-21
Payer: MEDICARE

## 2024-11-21 ENCOUNTER — OUTPATIENT (OUTPATIENT)
Dept: OUTPATIENT SERVICES | Facility: HOSPITAL | Age: 88
LOS: 1 days | End: 2024-11-21
Payer: MEDICARE

## 2024-11-21 DIAGNOSIS — Z98.890 OTHER SPECIFIED POSTPROCEDURAL STATES: Chronic | ICD-10-CM

## 2024-11-21 DIAGNOSIS — Z95.5 PRESENCE OF CORONARY ANGIOPLASTY IMPLANT AND GRAFT: Chronic | ICD-10-CM

## 2024-11-21 DIAGNOSIS — Z98.1 ARTHRODESIS STATUS: ICD-10-CM

## 2024-11-21 DIAGNOSIS — S12.110A ANTERIOR DISPLACED TYPE II DENS FRACTURE, INITIAL ENCOUNTER FOR CLOSED FRACTURE: ICD-10-CM

## 2024-11-21 DIAGNOSIS — Z96.649 PRESENCE OF UNSPECIFIED ARTIFICIAL HIP JOINT: Chronic | ICD-10-CM

## 2024-11-21 DIAGNOSIS — Z95.2 PRESENCE OF PROSTHETIC HEART VALVE: Chronic | ICD-10-CM

## 2024-11-21 DIAGNOSIS — Z98.890 OTHER SPECIFIED POSTPROCEDURAL STATES: ICD-10-CM

## 2024-11-21 PROCEDURE — 99213 OFFICE O/P EST LOW 20 MIN: CPT

## 2024-11-21 PROCEDURE — 72052 X-RAY EXAM NECK SPINE 6/>VWS: CPT

## 2024-11-21 PROCEDURE — 72052 X-RAY EXAM NECK SPINE 6/>VWS: CPT | Mod: 26

## 2024-12-10 ENCOUNTER — APPOINTMENT (OUTPATIENT)
Dept: NEPHROLOGY | Facility: CLINIC | Age: 88
End: 2024-12-10

## 2024-12-18 ENCOUNTER — APPOINTMENT (OUTPATIENT)
Dept: NEPHROLOGY | Facility: CLINIC | Age: 88
End: 2024-12-18

## 2024-12-18 DIAGNOSIS — R60.0 LOCALIZED EDEMA: ICD-10-CM

## 2024-12-18 DIAGNOSIS — I10 ESSENTIAL (PRIMARY) HYPERTENSION: ICD-10-CM

## 2024-12-18 DIAGNOSIS — R63.1 POLYDIPSIA: ICD-10-CM

## 2024-12-18 DIAGNOSIS — Z98.1 ARTHRODESIS STATUS: ICD-10-CM

## 2024-12-18 DIAGNOSIS — F41.8 OTHER SPECIFIED ANXIETY DISORDERS: ICD-10-CM

## 2024-12-18 PROCEDURE — G2212 PROLONG OUTPT/OFFICE VIS: CPT

## 2024-12-18 PROCEDURE — G2211 COMPLEX E/M VISIT ADD ON: CPT

## 2024-12-18 PROCEDURE — 99215 OFFICE O/P EST HI 40 MIN: CPT

## 2024-12-19 PROBLEM — F41.8 DEPRESSION WITH ANXIETY: Status: ACTIVE | Noted: 2024-12-19

## 2024-12-19 RX ORDER — BUSPIRONE HYDROCHLORIDE 7.5 MG/1
7.5 TABLET ORAL
Qty: 180 | Refills: 1 | Status: ACTIVE | COMMUNITY
Start: 2024-12-19 | End: 1900-01-01

## 2025-02-03 NOTE — ED ADULT NURSE NOTE - NSFALLRISKFACTORS_ED_ALL_ED
Instructions: This plan will send the code FBSE to the PM system.  DO NOT or CHANGE the price.
Detail Level: Simple
Price (Do Not Change): 0.00
No indicators present

## 2025-02-18 ENCOUNTER — APPOINTMENT (OUTPATIENT)
Dept: OPHTHALMOLOGY | Facility: CLINIC | Age: 89
End: 2025-02-18
Payer: MEDICARE

## 2025-02-18 ENCOUNTER — NON-APPOINTMENT (OUTPATIENT)
Age: 89
End: 2025-02-18

## 2025-02-18 PROCEDURE — 92012 INTRM OPH EXAM EST PATIENT: CPT

## 2025-03-18 ENCOUNTER — APPOINTMENT (OUTPATIENT)
Dept: OPHTHALMOLOGY | Facility: CLINIC | Age: 89
End: 2025-03-18
Payer: MEDICARE

## 2025-03-18 ENCOUNTER — NON-APPOINTMENT (OUTPATIENT)
Age: 89
End: 2025-03-18

## 2025-03-18 PROCEDURE — 92014 COMPRE OPH EXAM EST PT 1/>: CPT

## 2025-04-22 ENCOUNTER — NON-APPOINTMENT (OUTPATIENT)
Age: 89
End: 2025-04-22

## 2025-04-22 ENCOUNTER — APPOINTMENT (OUTPATIENT)
Dept: OPHTHALMOLOGY | Facility: CLINIC | Age: 89
End: 2025-04-22
Payer: MEDICARE

## 2025-04-22 PROCEDURE — 92012 INTRM OPH EXAM EST PATIENT: CPT

## 2025-05-07 NOTE — PATIENT PROFILE ADULT - TRANSPORTATION
-- DO NOT REPLY / DO NOT REPLY ALL --  -- This inbox is not monitored. If this was sent to the wrong provider or department, reroute message to P ECO Reroute pool. --  -- Message is from Engagement Center Operations (ECO) --      Message Type:  Refill Medication   Refill request for Pended medication named: carvedilol (COREG) 12.5 MG tablet // fluticasone (FLONASE) 50 MCG/ACT nasal spray   Preferred pharmacy verified, and selected.   [facility] - [place] [address] [address].    Is the patient OUT of Medication?  Yes and During Work Hours Medication Refills handled by Care Site - route as high priority to care site pool on KB. Patient has been advised it will be addressed within 1 business day.     Message: Meaghan is currently out of her Carvedilol not her Flonase just yet, please review and authorize. She will call back in an hour or so to check the status of her request                   no

## 2025-05-20 ENCOUNTER — APPOINTMENT (OUTPATIENT)
Dept: NEPHROLOGY | Facility: CLINIC | Age: 89
End: 2025-05-20
Payer: MEDICARE

## 2025-05-20 ENCOUNTER — NON-APPOINTMENT (OUTPATIENT)
Age: 89
End: 2025-05-20

## 2025-05-20 ENCOUNTER — APPOINTMENT (OUTPATIENT)
Dept: OPHTHALMOLOGY | Facility: CLINIC | Age: 89
End: 2025-05-20
Payer: MEDICARE

## 2025-05-20 DIAGNOSIS — R63.1 POLYDIPSIA: ICD-10-CM

## 2025-05-20 DIAGNOSIS — I50.32 CHRONIC DIASTOLIC (CONGESTIVE) HEART FAILURE: ICD-10-CM

## 2025-05-20 DIAGNOSIS — R60.0 LOCALIZED EDEMA: ICD-10-CM

## 2025-05-20 DIAGNOSIS — E23.2 DIABETES INSIPIDUS: ICD-10-CM

## 2025-05-20 DIAGNOSIS — I10 ESSENTIAL (PRIMARY) HYPERTENSION: ICD-10-CM

## 2025-05-20 PROCEDURE — 99215 OFFICE O/P EST HI 40 MIN: CPT

## 2025-05-20 PROCEDURE — 92012 INTRM OPH EXAM EST PATIENT: CPT

## 2025-05-20 PROCEDURE — G2212 PROLONG OUTPT/OFFICE VIS: CPT

## 2025-05-20 PROCEDURE — G2211 COMPLEX E/M VISIT ADD ON: CPT

## 2025-05-25 VITALS — SYSTOLIC BLOOD PRESSURE: 115 MMHG | HEART RATE: 68 BPM | DIASTOLIC BLOOD PRESSURE: 72 MMHG

## 2025-05-28 LAB
25(OH)D3 SERPL-MCNC: 42.5 NG/ML
ALBUMIN SERPL ELPH-MCNC: 4.2 G/DL
ALP BLD-CCNC: 62 U/L
ALT SERPL-CCNC: 13 U/L
ANION GAP SERPL CALC-SCNC: 14 MMOL/L
APPEARANCE: CLEAR
AST SERPL-CCNC: 24 U/L
BACTERIA UR CULT: NORMAL
BACTERIA: NEGATIVE /HPF
BASOPHILS # BLD AUTO: 0.04 K/UL
BASOPHILS NFR BLD AUTO: 0.5 %
BILIRUB SERPL-MCNC: 0.2 MG/DL
BILIRUBIN URINE: NEGATIVE
BLOOD URINE: ABNORMAL
BUN SERPL-MCNC: 23 MG/DL
CALCIUM SERPL-MCNC: 9.4 MG/DL
CALCIUM SERPL-MCNC: 9.4 MG/DL
CAST: 0 /LPF
CHLORIDE SERPL-SCNC: 93 MMOL/L
CO2 SERPL-SCNC: 27 MMOL/L
COLOR: YELLOW
CREAT SERPL-MCNC: 1.1 MG/DL
CREAT SPEC-SCNC: 42 MG/DL
CREAT SPEC-SCNC: 42 MG/DL
CREAT/PROT UR: 0.3 RATIO
CRP SERPL-MCNC: 3 MG/L
CYSTATIN C SERPL-MCNC: 1.88 MG/L
EGFRCR SERPLBLD CKD-EPI 2021: 46 ML/MIN/1.73M2
EOSINOPHIL # BLD AUTO: 0.14 K/UL
EOSINOPHIL NFR BLD AUTO: 1.6 %
EPITHELIAL CELLS: 0 /HPF
GFR/BSA.PRED SERPLBLD CYS-BASED-ARV: 27 ML/MIN/1.73M2
GLUCOSE QUALITATIVE U: NEGATIVE MG/DL
GLUCOSE SERPL-MCNC: 94 MG/DL
HCT VFR BLD CALC: 39 %
HGB BLD-MCNC: 11.8 G/DL
IMM GRANULOCYTES NFR BLD AUTO: 0.3 %
KETONES URINE: NEGATIVE MG/DL
LEUKOCYTE ESTERASE URINE: NEGATIVE
LYMPHOCYTES # BLD AUTO: 1.73 K/UL
LYMPHOCYTES NFR BLD AUTO: 19.7 %
MAGNESIUM SERPL-MCNC: 2.2 MG/DL
MAN DIFF?: NORMAL
MCHC RBC-ENTMCNC: 30.2 PG
MCHC RBC-ENTMCNC: 30.3 G/DL
MCV RBC AUTO: 99.7 FL
MICROALBUMIN 24H UR DL<=1MG/L-MCNC: 1.8 MG/DL
MICROALBUMIN/CREAT 24H UR-RTO: 44 MG/G
MICROSCOPIC-UA: NORMAL
MONOCYTES # BLD AUTO: 1.02 K/UL
MONOCYTES NFR BLD AUTO: 11.6 %
NEUTROPHILS # BLD AUTO: 5.82 K/UL
NEUTROPHILS NFR BLD AUTO: 66.3 %
NITRITE URINE: NEGATIVE
OSMOLALITY SERPL: 281 MOSMOL/KG
OSMOLALITY UR: 254 MOSM/KG
PARATHYROID HORMONE INTACT: 71 PG/ML
PH URINE: 7.5
PHOSPHATE SERPL-MCNC: 4.4 MG/DL
PLATELET # BLD AUTO: 296 K/UL
POTASSIUM SERPL-SCNC: 5.5 MMOL/L
PROT SERPL-MCNC: 7.1 G/DL
PROT UR-MCNC: 13 MG/DL
PROTEIN URINE: NEGATIVE MG/DL
RBC # BLD: 3.91 M/UL
RBC # FLD: 15.1 %
RED BLOOD CELLS URINE: 5 /HPF
SODIUM ?TM SUB UR QN: 40 MMOL/L
SODIUM SERPL-SCNC: 134 MMOL/L
SPECIFIC GRAVITY URINE: 1.01
UROBILINOGEN URINE: 0.2 MG/DL
VIT B12 SERPL-MCNC: 320 PG/ML
WBC # FLD AUTO: 8.78 K/UL
WHITE BLOOD CELLS URINE: 0 /HPF

## 2025-05-29 LAB — VIT B1 SERPL-MCNC: 133.1 NMOL/L

## 2025-05-31 NOTE — OCCUPATIONAL THERAPY INITIAL EVALUATION ADULT - EATING, PREVIOUS LEVEL OF FUNCTION, OT EVAL
"Recent hospitalization at [facility] (5/23- 5/27) for hematemesis  Emergent EGD at that time revealed \"spurting vessel at EGJ s/p Endo Clip x 1 and p.o. diet with successful hemostasis \".  Patient had EGD in July 2024 at [facility]- also noted gastric erosions at that time  GI consulted, appreciate recs   Continue PPI BID. Resume home regimen omeprazole 20mg qam at discharge   " independent

## 2025-06-13 ENCOUNTER — RX RENEWAL (OUTPATIENT)
Age: 89
End: 2025-06-13

## 2025-06-27 ENCOUNTER — INPATIENT (INPATIENT)
Facility: HOSPITAL | Age: 89
LOS: 2 days | Discharge: HOME HEALTH | DRG: 189 | End: 2025-06-30
Attending: STUDENT IN AN ORGANIZED HEALTH CARE EDUCATION/TRAINING PROGRAM | Admitting: STUDENT IN AN ORGANIZED HEALTH CARE EDUCATION/TRAINING PROGRAM
Payer: MEDICARE

## 2025-06-27 VITALS
SYSTOLIC BLOOD PRESSURE: 178 MMHG | OXYGEN SATURATION: 98 % | HEART RATE: 76 BPM | RESPIRATION RATE: 17 BRPM | TEMPERATURE: 98 F | WEIGHT: 139.99 LBS | DIASTOLIC BLOOD PRESSURE: 94 MMHG

## 2025-06-27 DIAGNOSIS — Z29.9 ENCOUNTER FOR PROPHYLACTIC MEASURES, UNSPECIFIED: ICD-10-CM

## 2025-06-27 DIAGNOSIS — Z95.2 PRESENCE OF PROSTHETIC HEART VALVE: Chronic | ICD-10-CM

## 2025-06-27 DIAGNOSIS — R33.9 RETENTION OF URINE, UNSPECIFIED: ICD-10-CM

## 2025-06-27 DIAGNOSIS — F32.9 MAJOR DEPRESSIVE DISORDER, SINGLE EPISODE, UNSPECIFIED: ICD-10-CM

## 2025-06-27 DIAGNOSIS — I50.9 HEART FAILURE, UNSPECIFIED: ICD-10-CM

## 2025-06-27 DIAGNOSIS — I35.0 NONRHEUMATIC AORTIC (VALVE) STENOSIS: ICD-10-CM

## 2025-06-27 DIAGNOSIS — J96.02 ACUTE RESPIRATORY FAILURE WITH HYPERCAPNIA: ICD-10-CM

## 2025-06-27 DIAGNOSIS — Z96.649 PRESENCE OF UNSPECIFIED ARTIFICIAL HIP JOINT: Chronic | ICD-10-CM

## 2025-06-27 DIAGNOSIS — I21.A1 MYOCARDIAL INFARCTION TYPE 2: ICD-10-CM

## 2025-06-27 DIAGNOSIS — J96.01 ACUTE RESPIRATORY FAILURE WITH HYPOXIA: ICD-10-CM

## 2025-06-27 DIAGNOSIS — I10 ESSENTIAL (PRIMARY) HYPERTENSION: ICD-10-CM

## 2025-06-27 DIAGNOSIS — Z98.890 OTHER SPECIFIED POSTPROCEDURAL STATES: Chronic | ICD-10-CM

## 2025-06-27 DIAGNOSIS — E78.5 HYPERLIPIDEMIA, UNSPECIFIED: ICD-10-CM

## 2025-06-27 DIAGNOSIS — J18.9 PNEUMONIA, UNSPECIFIED ORGANISM: ICD-10-CM

## 2025-06-27 DIAGNOSIS — Z95.5 PRESENCE OF CORONARY ANGIOPLASTY IMPLANT AND GRAFT: Chronic | ICD-10-CM

## 2025-06-27 LAB
ADD ON TEST-SPECIMEN IN LAB: SIGNIFICANT CHANGE UP
ALBUMIN SERPL ELPH-MCNC: 3.4 G/DL — SIGNIFICANT CHANGE UP (ref 3.4–5)
ALP SERPL-CCNC: 64 U/L — SIGNIFICANT CHANGE UP (ref 40–120)
ALT FLD-CCNC: 16 U/L — SIGNIFICANT CHANGE UP (ref 12–42)
ANION GAP SERPL CALC-SCNC: 3 MMOL/L — LOW (ref 9–16)
AST SERPL-CCNC: 19 U/L — SIGNIFICANT CHANGE UP (ref 15–37)
BASOPHILS # BLD AUTO: 0.04 K/UL — SIGNIFICANT CHANGE UP (ref 0–0.2)
BASOPHILS NFR BLD AUTO: 0.4 % — SIGNIFICANT CHANGE UP (ref 0–2)
BILIRUB SERPL-MCNC: 0.3 MG/DL — SIGNIFICANT CHANGE UP (ref 0.2–1.2)
BUN SERPL-MCNC: 25 MG/DL — HIGH (ref 7–23)
CALCIUM SERPL-MCNC: 8.8 MG/DL — SIGNIFICANT CHANGE UP (ref 8.5–10.5)
CHLORIDE SERPL-SCNC: 102 MMOL/L — SIGNIFICANT CHANGE UP (ref 96–108)
CO2 SERPL-SCNC: 35 MMOL/L — HIGH (ref 22–31)
CREAT SERPL-MCNC: 1.12 MG/DL — SIGNIFICANT CHANGE UP (ref 0.5–1.3)
EGFR: 45 ML/MIN/1.73M2 — LOW
EGFR: 45 ML/MIN/1.73M2 — LOW
EOSINOPHIL # BLD AUTO: 0.01 K/UL — SIGNIFICANT CHANGE UP (ref 0–0.5)
EOSINOPHIL NFR BLD AUTO: 0.1 % — SIGNIFICANT CHANGE UP (ref 0–6)
FLUAV AG NPH QL: SIGNIFICANT CHANGE UP
FLUBV AG NPH QL: SIGNIFICANT CHANGE UP
GLUCOSE SERPL-MCNC: 102 MG/DL — HIGH (ref 70–99)
HCT VFR BLD CALC: 39.4 % — SIGNIFICANT CHANGE UP (ref 34.5–45)
HGB BLD-MCNC: 12.1 G/DL — SIGNIFICANT CHANGE UP (ref 11.5–15.5)
HMPV RNA SPEC QL NAA+PROBE: DETECTED
IMM GRANULOCYTES # BLD AUTO: 0.04 K/UL — SIGNIFICANT CHANGE UP (ref 0–0.07)
IMM GRANULOCYTES NFR BLD AUTO: 0.4 % — SIGNIFICANT CHANGE UP (ref 0–0.9)
LACTATE BLDV-MCNC: 1.7 MMOL/L — SIGNIFICANT CHANGE UP (ref 0.5–2)
LYMPHOCYTES # BLD AUTO: 1.05 K/UL — SIGNIFICANT CHANGE UP (ref 1–3.3)
LYMPHOCYTES NFR BLD AUTO: 10.6 % — LOW (ref 13–44)
MCHC RBC-ENTMCNC: 30.6 PG — SIGNIFICANT CHANGE UP (ref 27–34)
MCHC RBC-ENTMCNC: 30.7 G/DL — LOW (ref 32–36)
MCV RBC AUTO: 99.5 FL — SIGNIFICANT CHANGE UP (ref 80–100)
MONOCYTES # BLD AUTO: 1.23 K/UL — HIGH (ref 0–0.9)
MONOCYTES NFR BLD AUTO: 12.4 % — SIGNIFICANT CHANGE UP (ref 2–14)
NEUTROPHILS # BLD AUTO: 7.58 K/UL — HIGH (ref 1.8–7.4)
NEUTROPHILS NFR BLD AUTO: 76.1 % — SIGNIFICANT CHANGE UP (ref 43–77)
NRBC # BLD AUTO: 0 K/UL — SIGNIFICANT CHANGE UP (ref 0–0)
NRBC # FLD: 0 K/UL — SIGNIFICANT CHANGE UP (ref 0–0)
NRBC BLD AUTO-RTO: 0 /100 WBCS — SIGNIFICANT CHANGE UP (ref 0–0)
NT-PROBNP SERPL-SCNC: 4692 PG/ML — HIGH
PCO2 BLDV: 56 MMHG — HIGH (ref 39–42)
PCO2 BLDV: 75 MMHG — CRITICAL HIGH (ref 39–42)
PH BLDV: 7.31 — LOW (ref 7.32–7.43)
PH BLDV: 7.33 — SIGNIFICANT CHANGE UP (ref 7.32–7.43)
PLATELET # BLD AUTO: 284 K/UL — SIGNIFICANT CHANGE UP (ref 150–400)
PMV BLD: 9.8 FL — SIGNIFICANT CHANGE UP (ref 7–13)
PO2 BLDV: 48 MMHG — HIGH (ref 25–45)
PO2 BLDV: 95 MMHG — HIGH (ref 25–45)
POTASSIUM SERPL-MCNC: 4.3 MMOL/L — SIGNIFICANT CHANGE UP (ref 3.5–5.3)
POTASSIUM SERPL-SCNC: 4.3 MMOL/L — SIGNIFICANT CHANGE UP (ref 3.5–5.3)
PROT SERPL-MCNC: 8 G/DL — SIGNIFICANT CHANGE UP (ref 6.4–8.2)
RAPID RVP RESULT: DETECTED
RBC # BLD: 3.96 M/UL — SIGNIFICANT CHANGE UP (ref 3.8–5.2)
RBC # FLD: 14.2 % — SIGNIFICANT CHANGE UP (ref 10.3–14.5)
RSV RNA NPH QL NAA+NON-PROBE: SIGNIFICANT CHANGE UP
SAO2 % BLDV: 77 % — SIGNIFICANT CHANGE UP (ref 67–88)
SAO2 % BLDV: 98.4 % — HIGH (ref 67–88)
SARS-COV-2 RNA SPEC QL NAA+PROBE: SIGNIFICANT CHANGE UP
SARS-COV-2 RNA SPEC QL NAA+PROBE: SIGNIFICANT CHANGE UP
SODIUM SERPL-SCNC: 140 MMOL/L — SIGNIFICANT CHANGE UP (ref 132–145)
SOURCE RESPIRATORY: SIGNIFICANT CHANGE UP
TROPONIN I, HIGH SENSITIVITY RESULT: 70 NG/L — HIGH
TROPONIN I, HIGH SENSITIVITY RESULT: 87.4 NG/L — HIGH
WBC # BLD: 9.95 K/UL — SIGNIFICANT CHANGE UP (ref 3.8–10.5)
WBC # FLD AUTO: 9.95 K/UL — SIGNIFICANT CHANGE UP (ref 3.8–10.5)

## 2025-06-27 PROCEDURE — 71045 X-RAY EXAM CHEST 1 VIEW: CPT | Mod: 26

## 2025-06-27 PROCEDURE — 99223 1ST HOSP IP/OBS HIGH 75: CPT | Mod: GC

## 2025-06-27 PROCEDURE — 87040 BLOOD CULTURE FOR BACTERIA: CPT

## 2025-06-27 PROCEDURE — 86140 C-REACTIVE PROTEIN: CPT

## 2025-06-27 PROCEDURE — 0241U: CPT

## 2025-06-27 PROCEDURE — 82962 GLUCOSE BLOOD TEST: CPT

## 2025-06-27 PROCEDURE — 83880 ASSAY OF NATRIURETIC PEPTIDE: CPT

## 2025-06-27 PROCEDURE — 82803 BLOOD GASES ANY COMBINATION: CPT

## 2025-06-27 PROCEDURE — 83605 ASSAY OF LACTIC ACID: CPT

## 2025-06-27 PROCEDURE — 0225U NFCT DS DNA&RNA 21 SARSCOV2: CPT

## 2025-06-27 PROCEDURE — 84484 ASSAY OF TROPONIN QUANT: CPT

## 2025-06-27 PROCEDURE — 94640 AIRWAY INHALATION TREATMENT: CPT

## 2025-06-27 PROCEDURE — 85025 COMPLETE CBC W/AUTO DIFF WBC: CPT

## 2025-06-27 PROCEDURE — 36415 COLL VENOUS BLD VENIPUNCTURE: CPT

## 2025-06-27 PROCEDURE — 99285 EMERGENCY DEPT VISIT HI MDM: CPT | Mod: FS

## 2025-06-27 PROCEDURE — 80053 COMPREHEN METABOLIC PANEL: CPT

## 2025-06-27 RX ORDER — VIBEGRON 75 MG/1
1 TABLET, FILM COATED ORAL
Refills: 0 | DISCHARGE

## 2025-06-27 RX ORDER — ESCITALOPRAM OXALATE 20 MG/1
1 TABLET ORAL
Refills: 0 | DISCHARGE

## 2025-06-27 RX ORDER — DULOXETINE 20 MG/1
1 CAPSULE, DELAYED RELEASE ORAL
Refills: 0 | DISCHARGE

## 2025-06-27 RX ORDER — DORZOLAMIDE HYDROCHLORIDE AND TIMOLOL MALEATE 20; 5 MG/ML; MG/ML
1 SOLUTION/ DROPS OPHTHALMIC
Refills: 0 | DISCHARGE

## 2025-06-27 RX ORDER — ASPIRIN 325 MG
81 TABLET ORAL DAILY
Refills: 0 | Status: DISCONTINUED | OUTPATIENT
Start: 2025-06-27 | End: 2025-06-30

## 2025-06-27 RX ORDER — IPRATROPIUM BROMIDE AND ALBUTEROL SULFATE .5; 2.5 MG/3ML; MG/3ML
3 SOLUTION RESPIRATORY (INHALATION) EVERY 6 HOURS
Refills: 0 | Status: DISCONTINUED | OUTPATIENT
Start: 2025-06-27 | End: 2025-06-30

## 2025-06-27 RX ORDER — DULOXETINE 20 MG/1
30 CAPSULE, DELAYED RELEASE ORAL DAILY
Refills: 0 | Status: DISCONTINUED | OUTPATIENT
Start: 2025-06-27 | End: 2025-06-30

## 2025-06-27 RX ORDER — IPRATROPIUM BROMIDE AND ALBUTEROL SULFATE .5; 2.5 MG/3ML; MG/3ML
3 SOLUTION RESPIRATORY (INHALATION)
Refills: 0 | Status: COMPLETED | OUTPATIENT
Start: 2025-06-27 | End: 2025-06-27

## 2025-06-27 RX ORDER — ACETAMINOPHEN 500 MG/5ML
650 LIQUID (ML) ORAL EVERY 6 HOURS
Refills: 0 | Status: DISCONTINUED | OUTPATIENT
Start: 2025-06-27 | End: 2025-06-30

## 2025-06-27 RX ORDER — BUPROPION HYDROBROMIDE 522 MG/1
1 TABLET, EXTENDED RELEASE ORAL
Refills: 0 | DISCHARGE

## 2025-06-27 RX ORDER — MELATONIN 5 MG
3 TABLET ORAL AT BEDTIME
Refills: 0 | Status: DISCONTINUED | OUTPATIENT
Start: 2025-06-27 | End: 2025-06-30

## 2025-06-27 RX ORDER — ONDANSETRON HCL/PF 4 MG/2 ML
4 VIAL (ML) INJECTION EVERY 8 HOURS
Refills: 0 | Status: DISCONTINUED | OUTPATIENT
Start: 2025-06-27 | End: 2025-06-30

## 2025-06-27 RX ORDER — MAGNESIUM, ALUMINUM HYDROXIDE 200-200 MG
30 TABLET,CHEWABLE ORAL EVERY 4 HOURS
Refills: 0 | Status: DISCONTINUED | OUTPATIENT
Start: 2025-06-27 | End: 2025-06-30

## 2025-06-27 RX ORDER — AZITHROMYCIN 250 MG
500 CAPSULE ORAL ONCE
Refills: 0 | Status: COMPLETED | OUTPATIENT
Start: 2025-06-27 | End: 2025-06-27

## 2025-06-27 RX ORDER — TOLTERODINE TARTRATE 4 MG/1
1 CAPSULE, EXTENDED RELEASE ORAL
Refills: 0 | DISCHARGE

## 2025-06-27 RX ORDER — LATANOPROST PF 0.05 MG/ML
1 SOLUTION/ DROPS OPHTHALMIC AT BEDTIME
Refills: 0 | Status: DISCONTINUED | OUTPATIENT
Start: 2025-06-27 | End: 2025-06-30

## 2025-06-27 RX ORDER — CEFTRIAXONE 500 MG/1
1000 INJECTION, POWDER, FOR SOLUTION INTRAMUSCULAR; INTRAVENOUS ONCE
Refills: 0 | Status: COMPLETED | OUTPATIENT
Start: 2025-06-27 | End: 2025-06-27

## 2025-06-27 RX ORDER — BUSPIRONE HYDROCHLORIDE 15 MG/1
1 TABLET ORAL
Refills: 0 | DISCHARGE

## 2025-06-27 RX ORDER — BUSPIRONE HYDROCHLORIDE 15 MG/1
7.5 TABLET ORAL
Refills: 0 | Status: DISCONTINUED | OUTPATIENT
Start: 2025-06-27 | End: 2025-06-30

## 2025-06-27 RX ORDER — SENNA 187 MG
2 TABLET ORAL AT BEDTIME
Refills: 0 | Status: DISCONTINUED | OUTPATIENT
Start: 2025-06-27 | End: 2025-06-30

## 2025-06-27 RX ADMIN — BUSPIRONE HYDROCHLORIDE 7.5 MILLIGRAM(S): 15 TABLET ORAL at 22:19

## 2025-06-27 RX ADMIN — IPRATROPIUM BROMIDE AND ALBUTEROL SULFATE 3 MILLILITER(S): .5; 2.5 SOLUTION RESPIRATORY (INHALATION) at 12:03

## 2025-06-27 RX ADMIN — LATANOPROST PF 1 DROP(S): 0.05 SOLUTION/ DROPS OPHTHALMIC at 22:19

## 2025-06-27 RX ADMIN — IPRATROPIUM BROMIDE AND ALBUTEROL SULFATE 3 MILLILITER(S): .5; 2.5 SOLUTION RESPIRATORY (INHALATION) at 19:08

## 2025-06-27 RX ADMIN — DULOXETINE 30 MILLIGRAM(S): 20 CAPSULE, DELAYED RELEASE ORAL at 22:19

## 2025-06-27 RX ADMIN — Medication 81 MILLIGRAM(S): at 22:19

## 2025-06-27 RX ADMIN — IPRATROPIUM BROMIDE AND ALBUTEROL SULFATE 3 MILLILITER(S): .5; 2.5 SOLUTION RESPIRATORY (INHALATION) at 11:48

## 2025-06-27 RX ADMIN — Medication 250 MILLILITER(S): at 13:17

## 2025-06-27 RX ADMIN — CEFTRIAXONE 100 MILLIGRAM(S): 500 INJECTION, POWDER, FOR SOLUTION INTRAMUSCULAR; INTRAVENOUS at 13:03

## 2025-06-27 RX ADMIN — Medication 255 MILLIGRAM(S): at 13:39

## 2025-06-27 RX ADMIN — IPRATROPIUM BROMIDE AND ALBUTEROL SULFATE 3 MILLILITER(S): .5; 2.5 SOLUTION RESPIRATORY (INHALATION) at 11:30

## 2025-06-27 NOTE — H&P ADULT - PROBLEM SELECTOR PLAN 4
Not on medications at home. Severe AS s/p TAVR. 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 EKG on admission with LBBB known from previous.

## 2025-06-27 NOTE — H&P ADULT - PROBLEM SELECTOR PROBLEM 2
Pneumonia 6F ex-FT, IUTD, no past medical history, RHD, presents to the emergency department with FOOSH to right hand 2hrs prior to arrival. No pain medications since that time. Denies paresthesias, weakness, tingling. No head strike or loss of consciousness. Hypertension

## 2025-06-27 NOTE — ED ADULT TRIAGE NOTE - CHIEF COMPLAINT QUOTE
pt BIBA from PCP office, accompanied by aide, c/o CHERYL, pt's O2 Sat was 88% on RA, EMS placed pt on 2L O2, Sats now up to 98%

## 2025-06-27 NOTE — H&P ADULT - ATTENDING COMMENTS
Ms. Duval is a 89yo woman with PMHx severe AS s/p TAVR, HFpEF, HTN, HLD, prior C2 fx s/p OR who presented to the ED for SOB for 2 days.    Patient seen on 4Ur with HHA at bedside, breathing comfortably on 2L NC. Patient alert and oriented to self, place, hospital. HHA reports she came in to work this morning and heard patient wheezing, prompting ED presentation. Patient reports feeling like she has to cough but can't get it out. On exam, well-appearing elderly woman, cardiac +SADIE, lungs with end-expiratory wheezing, abdomen soft, no JAZMIN. Vitals/labs/imaging reviewed    #Acute hypoxic respiratory failure -- low suspicion for bacterial pna given afebrile, normal WBC, no consolidation on CXR. Patient appears euvolemic on exam, however elevated pro-BNP noted iso HFpEF. Consider viral URI. Low suspicion for ACS, suspect elevated troponin represents demand. Troponin down-trended. EKG personally reviewed, NSR with known LBBB (present on EKG 08/2022, although not noted 07/2024)  - Start duonebs  - Chest PT  - Full RVP  - Wean O2 - saturating 98% on 2L NC  - If unable to wean O2 and RVP negative, consider trial of small dose Lasix  - Needs med rec in AM Ms. Duval is a 89yo woman with PMHx severe AS s/p TAVR, HFpEF, HTN, HLD, prior C2 fx s/p OR who presented to the ED for SOB for 2 days.    Patient seen on 4Ur with HHA at bedside, breathing comfortably on 2L NC. Patient alert and oriented to self, place, hospital. HHA reports she came in to work this morning and heard patient wheezing, prompting ED presentation. Patient reports feeling like she has to cough but can't get it out. On exam, well-appearing elderly woman, cardiac +SADIE, lungs with end-expiratory wheezing, abdomen soft, no JAZMIN. Vitals/labs/imaging reviewed    #Acute hypoxic respiratory failure -- low suspicion for bacterial pna given afebrile, normal WBC, no consolidation on CXR. Patient appears euvolemic on exam, however elevated pro-BNP noted iso HFpEF. Consider viral URI. Low suspicion for ACS, suspect elevated troponin represents demand. Troponin down-trended. EKG personally reviewed, NSR with known LBBB (present on EKG 08/2022, although not noted 07/2024)  - Start duonebs  - Chest PT  - Full RVP  - Wean O2 - saturating 98% on 2L NC  - If unable to wean O2 and RVP negative, consider trial of small dose Lasix  - Needs med rec in AM    Dispo: home with 24/7 aides

## 2025-06-27 NOTE — ED PROVIDER NOTE - PROGRESS NOTE DETAILS
CXR showed likely pneumonia; repeat VBG showed improved pCO2 and pH. pt appears comfortable with 2L NC with O2 at 100%. Will start Ceftriaxone and admit for pneumonia. Talked to Daughter Massiel (healthcare proxy) who is made aware of pt's condition and workup, informed that pt is being admitted for pneumonia. Massiel states that at this time pt is full code.

## 2025-06-27 NOTE — ED PROVIDER NOTE - ATTENDING APP SHARED VISIT CONTRIBUTION OF CARE
89 yo F with HTN (not on meds), CHF (not on any meds), AS, HLD, DI, h/o C2 fracture s/p surgery, BIB [facility] for SOB for 3 days. Pt was noticed to have wheezing at home. Pt is a poor historian. Per aid, this is her usual mental status. Pt was able to tell her that she was having wheezing and a cough, and some chest pressure.  Exam +wheezing. decreased lung sounds B/L at base. Appears comfortable while at rest, but mild labor breathing while talking.   EKG is similar to previous EKG, showed LBBB.     Please see progress note.   Patient admitted for treatment of pneumonia 89 yo F with HTN (not on meds), CHF (not on any meds), AS, HLD, DI, h/o C2 fracture s/p surgery, BIB [facility] for SOB for 3 days. Pt was noticed to have wheezing at home. Pt is a poor historian. Per aid, this is her usual mental status. Pt was able to tell her that she was having wheezing and a cough, and some chest pressure.  Exam +wheezing. decreased lung sounds B/L at base. Appears comfortable while at rest, but mild labor breathing while talking.   EKG is similar to previous EKG, showed LBBB.     Please see progress note.   Patient admitted for treatment of pneumonia. 89 yo F with HTN (not on meds), CHF (not on any meds), AS, HLD, DI, h/o C2 fracture s/p surgery, BIB [facility] for SOB for 3 days. Pt was noticed to have wheezing at home. Pt is a poor historian. Per aid, this is her usual mental status. Pt was able to tell her that she was having wheezing and a cough, and some chest pressure.  Exam +wheezing. decreased lung sounds B/L at base. Appears comfortable while at rest, but mild labor breathing while talking.   EKG is similar to previous EKG, showed LBBB.     Please see progress note.   Patient admitted for treatment of pneumonia

## 2025-06-27 NOTE — H&P ADULT - PROBLEM SELECTOR PLAN 7
Vibegron 75 mg qd continue F: none  E: replete as needed  N: regular diet  DVT ppx Vibegron 75 mg qd continue    - f/u bladder scan

## 2025-06-27 NOTE — H&P ADULT - PROBLEM SELECTOR PLAN 5
Severe AS s/p TAVR. 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 EKG on admission with LBBB known from previous. Wellbutrin 150 XR qd, Buspar 10 mg BID, Alprazolam 0.25 mg BID, Lexapro 20 mg qd    - continue home meds after med rec hsTrop down-trended, suspect demand

## 2025-06-27 NOTE — H&P ADULT - HISTORY OF PRESENT ILLNESS
89 yo F with HTN (not on meds), CHF (not on any meds), AS, HLD, h/o C2 fracture s/p surgery, BIB [facility] for SOB for 3 days. HHA is at bedside and provides history. States since Wednesday, previous HHA noted some shortness of breath. Then had coughing. Does not use O2 at home. SOB worsened and patient complained of chest pressure, wheezing so HHA called EMS to bring to hospital. States patient otherwise is at baseline and is at baseline mental status. Has RN that comes weekly and puts meds in a pill box and HHA administers. On interview, patient states she has abdominal discomfort and wants to pee. States she has been short of breath and has had cough. Attempts to take off NC several times during interview.    Per patient and HHA, no ha, cp, abd pain, fevers, chills, recent travel, sick contacts, nvd, abdominal pain. No recent hospitalizations, antibiotic use    ED Course   Vitals: T  98.2, HR 76, /94, 98% on 2L NC  Labs: WBC 9.95 , Hgb 12.1 , , Na 140, K 4.3, HCO3 35, AG 3, BUN 25, Cr 1.12 LFTs wnl, Trop T 87.4 > 70, p-BNP 4692  VBG pH 7.31, CO2 75 -> pH 7.33, pCO2 56; RVP negative   Imaging: CXR with Heart size within normal limits, thoracic aortic and mitral   annulus calcification, TAVR,. No acute focal opacity. Left midlung calcified granuloma. Stable bony structures, dextro scoliosis.. Cervical spine hardware. Chilaiditi syndrome.  Intervention: Azithromycin 500mg x1, CTX 1g x1, 1L NS, duonebs x1

## 2025-06-27 NOTE — ED PROVIDER NOTE - OBJECTIVE STATEMENT
89 yo F with HTN (not on meds), CHF (not on any meds), AS, HLD, h/o C2 fracture s/p surgery, BIB [facility] for SOB for 3 days. Pt was noticed to have wheezing at home. Pt is a poor historian. Per aid, this is her usual mental status. Pt was able to tell her that she was having wheezing and a cough, and some chest pressure.

## 2025-06-27 NOTE — H&P ADULT - PROBLEM SELECTOR PLAN 8
F: none  E: replete as needed  N: regular diet  DVT ppx F: none  E: replete as needed  N: regular diet  DVT ppx: lovenox    CODE: FULL

## 2025-06-27 NOTE — ED ADULT NURSE NOTE - OBJECTIVE STATEMENT
Pt in ED d/t SOB. Per aide, night shift aide noticed pt having SOB, when current aide arrived this AM, noticed pt was wheezing. Per triage note, pt was 88% on RA when EMS arrived, pt requiring 2LNC, normally does not use O2 at home. AOx3, GCS 15, disoriented to situation.

## 2025-06-27 NOTE — H&P ADULT - NSHPLABSRESULTS_GEN_ALL_CORE
.  LABS:                         12.1   9.95  )-----------( 284      ( 27 Jun 2025 11:24 )             39.4     06-27    140  |  102  |  25[H]  ----------------------------<  102[H]  4.3   |  35[H]  |  1.12    Ca    8.8      27 Jun 2025 11:24    TPro  8.0  /  Alb  3.4  /  TBili  0.3  /  DBili  x   /  AST  19  /  ALT  16  /  AlkPhos  64  06-27      Urinalysis Basic - ( 27 Jun 2025 11:24 )    Color: x / Appearance: x / SG: x / pH: x  Gluc: 102 mg/dL / Ketone: x  / Bili: x / Urobili: x   Blood: x / Protein: x / Nitrite: x   Leuk Esterase: x / RBC: x / WBC x   Sq Epi: x / Non Sq Epi: x / Bacteria: x                RADIOLOGY, EKG & ADDITIONAL TESTS: Reviewed.

## 2025-06-27 NOTE — ED PROVIDER NOTE - CLINICAL SUMMARY MEDICAL DECISION MAKING FREE TEXT BOX
89 yo F with HTN (not on meds), CHF (not on any meds), AS, HLD, DI, h/o C2 fracture s/p surgery, BIB [facility] for SOB for 3 days. Pt was noticed to have wheezing at home. Pt is a poor historian. Per aid, this is her usual mental status. Pt was able to tell her that she was having wheezing and a cough, and some chest pressure.  Exam +wheezing. decreased lung sounds B/L at base. Appears comfortable while at rest, but mild labor breathing while talking.   EKG is similar to previous EKG, showed LBBB.   ddx including CHF exacerbation, pneumonia. will get CXR, cbc, cmp, pro-BNP

## 2025-06-27 NOTE — ED ADULT NURSE NOTE - NSFALLHARMRISKINTERV_ED_ALL_ED

## 2025-06-27 NOTE — H&P ADULT - PROBLEM SELECTOR PLAN 1
Pt with 3 days cough, sob, wheezing at home. VBG with hypercapnia on admission. Improved with 2L NC. 0/4 SIRS criteria on admission. DDx: ADHF, ACS, PNA  - RVP negative, Covid negative  - Trops WNL, Probnp negative.    Plan:  - full rvp  - Wean O2 as tolerated Pt with 3 days cough, sob, wheezing at home. VBG with hypercapnia on admission. Improved with 2L NC.  DDx: viral URI, ADHF, ACS, PNA  - 0/4 SIRS on admission. CXR no consolidation/opacity  - limited RVP negative, Covid negative  - Trops WNL, Probnp negative.    Plan:  - f/u full rvp  - Wean O2 as tolerated  - duonebs q6 Pt with 3 days cough, sob, wheezing at home. VBG with hypercapnia on admission. Improved with 2L NC.  DDx: viral URI, ADHF, ACS, PNA  - 0/4 SIRS on admission. CXR no consolidation/opacity  - limited RVP negative, Covid negative  - Trops WNL, Probnp negative.    Plan:  - f/u full rvp  - Wean O2 as tolerated  - duonebs q6  - monitor off abx Pt with 3 days cough, sob, wheezing at home. VBG with hypercapnia on admission. Improved with 2L NC.  DDx: viral URI, ADHF, ACS, PNA  - 0/4 SIRS on admission. CXR no consolidation/opacity  - limited RVP negative, Covid negative  - Trops 87 > 70; Probnp 4K, but no ssx overload; EKG NSR similar to previous, no ischemic changes    Plan:  - f/u full rvp  - Wean O2 as tolerated  - duonebs q6  - monitor off abx

## 2025-06-27 NOTE — H&P ADULT - NS ATTEST RISK PROBLEM GEN_ALL_CORE FT
1. Acute or chronic illness or injury which poses a threat to life or bodily function  2. Independent review of test (CXR, EKG), review prior records, review of test results, consideration of ordering test, independent assessment

## 2025-06-27 NOTE — H&P ADULT - NSHPPHYSICALEXAM_GEN_ALL_CORE
.  VITAL SIGNS:  T(F): 98.1 (06-27-25 @ 15:49), Max: 98.5 (06-27-25 @ 13:08)  HR: 73 (06-27-25 @ 15:49) (70 - 82)  BP: 149/79 (06-27-25 @ 15:49) (149/79 - 178/94)  BP(mean): 116 (06-27-25 @ 14:17) (116 - 116)  RR: 19 (06-27-25 @ 15:49) (16 - 20)  SpO2: 100% (06-27-25 @ 15:49) (96% - 100%)    PHYSICAL EXAM:    Constitutional: resting comfortably in bed; NAD, elderly woman lying in bed  HEENT: NC/AT, PERRL, EOMI, anicteric sclera, no nasal discharge; uvula midline, no oropharyngeal erythema or exudates; MMM  Neck: supple; no JVD or thyromegaly  Respiratory: unlabored breathing, speaks in full sentences. on 2L NC. mild wheezing in lung fields. dec. lung sounds at b/l bases  Cardiac: +S1/S2; RRR; no M/R/G; No ventricular heaves, PMI non-displaced  Gastrointestinal: mild distention with some tenderness to palpation at suprapubic region. Otherwise NT/ND; no rebound or guarding; +BSx4  Back: spine midline, no bony tenderness or step-offs; no CVAT B/L  Extremities: WWP, no clubbing or cyanosis; no peripheral edema  Musculoskeletal: NROM x4; no joint swelling, tenderness or erythema  Vascular: 2+ radial, DP/PT pulses B/L  Dermatologic: skin warm, dry and intact; no rashes, wounds, or scars  Lymphatic: no submandibular or cervical LAD  Neurologic: AAOx3; CNII-XII grossly intact; no focal deficits  Psychiatric: affect and characteristics of appearance, verbalizations, behaviors are appropriate, denies SI/HI/AH/VH

## 2025-06-27 NOTE — ED PROVIDER NOTE - ATTENDING CONTRIBUTION TO CARE
89 yo F with HTN (not on meds), CHF (not on any meds), AS, HLD, DI, h/o C2 fracture s/p surgery, BIB [facility] for SOB for 3 days. Pt was noticed to have wheezing at home. Pt is a poor historian. Per aid, this is her usual mental status. Pt was able to tell her that she was having wheezing and a cough, and some chest pressure.  Exam +wheezing. decreased lung sounds B/L at base. Appears comfortable while at rest, but mild labor breathing while talking.   EKG is similar to previous EKG, showed LBBB.     Please see progress note.   Patient admitted for treatment of pneumonia

## 2025-06-27 NOTE — ED PROVIDER NOTE - NS ED ATTENDING STATEMENT MOD
I have seen and examined this patient and fully participated in the care of this patient as the teaching attending.  The service was shared with the MECHE.  I reviewed and verified the documentation. This was a shared visit with the MECHE. I reviewed and verified the documentation.

## 2025-06-27 NOTE — ED PROVIDER NOTE - PHYSICAL EXAMINATION
CONSTITUTIONAL: appears comfortable while at rest, but mild labor breathing while talking.   NEURO: A+O x2 (person, place, not time) Sensory and motor functions are grossly intact.  PSYCH: poor historian - mental status at baseline per aid Cindy  NECK: Supple  CARD: Regular rate and rhythm, no murmurs  RESP: +wheezing. decreased lung sounds B/L at base.   ABD: Soft; non-distended; non-tender.   MUSCULOSKELETAL/EXTREMITIES: FROM in all four extremities; no extremity edema.  SKIN: Warm; dry; no apparent lesions or exudate

## 2025-06-27 NOTE — H&P ADULT - ASSESSMENT
This is a 89 yo F with HTN (not on meds), CHF (not on any meds), AS, HLD, h/o C2 fracture s/p surgery, BIB [facility] for SOB for 3 days, admitted for ahrf 2/2 likely CAP This is a 89 yo F with HTN (not on meds), CHF (not on any meds), AS, HLD, h/o C2 fracture s/p surgery, BIB [facility] for SOB for 3 days, admitted for ahrf 2/2 likely viral URI

## 2025-06-27 NOTE — H&P ADULT - PROBLEM SELECTOR PLAN 2
CXR with No acute focal opacity. Left midlung calcified granuloma. Stable bony structures, dextro scoliosis.. Cervical spine hardware. Chilaiditi syndrome.    Plan:  - dc CTX, AZT for CAP coverage  - U strep/legionella  - MRSA swab Well controlled off without medications

## 2025-06-27 NOTE — H&P ADULT - PROBLEM SELECTOR PLAN 6
Wellbutrin 150 XR qd, Buspar 10 mg BID, Alprazolam 0.25 mg BID, Lexapro 20 mg qd Vibegron 75 mg qd continue Vibegron 75 mg qd continue    - f/u bladder scan Wellbutrin 150 XR qd, Buspar 10 mg BID, Alprazolam 0.25 mg BID, Lexapro 20 mg qd    - continue home meds after med rec

## 2025-06-27 NOTE — PATIENT PROFILE ADULT - FALL HARM RISK - HARM RISK INTERVENTIONS
Assistance with ambulation/Assistance OOB with selected safe patient handling equipment/Communicate Risk of Fall with Harm to all staff/Discuss with provider need for PT consult/Monitor gait and stability/Reinforce activity limits and safety measures with patient and family/Tailored Fall Risk Interventions/Visual Cue: Yellow wristband and red socks/Bed in lowest position, wheels locked, appropriate side rails in place/Call bell, personal items and telephone in reach/Instruct patient to call for assistance before getting out of bed or chair/Non-slip footwear when patient is out of bed/[place] to call system/Physically safe environment - no spills, clutter or unnecessary equipment/Purposeful Proactive Rounding/Room/bathroom lighting operational, light cord in reach

## 2025-06-28 ENCOUNTER — TRANSCRIPTION ENCOUNTER (OUTPATIENT)
Age: 89
End: 2025-06-28

## 2025-06-28 LAB
ADD ON TEST-SPECIMEN IN LAB: SIGNIFICANT CHANGE UP
ANION GAP SERPL CALC-SCNC: 11 MMOL/L — SIGNIFICANT CHANGE UP (ref 5–17)
BASOPHILS # BLD AUTO: 0.03 K/UL — SIGNIFICANT CHANGE UP (ref 0–0.2)
BASOPHILS NFR BLD AUTO: 0.4 % — SIGNIFICANT CHANGE UP (ref 0–2)
BUN SERPL-MCNC: 25 MG/DL — HIGH (ref 7–23)
CALCIUM SERPL-MCNC: 9.2 MG/DL — SIGNIFICANT CHANGE UP (ref 8.4–10.5)
CHLORIDE SERPL-SCNC: 97 MMOL/L — SIGNIFICANT CHANGE UP (ref 96–108)
CO2 SERPL-SCNC: 28 MMOL/L — SIGNIFICANT CHANGE UP (ref 22–31)
CREAT SERPL-MCNC: 1.09 MG/DL — SIGNIFICANT CHANGE UP (ref 0.5–1.3)
EGFR: 46 ML/MIN/1.73M2 — LOW
EGFR: 46 ML/MIN/1.73M2 — LOW
EOSINOPHIL # BLD AUTO: 0.02 K/UL — SIGNIFICANT CHANGE UP (ref 0–0.5)
EOSINOPHIL NFR BLD AUTO: 0.3 % — SIGNIFICANT CHANGE UP (ref 0–6)
GLUCOSE SERPL-MCNC: 102 MG/DL — HIGH (ref 70–99)
HCT VFR BLD CALC: 35.5 % — SIGNIFICANT CHANGE UP (ref 34.5–45)
HGB BLD-MCNC: 10.7 G/DL — LOW (ref 11.5–15.5)
IMM GRANULOCYTES # BLD AUTO: 0.04 K/UL — SIGNIFICANT CHANGE UP (ref 0–0.07)
IMM GRANULOCYTES NFR BLD AUTO: 0.5 % — SIGNIFICANT CHANGE UP (ref 0–0.9)
LYMPHOCYTES # BLD AUTO: 1.09 K/UL — SIGNIFICANT CHANGE UP (ref 1–3.3)
LYMPHOCYTES NFR BLD AUTO: 14 % — SIGNIFICANT CHANGE UP (ref 13–44)
MAGNESIUM SERPL-MCNC: 2.1 MG/DL — SIGNIFICANT CHANGE UP (ref 1.6–2.6)
MCHC RBC-ENTMCNC: 30.1 G/DL — LOW (ref 32–36)
MCHC RBC-ENTMCNC: 31.2 PG — SIGNIFICANT CHANGE UP (ref 27–34)
MCV RBC AUTO: 103.5 FL — HIGH (ref 80–100)
MONOCYTES # BLD AUTO: 1.19 K/UL — HIGH (ref 0–0.9)
MONOCYTES NFR BLD AUTO: 15.3 % — HIGH (ref 2–14)
NEUTROPHILS # BLD AUTO: 5.4 K/UL — SIGNIFICANT CHANGE UP (ref 1.8–7.4)
NEUTROPHILS NFR BLD AUTO: 69.5 % — SIGNIFICANT CHANGE UP (ref 43–77)
NRBC # BLD AUTO: 0 K/UL — SIGNIFICANT CHANGE UP (ref 0–0)
NRBC # FLD: 0 K/UL — SIGNIFICANT CHANGE UP (ref 0–0)
NRBC BLD AUTO-RTO: 0 /100 WBCS — SIGNIFICANT CHANGE UP (ref 0–0)
PHOSPHATE SERPL-MCNC: 3.7 MG/DL — SIGNIFICANT CHANGE UP (ref 2.5–4.5)
PLATELET # BLD AUTO: 228 K/UL — SIGNIFICANT CHANGE UP (ref 150–400)
PMV BLD: 9.8 FL — SIGNIFICANT CHANGE UP (ref 7–13)
POTASSIUM SERPL-MCNC: 4.8 MMOL/L — SIGNIFICANT CHANGE UP (ref 3.5–5.3)
POTASSIUM SERPL-SCNC: 4.8 MMOL/L — SIGNIFICANT CHANGE UP (ref 3.5–5.3)
RBC # BLD: 3.43 M/UL — LOW (ref 3.8–5.2)
RBC # FLD: 14.2 % — SIGNIFICANT CHANGE UP (ref 10.3–14.5)
SODIUM SERPL-SCNC: 136 MMOL/L — SIGNIFICANT CHANGE UP (ref 135–145)
WBC # BLD: 7.77 K/UL — SIGNIFICANT CHANGE UP (ref 3.8–10.5)
WBC # FLD AUTO: 7.77 K/UL — SIGNIFICANT CHANGE UP (ref 3.8–10.5)

## 2025-06-28 PROCEDURE — 0225U NFCT DS DNA&RNA 21 SARSCOV2: CPT

## 2025-06-28 PROCEDURE — 83605 ASSAY OF LACTIC ACID: CPT

## 2025-06-28 PROCEDURE — 94640 AIRWAY INHALATION TREATMENT: CPT

## 2025-06-28 PROCEDURE — 86140 C-REACTIVE PROTEIN: CPT

## 2025-06-28 PROCEDURE — 80048 BASIC METABOLIC PNL TOTAL CA: CPT

## 2025-06-28 PROCEDURE — 87040 BLOOD CULTURE FOR BACTERIA: CPT

## 2025-06-28 PROCEDURE — 82962 GLUCOSE BLOOD TEST: CPT

## 2025-06-28 PROCEDURE — 36415 COLL VENOUS BLD VENIPUNCTURE: CPT

## 2025-06-28 PROCEDURE — 0241U: CPT

## 2025-06-28 PROCEDURE — 84145 PROCALCITONIN (PCT): CPT

## 2025-06-28 PROCEDURE — 80053 COMPREHEN METABOLIC PANEL: CPT

## 2025-06-28 PROCEDURE — 85025 COMPLETE CBC W/AUTO DIFF WBC: CPT

## 2025-06-28 PROCEDURE — 84484 ASSAY OF TROPONIN QUANT: CPT

## 2025-06-28 PROCEDURE — 82803 BLOOD GASES ANY COMBINATION: CPT

## 2025-06-28 PROCEDURE — 83880 ASSAY OF NATRIURETIC PEPTIDE: CPT

## 2025-06-28 PROCEDURE — 84100 ASSAY OF PHOSPHORUS: CPT

## 2025-06-28 PROCEDURE — 99233 SBSQ HOSP IP/OBS HIGH 50: CPT | Mod: GC

## 2025-06-28 PROCEDURE — 83735 ASSAY OF MAGNESIUM: CPT

## 2025-06-28 RX ORDER — ALPRAZOLAM 0.5 MG
0.25 TABLET, EXTENDED RELEASE 24 HR ORAL ONCE
Refills: 0 | Status: DISCONTINUED | OUTPATIENT
Start: 2025-06-28 | End: 2025-06-28

## 2025-06-28 RX ADMIN — Medication 650 MILLIGRAM(S): at 01:20

## 2025-06-28 RX ADMIN — IPRATROPIUM BROMIDE AND ALBUTEROL SULFATE 3 MILLILITER(S): .5; 2.5 SOLUTION RESPIRATORY (INHALATION) at 23:26

## 2025-06-28 RX ADMIN — Medication 650 MILLIGRAM(S): at 19:11

## 2025-06-28 RX ADMIN — IPRATROPIUM BROMIDE AND ALBUTEROL SULFATE 3 MILLILITER(S): .5; 2.5 SOLUTION RESPIRATORY (INHALATION) at 05:42

## 2025-06-28 RX ADMIN — Medication 3 MILLIGRAM(S): at 21:24

## 2025-06-28 RX ADMIN — Medication 81 MILLIGRAM(S): at 12:38

## 2025-06-28 RX ADMIN — Medication 650 MILLIGRAM(S): at 00:04

## 2025-06-28 RX ADMIN — IPRATROPIUM BROMIDE AND ALBUTEROL SULFATE 3 MILLILITER(S): .5; 2.5 SOLUTION RESPIRATORY (INHALATION) at 00:04

## 2025-06-28 RX ADMIN — Medication 650 MILLIGRAM(S): at 18:11

## 2025-06-28 RX ADMIN — IPRATROPIUM BROMIDE AND ALBUTEROL SULFATE 3 MILLILITER(S): .5; 2.5 SOLUTION RESPIRATORY (INHALATION) at 17:35

## 2025-06-28 RX ADMIN — LATANOPROST PF 1 DROP(S): 0.05 SOLUTION/ DROPS OPHTHALMIC at 21:32

## 2025-06-28 RX ADMIN — BUSPIRONE HYDROCHLORIDE 7.5 MILLIGRAM(S): 15 TABLET ORAL at 17:35

## 2025-06-28 RX ADMIN — DULOXETINE 30 MILLIGRAM(S): 20 CAPSULE, DELAYED RELEASE ORAL at 12:38

## 2025-06-28 RX ADMIN — Medication 0.25 MILLIGRAM(S): at 21:24

## 2025-06-28 RX ADMIN — BUSPIRONE HYDROCHLORIDE 7.5 MILLIGRAM(S): 15 TABLET ORAL at 05:42

## 2025-06-28 RX ADMIN — IPRATROPIUM BROMIDE AND ALBUTEROL SULFATE 3 MILLILITER(S): .5; 2.5 SOLUTION RESPIRATORY (INHALATION) at 12:38

## 2025-06-28 NOTE — DIETITIAN INITIAL EVALUATION ADULT - PERSON TAUGHT/METHOD
Discussed importance of adequate protein, food sources of protein. Patient verbalized understanding./verbal instruction/patient instructed/caregiver

## 2025-06-28 NOTE — DISCHARGE NOTE PROVIDER - CARE PROVIDER_API CALL
Naun Doan  Internal Medicine  [address] [address]  Phone: [phone]  Fax: [phone]  Established Patient  Follow Up Time:

## 2025-06-28 NOTE — DIETITIAN INITIAL EVALUATION ADULT - OTHER CALCULATIONS
pounds. Patient within % IBW (117%) thus actual body weight used for all calculations. Needs adjusted for advanced age, CHF. Fluid recs per team due to CHF.

## 2025-06-28 NOTE — DISCHARGE NOTE PROVIDER - NSDCMRMEDTOKEN_GEN_ALL_CORE_FT
acetaminophen 325 mg oral tablet: 2 tab(s) orally every 6 hours As needed Temp greater or equal to 38C (100.4F), Mild Pain (1 - 3)  ALPRAZolam 0.25 mg oral tablet: 1 tab(s) orally 2 times a day  ascorbic acid 500 mg oral tablet: 1 tab(s) orally once a day  aspirin 81 mg oral tablet, chewable: 1 tab(s) orally once a day  busPIRone 10 mg oral tablet: 1 tab(s) orally 2 times a day  calamine topical lotion: 1 Apply topically to affected area every 6 hours As needed Rash and/or Itching  cholecalciferol 125 mcg (5000 intl units) oral tablet: 2 tab(s) orally 2 times a day  dorzolamide hydrochloride-timolol maleate 2.23%-0.68% (2%-0.5% base) ophthalmic solution: 1 drop(s) in the right eye 2 times a day  DULoxetine 30 mg oral delayed release capsule: 1 cap(s) orally once a day  heparin: 5,000 unit(s) subcutaneous every 8 hours  latanoprost 0.005% ophthalmic solution: 1 drop(s) to each affected eye once a day (at bedtime)  melatonin 3 mg oral tablet: 3 tab(s) orally once a day (at bedtime) as needed for  insomnia  menthol-benzocaine: 1 lozenge orally 4 times a day as needed for dry mouth  Multiple Vitamins oral tablet: 1 tab(s) orally once a day  psyllium 3.4 g/7 g oral powder for reconstitution: 1 application orally once a day as needed for  constipation  senna leaf extract oral tablet: 2 tab(s) orally once a day (at bedtime) as needed for  constipation  tolterodine 2 mg oral tablet: 1 tab(s) orally once a day (at bedtime)  vibegron 75 mg oral tablet: 1 tab(s) orally once a day  Wellbutrin  mg/24 hours oral tablet, extended release: 1 tab(s) orally once a day   acetaminophen 325 mg oral tablet: 2 tab(s) orally every 6 hours As needed Temp greater or equal to 38C (100.4F), Mild Pain (1 - 3)  ALPRAZolam 0.25 mg oral tablet: 1 tab(s) orally 2 times a day  ascorbic acid 500 mg oral tablet: 1 tab(s) orally once a day  aspirin 81 mg oral tablet, chewable: 1 tab(s) orally once a day  busPIRone 10 mg oral tablet: 1 tab(s) orally 2 times a day  calamine topical lotion: 1 Apply topically to affected area every 6 hours As needed Rash and/or Itching  cholecalciferol 125 mcg (5000 intl units) oral tablet: 2 tab(s) orally 2 times a day  dorzolamide hydrochloride-timolol maleate 2.23%-0.68% (2%-0.5% base) ophthalmic solution: 1 drop(s) in the right eye 2 times a day  DULoxetine 30 mg oral delayed release capsule: 1 cap(s) orally once a day  heparin: 5,000 unit(s) subcutaneous every 8 hours  Home PT. : ICD R53.1 Weakness  latanoprost 0.005% ophthalmic solution: 1 drop(s) to each affected eye once a day (at bedtime)  melatonin 3 mg oral tablet: 3 tab(s) orally once a day (at bedtime) as needed for  insomnia  menthol-benzocaine: 1 lozenge orally 4 times a day as needed for dry mouth  Multiple Vitamins oral tablet: 1 tab(s) orally once a day  psyllium 3.4 g/7 g oral powder for reconstitution: 1 application orally once a day as needed for  constipation  senna leaf extract oral tablet: 2 tab(s) orally once a day (at bedtime) as needed for  constipation  tolterodine 2 mg oral tablet: 1 tab(s) orally once a day (at bedtime)  vibegron 75 mg oral tablet: 1 tab(s) orally once a day  Wellbutrin  mg/24 hours oral tablet, extended release: 1 tab(s) orally once a day   ALPRAZolam 0.25 mg oral tablet: 1 tab(s) orally 2 times a day  ascorbic acid 500 mg oral tablet: 1 tab(s) orally once a day  aspirin 81 mg oral tablet, chewable: 1 tab(s) orally once a day  busPIRone 10 mg oral tablet: 1 tab(s) orally 2 times a day  calamine topical lotion: 1 Apply topically to affected area every 6 hours As needed Rash and/or Itching  cholecalciferol 125 mcg (5000 intl units) oral tablet: 2 tab(s) orally 2 times a day  dorzolamide hydrochloride-timolol maleate 2.23%-0.68% (2%-0.5% base) ophthalmic solution: 1 drop(s) in the right eye 2 times a day  DULoxetine 30 mg oral delayed release capsule: 1 cap(s) orally once a day  Home PT. : ICD R53.1 Weakness  latanoprost 0.005% ophthalmic solution: 1 drop(s) to each affected eye once a day (at bedtime)  Multiple Vitamins oral tablet: 1 tab(s) orally once a day  tolterodine 2 mg oral tablet: 1 tab(s) orally once a day (at bedtime)  vibegron 75 mg oral tablet: 1 tab(s) orally once a day  Wellbutrin  mg/24 hours oral tablet, extended release: 1 tab(s) orally once a day   acetaminophen 325 mg oral tablet: 2 tab(s) orally every 6 hours As needed Temp greater or equal to 38C (100.4F), Mild Pain (1 - 3)  ALPRAZolam 0.25 mg oral tablet: 1 tab(s) orally 2 times a day  ascorbic acid 500 mg oral tablet: 1 tab(s) orally once a day  aspirin 81 mg oral tablet, chewable: 1 tab(s) orally once a day  busPIRone 10 mg oral tablet: 1 tab(s) orally 2 times a day  calamine topical lotion: 1 Apply topically to affected area every 6 hours As needed Rash and/or Itching  cholecalciferol 125 mcg (5000 intl units) oral tablet: 2 tab(s) orally 2 times a day  dorzolamide hydrochloride-timolol maleate 2.23%-0.68% (2%-0.5% base) ophthalmic solution: 1 drop(s) in the right eye 2 times a day  DULoxetine 30 mg oral delayed release capsule: 1 cap(s) orally once a day  Home PT. : ICD R53.1 Weakness  latanoprost 0.005% ophthalmic solution: 1 drop(s) to each affected eye once a day (at bedtime)  melatonin 3 mg oral tablet: 1 tab(s) orally once a day (at bedtime) As needed Insomnia  Multiple Vitamins oral tablet: 1 tab(s) orally once a day  senna leaf extract oral tablet: 2 tab(s) orally once a day (at bedtime) As needed for constipation  tolterodine 2 mg oral tablet: 1 tab(s) orally once a day (at bedtime)  vibegron 75 mg oral tablet: 1 tab(s) orally once a day  Wellbutrin  mg/24 hours oral tablet, extended release: 1 tab(s) orally once a day   acetaminophen 325 mg oral tablet: 2 tab(s) orally every 6 hours As needed Temp greater or equal to 38C (100.4F), Mild Pain (1 - 3)  ALPRAZolam 0.25 mg oral tablet: 1 tab(s) orally 2 times a day  ascorbic acid 500 mg oral tablet: 1 tab(s) orally once a day  aspirin 81 mg oral tablet, chewable: 1 tab(s) orally once a day  busPIRone 10 mg oral tablet: 1 tab(s) orally 2 times a day  calamine topical lotion: 1 Apply topically to affected area every 6 hours As needed Rash and/or Itching  cholecalciferol 125 mcg (5000 intl units) oral tablet: 2 tab(s) orally 2 times a day  dorzolamide hydrochloride-timolol maleate 2.23%-0.68% (2%-0.5% base) ophthalmic solution: 1 drop(s) in the right eye 2 times a day  DULoxetine 30 mg oral delayed release capsule: 1 cap(s) orally once a day  home PT: ICD R53.1 Home PT  Home PT. : ICD R53.1 Weakness  latanoprost 0.005% ophthalmic solution: 1 drop(s) to each affected eye once a day (at bedtime)  melatonin 3 mg oral tablet: 1 tab(s) orally once a day (at bedtime) As needed Insomnia  Multiple Vitamins oral tablet: 1 tab(s) orally once a day  senna leaf extract oral tablet: 2 tab(s) orally once a day (at bedtime) As needed for constipation  tolterodine 2 mg oral tablet: 1 tab(s) orally once a day (at bedtime)  vibegron 75 mg oral tablet: 1 tab(s) orally once a day  Wellbutrin  mg/24 hours oral tablet, extended release: 1 tab(s) orally once a day

## 2025-06-28 NOTE — PROGRESS NOTE ADULT - PROBLEM SELECTOR PLAN 1
Pt with 3 days cough, sob, wheezing at home. VBG with moderate-severe hypercapnia Pco2 77 on admission. Improved with 2L NC.   - 0/4 SIRS on admission. CXR no consolidation/opacity  - limited RVP negative, Covid negative  - Trops 87 > 70; Probnp 4K, but no ssx overload; EKG NSR similar to previous, no ischemic changes    - RVP pos for hMPV  - d/c abx  - Wean O2 as tolerated  - duonebs q6  - Amb o2 monitoring  - likely discharge 06/29

## 2025-06-28 NOTE — PROGRESS NOTE ADULT - SUBJECTIVE AND OBJECTIVE BOX
**INCOMPLETE NOTE    OVERNIGHT EVENTS:    SUBJECTIVE:  Patient seen and examined at bedside.    Vital Signs Last 12 Hrs  T(F): 98.5 (06-27-25 @ 20:46), Max: 98.5 (06-27-25 @ 20:46)  HR: 74 (06-27-25 @ 20:46) (74 - 74)  BP: 131/76 (06-27-25 @ 20:46) (131/76 - 131/76)  BP(mean): 95 (06-27-25 @ 20:46) (95 - 95)  RR: 20 (06-27-25 @ 20:46) (20 - 20)  SpO2: 93% (06-27-25 @ 20:46) (93% - 93%)  I&O's Summary      PHYSICAL EXAM:    Constitutional: WDWN resting comfortably in bed; NAD  Head: NC/AT  Eyes: PERRL, EOMI, anicteric sclera  ENT: no nasal discharge; uvula midline, no oropharyngeal erythema or exudates; MMM  Neck: supple; no JVD or thyromegaly  Respiratory: CTA B/L; no W/R/R, no retractions  Cardiac: +S1/S2; RRR; no M/R/G; PMI non-displaced  Gastrointestinal: abdomen soft, NT/ND; no rebound or guarding; +BSx4  Extremities: WWP, no clubbing or cyanosis; no peripheral edema  Musculoskeletal: NROM x4; no joint swelling, tenderness or erythema  Vascular: 2+ radial, DP/PT pulses B/L  Lymphatic: no submandibular or cervical LAD  Neurologic: AAOx3  Psychiatric: affect and characteristics of appearance, verbalizations, behaviors are appropriate    LABS:                        12.1   9.95  )-----------( 284      ( 27 Jun 2025 11:24 )             39.4     06-27    140  |  102  |  25[H]  ----------------------------<  102[H]  4.3   |  35[H]  |  1.12    Ca    8.8      27 Jun 2025 11:24    TPro  8.0  /  Alb  3.4  /  TBili  0.3  /  DBili  x   /  AST  19  /  ALT  16  /  AlkPhos  64  06-27      Urinalysis Basic - ( 27 Jun 2025 11:24 )    Color: x / Appearance: x / SG: x / pH: x  Gluc: 102 mg/dL / Ketone: x  / Bili: x / Urobili: x   Blood: x / Protein: x / Nitrite: x   Leuk Esterase: x / RBC: x / WBC x   Sq Epi: x / Non Sq Epi: x / Bacteria: x          RADIOLOGY & ADDITIONAL TESTS:    MEDICATIONS  (STANDING):  albuterol/ipratropium for Nebulization 3 milliLiter(s) Nebulizer every 6 hours  aspirin  chewable 81 milliGRAM(s) Oral daily  busPIRone 7.5 milliGRAM(s) Oral two times a day  DULoxetine 30 milliGRAM(s) Oral daily  latanoprost 0.005% Ophthalmic Solution 1 Drop(s) Both EYES at bedtime    MEDICATIONS  (PRN):  acetaminophen     Tablet .. 650 milliGRAM(s) Oral every 6 hours PRN Temp greater or equal to 38C (100.4F), Mild Pain (1 - 3)  aluminum hydroxide/magnesium hydroxide/simethicone Suspension 30 milliLiter(s) Oral every 4 hours PRN Dyspepsia  melatonin 3 milliGRAM(s) Oral at bedtime PRN Insomnia  ondansetron Injectable 4 milliGRAM(s) IV Push every 8 hours PRN Nausea and/or Vomiting  senna 2 Tablet(s) Oral at bedtime PRN for constipation   OVERNIGHT EVENTS:  -allyssa    SUBJECTIVE: Patient was evaluated at the bedside, resting comfortably and in no acute distress. She is able to speak in full sentences without shortness of breath. Oxygen saturation remains in the high 90s on 2 L/min nasal cannula. She reports an occasional cough but is unable to expectorate phlegm. She denies chest pain, nausea, vomiting, diarrhea, abdominal pain, or dyspnea. She is uncertain whether she has had any bowel movements since admission.    Vital Signs Last 12 Hrs  T(F): 98.5 (06-27-25 @ 20:46), Max: 98.5 (06-27-25 @ 20:46)  HR: 74 (06-27-25 @ 20:46) (74 - 74)  BP: 131/76 (06-27-25 @ 20:46) (131/76 - 131/76)  BP(mean): 95 (06-27-25 @ 20:46) (95 - 95)  RR: 20 (06-27-25 @ 20:46) (20 - 20)  SpO2: 93% (06-27-25 @ 20:46) (93% - 93%)  I&O's Summary      PHYSICAL EXAM:    Constitutional: WDWN resting comfortably in bed; NAD  Head: NC/AT  Eyes: PERRL, EOMI, anicteric sclera  ENT: no nasal discharge; uvula midline, no oropharyngeal erythema or exudates; MMM  Neck: supple; no JVD or thyromegaly  Respiratory: no etracations, b/l diffuse wheezing present   Gastrointestinal: abdomen soft, NT/ND; no rebound or guarding; +BSx4  Extremities: WWP, no clubbing or cyanosis; no peripheral edema  Musculoskeletal: NROM x4; no joint swelling, tenderness or erythema  Vascular: 2+ radial, DP/PT pulses B/L  Lymphatic: no submandibular or cervical LAD  Neurologic: AAOx3  Psychiatric: affect and characteristics of appearance, verbalizations, behaviors are appropriate    LABS:                        12.1   9.95  )-----------( 284      ( 27 Jun 2025 11:24 )             39.4     06-27    140  |  102  |  25[H]  ----------------------------<  102[H]  4.3   |  35[H]  |  1.12    Ca    8.8      27 Jun 2025 11:24    TPro  8.0  /  Alb  3.4  /  TBili  0.3  /  DBili  x   /  AST  19  /  ALT  16  /  AlkPhos  64  06-27      Urinalysis Basic - ( 27 Jun 2025 11:24 )    Color: x / Appearance: x / SG: x / pH: x  Gluc: 102 mg/dL / Ketone: x  / Bili: x / Urobili: x   Blood: x / Protein: x / Nitrite: x   Leuk Esterase: x / RBC: x / WBC x   Sq Epi: x / Non Sq Epi: x / Bacteria: x          RADIOLOGY & ADDITIONAL TESTS:    MEDICATIONS  (STANDING):  albuterol/ipratropium for Nebulization 3 milliLiter(s) Nebulizer every 6 hours  aspirin  chewable 81 milliGRAM(s) Oral daily  busPIRone 7.5 milliGRAM(s) Oral two times a day  DULoxetine 30 milliGRAM(s) Oral daily  latanoprost 0.005% Ophthalmic Solution 1 Drop(s) Both EYES at bedtime    MEDICATIONS  (PRN):  acetaminophen     Tablet .. 650 milliGRAM(s) Oral every 6 hours PRN Temp greater or equal to 38C (100.4F), Mild Pain (1 - 3)  aluminum hydroxide/magnesium hydroxide/simethicone Suspension 30 milliLiter(s) Oral every 4 hours PRN Dyspepsia  melatonin 3 milliGRAM(s) Oral at bedtime PRN Insomnia  ondansetron Injectable 4 milliGRAM(s) IV Push every 8 hours PRN Nausea and/or Vomiting  senna 2 Tablet(s) Oral at bedtime PRN for constipation

## 2025-06-28 NOTE — DIETITIAN INITIAL EVALUATION ADULT - PERTINENT MEDS FT
MEDICATIONS  (STANDING):  albuterol/ipratropium for Nebulization 3 milliLiter(s) Nebulizer every 6 hours  aspirin  chewable 81 milliGRAM(s) Oral daily  busPIRone 7.5 milliGRAM(s) Oral two times a day  DULoxetine 30 milliGRAM(s) Oral daily  latanoprost 0.005% Ophthalmic Solution 1 Drop(s) Both EYES at bedtime    MEDICATIONS  (PRN):  acetaminophen     Tablet .. 650 milliGRAM(s) Oral every 6 hours PRN Temp greater or equal to 38C (100.4F), Mild Pain (1 - 3)  aluminum hydroxide/magnesium hydroxide/simethicone Suspension 30 milliLiter(s) Oral every 4 hours PRN Dyspepsia  melatonin 3 milliGRAM(s) Oral at bedtime PRN Insomnia  ondansetron Injectable 4 milliGRAM(s) IV Push every 8 hours PRN Nausea and/or Vomiting  senna 2 Tablet(s) Oral at bedtime PRN for constipation

## 2025-06-28 NOTE — DIETITIAN INITIAL EVALUATION ADULT - OTHER INFO
Per H&P: This is a 89 yo F with HTN (not on meds), CHF (not on any meds), severe AS s/p TAVR, HLD, h/o C2 fracture s/p surgery, BIB [facility] for SOB for 3 days, admitted for ahrf 2/2 likely viral URI.    Patient seen at bedside for nutrition assessment. Patient's HHA present during RD assessment. Current diet order: regular. No known food allergies. No difficulty chewing/swallowing reported. Reports good appetite in house - consumed % of breakfast. Patient reports good appetite/PO intake PTA which HHA confirms. Provided nutrition education - see below. No dosing height entered in chart - HHA reports patient is 5'4". Dosing weight: 140 pounds, BMI 24, reports weight has been stable. No pressure injuries documented. No edema documented. Denies nausea/vomiting/diarrhea/constipation. Labs: BUN 25, GFR 46. Meds: zofran, bowel regimen. Observed with mild muscle wasting to clavicles and orbitals, however likely in setting of age-related sarcopenia since patient with adequate PO intake. Based on ASPEN guidelines, patient does not meet criteria for malnutrition. See nutrition recommendations below.

## 2025-06-28 NOTE — DIETITIAN INITIAL EVALUATION ADULT - PROBLEM/PLAN-2
Provided pt with food and water per request, re-oriented pt to whereabouts and situation    DISPLAY PLAN FREE TEXT

## 2025-06-28 NOTE — DISCHARGE NOTE PROVIDER - ATTENDING DISCHARGE PHYSICAL EXAMINATION:
Gen: sitting upright in bed at time of exam  HEENT: NCAT, MMM, clear OP  Neck: supple, trachea at midline  CV: RRR, +S1/S2  Pulm: adequate respiratory effort, no increased work of breathing  Abd: soft, NTND  Skin: warm and dry, no new rashes vs prior report  Ext: WWP  Neuro: AOx2 (baseline), no gross focal neurological deficits  Psych: affect and behavior appropriate

## 2025-06-28 NOTE — DIETITIAN INITIAL EVALUATION ADULT - PROBLEM SELECTOR PLAN 4
Severe AS s/p TAVR. 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 EKG on admission with LBBB known from previous.

## 2025-06-28 NOTE — DISCHARGE NOTE PROVIDER - NSDCFUSCHEDAPPT_GEN_ALL_CORE_FT
Juancho Pandya  [facility]  OPHTHALM [address]  Scheduled Appointment: 08/19/2025    Rodrigo Bowlnig  [facility]  NEPHRO [address]  Scheduled Appointment: 08/19/2025

## 2025-06-28 NOTE — DIETITIAN INITIAL EVALUATION ADULT - PERTINENT LABORATORY DATA
06-28    136  |  97  |  25[H]  ----------------------------<  102[H]  4.8   |  28  |  1.09    Ca    9.2      28 Jun 2025 05:30  Phos  3.7     06-28  Mg     2.1     06-28    TPro  8.0  /  Alb  3.4  /  TBili  0.3  /  DBili  x   /  AST  19  /  ALT  16  /  AlkPhos  64  06-27  POCT Blood Glucose.: 106 mg/dL (06-27-25 @ 17:41)  A1C with Estimated Average Glucose Result: 5.3 % (07-19-24 @ 12:50)

## 2025-06-28 NOTE — DIETITIAN INITIAL EVALUATION ADULT - ADD RECOMMEND
1. Continue with regular diet  2. Encourage pt to meet nutritional needs as able   3. Monitor labs: electrolytes, cbc  4. Monitor weights   5. Pain and bowel regimen per team   6. Will continue to assess/honor food preferences as able  7. Monitor adherence to diet education

## 2025-06-28 NOTE — DISCHARGE NOTE PROVIDER - NSDCCPCAREPLAN_GEN_ALL_CORE_FT
PRINCIPAL DISCHARGE DIAGNOSIS  Diagnosis: Viral URI with cough  Assessment and Plan of Treatment: You have a lung infection called human metapneumovirus (hMPV), which can make it hard to breathe and cause wheezing. To help your breathing, you will go home with extra oxygen and a nebulizer machine for your medicine. Use the oxygen as directed to keep your oxygen levels safe, and use the nebulizer if you feel short of breath or start wheezing again. Make sure you and your family know how to use the equipment safely and ask questions if you are unsure about anything.     PRINCIPAL DISCHARGE DIAGNOSIS  Diagnosis: Viral URI with cough  Assessment and Plan of Treatment: You have a lung infection called human metapneumovirus (hMPV), which can make it hard to breathe and cause wheezing. To help your breathing, you will go home with extra oxygen and a nebulizer machine for your medicine. Use the oxygen as directed to keep your oxygen levels safe. Make sure you and your family know how to use the equipment safely and ask questions if you are unsure about anything.

## 2025-06-28 NOTE — DIETITIAN INITIAL EVALUATION ADULT - PROBLEM SELECTOR PLAN 6
Wellbutrin 150 XR qd, Buspar 10 mg BID, Alprazolam 0.25 mg BID, Lexapro 20 mg qd    - continue home meds after med rec

## 2025-06-28 NOTE — PROGRESS NOTE ADULT - ASSESSMENT
This is a 89 yo F with HTN (not on meds), CHF (not on any meds), AS, HLD, h/o C2 fracture s/p surgery, BIB [facility] for SOB for 3 days, admitted for acute hypercapnic respiratory failure  2/2 hMPV.

## 2025-06-28 NOTE — DIETITIAN INITIAL EVALUATION ADULT - PROBLEM SELECTOR PLAN 1
Pt with 3 days cough, sob, wheezing at home. VBG with hypercapnia on admission. Improved with 2L NC.  DDx: viral URI, ADHF, ACS, PNA  - 0/4 SIRS on admission. CXR no consolidation/opacity  - limited RVP negative, Covid negative  - Trops 87 > 70; Probnp 4K, but no ssx overload; EKG NSR similar to previous, no ischemic changes    Plan:  - f/u full rvp  - Wean O2 as tolerated  - duonebs q6  - monitor off abx

## 2025-06-28 NOTE — DISCHARGE NOTE PROVIDER - HOSPITAL COURSE
#Discharge:     COLLETTE OROZCO is a 89y Female with a past medical history of _____    Presented with _____, found to have _____    Problem List/Main Diagnoses (system-based):   #     #     #    Patient was discharged to: home    New medications: none  Changes to old medications: none  Medications that were stopped: none    Items to follow up as outpatient:       PHYSICAL EXAM:    Constitutional: WDWN resting comfortably in bed; NAD  Head: NC/AT  Eyes: PERRL, EOMI, anicteric sclera  ENT: no nasal discharge; uvula midline, no oropharyngeal erythema or exudates; MMM  Neck: supple; no JVD or thyromegaly  Respiratory: CTA B/L; no W/R/R, no retractions  Cardiac: +S1/S2; RRR; no M/R/G; PMI non-displaced  Gastrointestinal: abdomen soft, NT/ND; no rebound or guarding; +BSx4  Extremities: WWP, no clubbing or cyanosis; no peripheral edema  Musculoskeletal: NROM x4; no joint swelling, tenderness or erythema  Vascular: 2+ radial, DP/PT pulses B/L  Lymphatic: no submandibular or cervical LAD  Neurologic: AAOx3  Psychiatric: affect and characteristics of appearance, verbalizations, behaviors are appropriate    LABS & STUDIES:    SARS-CoV-2: Britni (27 Jun 2025 19:54)   Pt is 89 yo F with HTN (not on meds), CHF (not on any meds), AS, HLD, h/o C2 fracture s/p surgery, BIB [facility] for SOB for 3 days, admitted for ahrf 2/2 HMPV s/p O2 duonebs now on RA ready for d/c home     Problem List/Main Diagnoses (system-based):   #Acute respiratory failure with hypercapnia.   Pt with 3 days cough, sob, wheezing at home. VBG with hypercapnia on admission found to be +HMPV s/p O2, duonebs now on RA ready for d/c home   - f/u PCP     #Aortic stenosis.   #Type 2 MI (myocardial infarction).   #Chronic CHF  #Hypertension.   Pt not on any home meds, intermittently hypertensive during admission. Severe AS s/p TAVR. 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 EKG on admission with LBBB known from previous. hsTrop down-trended, suspect demand.  - f/u PCP for monitoring       #Major depression.   Wellbutrin 150 XR qd, Buspar 10 mg BID, Alprazolam 0.25 mg BID, Lexapro 20 mg qd  - continue home meds    #Urinary retention.   Vibegron 75 mg qd continue      Patient was discharged to: home    New medications: none  Changes to old medications: none  Medications that were stopped: none    Items to follow up as outpatient:       PHYSICAL EXAM:    Constitutional: WDWN resting comfortably in bed; NAD  Head: NC/AT  Eyes: PERRL, EOMI, anicteric sclera  ENT: no nasal discharge; uvula midline, no oropharyngeal erythema or exudates; MMM  Neck: supple; no JVD or thyromegaly  Respiratory: CTA B/L; no W/R/R, no retractions  Cardiac: +S1/S2; RRR; no M/R/G; PMI non-displaced  Gastrointestinal: abdomen soft, NT/ND; no rebound or guarding; +BSx4  Extremities: WWP, no clubbing or cyanosis; no peripheral edema  Musculoskeletal: NROM x4; no joint swelling, tenderness or erythema  Vascular: 2+ radial, DP/PT pulses B/L  Lymphatic: no submandibular or cervical LAD  Neurologic: AAOx3  Psychiatric: affect and characteristics of appearance, verbalizations, behaviors are appropriate    LABS & STUDIES:    SARS-CoV-2: NotDetec (27 Jun 2025 19:54)   Ms. Duval is a 89yo woman with PMHx severe AS s/p TAVR, HFpEF, HTN, HLD, prior C2 fx s/p OR who presented to the ED for SOB for 2 days, admitted for management of acute hypoxic respiratory failure 2/2 human metapneumovirus. Pt is medically ready for discharge to home on supplemental O2.     Problem List/Main Diagnoses:   #Acute respiratory failure with hypercapnia.   Pt with 3 days cough, sob, wheezing at home. VBG with hypercapnia on admission found to be +HMPV s/p O2, duonebs now ready for d/c home on supplemental O2.  - f/u PCP     #Aortic stenosis.   #Type 2 MI (myocardial infarction).   #Chronic CHF  #Hypertension.   Pt not on any home meds, intermittently hypertensive during admission. Severe AS s/p TAVR. 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 EKG on admission with LBBB known from previous. hsTrop down-trended, suspect demand.  - f/u PCP for monitoring     #Major depression.   Wellbutrin 150 XR qd, Buspar 10 mg BID, Alprazolam 0.25 mg BID, Lexapro 20 mg qd  - continue home meds    #Urinary retention.   Vibegron 75 mg qd continue    Patient was discharged to: home    New medications: none  Changes to old medications: none  Medications that were stopped: none    Items to follow up as outpatient:     PHYSICAL EXAM:    Constitutional: WDWN resting comfortably in bed; NAD  Head: NC/AT  Eyes: PERRL, EOMI, anicteric sclera  ENT: no nasal discharge; uvula midline, no oropharyngeal erythema or exudates; MMM  Neck: supple; no JVD or thyromegaly  Respiratory: Mild-moderate wheezing present on posterior auscultation; no W/R/R, no retractions  Cardiac: +S1/S2; RRR; no M/R/G; PMI non-displaced  Gastrointestinal: abdomen soft, NT/ND; no rebound or guarding; +BSx4  Extremities: WWP, no clubbing or cyanosis; no peripheral edema  Musculoskeletal: NROM x4; no joint swelling, tenderness or erythema  Vascular: 2+ radial, DP/PT pulses B/L  Lymphatic: no submandibular or cervical LAD  Neurologic: AAOx3  Psychiatric: affect and characteristics of appearance, verbalizations, behaviors are appropriate    SARS-CoV-2: Britni (27 Jun 2025 19:54)   Ms. Duval is a 89yo woman with PMHx severe AS s/p TAVR, HFpEF, HTN, HLD, prior C2 fx s/p OR who presented to the ED for SOB for 2 days, admitted for management of acute hypoxic respiratory failure 2/2 human metapneumovirus. Pt is medically ready for discharge to home on supplemental O2.     Problem List/Main Diagnoses:   #Acute respiratory failure with hypercapnia.   Pt with 3 days cough, sob, wheezing at home. VBG with hypercapnia on admission found to be +HMPV s/p O2, duonebs now ready for d/c home on supplemental O2.  - f/u PCP     #Aortic stenosis.   #Type 2 MI (myocardial infarction).   #Chronic CHF  #Hypertension.   Pt not on any home meds, intermittently hypertensive during admission. Severe AS s/p TAVR. 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 EKG on admission with LBBB known from previous. hsTrop down-trended, suspect demand.  - f/u PCP for monitoring     #Major depression.   Wellbutrin 150 XR qd, Buspar 10 mg BID, Alprazolam 0.25 mg BID, Lexapro 20 mg qd  - continue home meds    #Urinary retention.   Vibegron 75 mg qd continue    Patient was discharged to: home    New medications: none  Changes to old medications: none  Medications that were stopped: none    Items to follow up as outpatient: PCP    PHYSICAL EXAM:    Constitutional: WDWN resting comfortably in bed; NAD  Head: NC/AT  Eyes: PERRL, EOMI, anicteric sclera  ENT: no nasal discharge; uvula midline, no oropharyngeal erythema or exudates; MMM  Neck: supple; no JVD or thyromegaly  Respiratory: Mild-moderate wheezing present on posterior auscultation; no W/R/R, no retractions  Cardiac: +S1/S2; RRR; no M/R/G; PMI non-displaced  Gastrointestinal: abdomen soft, NT/ND; no rebound or guarding; +BSx4  Extremities: WWP, no clubbing or cyanosis; no peripheral edema  Musculoskeletal: NROM x4; no joint swelling, tenderness or erythema  Vascular: 2+ radial, DP/PT pulses B/L  Lymphatic: no submandibular or cervical LAD  Neurologic: AAOx2  Psychiatric: affect and characteristics of appearance, verbalizations, behaviors are appropriate    SARS-CoV-2: Britni (27 Jun 2025 19:54)   Ms. Duval is a 89yo woman with PMHx severe AS s/p TAVR, HFpEF, HTN, HLD, prior C2 fx s/p OR who presented to the ED for SOB for 2 days, admitted for management of acute hypoxic respiratory failure 2/2 human metapneumovirus. Pt is medically ready for discharge to home on supplemental O2.     Problem List/Main Diagnoses:   #Acute respiratory failure with hypercapnia.   Pt with 3 days cough, sob, wheezing at home. VBG with hypercapnia on admission found to be +HMPV s/p O2, duonebs now ready for d/c home on supplemental O2.  - f/u PCP     #Aortic stenosis.   #Type 2 MI (myocardial infarction).   #Chronic CHF  #Hypertension.   Pt not on any home meds, intermittently hypertensive during admission. Severe AS s/p TAVR. 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 EKG on admission with LBBB known from previous. hsTrop down-trended, suspect demand.  - f/u PCP for monitoring     #Major depression.   Wellbutrin 150 XR qd, Buspar 10 mg BID, Alprazolam 0.25 mg BID, Lexapro 20 mg qd  - continue home meds    #Urinary retention.   Vibegron 75 mg qd continue    Patient was discharged to: home    New medications: none  Changes to old medications: none  Medications that were stopped: none    Items to follow up as outpatient: PCP    PHYSICAL EXAM:    Constitutional: WDWN resting comfortably in bed; NAD  Head: NC/AT  Eyes: PERRL, EOMI, anicteric sclera  ENT: no nasal discharge; uvula midline, no oropharyngeal erythema or exudates; MMM  Neck: supple; no JVD or thyromegaly  Respiratory: Mild-moderate wheezing present on posterior auscultation; no W/R/R, no retractions  Cardiac: +S1/S2; RRR; no M/R/G; PMI non-displaced  Gastrointestinal: abdomen soft, NT/ND; no rebound or guarding; +BSx4  Extremities: WWP, no clubbing or cyanosis; no peripheral edema  Musculoskeletal: NROM x4; no joint swelling, tenderness or erythema  Vascular: 2+ radial, DP/PT pulses B/L  Lymphatic: no submandibular or cervical LAD  Neurologic: AAOx2 (baseline)  Psychiatric: affect and characteristics of appearance, verbalizations, behaviors are appropriate    SARS-CoV-2: Britni (27 Jun 2025 19:54)

## 2025-06-29 LAB
ALBUMIN SERPL ELPH-MCNC: 4.1 G/DL — SIGNIFICANT CHANGE UP (ref 3.3–5)
ALP SERPL-CCNC: 61 U/L — SIGNIFICANT CHANGE UP (ref 40–120)
ALT FLD-CCNC: 11 U/L — SIGNIFICANT CHANGE UP (ref 10–45)
ANION GAP SERPL CALC-SCNC: 11 MMOL/L — SIGNIFICANT CHANGE UP (ref 5–17)
AST SERPL-CCNC: 27 U/L — SIGNIFICANT CHANGE UP (ref 10–40)
BILIRUB SERPL-MCNC: 0.4 MG/DL — SIGNIFICANT CHANGE UP (ref 0.2–1.2)
BUN SERPL-MCNC: 25 MG/DL — HIGH (ref 7–23)
CALCIUM SERPL-MCNC: 9.7 MG/DL — SIGNIFICANT CHANGE UP (ref 8.4–10.5)
CHLORIDE SERPL-SCNC: 99 MMOL/L — SIGNIFICANT CHANGE UP (ref 96–108)
CO2 SERPL-SCNC: 32 MMOL/L — HIGH (ref 22–31)
CREAT SERPL-MCNC: 0.92 MG/DL — SIGNIFICANT CHANGE UP (ref 0.5–1.3)
EGFR: 57 ML/MIN/1.73M2 — LOW
EGFR: 57 ML/MIN/1.73M2 — LOW
GLUCOSE SERPL-MCNC: 107 MG/DL — HIGH (ref 70–99)
HCT VFR BLD CALC: 39.8 % — SIGNIFICANT CHANGE UP (ref 34.5–45)
HGB BLD-MCNC: 12 G/DL — SIGNIFICANT CHANGE UP (ref 11.5–15.5)
MCHC RBC-ENTMCNC: 30.2 G/DL — LOW (ref 32–36)
MCHC RBC-ENTMCNC: 31.3 PG — SIGNIFICANT CHANGE UP (ref 27–34)
MCV RBC AUTO: 103.6 FL — HIGH (ref 80–100)
NRBC # BLD AUTO: 0 K/UL — SIGNIFICANT CHANGE UP (ref 0–0)
NRBC # FLD: 0 K/UL — SIGNIFICANT CHANGE UP (ref 0–0)
NRBC BLD AUTO-RTO: 0 /100 WBCS — SIGNIFICANT CHANGE UP (ref 0–0)
PLATELET # BLD AUTO: 296 K/UL — SIGNIFICANT CHANGE UP (ref 150–400)
PMV BLD: 10.1 FL — SIGNIFICANT CHANGE UP (ref 7–13)
POTASSIUM SERPL-MCNC: 5 MMOL/L — SIGNIFICANT CHANGE UP (ref 3.5–5.3)
POTASSIUM SERPL-SCNC: 5 MMOL/L — SIGNIFICANT CHANGE UP (ref 3.5–5.3)
PROT SERPL-MCNC: 7.6 G/DL — SIGNIFICANT CHANGE UP (ref 6–8.3)
RBC # BLD: 3.84 M/UL — SIGNIFICANT CHANGE UP (ref 3.8–5.2)
RBC # FLD: 14.2 % — SIGNIFICANT CHANGE UP (ref 10.3–14.5)
SODIUM SERPL-SCNC: 142 MMOL/L — SIGNIFICANT CHANGE UP (ref 135–145)
WBC # BLD: 8.83 K/UL — SIGNIFICANT CHANGE UP (ref 3.8–10.5)
WBC # FLD AUTO: 8.83 K/UL — SIGNIFICANT CHANGE UP (ref 3.8–10.5)

## 2025-06-29 PROCEDURE — 82803 BLOOD GASES ANY COMBINATION: CPT

## 2025-06-29 PROCEDURE — 83735 ASSAY OF MAGNESIUM: CPT

## 2025-06-29 PROCEDURE — 83605 ASSAY OF LACTIC ACID: CPT

## 2025-06-29 PROCEDURE — 83880 ASSAY OF NATRIURETIC PEPTIDE: CPT

## 2025-06-29 PROCEDURE — 84484 ASSAY OF TROPONIN QUANT: CPT

## 2025-06-29 PROCEDURE — 80048 BASIC METABOLIC PNL TOTAL CA: CPT

## 2025-06-29 PROCEDURE — 94640 AIRWAY INHALATION TREATMENT: CPT

## 2025-06-29 PROCEDURE — 84100 ASSAY OF PHOSPHORUS: CPT

## 2025-06-29 PROCEDURE — 0241U: CPT

## 2025-06-29 PROCEDURE — 85025 COMPLETE CBC W/AUTO DIFF WBC: CPT

## 2025-06-29 PROCEDURE — 99233 SBSQ HOSP IP/OBS HIGH 50: CPT | Mod: GC

## 2025-06-29 PROCEDURE — 84145 PROCALCITONIN (PCT): CPT

## 2025-06-29 PROCEDURE — 87040 BLOOD CULTURE FOR BACTERIA: CPT

## 2025-06-29 PROCEDURE — 71045 X-RAY EXAM CHEST 1 VIEW: CPT

## 2025-06-29 PROCEDURE — 82962 GLUCOSE BLOOD TEST: CPT

## 2025-06-29 PROCEDURE — 80053 COMPREHEN METABOLIC PANEL: CPT

## 2025-06-29 PROCEDURE — 36415 COLL VENOUS BLD VENIPUNCTURE: CPT

## 2025-06-29 PROCEDURE — 86140 C-REACTIVE PROTEIN: CPT

## 2025-06-29 PROCEDURE — 0225U NFCT DS DNA&RNA 21 SARSCOV2: CPT

## 2025-06-29 RX ADMIN — IPRATROPIUM BROMIDE AND ALBUTEROL SULFATE 3 MILLILITER(S): .5; 2.5 SOLUTION RESPIRATORY (INHALATION) at 05:02

## 2025-06-29 RX ADMIN — Medication 650 MILLIGRAM(S): at 05:03

## 2025-06-29 RX ADMIN — DULOXETINE 30 MILLIGRAM(S): 20 CAPSULE, DELAYED RELEASE ORAL at 11:06

## 2025-06-29 RX ADMIN — IPRATROPIUM BROMIDE AND ALBUTEROL SULFATE 3 MILLILITER(S): .5; 2.5 SOLUTION RESPIRATORY (INHALATION) at 23:37

## 2025-06-29 RX ADMIN — BUSPIRONE HYDROCHLORIDE 7.5 MILLIGRAM(S): 15 TABLET ORAL at 05:03

## 2025-06-29 RX ADMIN — BUSPIRONE HYDROCHLORIDE 7.5 MILLIGRAM(S): 15 TABLET ORAL at 17:26

## 2025-06-29 RX ADMIN — IPRATROPIUM BROMIDE AND ALBUTEROL SULFATE 3 MILLILITER(S): .5; 2.5 SOLUTION RESPIRATORY (INHALATION) at 17:26

## 2025-06-29 RX ADMIN — IPRATROPIUM BROMIDE AND ALBUTEROL SULFATE 3 MILLILITER(S): .5; 2.5 SOLUTION RESPIRATORY (INHALATION) at 11:05

## 2025-06-29 RX ADMIN — Medication 81 MILLIGRAM(S): at 11:06

## 2025-06-29 RX ADMIN — LATANOPROST PF 1 DROP(S): 0.05 SOLUTION/ DROPS OPHTHALMIC at 22:37

## 2025-06-29 NOTE — PROGRESS NOTE ADULT - ASSESSMENT
This is a 89 yo F with HTN (not on meds), CHF (not on any meds), AS, HLD, h/o C2 fracture s/p surgery, BIB [facility] for SOB for 3 days, admitted for acute hypercapnic respiratory failure  2/2 hMPV.

## 2025-06-29 NOTE — PROGRESS NOTE ADULT - SUBJECTIVE AND OBJECTIVE BOX
**INCOMPLETE NOTE    OVERNIGHT EVENTS:    SUBJECTIVE:  Patient seen and examined at bedside.    Vital Signs Last 12 Hrs  T(F): 97.3 (06-29-25 @ 05:00), Max: 97.3 (06-28-25 @ 20:28)  HR: 80 (06-29-25 @ 05:00) (76 - 80)  BP: 184/88 (06-29-25 @ 05:00) (141/82 - 184/88)  BP(mean): 102 (06-28-25 @ 20:28) (102 - 102)  RR: 18 (06-29-25 @ 05:00) (18 - 18)  SpO2: 96% (06-29-25 @ 05:00) (96% - 96%)  I&O's Summary      PHYSICAL EXAM:    Constitutional: WDWN resting comfortably in bed; NAD  Head: NC/AT  Eyes: PERRL, EOMI, anicteric sclera  ENT: no nasal discharge; uvula midline, no oropharyngeal erythema or exudates; MMM  Neck: supple; no JVD or thyromegaly  Respiratory: CTA B/L; no W/R/R, no retractions  Cardiac: +S1/S2; RRR; no M/R/G; PMI non-displaced  Gastrointestinal: abdomen soft, NT/ND; no rebound or guarding; +BSx4  Extremities: WWP, no clubbing or cyanosis; no peripheral edema  Musculoskeletal: NROM x4; no joint swelling, tenderness or erythema  Vascular: 2+ radial, DP/PT pulses B/L  Lymphatic: no submandibular or cervical LAD  Neurologic: AAOx3  Psychiatric: affect and characteristics of appearance, verbalizations, behaviors are appropriate    LABS:                        10.7   7.77  )-----------( 228      ( 28 Jun 2025 05:30 )             35.5     06-28    136  |  97  |  25[H]  ----------------------------<  102[H]  4.8   |  28  |  1.09    Ca    9.2      28 Jun 2025 05:30  Phos  3.7     06-28  Mg     2.1     06-28    TPro  8.0  /  Alb  3.4  /  TBili  0.3  /  DBili  x   /  AST  19  /  ALT  16  /  AlkPhos  64  06-27      Urinalysis Basic - ( 28 Jun 2025 05:30 )    Color: x / Appearance: x / SG: x / pH: x  Gluc: 102 mg/dL / Ketone: x  / Bili: x / Urobili: x   Blood: x / Protein: x / Nitrite: x   Leuk Esterase: x / RBC: x / WBC x   Sq Epi: x / Non Sq Epi: x / Bacteria: x          RADIOLOGY & ADDITIONAL TESTS:    MEDICATIONS  (STANDING):  albuterol/ipratropium for Nebulization 3 milliLiter(s) Nebulizer every 6 hours  aspirin  chewable 81 milliGRAM(s) Oral daily  busPIRone 7.5 milliGRAM(s) Oral two times a day  DULoxetine 30 milliGRAM(s) Oral daily  latanoprost 0.005% Ophthalmic Solution 1 Drop(s) Both EYES at bedtime    MEDICATIONS  (PRN):  acetaminophen     Tablet .. 650 milliGRAM(s) Oral every 6 hours PRN Temp greater or equal to 38C (100.4F), Mild Pain (1 - 3)  aluminum hydroxide/magnesium hydroxide/simethicone Suspension 30 milliLiter(s) Oral every 4 hours PRN Dyspepsia  melatonin 3 milliGRAM(s) Oral at bedtime PRN Insomnia  ondansetron Injectable 4 milliGRAM(s) IV Push every 8 hours PRN Nausea and/or Vomiting  senna 2 Tablet(s) Oral at bedtime PRN for constipation   OVERNIGHT EVENTS:  -allyssa    SUBJECTIVE:  Patient seen and examined at bedside. Sleeping in bed comfortably; in NAD on 2L NC. Pt confused this morning AAOx0. Unable to provide information.     Vital Signs Last 12 Hrs  T(F): 97.3 (06-29-25 @ 05:00), Max: 97.3 (06-28-25 @ 20:28)  HR: 80 (06-29-25 @ 05:00) (76 - 80)  BP: 184/88 (06-29-25 @ 05:00) (141/82 - 184/88)  BP(mean): 102 (06-28-25 @ 20:28) (102 - 102)  RR: 18 (06-29-25 @ 05:00) (18 - 18)  SpO2: 96% (06-29-25 @ 05:00) (96% - 96%)  I&O's Summary      PHYSICAL EXAM:    Constitutional: WDWN resting comfortably in bed; NAD  Head: NC/AT  Eyes: PERRL, EOMI, anicteric sclera  ENT: no nasal discharge; uvula midline, no oropharyngeal erythema or exudates; MMM  Neck: supple; no JVD or thyromegaly  Respiratory: no retractions, wheezing improved significantly   Cardiac: +S1/S2; RRR; no M/R/G; PMI non-displaced  Gastrointestinal: abdomen soft, NT/ND; no rebound or guarding; +BSx4  Extremities: WWP, no clubbing or cyanosis; no peripheral edema  Musculoskeletal: NROM x4; no joint swelling, tenderness or erythema  Vascular: 2+ radial, DP/PT pulses B/L  Lymphatic: no submandibular or cervical LAD  Neurologic: AAOx0  Psychiatric: affect and characteristics of appearance, verbalizations, behaviors are appropriate    LABS:                        10.7   7.77  )-----------( 228      ( 28 Jun 2025 05:30 )             35.5     06-28    136  |  97  |  25[H]  ----------------------------<  102[H]  4.8   |  28  |  1.09    Ca    9.2      28 Jun 2025 05:30  Phos  3.7     06-28  Mg     2.1     06-28    TPro  8.0  /  Alb  3.4  /  TBili  0.3  /  DBili  x   /  AST  19  /  ALT  16  /  AlkPhos  64  06-27      Urinalysis Basic - ( 28 Jun 2025 05:30 )    Color: x / Appearance: x / SG: x / pH: x  Gluc: 102 mg/dL / Ketone: x  / Bili: x / Urobili: x   Blood: x / Protein: x / Nitrite: x   Leuk Esterase: x / RBC: x / WBC x   Sq Epi: x / Non Sq Epi: x / Bacteria: x          RADIOLOGY & ADDITIONAL TESTS:    MEDICATIONS  (STANDING):  albuterol/ipratropium for Nebulization 3 milliLiter(s) Nebulizer every 6 hours  aspirin  chewable 81 milliGRAM(s) Oral daily  busPIRone 7.5 milliGRAM(s) Oral two times a day  DULoxetine 30 milliGRAM(s) Oral daily  latanoprost 0.005% Ophthalmic Solution 1 Drop(s) Both EYES at bedtime    MEDICATIONS  (PRN):  acetaminophen     Tablet .. 650 milliGRAM(s) Oral every 6 hours PRN Temp greater or equal to 38C (100.4F), Mild Pain (1 - 3)  aluminum hydroxide/magnesium hydroxide/simethicone Suspension 30 milliLiter(s) Oral every 4 hours PRN Dyspepsia  melatonin 3 milliGRAM(s) Oral at bedtime PRN Insomnia  ondansetron Injectable 4 milliGRAM(s) IV Push every 8 hours PRN Nausea and/or Vomiting  senna 2 Tablet(s) Oral at bedtime PRN for constipation

## 2025-06-29 NOTE — PROGRESS NOTE ADULT - PROBLEM SELECTOR PLAN 1
Pt with 3 days cough, sob, wheezing at home. VBG with moderate-severe hypercapnia Pco2 77 on admission. Improved with 2L NC.   - 0/4 SIRS on admission. CXR no consolidation/opacity  - limited RVP negative, Covid negative  - Trops 87 > 70; Probnp 4K, but no ssx overload; EKG NSR similar to previous, no ischemic changes    - RVP pos for hMPV  - d/c abx  - Wean O2 as tolerated  - duonebs q6  - Amb o2 monitoring  - likely discharge 06/29 Pt with 3 days cough, sob, wheezing at home. VBG with moderate-severe hypercapnia Pco2 77 on admission. Improved with 2L NC.   - 0/4 SIRS on admission. CXR no consolidation/opacity  - limited RVP negative, Covid negative  - Trops 87 > 70; Probnp 4K, but no ssx overload; EKG NSR similar to previous, no ischemic changes    - RVP pos for hMPV  - Wean O2 as tolerated  - duonebs q6  - Amb o2 monitoring  - Ambi sat 89% on 2L O2, consider discharge tmw on supplemental oxygen

## 2025-06-29 NOTE — PROGRESS NOTE ADULT - ATTENDING COMMENTS
Ms. Duval is a 89yo woman with PMHx severe AS s/p TAVR, HFpEF, HTN, HLD, prior C2 fx s/p OR who presented to the ED for SOB for 2 days, admitted for management of acute hypoxic respiratory failure 2/2 human metapneumovirus.    Patient seen this morning with [facility] at bedside, breathing comfortably on 2L NC. Patient seen again 11am sitting up in chair eating breakfast, had just returned from walk using walker. Oxygen saturation 89% while ambulating. Patient alert and oriented to self, place, hospital. Reports feeling better than yesterday. On exam, well-appearing elderly woman, cardiac +SADIE, lungs with few coarse breath sounds, improved from yesterday, abdomen soft, no JAZMIN. Vitals/labs/imaging reviewed    #Acute hypoxic respiratory failure 2/2 viral infection - low suspicion for superimposed bacterial pna given afebrile, normal WBC, no consolidation on CXR. Appears euvolemic, low suspicion for CHF exacerbation  - C/w duonebs, chest PT  - Wean O2 as able -- ambulated with SpO2 89% today, may need supplemental O2 on discharge  - Daily weights  - OOBTC
Ms. Duval is a 89yo woman with PMHx severe AS s/p TAVR, HFpEF, HTN, HLD, prior C2 fx s/p OR who presented to the ED for SOB for 2 days, admitted for management of acute hypoxic respiratory failure 2/2 human metapneumovirus.    Patient seen this morning with [facility] at bedside, breathing comfortably on 2L NC. Patient alert and oriented to self, place, hospital. Reports feeling better than yesterday. On exam, well-appearing elderly woman, cardiac +SADIE, lungs with end-expiratory wheezing, abdomen soft, no JAZMIN. Vitals/labs/imaging reviewed    #Acute hypoxic respiratory failure 2/2 viral infection - low suspicion for superimposed bacterial pna given afebrile, normal WBC, no consolidation on CXR. Appears euvolemic, low suspicion for CHF exacerbation  - C/w duonebs, chest PT  - Wean O2  - Daily weights  - OOBTC

## 2025-06-30 ENCOUNTER — TRANSCRIPTION ENCOUNTER (OUTPATIENT)
Age: 89
End: 2025-06-30

## 2025-06-30 VITALS
OXYGEN SATURATION: 92 % | TEMPERATURE: 98 F | SYSTOLIC BLOOD PRESSURE: 154 MMHG | DIASTOLIC BLOOD PRESSURE: 90 MMHG | HEART RATE: 91 BPM | RESPIRATION RATE: 17 BRPM

## 2025-06-30 PROCEDURE — 99285 EMERGENCY DEPT VISIT HI MDM: CPT

## 2025-06-30 PROCEDURE — 83735 ASSAY OF MAGNESIUM: CPT

## 2025-06-30 PROCEDURE — 86140 C-REACTIVE PROTEIN: CPT

## 2025-06-30 PROCEDURE — 71045 X-RAY EXAM CHEST 1 VIEW: CPT | Mod: 26

## 2025-06-30 PROCEDURE — 84100 ASSAY OF PHOSPHORUS: CPT

## 2025-06-30 PROCEDURE — 99239 HOSP IP/OBS DSCHRG MGMT >30: CPT

## 2025-06-30 PROCEDURE — 0241U: CPT

## 2025-06-30 PROCEDURE — 80053 COMPREHEN METABOLIC PANEL: CPT

## 2025-06-30 PROCEDURE — 0225U NFCT DS DNA&RNA 21 SARSCOV2: CPT

## 2025-06-30 PROCEDURE — 84145 PROCALCITONIN (PCT): CPT

## 2025-06-30 PROCEDURE — 85027 COMPLETE CBC AUTOMATED: CPT

## 2025-06-30 PROCEDURE — 94640 AIRWAY INHALATION TREATMENT: CPT

## 2025-06-30 PROCEDURE — 36415 COLL VENOUS BLD VENIPUNCTURE: CPT

## 2025-06-30 PROCEDURE — 87637 SARSCOV2&INF A&B&RSV AMP PRB: CPT

## 2025-06-30 PROCEDURE — 83880 ASSAY OF NATRIURETIC PEPTIDE: CPT

## 2025-06-30 PROCEDURE — 85025 COMPLETE CBC W/AUTO DIFF WBC: CPT

## 2025-06-30 PROCEDURE — 82803 BLOOD GASES ANY COMBINATION: CPT

## 2025-06-30 PROCEDURE — 80048 BASIC METABOLIC PNL TOTAL CA: CPT

## 2025-06-30 PROCEDURE — 84484 ASSAY OF TROPONIN QUANT: CPT

## 2025-06-30 PROCEDURE — 87040 BLOOD CULTURE FOR BACTERIA: CPT

## 2025-06-30 PROCEDURE — 83605 ASSAY OF LACTIC ACID: CPT

## 2025-06-30 PROCEDURE — 82962 GLUCOSE BLOOD TEST: CPT

## 2025-06-30 PROCEDURE — 71045 X-RAY EXAM CHEST 1 VIEW: CPT

## 2025-06-30 RX ORDER — MELATONIN 5 MG
1 TABLET ORAL
Qty: 0 | Refills: 0 | DISCHARGE
Start: 2025-06-30

## 2025-06-30 RX ORDER — ACETAMINOPHEN 500 MG/5ML
2 LIQUID (ML) ORAL
Qty: 0 | Refills: 0 | DISCHARGE
Start: 2025-06-30

## 2025-06-30 RX ORDER — SENNA 187 MG
2 TABLET ORAL
Qty: 0 | Refills: 0 | DISCHARGE
Start: 2025-06-30

## 2025-06-30 RX ADMIN — BUSPIRONE HYDROCHLORIDE 7.5 MILLIGRAM(S): 15 TABLET ORAL at 17:37

## 2025-06-30 RX ADMIN — IPRATROPIUM BROMIDE AND ALBUTEROL SULFATE 3 MILLILITER(S): .5; 2.5 SOLUTION RESPIRATORY (INHALATION) at 06:28

## 2025-06-30 RX ADMIN — IPRATROPIUM BROMIDE AND ALBUTEROL SULFATE 3 MILLILITER(S): .5; 2.5 SOLUTION RESPIRATORY (INHALATION) at 17:37

## 2025-06-30 RX ADMIN — Medication 81 MILLIGRAM(S): at 12:06

## 2025-06-30 RX ADMIN — IPRATROPIUM BROMIDE AND ALBUTEROL SULFATE 3 MILLILITER(S): .5; 2.5 SOLUTION RESPIRATORY (INHALATION) at 12:06

## 2025-06-30 RX ADMIN — BUSPIRONE HYDROCHLORIDE 7.5 MILLIGRAM(S): 15 TABLET ORAL at 06:28

## 2025-06-30 RX ADMIN — DULOXETINE 30 MILLIGRAM(S): 20 CAPSULE, DELAYED RELEASE ORAL at 12:06

## 2025-06-30 NOTE — PROGRESS NOTE ADULT - SUBJECTIVE AND OBJECTIVE BOX
**INCOMPLETE NOTE    OVERNIGHT EVENTS:    SUBJECTIVE:  Patient seen and examined at bedside.    Vital Signs Last 12 Hrs  T(F): 98.1 (06-30-25 @ 09:07), Max: 98.8 (06-30-25 @ 07:00)  HR: 91 (06-30-25 @ 09:07) (74 - 91)  BP: 154/90 (06-30-25 @ 09:07) (142/84 - 154/90)  BP(mean): --  RR: 17 (06-30-25 @ 09:07) (17 - 18)  SpO2: 92% (06-30-25 @ 09:07) (92% - 93%)  I&O's Summary      PHYSICAL EXAM:    Constitutional: WDWN resting comfortably in bed; NAD  Head: NC/AT  Eyes: PERRL, EOMI, anicteric sclera  ENT: no nasal discharge; uvula midline, no oropharyngeal erythema or exudates; MMM  Neck: supple; no JVD or thyromegaly  Respiratory: CTA B/L; no W/R/R, no retractions  Cardiac: +S1/S2; RRR; no M/R/G; PMI non-displaced  Gastrointestinal: abdomen soft, NT/ND; no rebound or guarding; +BSx4  Extremities: WWP, no clubbing or cyanosis; no peripheral edema  Musculoskeletal: NROM x4; no joint swelling, tenderness or erythema  Vascular: 2+ radial, DP/PT pulses B/L  Lymphatic: no submandibular or cervical LAD  Neurologic: AAOx3  Psychiatric: affect and characteristics of appearance, verbalizations, behaviors are appropriate    LABS:                        12.0   8.83  )-----------( 296      ( 29 Jun 2025 06:23 )             39.8     06-29    142  |  99  |  25[H]  ----------------------------<  107[H]  5.0   |  32[H]  |  0.92    Ca    9.7      29 Jun 2025 06:23    TPro  7.6  /  Alb  4.1  /  TBili  0.4  /  DBili  x   /  AST  27  /  ALT  11  /  AlkPhos  61  06-29      Urinalysis Basic - ( 29 Jun 2025 06:23 )    Color: x / Appearance: x / SG: x / pH: x  Gluc: 107 mg/dL / Ketone: x  / Bili: x / Urobili: x   Blood: x / Protein: x / Nitrite: x   Leuk Esterase: x / RBC: x / WBC x   Sq Epi: x / Non Sq Epi: x / Bacteria: x          RADIOLOGY & ADDITIONAL TESTS:    MEDICATIONS  (STANDING):  albuterol/ipratropium for Nebulization 3 milliLiter(s) Nebulizer every 6 hours  aspirin  chewable 81 milliGRAM(s) Oral daily  busPIRone 7.5 milliGRAM(s) Oral two times a day  DULoxetine 30 milliGRAM(s) Oral daily  latanoprost 0.005% Ophthalmic Solution 1 Drop(s) Both EYES at bedtime    MEDICATIONS  (PRN):  acetaminophen     Tablet .. 650 milliGRAM(s) Oral every 6 hours PRN Temp greater or equal to 38C (100.4F), Mild Pain (1 - 3)  aluminum hydroxide/magnesium hydroxide/simethicone Suspension 30 milliLiter(s) Oral every 4 hours PRN Dyspepsia  melatonin 3 milliGRAM(s) Oral at bedtime PRN Insomnia  ondansetron Injectable 4 milliGRAM(s) IV Push every 8 hours PRN Nausea and/or Vomiting  senna 2 Tablet(s) Oral at bedtime PRN for constipation   OVERNIGHT EVENTS:  -allyssa    SUBJECTIVE:  Patient seen and examined at bedside. Pt resting comfortably in bed; in NAD. Pt denies any chest pain, sob, f/c or n/v/d. Pt on 2L O2 NC.     Vital Signs Last 12 Hrs  T(F): 98.1 (06-30-25 @ 09:07), Max: 98.8 (06-30-25 @ 07:00)  HR: 91 (06-30-25 @ 09:07) (74 - 91)  BP: 154/90 (06-30-25 @ 09:07) (142/84 - 154/90)  BP(mean): --  RR: 17 (06-30-25 @ 09:07) (17 - 18)  SpO2: 92% (06-30-25 @ 09:07) (92% - 93%)  I&O's Summary      PHYSICAL EXAM:    Constitutional: WDWN resting comfortably in bed; NAD  Head: NC/AT  Eyes: PERRL, EOMI, anicteric sclera  ENT: no nasal discharge; uvula midline, no oropharyngeal erythema or exudates; MMM  Neck: supple; no JVD or thyromegaly  Respiratory: CTA B/L; no W/R/R, no retractions  Cardiac: +S1/S2; RRR; no M/R/G; PMI non-displaced  Gastrointestinal: abdomen soft, NT/ND; no rebound or guarding; +BSx4  Extremities: WWP, no clubbing or cyanosis; no peripheral edema  Musculoskeletal: NROM x4; no joint swelling, tenderness or erythema  Vascular: 2+ radial, DP/PT pulses B/L  Lymphatic: no submandibular or cervical LAD  Neurologic: AAOx2  Psychiatric: affect and characteristics of appearance, verbalizations, behaviors are appropriate    LABS:                        12.0   8.83  )-----------( 296      ( 29 Jun 2025 06:23 )             39.8     06-29    142  |  99  |  25[H]  ----------------------------<  107[H]  5.0   |  32[H]  |  0.92    Ca    9.7      29 Jun 2025 06:23    TPro  7.6  /  Alb  4.1  /  TBili  0.4  /  DBili  x   /  AST  27  /  ALT  11  /  AlkPhos  61  06-29      Urinalysis Basic - ( 29 Jun 2025 06:23 )    Color: x / Appearance: x / SG: x / pH: x  Gluc: 107 mg/dL / Ketone: x  / Bili: x / Urobili: x   Blood: x / Protein: x / Nitrite: x   Leuk Esterase: x / RBC: x / WBC x   Sq Epi: x / Non Sq Epi: x / Bacteria: x          RADIOLOGY & ADDITIONAL TESTS:    MEDICATIONS  (STANDING):  albuterol/ipratropium for Nebulization 3 milliLiter(s) Nebulizer every 6 hours  aspirin  chewable 81 milliGRAM(s) Oral daily  busPIRone 7.5 milliGRAM(s) Oral two times a day  DULoxetine 30 milliGRAM(s) Oral daily  latanoprost 0.005% Ophthalmic Solution 1 Drop(s) Both EYES at bedtime    MEDICATIONS  (PRN):  acetaminophen     Tablet .. 650 milliGRAM(s) Oral every 6 hours PRN Temp greater or equal to 38C (100.4F), Mild Pain (1 - 3)  aluminum hydroxide/magnesium hydroxide/simethicone Suspension 30 milliLiter(s) Oral every 4 hours PRN Dyspepsia  melatonin 3 milliGRAM(s) Oral at bedtime PRN Insomnia  ondansetron Injectable 4 milliGRAM(s) IV Push every 8 hours PRN Nausea and/or Vomiting  senna 2 Tablet(s) Oral at bedtime PRN for constipation

## 2025-06-30 NOTE — PROGRESS NOTE ADULT - PROBLEM SELECTOR PLAN 6
Wellbutrin 150 XR qd, Buspar 10 mg BID, Alprazolam 0.25 mg BID, Lexapro 20 mg qd    - resume home meds after med rec

## 2025-06-30 NOTE — PROGRESS NOTE ADULT - PROBLEM SELECTOR PLAN 1
Pt with 3 days cough, sob, wheezing at home. VBG with moderate-severe hypercapnia Pco2 77 on admission. Improved with 2L NC.   - 0/4 SIRS on admission. CXR no consolidation/opacity  - limited RVP negative, Covid negative  - Trops 87 > 70; Probnp 4K, but no ssx overload; EKG NSR similar to previous, no ischemic changes    - RVP pos for hMPV  During oxygen testing patient is desatting due to URI (HMPV) and will require home oxygen. Pt is mobile in home and will require portable oxygen system.      - Ambi sat 89% on 2L O2, consider discharge tmw on supplemental oxygen

## 2025-06-30 NOTE — PROGRESS NOTE ADULT - PROBLEM SELECTOR PLAN 2
Intermittent hypertensive episodes during hospital stay; patient on no home medications for HTN    -f/u PCP for HTN management

## 2025-06-30 NOTE — DISCHARGE NOTE NURSING/CASE MANAGEMENT/SOCIAL WORK - NSDCVIVACCINE_GEN_ALL_CORE_FT
Tdap; 13-Feb-2018 19:16; Lit Romero); [employer]; [id number]; IntraMuscular; Deltoid Right.; 0.5 milliLiter(s); VIS (VIS Published: 09-May-2013, VIS Presented: 13-Feb-2018);

## 2025-06-30 NOTE — DISCHARGE NOTE NURSING/CASE MANAGEMENT/SOCIAL WORK - NSDCPEFALRISK_GEN_ALL_CORE
For information on Fall & Injury Prevention, visit: [email or web address] OR  [email or web address] OR  https://www.cdc.gov/steadi/patient.html

## 2025-06-30 NOTE — CHART NOTE - NSCHARTNOTEFT_GEN_A_CORE
During oxygen testing patient is desatting due to URI (HMPV) and will require home oxygen. Pt is mobile in home and will require portable oxygen system

## 2025-06-30 NOTE — DISCHARGE NOTE NURSING/CASE MANAGEMENT/SOCIAL WORK - FINANCIAL ASSISTANCE
[facility] provides services at a reduced cost to those who are determined to be eligible through [facility]’s financial assistance program. Information regarding [facility]’s financial assistance program can be found by going to [email or web address] or by calling [phone].

## 2025-06-30 NOTE — DISCHARGE NOTE NURSING/CASE MANAGEMENT/SOCIAL WORK - PATIENT PORTAL LINK FT
You can access the FollowMyHealth Patient Portal offered by [facility] by registering at the following website: [email or web address]. By joining [employer]’s FollowMyHealth portal, you will also be able to view your health information using other applications (apps) compatible with our system.

## 2025-06-30 NOTE — PROGRESS NOTE ADULT - PROBLEM SELECTOR PLAN 7
Vibegron 75 mg qd continue    - f/u bladder scan; no urinary retention.

## 2025-06-30 NOTE — PROGRESS NOTE ADULT - PROBLEM SELECTOR PLAN 8
F: none  E: replete as needed  N: regular diet  DVT ppx: lovenox    CODE: FULL

## 2025-06-30 NOTE — PROGRESS NOTE ADULT - PROBLEM SELECTOR PLAN 5
hsTrop down-trended, suspect demand

## 2025-06-30 NOTE — PROGRESS NOTE ADULT - ASSESSMENT
This is a 89 yo F with HTN (not on meds), CHF (not on any meds), AS, HLD, h/o C2 fracture s/p surgery, BIB [facility] for SOB for 3 days, admitted for acute hypercapnic respiratory failure  2/2 hMPV. During oxygen testing patient is desatting due to URI (HMPV) and will require home oxygen. Pt is mobile in home and will require portable oxygen system. This is a 89 yo F with HTN (not on meds), CHF (not on any meds), AS, HLD, h/o C2 fracture s/p surgery, BIB [facility] for SOB for 3 days, admitted for acute hypercapnic respiratory failure  2/2 hMPV. During oxygen testing patient is desatting due to URI (HMPV) and will require home oxygen. Pt is mobile in home and will require portable oxygen system.

## 2025-06-30 NOTE — PROGRESS NOTE ADULT - PROBLEM SELECTOR PROBLEM 5
Type 2 MI (myocardial infarction)

## 2025-06-30 NOTE — PROGRESS NOTE ADULT - PROBLEM SELECTOR PLAN 3
Patient on no home medications for CHF. No signs of fluid overload such as LE edema, pulmonary congestion/edema, well perfused.     -f/u PCP for further management or referrals

## 2025-07-02 LAB
CULTURE RESULTS: SIGNIFICANT CHANGE UP
CULTURE RESULTS: SIGNIFICANT CHANGE UP
SPECIMEN SOURCE: SIGNIFICANT CHANGE UP
SPECIMEN SOURCE: SIGNIFICANT CHANGE UP

## 2025-07-02 NOTE — DISCHARGE NOTE NURSING/CASE MANAGEMENT/SOCIAL WORK - NSPROEXTENSIONSOFSELF_GEN_A_NUR
Body mass index is 30.54 kg/m².  Increases insulin resistance.   Discussed DM diet and exercise.      wheelchair

## 2025-07-02 NOTE — CONSULT NOTE ADULT - PROBLEM SELECTOR PROBLEM 7
Subjective     Jenny Qureshi is a 34 y.o. female who presents for the following: Suspicious Skin Lesion (LV 04/02/25 FBSE - Area(s) of concern: right scapula, scab, no symptoms, no topicals, no past treatments).   Patient is pregnant and due 10/7/2025.         Review of Systems:  No other skin or systemic complaints other than what is documented elsewhere in the note.    The following portions of the chart were reviewed this encounter and updated as appropriate:          Skin Cancer History  Biopsy Log Book  No skin cancers from Specimen Tracking.    Additional History      Specialty Problems          Dermatology Problems    Facial rash        Objective   Well appearing patient in no apparent distress; mood and affect are within normal limits.    A focused skin examination was performed. All findings within normal limits unless otherwise noted below.    Assessment/Plan   Skin Exam  1. VIRAL WARTS, UNSPECIFIED TYPE  Right Upper Back  Pink verrucous scaly papule  Warts are common growths that can appear anywhere on the body and are due to the HPV virus. There are many treatment options available, however if the lesions are asymptomatic they do not have to be treated.  Treatments include liquid nitrogen (LN&2), electrodesiccation & curettage (ED&C), laser, topical prescription or over-the counter products. Regardless of treatment chosen, warts often require multiple treatments and may recur after therapy.    Discussed risks and benefits of LN2, ED&C, candida injection, laser treatment, Over-the-counter treatments and observation.     Patient elected for LN2, The risks and benefits of LN2 were reviewed including incomplete removal, crusting, blister hypo and/or hyperpigmentation, scarring and recurrence.  .   Destr of lesion - Right Upper Back  Complexity: simple    Destruction method: cryotherapy    Informed consent: discussed and consent obtained    Lesion destroyed using liquid nitrogen: Yes    Cryotherapy  cycles:  3  Outcome: patient tolerated procedure well with no complications    Post-procedure details: wound care instructions given         Advance care planning

## 2025-07-05 DIAGNOSIS — Z88.1 ALLERGY STATUS TO OTHER ANTIBIOTIC AGENTS: ICD-10-CM

## 2025-07-05 DIAGNOSIS — H40.9 UNSPECIFIED GLAUCOMA: ICD-10-CM

## 2025-07-05 DIAGNOSIS — Z88.0 ALLERGY STATUS TO PENICILLIN: ICD-10-CM

## 2025-07-05 DIAGNOSIS — J96.01 ACUTE RESPIRATORY FAILURE WITH HYPOXIA: ICD-10-CM

## 2025-07-05 DIAGNOSIS — I50.32 CHRONIC DIASTOLIC (CONGESTIVE) HEART FAILURE: ICD-10-CM

## 2025-07-05 DIAGNOSIS — J06.9 ACUTE UPPER RESPIRATORY INFECTION, UNSPECIFIED: ICD-10-CM

## 2025-07-05 DIAGNOSIS — I11.0 HYPERTENSIVE HEART DISEASE WITH HEART FAILURE: ICD-10-CM

## 2025-07-05 DIAGNOSIS — J96.02 ACUTE RESPIRATORY FAILURE WITH HYPERCAPNIA: ICD-10-CM

## 2025-07-05 DIAGNOSIS — I35.0 NONRHEUMATIC AORTIC (VALVE) STENOSIS: ICD-10-CM

## 2025-07-05 DIAGNOSIS — J18.9 PNEUMONIA, UNSPECIFIED ORGANISM: ICD-10-CM

## 2025-07-05 DIAGNOSIS — B97.81 HUMAN METAPNEUMOVIRUS AS THE CAUSE OF DISEASES CLASSIFIED ELSEWHERE: ICD-10-CM

## 2025-07-05 DIAGNOSIS — Z95.3 PRESENCE OF XENOGENIC HEART VALVE: ICD-10-CM

## 2025-07-05 DIAGNOSIS — I44.7 LEFT BUNDLE-BRANCH BLOCK, UNSPECIFIED: ICD-10-CM

## 2025-07-05 DIAGNOSIS — Z95.5 PRESENCE OF CORONARY ANGIOPLASTY IMPLANT AND GRAFT: ICD-10-CM

## 2025-07-05 DIAGNOSIS — F32.9 MAJOR DEPRESSIVE DISORDER, SINGLE EPISODE, UNSPECIFIED: ICD-10-CM

## 2025-07-05 DIAGNOSIS — I21.A1 MYOCARDIAL INFARCTION TYPE 2: ICD-10-CM

## 2025-07-05 DIAGNOSIS — Z96.643 PRESENCE OF ARTIFICIAL HIP JOINT, BILATERAL: ICD-10-CM

## 2025-07-05 DIAGNOSIS — R33.9 RETENTION OF URINE, UNSPECIFIED: ICD-10-CM

## 2025-07-23 ENCOUNTER — OUTPATIENT (OUTPATIENT)
Dept: OUTPATIENT SERVICES | Facility: HOSPITAL | Age: 89
LOS: 1 days | End: 2025-07-23
Payer: MEDICARE

## 2025-07-23 DIAGNOSIS — Z95.2 PRESENCE OF PROSTHETIC HEART VALVE: Chronic | ICD-10-CM

## 2025-07-23 DIAGNOSIS — Z98.890 OTHER SPECIFIED POSTPROCEDURAL STATES: Chronic | ICD-10-CM

## 2025-07-23 DIAGNOSIS — Z95.5 PRESENCE OF CORONARY ANGIOPLASTY IMPLANT AND GRAFT: Chronic | ICD-10-CM

## 2025-07-23 DIAGNOSIS — Z96.649 PRESENCE OF UNSPECIFIED ARTIFICIAL HIP JOINT: Chronic | ICD-10-CM

## 2025-07-23 PROCEDURE — 71046 X-RAY EXAM CHEST 2 VIEWS: CPT | Mod: 26

## 2025-07-23 PROCEDURE — 71046 X-RAY EXAM CHEST 2 VIEWS: CPT

## 2025-07-24 NOTE — PRE-OP CHECKLIST - BSA (M2)
Immediate Brief Procedure Note    Patient: Marietta Slagado    Preoperative Diagnosis: Rectal bleeding [K62.5]  Chronic constipation [K59.09]  Bloating [R14.0]     Postoperative Diagnosis: Rectal bleeding [K62.5]  Chronic constipation [K59.09]  Bloating [R14.0]    Procedure: Procedure(s) (LRB):  COLONOSCOPY (N/A)     Surgeon:  Mario Helm MD    Assistants: None    Anesthesia Staff: Anesthesia Provider    Anesthesia Type: MAC    Findings: Normal colonoscopy, redundant sigmoid colon    Estimated Blood Loss: None    Complications: None    Specimens Removed: None    Implant: None        
1.56

## 2025-08-01 ENCOUNTER — EMERGENCY (EMERGENCY)
Facility: HOSPITAL | Age: 89
LOS: 1 days | End: 2025-08-01
Attending: STUDENT IN AN ORGANIZED HEALTH CARE EDUCATION/TRAINING PROGRAM | Admitting: EMERGENCY MEDICINE
Payer: MEDICARE

## 2025-08-01 VITALS
OXYGEN SATURATION: 96 % | TEMPERATURE: 98 F | WEIGHT: 119.93 LBS | RESPIRATION RATE: 17 BRPM | DIASTOLIC BLOOD PRESSURE: 50 MMHG | HEART RATE: 73 BPM | SYSTOLIC BLOOD PRESSURE: 97 MMHG

## 2025-08-01 VITALS
SYSTOLIC BLOOD PRESSURE: 131 MMHG | TEMPERATURE: 98 F | HEART RATE: 85 BPM | DIASTOLIC BLOOD PRESSURE: 85 MMHG | RESPIRATION RATE: 18 BRPM | OXYGEN SATURATION: 99 %

## 2025-08-01 DIAGNOSIS — Z98.890 OTHER SPECIFIED POSTPROCEDURAL STATES: Chronic | ICD-10-CM

## 2025-08-01 DIAGNOSIS — Z95.2 PRESENCE OF PROSTHETIC HEART VALVE: Chronic | ICD-10-CM

## 2025-08-01 DIAGNOSIS — Z96.649 PRESENCE OF UNSPECIFIED ARTIFICIAL HIP JOINT: Chronic | ICD-10-CM

## 2025-08-01 DIAGNOSIS — Z95.5 PRESENCE OF CORONARY ANGIOPLASTY IMPLANT AND GRAFT: Chronic | ICD-10-CM

## 2025-08-01 PROCEDURE — 99283 EMERGENCY DEPT VISIT LOW MDM: CPT

## 2025-08-01 RX ORDER — POLYETHYLENE GLYCOL 3350 17 G/17G
17 POWDER, FOR SOLUTION ORAL ONCE
Refills: 0 | Status: COMPLETED | OUTPATIENT
Start: 2025-08-01 | End: 2025-08-01

## 2025-08-01 RX ORDER — POLYETHYLENE GLYCOL 3350 17 G/17G
17 POWDER, FOR SOLUTION ORAL
Qty: 1 | Refills: 0
Start: 2025-08-01

## 2025-08-01 RX ADMIN — POLYETHYLENE GLYCOL 3350 17 GRAM(S): 17 POWDER, FOR SOLUTION ORAL at 16:03

## 2025-08-05 DIAGNOSIS — Q25.1 COARCTATION OF AORTA: ICD-10-CM

## 2025-08-05 DIAGNOSIS — Z88.0 ALLERGY STATUS TO PENICILLIN: ICD-10-CM

## 2025-08-05 DIAGNOSIS — Z88.1 ALLERGY STATUS TO OTHER ANTIBIOTIC AGENTS: ICD-10-CM

## 2025-08-05 DIAGNOSIS — I25.10 ATHEROSCLEROTIC HEART DISEASE OF NATIVE CORONARY ARTERY WITHOUT ANGINA PECTORIS: ICD-10-CM

## 2025-08-05 DIAGNOSIS — I50.9 HEART FAILURE, UNSPECIFIED: ICD-10-CM

## 2025-08-05 DIAGNOSIS — Z96.643 PRESENCE OF ARTIFICIAL HIP JOINT, BILATERAL: ICD-10-CM

## 2025-08-05 DIAGNOSIS — I11.0 HYPERTENSIVE HEART DISEASE WITH HEART FAILURE: ICD-10-CM

## 2025-08-05 DIAGNOSIS — K59.00 CONSTIPATION, UNSPECIFIED: ICD-10-CM

## 2025-08-05 DIAGNOSIS — E78.5 HYPERLIPIDEMIA, UNSPECIFIED: ICD-10-CM

## 2025-08-19 ENCOUNTER — NON-APPOINTMENT (OUTPATIENT)
Age: 89
End: 2025-08-19

## 2025-08-19 ENCOUNTER — APPOINTMENT (OUTPATIENT)
Dept: OPHTHALMOLOGY | Facility: CLINIC | Age: 89
End: 2025-08-19
Payer: MEDICARE

## 2025-08-19 ENCOUNTER — APPOINTMENT (OUTPATIENT)
Dept: NEPHROLOGY | Facility: CLINIC | Age: 89
End: 2025-08-19
Payer: MEDICARE

## 2025-08-19 DIAGNOSIS — R09.02 HYPOXEMIA: ICD-10-CM

## 2025-08-19 DIAGNOSIS — I50.32 CHRONIC DIASTOLIC (CONGESTIVE) HEART FAILURE: ICD-10-CM

## 2025-08-19 DIAGNOSIS — I10 ESSENTIAL (PRIMARY) HYPERTENSION: ICD-10-CM

## 2025-08-19 DIAGNOSIS — R63.1 POLYDIPSIA: ICD-10-CM

## 2025-08-19 DIAGNOSIS — B94.8 SEQUELAE OF OTHER SPECIFIED INFECTIOUS AND PARASITIC DISEASES: ICD-10-CM

## 2025-08-19 PROCEDURE — G2212 PROLONG OUTPT/OFFICE VIS: CPT

## 2025-08-19 PROCEDURE — 92012 INTRM OPH EXAM EST PATIENT: CPT

## 2025-08-19 PROCEDURE — 99215 OFFICE O/P EST HI 40 MIN: CPT

## 2025-08-19 PROCEDURE — G2211 COMPLEX E/M VISIT ADD ON: CPT

## 2025-08-21 VITALS — SYSTOLIC BLOOD PRESSURE: 110 MMHG | RESPIRATION RATE: 12 BRPM | HEART RATE: 60 BPM | DIASTOLIC BLOOD PRESSURE: 68 MMHG

## 2025-09-10 ENCOUNTER — EMERGENCY (EMERGENCY)
Age: 89
LOS: 1 days | End: 2025-09-10
Attending: EMERGENCY MEDICINE | Admitting: EMERGENCY MEDICINE
Payer: MEDICARE

## 2025-09-10 VITALS
TEMPERATURE: 99 F | SYSTOLIC BLOOD PRESSURE: 123 MMHG | RESPIRATION RATE: 18 BRPM | HEART RATE: 71 BPM | DIASTOLIC BLOOD PRESSURE: 70 MMHG | OXYGEN SATURATION: 97 % | WEIGHT: 130.07 LBS

## 2025-09-10 VITALS
HEART RATE: 79 BPM | SYSTOLIC BLOOD PRESSURE: 156 MMHG | TEMPERATURE: 98 F | DIASTOLIC BLOOD PRESSURE: 90 MMHG | OXYGEN SATURATION: 95 % | RESPIRATION RATE: 16 BRPM

## 2025-09-10 DIAGNOSIS — R06.02 SHORTNESS OF BREATH: ICD-10-CM

## 2025-09-10 DIAGNOSIS — Z88.0 ALLERGY STATUS TO PENICILLIN: ICD-10-CM

## 2025-09-10 DIAGNOSIS — Z95.5 PRESENCE OF CORONARY ANGIOPLASTY IMPLANT AND GRAFT: Chronic | ICD-10-CM

## 2025-09-10 DIAGNOSIS — Z96.649 PRESENCE OF UNSPECIFIED ARTIFICIAL HIP JOINT: Chronic | ICD-10-CM

## 2025-09-10 DIAGNOSIS — Z95.2 PRESENCE OF PROSTHETIC HEART VALVE: Chronic | ICD-10-CM

## 2025-09-10 DIAGNOSIS — Z88.1 ALLERGY STATUS TO OTHER ANTIBIOTIC AGENTS: ICD-10-CM

## 2025-09-10 DIAGNOSIS — Z98.890 OTHER SPECIFIED POSTPROCEDURAL STATES: Chronic | ICD-10-CM

## 2025-09-10 LAB
ALBUMIN SERPL ELPH-MCNC: 3 G/DL — LOW (ref 3.4–5)
ALP SERPL-CCNC: 54 U/L — SIGNIFICANT CHANGE UP (ref 40–120)
ALT FLD-CCNC: 17 U/L — SIGNIFICANT CHANGE UP (ref 12–42)
ANION GAP SERPL CALC-SCNC: 4 MMOL/L — LOW (ref 9–16)
AST SERPL-CCNC: 24 U/L — SIGNIFICANT CHANGE UP (ref 15–37)
BASOPHILS # BLD AUTO: 0.02 K/UL — SIGNIFICANT CHANGE UP (ref 0–0.2)
BASOPHILS NFR BLD AUTO: 0.3 % — SIGNIFICANT CHANGE UP (ref 0–2)
BILIRUB SERPL-MCNC: 0.4 MG/DL — SIGNIFICANT CHANGE UP (ref 0.2–1.2)
BUN SERPL-MCNC: 16 MG/DL — SIGNIFICANT CHANGE UP (ref 7–23)
CALCIUM SERPL-MCNC: 9 MG/DL — SIGNIFICANT CHANGE UP (ref 8.5–10.5)
CHLORIDE SERPL-SCNC: 98 MMOL/L — SIGNIFICANT CHANGE UP (ref 96–108)
CO2 SERPL-SCNC: 37 MMOL/L — HIGH (ref 22–31)
CREAT SERPL-MCNC: 1.03 MG/DL — SIGNIFICANT CHANGE UP (ref 0.5–1.3)
D DIMER BLD IA.RAPID-MCNC: 910 NG/ML DDU — HIGH
EGFR: 50 ML/MIN/1.73M2 — LOW
EGFR: 50 ML/MIN/1.73M2 — LOW
EOSINOPHIL # BLD AUTO: 0.12 K/UL — SIGNIFICANT CHANGE UP (ref 0–0.5)
EOSINOPHIL NFR BLD AUTO: 1.6 % — SIGNIFICANT CHANGE UP (ref 0–6)
FLUAV AG NPH QL: SIGNIFICANT CHANGE UP
FLUBV AG NPH QL: SIGNIFICANT CHANGE UP
GLUCOSE SERPL-MCNC: 153 MG/DL — HIGH (ref 70–99)
HCT VFR BLD CALC: 33.2 % — LOW (ref 34.5–45)
HGB BLD-MCNC: 9.9 G/DL — LOW (ref 11.5–15.5)
IMM GRANULOCYTES # BLD AUTO: 0.02 K/UL — SIGNIFICANT CHANGE UP (ref 0–0.07)
IMM GRANULOCYTES NFR BLD AUTO: 0.3 % — SIGNIFICANT CHANGE UP (ref 0–0.9)
LIDOCAIN IGE QN: 26 U/L — SIGNIFICANT CHANGE UP (ref 16–77)
LYMPHOCYTES # BLD AUTO: 0.98 K/UL — LOW (ref 1–3.3)
LYMPHOCYTES NFR BLD AUTO: 13.2 % — SIGNIFICANT CHANGE UP (ref 13–44)
MAGNESIUM SERPL-MCNC: 2 MG/DL — SIGNIFICANT CHANGE UP (ref 1.6–2.6)
MCHC RBC-ENTMCNC: 29.8 G/DL — LOW (ref 32–36)
MCHC RBC-ENTMCNC: 30.4 PG — SIGNIFICANT CHANGE UP (ref 27–34)
MCV RBC AUTO: 101.8 FL — HIGH (ref 80–100)
MONOCYTES # BLD AUTO: 0.77 K/UL — SIGNIFICANT CHANGE UP (ref 0–0.9)
MONOCYTES NFR BLD AUTO: 10.4 % — SIGNIFICANT CHANGE UP (ref 2–14)
NEUTROPHILS # BLD AUTO: 5.5 K/UL — SIGNIFICANT CHANGE UP (ref 1.8–7.4)
NEUTROPHILS NFR BLD AUTO: 74.2 % — SIGNIFICANT CHANGE UP (ref 43–77)
NRBC # BLD AUTO: 0 K/UL — SIGNIFICANT CHANGE UP (ref 0–0)
NRBC # FLD: 0 K/UL — SIGNIFICANT CHANGE UP (ref 0–0)
NRBC BLD AUTO-RTO: 0 /100 WBCS — SIGNIFICANT CHANGE UP (ref 0–0)
NT-PROBNP SERPL-SCNC: 3130 PG/ML — HIGH
PLATELET # BLD AUTO: 280 K/UL — SIGNIFICANT CHANGE UP (ref 150–400)
PMV BLD: 10.1 FL — SIGNIFICANT CHANGE UP (ref 7–13)
POTASSIUM SERPL-MCNC: 4.4 MMOL/L — SIGNIFICANT CHANGE UP (ref 3.5–5.3)
POTASSIUM SERPL-SCNC: 4.4 MMOL/L — SIGNIFICANT CHANGE UP (ref 3.5–5.3)
PROT SERPL-MCNC: 7.3 G/DL — SIGNIFICANT CHANGE UP (ref 6.4–8.2)
RBC # BLD: 3.26 M/UL — LOW (ref 3.8–5.2)
RBC # FLD: 13.7 % — SIGNIFICANT CHANGE UP (ref 10.3–14.5)
RSV RNA NPH QL NAA+NON-PROBE: SIGNIFICANT CHANGE UP
SARS-COV-2 RNA SPEC QL NAA+PROBE: SIGNIFICANT CHANGE UP
SODIUM SERPL-SCNC: 139 MMOL/L — SIGNIFICANT CHANGE UP (ref 132–145)
SOURCE RESPIRATORY: SIGNIFICANT CHANGE UP
TROPONIN I, HIGH SENSITIVITY RESULT: 23 NG/L — SIGNIFICANT CHANGE UP
WBC # BLD: 7.41 K/UL — SIGNIFICANT CHANGE UP (ref 3.8–10.5)
WBC # FLD AUTO: 7.41 K/UL — SIGNIFICANT CHANGE UP (ref 3.8–10.5)

## 2025-09-10 PROCEDURE — 99285 EMERGENCY DEPT VISIT HI MDM: CPT

## 2025-09-10 PROCEDURE — 71045 X-RAY EXAM CHEST 1 VIEW: CPT | Mod: 26

## 2025-09-10 PROCEDURE — 71275 CT ANGIOGRAPHY CHEST: CPT | Mod: 26

## 2025-09-10 RX ORDER — FUROSEMIDE 10 MG/ML
1 INJECTION INTRAMUSCULAR; INTRAVENOUS
Qty: 7 | Refills: 0
Start: 2025-09-10 | End: 2025-09-16

## 2025-09-10 RX ORDER — FUROSEMIDE 10 MG/ML
20 INJECTION INTRAMUSCULAR; INTRAVENOUS ONCE
Refills: 0 | Status: COMPLETED | OUTPATIENT
Start: 2025-09-10 | End: 2025-09-10

## (undated) DEVICE — SUT ETHILON 10-0 12" TG160-4

## (undated) DEVICE — KNIFE ALCON I-KNIFE II STAB KNIFE STANDARD 3MM (GREEN)

## (undated) DEVICE — APPLICATOR COTTON TIP 3" STERILE

## (undated) DEVICE — SYR LUER LOK 1CC

## (undated) DEVICE — CANNULA IRR ANT CHAMBER 30G

## (undated) DEVICE — DRAPE MICROSCOPE KNOB COVER SMALL (2 PCS)

## (undated) DEVICE — DRAPE MAYO STAND 23"

## (undated) DEVICE — GLV 7.5 PROTEXIS (WHITE)

## (undated) DEVICE — SUT SILK 7-0 18" TG140-8

## (undated) DEVICE — ELCTR ERASER BI-P BVL 45DEG 18G

## (undated) DEVICE — SUT NYLON 9-0 5" BV130-5

## (undated) DEVICE — NDL HYPO REGULAR BEVEL 30G X .5"

## (undated) DEVICE — MARKING PEN W RULER

## (undated) DEVICE — BLADE MULTI SIDED MICROSCLECTROMY

## (undated) DEVICE — ELCTR BIPOLAR CORD 12FT

## (undated) DEVICE — SPEAR CELLULOSE 40410

## (undated) DEVICE — PACK ANTERIOR SEGMENT